# Patient Record
Sex: FEMALE | Race: WHITE | NOT HISPANIC OR LATINO | Employment: OTHER | URBAN - METROPOLITAN AREA
[De-identification: names, ages, dates, MRNs, and addresses within clinical notes are randomized per-mention and may not be internally consistent; named-entity substitution may affect disease eponyms.]

---

## 2017-01-10 ENCOUNTER — PATIENT MESSAGE (OUTPATIENT)
Dept: UROLOGY | Facility: CLINIC | Age: 77
End: 2017-01-10

## 2017-01-10 ENCOUNTER — TELEPHONE (OUTPATIENT)
Dept: UROLOGY | Facility: CLINIC | Age: 77
End: 2017-01-10

## 2017-01-10 DIAGNOSIS — N20.1 URETERAL CALCULUS, LEFT: Primary | ICD-10-CM

## 2017-01-10 NOTE — H&P
Subjective:       Patient ID: Valery Huggins is a 76 y.o. female.    Chief Complaint: Patient had undergone left ESWL on 12/5/2016.  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  Most recent KUB is able to visualize stone fragments alongside the stent.    Past Medical History   Diagnosis Date    Anemia     Cancer      thyroid    Cancer      Kidney    Depression     Diabetes mellitus     Diabetes mellitus type II     Encounter for blood transfusion     Gallstones     Hypertension     Kidney stone     MVP (mitral valve prolapse)     PONV (postoperative nausea and vomiting)     Thyroid disease      Past Surgical History   Procedure Laterality Date    Appendectomy      Hysterectomy      Tonsillectomy      Ectopic pregnancy surgery      Thyroidectomy       two times    Cataracts       both eyes    Cystoscopy      Cystoscopy w/ ureteral stent placement      Nasal septum surgery       Family History   Problem Relation Age of Onset    Hypertension Father     Kidney disease Father     Mental illness Mother     Cancer Brother      Social History     Social History    Marital status:      Spouse name: N/A    Number of children: N/A    Years of education: N/A     Social History Main Topics    Smoking status: Never Smoker    Smokeless tobacco: Not on file    Alcohol use No      Comment: per month    Drug use: No    Sexual activity: Yes     Partners: Male     Other Topics Concern    Not on file     Social History Narrative       Current Outpatient Prescriptions   Medication Sig Dispense Refill    ascorbic acid (VITAMIN C) 100 MG tablet Take 100 mg by mouth once daily.      aspirin (ECOTRIN) 81 MG EC tablet Take 81 mg by mouth once daily.      atenolol (TENORMIN) 25 MG tablet Take 1 tablet (25 mg total) by mouth once daily. 30 tablet 1    b complex vitamins capsule Take 1 capsule by mouth once daily.      BD ULTRA-FINE CONNIE  "PEN NEEDLES 32 gauge x 5/32" Ndle Use EVERY DAY  3    calcitRIOL (ROCALTROL) 0.5 MCG Cap Take 1 capsule (0.5 mcg total) by mouth once daily. 90 capsule 0    cholecalciferol, vitamin D3, (VITAMIN D3) 4,000 unit Cap Take 1 capsule by mouth once daily.      escitalopram oxalate (LEXAPRO) 20 MG tablet Take 1 tablet (20 mg total) by mouth once daily. 30 tablet 0    fenofibrate (TRICOR) 54 MG tablet Take 1 tablet (54 mg total) by mouth once daily. 90 tablet 0    ferrous sulfate 325 (65 FE) MG EC tablet Take 1 tablet (325 mg total) by mouth 3 (three) times daily. 180 tablet 0    JANUMET XR 50-1,000 mg TM24 Take 1 tablet by mouth 2 (two) times daily.  3    levothyroxine (SYNTHROID) 112 MCG tablet Take 1 tablet (112 mcg total) by mouth before breakfast. 90 tablet 0    magnesium oxide (MAG-OX) 400 mg tablet TAKE ONE TABLET BY MOUTH TWICE DAILY 180 tablet 0    meclizine (ANTIVERT) 25 mg tablet Take 1 tablet (25 mg total) by mouth 3 (three) times daily as needed for Dizziness or Nausea. 30 tablet 1    niacin 500 MG CpSR Take 500 mg by mouth every evening.       ondansetron (ZOFRAN, AS HYDROCHLORIDE,) 4 MG tablet Take 1 tablet (4 mg total) by mouth daily as needed for Nausea. 15 tablet 0    ONETOUCH DELICA LANCETS 33 gauge Misc TESR BLOOD SUGAR THREE TIMES DAILY  3    ONETOUCH VERIO Strp TEST BLOOD SUGAR THREE TIMES DAILY  3    pantoprazole (PROTONIX) 40 MG tablet TAKE ONE TABLET BY MOUTH EVERY DAY 90 tablet 0    valsartan-hydrochlorothiazide (DIOVAN-HCT) 160-12.5 mg per tablet Take 1 tablet by mouth once daily. 90 tablet 0    VICTOZA 3-XAVIER 0.6 mg/0.1 mL (18 mg/3 mL) PnIj 1.2 mg sq SUBCUTANEOUS every day  3     No current facility-administered medications for this visit.      Review of patient's allergies indicates:  No Known Allergies    Review of Systems   All other systems reviewed and are negative.      Objective:      There were no vitals filed for this visit.  Physical Exam   Constitutional: She appears " well-developed.   Eyes: Pupils are equal, round, and reactive to light.   Cardiovascular: Normal rate.    Pulmonary/Chest: Effort normal.   Abdominal: Soft.   Mild left flank tenderness        Assessment:       1. Ureteral calculus, left        Plan:       Cystoscopy left ureteroscopy stone extraction

## 2017-01-11 ENCOUNTER — HOSPITAL ENCOUNTER (OUTPATIENT)
Dept: PREADMISSION TESTING | Facility: HOSPITAL | Age: 77
Discharge: HOME | End: 2017-01-11
Attending: UROLOGY
Payer: MEDICARE

## 2017-01-11 VITALS — WEIGHT: 135 LBS | BODY MASS INDEX: 25.49 KG/M2 | HEIGHT: 61 IN

## 2017-01-11 DIAGNOSIS — N20.1 URETERAL CALCULUS, LEFT: ICD-10-CM

## 2017-01-11 LAB
ALBUMIN SERPL BCP-MCNC: 3.5 G/DL
ALP SERPL-CCNC: 52 U/L
ALT SERPL W/O P-5'-P-CCNC: 13 U/L
ANION GAP SERPL CALC-SCNC: 10 MMOL/L
AST SERPL-CCNC: 14 U/L
BASOPHILS # BLD AUTO: 0 K/UL
BASOPHILS NFR BLD: 0.5 %
BILIRUB SERPL-MCNC: 0.2 MG/DL
BUN SERPL-MCNC: 23 MG/DL
CALCIUM SERPL-MCNC: 9 MG/DL
CHLORIDE SERPL-SCNC: 99 MMOL/L
CO2 SERPL-SCNC: 28 MMOL/L
CREAT SERPL-MCNC: 1.2 MG/DL
DIFFERENTIAL METHOD: ABNORMAL
EOSINOPHIL # BLD AUTO: 0.4 K/UL
EOSINOPHIL NFR BLD: 5.2 %
ERYTHROCYTE [DISTWIDTH] IN BLOOD BY AUTOMATED COUNT: 14.5 %
EST. GFR  (AFRICAN AMERICAN): 51 ML/MIN/1.73 M^2
EST. GFR  (NON AFRICAN AMERICAN): 45 ML/MIN/1.73 M^2
GLUCOSE SERPL-MCNC: 183 MG/DL
HCT VFR BLD AUTO: 33.9 %
HGB BLD-MCNC: 11.3 G/DL
LYMPHOCYTES # BLD AUTO: 1.7 K/UL
LYMPHOCYTES NFR BLD: 22.2 %
MCH RBC QN AUTO: 28.2 PG
MCHC RBC AUTO-ENTMCNC: 33.4 %
MCV RBC AUTO: 85 FL
MONOCYTES # BLD AUTO: 0.5 K/UL
MONOCYTES NFR BLD: 6.9 %
NEUTROPHILS # BLD AUTO: 4.9 K/UL
NEUTROPHILS NFR BLD: 65.2 %
PLATELET # BLD AUTO: 320 K/UL
PMV BLD AUTO: 7.8 FL
POTASSIUM SERPL-SCNC: 4 MMOL/L
PROT SERPL-MCNC: 6.5 G/DL
RBC # BLD AUTO: 4.01 M/UL
SODIUM SERPL-SCNC: 137 MMOL/L
WBC # BLD AUTO: 7.5 K/UL

## 2017-01-11 PROCEDURE — 99900103 DSU ONLY-NO CHARGE-INITIAL HR (STAT)

## 2017-01-11 PROCEDURE — 99900104 DSU ONLY-NO CHARGE-EA ADD'L HR (STAT)

## 2017-01-11 PROCEDURE — 85025 COMPLETE CBC W/AUTO DIFF WBC: CPT

## 2017-01-11 PROCEDURE — 36415 COLL VENOUS BLD VENIPUNCTURE: CPT

## 2017-01-11 PROCEDURE — 80053 COMPREHEN METABOLIC PANEL: CPT

## 2017-01-13 ENCOUNTER — ANESTHESIA EVENT (OUTPATIENT)
Dept: SURGERY | Facility: HOSPITAL | Age: 77
End: 2017-01-13
Payer: COMMERCIAL

## 2017-01-16 ENCOUNTER — ANESTHESIA (OUTPATIENT)
Dept: SURGERY | Facility: HOSPITAL | Age: 77
End: 2017-01-16
Payer: COMMERCIAL

## 2017-01-16 ENCOUNTER — HOSPITAL ENCOUNTER (OUTPATIENT)
Facility: HOSPITAL | Age: 77
Discharge: HOME | End: 2017-01-16
Attending: UROLOGY | Admitting: UROLOGY
Payer: COMMERCIAL

## 2017-01-16 VITALS
DIASTOLIC BLOOD PRESSURE: 67 MMHG | BODY MASS INDEX: 26.24 KG/M2 | WEIGHT: 139 LBS | RESPIRATION RATE: 14 BRPM | HEART RATE: 77 BPM | SYSTOLIC BLOOD PRESSURE: 145 MMHG | HEIGHT: 61 IN | TEMPERATURE: 98 F | OXYGEN SATURATION: 100 %

## 2017-01-16 DIAGNOSIS — N20.1 URETERAL CALCULUS, LEFT: ICD-10-CM

## 2017-01-16 PROCEDURE — D9220A PRA ANESTHESIA: Mod: ANES,,, | Performed by: ANESTHESIOLOGY

## 2017-01-16 PROCEDURE — 52352 CYSTOURETERO W/STONE REMOVE: CPT | Mod: 58,LT,, | Performed by: UROLOGY

## 2017-01-16 PROCEDURE — 99900103 DSU ONLY-NO CHARGE-INITIAL HR (STAT): Performed by: UROLOGY

## 2017-01-16 PROCEDURE — 71000033 HC RECOVERY, INTIAL HOUR: Performed by: UROLOGY

## 2017-01-16 PROCEDURE — 37000008 HC ANESTHESIA 1ST 15 MINUTES: Performed by: UROLOGY

## 2017-01-16 PROCEDURE — 76000 FLUOROSCOPY <1 HR PHYS/QHP: CPT | Mod: 26,59,, | Performed by: UROLOGY

## 2017-01-16 PROCEDURE — 82370 X-RAY ASSAY CALCULUS: CPT

## 2017-01-16 PROCEDURE — C1773 RET DEV, INSERTABLE: HCPCS | Performed by: UROLOGY

## 2017-01-16 PROCEDURE — 74420 UROGRAPHY RTRGR +-KUB: CPT | Mod: 26,,, | Performed by: UROLOGY

## 2017-01-16 PROCEDURE — 71000015 HC POSTOP RECOV 1ST HR: Performed by: UROLOGY

## 2017-01-16 PROCEDURE — 63600175 PHARM REV CODE 636 W HCPCS: Performed by: NURSE ANESTHETIST, CERTIFIED REGISTERED

## 2017-01-16 PROCEDURE — 25000003 PHARM REV CODE 250: Performed by: ANESTHESIOLOGY

## 2017-01-16 PROCEDURE — 25000003 PHARM REV CODE 250: Performed by: NURSE ANESTHETIST, CERTIFIED REGISTERED

## 2017-01-16 PROCEDURE — D9220A PRA ANESTHESIA: Mod: CRNA,,, | Performed by: NURSE ANESTHETIST, CERTIFIED REGISTERED

## 2017-01-16 PROCEDURE — 36000706: Performed by: UROLOGY

## 2017-01-16 PROCEDURE — 36000707: Performed by: UROLOGY

## 2017-01-16 PROCEDURE — C1769 GUIDE WIRE: HCPCS | Performed by: UROLOGY

## 2017-01-16 PROCEDURE — 99900104 DSU ONLY-NO CHARGE-EA ADD'L HR (STAT): Performed by: UROLOGY

## 2017-01-16 PROCEDURE — 37000009 HC ANESTHESIA EA ADD 15 MINS: Performed by: UROLOGY

## 2017-01-16 PROCEDURE — 63600175 PHARM REV CODE 636 W HCPCS: Performed by: UROLOGY

## 2017-01-16 PROCEDURE — 25000003 PHARM REV CODE 250: Performed by: UROLOGY

## 2017-01-16 PROCEDURE — C1758 CATHETER, URETERAL: HCPCS | Performed by: UROLOGY

## 2017-01-16 RX ORDER — LIDOCAINE HCL/PF 100 MG/5ML
SYRINGE (ML) INTRAVENOUS
Status: DISCONTINUED | OUTPATIENT
Start: 2017-01-16 | End: 2017-01-16

## 2017-01-16 RX ORDER — PROPOFOL 10 MG/ML
VIAL (ML) INTRAVENOUS
Status: DISCONTINUED | OUTPATIENT
Start: 2017-01-16 | End: 2017-01-16

## 2017-01-16 RX ORDER — HYDROCODONE BITARTRATE AND ACETAMINOPHEN 5; 325 MG/1; MG/1
1 TABLET ORAL
Qty: 20 TABLET | Refills: 0 | Status: SHIPPED | OUTPATIENT
Start: 2017-01-16 | End: 2017-09-26

## 2017-01-16 RX ORDER — LIDOCAINE HYDROCHLORIDE 10 MG/ML
1 INJECTION, SOLUTION EPIDURAL; INFILTRATION; INTRACAUDAL; PERINEURAL
Status: DISCONTINUED | OUTPATIENT
Start: 2017-01-16 | End: 2017-01-16 | Stop reason: HOSPADM

## 2017-01-16 RX ORDER — PHENYLEPHRINE HYDROCHLORIDE 10 MG/ML
INJECTION INTRAVENOUS
Status: DISCONTINUED | OUTPATIENT
Start: 2017-01-16 | End: 2017-01-16

## 2017-01-16 RX ORDER — PHENAZOPYRIDINE HYDROCHLORIDE 200 MG/1
200 TABLET, FILM COATED ORAL ONCE
Status: CANCELLED | OUTPATIENT
Start: 2017-01-16 | End: 2017-01-16

## 2017-01-16 RX ORDER — HYDROCODONE BITARTRATE AND ACETAMINOPHEN 5; 325 MG/1; MG/1
1 TABLET ORAL ONCE
Status: COMPLETED | OUTPATIENT
Start: 2017-01-16 | End: 2017-01-16

## 2017-01-16 RX ORDER — SODIUM CHLORIDE 0.9 % (FLUSH) 0.9 %
3 SYRINGE (ML) INJECTION
Status: DISCONTINUED | OUTPATIENT
Start: 2017-01-16 | End: 2017-01-16 | Stop reason: HOSPADM

## 2017-01-16 RX ORDER — FENTANYL CITRATE 50 UG/ML
INJECTION, SOLUTION INTRAMUSCULAR; INTRAVENOUS
Status: DISCONTINUED | OUTPATIENT
Start: 2017-01-16 | End: 2017-01-16

## 2017-01-16 RX ORDER — FENTANYL CITRATE 50 UG/ML
25 INJECTION, SOLUTION INTRAMUSCULAR; INTRAVENOUS EVERY 5 MIN PRN
Status: DISCONTINUED | OUTPATIENT
Start: 2017-01-16 | End: 2017-01-16 | Stop reason: HOSPADM

## 2017-01-16 RX ORDER — SODIUM CHLORIDE, SODIUM LACTATE, POTASSIUM CHLORIDE, CALCIUM CHLORIDE 600; 310; 30; 20 MG/100ML; MG/100ML; MG/100ML; MG/100ML
10 INJECTION, SOLUTION INTRAVENOUS CONTINUOUS
Status: DISCONTINUED | OUTPATIENT
Start: 2017-01-17 | End: 2017-01-16 | Stop reason: HOSPADM

## 2017-01-16 RX ORDER — ONDANSETRON 2 MG/ML
INJECTION INTRAMUSCULAR; INTRAVENOUS
Status: DISCONTINUED | OUTPATIENT
Start: 2017-01-16 | End: 2017-01-16

## 2017-01-16 RX ORDER — LIDOCAINE HYDROCHLORIDE 20 MG/ML
JELLY TOPICAL
Status: DISCONTINUED | OUTPATIENT
Start: 2017-01-16 | End: 2017-01-16 | Stop reason: HOSPADM

## 2017-01-16 RX ORDER — CEFAZOLIN SODIUM 2 G/50ML
2 SOLUTION INTRAVENOUS
Status: COMPLETED | OUTPATIENT
Start: 2017-01-16 | End: 2017-01-16

## 2017-01-16 RX ORDER — HYDROCODONE BITARTRATE AND ACETAMINOPHEN 5; 325 MG/1; MG/1
1 TABLET ORAL EVERY 4 HOURS PRN
Status: CANCELLED | OUTPATIENT
Start: 2017-01-16

## 2017-01-16 RX ORDER — PHENAZOPYRIDINE HYDROCHLORIDE 100 MG/1
200 TABLET, FILM COATED ORAL
Qty: 20 TABLET | Refills: 0 | Status: SHIPPED | OUTPATIENT
Start: 2017-01-16 | End: 2017-02-23

## 2017-01-16 RX ORDER — CIPROFLOXACIN 500 MG/1
500 TABLET ORAL 2 TIMES DAILY
Qty: 20 TABLET | Refills: 0 | Status: SHIPPED | OUTPATIENT
Start: 2017-01-16 | End: 2017-01-26

## 2017-01-16 RX ORDER — METOCLOPRAMIDE HYDROCHLORIDE 5 MG/ML
10 INJECTION INTRAMUSCULAR; INTRAVENOUS EVERY 10 MIN PRN
Status: DISCONTINUED | OUTPATIENT
Start: 2017-01-16 | End: 2017-01-16 | Stop reason: HOSPADM

## 2017-01-16 RX ADMIN — PHENYLEPHRINE HYDROCHLORIDE 100 MCG: 10 INJECTION INTRAVENOUS at 12:01

## 2017-01-16 RX ADMIN — HYDROCODONE BITARTRATE AND ACETAMINOPHEN 1 TABLET: 5; 325 TABLET ORAL at 01:01

## 2017-01-16 RX ADMIN — ONDANSETRON 4 MG: 2 INJECTION, SOLUTION INTRAMUSCULAR; INTRAVENOUS at 11:01

## 2017-01-16 RX ADMIN — PROPOFOL 120 MG: 10 INJECTION, EMULSION INTRAVENOUS at 11:01

## 2017-01-16 RX ADMIN — CEFAZOLIN SODIUM 2 G: 2 SOLUTION INTRAVENOUS at 11:01

## 2017-01-16 RX ADMIN — LIDOCAINE HYDROCHLORIDE 50 MG: 20 INJECTION, SOLUTION INTRAVENOUS at 12:01

## 2017-01-16 RX ADMIN — FENTANYL CITRATE 25 MCG: 50 INJECTION INTRAMUSCULAR; INTRAVENOUS at 11:01

## 2017-01-16 RX ADMIN — SODIUM CHLORIDE, SODIUM LACTATE, POTASSIUM CHLORIDE, AND CALCIUM CHLORIDE 10 ML/HR: .6; .31; .03; .02 INJECTION, SOLUTION INTRAVENOUS at 09:01

## 2017-01-16 RX ADMIN — LIDOCAINE HYDROCHLORIDE 10 MG: 10 INJECTION, SOLUTION EPIDURAL; INFILTRATION; INTRACAUDAL; PERINEURAL at 09:01

## 2017-01-16 RX ADMIN — LIDOCAINE HYDROCHLORIDE 50 MG: 20 INJECTION, SOLUTION INTRAVENOUS at 11:01

## 2017-01-16 NOTE — ANESTHESIA PREPROCEDURE EVALUATION
01/16/2017  Valery Huggins is a 76 y.o., female.    [facility] Anesthesia Evaluation    I have reviewed the Patient Summary Reports.    I have reviewed the Nursing Notes.      Review of Systems  Anesthesia Hx:  PONV   Social:  Non-Smoker    Hematology/Oncology:         -- Anemia:   Cardiovascular:   Hypertension, well controlled Valvular problems/Murmurs, MVP ECG has been reviewed.    Pulmonary:  Pulmonary Normal    Renal/:   renal calculi    Neurological:  Neurology Normal    Endocrine:   Diabetes, well controlled, type 2 Hypothyroidism    Psych:   Psychiatric History          Physical Exam  General:  Well nourished    Airway/Jaw/Neck:  Airway Findings: Mouth Opening: Normal Tongue: Normal  General Airway Assessment: Adult  Mallampati: III  Improves to II with phonation.  TM Distance: Normal, at least 6 cm  Jaw/Neck Findings:  Neck ROM: Normal ROM  Neck Findings:  Thyroidectomy Scar      Dental:  Dental Findings:   Chest/Lungs:  Chest/Lungs Findings: Clear to auscultation     Heart/Vascular:  Heart Findings: Rate: Normal  Rhythm: Regular Rhythm  Sounds: Normal             Anesthesia Plan  Type of Anesthesia, risks & benefits discussed:  Anesthesia Type:  general  Patient's Preference:   Intra-op Monitoring Plan:   Intra-op Monitoring Plan Comments:   Post Op Pain Control Plan:   Post Op Pain Control Plan Comments:   Induction:   IV  Beta Blocker:  Patient is on a Beta-Blocker and has received one dose within the past 24 hours (No further documentation required).       Informed Consent: Patient understands risks and agrees with Anesthesia plan.  Questions answered. Anesthesia consent signed with patient.  ASA Score: 2     Day of Surgery Review of History & Physical:    H&P update referred to the surgeon.         Ready For Surgery From Anesthesia Perspective.

## 2017-01-16 NOTE — TRANSFER OF CARE
"Anesthesia Transfer of Care Note    Patient: Valery Huggins    Procedure(s) Performed: Procedure(s) (LRB):  EXTRACTION-STONE-URETEROSCOPY (Left)  PYELOGRAM-RETROGRADE (Left)  CYSTOSCOPY WITH STENT REMOVAL (Left)    Patient location: PACU    Anesthesia Type: general    Transport from OR: Transported from OR on 2-3 L/min O2 by NC with adequate spontaneous ventilation    Post pain: adequate analgesia    Post assessment: no apparent anesthetic complications    Post vital signs: stable    Level of consciousness: awake    Nausea/Vomiting: no nausea/vomiting    Complications: none          Last vitals:   Visit Vitals    BP (!) 120/59 (BP Location: Left arm, Patient Position: Lying, BP Method: Automatic)    Pulse 78    Temp 36.7 °C (98 °F) (Oral)    Resp 18    Ht 5' 1" (1.549 m)    Wt 63 kg (139 lb)    LMP 06/01/2012    SpO2 100%    Breastfeeding No    BMI 26.26 kg/m2     "

## 2017-01-16 NOTE — IP AVS SNAPSHOT
[address] [place] [address]  Phone: [phone]           Patient Discharge Instructions     Our goal is to set you up for success. This packet includes information on your condition, medications, and your home care. It will help you to care for yourself so you don't get sicker and need to go back to the hospital.     Please ask your nurse if you have any questions.        There are many details to remember when preparing to leave the hospital. Here is what you will need to do:    1. Take your medicine. If you are prescribed medications, review your Medication List in the following pages. You may have new medications to  at the pharmacy and others that you'll need to stop taking. Review the instructions for how and when to take your medications. Talk with your doctor or nurses if you are unsure of what to do.     2. Go to your follow-up appointments. Specific follow-up information is listed in the following pages. Your may be contacted by a transition nurse or clinical provider about future appointments. Be sure we have all of the phone numbers to reach you, if needed. Please contact your provider's office if you are unable to make an appointment.     3. Watch for warning signs. Your doctor or nurse will give you detailed warning signs to watch for and when to call for assistance. These instructions may also include educational information about your condition. If you experience any of warning signs to your health, call your doctor.               [facility]  Unless otherwise directed by your provider, please contact [facility], our nurse care line that is available for 24/7 assistance.     [phone] (toll-free)    Registered nurses in the [facility] provide clinical advisement, health education, appointment booking, and other advisory services.                    ** Verify the list of medication(s) below is accurate and up to date.  "Carry this with you in case of emergency. If your medications have changed, please notify your healthcare provider.             Medication List      START taking these medications        Additional Info                      ciprofloxacin HCl 500 MG tablet   Commonly known as:  CIPRO   Quantity:  20 tablet   Refills:  0   Dose:  500 mg    Instructions:  Take 1 tablet (500 mg total) by mouth 2 (two) times daily.     Begin Date    AM    Noon    PM    Bedtime       hydrocodone-acetaminophen 5-325mg 5-325 mg per tablet   Commonly known as:  NORCO   Quantity:  20 tablet   Refills:  0   Dose:  1 tablet    Instructions:  Take 1 tablet by mouth every 4 to 6 hours as needed for Pain.     Begin Date    AM    Noon    PM    Bedtime       phenazopyridine 100 MG tablet   Commonly known as:  PYRIDIUM   Quantity:  20 tablet   Refills:  0   Dose:  200 mg    Instructions:  Take 2 tablets (200 mg total) by mouth 3 (three) times daily with meals.     Begin Date    AM    Noon    PM    Bedtime         CONTINUE taking these medications        Additional Info                      aspirin 81 MG EC tablet   Commonly known as:  ECOTRIN   Refills:  0   Dose:  81 mg    Instructions:  Take 81 mg by mouth once daily.     Begin Date    AM    Noon    PM    Bedtime       atenolol 25 MG tablet   Commonly known as:  TENORMIN   Quantity:  30 tablet   Refills:  1   Dose:  25 mg   Comments:  This prescription was filled today. Any refills authorized will be placed on file.    Instructions:  Take 1 tablet (25 mg total) by mouth once daily.     Begin Date    AM    Noon    PM    Bedtime       b complex vitamins capsule   Refills:  0   Dose:  1 capsule    Instructions:  Take 1 capsule by mouth once daily.     Begin Date    AM    Noon    PM    Bedtime       BD ULTRA-FINE CONNIE PEN NEEDLES 32 gauge x 5/32" Ndle   Refills:  3   Generic drug:  pen needle, diabetic    Instructions:  Use EVERY DAY     Begin Date    AM    Noon    PM    Bedtime       calcitRIOL 0.5 " MCG Cap   Commonly known as:  ROCALTROL   Quantity:  90 capsule   Refills:  0   Dose:  0.5 mcg    Instructions:  Take 1 capsule (0.5 mcg total) by mouth once daily.     Begin Date    AM    Noon    PM    Bedtime       escitalopram oxalate 20 MG tablet   Commonly known as:  LEXAPRO   Quantity:  30 tablet   Refills:  0   Dose:  20 mg    Instructions:  Take 1 tablet (20 mg total) by mouth once daily.     Begin Date    AM    Noon    PM    Bedtime       fenofibrate 54 MG tablet   Commonly known as:  TRICOR   Quantity:  90 tablet   Refills:  0   Dose:  54 mg    Instructions:  Take 1 tablet (54 mg total) by mouth once daily.     Begin Date    AM    Noon    PM    Bedtime       ferrous sulfate 325 (65 FE) MG EC tablet   Quantity:  180 tablet   Refills:  0   Dose:  325 mg    Instructions:  Take 1 tablet (325 mg total) by mouth 3 (three) times daily.     Begin Date    AM    Noon    PM    Bedtime       JANUMET XR 50-1,000 mg Tm24   Refills:  3   Dose:  1 tablet   Generic drug:  SITagliptan-metformin    Instructions:  Take 1 tablet by mouth 2 (two) times daily.     Begin Date    AM    Noon    PM    Bedtime       levothyroxine 112 MCG tablet   Commonly known as:  SYNTHROID   Quantity:  90 tablet   Refills:  0   Dose:  112 mcg    Instructions:  Take 1 tablet (112 mcg total) by mouth before breakfast.     Begin Date    AM    Noon    PM    Bedtime       magnesium oxide 400 mg tablet   Commonly known as:  MAG-OX   Quantity:  180 tablet   Refills:  0    Instructions:  TAKE ONE TABLET BY MOUTH TWICE DAILY     Begin Date    AM    Noon    PM    Bedtime       meclizine 25 mg tablet   Commonly known as:  ANTIVERT   Quantity:  30 tablet   Refills:  1   Dose:  25 mg    Instructions:  Take 1 tablet (25 mg total) by mouth 3 (three) times daily as needed for Dizziness or Nausea.     Begin Date    AM    Noon    PM    Bedtime       niacin 500 MG Cpsr   Refills:  0   Dose:  500 mg    Instructions:  Take 500 mg by mouth every evening.     Begin Date     AM    Noon    PM    Bedtime       ondansetron 4 MG tablet   Commonly known as:  ZOFRAN (AS HYDROCHLORIDE)   Quantity:  15 tablet   Refills:  0   Dose:  4 mg    Instructions:  Take 1 tablet (4 mg total) by mouth daily as needed for Nausea.     Begin Date    AM    Noon    PM    Bedtime       ONETOUCH DELICA LANCETS 33 gauge Misc   Refills:  3   Generic drug:  lancets    Instructions:  TESR BLOOD SUGAR THREE TIMES DAILY     Begin Date    AM    Noon    PM    Bedtime       ONETOUCH VERIO Strp   Refills:  3   Generic drug:  blood sugar diagnostic    Instructions:  TEST BLOOD SUGAR THREE TIMES DAILY     Begin Date    AM    Noon    PM    Bedtime       pantoprazole 40 MG tablet   Commonly known as:  PROTONIX   Quantity:  90 tablet   Refills:  0    Instructions:  TAKE ONE TABLET BY MOUTH EVERY DAY     Begin Date    AM    Noon    PM    Bedtime       valsartan-hydrochlorothiazide 160-12.5 mg per tablet   Commonly known as:  DIOVAN-HCT   Quantity:  90 tablet   Refills:  0   Dose:  1 tablet    Instructions:  Take 1 tablet by mouth once daily.     Begin Date    AM    Noon    PM    Bedtime       VICTOZA 3-XAVIER 0.6 mg/0.1 mL (18 mg/3 mL) Pnij   Refills:  3   Generic drug:  liraglutide 0.6 mg/0.1 mL (18 mg/3 mL) subq PNIJ    Instructions:  1.2 mg sq SUBCUTANEOUS every day     Begin Date    AM    Noon    PM    Bedtime       VITAMIN C 100 MG tablet   Refills:  0   Dose:  100 mg   Generic drug:  ascorbic acid (vitamin C)    Instructions:  Take 100 mg by mouth once daily.     Begin Date    AM    Noon    PM    Bedtime       VITAMIN D3 4,000 unit Cap   Refills:  0   Dose:  1 capsule   Generic drug:  cholecalciferol (vitamin D3)    Instructions:  Take 1 capsule by mouth once daily.     Begin Date    AM    Noon    PM    Bedtime            Where to Get Your Medications      These medications were sent to [facility]  FRANNY Calvin [address]  [address] [address]     Phone:  [phone]      ciprofloxacin HCl 500 MG tablet    phenazopyridine 100 MG tablet         You can get these medications from any pharmacy     Bring a paper prescription for each of these medications     hydrocodone-acetaminophen 5-325mg 5-325 mg per tablet                  Please bring to all follow up appointments:    1. A copy of your discharge instructions.  2. All medicines you are currently taking in their original bottles.  3. Identification and insurance card.    Please arrive 15 minutes ahead of scheduled appointment time.    Please call 24 hours in advance if you must reschedule your appointment and/or time.        Your Scheduled Appointments     Jan 31, 2017  9:00 AM CST   Established Patient Visit with MD Carina Marcus - Urology ([facility])    [address]  [facility] [address]   [phone]            May 05, 2017 11:00 AM CDT   Diagnostic Xray with [facility] XROP    [facility] ([facility])    [address] [facility] [address] [phone]            May 05, 2017 11:15 AM CDT   Us Abdomen with [facility] USOPRuddy   [facility] ([facility])    [address] [facility] [address] [phone]            May 09, 2017  1:45 PM CDT   Established Patient Visit with Azar Donnelly MD   [facility] - Urology ([facility])    [address] [place] [address] [phone]              Follow-up Information     Follow up In 2 weeks.        Discharge Instructions     Future Orders    Activity as tolerated     Diet general     Questions:    Total calories:      Fat restriction, if any:      Protein restriction, if any:      Na restriction, if any:      Fluid restriction:      Additional restrictions:          Discharge Instructions       General Information:    1.  Do not drink alcoholic beverages including beer for 24 hours or as long as you are on pain medication..  2.  Do not drive a motor vehicle, operate machinery or power tools,  or signs legal papers for 24 hours or as long as you are on pain medication.   3.  You may experience light-headedness, dizziness, and sleepiness following surgery. Please do not stay alone. A responsible adult should be with you for this 24 hour period.  4.  Go home and rest.    5. Progress slowly to a normal diet unless instructed.  Otherwise, begin with liquids such as soft drinks, then soup and crackers working up to solid foods. Drink plenty of nonalcoholic fluids.  6.  Certain anesthetics and pain medications produce nausea and vomiting in certain       individuals. If nausea becomes a problem at home, call you doctor.    7. A nurse will be calling you sometime after surgery. Do not be alarmed. This is our way of finding out how you are doing.    8. Several times every hour while you are awake, take 2-3 deep breaths and cough. If you had stomach surgery hold a pillow or rolled towel firmly against your stomach before you cough. This will help with any pain the cough might cause.  9. Several times every hour while you are awake, pump and flex your feet 5-6 times and do foot circles. This will help prevent blood clots.    10.Call your doctor for severe pain, bleeding, fever, or signs or symptoms of infection (pain, swelling, redness, foul odor, drainage).Discharge Instructions: After Your Surgery/Procedure  Youve just had surgery. During surgery you were given medicine called anesthesia to keep you relaxed and free of pain. After surgery you may have some pain or nausea. This is common. Here are some tips for feeling better and getting well after surgery.     Stay on schedule with your medication.   Going home  Your doctor or nurse will show you how to take care of yourself when you go home. He or she will also answer your questions. Have an adult family member or friend drive you home.      For your safety we recommend these precaution for the first 24 hours after your procedure:  · Do not drive or use heavy  "equipment.  · Do not make important decisions or sign legal papers.  · Do not drink alcohol.  · Have someone stay with you, if needed. He or she can watch for problems and help keep you safe.  · Your concentration, balance, coordination, and judgement may be impaired for many hours after anesthesia.  Use caution when ambulating or standing up.     · You may feel weak and "washed out" after anesthesia and surgery.      Subtle residual effects of general anesthesia or sedation with regional / local anesthesia can last more than 24 hours.  Rest for the remainder of the day or longer if your Doctor/Surgeon has advised you to do so.  Although you may feel normal within the first 24 hours, your reflexes and mental ability may be impaired without you realizing it.  You may feel dizzy, lightheaded or sleepy for 24 hours or longer.      Be sure to go to all follow-up visits with your doctor. And rest after your surgery for as long as your doctor tells you to.  Coping with pain  If you have pain after surgery, pain medicine will help you feel better. Take it as told, before pain becomes severe. Also, ask your doctor or pharmacist about other ways to control pain. This might be with heat, ice, or relaxation. And follow any other instructions your surgeon or nurse gives you.  Tips for taking pain medicine  To get the best relief possible, remember these points:  · Pain medicines can upset your stomach. Taking them with a little food may help.  · Most pain relievers taken by mouth need at least 20 to 30 minutes to start to work.  · Taking medicine on a schedule can help you remember to take it. Try to time your medicine so that you can take it before starting an activity. This might be before you get dressed, go for a walk, or sit down for dinner.  · Constipation is a common side effect of pain medicines. Call your doctor before taking any medicines such as laxatives or stool softeners to help ease constipation. Also ask if you " should skip any foods. Drinking lots of fluids and eating foods such as fruits and vegetables that are high in fiber can also help. Remember, do not take laxatives unless your surgeon has prescribed them.  · Drinking alcohol and taking pain medicine can cause dizziness and slow your breathing. It can even be deadly. Do not drink alcohol while taking pain medicine.  · Pain medicine can make you react more slowly to things. Do not drive or run machinery while taking pain medicine.  Your health care provider may tell you to take acetaminophen to help ease your pain. Ask him or her how much you are supposed to take each day. Acetaminophen or other pain relievers may interact with your prescription medicines or other over-the-counter (OTC) drugs. Some prescription medicines have acetaminophen and other ingredients. Using both prescription and OTC acetaminophen for pain can cause you to overdose. Read the labels on your OTC medicines with care. This will help you to clearly know the list of ingredients, how much to take, and any warnings. It may also help you not take too much acetaminophen. If you have questions or do not understand the information, ask your pharmacist or health care provider to explain it to you before you take the OTC medicine.  Managing nausea  Some people have an upset stomach after surgery. This is often because of anesthesia, pain, or pain medicine, or the stress of surgery. These tips will help you handle nausea and eat healthy foods as you get better. If you were on a special food plan before surgery, ask your doctor if you should follow it while you get better. These tips may help:  · Do not push yourself to eat. Your body will tell you when to eat and how much.  · Start off with clear liquids and soup. They are easier to digest.  · Next try semi-solid foods, such as mashed potatoes, applesauce, and gelatin, as you feel ready.  · Slowly move to solid foods. Dont eat fatty, rich, or spicy foods  at first.  · Do not force yourself to have 3 large meals a day. Instead eat smaller amounts more often.  · Take pain medicines with a small amount of solid food, such as crackers or toast, to avoid nausea.     Call your surgeon if  · You still have pain an hour after taking medicine. The medicine may not be strong enough.  · You feel too sleepy, dizzy, or groggy. The medicine may be too strong.  · You have side effects like nausea, vomiting, or skin changes, such as rash, itching, or hives.       If you have obstructive sleep apnea  You were given anesthesia medicine during surgery to keep you comfortable and free of pain. After surgery, you may have more apnea spells because of this medicine and other medicines you were given. The spells may last longer than usual.   At home:  · Keep using the continuous positive airway pressure (CPAP) device when you sleep. Unless your health care provider tells you not to, use it when you sleep, day or night. CPAP is a common device used to treat obstructive sleep apnea.  · Talk with your provider before taking any pain medicine, muscle relaxants, or sedatives. Your provider will tell you about the possible dangers of taking these medicines.  © 9860-2278 [employer]. [address] [place] [address], [place] [address]. All rights reserved. This information is not intended as a substitute for professional medical care. Always follow your healthcare professional's instructions.    Cystoscopy  Cystoscopy is a procedure that lets your doctor look directly inside your urethra and bladder. It can be used to:  · Help diagnose a problem with your urethra, bladder, or kidneys.  · Take a sample (biopsy) of bladder or urethral tissue.  · Treat certain problems (such as removing kidney stones).  · Place a stent to bypass an obstruction.  · Take special X-rays of the kidneys.  Based on the findings, your doctor may recommend other tests or treatments.  What is a cystoscope?  A cystoscope  is a telescope-like instrument that contains lenses and fiberoptics (small glass wires that make bright light). The cystoscope may be straight and rigid, or flexible to bend around curves in the urethra. The doctor may look directly into the cystoscope, or project the image onto a monitor.  Getting ready  · Ask your doctor if you should stop taking any medications prior to the procedure.  · Ask whether you should avoid eating or drinking anything after midnight before the procedure.  · Follow any other instructions your doctor gives you.  Tell your doctor before the exam if you:  · Take any medications, such as aspirin or blood thinners  · Have allergies to any medications  · Are pregnant   The procedure  Cystoscopy is done in the doctors office or hospital. The doctor and a nurse are present during the procedure. It takes only a few minutes, longer if a biopsy, X-ray, or treatment needs to be done.  During the procedure:  · You lie on an exam table on your back, knees bent and legs apart. You are covered with a drape.  · Your urethra and the area around it are washed. Anesthetic jelly may be applied to numb the urethra. Other pain medication is usually not needed. In some cases, you may be offered a mild sedative to help you relax. If a more extensive procedure is to be done, such as a biopsy or kidney stone removal, general anesthesia may be needed.  · The cystoscope is inserted. A sterile fluid is put into the bladder to expand it. You may feel pressure from this fluid.  · When the procedure is done, the cystoscope is removed.  After the procedure  If you had a sedative, general anesthesia, or spinal anesthesia, you must have someone drive you home. Once youre home:  · Drink plenty of fluids.  · You may have burning or light bleeding when you urinate--this is normal.  · Medications may be prescribed to ease any discomfort or prevent infection. Take these as directed.  · Call your doctor if you have heavy  "bleeding or blood clots, burning that lasts more than a day, a fever over 100°F  (38° C), or trouble urinating.  © 4147-5024 [employer]. [address] [place] [address], [place] [address]. All rights reserved. This information is not intended as a substitute for professional medical care. Always follow your healthcare professional's instructions.            Primary Diagnosis     Your primary diagnosis was:  Urinary Tract Stone      Admission Information     Date & Time Provider Department CSN    1/16/2017  9:04 AM Roxy Rodríguez MD [facility] [id number]      Care Providers     Provider Role Specialty Primary office phone    Roxy Rodríguez MD Attending Provider Urology [phone]    Roxy Rodríguez MD Surgeon  Urology [phone]      Your Vitals Were     BP Pulse Temp Resp Height Weight    120/59 (BP Location: Left arm, Patient Position: Lying, BP Method: Automatic) 78 98.2 °F (36.8 °C) (Oral) 18 5' 1" (1.549 m) 63 kg (139 lb)    Last Period SpO2 BMI          06/01/2012 100% 26.26 kg/m2        Recent Lab Values        8/3/2007 5/20/2008 7/27/2009 1/20/2010 7/30/2010 7/30/2012 12/5/2012 3/5/2013      9:48 AM 10:28 AM  9:34 AM  9:19 AM  9:19 AM 12:26 PM 10:54 AM 12:25 PM    A1C 7.6 (H) 7.2 (H) 7.1 (H) 7.2 (H) 7.3 (H) 7.0 7.0 6.8      Pending Labs     Order Current Status    Urinary Stone Analysis In process      Allergies as of 1/16/2017     No Known Allergies      Advance Directives     An advance directive is a document which, in the event you are no longer able to make decisions for yourself, tells your healthcare team what kind of treatment you do or do not want to receive, or who you would like to make those decisions for you.  If you do not currently have an advance directive, [facility] encourages you to create one.  For more information call:  [phone]), [phone] ([phone]),  or log on to [email or web address].        Language Assistance Services     " ATTENTION: Language assistance services are available, free of charge. Please call [phone].      ATENCIÓN: Si habla español, tiene a nam disposición servicios gratuitos de asistencia lingüística. Llame al [phone].     CHÚ Ý: N?u b?n nói Ti?ng Vi?t, có các d?ch v? h? tr? ngôn ng? mi?n phí dành cho b?n. G?i s? [phone].        Diabetes Discharge Instructions                                    [facility] complies with applicable Federal civil rights laws and does not discriminate on the basis of race, color, national origin, age, disability, or sex.

## 2017-01-16 NOTE — IP AVS SNAPSHOT
[address] [place] [address]  Phone: [phone]           I have received a copy of my After Visit Summary and discharge instructions from [facility].    INSTRUCTIONS RECEIVED AND UNDERSTOOD BY:                     Patient/Patient Representative: ________________________________________________________________     Date/Time: ________________________________________________________________                     Instructions Given By: ________________________________________________________________     Date/Time: ________________________________________________________________

## 2017-01-16 NOTE — ANESTHESIA POSTPROCEDURE EVALUATION
"Anesthesia Post Evaluation    Patient: Valery Huggins    Procedure(s) Performed: Procedure(s) (LRB):  EXTRACTION-STONE-URETEROSCOPY (Left)  PYELOGRAM-RETROGRADE (Left)  CYSTOSCOPY WITH STENT REMOVAL (Left)    Final Anesthesia Type: general  Patient location during evaluation: PACU  Patient participation: Yes- Able to Participate  Level of consciousness: awake and alert  Post-procedure vital signs: reviewed and stable  Pain management: adequate  Airway patency: patent  PONV status at discharge: No PONV  Anesthetic complications: no      Cardiovascular status: blood pressure returned to baseline  Respiratory status: unassisted  Hydration status: euvolemic  Follow-up not needed.        Visit Vitals    BP (!) 120/59 (BP Location: Left arm, Patient Position: Lying, BP Method: Automatic)    Pulse 78    Temp 36.8 °C (98.2 °F) (Oral)    Resp 18    Ht 5' 1" (1.549 m)    Wt 63 kg (139 lb)    LMP 06/01/2012    SpO2 100%    Breastfeeding No    BMI 26.26 kg/m2       Pain/Jake Score: Pain Assessment Performed: Yes (1/16/2017  1:00 PM)  Presence of Pain: denies (1/16/2017  1:00 PM)  Pain Rating Prior to Med Admin: 2 (1/16/2017  1:00 PM)  Jake Score: 9 (1/16/2017  1:00 PM)      "

## 2017-01-16 NOTE — BRIEF OP NOTE
"Operative Note     SUMMARY     Surgery Date: 1/16/2017     Surgeon(s) and Role:     * Roxy Rodríguez MD - Primary    Pre-op Diagnosis:  Ureteral calculus, left [N20.1]    Post-op Diagnosis:  Ureteral calculus, left [N20.1]    Procedure(s) (LRB):  EXTRACTION-STONE-URETEROSCOPY (Left)  PYELOGRAM-RETROGRADE (Left)  CYSTOSCOPY WITH STENT REMOVAL (Left)    Anesthesia: General    Findings/Key Components:  See Op Note      Estimated Blood Loss: * No values recorded between 1/16/2017 12:03 PM and 1/16/2017 12:28 PM *         Specimens     None            Discharge Note      SUMMARY     Admit Date: 1/16/2017    Attending Physician: Roxy Rodríguez MD     Discharge Physician: Roxy Rodríguez MD    Discharge Date: 1/16/2017     Final Diagnosis: Ureteral calculus, left [N20.1]    Hospital Course: uneventful, going home same day    Disposition: Home or Self Care    Patient Instructions:   Current Discharge Medication List      START taking these medications    Details   ciprofloxacin HCl (CIPRO) 500 MG tablet Take 1 tablet (500 mg total) by mouth 2 (two) times daily.  Qty: 20 tablet, Refills: 0      hydrocodone-acetaminophen 5-325mg (NORCO) 5-325 mg per tablet Take 1 tablet by mouth every 4 to 6 hours as needed for Pain.  Qty: 20 tablet, Refills: 0      phenazopyridine (PYRIDIUM) 100 MG tablet Take 2 tablets (200 mg total) by mouth 3 (three) times daily with meals.  Qty: 20 tablet, Refills: 0         CONTINUE these medications which have NOT CHANGED    Details   ascorbic acid (VITAMIN C) 100 MG tablet Take 100 mg by mouth once daily.      atenolol (TENORMIN) 25 MG tablet Take 1 tablet (25 mg total) by mouth once daily.  Qty: 30 tablet, Refills: 1    Comments: This prescription was filled today. Any refills authorized will be placed on file.  Associated Diagnoses: Essential hypertension      BD ULTRA-FINE CONNIE PEN NEEDLES 32 gauge x 5/32" Ndle Use EVERY DAY  Refills: 3      calcitRIOL (ROCALTROL) 0.5 MCG Cap Take 1 capsule " (0.5 mcg total) by mouth once daily.  Qty: 90 capsule, Refills: 0    Associated Diagnoses: Vitamin deficiency      cholecalciferol, vitamin D3, (VITAMIN D3) 4,000 unit Cap Take 1 capsule by mouth once daily.      escitalopram oxalate (LEXAPRO) 20 MG tablet Take 1 tablet (20 mg total) by mouth once daily.  Qty: 30 tablet, Refills: 0    Associated Diagnoses: Mild single current episode of major depressive disorder      fenofibrate (TRICOR) 54 MG tablet Take 1 tablet (54 mg total) by mouth once daily.  Qty: 90 tablet, Refills: 0    Associated Diagnoses: Hypercholesteremia      ferrous sulfate 325 (65 FE) MG EC tablet Take 1 tablet (325 mg total) by mouth 3 (three) times daily.  Qty: 180 tablet, Refills: 0    Associated Diagnoses: Other iron deficiency anemia      JANUMET XR 50-1,000 mg TM24 Take 1 tablet by mouth 2 (two) times daily.  Refills: 3      levothyroxine (SYNTHROID) 112 MCG tablet Take 1 tablet (112 mcg total) by mouth before breakfast.  Qty: 90 tablet, Refills: 0    Associated Diagnoses: Congenital hypothyroidism without goiter      magnesium oxide (MAG-OX) 400 mg tablet TAKE ONE TABLET BY MOUTH TWICE DAILY  Qty: 180 tablet, Refills: 0    Associated Diagnoses: Hypomagnesemia      niacin 500 MG CpSR Take 500 mg by mouth every evening.       ONETOUCH DELICA LANCETS 33 gauge Misc TESR BLOOD SUGAR THREE TIMES DAILY  Refills: 3      ONETOUCH VERIO Strp TEST BLOOD SUGAR THREE TIMES DAILY  Refills: 3      pantoprazole (PROTONIX) 40 MG tablet TAKE ONE TABLET BY MOUTH EVERY DAY  Qty: 90 tablet, Refills: 0    Associated Diagnoses: Gastroesophageal reflux disease without esophagitis      valsartan-hydrochlorothiazide (DIOVAN-HCT) 160-12.5 mg per tablet Take 1 tablet by mouth once daily.  Qty: 90 tablet, Refills: 0    Associated Diagnoses: Essential hypertension      VICTOZA 3-XAVIER 0.6 mg/0.1 mL (18 mg/3 mL) PnIj 1.2 mg sq SUBCUTANEOUS every day  Refills: 3      aspirin (ECOTRIN) 81 MG EC tablet Take 81 mg by mouth once  daily.      b complex vitamins capsule Take 1 capsule by mouth once daily.      meclizine (ANTIVERT) 25 mg tablet Take 1 tablet (25 mg total) by mouth 3 (three) times daily as needed for Dizziness or Nausea.  Qty: 30 tablet, Refills: 1    Associated Diagnoses: Vertigo      ondansetron (ZOFRAN, AS HYDROCHLORIDE,) 4 MG tablet Take 1 tablet (4 mg total) by mouth daily as needed for Nausea.  Qty: 15 tablet, Refills: 0    Associated Diagnoses: Vertigo; Nausea             Discharge Procedure Orders (must include Diet, Follow-up, Activity)  Resume previous diet.  Follow up with PCP as needed.

## 2017-01-16 NOTE — INTERVAL H&P NOTE
The patient has been examined and the H&P has been reviewed:    I concur with the findings and no changes have occurred since H&P was written.    Anesthesia/Surgery risks, benefits and alternative options discussed and understood by patient/family.          Active Hospital Problems    Diagnosis  POA    Ureteral calculus, left [N20.1]  Yes      Resolved Hospital Problems    Diagnosis Date Resolved POA   No resolved problems to display.

## 2017-01-16 NOTE — PLAN OF CARE
Discharge instructions given to pt and  who voice understanding.  Taking po fluids without difficulty.  Voiding clear pink urine without difficulty.  Denies pain

## 2017-01-16 NOTE — OP NOTE
DATE OF PROCEDURE:  01/16/2017    PREOPERATIVE DIAGNOSES:  Left ureteral and renal calculi and indwelling left   ureteral stent.    POSTOPERATIVE DIAGNOSES:  Left ureteral and renal calculi and indwelling left   ureteral stent.    PROCEDURES PERFORMED:  Cystourethroscopy, left ureteroscopy with stone   extraction, removal of left ureteral stent and left retrograde pyelogram.    SURGEON:  Roxy Rodríguez M.D.    ANESTHESIA:  General.    PROCEDURE IN DETAIL:  The patient was brought to the Cystoscopy Suite and after   adequate induction of general anesthesia, she was placed in lithotomy position.    Genitalia were prepped and draped in usual manner.  Plain fluoroscopic images   were obtained of the abdomen and pelvis, and the left double-J stent was noted   to be in good position and easily visualized alongside the stent and areas of   the proximal mid ureter, some small rounded radiopaque densities were noted to   be consistent with calculi that the patient was known to have in the left   ureter.  There was also a small stone visualized at the level of the lower pole   of the left kidney that the patient is known to have from prior imaging studies,   but the large right stone in the upper pole of the left kidney was no longer   visualized, the patient having undergone extracorporeal shockwave lithotripsy in   the past.  A 22-Luxembourger cystoscope sheath with foroblique lens and video camera   was then inserted under direct vision into urethra.  Examination of urethra was   unremarkable.  The interior of the bladder was then examined and protruding from   the left ureteral orifice was the distal coiled end of the double-J stent.    This was grasped with alligator forceps and removed without any resistance or   difficulty.  Subsequently, a #35 guidewire was introduced into left ureteral   orifice, was advanced up to the kidney and subsequently, a rigid ureteroscope   was introduced into the left ureter alongside the  guidewire and was advanced up   the ureter and in the portion of the distal ureter, the calculus was encountered   and this was successfully engaged within the wires of a Jackson stone basket and   removed without any resistance or difficulty.  Ureteroscope was then   reintroduced and advanced up the ureter in the area of the mid to proximal   ureter.  Another larger calculus was encountered, plus another small one   adjacent to it and these were successfully engaged within the wires of a Jackson   stone basket and also removed without any resistance or difficulty.  The   ureteroscope was then reintroduced one more time and it was passed all the way   up into the kidney and no further calculi or points of obstruction were   encountered.  The ureteroscope was then removed and subsequently, a 5-Syriac   open-ended catheter was threaded over the guidewire.  It was passed up the   ureter until the proximal end was in the renal pelvis.  Guidewire was removed   and contrast was then injected into the open-ended catheter and in this fashion,   retrograde pyelogram was obtained, which revealed essentially normal left   pyelocalyceal system with no evidence of any hydronephrosis, no obstruction.    The instrument was then pulled back, while  injecting contrast; in this   fashion, whole ureter was visualized, and there were no further points of   obstruction and no evidence of any extravasation.  Cystoscope was then   reintroduced into the bladder and interior of the bladder was examined and   multiple small stone fragments scattered throughout the lumen of the bladder,   somewhat larger ones were removed with the aid of alligator forceps, but the   rest were felt that will be able to pass without any difficulty.  At the end of   procedure, the bladder was then drained, instruments removed and the patient   having tolerated the procedure well, was then transferred to the Recovery Room   in stable condition.      MD/  dd:  01/16/2017 12:42:48 (CST)  td: 01/16/2017 14:00:14 (CST)  Doc ID   #[id number]  Job ID #[id number]    CC:

## 2017-01-16 NOTE — DISCHARGE INSTRUCTIONS
General Information:    1.  Do not drink alcoholic beverages including beer for 24 hours or as long as you are on pain medication..  2.  Do not drive a motor vehicle, operate machinery or power tools, or signs legal papers for 24 hours or as long as you are on pain medication.   3.  You may experience light-headedness, dizziness, and sleepiness following surgery. Please do not stay alone. A responsible adult should be with you for this 24 hour period.  4.  Go home and rest.    5. Progress slowly to a normal diet unless instructed.  Otherwise, begin with liquids such as soft drinks, then soup and crackers working up to solid foods. Drink plenty of nonalcoholic fluids.  6.  Certain anesthetics and pain medications produce nausea and vomiting in certain       individuals. If nausea becomes a problem at home, call you doctor.    7. A nurse will be calling you sometime after surgery. Do not be alarmed. This is our way of finding out how you are doing.    8. Several times every hour while you are awake, take 2-3 deep breaths and cough. If you had stomach surgery hold a pillow or rolled towel firmly against your stomach before you cough. This will help with any pain the cough might cause.  9. Several times every hour while you are awake, pump and flex your feet 5-6 times and do foot circles. This will help prevent blood clots.    10.Call your doctor for severe pain, bleeding, fever, or signs or symptoms of infection (pain, swelling, redness, foul odor, drainage).Discharge Instructions: After Your Surgery/Procedure  Youve just had surgery. During surgery you were given medicine called anesthesia to keep you relaxed and free of pain. After surgery you may have some pain or nausea. This is common. Here are some tips for feeling better and getting well after surgery.     Stay on schedule with your medication.   Going home  Your doctor or nurse will show you how to take care of yourself when you go home. He or she will also  "answer your questions. Have an adult family member or friend drive you home.      For your safety we recommend these precaution for the first 24 hours after your procedure:  · Do not drive or use heavy equipment.  · Do not make important decisions or sign legal papers.  · Do not drink alcohol.  · Have someone stay with you, if needed. He or she can watch for problems and help keep you safe.  · Your concentration, balance, coordination, and judgement may be impaired for many hours after anesthesia.  Use caution when ambulating or standing up.     · You may feel weak and "washed out" after anesthesia and surgery.      Subtle residual effects of general anesthesia or sedation with regional / local anesthesia can last more than 24 hours.  Rest for the remainder of the day or longer if your Doctor/Surgeon has advised you to do so.  Although you may feel normal within the first 24 hours, your reflexes and mental ability may be impaired without you realizing it.  You may feel dizzy, lightheaded or sleepy for 24 hours or longer.      Be sure to go to all follow-up visits with your doctor. And rest after your surgery for as long as your doctor tells you to.  Coping with pain  If you have pain after surgery, pain medicine will help you feel better. Take it as told, before pain becomes severe. Also, ask your doctor or pharmacist about other ways to control pain. This might be with heat, ice, or relaxation. And follow any other instructions your surgeon or nurse gives you.  Tips for taking pain medicine  To get the best relief possible, remember these points:  · Pain medicines can upset your stomach. Taking them with a little food may help.  · Most pain relievers taken by mouth need at least 20 to 30 minutes to start to work.  · Taking medicine on a schedule can help you remember to take it. Try to time your medicine so that you can take it before starting an activity. This might be before you get dressed, go for a walk, or sit " down for dinner.  · Constipation is a common side effect of pain medicines. Call your doctor before taking any medicines such as laxatives or stool softeners to help ease constipation. Also ask if you should skip any foods. Drinking lots of fluids and eating foods such as fruits and vegetables that are high in fiber can also help. Remember, do not take laxatives unless your surgeon has prescribed them.  · Drinking alcohol and taking pain medicine can cause dizziness and slow your breathing. It can even be deadly. Do not drink alcohol while taking pain medicine.  · Pain medicine can make you react more slowly to things. Do not drive or run machinery while taking pain medicine.  Your health care provider may tell you to take acetaminophen to help ease your pain. Ask him or her how much you are supposed to take each day. Acetaminophen or other pain relievers may interact with your prescription medicines or other over-the-counter (OTC) drugs. Some prescription medicines have acetaminophen and other ingredients. Using both prescription and OTC acetaminophen for pain can cause you to overdose. Read the labels on your OTC medicines with care. This will help you to clearly know the list of ingredients, how much to take, and any warnings. It may also help you not take too much acetaminophen. If you have questions or do not understand the information, ask your pharmacist or health care provider to explain it to you before you take the OTC medicine.  Managing nausea  Some people have an upset stomach after surgery. This is often because of anesthesia, pain, or pain medicine, or the stress of surgery. These tips will help you handle nausea and eat healthy foods as you get better. If you were on a special food plan before surgery, ask your doctor if you should follow it while you get better. These tips may help:  · Do not push yourself to eat. Your body will tell you when to eat and how much.  · Start off with clear liquids and  soup. They are easier to digest.  · Next try semi-solid foods, such as mashed potatoes, applesauce, and gelatin, as you feel ready.  · Slowly move to solid foods. Dont eat fatty, rich, or spicy foods at first.  · Do not force yourself to have 3 large meals a day. Instead eat smaller amounts more often.  · Take pain medicines with a small amount of solid food, such as crackers or toast, to avoid nausea.     Call your surgeon if  · You still have pain an hour after taking medicine. The medicine may not be strong enough.  · You feel too sleepy, dizzy, or groggy. The medicine may be too strong.  · You have side effects like nausea, vomiting, or skin changes, such as rash, itching, or hives.       If you have obstructive sleep apnea  You were given anesthesia medicine during surgery to keep you comfortable and free of pain. After surgery, you may have more apnea spells because of this medicine and other medicines you were given. The spells may last longer than usual.   At home:  · Keep using the continuous positive airway pressure (CPAP) device when you sleep. Unless your health care provider tells you not to, use it when you sleep, day or night. CPAP is a common device used to treat obstructive sleep apnea.  · Talk with your provider before taking any pain medicine, muscle relaxants, or sedatives. Your provider will tell you about the possible dangers of taking these medicines.  © 5202-7022 [employer]. [address] [place] [address]. All rights reserved. This information is not intended as a substitute for professional medical care. Always follow your healthcare professional's instructions.    Cystoscopy  Cystoscopy is a procedure that lets your doctor look directly inside your urethra and bladder. It can be used to:  · Help diagnose a problem with your urethra, bladder, or kidneys.  · Take a sample (biopsy) of bladder or urethral tissue.  · Treat certain problems (such as removing kidney  stones).  · Place a stent to bypass an obstruction.  · Take special X-rays of the kidneys.  Based on the findings, your doctor may recommend other tests or treatments.  What is a cystoscope?  A cystoscope is a telescope-like instrument that contains lenses and fiberoptics (small glass wires that make bright light). The cystoscope may be straight and rigid, or flexible to bend around curves in the urethra. The doctor may look directly into the cystoscope, or project the image onto a monitor.  Getting ready  · Ask your doctor if you should stop taking any medications prior to the procedure.  · Ask whether you should avoid eating or drinking anything after midnight before the procedure.  · Follow any other instructions your doctor gives you.  Tell your doctor before the exam if you:  · Take any medications, such as aspirin or blood thinners  · Have allergies to any medications  · Are pregnant   The procedure  Cystoscopy is done in the doctors office or hospital. The doctor and a nurse are present during the procedure. It takes only a few minutes, longer if a biopsy, X-ray, or treatment needs to be done.  During the procedure:  · You lie on an exam table on your back, knees bent and legs apart. You are covered with a drape.  · Your urethra and the area around it are washed. Anesthetic jelly may be applied to numb the urethra. Other pain medication is usually not needed. In some cases, you may be offered a mild sedative to help you relax. If a more extensive procedure is to be done, such as a biopsy or kidney stone removal, general anesthesia may be needed.  · The cystoscope is inserted. A sterile fluid is put into the bladder to expand it. You may feel pressure from this fluid.  · When the procedure is done, the cystoscope is removed.  After the procedure  If you had a sedative, general anesthesia, or spinal anesthesia, you must have someone drive you home. Once youre home:  · Drink plenty of fluids.  · You may have  burning or light bleeding when you urinate--this is normal.  · Medications may be prescribed to ease any discomfort or prevent infection. Take these as directed.  · Call your doctor if you have heavy bleeding or blood clots, burning that lasts more than a day, a fever over 100°F  (38° C), or trouble urinating.  © 5256-8132 [employer]. [address] [place] [address], [place] [address]. All rights reserved. This information is not intended as a substitute for professional medical care. Always follow your healthcare professional's instructions.

## 2017-01-16 NOTE — PLAN OF CARE
Problem: Patient Care Overview  Goal: Plan of Care Review  Outcome: Ongoing (interventions implemented as appropriate)  Reviewed plan of care with family

## 2017-01-18 ENCOUNTER — PATIENT MESSAGE (OUTPATIENT)
Dept: UROLOGY | Facility: CLINIC | Age: 77
End: 2017-01-18

## 2017-01-23 ENCOUNTER — PATIENT MESSAGE (OUTPATIENT)
Dept: UROLOGY | Facility: CLINIC | Age: 77
End: 2017-01-23

## 2017-01-25 LAB — URINARY STONE ANALYSIS: NORMAL

## 2017-01-31 ENCOUNTER — OFFICE VISIT (OUTPATIENT)
Dept: UROLOGY | Facility: CLINIC | Age: 77
End: 2017-01-31
Payer: COMMERCIAL

## 2017-01-31 VITALS — TEMPERATURE: 98 F | DIASTOLIC BLOOD PRESSURE: 66 MMHG | HEART RATE: 79 BPM | SYSTOLIC BLOOD PRESSURE: 114 MMHG

## 2017-01-31 DIAGNOSIS — Z09 POSTOP CHECK: ICD-10-CM

## 2017-01-31 DIAGNOSIS — N20.1 URETERAL CALCULUS, LEFT: Primary | ICD-10-CM

## 2017-01-31 DIAGNOSIS — N20.0 CALCULUS OF KIDNEY: ICD-10-CM

## 2017-01-31 PROCEDURE — 99999 PR PBB SHADOW E&M-EST. PATIENT-LVL II: CPT | Mod: PBBFAC,,, | Performed by: UROLOGY

## 2017-01-31 PROCEDURE — 99024 POSTOP FOLLOW-UP VISIT: CPT | Mod: S$GLB,,, | Performed by: UROLOGY

## 2017-01-31 NOTE — PROGRESS NOTES
POST OPERATIVE UROLOGY NOTE    PATIENT NAME: Valery Huggins  : 1940    PROCEDURE/DATE OF PROCEDURE:  ESWL of left renal calculus on 2016                                                                       Cystoscopy left ureteroscopy with stone extraction on 2017 - patient does not have a stent    COMPLAINTS/COMMENTS: Doing very well no complaints no pain    PHYSICAL EXAM: No flank tenderness    RECOMMENDATIONS: 24-hour urine stone workup and awaiting stone analysis report - will contact patient when we have received the findings.

## 2017-05-03 ENCOUNTER — TELEPHONE (OUTPATIENT)
Dept: UROLOGY | Facility: CLINIC | Age: 77
End: 2017-05-03

## 2017-05-03 NOTE — TELEPHONE ENCOUNTER
----- Message from Marium Sifuentes sent at 5/3/2017  2:38 PM CDT -----  Contact: pt   X  1st Request  _  2nd Request  _  3rd Request    Who:REJI MENDOZA [[id number]]    Why: Patient states she would like to speak with the staff to find out if blood work is needed prior to her appointment.... Please contact patient to further discuss and advise      What Number to Call Back: [phone]    When to Expect a call back: (Before the end of the day)   -- if call after 3:00 call back will be tomorrow.

## 2017-05-05 ENCOUNTER — HOSPITAL ENCOUNTER (OUTPATIENT)
Dept: RADIOLOGY | Facility: OTHER | Age: 77
Discharge: HOME | End: 2017-05-05
Attending: UROLOGY
Payer: COMMERCIAL

## 2017-05-05 DIAGNOSIS — C64.9 RENAL CELL CARCINOMA, UNSPECIFIED LATERALITY: ICD-10-CM

## 2017-05-05 PROCEDURE — 71020 XR CHEST PA AND LATERAL: CPT | Mod: TC

## 2017-05-05 PROCEDURE — 71020 XR CHEST PA AND LATERAL: CPT | Mod: 26,,, | Performed by: RADIOLOGY

## 2017-05-05 PROCEDURE — 76700 US EXAM ABDOM COMPLETE: CPT | Mod: TC

## 2017-05-05 PROCEDURE — 76700 US EXAM ABDOM COMPLETE: CPT | Mod: 26,,, | Performed by: RADIOLOGY

## 2017-05-09 ENCOUNTER — OFFICE VISIT (OUTPATIENT)
Dept: UROLOGY | Facility: CLINIC | Age: 77
End: 2017-05-09
Attending: UROLOGY
Payer: COMMERCIAL

## 2017-05-09 VITALS
WEIGHT: 145.5 LBS | HEIGHT: 61 IN | SYSTOLIC BLOOD PRESSURE: 132 MMHG | DIASTOLIC BLOOD PRESSURE: 62 MMHG | HEART RATE: 76 BPM | BODY MASS INDEX: 27.47 KG/M2

## 2017-05-09 DIAGNOSIS — C64.9 RENAL CELL CARCINOMA, UNSPECIFIED LATERALITY: Primary | ICD-10-CM

## 2017-05-09 PROCEDURE — 99999 PR PBB SHADOW E&M-EST. PATIENT-LVL IV: CPT | Mod: PBBFAC,,, | Performed by: UROLOGY

## 2017-05-09 PROCEDURE — 99214 OFFICE O/P EST MOD 30 MIN: CPT | Mod: PBBFAC | Performed by: UROLOGY

## 2017-05-09 PROCEDURE — 99214 OFFICE O/P EST MOD 30 MIN: CPT | Mod: S$GLB,,, | Performed by: UROLOGY

## 2017-05-09 RX ORDER — BLOOD-GLUCOSE METER
KIT MISCELLANEOUS
Refills: 0 | COMMUNITY
Start: 2017-04-25 | End: 2023-06-02

## 2017-05-09 RX ORDER — FERROUS SULFATE 325(65) MG
1 TABLET ORAL 3 TIMES DAILY
Refills: 0 | COMMUNITY
Start: 2017-04-17 | End: 2017-06-20 | Stop reason: SDUPTHER

## 2017-05-09 NOTE — PROGRESS NOTES
"Subjective:      Valery Huggins is a 76 y.o. female who returns today regarding her     Small renal mass on active surveillance.  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  She has no symptoms of metastatic disease and no local symptoms related to the mass.  She is motivated to continue active surveillance.    Dr. De Jesus has been managing her renal stone disease and she sees Dr. Antonio for her chronic kidney disease    The following portions of the patient's history were reviewed and updated as appropriate: allergies, current medications, past family history, past medical history, past social history, past surgical history and problem list.    Review of Systems  Pertinent items are noted in HPI.  A comprehensive multipoint review of systems was negative except as otherwise stated in the HPI.     Objective:   Vitals: /62 (BP Location: Left arm, Patient Position: Sitting, BP Method: Automatic)  Pulse 76  Ht 5' 1" (1.549 m)  Wt 66 kg (145 lb 8.1 oz)  LMP 06/01/2012  BMI 27.49 kg/m2    Physical Exam   General: alert and oriented, no acute distress  Respiratory: Symmetric expansion, non-labored breathing  Cardiovascular: no peripheral edema  Abdomen: soft, non distended; well-healed lower midline incision  Skin: normal coloration and turgor, no rashes, no suspicious skin lesions noted  Neuro: no gross deficits  Psych: normal judgment and insight, normal mood/affect and non-anxious  No adenopathy in the cervical axillary or inguinal areas  Physical Exam    Lab Review   Urinalysis demonstrates no specimen    Lab Results   Component Value Date    WBC 7.50 01/11/2017    HGB 11.3 (L) 01/11/2017    HCT 33.9 (L) 01/11/2017    MCV 85 01/11/2017     01/11/2017     Lab Results   Component Value Date    CREATININE 1.2 01/11/2017    BUN 23 01/11/2017       Imaging   Chest x-ray shows no metastases.  Renal ultrasound shows unchanged 2.3 cm endophytic left-sided renal mass    Assessment and Plan: "   Renal cell carcinoma, unspecified laterality  Clear cell [id number]; active surveillance  -     X-Ray Chest PA And Lateral; Future; Expected date: 5/9/17  -     US Kidney Only; Future; Expected date: 5/10/17      We again discussed active treatment versus surveillance.  We discussed the small but finite risk of a developing metastatic disease while on surveillance.  She is very motivated to continue surveillance.  She will return in 6 months with a chest x-ray and renal ultrasound

## 2017-05-09 NOTE — MR AVS SNAPSHOT
[facility]  [address] [place] [address], [address]  [facility] [address]  Phone: [phone]                  Valery Huggins   2017 1:45 PM   Office Visit    Description:  Female : 1940   Provider:  Azar Donnelly MD   Department:  [facility]           Reason for Visit     Follow-up           Diagnoses this Visit        Comments    Renal cell carcinoma, unspecified laterality    -  Primary            To Do List           Future Appointments        Provider Department Dept Phone    11/10/2017 10:30 AM Azar Donnelly MD [facility] [phone]      Goals (5 Years of Data)     None      Follow-Up and Disposition     Return in about 6 months (around 2017).      [facility]     [facility] On Call Nurse Care Line -  Assistance  Unless otherwise directed by your provider, please contact [facility], our nurse care line that is available for  assistance.     Registered nurses in the [facility] On Call Center provide: appointment scheduling, clinical advisement, health education, and other advisory services.  Call: [phone] (toll free)               Medications           Message regarding Medications     Verify the changes and/or additions to your medication regime listed below are the same as discussed with your clinician today.  If any of these changes or additions are incorrect, please notify your healthcare provider.        STOP taking these medications     meclizine (ANTIVERT) 25 mg tablet Take 1 tablet (25 mg total) by mouth 3 (three) times daily as needed for Dizziness or Nausea.           Verify that the below list of medications is an accurate representation of the medications you are currently taking.  If none reported, the list may be blank. If incorrect, please contact your healthcare provider. Carry this list with you in case of emergency.           Current Medications     ascorbic acid (VITAMIN C) 100 MG tablet Take 100 mg by mouth once daily.     "aspirin (ECOTRIN) 81 MG EC tablet Take 81 mg by mouth once daily.    atenolol (TENORMIN) 25 MG tablet TAKE ONE TABLET BY MOUTH EVERY DAY    b complex vitamins capsule Take 1 capsule by mouth once daily.    BD ULTRA-FINE CONNIE PEN NEEDLES 32 gauge x 5/32" Ndle Use EVERY DAY    calcitRIOL (ROCALTROL) 0.5 MCG Cap Take 1 capsule (0.5 mcg total) by mouth once daily.    cholecalciferol, vitamin D3, (VITAMIN D3) 4,000 unit Cap Take 1 capsule by mouth once daily.    escitalopram oxalate (LEXAPRO) 20 MG tablet Take 1 tablet (20 mg total) by mouth once daily.    fenofibrate (TRICOR) 54 MG tablet Take 1 tablet (54 mg total) by mouth once daily.    ferrous sulfate 325 (65 FE) MG EC tablet Take 1 tablet (325 mg total) by mouth 3 (three) times daily.    ferrous sulfate 325 mg (65 mg iron) Tab tablet Take 1 tablet by mouth 3 (three) times daily.    FREESTYLE LITE METER kit     hydrocodone-acetaminophen 5-325mg (NORCO) 5-325 mg per tablet Take 1 tablet by mouth every 4 to 6 hours as needed for Pain.    JANUMET XR 50-1,000 mg TM24 Take 1 tablet by mouth 2 (two) times daily.    levothyroxine (SYNTHROID) 112 MCG tablet Take 1 tablet (112 mcg total) by mouth before breakfast.    losartan-hydrochlorothiazide 100-25 mg (HYZAAR) 100-25 mg per tablet Take 1 tablet by mouth once daily.    magnesium oxide (MAG-OX) 400 mg tablet TAKE ONE TABLET BY MOUTH TWICE DAILY    niacin 500 MG CpSR Take 500 mg by mouth every evening.     ondansetron (ZOFRAN, AS HYDROCHLORIDE,) 4 MG tablet Take 1 tablet (4 mg total) by mouth daily as needed for Nausea.    ONETOUCH DELICA LANCETS 33 gauge Misc TESR BLOOD SUGAR THREE TIMES DAILY    ONETOUCH VERIO Strp TEST BLOOD SUGAR THREE TIMES DAILY    pantoprazole (PROTONIX) 40 MG tablet Take 1 tablet (40 mg total) by mouth once daily.    VICTOZA 3-XAVIER 0.6 mg/0.1 mL (18 mg/3 mL) PnIj 1.2 mg sq SUBCUTANEOUS every day           Clinical Reference Information           Your Vitals Were     BP Pulse Height Weight Last Period " "BMI    132/62 (BP Location: Left arm, Patient Position: Sitting, BP Method: Automatic) 76 5' 1" (1.549 m) 66 kg (145 lb 8.1 oz) 06/01/2012 27.49 kg/m2      Blood Pressure          Most Recent Value    BP  132/62      Allergies as of 5/9/2017     No Known Allergies      Immunizations Administered on Date of Encounter - 5/9/2017     None      Orders Placed During Today's Visit     Future Labs/Procedures Expected by Expires    X-Ray Chest PA And Lateral  5/9/2017 5/9/2018    US Kidney Only  5/10/2017 5/9/2018      Language Assistance Services     ATTENTION: Language assistance services are available, free of charge. Please call [phone].      ATENCIÓN: Si linwood moreno, tiene a nam disposición servicios gratuitos de asistencia lingüística. Llame al [phone].     CHÚ Ý: N?u b?n nói Ti?ng Vi?t, có các d?ch v? h? tr? ngôn ng? mi?n phí dành cho b?n. G?i s? [phone].         [facility] complies with applicable Federal civil rights laws and does not discriminate on the basis of race, color, national origin, age, disability, or sex.        "

## 2017-06-23 ENCOUNTER — HOSPITAL ENCOUNTER (OUTPATIENT)
Dept: RADIOLOGY | Facility: HOSPITAL | Age: 77
Discharge: HOME | End: 2017-06-23
Attending: NURSE PRACTITIONER
Payer: COMMERCIAL

## 2017-06-23 DIAGNOSIS — M79.642 LEFT HAND PAIN: ICD-10-CM

## 2017-06-23 PROCEDURE — 73120 X-RAY EXAM OF HAND: CPT | Mod: TC,LT

## 2017-06-23 PROCEDURE — 73120 X-RAY EXAM OF HAND: CPT | Mod: 26,LT,, | Performed by: RADIOLOGY

## 2017-09-26 PROBLEM — I10 ESSENTIAL HYPERTENSION: Chronic | Status: ACTIVE | Noted: 2017-09-26

## 2017-11-01 ENCOUNTER — HOSPITAL ENCOUNTER (OUTPATIENT)
Dept: RADIOLOGY | Facility: OTHER | Age: 77
Discharge: HOME | End: 2017-11-01
Attending: UROLOGY
Payer: COMMERCIAL

## 2017-11-01 DIAGNOSIS — C64.9 RENAL CELL CARCINOMA, UNSPECIFIED LATERALITY: ICD-10-CM

## 2017-11-01 PROCEDURE — 71020 XR CHEST PA AND LATERAL: CPT | Mod: 26,,, | Performed by: RADIOLOGY

## 2017-11-01 PROCEDURE — 76770 US EXAM ABDO BACK WALL COMP: CPT | Mod: 26,,, | Performed by: RADIOLOGY

## 2017-11-01 PROCEDURE — 71020 XR CHEST PA AND LATERAL: CPT | Mod: TC

## 2017-11-01 PROCEDURE — 76770 US EXAM ABDO BACK WALL COMP: CPT | Mod: TC

## 2017-11-03 ENCOUNTER — OFFICE VISIT (OUTPATIENT)
Dept: UROLOGY | Facility: CLINIC | Age: 77
End: 2017-11-03
Attending: UROLOGY
Payer: MEDICARE

## 2017-11-03 VITALS
HEIGHT: 61 IN | BODY MASS INDEX: 26.6 KG/M2 | HEART RATE: 81 BPM | SYSTOLIC BLOOD PRESSURE: 112 MMHG | DIASTOLIC BLOOD PRESSURE: 61 MMHG | WEIGHT: 140.88 LBS

## 2017-11-03 DIAGNOSIS — N28.89 RENAL MASS, LEFT: Primary | ICD-10-CM

## 2017-11-03 DIAGNOSIS — N20.0 KIDNEY STONE: ICD-10-CM

## 2017-11-03 PROCEDURE — 99214 OFFICE O/P EST MOD 30 MIN: CPT | Mod: S$GLB,,, | Performed by: UROLOGY

## 2017-11-03 NOTE — PROGRESS NOTES
"Subjective:      Valery Huggins is a 76 y.o. female who returns today regarding her     small renal cell carcinoma on surveillance.  Here to review imaging.  No symptoms of metastatic disease.  No flank pain no hematuria.  No  complaints.    The following portions of the patient's history were reviewed and updated as appropriate: allergies, current medications, past family history, past medical history, past social history, past surgical history and problem list.    Review of Systems  Pertinent items are noted in HPI.  A comprehensive multipoint review of systems was negative except as otherwise stated in the HPI.     Objective:   Vitals: /61 (BP Location: Left arm, Patient Position: Sitting, BP Method: Large (Automatic))   Pulse 81   Ht 5' 1" (1.549 m)   Wt 63.9 kg (140 lb 14 oz)   LMP 06/01/2012   BMI 26.62 kg/m²     Physical Exam   General: alert and oriented, no acute distress  Respiratory: Symmetric expansion, non-labored breathing  Cardiovascular: no peripheral edema  Abdomen: soft, non distended  Skin: normal coloration and turgor, no rashes, no suspicious skin lesions noted  Neuro: no gross deficits  Psych: normal judgment and insight, normal mood/affect and non-anxious    Physical Exam    Lab Review   Urinalysis demonstrates no specimen    Lab Results   Component Value Date    WBC 7.50 01/11/2017    HGB 11.9 10/03/2017    HCT 36.1 10/03/2017    MCV 85 01/11/2017     01/11/2017     Lab Results   Component Value Date    CREATININE 1.12 (H) 10/03/2017    CREATININE 1.12 (H) 10/03/2017    BUN 30 (H) 10/03/2017    BUN 30 (H) 10/03/2017       Imaging  US masses enlarged from 2.2cm to 3.3cm  noobstructing stone    Assessment and Plan:   Renal mass, left  Clear cell renal cell ca [id number]; active surveillance  -     Basic metabolic panel; Future; Expected date: 11/03/2017  -     CT Urogram Abd Pelvis W WO; Future; Expected date: 11/03/2017    Kidney stone  -     Basic metabolic panel; Future; " Expected date: 11/03/2017  -     CT Urogram Abd Pelvis W WO; Future; Expected date: 11/03/2017      We will get CT urogram and return to clinic as soon as possible.  Hold metformin prior to CT.  If the lesion has indeed increased in size, I will recommend intervention

## 2017-11-07 ENCOUNTER — HOSPITAL ENCOUNTER (OUTPATIENT)
Dept: RADIOLOGY | Facility: OTHER | Age: 77
Discharge: HOME | End: 2017-11-07
Attending: UROLOGY
Payer: COMMERCIAL

## 2017-11-07 ENCOUNTER — OFFICE VISIT (OUTPATIENT)
Dept: UROLOGY | Facility: CLINIC | Age: 77
End: 2017-11-07
Attending: UROLOGY
Payer: COMMERCIAL

## 2017-11-07 VITALS
BODY MASS INDEX: 26.6 KG/M2 | DIASTOLIC BLOOD PRESSURE: 71 MMHG | WEIGHT: 140.88 LBS | HEIGHT: 61 IN | HEART RATE: 82 BPM | SYSTOLIC BLOOD PRESSURE: 123 MMHG

## 2017-11-07 DIAGNOSIS — N28.89 RENAL MASS, LEFT: ICD-10-CM

## 2017-11-07 DIAGNOSIS — N20.0 KIDNEY STONE: ICD-10-CM

## 2017-11-07 DIAGNOSIS — N18.30 CKD (CHRONIC KIDNEY DISEASE) STAGE 3, GFR 30-59 ML/MIN: ICD-10-CM

## 2017-11-07 DIAGNOSIS — C64.9 RENAL CELL CARCINOMA, UNSPECIFIED LATERALITY: Primary | ICD-10-CM

## 2017-11-07 PROCEDURE — 74178 CT ABD&PLV WO CNTR FLWD CNTR: CPT | Mod: 26,,, | Performed by: RADIOLOGY

## 2017-11-07 PROCEDURE — 74178 CT ABD&PLV WO CNTR FLWD CNTR: CPT | Mod: TC

## 2017-11-07 PROCEDURE — 99215 OFFICE O/P EST HI 40 MIN: CPT | Mod: S$GLB,,, | Performed by: UROLOGY

## 2017-11-07 PROCEDURE — 25500020 PHARM REV CODE 255: Performed by: UROLOGY

## 2017-11-07 RX ADMIN — IOHEXOL 125 ML: 350 INJECTION, SOLUTION INTRAVENOUS at 10:11

## 2017-11-07 NOTE — PROGRESS NOTES
[facility]  Urology  History & Physical    Patient Name: Valery Huggins  MRN: [id number]  Admission Date: (Not on file)  Code Status: [unfilled]   Attending Provider: Azar Donnelly MD   Primary Care Physician: Luci Nath NP  Principal Problem:[unfilled]    Subjective:     HPI:   76-year-old female with small left renal mass which has been biopsied and is clear cell renal cell carcinoma.  After extensive discussion with her and her  they elected active surveillance.  She does have well controlled chronic kidney disease.  Over the last 6 months the mass is increased rather rapidly in size and is now 3.6 cm.  The mass is now extending into the hilum and contacting the main renal vessels.  This does not appear amenable to partial nephrectomy.  There is no evidence of metastatic disease.  She presents for robotic nephrectomy      Past Medical History:   Diagnosis Date    Anemia     Cancer     thyroid    Cancer     Kidney    Depression     Diabetes mellitus     Diabetes mellitus type II     Encounter for blood transfusion     Gallstones     Hypertension     Kidney stone     MVP (mitral valve prolapse)     PONV (postoperative nausea and vomiting)     Thyroid disease        Past Surgical History:   Procedure Laterality Date    APPENDECTOMY      cataracts      both eyes    CYSTOSCOPY      CYSTOSCOPY W/ URETERAL STENT PLACEMENT      ECTOPIC PREGNANCY SURGERY      EYE SURGERY      phoebe cataract    HYSTERECTOMY      NASAL SEPTUM SURGERY      THYROIDECTOMY      two times    TONSILLECTOMY         Review of patient's allergies indicates:  No Known Allergies    Family History     Problem Relation (Age of Onset)    Cancer Brother    Hypertension Father    Kidney disease Father    Mental illness Mother          Social History Main Topics    Smoking status: Never Smoker    Smokeless tobacco: Never Used    Alcohol use No      Comment: per month    Drug use: No    Sexual  activity: Yes     Partners: Male       Review of Systems    Objective:     [unfilled]     Body mass index is 26.62 kg/m².    [unfilled]       Lines/Drains/Airways     Drain                 Ureteral Drain/Stent 12/11/16 1653 Left ureter 6 Fr. 330 days          Airway                 Airway - Non-Surgical 01/16/17 1153  days                Physical Exam    Significant Labs:    Cr 1.2  GFR 45  LFT normal   Alk phos normal          Significant Imaging:  Impression          Large heterogeneous mass in the interpolar region of the left kidney has increased in size now measuring 3.4 x 2.9 cm, previously 2.6 x 2.2 cm on 05/10/16.      5 mm hypodensity in the upper pole of the left kidney without definite enhancement is most suggestive of a cyst but too small to accurately characterize on CT.      Electronically signed by: SANA WINN  Date: 11/07/17  Time: 10:40      CXR neg        Assessment and Plan:     There are no hospital problems to display for this patient.    Renal cell carcinoma, unspecified laterality  Clear cell [id number]; progressed while on active surveillance; now with hilar involvement    CKD    Schedule robotic nephrectomy next Thursday.  Preop labs.  Type and match.  Unfortunately the lesion has increased rather rapidly in size and is now involving the renal hilum; therefore do not think this is amenable to partial nephrectomy.  Preop appt with anesthesia.  Will notify her nephrologist Dr Antonio and her PCP    40 min face to face; more than 50% time spent counseling and coordinating care            VTE Risk Mitigation     None          Azar Donnelly MD  Urology  [facility]

## 2017-11-13 ENCOUNTER — HOSPITAL ENCOUNTER (OUTPATIENT)
Dept: PREADMISSION TESTING | Facility: OTHER | Age: 77
Discharge: HOME | DRG: 658 | End: 2017-11-13
Attending: UROLOGY
Payer: COMMERCIAL

## 2017-11-13 ENCOUNTER — ANESTHESIA EVENT (OUTPATIENT)
Dept: SURGERY | Facility: OTHER | Age: 77
DRG: 658 | End: 2017-11-13
Payer: COMMERCIAL

## 2017-11-13 VITALS
OXYGEN SATURATION: 98 % | DIASTOLIC BLOOD PRESSURE: 67 MMHG | WEIGHT: 140 LBS | TEMPERATURE: 99 F | HEIGHT: 61 IN | BODY MASS INDEX: 26.43 KG/M2 | HEART RATE: 86 BPM | SYSTOLIC BLOOD PRESSURE: 107 MMHG

## 2017-11-13 DIAGNOSIS — C64.9 RENAL CELL CARCINOMA, UNSPECIFIED LATERALITY: ICD-10-CM

## 2017-11-13 DIAGNOSIS — N18.30 CKD (CHRONIC KIDNEY DISEASE) STAGE 3, GFR 30-59 ML/MIN: ICD-10-CM

## 2017-11-13 DIAGNOSIS — C64.9 RENAL CANCER: ICD-10-CM

## 2017-11-13 LAB
ABO GROUP BLD: NORMAL
ANION GAP SERPL CALC-SCNC: 6 MMOL/L
BASOPHILS # BLD AUTO: 0.03 K/UL
BASOPHILS NFR BLD: 0.5 %
BLD GP AB SCN CELLS X3 SERPL QL: NORMAL
BUN SERPL-MCNC: 25 MG/DL
CALCIUM SERPL-MCNC: 8.8 MG/DL
CHLORIDE SERPL-SCNC: 100 MMOL/L
CO2 SERPL-SCNC: 32 MMOL/L
CREAT SERPL-MCNC: 1 MG/DL
DIFFERENTIAL METHOD: ABNORMAL
EOSINOPHIL # BLD AUTO: 0.1 K/UL
EOSINOPHIL NFR BLD: 1.6 %
ERYTHROCYTE [DISTWIDTH] IN BLOOD BY AUTOMATED COUNT: 13.2 %
EST. GFR  (AFRICAN AMERICAN): >60 ML/MIN/1.73 M^2
EST. GFR  (NON AFRICAN AMERICAN): 56 ML/MIN/1.73 M^2
GLUCOSE SERPL-MCNC: 108 MG/DL
HCT VFR BLD AUTO: 35.2 %
HGB BLD-MCNC: 11.8 G/DL
LYMPHOCYTES # BLD AUTO: 1.4 K/UL
LYMPHOCYTES NFR BLD: 24.3 %
MCH RBC QN AUTO: 29 PG
MCHC RBC AUTO-ENTMCNC: 33.5 G/DL
MCV RBC AUTO: 87 FL
MONOCYTES # BLD AUTO: 0.5 K/UL
MONOCYTES NFR BLD: 7.8 %
NEUTROPHILS # BLD AUTO: 3.8 K/UL
NEUTROPHILS NFR BLD: 65.6 %
PLATELET # BLD AUTO: 326 K/UL
PMV BLD AUTO: 10.2 FL
POTASSIUM SERPL-SCNC: 3.8 MMOL/L
RBC # BLD AUTO: 4.07 M/UL
RH BLD: NORMAL
SODIUM SERPL-SCNC: 138 MMOL/L
WBC # BLD AUTO: 5.76 K/UL

## 2017-11-13 PROCEDURE — 36415 COLL VENOUS BLD VENIPUNCTURE: CPT

## 2017-11-13 PROCEDURE — 93005 ELECTROCARDIOGRAM TRACING: CPT

## 2017-11-13 PROCEDURE — 93010 ELECTROCARDIOGRAM REPORT: CPT | Mod: ,,, | Performed by: INTERNAL MEDICINE

## 2017-11-13 PROCEDURE — 86900 BLOOD TYPING SEROLOGIC ABO: CPT

## 2017-11-13 PROCEDURE — 85025 COMPLETE CBC W/AUTO DIFF WBC: CPT

## 2017-11-13 PROCEDURE — 86901 BLOOD TYPING SEROLOGIC RH(D): CPT

## 2017-11-13 PROCEDURE — 80048 BASIC METABOLIC PNL TOTAL CA: CPT

## 2017-11-13 RX ORDER — SCOLOPAMINE TRANSDERMAL SYSTEM 1 MG/1
1 PATCH, EXTENDED RELEASE TRANSDERMAL
Status: CANCELLED | OUTPATIENT
Start: 2017-11-13

## 2017-11-13 RX ORDER — FAMOTIDINE 20 MG/1
20 TABLET, FILM COATED ORAL
Status: DISPENSED | OUTPATIENT
Start: 2017-11-13 | End: 2017-11-13

## 2017-11-13 RX ORDER — SODIUM CHLORIDE, SODIUM LACTATE, POTASSIUM CHLORIDE, CALCIUM CHLORIDE 600; 310; 30; 20 MG/100ML; MG/100ML; MG/100ML; MG/100ML
INJECTION, SOLUTION INTRAVENOUS CONTINUOUS
Status: CANCELLED | OUTPATIENT
Start: 2017-11-13

## 2017-11-13 NOTE — DISCHARGE INSTRUCTIONS
PRE-ADMIT TESTING -  [phone]    [address]  [facility]          Your surgery has been scheduled at [facility]. We are pleased to have the opportunity to serve you. For Further Information please call [phone].    On the day of surgery please report to the Information Desk on the 1st floor.    · CONTACT YOUR PHYSICIAN'S OFFICE THE DAY PRIOR TO YOUR SURGERY TO OBTAIN YOUR ARRIVAL TIME.     · The evening before surgery do not eat anything after 9 p.m. ( this includes hard candy, chewing gum and mints).  You may only have GATORADE, POWERADE AND WATER  from 9 p.m. until you leave your home.   DO NOT DRINK ANY LIQUIDS ON THE WAY TO THE HOSPITAL.      SPECIAL MEDICATION INSTRUCTIONS: TAKE medications checked off by the Anesthesiologist on your Medication List.    Angiogram Patients: Take medications as instructed by your physician, including aspirin.     Surgery Patients:    If you take ASPIRIN - Your PHYSICIAN/SURGEON will need to inform you IF/OR when you need to stop taking aspirin prior to your surgery.     Do Not take any medications containing IBUPROFEN.  Do Not Wear any make-up or dark nail polish   (especially eye make-up) to surgery. If you come to surgery with makeup on you will be required to remove the makeup or nail polish.    Do not shave your surgical area at least 5 days prior to your surgery. The surgical prep will be performed at the hospital according to Infection Control regulations.    Leave all valuables at home.   Do Not wear any jewelry or watches, including any metal in body piercings.  Contact Lens must be removed before surgery. Either do not wear the contact lens or bring a case and solution for storage.  Please bring a container for eyeglasses or dentures as required.  Bring any paperwork your physician has provided, such as consent forms,  history and physicals, doctor's orders, etc.   Bring comfortable clothes that are loose fitting to wear upon  discharge. Take into consideration the type of surgery being performed.  Maintain your diet as advised per your physician the day prior to surgery.      Adequate rest the night before surgery is advised.   Park in the Parking lot behind the hospital or in the [facility] across the street from the parking lot. Parking is complimentary.  If you will be discharged the same day as your procedure, please arrange for a responsible adult to drive you home or to accompany you if traveling by taxi.   YOU WILL NOT BE PERMITTED TO DRIVE OR TO LEAVE THE HOSPITAL ALONE AFTER SURGERY.   It is strongly recommended that you arrange for someone to remain with you for the first 24 hrs following your surgery.       Thank you for your cooperation.  The Staff of [facility].        Bathing Instructions                                                                 Please shower the evening before and morning of your procedure with    ANTIBACTERIAL SOAP. ( DIAL, etc )  Concentrate on the surgical area   for at least 3 minutes and rinse completely. Dry off as usual.   Do not use any deodorant, powder, body lotions, perfume, after shave or    cologne.

## 2017-11-13 NOTE — ANESTHESIA PREPROCEDURE EVALUATION
11/13/2017  Valery Huggins is a 76 y.o., female.    Anesthesia Evaluation    I have reviewed the Patient Summary Reports.    I have reviewed the Nursing Notes.   I have reviewed the Medications.     Review of Systems  Anesthesia Hx:  No problems with previous Anesthesia  Denies Family Hx of Anesthesia complications.  Personal Hx of Anesthesia complications, Post-Operative Nausea/Vomiting, with every anesthetic, treatment not known   Social:  Non-Smoker    Hematology/Oncology:         -- Anemia: Current/Recent Cancer.   EENT/Dental:EENT/Dental Normal   Cardiovascular:   Exercise tolerance: good Hypertension    Pulmonary:  Pulmonary Normal    Renal/:   Chronic Renal Disease    Musculoskeletal:  Musculoskeletal Normal    Neurological:  Neurology Normal    Endocrine:   Diabetes, well controlled, type 1 Hypothyroidism    Dermatological:  Skin Normal    Psych:   anxiety          Physical Exam  General:  Well nourished    Airway/Jaw/Neck:  Jaw/Neck Findings:                Anesthesia Plan  Type of Anesthesia, risks & benefits discussed:  Anesthesia Type:  general  Patient's Preference:   Intra-op Monitoring Plan:   Intra-op Monitoring Plan Comments:   Post Op Pain Control Plan: multimodal analgesia  Post Op Pain Control Plan Comments:   Induction:   IV  Beta Blocker:         Informed Consent: Patient understands risks and agrees with Anesthesia plan.  Questions answered. Anesthesia consent signed with patient.  ASA Score: 3     Day of Surgery Review of History & Physical:    H&P update referred to the surgeon.     Anesthesia Plan Notes: Labs and EKG ordered        Ready For Surgery From Anesthesia Perspective.

## 2017-11-15 ENCOUNTER — TELEPHONE (OUTPATIENT)
Dept: UROLOGY | Facility: CLINIC | Age: 77
End: 2017-11-15

## 2017-11-15 PROCEDURE — 86920 COMPATIBILITY TEST SPIN: CPT

## 2017-11-15 NOTE — TELEPHONE ENCOUNTER
----- Message from Curry Blanca sent at 11/15/2017 11:41 AM CST -----  Contact: Pt   x_  1st Request  _  2nd Request  _  3rd Request        Who: REJI MENDOZA [[id number]]    Why: Requesting a call back in regards to discussing procedure that she is havinog tomrrow to verify arrival time.Please return the call at earliest convenience to discuss further. Thanks!    What Number to Call Back:[phone]  When to Expect a call back: (Within 24 hours)

## 2017-11-16 ENCOUNTER — HOSPITAL ENCOUNTER (INPATIENT)
Facility: OTHER | Age: 77
LOS: 1 days | Discharge: HOME | DRG: 658 | End: 2017-11-17
Attending: UROLOGY | Admitting: UROLOGY
Payer: COMMERCIAL

## 2017-11-16 ENCOUNTER — ANESTHESIA (OUTPATIENT)
Dept: SURGERY | Facility: OTHER | Age: 77
DRG: 658 | End: 2017-11-16
Payer: COMMERCIAL

## 2017-11-16 DIAGNOSIS — C64.9 RENAL CELL CARCINOMA, UNSPECIFIED LATERALITY: ICD-10-CM

## 2017-11-16 DIAGNOSIS — N18.30 CKD (CHRONIC KIDNEY DISEASE) STAGE 3, GFR 30-59 ML/MIN: ICD-10-CM

## 2017-11-16 LAB — POCT GLUCOSE: 99 MG/DL (ref 70–110)

## 2017-11-16 PROCEDURE — 63600175 PHARM REV CODE 636 W HCPCS: Performed by: NURSE ANESTHETIST, CERTIFIED REGISTERED

## 2017-11-16 PROCEDURE — 37000009 HC ANESTHESIA EA ADD 15 MINS: Performed by: UROLOGY

## 2017-11-16 PROCEDURE — P9045 ALBUMIN (HUMAN), 5%, 250 ML: HCPCS | Performed by: NURSE ANESTHETIST, CERTIFIED REGISTERED

## 2017-11-16 PROCEDURE — 25000003 PHARM REV CODE 250: Performed by: UROLOGY

## 2017-11-16 PROCEDURE — 50545 LAPARO RADICAL NEPHRECTOMY: CPT | Mod: LT,,, | Performed by: UROLOGY

## 2017-11-16 PROCEDURE — 25000003 PHARM REV CODE 250: Performed by: ANESTHESIOLOGY

## 2017-11-16 PROCEDURE — 63600175 PHARM REV CODE 636 W HCPCS: Performed by: UROLOGY

## 2017-11-16 PROCEDURE — 82962 GLUCOSE BLOOD TEST: CPT | Performed by: UROLOGY

## 2017-11-16 PROCEDURE — 25000003 PHARM REV CODE 250: Performed by: NURSE ANESTHETIST, CERTIFIED REGISTERED

## 2017-11-16 PROCEDURE — 71000033 HC RECOVERY, INTIAL HOUR: Performed by: UROLOGY

## 2017-11-16 PROCEDURE — 36000713 HC OR TIME LEV V EA ADD 15 MIN: Performed by: UROLOGY

## 2017-11-16 PROCEDURE — 0TT14ZZ RESECTION OF LEFT KIDNEY, PERCUTANEOUS ENDOSCOPIC APPROACH: ICD-10-PCS | Performed by: UROLOGY

## 2017-11-16 PROCEDURE — 37000008 HC ANESTHESIA 1ST 15 MINUTES: Performed by: UROLOGY

## 2017-11-16 PROCEDURE — 86920 COMPATIBILITY TEST SPIN: CPT

## 2017-11-16 PROCEDURE — 63600175 PHARM REV CODE 636 W HCPCS: Performed by: ANESTHESIOLOGY

## 2017-11-16 PROCEDURE — 36000712 HC OR TIME LEV V 1ST 15 MIN: Performed by: UROLOGY

## 2017-11-16 PROCEDURE — 11000001 HC ACUTE MED/SURG PRIVATE ROOM

## 2017-11-16 PROCEDURE — 27201423 OPTIME MED/SURG SUP & DEVICES STERILE SUPPLY: Performed by: UROLOGY

## 2017-11-16 PROCEDURE — C9290 INJ, BUPIVACAINE LIPOSOME: HCPCS | Performed by: UROLOGY

## 2017-11-16 PROCEDURE — 88309 TISSUE EXAM BY PATHOLOGIST: CPT | Performed by: PATHOLOGY

## 2017-11-16 PROCEDURE — 71000039 HC RECOVERY, EACH ADD'L HOUR: Performed by: UROLOGY

## 2017-11-16 PROCEDURE — 8E0W4CZ ROBOTIC ASSISTED PROCEDURE OF TRUNK REGION, PERCUTANEOUS ENDOSCOPIC APPROACH: ICD-10-PCS | Performed by: UROLOGY

## 2017-11-16 PROCEDURE — 88309 TISSUE EXAM BY PATHOLOGIST: CPT | Mod: 26,,, | Performed by: PATHOLOGY

## 2017-11-16 RX ORDER — GLYCOPYRROLATE 0.2 MG/ML
INJECTION INTRAMUSCULAR; INTRAVENOUS
Status: DISCONTINUED | OUTPATIENT
Start: 2017-11-16 | End: 2017-11-16

## 2017-11-16 RX ORDER — HYDROMORPHONE HYDROCHLORIDE 2 MG/ML
0.4 INJECTION, SOLUTION INTRAMUSCULAR; INTRAVENOUS; SUBCUTANEOUS EVERY 5 MIN PRN
Status: DISCONTINUED | OUTPATIENT
Start: 2017-11-16 | End: 2017-11-16 | Stop reason: HOSPADM

## 2017-11-16 RX ORDER — FENTANYL CITRATE 50 UG/ML
INJECTION, SOLUTION INTRAMUSCULAR; INTRAVENOUS
Status: DISCONTINUED | OUTPATIENT
Start: 2017-11-16 | End: 2017-11-16

## 2017-11-16 RX ORDER — DIPHENHYDRAMINE HYDROCHLORIDE 50 MG/ML
12.5 INJECTION INTRAMUSCULAR; INTRAVENOUS EVERY 4 HOURS PRN
Status: DISCONTINUED | OUTPATIENT
Start: 2017-11-16 | End: 2017-11-17 | Stop reason: HOSPADM

## 2017-11-16 RX ORDER — SODIUM CHLORIDE 0.9 % (FLUSH) 0.9 %
3 SYRINGE (ML) INJECTION
Status: DISCONTINUED | OUTPATIENT
Start: 2017-11-16 | End: 2017-11-16

## 2017-11-16 RX ORDER — ONDANSETRON 2 MG/ML
4 INJECTION INTRAMUSCULAR; INTRAVENOUS DAILY PRN
Status: DISCONTINUED | OUTPATIENT
Start: 2017-11-16 | End: 2017-11-16 | Stop reason: HOSPADM

## 2017-11-16 RX ORDER — ACETAMINOPHEN 10 MG/ML
1000 INJECTION, SOLUTION INTRAVENOUS EVERY 8 HOURS
Status: DISCONTINUED | OUTPATIENT
Start: 2017-11-16 | End: 2017-11-17 | Stop reason: HOSPADM

## 2017-11-16 RX ORDER — ASPIRIN 81 MG/1
81 TABLET ORAL DAILY
Status: DISCONTINUED | OUTPATIENT
Start: 2017-11-17 | End: 2017-11-17 | Stop reason: HOSPADM

## 2017-11-16 RX ORDER — ONDANSETRON 2 MG/ML
4 INJECTION INTRAMUSCULAR; INTRAVENOUS EVERY 8 HOURS PRN
Status: DISCONTINUED | OUTPATIENT
Start: 2017-11-16 | End: 2017-11-17 | Stop reason: HOSPADM

## 2017-11-16 RX ORDER — PANTOPRAZOLE SODIUM 40 MG/1
40 TABLET, DELAYED RELEASE ORAL DAILY
Status: DISCONTINUED | OUTPATIENT
Start: 2017-11-17 | End: 2017-11-17 | Stop reason: HOSPADM

## 2017-11-16 RX ORDER — ESCITALOPRAM OXALATE 10 MG/1
20 TABLET ORAL DAILY
Status: DISCONTINUED | OUTPATIENT
Start: 2017-11-17 | End: 2017-11-17 | Stop reason: HOSPADM

## 2017-11-16 RX ORDER — ROCURONIUM BROMIDE 10 MG/ML
INJECTION, SOLUTION INTRAVENOUS
Status: DISCONTINUED | OUTPATIENT
Start: 2017-11-16 | End: 2017-11-16

## 2017-11-16 RX ORDER — OXYCODONE HYDROCHLORIDE 5 MG/1
5 TABLET ORAL
Status: DISCONTINUED | OUTPATIENT
Start: 2017-11-16 | End: 2017-11-16 | Stop reason: HOSPADM

## 2017-11-16 RX ORDER — LIDOCAINE HCL/PF 100 MG/5ML
SYRINGE (ML) INTRAVENOUS
Status: DISCONTINUED | OUTPATIENT
Start: 2017-11-16 | End: 2017-11-16

## 2017-11-16 RX ORDER — MEPERIDINE HYDROCHLORIDE 50 MG/ML
12.5 INJECTION INTRAMUSCULAR; INTRAVENOUS; SUBCUTANEOUS ONCE AS NEEDED
Status: DISCONTINUED | OUTPATIENT
Start: 2017-11-16 | End: 2017-11-16 | Stop reason: HOSPADM

## 2017-11-16 RX ORDER — ATENOLOL 25 MG/1
25 TABLET ORAL DAILY
Status: DISCONTINUED | OUTPATIENT
Start: 2017-11-17 | End: 2017-11-17 | Stop reason: HOSPADM

## 2017-11-16 RX ORDER — MIDAZOLAM HYDROCHLORIDE 1 MG/ML
INJECTION INTRAMUSCULAR; INTRAVENOUS
Status: DISCONTINUED | OUTPATIENT
Start: 2017-11-16 | End: 2017-11-16

## 2017-11-16 RX ORDER — NEOSTIGMINE METHYLSULFATE 1 MG/ML
INJECTION, SOLUTION INTRAVENOUS
Status: DISCONTINUED | OUTPATIENT
Start: 2017-11-16 | End: 2017-11-16

## 2017-11-16 RX ORDER — LANOLIN ALCOHOL/MO/W.PET/CERES
500 CREAM (GRAM) TOPICAL NIGHTLY
Status: DISCONTINUED | OUTPATIENT
Start: 2017-11-16 | End: 2017-11-17 | Stop reason: HOSPADM

## 2017-11-16 RX ORDER — FENTANYL CITRATE 50 UG/ML
25 INJECTION, SOLUTION INTRAMUSCULAR; INTRAVENOUS EVERY 5 MIN PRN
Status: DISCONTINUED | OUTPATIENT
Start: 2017-11-16 | End: 2017-11-16 | Stop reason: HOSPADM

## 2017-11-16 RX ORDER — ALBUMIN HUMAN 50 G/1000ML
SOLUTION INTRAVENOUS CONTINUOUS PRN
Status: DISCONTINUED | OUTPATIENT
Start: 2017-11-16 | End: 2017-11-16

## 2017-11-16 RX ORDER — PROPOFOL 10 MG/ML
VIAL (ML) INTRAVENOUS
Status: DISCONTINUED | OUTPATIENT
Start: 2017-11-16 | End: 2017-11-16

## 2017-11-16 RX ORDER — FENOFIBRATE 160 MG/1
54 TABLET ORAL DAILY
Status: DISCONTINUED | OUTPATIENT
Start: 2017-11-17 | End: 2017-11-17 | Stop reason: HOSPADM

## 2017-11-16 RX ORDER — SCOLOPAMINE TRANSDERMAL SYSTEM 1 MG/1
1 PATCH, EXTENDED RELEASE TRANSDERMAL
Status: DISCONTINUED | OUTPATIENT
Start: 2017-11-16 | End: 2017-11-16 | Stop reason: HOSPADM

## 2017-11-16 RX ORDER — CEFAZOLIN SODIUM 2 G/50ML
2 SOLUTION INTRAVENOUS
Status: DISCONTINUED | OUTPATIENT
Start: 2017-11-16 | End: 2017-11-16 | Stop reason: HOSPADM

## 2017-11-16 RX ORDER — BISACODYL 10 MG
10 SUPPOSITORY, RECTAL RECTAL 2 TIMES DAILY
Status: COMPLETED | OUTPATIENT
Start: 2017-11-16 | End: 2017-11-17

## 2017-11-16 RX ORDER — ACETAMINOPHEN 10 MG/ML
INJECTION, SOLUTION INTRAVENOUS
Status: DISCONTINUED | OUTPATIENT
Start: 2017-11-16 | End: 2017-11-16

## 2017-11-16 RX ORDER — CEFAZOLIN SODIUM 2 G/50ML
2 SOLUTION INTRAVENOUS
Status: COMPLETED | OUTPATIENT
Start: 2017-11-16 | End: 2017-11-17

## 2017-11-16 RX ORDER — DOCUSATE SODIUM 100 MG/1
100 CAPSULE, LIQUID FILLED ORAL 2 TIMES DAILY
Status: DISCONTINUED | OUTPATIENT
Start: 2017-11-16 | End: 2017-11-17 | Stop reason: HOSPADM

## 2017-11-16 RX ORDER — SODIUM CHLORIDE, SODIUM LACTATE, POTASSIUM CHLORIDE, CALCIUM CHLORIDE 600; 310; 30; 20 MG/100ML; MG/100ML; MG/100ML; MG/100ML
INJECTION, SOLUTION INTRAVENOUS CONTINUOUS
Status: DISCONTINUED | OUTPATIENT
Start: 2017-11-16 | End: 2017-11-16

## 2017-11-16 RX ORDER — ONDANSETRON 2 MG/ML
INJECTION INTRAMUSCULAR; INTRAVENOUS
Status: DISCONTINUED | OUTPATIENT
Start: 2017-11-16 | End: 2017-11-16

## 2017-11-16 RX ORDER — OXYCODONE HYDROCHLORIDE 5 MG/1
10 TABLET ORAL EVERY 4 HOURS PRN
Status: DISCONTINUED | OUTPATIENT
Start: 2017-11-16 | End: 2017-11-17 | Stop reason: HOSPADM

## 2017-11-16 RX ORDER — SODIUM CHLORIDE 450 MG/100ML
INJECTION, SOLUTION INTRAVENOUS CONTINUOUS
Status: DISCONTINUED | OUTPATIENT
Start: 2017-11-16 | End: 2017-11-17

## 2017-11-16 RX ORDER — LANOLIN ALCOHOL/MO/W.PET/CERES
400 CREAM (GRAM) TOPICAL 2 TIMES DAILY
Status: DISCONTINUED | OUTPATIENT
Start: 2017-11-16 | End: 2017-11-17 | Stop reason: HOSPADM

## 2017-11-16 RX ORDER — OXYCODONE HYDROCHLORIDE 5 MG/1
5 TABLET ORAL EVERY 4 HOURS PRN
Status: DISCONTINUED | OUTPATIENT
Start: 2017-11-16 | End: 2017-11-17 | Stop reason: HOSPADM

## 2017-11-16 RX ORDER — CALCITRIOL 0.25 UG/1
0.5 CAPSULE ORAL DAILY
Status: DISCONTINUED | OUTPATIENT
Start: 2017-11-17 | End: 2017-11-17 | Stop reason: HOSPADM

## 2017-11-16 RX ORDER — LEVOTHYROXINE SODIUM 112 UG/1
112 TABLET ORAL
Status: DISCONTINUED | OUTPATIENT
Start: 2017-11-17 | End: 2017-11-17 | Stop reason: HOSPADM

## 2017-11-16 RX ADMIN — PROPOFOL 40 MG: 10 INJECTION, EMULSION INTRAVENOUS at 02:11

## 2017-11-16 RX ADMIN — SODIUM CHLORIDE: 450 INJECTION, SOLUTION INTRAVENOUS at 04:11

## 2017-11-16 RX ADMIN — CEFAZOLIN SODIUM 2 G: 2 SOLUTION INTRAVENOUS at 06:11

## 2017-11-16 RX ADMIN — HYDROMORPHONE HYDROCHLORIDE 0.4 MG: 2 INJECTION INTRAMUSCULAR; INTRAVENOUS; SUBCUTANEOUS at 04:11

## 2017-11-16 RX ADMIN — ACETAMINOPHEN 1000 MG: 10 INJECTION, SOLUTION INTRAVENOUS at 12:11

## 2017-11-16 RX ADMIN — EPHEDRINE SULFATE 5 MG: 50 INJECTION INTRAMUSCULAR; INTRAVENOUS; SUBCUTANEOUS at 12:11

## 2017-11-16 RX ADMIN — BISACODYL 10 MG: 10 SUPPOSITORY RECTAL at 08:11

## 2017-11-16 RX ADMIN — PROPOFOL 50 MG: 10 INJECTION, EMULSION INTRAVENOUS at 02:11

## 2017-11-16 RX ADMIN — FENTANYL CITRATE 25 MCG: 50 INJECTION, SOLUTION INTRAMUSCULAR; INTRAVENOUS at 12:11

## 2017-11-16 RX ADMIN — SODIUM CHLORIDE, SODIUM LACTATE, POTASSIUM CHLORIDE, AND CALCIUM CHLORIDE: 600; 310; 30; 20 INJECTION, SOLUTION INTRAVENOUS at 02:11

## 2017-11-16 RX ADMIN — Medication 400 MG: at 08:11

## 2017-11-16 RX ADMIN — GLYCOPYRROLATE 0.2 MG: 0.2 INJECTION, SOLUTION INTRAMUSCULAR; INTRAVENOUS at 12:11

## 2017-11-16 RX ADMIN — CARBOXYMETHYLCELLULOSE SODIUM 2 DROP: 2.5 SOLUTION/ DROPS OPHTHALMIC at 12:11

## 2017-11-16 RX ADMIN — FENTANYL CITRATE 100 MCG: 50 INJECTION, SOLUTION INTRAMUSCULAR; INTRAVENOUS at 12:11

## 2017-11-16 RX ADMIN — ROCURONIUM BROMIDE 10 MG: 10 INJECTION INTRAVENOUS at 01:11

## 2017-11-16 RX ADMIN — OXYCODONE HYDROCHLORIDE 5 MG: 5 TABLET ORAL at 04:11

## 2017-11-16 RX ADMIN — SCOPALAMINE 1 PATCH: 1 PATCH, EXTENDED RELEASE TRANSDERMAL at 11:11

## 2017-11-16 RX ADMIN — CEFAZOLIN SODIUM 2 G: 2 SOLUTION INTRAVENOUS at 11:11

## 2017-11-16 RX ADMIN — ACETAMINOPHEN 1000 MG: 10 INJECTION, SOLUTION INTRAVENOUS at 09:11

## 2017-11-16 RX ADMIN — MIDAZOLAM HYDROCHLORIDE 2 MG: 1 INJECTION, SOLUTION INTRAMUSCULAR; INTRAVENOUS at 11:11

## 2017-11-16 RX ADMIN — ALBUMIN (HUMAN): 2.5 SOLUTION INTRAVENOUS at 12:11

## 2017-11-16 RX ADMIN — PROPOFOL 160 MG: 10 INJECTION, EMULSION INTRAVENOUS at 12:11

## 2017-11-16 RX ADMIN — GLYCOPYRROLATE 0.8 MG: 0.2 INJECTION, SOLUTION INTRAMUSCULAR; INTRAVENOUS at 03:11

## 2017-11-16 RX ADMIN — FENTANYL CITRATE 25 MCG: 50 INJECTION, SOLUTION INTRAMUSCULAR; INTRAVENOUS at 01:11

## 2017-11-16 RX ADMIN — LIDOCAINE HYDROCHLORIDE 40 MG: 20 INJECTION, SOLUTION INTRAVENOUS at 12:11

## 2017-11-16 RX ADMIN — ROCURONIUM BROMIDE 50 MG: 10 INJECTION INTRAVENOUS at 12:11

## 2017-11-16 RX ADMIN — ONDANSETRON 4 MG: 2 INJECTION INTRAMUSCULAR; INTRAVENOUS at 02:11

## 2017-11-16 RX ADMIN — FENTANYL CITRATE 25 MCG: 50 INJECTION, SOLUTION INTRAMUSCULAR; INTRAVENOUS at 02:11

## 2017-11-16 RX ADMIN — NEOSTIGMINE METHYLSULFATE 5 MG: 1 INJECTION INTRAVENOUS at 03:11

## 2017-11-16 RX ADMIN — SODIUM CHLORIDE, SODIUM LACTATE, POTASSIUM CHLORIDE, AND CALCIUM CHLORIDE: 600; 310; 30; 20 INJECTION, SOLUTION INTRAVENOUS at 11:11

## 2017-11-16 RX ADMIN — DOCUSATE SODIUM 100 MG: 100 CAPSULE, LIQUID FILLED ORAL at 08:11

## 2017-11-16 NOTE — OR NURSING
Abdominal surgical sites with dermabond, ecchymotic around sites.  One small area to proximal end of long incision is slightly lumpy.  Dr Donnelly aware of this and states it is where they injected the exparel.  Denies c/o pain

## 2017-11-16 NOTE — INTERVAL H&P NOTE
The patient has been examined and the H&P has been reviewed:    I concur with the findings and no changes have occurred since H&P was written.    Anesthesia/Surgery risks, benefits and alternative options discussed and understood by patient/family.          Active Hospital Problems    Diagnosis  POA    Renal cell carcinoma [C64.9]  Yes      Resolved Hospital Problems    Diagnosis Date Resolved POA   No resolved problems to display.

## 2017-11-16 NOTE — H&P (VIEW-ONLY)
[facility]  Urology  History & Physical    Patient Name: Valery Huggins  MRN: [id number]  Admission Date: (Not on file)  Code Status: [unfilled]   Attending Provider: Azar Donnelly MD   Primary Care Physician: Luci Nath NP  Principal Problem:[unfilled]    Subjective:     HPI:   76-year-old female with small left renal mass which has been biopsied and is clear cell renal cell carcinoma.  After extensive discussion with her and her  they elected active surveillance.  She does have well controlled chronic kidney disease.  Over the last 6 months the mass is increased rather rapidly in size and is now 3.6 cm.  The mass is now extending into the hilum and contacting the main renal vessels.  This does not appear amenable to partial nephrectomy.  There is no evidence of metastatic disease.  She presents for robotic nephrectomy      Past Medical History:   Diagnosis Date    Anemia     Cancer     thyroid    Cancer     Kidney    Depression     Diabetes mellitus     Diabetes mellitus type II     Encounter for blood transfusion     Gallstones     Hypertension     Kidney stone     MVP (mitral valve prolapse)     PONV (postoperative nausea and vomiting)     Thyroid disease        Past Surgical History:   Procedure Laterality Date    APPENDECTOMY      cataracts      both eyes    CYSTOSCOPY      CYSTOSCOPY W/ URETERAL STENT PLACEMENT      ECTOPIC PREGNANCY SURGERY      EYE SURGERY      phoebe cataract    HYSTERECTOMY      NASAL SEPTUM SURGERY      THYROIDECTOMY      two times    TONSILLECTOMY         Review of patient's allergies indicates:  No Known Allergies    Family History     Problem Relation (Age of Onset)    Cancer Brother    Hypertension Father    Kidney disease Father    Mental illness Mother          Social History Main Topics    Smoking status: Never Smoker    Smokeless tobacco: Never Used    Alcohol use No      Comment: per month    Drug use: No    Sexual  activity: Yes     Partners: Male       Review of Systems    Objective:     [unfilled]     Body mass index is 26.62 kg/m².    [unfilled]       Lines/Drains/Airways     Drain                 Ureteral Drain/Stent 12/11/16 1653 Left ureter 6 Fr. 330 days          Airway                 Airway - Non-Surgical 01/16/17 1153  days                Physical Exam    Significant Labs:    Cr 1.2  GFR 45  LFT normal   Alk phos normal          Significant Imaging:  Impression          Large heterogeneous mass in the interpolar region of the left kidney has increased in size now measuring 3.4 x 2.9 cm, previously 2.6 x 2.2 cm on 05/10/16.      5 mm hypodensity in the upper pole of the left kidney without definite enhancement is most suggestive of a cyst but too small to accurately characterize on CT.      Electronically signed by: SANA WINN  Date: 11/07/17  Time: 10:40      CXR neg        Assessment and Plan:     There are no hospital problems to display for this patient.    Renal cell carcinoma, unspecified laterality  Clear cell [id number]; progressed while on active surveillance; now with hilar involvement    CKD    Schedule robotic nephrectomy next Thursday.  Preop labs.  Type and match.  Unfortunately the lesion has increased rather rapidly in size and is now involving the renal hilum; therefore do not think this is amenable to partial nephrectomy.  Preop appt with anesthesia.  Will notify her nephrologist Dr Antonio and her PCP    40 min face to face; more than 50% time spent counseling and coordinating care            VTE Risk Mitigation     None          Azar Donnelly MD  Urology  [facility]

## 2017-11-16 NOTE — TRANSFER OF CARE
"Anesthesia Transfer of Care Note    Patient: Valery Huggins    Procedure(s) Performed: Procedure(s) (LRB):  XI ROBOTIC ASSISTED LAPAROSCOPIC NEPHRECTOMY (Left)    Patient location: PACU    Anesthesia Type: general    Transport from OR: Transported from OR on 2-3 L/min O2 by NC with adequate spontaneous ventilation    Post pain: adequate analgesia    Post assessment: no apparent anesthetic complications and tolerated procedure well    Post vital signs: stable    Level of consciousness: awake    Nausea/Vomiting: no nausea/vomiting    Complications: none    Transfer of care protocol was followed      Last vitals:   Visit Vitals  BP (!) 125/58 (BP Location: Left arm, Patient Position: Lying)   Pulse 70   Temp 36.9 °C (98.4 °F) (Oral)   Resp 18   Ht 5' 1" (1.549 m)   Wt 63.5 kg (140 lb)   LMP 06/01/2012   SpO2 97%   Breastfeeding? No   BMI 26.45 kg/m²     "

## 2017-11-17 VITALS
OXYGEN SATURATION: 94 % | DIASTOLIC BLOOD PRESSURE: 57 MMHG | RESPIRATION RATE: 16 BRPM | HEIGHT: 61 IN | TEMPERATURE: 99 F | WEIGHT: 140 LBS | SYSTOLIC BLOOD PRESSURE: 108 MMHG | BODY MASS INDEX: 26.43 KG/M2 | HEART RATE: 67 BPM

## 2017-11-17 PROBLEM — Z90.5 STATUS POST NEPHRECTOMY: Status: ACTIVE | Noted: 2017-11-17

## 2017-11-17 LAB
ANION GAP SERPL CALC-SCNC: 7 MMOL/L
BASOPHILS # BLD AUTO: 0.01 K/UL
BASOPHILS NFR BLD: 0.1 %
BUN SERPL-MCNC: 20 MG/DL
CALCIUM SERPL-MCNC: 7.3 MG/DL
CHLORIDE SERPL-SCNC: 95 MMOL/L
CO2 SERPL-SCNC: 28 MMOL/L
CREAT SERPL-MCNC: 1.7 MG/DL
DIFFERENTIAL METHOD: ABNORMAL
EOSINOPHIL # BLD AUTO: 0.1 K/UL
EOSINOPHIL NFR BLD: 0.6 %
ERYTHROCYTE [DISTWIDTH] IN BLOOD BY AUTOMATED COUNT: 13.2 %
EST. GFR  (AFRICAN AMERICAN): 34 ML/MIN/1.73 M^2
EST. GFR  (NON AFRICAN AMERICAN): 29 ML/MIN/1.73 M^2
GLUCOSE SERPL-MCNC: 132 MG/DL
HCT VFR BLD AUTO: 30.6 %
HGB BLD-MCNC: 10.1 G/DL
LYMPHOCYTES # BLD AUTO: 1.5 K/UL
LYMPHOCYTES NFR BLD: 15.1 %
MCH RBC QN AUTO: 28.5 PG
MCHC RBC AUTO-ENTMCNC: 33 G/DL
MCV RBC AUTO: 86 FL
MONOCYTES # BLD AUTO: 0.7 K/UL
MONOCYTES NFR BLD: 6.9 %
NEUTROPHILS # BLD AUTO: 7.4 K/UL
NEUTROPHILS NFR BLD: 77 %
PLATELET # BLD AUTO: 269 K/UL
PMV BLD AUTO: 9.8 FL
POTASSIUM SERPL-SCNC: 4.6 MMOL/L
RBC # BLD AUTO: 3.55 M/UL
SODIUM SERPL-SCNC: 130 MMOL/L
WBC # BLD AUTO: 9.58 K/UL

## 2017-11-17 PROCEDURE — 99253 IP/OBS CNSLTJ NEW/EST LOW 45: CPT | Mod: ,,, | Performed by: HOSPITALIST

## 2017-11-17 PROCEDURE — 90670 PCV13 VACCINE IM: CPT | Performed by: UROLOGY

## 2017-11-17 PROCEDURE — 80048 BASIC METABOLIC PNL TOTAL CA: CPT

## 2017-11-17 PROCEDURE — 85025 COMPLETE CBC W/AUTO DIFF WBC: CPT

## 2017-11-17 PROCEDURE — 90471 IMMUNIZATION ADMIN: CPT | Performed by: UROLOGY

## 2017-11-17 PROCEDURE — 63600175 PHARM REV CODE 636 W HCPCS: Performed by: UROLOGY

## 2017-11-17 PROCEDURE — 25000003 PHARM REV CODE 250: Performed by: UROLOGY

## 2017-11-17 PROCEDURE — 3E0234Z INTRODUCTION OF SERUM, TOXOID AND VACCINE INTO MUSCLE, PERCUTANEOUS APPROACH: ICD-10-PCS | Performed by: UROLOGY

## 2017-11-17 PROCEDURE — 25000003 PHARM REV CODE 250: Performed by: HOSPITALIST

## 2017-11-17 PROCEDURE — 36415 COLL VENOUS BLD VENIPUNCTURE: CPT

## 2017-11-17 RX ORDER — SODIUM CHLORIDE 9 MG/ML
INJECTION, SOLUTION INTRAVENOUS CONTINUOUS
Status: DISCONTINUED | OUTPATIENT
Start: 2017-11-17 | End: 2017-11-17 | Stop reason: HOSPADM

## 2017-11-17 RX ORDER — OXYCODONE AND ACETAMINOPHEN 5; 325 MG/1; MG/1
1 TABLET ORAL EVERY 4 HOURS PRN
Qty: 30 TABLET | Refills: 0 | Status: SHIPPED | OUTPATIENT
Start: 2017-11-17 | End: 2017-12-01

## 2017-11-17 RX ORDER — AMLODIPINE BESYLATE 5 MG/1
5 TABLET ORAL DAILY
Qty: 30 TABLET | Refills: 0 | Status: SHIPPED | OUTPATIENT
Start: 2017-11-17 | End: 2017-12-20 | Stop reason: SDUPTHER

## 2017-11-17 RX ORDER — DOCUSATE SODIUM 100 MG/1
100 CAPSULE, LIQUID FILLED ORAL 2 TIMES DAILY
Refills: 0 | COMMUNITY
Start: 2017-11-17 | End: 2017-12-19

## 2017-11-17 RX ADMIN — OXYCODONE HYDROCHLORIDE 10 MG: 5 TABLET ORAL at 12:11

## 2017-11-17 RX ADMIN — Medication 400 MG: at 10:11

## 2017-11-17 RX ADMIN — LEVOTHYROXINE SODIUM 112 MCG: 112 TABLET ORAL at 06:11

## 2017-11-17 RX ADMIN — ONDANSETRON 4 MG: 2 INJECTION INTRAMUSCULAR; INTRAVENOUS at 06:11

## 2017-11-17 RX ADMIN — BISACODYL 10 MG: 10 SUPPOSITORY RECTAL at 10:11

## 2017-11-17 RX ADMIN — CALCITRIOL 0.5 MCG: 0.25 CAPSULE, LIQUID FILLED ORAL at 10:11

## 2017-11-17 RX ADMIN — ATENOLOL 25 MG: 25 TABLET ORAL at 10:11

## 2017-11-17 RX ADMIN — ACETAMINOPHEN 1000 MG: 10 INJECTION, SOLUTION INTRAVENOUS at 06:11

## 2017-11-17 RX ADMIN — ESCITALOPRAM 20 MG: 10 TABLET, FILM COATED ORAL at 10:11

## 2017-11-17 RX ADMIN — SODIUM CHLORIDE: 0.9 INJECTION, SOLUTION INTRAVENOUS at 10:11

## 2017-11-17 RX ADMIN — PANTOPRAZOLE SODIUM 40 MG: 40 TABLET, DELAYED RELEASE ORAL at 10:11

## 2017-11-17 RX ADMIN — CEFAZOLIN SODIUM 2 G: 2 SOLUTION INTRAVENOUS at 06:11

## 2017-11-17 RX ADMIN — ASPIRIN 81 MG: 81 TABLET, COATED ORAL at 10:11

## 2017-11-17 RX ADMIN — PNEUMOCOCCAL 13-VALENT CONJUGATE VACCINE 0.5 ML: 2.2; 2.2; 2.2; 2.2; 2.2; 4.4; 2.2; 2.2; 2.2; 2.2; 2.2; 2.2; 2.2 INJECTION, SUSPENSION INTRAMUSCULAR at 12:11

## 2017-11-17 RX ADMIN — DOCUSATE SODIUM 100 MG: 100 CAPSULE, LIQUID FILLED ORAL at 10:11

## 2017-11-17 RX ADMIN — OXYCODONE HYDROCHLORIDE 5 MG: 5 TABLET ORAL at 03:11

## 2017-11-17 NOTE — HPI
77 yo white female with history of DM-2, HTN, HLD, and left RCC is not s/p left nephrectomy by Dr. Donnelly.  She has no complaints currently other than mild nausea.  She is getting ready to eat.      She states she was told to stop her BP medication (losartan-HCTZ) per anesthesia, and to stop her DM medications by her endocrinologist prior to surgery.      Medicine is consulted for medical management.

## 2017-11-17 NOTE — CONSULTS
[facility] Hospital Medicine  Consult Note    Patient Name: Valery Huggins  MRN: [id number]  Admission Date: 11/16/2017  Hospital Length of Stay: 1 days  Attending Physician: Azar Donnelly MD   Primary Care Provider: Luci Nath NP           Patient information was obtained from patient.     Consults  Subjective:     Principal Problem: Renal cell carcinoma    Chief Complaint: No chief complaint on file.       HPI: 75 yo white female with history of DM-2, HTN, HLD, and left RCC is not s/p left nephrectomy by Dr. Donnelly.  She has no complaints currently other than mild nausea.  She is getting ready to eat.      She states she was told to stop her BP medication (losartan-HCTZ) per anesthesia, and to stop her DM medications by her endocrinologist prior to surgery.      Medicine is consulted for medical management.      Past Medical History:   Diagnosis Date    Anemia     Cancer     thyroid    Cancer     Kidney    Depression     Diabetes mellitus     Diabetes mellitus type II     Encounter for blood transfusion     Gallstones     Hypertension     Kidney stone     MVP (mitral valve prolapse)     PONV (postoperative nausea and vomiting)     Thyroid disease        Past Surgical History:   Procedure Laterality Date    APPENDECTOMY      cataracts      both eyes    CYSTOSCOPY      CYSTOSCOPY W/ URETERAL STENT PLACEMENT      ECTOPIC PREGNANCY SURGERY      EYE SURGERY      phoebe cataract    HYSTERECTOMY      NASAL SEPTUM SURGERY      THYROIDECTOMY      two times    TONSILLECTOMY         Review of patient's allergies indicates:  No Known Allergies    No current facility-administered medications on file prior to encounter.      Current Outpatient Prescriptions on File Prior to Encounter   Medication Sig    atenolol (TENORMIN) 25 MG tablet TAKE ONE TABLET BY MOUTH ONCE DAILY    b complex vitamins capsule Take 1 capsule by mouth once daily.    calcitRIOL (ROCALTROL) 0.5 MCG Cap Take  "1 capsule (0.5 mcg total) by mouth once daily.    cholecalciferol, vitamin D3, (VITAMIN D3) 4,000 unit Cap Take 1 capsule by mouth once daily.    empagliflozin-metformin (SYNJARDY) 12.5-1,000 mg Tab Take by mouth.    escitalopram oxalate (LEXAPRO) 20 MG tablet TAKE ONE TABLET BY MOUTH DAILY    fenofibrate (TRICOR) 54 MG tablet TAKE 1 TABLET (54 MG TOTAL) BY MOUTH ONCE DAILY.    ferrous sulfate 325 (65 FE) MG EC tablet Take 1 tablet (325 mg total) by mouth 3 (three) times daily.    levothyroxine (SYNTHROID) 112 MCG tablet Take 1 tablet (112 mcg total) by mouth before breakfast.    LIRAGLUTIDE (VICTOZA 3-XAVIER SUBQ) Inject into the skin.    losartan-hydrochlorothiazide 100-25 mg (HYZAAR) 100-25 mg per tablet TAKE 1 TABLET BY MOUTH ONCE DAILY.    magnesium oxide (MAG-OX) 400 mg tablet TAKE ONE TABLET BY MOUTH TWICE DAILY    niacin 500 MG CpSR Take 500 mg by mouth every evening.     pantoprazole (PROTONIX) 40 MG tablet Take 1 tablet (40 mg total) by mouth once daily.    ascorbic acid (VITAMIN C) 100 MG tablet Take 100 mg by mouth once daily.    aspirin (ECOTRIN) 81 MG EC tablet Take 81 mg by mouth once daily.    BD ULTRA-FINE CONNIE PEN NEEDLES 32 gauge x 5/32" Ndle Use EVERY DAY    FREESTYLE LITE METER kit      Family History     Problem Relation (Age of Onset)    Cancer Brother    Hypertension Father    Kidney disease Father    Mental illness Mother        Social History Main Topics    Smoking status: Never Smoker    Smokeless tobacco: Never Used    Alcohol use No    Drug use: No    Sexual activity: Yes     Partners: Male     Review of Systems   Constitutional: Negative for appetite change and fever.   Eyes: Negative for redness and visual disturbance.   Respiratory: Negative for cough and shortness of breath.    Cardiovascular: Negative for chest pain and leg swelling.   Gastrointestinal: Positive for nausea. Negative for abdominal pain and vomiting.        Mild nausea   Endocrine: Negative for cold " intolerance and heat intolerance.   Genitourinary: Negative for dysuria and menstrual problem.   Musculoskeletal: Negative for gait problem and neck pain.   Skin: Negative.    Allergic/Immunologic: Negative for environmental allergies and food allergies.   Neurological: Negative for syncope and weakness.   Hematological: Negative for adenopathy.   Psychiatric/Behavioral: Negative for agitation and confusion.     Objective:     Vital Signs (Most Recent):  Temp: 99.2 °F (37.3 °C) (11/17/17 0808)  Pulse: 67 (11/17/17 0808)  Resp: 16 (11/17/17 0808)  BP: (!) 108/57 (11/17/17 0808)  SpO2: (!) 94 % (11/17/17 0808) Vital Signs (24h Range):  Temp:  [97.9 °F (36.6 °C)-99.2 °F (37.3 °C)] 99.2 °F (37.3 °C)  Pulse:  [67-75] 67  Resp:  [16-20] 16  SpO2:  [94 %-100 %] 94 %  BP: (105-126)/(55-73) 108/57     Weight: 63.5 kg (139 lb 15.9 oz)  Body mass index is 26.45 kg/m².    Physical Exam   Constitutional: She is oriented to person, place, and time. She appears well-developed and well-nourished.   HENT:   Head: Normocephalic and atraumatic.   Eyes: Conjunctivae are normal. Pupils are equal, round, and reactive to light.   Neck: Normal range of motion. Neck supple.   Cardiovascular: Normal rate, regular rhythm and normal heart sounds.    Pulmonary/Chest: Effort normal and breath sounds normal.   Abdominal: Soft. Bowel sounds are normal.   Musculoskeletal: Normal range of motion. She exhibits no edema.   Neurological: She is alert and oriented to person, place, and time.   Skin: Skin is warm and dry.   Psychiatric: She has a normal mood and affect. Her behavior is normal.       Significant Labs:   BMP:   Recent Labs  Lab 11/17/17 0416   *   *   K 4.6   CL 95   CO2 28   BUN 20   CREATININE 1.7*   CALCIUM 7.3*     CBC:   Recent Labs  Lab 11/17/17 0416   WBC 9.58   HGB 10.1*   HCT 30.6*          Significant Imaging: I have reviewed all pertinent imaging results/findings within the past 24 hours.   Imaging Results     None       Assessment/Plan:     * Renal cell carcinoma    - Per Dr. Donnelly urology.             Status post Left nephrectomy    - Per primary urology team.             Essential hypertension    - GFR increased post left nephrectomy.   - Would stop losartan-HCTZ pending follow up with PCP and recheck of BMP.  - May continue atenolol, as dose adequate for GFR.   - If patient needs another blood pressure medication at discharge, would prescribe amlodipine (Norvasc) 5 mg daily.   - BP appears controlled currently on atenolol.   - Discussed with patient and she expressed understanding.                 Non-insulin dependent type 2 diabetes mellitus    - GFR increased post nephrectomy.   - Most recent GFRs reflected CKD-3 level.    - As patient was advised by her endocrinologist, would stop Victoza and Synjardy (empagliflozin-metformin) and follow up promptly with PCP and endocrinologist with recheck of BMP, and consideration of resumption or alternative diabetes medications.   - A1c = 6.5 on 10/3/17.    - Discussed avoid sugary foods until follow up with MDs.    - Patient expressed understanding.              Hypercholesteremia    - Per review of Up to Date, current GFR presents potential problem with Tricor.   - Would stop Fenofibrate (Tricor) at discharge and follow up with PCP for resumption, or alternative medication.   - Discussed with patient and she expressed understanding.                     VTE Risk Mitigation         Ordered     High Risk of VTE  Once      11/16/17 1757     Place LANCE hose  Until discontinued      11/16/17 1757     Place sequential compression device  Until discontinued      11/16/17 1757     Reason for No Pharmacological VTE Prophylaxis  Once      11/16/17 1757              Thank you for your consult. I will follow-up with patient. Please contact us if you have any additional questions.              Lior Dias MD  Department of Hospital Medicine   [facility]

## 2017-11-17 NOTE — HOSPITAL COURSE
DATE OF PROCEDURE:  11/16/2017   SURGEON:  Azar Donnelly M.D.   PROCEDURE:  Robotic left nephrectomy.   PREOPERATIVE DIAGNOSIS:  Renal cell carcinoma, clinical T1a with progression on   active surveillance.   POSTOPERATIVE DIAGNOSIS:  Renal cell carcinoma, clinical T1a with progression on   active surveillance.

## 2017-11-17 NOTE — ASSESSMENT & PLAN NOTE
- GFR increased post left nephrectomy.   - Would stop losartan-HCTZ pending follow up with PCP and recheck of BMP.  - May continue atenolol, as dose adequate for GFR.   - If patient needs another blood pressure medication at discharge, would prescribe amlodipine (Norvasc) 5 mg daily.   - BP appears controlled currently on atenolol.   - Discussed with patient and she expressed understanding.

## 2017-11-17 NOTE — PROGRESS NOTES
Post Op Note:     POD #1 s/p robotic left nephrectomy by Dr. Donnelly        VSS, afebrile overnight  Pt states that she feels great this morning   Denies pain this morning  Tolerating diet well, 1 episode of emesis this morning  Abdomen benign, incisions CDI, +BS,+ flatus, + BM.   H&H stable (10.1/11.6)   Creatinine 1.7 (baseline 1.2)  Hendrickson to gravity with clear, yellow urine    Ambulating well without assistance     D/C hendrickson    Dr. Donnelly discussed operative findings with the patient and her family yesterday   Will reevaluate the patient later today. Will likely discharge at that point.        Addendum:  Pt re-evaluated at 11:30  Doing great  Hendrickson removed at 1100, has not voided yet  Nausea has resolved   Discharge orders placed and pended, okay to discharge after the patient voids   Follow up in 2 weeks with BMP  Follow up with PCP ASAP to reevaluate her current medications

## 2017-11-17 NOTE — ASSESSMENT & PLAN NOTE
- GFR increased post nephrectomy.   - Most recent GFRs reflected CKD-3 level.    - As patient was advised by her endocrinologist, would stop Victoza and Synjardy (empagliflozin-metformin) and follow up promptly with PCP and endocrinologist with recheck of BMP, and consideration of resumption or alternative diabetes medications.   - A1c = 6.5 on 10/3/17.    - Discussed avoid sugary foods until follow up with MDs.    - Patient expressed understanding.

## 2017-11-17 NOTE — PROGRESS NOTES
gu post op  Comfortable in pacu  Af vss  Ab benign  Urine clear  Discussed operative findings with pt and family  Routine post op  Hopefully dc home on 11/18  NP will see pt on 11/18; pt and family aware

## 2017-11-17 NOTE — NURSING
Pt hendrickson cath discontinued this am. Pt voided s difficulty x 2-3 times this am. Vs stable. Left abdomen soft c incision sites healing well. No bleeding or drainage. Incisions open to air. Tolerating reg diet well. No c/o nausea  Etc. Medicated for pain for ride home prior to dc/. Seen by Dr Donnelly NP and discharged home. IV dc'd. Discharge instructions given and prescriptions sent to pt pharmacy. Pt and  verbalize understanding of home meds and follow up care. Pneumonia shot given prior to Dc.  WALTER

## 2017-11-17 NOTE — PLAN OF CARE
DC DISPO:    Patient to D/C home with self care. No needs expressed, CM to remain supportive.     11/17/17 1341   Final Note   Assessment Type Final Discharge Note   Discharge Disposition Home   Hospital Follow Up  Appt(s) scheduled? Yes   Discharge plans and expectations educations in teach back method with documentation complete? Yes

## 2017-11-17 NOTE — ASSESSMENT & PLAN NOTE
- Per review of Up to Date, current GFR presents potential problem with Tricor.   - Would stop Fenofibrate (Tricor) at discharge and follow up with PCP for resumption, or alternative medication.   - Discussed with patient and she expressed understanding.

## 2017-11-17 NOTE — OP NOTE
DATE OF PROCEDURE:  11/16/2017    SURGEON:  Azar Donnelly M.D.    ASSISTANT:  Charline Joseph RN, first assistant.    PROCEDURE:  Robotic left nephrectomy.    PREOPERATIVE DIAGNOSIS:  Renal cell carcinoma, clinical T1a with progression on   active surveillance.    POSTOPERATIVE DIAGNOSIS:  Renal cell carcinoma, clinical T1a with progression on   active surveillance.    ANESTHESIA:  General.    BLOOD LOSS:  50 mL.    HISTORY:  Ms. Huggins is a 76-year-old woman who has had a left renal mass for some   time.  She felt strongly that she preferred surveillance over active treatment.    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  The mass has increased in size and is now 3.6 cm.  The mass is now   quite central and actually abuts the main renal vessels, in the hilum of the   kidney.  The mass does not appear amenable to partial nephrectomy.  There is no   evidence of metastatic disease.  After discussing all the options, she presents   for a robotic nephrectomy.  Informed consent was obtained.    PROCEDURE IN DETAIL:  The patient was brought to the Operating Room and   undergoes general endotracheal anesthesia.  She was placed in the right lateral   decubitus position with the left side up.  An axillary roll was placed and all   pressure points were well padded.  A Holbrook catheter had been placed and an   orogastric tube was placed by Anesthesia.  Sterile prep and drape was performed.    Primary access was obtained with the Veress needle technique.  The needle   irrigates and aspirates without abnormality and a drop test is negative.  The   abdomen is insufflated with low opening pressures.  A dilating 8 mm trocar was   placed.  The abdomen was inspected, no injuries occurred with the primary trocar   placement.  The remaining trocars were placed in the usual fashion utilizing 3   additional 8 mm metallic robotic trocars laterally in line with the primary   access site in addition  to a dilating 12 mm trocar lateral to the rectus and a   dilating 5-mm AirSeal trocar lateral to the rectus.  The robot is docked.  There   were few adhesions in the left upper quadrant, which were taken down sharply.    These required approximately 20 minutes of additional dissection.  The   descending colon is mobilized.  The upper pole attachments to the spleen, colon,   and pancreas are taken down.  The gonadal vein and ureter are identified after   the colon is mobilized.  The gonadal vein was dissected up to the renal vein.    The ureter was dissected free from the gonadal vein.  The lower pole and   posterior surface of the kidney are dissected.  The adrenal gland is identified   and preserved.  There is no evidence of invasion of the adrenal gland.  The   upper pole of renal capsule is identified and dissected.  The main renal artery   was obscured by a large lumbar vein.  Therefore, we elected to take the entire   hilum en bloc with a stapler.  This was performed with a 60 mm [employer]-ALICE   stapler with vascular load.  This produces good hemostasis.  The upper pole   attachments are further dissected.  The remaining upper pole attachments are   taken also with the stapler with care taken to preserve the spleen and the tail   of the pancreas.  The ureter is divided with the Harmonic scalpel.  The   remaining attachments laterally are dissected as are the upper pole attachments.    The specimen was inspected and placed in an EndoCatch bag.  The wound was   irrigated and pressure was lowered.  There was good hemostasis.  The specimen is   extracted through a small incision between 2 trocar sites.  The fascia at this   location now is closed after injecting with Exparel.  Looped #1 PDS was   utilized.  The abdomen was again inspected, the extraction site is inspected and   found to be intact.  There were no unwanted foreign bodies in the extraction   site.  The remaining trocars were removed at low  pressure.  There was good   hemostasis.  The wounds are irrigated.  All counts were correct.  The skin   incisions are closed with absorbable suture and sterile adhesive dressings are   applied.  The patient tolerated the procedure well.      NAHID/JOSE JUAN  dd: 11/16/2017 15:32:22 (CST)  td: 11/17/2017 00:30:22 (CST)  Doc ID   #[id number]  Job ID #[id number]    CC:

## 2017-11-17 NOTE — ANESTHESIA POSTPROCEDURE EVALUATION
"Anesthesia Post Evaluation    Patient: Valery Huggins    Procedure(s) Performed: Procedure(s) (LRB):  XI ROBOTIC ASSISTED LAPAROSCOPIC NEPHRECTOMY (Left)    Final Anesthesia Type: general  Patient location during evaluation: PACU  Patient participation: Yes- Able to Participate  Level of consciousness: awake and alert  Post-procedure vital signs: reviewed and stable  Pain management: adequate  Airway patency: patent  PONV status at discharge: No PONV  Anesthetic complications: no      Cardiovascular status: blood pressure returned to baseline  Respiratory status: unassisted and spontaneous ventilation  Hydration status: euvolemic  Follow-up not needed.        Visit Vitals  BP (!) 118/58 (Patient Position: Lying)   Pulse 74   Temp 36.7 °C (98 °F) (Oral)   Resp 16   Ht 5' 1" (1.549 m)   Wt 63.5 kg (139 lb 15.9 oz)   LMP 06/01/2012   SpO2 100%   Breastfeeding? No   BMI 26.45 kg/m²       Pain/Jake Score: Pain Assessment Performed: Yes (11/16/2017  3:30 PM)  Presence of Pain: denies (11/16/2017  4:55 PM)  Pain Rating Prior to Med Admin: 6 (11/16/2017  4:28 PM)  Pain Rating Post Med Admin: 0 (11/16/2017  4:54 PM)  Jake Score: 7 (11/16/2017  4:55 PM)      "

## 2017-11-17 NOTE — DISCHARGE SUMMARY
[facility]  Urology  Discharge Summary      Patient Name: Valery Huggins  MRN: [id number]  Admission Date: 11/16/2017  Hospital Length of Stay: 1 days  Discharge Date and Time:  11/17/2017 1:52 PM  Attending Physician: No att. providers found   Discharging Provider: Franci Del Cid NP  Primary Care Physician: Luci Nath NP    HPI:    Procedure(s) (LRB):  XI ROBOTIC ASSISTED LAPAROSCOPIC NEPHRECTOMY (Left)     Indwelling Lines/Drains at time of discharge:   Lines/Drains/Airways     Drain                 Ureteral Drain/Stent 12/11/16 1653 Left ureter 6 Fr. 340 days                Hospital Course (synopsis of major diagnoses, care, treatment, and services provided during the course of the hospital stay):     Pt re-evaluated at 11:30  Doing great  [place] removed at 1100, has not voided yet  Nausea has resolved   Discharge orders placed and pended, okay to discharge after the patient voids   Follow up in 2 weeks with BMP  Follow up with PCP ASAP to reevaluate her current medications     Consults:   Consults         Status Ordering Provider     Inpatient consult to Hospitalist  Once     Provider:  Lior Dias MD    Acknowledged AZAR HERNADEZ          Significant Diagnostic Studies:   Pathology pending    Pending Diagnostic Studies:     None          Final Active Diagnoses:    Diagnosis Date Noted POA    PRINCIPAL PROBLEM:  Renal cell carcinoma [C64.9] 11/16/2017 Yes    Status post Left nephrectomy [Z90.5] 11/17/2017 Not Applicable    Essential hypertension [I10] 09/26/2017 Yes     Chronic    Non-insulin dependent type 2 diabetes mellitus [E11.9] 12/11/2016 Yes     Chronic    Hypercholesteremia [E78.00] 09/05/2012 Yes     Chronic      Problems Resolved During this Admission:    Diagnosis Date Noted Date Resolved POA       Discharged Condition: good    Disposition: Home or Self Care    Follow Up:  Follow-up Information     Azar Hernadez MD In 2 weeks.    Specialty:  Urology  Why:  post  op  Contact information:  [address]  [place] [address]  [phone]             Luci Nath NP On 11/20/2017.    Specialty:  Family Medicine  Why:  post op- medication review  Contact information:  [address] [id number] [phone]                 Patient Instructions:     Basic metabolic panel   Standing Status: Standing Number of Occurrences: 1 Standing Exp. Date: 01/16/19     Diet general     Lifting restrictions   Order Comments: Do not lift >5 lbs for 6 weeks     Call MD for:  temperature >100.4     Call MD for:  persistent nausea and vomiting or diarrhea     Call MD for:  severe uncontrolled pain     Call MD for:  redness, tenderness, or signs of infection (pain, swelling, redness, odor or green/yellow discharge around incision site)     Call MD for:  difficulty breathing or increased cough     Call MD for:  severe persistent headache     Call MD for:  worsening rash     Call MD for:  persistent dizziness, light-headedness, or visual disturbances     Call MD for:  increased confusion or weakness     No dressing needed       Medications:  Reconciled Home Medications:   Discharge Medication List as of 11/17/2017  1:04 PM      START taking these medications    Details   amLODIPine (NORVASC) 5 MG tablet Take 1 tablet (5 mg total) by mouth once daily., Starting Fri 11/17/2017, Until Sat 11/17/2018, Normal      docusate sodium (COLACE) 100 MG capsule Take 1 capsule (100 mg total) by mouth 2 (two) times daily., Starting Fri 11/17/2017, OTC      oxyCODONE-acetaminophen (PERCOCET) 5-325 mg per tablet Take 1 tablet by mouth every 4 (four) hours as needed., Starting Fri 11/17/2017, Normal         CONTINUE these medications which have NOT CHANGED    Details   atenolol (TENORMIN) 25 MG tablet TAKE ONE TABLET BY MOUTH ONCE DAILY, Normal      b complex vitamins capsule Take 1 capsule by mouth once daily., Until Discontinued, Historical Med      calcitRIOL (ROCALTROL) 0.5 MCG Cap  "Take 1 capsule (0.5 mcg total) by mouth once daily., Starting 9/9/2016, Until Discontinued, Normal      cholecalciferol, vitamin D3, (VITAMIN D3) 4,000 unit Cap Take 1 capsule by mouth once daily., Until Discontinued, Historical Med      escitalopram oxalate (LEXAPRO) 20 MG tablet TAKE ONE TABLET BY MOUTH DAILY, Normal      ferrous sulfate 325 (65 FE) MG EC tablet Take 1 tablet (325 mg total) by mouth 3 (three) times daily., Starting Wed 6/21/2017, Until Thu 6/21/2018, Normal      levothyroxine (SYNTHROID) 112 MCG tablet Take 1 tablet (112 mcg total) by mouth before breakfast., Starting 11/13/2015, Until Discontinued, Normal      magnesium oxide (MAG-OX) 400 mg tablet TAKE ONE TABLET BY MOUTH TWICE DAILY, Normal      niacin 500 MG CpSR Take 500 mg by mouth every evening. , Until Discontinued, Historical Med      pantoprazole (PROTONIX) 40 MG tablet Take 1 tablet (40 mg total) by mouth once daily., Starting Wed 6/21/2017, Normal      ascorbic acid (VITAMIN C) 100 MG tablet Take 100 mg by mouth once daily., Until Discontinued, Historical Med      aspirin (ECOTRIN) 81 MG EC tablet Take 81 mg by mouth once daily., Until Discontinued, Historical Med      BD ULTRA-FINE CONNIE PEN NEEDLES 32 gauge x 5/32" Ndle Use EVERY DAY, Historical Med      FREESTYLE LITE METER kit Historical Med         STOP taking these medications       empagliflozin-metformin (SYNJARDY) 12.5-1,000 mg Tab Comments:   Reason for Stopping:         fenofibrate (TRICOR) 54 MG tablet Comments:   Reason for Stopping:         LIRAGLUTIDE (VICTOZA 3-XAVIER SUBQ) Comments:   Reason for Stopping:         losartan-hydrochlorothiazide 100-25 mg (HYZAAR) 100-25 mg per tablet Comments:   Reason for Stopping:           Follow up with Dr. Donnelly in 2 weeks with BMP  Follow up with PCP ASAP to address recent medication changes    Time spent on the discharge of patient: 30 minutes    Franci Del Cid NP  Urology  [facility]  "

## 2017-11-17 NOTE — SUBJECTIVE & OBJECTIVE
Past Medical History:   Diagnosis Date    Anemia     Cancer     thyroid    Cancer     Kidney    Depression     Diabetes mellitus     Diabetes mellitus type II     Encounter for blood transfusion     Gallstones     Hypertension     Kidney stone     MVP (mitral valve prolapse)     PONV (postoperative nausea and vomiting)     Thyroid disease        Past Surgical History:   Procedure Laterality Date    APPENDECTOMY      cataracts      both eyes    CYSTOSCOPY      CYSTOSCOPY W/ URETERAL STENT PLACEMENT      ECTOPIC PREGNANCY SURGERY      EYE SURGERY      phoebe cataract    HYSTERECTOMY      NASAL SEPTUM SURGERY      THYROIDECTOMY      two times    TONSILLECTOMY         Review of patient's allergies indicates:  No Known Allergies    No current facility-administered medications on file prior to encounter.      Current Outpatient Prescriptions on File Prior to Encounter   Medication Sig    atenolol (TENORMIN) 25 MG tablet TAKE ONE TABLET BY MOUTH ONCE DAILY    b complex vitamins capsule Take 1 capsule by mouth once daily.    calcitRIOL (ROCALTROL) 0.5 MCG Cap Take 1 capsule (0.5 mcg total) by mouth once daily.    cholecalciferol, vitamin D3, (VITAMIN D3) 4,000 unit Cap Take 1 capsule by mouth once daily.    empagliflozin-metformin (SYNJARDY) 12.5-1,000 mg Tab Take by mouth.    escitalopram oxalate (LEXAPRO) 20 MG tablet TAKE ONE TABLET BY MOUTH DAILY    fenofibrate (TRICOR) 54 MG tablet TAKE 1 TABLET (54 MG TOTAL) BY MOUTH ONCE DAILY.    ferrous sulfate 325 (65 FE) MG EC tablet Take 1 tablet (325 mg total) by mouth 3 (three) times daily.    levothyroxine (SYNTHROID) 112 MCG tablet Take 1 tablet (112 mcg total) by mouth before breakfast.    LIRAGLUTIDE (VICTOZA 3-XAVIER SUBQ) Inject into the skin.    losartan-hydrochlorothiazide 100-25 mg (HYZAAR) 100-25 mg per tablet TAKE 1 TABLET BY MOUTH ONCE DAILY.    magnesium oxide (MAG-OX) 400 mg tablet TAKE ONE TABLET BY MOUTH TWICE DAILY    niacin 500  "MG CpSR Take 500 mg by mouth every evening.     pantoprazole (PROTONIX) 40 MG tablet Take 1 tablet (40 mg total) by mouth once daily.    ascorbic acid (VITAMIN C) 100 MG tablet Take 100 mg by mouth once daily.    aspirin (ECOTRIN) 81 MG EC tablet Take 81 mg by mouth once daily.    BD ULTRA-FINE CONNIE PEN NEEDLES 32 gauge x 5/32" Ndle Use EVERY DAY    FREESTYLE LITE METER kit      Family History     Problem Relation (Age of Onset)    Cancer Brother    Hypertension Father    Kidney disease Father    Mental illness Mother        Social History Main Topics    Smoking status: Never Smoker    Smokeless tobacco: Never Used    Alcohol use No    Drug use: No    Sexual activity: Yes     Partners: Male     Review of Systems   Constitutional: Negative for appetite change and fever.   Eyes: Negative for redness and visual disturbance.   Respiratory: Negative for cough and shortness of breath.    Cardiovascular: Negative for chest pain and leg swelling.   Gastrointestinal: Positive for nausea. Negative for abdominal pain and vomiting.        Mild nausea   Endocrine: Negative for cold intolerance and heat intolerance.   Genitourinary: Negative for dysuria and menstrual problem.   Musculoskeletal: Negative for gait problem and neck pain.   Skin: Negative.    Allergic/Immunologic: Negative for environmental allergies and food allergies.   Neurological: Negative for syncope and weakness.   Hematological: Negative for adenopathy.   Psychiatric/Behavioral: Negative for agitation and confusion.     Objective:     Vital Signs (Most Recent):  Temp: 99.2 °F (37.3 °C) (11/17/17 0808)  Pulse: 67 (11/17/17 0808)  Resp: 16 (11/17/17 0808)  BP: (!) 108/57 (11/17/17 0808)  SpO2: (!) 94 % (11/17/17 0808) Vital Signs (24h Range):  Temp:  [97.9 °F (36.6 °C)-99.2 °F (37.3 °C)] 99.2 °F (37.3 °C)  Pulse:  [67-75] 67  Resp:  [16-20] 16  SpO2:  [94 %-100 %] 94 %  BP: (105-126)/(55-73) 108/57     Weight: 63.5 kg (139 lb 15.9 oz)  Body mass index " is 26.45 kg/m².    Physical Exam   Constitutional: She is oriented to person, place, and time. She appears well-developed and well-nourished.   HENT:   Head: Normocephalic and atraumatic.   Eyes: Conjunctivae are normal. Pupils are equal, round, and reactive to light.   Neck: Normal range of motion. Neck supple.   Cardiovascular: Normal rate, regular rhythm and normal heart sounds.    Pulmonary/Chest: Effort normal and breath sounds normal.   Abdominal: Soft. Bowel sounds are normal.   Musculoskeletal: Normal range of motion. She exhibits no edema.   Neurological: She is alert and oriented to person, place, and time.   Skin: Skin is warm and dry.   Psychiatric: She has a normal mood and affect. Her behavior is normal.       Significant Labs:   BMP:   Recent Labs  Lab 11/17/17 0416   *   *   K 4.6   CL 95   CO2 28   BUN 20   CREATININE 1.7*   CALCIUM 7.3*     CBC:   Recent Labs  Lab 11/17/17 0416   WBC 9.58   HGB 10.1*   HCT 30.6*          Significant Imaging: I have reviewed all pertinent imaging results/findings within the past 24 hours.   Imaging Results    None

## 2017-11-19 LAB
BLD PROD TYP BPU: NORMAL
BLD PROD TYP BPU: NORMAL
BLOOD UNIT EXPIRATION DATE: NORMAL
BLOOD UNIT EXPIRATION DATE: NORMAL
BLOOD UNIT TYPE CODE: 5100
BLOOD UNIT TYPE CODE: 5100
BLOOD UNIT TYPE: NORMAL
BLOOD UNIT TYPE: NORMAL
CODING SYSTEM: NORMAL
CODING SYSTEM: NORMAL
DISPENSE STATUS: NORMAL
DISPENSE STATUS: NORMAL
TRANS ERYTHROCYTES VOL PATIENT: NORMAL ML
TRANS ERYTHROCYTES VOL PATIENT: NORMAL ML

## 2017-11-20 ENCOUNTER — TELEPHONE (OUTPATIENT)
Dept: UROLOGY | Facility: CLINIC | Age: 77
End: 2017-11-20

## 2017-11-20 NOTE — TELEPHONE ENCOUNTER
I spoke to pt and answered all questions.  She will have BMP done at [facility] on the Tuesday before her appt to see Dr. Donnelly.

## 2017-11-20 NOTE — TELEPHONE ENCOUNTER
----- Message from Dara Pennington sent at 11/20/2017 10:38 AM CST -----  Contact: pt  _  1st Request  _  2nd Request  _  3rd Request        Who: pt    Why: Requesting a call back in regards to has questions regarding post surgery. Call pt    What Number to Call Back:[phone]    When to Expect a call back: (Within 24 hours)    Please return the call at earliest convenience. Thanks!

## 2017-12-01 ENCOUNTER — OFFICE VISIT (OUTPATIENT)
Dept: UROLOGY | Facility: CLINIC | Age: 77
End: 2017-12-01
Attending: UROLOGY
Payer: COMMERCIAL

## 2017-12-01 VITALS
BODY MASS INDEX: 25.86 KG/M2 | SYSTOLIC BLOOD PRESSURE: 135 MMHG | HEART RATE: 81 BPM | WEIGHT: 137 LBS | DIASTOLIC BLOOD PRESSURE: 77 MMHG | HEIGHT: 61 IN

## 2017-12-01 DIAGNOSIS — C64.9 RENAL CELL CARCINOMA, UNSPECIFIED LATERALITY: Primary | ICD-10-CM

## 2017-12-01 LAB
BILIRUB SERPL-MCNC: ABNORMAL MG/DL
BLOOD URINE, POC: ABNORMAL
COLOR, POC UA: ABNORMAL
GLUCOSE UR QL STRIP: 50
KETONES UR QL STRIP: ABNORMAL
LEUKOCYTE ESTERASE URINE, POC: ABNORMAL
NITRITE, POC UA: ABNORMAL
PH, POC UA: 7
PROTEIN, POC: ABNORMAL
SPECIFIC GRAVITY, POC UA: 1.01
UROBILINOGEN, POC UA: ABNORMAL

## 2017-12-01 PROCEDURE — 99024 POSTOP FOLLOW-UP VISIT: CPT | Mod: S$GLB,,, | Performed by: UROLOGY

## 2017-12-01 PROCEDURE — 81002 URINALYSIS NONAUTO W/O SCOPE: CPT | Mod: S$GLB,,, | Performed by: UROLOGY

## 2017-12-01 NOTE — PROGRESS NOTES
"Subjective:      Valery Huggins is a 77 y.o. female who returns today regarding her     No complaints    Feels great.    The following portions of the patient's history were reviewed and updated as appropriate: allergies, current medications, past family history, past medical history, past social history, past surgical history and problem list.    Review of Systems  Pertinent items are noted in HPI.  A comprehensive multipoint review of systems was negative except as otherwise stated in the HPI.     Objective:   Vitals: /77 (BP Location: Left arm, Patient Position: Sitting, BP Method: Large (Automatic))   Pulse 81   Ht 5' 1" (1.549 m)   Wt 62.1 kg (137 lb)   LMP 06/01/2012   BMI 25.89 kg/m²     Physical Exam   General: alert and oriented, no acute distress  Respiratory: Symmetric expansion, non-labored breathing  Cardiovascular: no peripheral edema  Abdomen: soft, non distended  Skin: normal coloration and turgor, no rashes, no suspicious skin lesions noted  Neuro: no gross deficits  Psych: normal judgment and insight, normal mood/affect and non-anxious  Wounds clean, dry intact    Physical Exam    Lab Review   Urinalysis demonstrates negative for all components  Lab Results   Component Value Date    WBC 9.58 11/17/2017    HGB 10.1 (L) 11/17/2017    HCT 30.6 (L) 11/17/2017    MCV 86 11/17/2017     11/17/2017     Lab Results   Component Value Date    CREATININE 1.7 (H) 11/17/2017    BUN 20 11/17/2017     FINAL PATHOLOGIC DIAGNOSIS  KIDNEY (LEFT RADICAL NEPHRECTOMY): CLEAR CELL (RENAL CELL) CARCINOMA (4.5 CM) EXTENDING  INTO THE RENAL SINUS; SECTIONS OF RENAL PELVIS, URETER, RENAL VESSELS, AND RESECTION  MARGINS FREE OF MALIGNANCY.  SURGICAL PATHOLOGY CANCER CASE SUMMARY  KIDNEY NEPHRECTOMY  Specimen: kidney, ureter, adipose tissue  Laterality: left  Procedure: radical nephrectomy  Tumor site: superior pole  Tumor size: 4.5 cm  Tumor focality: single focus  Histologic type: clear cell " carcinoma  Sarcomatoid features: not present  Rhabdoid features: not present  Histologic grade: intermediate grade, G2  Tumor necrosis: not present  Anatomic extent of tumor: extends to renal sinus  Margins: uninvolved  Lymph-vascular invasion: not present  Lymph nodes: 0  Stage: pT3a NX  Pathologic findings in nonneoplastic kidney: chronic interstitial nephritis      Imaging          Assessment and Plan:   Renal cell carcinoma, unspecified laterality  Clear cell T3N0M0  -     POCT URINE DIPSTICK WITHOUT MICROSCOPE  -     Basic metabolic panel; Future; Expected date: 12/01/2017    CKD      Will discuss adjuvant trials with Dr Tete FRANCOIS 4 weeks with BMP    See PCP  Re BP management and CKD

## 2017-12-15 ENCOUNTER — TELEPHONE (OUTPATIENT)
Dept: HEMATOLOGY/ONCOLOGY | Facility: CLINIC | Age: 77
End: 2017-12-15

## 2017-12-15 NOTE — TELEPHONE ENCOUNTER
----- Message from Reina Mason NP sent at 12/14/2017 12:09 PM CST -----  Schedule on 12/21 with Dr. Epstein on phase I  ----- Message -----  From: Stanislav Epstein MD  Sent: 12/12/2017   5:00 PM  To: Azar Donnelly MD, Reina Mason NP, #    Josefa Leen. We will get her set up for an appt. Team, let's get her in ASAP.  Thanks!    -Stanislav      ----- Message -----  From: Azar Donnelly MD  Sent: 12/11/2017   3:35 PM  To: Stanislav Epstein MD, ALTAGRACIA Galicia    I'd love for you to see her.  I told her that I'd be discussing her case with you.  Please feel free to call her    Thanks    Azar    ----- Message -----  From: Stanislav Epstein MD  Sent: 12/11/2017   3:09 PM  To: Azar Donnelly MD, ALTAGRACIA Galicia,    Unfortunately, Azar, this one won't be eligible for the adjuvant trial.      Sutent has been approved in this adjuvant setting, and if you'll like I could see her to discuss further.  Thanks!    Stanislav      ----- Message -----  From: Tiff Kumar RN  Sent: 12/7/2017   2:57 PM  To: Stanislav Epstein MD    T3a needs to be a grade 3-4 to be eligible for the adjuvant WO trial. She is a T3a G2.    Thank you!  ----- Message -----  From: Stanislav Epstein MD  Sent: 12/7/2017   2:42 PM  To: Luci Nath NP, Azar Donnelly MD, #    Tiff,     Does this patient qualify for the adjuvant trial?  Thank you!     -MM      ----- Message -----  From: Azar Donnelly MD  Sent: 12/1/2017   3:47 PM  To: Luci Nath NP, Stanislav Epstein MD

## 2017-12-20 NOTE — PROGRESS NOTES
Subjective:       Patient ID: Valery Huggins is a 77 y.o. female.    Chief Complaint: RCC    HPI     77 year old female, referred by Dr. Azar Donnelly, to clinic today for evaluation and management of recently diagnosed Stage III renal cell carcinoma. Patient underwent robotic left nephrectomy on 11/16/17, with clear margins.  Here to discuss adjuvant therapy.       FINAL PATHOLOGIC DIAGNOSIS  KIDNEY (LEFT RADICAL NEPHRECTOMY): CLEAR CELL (RENAL CELL) CARCINOMA (4.5 CM) EXTENDING  INTO THE RENAL SINUS; SECTIONS OF RENAL PELVIS, URETER, RENAL VESSELS, AND RESECTION  MARGINS FREE OF MALIGNANCY.  SURGICAL PATHOLOGY CANCER CASE SUMMARY  KIDNEY NEPHRECTOMY  Specimen: kidney, ureter, adipose tissue  Laterality: left  Procedure: radical nephrectomy  Tumor site: superior pole  Tumor size: 4.5 cm  Tumor focality: single focus  Histologic type: clear cell carcinoma  Sarcomatoid features: not present  Rhabdoid features: not present  Histologic grade: intermediate grade, G2  Tumor necrosis: not present  Anatomic extent of tumor: extends to renal sinus  Margins: uninvolved  Lymph-vascular invasion: not present  Lymph nodes: 0  Stage: pT3a NX  Pathologic findings in nonneoplastic kidney: chronic interstitial nephritis    Review of Systems   Constitutional: Negative for appetite change and unexpected weight change.   Eyes: Negative for visual disturbance.   Respiratory: Negative for cough and shortness of breath.    Cardiovascular: Negative for chest pain.   Gastrointestinal: Negative for abdominal pain and diarrhea.   Genitourinary: Negative for frequency.   Musculoskeletal: Negative for back pain.   Skin: Negative for rash.   Neurological: Negative for headaches.   Hematological: Negative for adenopathy.   Psychiatric/Behavioral: The patient is not nervous/anxious.        Allergies:  Review of patient's allergies indicates:  No Known Allergies    Medications:  Current Outpatient Prescriptions   Medication Sig Dispense Refill     "amLODIPine (NORVASC) 5 MG tablet TAKE ONE TABLET BY MOUTH DAILY 30 tablet 0    ascorbic acid (VITAMIN C) 100 MG tablet Take 100 mg by mouth once daily.      aspirin (ECOTRIN) 81 MG EC tablet Take 81 mg by mouth once daily.      atenolol (TENORMIN) 25 MG tablet Take 1 tablet (25 mg total) by mouth once daily. 90 tablet 0    b complex vitamins capsule Take 1 capsule by mouth once daily.      BD ULTRA-FINE CONNIE PEN NEEDLES 32 gauge x 5/32" Ndle Use EVERY DAY  3    calcitRIOL (ROCALTROL) 0.5 MCG Cap Take 1 capsule (0.5 mcg total) by mouth once daily. 90 capsule 0    cholecalciferol, vitamin D3, (VITAMIN D3) 4,000 unit Cap Take 1 capsule by mouth once daily.      escitalopram oxalate (LEXAPRO) 20 MG tablet TAKE ONE TABLET BY MOUTH DAILY 90 tablet 0    fenofibrate (TRICOR) 54 MG tablet Take 54 mg by mouth once daily.      ferrous sulfate 325 (65 FE) MG EC tablet Take 1 tablet (325 mg total) by mouth 3 (three) times daily. 180 tablet 0    FREESTYLE LITE METER kit   0    levothyroxine (SYNTHROID) 112 MCG tablet Take 1 tablet (112 mcg total) by mouth before breakfast. 90 tablet 0    magnesium oxide (MAG-OX) 400 mg tablet TAKE ONE TABLET BY MOUTH TWICE DAILY 180 tablet 0    niacin 500 MG CpSR Take 500 mg by mouth every evening.       SYNJARDY XR 12.5-1,000 mg TBph 1,000 mg 2 (two) times daily.  2    VICTOZA 3-XAVIER 0.6 mg/0.1 mL (18 mg/3 mL) PnIj   3     No current facility-administered medications for this visit.        PMH:  Past Medical History:   Diagnosis Date    Anemia     Cancer     thyroid    Cancer     Kidney    Depression     Diabetes mellitus     Diabetes mellitus type II     Encounter for blood transfusion     Gallstones     Hypertension     Kidney stone     MVP (mitral valve prolapse)     PONV (postoperative nausea and vomiting)     Thyroid disease        PSH:  Past Surgical History:   Procedure Laterality Date    APPENDECTOMY      cataracts      both eyes    CYSTOSCOPY      " CYSTOSCOPY W/ URETERAL STENT PLACEMENT      ECTOPIC PREGNANCY SURGERY      EYE SURGERY      phoebe cataract    HYSTERECTOMY      NASAL SEPTUM SURGERY      THYROIDECTOMY      two times    TONSILLECTOMY         FamHx:  Family History   Problem Relation Age of Onset    Hypertension Father     Kidney disease Father     Mental illness Mother     Cancer Brother        SocHx:  Social History     Social History    Marital status:      Spouse name: N/A    Number of children: N/A    Years of education: N/A     Occupational History    Not on file.     Social History Main Topics    Smoking status: Never Smoker    Smokeless tobacco: Never Used    Alcohol use No    Drug use: No    Sexual activity: Yes     Partners: Male     Other Topics Concern    Not on file     Social History Narrative    No narrative on file       Distress Score    Distress Score: 6        Practical Problems Physical Problems   : No Appearance: No   Housing: No Bathing / Dressing: No   Insurance / Financial: No Breathing: No    Transportation: No  Changes in Urination: No    Work / School: No  Constipation: No   Treatment Decisions: No  Diarrhea: No     Eating: No    Family Problems Fatigue: No    Dealing with Children: No Feeling Swollen: No    Dealing with Partner: Yes Fevers: No    Ability to Have Children: No  Getting Around: No    Family Health Issues: No  Indigestion: No     Memory / Concentration: No   Emotional Problems Mouth Sores: No    Depression: No  Nausea: No    Fears: Yes  Nose Dry / Congested: No    Nervousness: Yes  Pain: No    Sadness: No Sexual: No    Worry: No Skin Dry / Itchy: No    Loss of Interest in Usual Activities: No Sleep: No     Substance Abuse: No    Spiritual/Religions Concerns Tingling in Hands / Feet: No   Spritual / Restorationism Concerns: No         Other Problems              Objective:      Physical Exam   Constitutional: She is oriented to person, place, and time. She appears well-developed  and well-nourished. No distress.   HENT:   Head: Normocephalic and atraumatic.   Eyes: EOM are normal. Pupils are equal, round, and reactive to light.   Musculoskeletal: Normal range of motion.   Neurological: She is alert and oriented to person, place, and time.   Skin: No erythema. No pallor.   Psychiatric: She has a normal mood and affect. Her behavior is normal. Judgment and thought content normal.       Assessment:       1. Renal cell carcinoma of left kidney    2. Status post Left nephrectomy    3. Encounter for chemotherapy management        Plan:       1. Stage III RCC:     77 year old female, referred by Dr. Azar Donnelly, to clinic today for evaluation and management of recently diagnosed clear cell renal cell carcinoma. Patient underwent robotic left nephrectomy on 11/16/17.    Discussed rationale and risks/benefits of adjuvant therapy in RCC.  She does not qualify for our adjuvant trial, but Sutent has recently been approved for this indication.  Patient would like to try this.      Reviewed side effects and schedule of Sutent (2w on/1w off per [facility] standard), pt given written info sent script to specialty pharmacy.      RTC 4 wks to see me with labs.     The patient agrees with the plan, and all questions have been answered to their satisfaction.     More than 45 mins were spent during this encounter, greater than 50% was spent in direct counseling and/or coordination of care.     Stanislav Epstein M.D., M.S., F.A.C.P.  Hematology and Oncology Attending  [facility]

## 2017-12-21 ENCOUNTER — INITIAL CONSULT (OUTPATIENT)
Dept: HEMATOLOGY/ONCOLOGY | Facility: CLINIC | Age: 77
End: 2017-12-21
Payer: COMMERCIAL

## 2017-12-21 VITALS
SYSTOLIC BLOOD PRESSURE: 129 MMHG | DIASTOLIC BLOOD PRESSURE: 64 MMHG | TEMPERATURE: 98 F | WEIGHT: 137.81 LBS | HEIGHT: 61 IN | RESPIRATION RATE: 16 BRPM | BODY MASS INDEX: 26.02 KG/M2 | HEART RATE: 88 BPM

## 2017-12-21 DIAGNOSIS — Z90.5 STATUS POST NEPHRECTOMY: ICD-10-CM

## 2017-12-21 DIAGNOSIS — C64.2 RENAL CELL CARCINOMA OF LEFT KIDNEY: Primary | ICD-10-CM

## 2017-12-21 DIAGNOSIS — Z51.11 ENCOUNTER FOR CHEMOTHERAPY MANAGEMENT: ICD-10-CM

## 2017-12-21 PROCEDURE — 99204 OFFICE O/P NEW MOD 45 MIN: CPT | Mod: S$GLB,,, | Performed by: INTERNAL MEDICINE

## 2017-12-21 PROCEDURE — 99999 PR PBB SHADOW E&M-EST. PATIENT-LVL III: CPT | Mod: PBBFAC,,, | Performed by: INTERNAL MEDICINE

## 2017-12-21 RX ORDER — AMLODIPINE BESYLATE 5 MG/1
TABLET ORAL
Qty: 30 TABLET | Refills: 0 | Status: SHIPPED | OUTPATIENT
Start: 2017-12-21 | End: 2018-01-05

## 2017-12-21 RX ORDER — SUNITINIB MALATE 50 MG/1
50 CAPSULE ORAL DAILY
Qty: 30 CAPSULE | Refills: 5 | Status: ON HOLD | OUTPATIENT
Start: 2017-12-21 | End: 2018-01-21

## 2017-12-21 NOTE — LETTER
December 21, 2017      Azar Donnelly MD  [address] [facility]  [address]  [facility] [address]           [facility]ematology Oncology  [address]  [place] [address]  Phone: [phone]          Patient: Valery Huggins   MR Number: [id number]   YOB: 1940   Date of Visit: 12/21/2017       Dear Dr. Azar Donnelly:    Thank you for referring Valery Huggins to me for evaluation. Attached you will find relevant portions of my assessment and plan of care.    If you have questions, please do not hesitate to call me. I look forward to following Valery Huggins along with you.    Sincerely,    Stanislav Epstein MD    Enclosure  CC:  No Recipients    If you would like to receive this communication electronically, please contact [email or web address] or [phone] to request more information on [employer] Link access.    For providers and/or their staff who would like to refer a patient to [facility], please contact us through our one-stop-shop provider referral line, [facility] , at [phone].    If you feel you have received this communication in error or would no longer like to receive these types of communications, please e-mail [email or web address]

## 2017-12-22 ENCOUNTER — TELEPHONE (OUTPATIENT)
Dept: PHARMACY | Facility: HOSPITAL | Age: 77
End: 2017-12-22

## 2017-12-22 DIAGNOSIS — C64.2 RENAL CELL CARCINOMA OF LEFT KIDNEY: Primary | ICD-10-CM

## 2017-12-22 NOTE — TELEPHONE ENCOUNTER
Informed patient [facility] received a prescription for Sutent and it will require a prior authorization with their insurance company. We will update patient of status as more information is received.

## 2017-12-23 NOTE — TELEPHONE ENCOUNTER
Documentation Only:    Faxed Prior Authorization form and supporting documentation for Sutent to insurance on 12/22/2017

## 2017-12-26 NOTE — TELEPHONE ENCOUNTER
FOR DOCUMENTATION ONLY:  Financial Assistance for Sutent is approved from 12-26-17 to 12-31-17  Source Sutent [employer] Pay Card  BIN: [id number]  PCN: [id number]  Id: [id number]  GRP: [id number]

## 2017-12-26 NOTE — TELEPHONE ENCOUNTER
Documentation Only:    Prior Authorization for Surent has been approved for one year.    Approval dates:  12/22/2017 through 12/21/2018    Case ID#:  [id number]    Patient co-pay:  $40.00    Researching possible co-pay assistance.

## 2017-12-28 NOTE — TELEPHONE ENCOUNTER
Patient was counseled on the administration directions for Sutent:  Take 1 tablet by mouth once daily for 4 weeks, and then 2 weeks off.  May be taken with or without food, but patient should remain consistent in terms of food and timing of the day.    Do not chew, crush, or break the tablets.   If possible, patient was instructed tip the tablets from the RX bottle to the cap, and take directly from the cap to the mouth.  Patient may handle the medication with their hands if they wear with a latex or nitrile glove and wash their hands before and after handling the tablets.    Patient was counseled on the following possible side effects, which include, but are not limited to:  hypertension. peripheral edema, fatigue, headaches, skin discoloration, skin rash, hand-foot syndrome,  dry skin, hyperglycemia, diarrhea, nausea, mouth sores, vomiting, anemia, increased risk of infection, increased risk of bleeding, weakness, back pain, cough, and shortness of breath.  Patient was given Eucerin cream for dry skin, and hydrocortisone cream for dermatitis.     DDIs:     Patient was given 2 patient education handouts on how to handle oral chemotherapy and specific recommendations- do's and don'ts. Instructed the patient that if they have any remaining oral chemotherapy, not to flush down the toilet or throw away in the trash; the patient or caregiver should return the unused oral chemotherapy to either the clinic or to myself in the Pharmacy where the oral chemotherapy can be disposed of properly.     Patient confirmed understanding. Patient did not have additional questions.      WCB in 1 week to see how patient is doing on therapy.  [facility] in 2 weeks to confirm readiness for refill.

## 2017-12-28 NOTE — TELEPHONE ENCOUNTER
Patient was counseled on the administration directions for Sutent on 12/28/17.  Patient plans to start Sutent on 12/30/17:    Dose Sutent 50 mg - Take 1 tablet by mouth once daily for 2 weeks, and then 1 week off.  May be taken with or without food, but patient should remain consistent in terms of food and timing of the day.      Do not chew, crush, or break the tablets.   If possible, patient was instructed tip the tablets from the RX bottle to the cap, and take directly from the cap to the mouth.  Patient may handle the medication with their hands if they wear with a latex or nitrile glove and wash their hands before and after handling the tablets.    Patient was counseled on the following possible side effects, which include, but are not limited to:  Hypertension (patient will check BP daily and note any trends of increased BP). peripheral edema, fatigue, headaches, skin discoloration, skin rash, hand-foot syndrome (discussed specifics of H-F syndrome and patient was told to use eucerin cream for dry skin and apply hydrocortisone cream for any red, inflamed, irritated skin), dry skin, diarrhea, nausea, vomiting, anemia, increased risk of infection (discussed reporting fever >100.5, persistent productive cough, cuts that won't heal, bleeding that won't stop, etc), increased risk of bleeding, weakness, back pain, cough, and shortness of breath.  Patient was given Eucerin cream for dry skin, and hydrocortisone cream for dermatitis.     DDIs: Sutent may enhance the hypoglycemic effects of Synjardy and Victoza - patient currently tests BG 2 times daily and will increase if needed.  She was advised to record readings and bring log to MD appointments.  Increased risk of QTc prolongation when Sutent is combined with Lexapro - patient's baseline QTc is 474 msec as of 11/13/17 - monitor patient (advised to note any dizziness, lightheadedness, chest pain/pressure, sudden SOB, irregular heartbeat or fluttering in her chest  to MD and seek medical attention).    Patient was given 2 patient education handouts on how to handle oral chemotherapy and specific recommendations- do's and don'ts. Instructed the patient that if they have any remaining oral chemotherapy, not to flush down the toilet or throw away in the trash; the patient or caregiver should return the unused oral chemotherapy to either the clinic or to myself in the Pharmacy where the oral chemotherapy can be disposed of properly.     Patient confirmed understanding. Patient did not have additional questions.      WCB in 1 week to see how patient is doing on therapy.  [facility] in 2 weeks to confirm readiness for refill.

## 2018-01-02 ENCOUNTER — TELEPHONE (OUTPATIENT)
Dept: HEMATOLOGY/ONCOLOGY | Facility: CLINIC | Age: 78
End: 2018-01-02

## 2018-01-02 DIAGNOSIS — T45.1X5A CHEMOTHERAPY INDUCED NAUSEA AND VOMITING: Primary | ICD-10-CM

## 2018-01-02 DIAGNOSIS — R11.2 CHEMOTHERAPY INDUCED NAUSEA AND VOMITING: Primary | ICD-10-CM

## 2018-01-02 RX ORDER — PROMETHAZINE HYDROCHLORIDE 25 MG/1
25 TABLET ORAL EVERY 6 HOURS PRN
Qty: 60 TABLET | Refills: 7 | Status: SHIPPED | OUTPATIENT
Start: 2018-01-02 | End: 2018-01-05

## 2018-01-02 RX ORDER — ONDANSETRON 8 MG/1
8 TABLET, ORALLY DISINTEGRATING ORAL EVERY 8 HOURS PRN
Qty: 60 TABLET | Refills: 4 | Status: SHIPPED | OUTPATIENT
Start: 2018-01-02 | End: 2018-04-09

## 2018-01-02 NOTE — TELEPHONE ENCOUNTER
Spoke to patient she will  her prescriptions. Discussed with patient to check her BP twice a day and to try increasing her Norvasc from 5 mg to 10 mg, also discussed signs and symptoms of low blood pressure. Patient states an understanding she will call for any further needs or concerns. Patient also mentioned reflux like symptoms and congestion. Let her know it is okay to take a decongestant. She states that once her nausea is under control she would know if it was really reflux.

## 2018-01-02 NOTE — TELEPHONE ENCOUNTER
Patient states that she has been having nausea, diarrhea and elevated blood pressure since starting medication. Let her know she should take imodium for her diarrhea and we would send her a prescription to her pharmacy for nausea medication. She let me know that she stopped Diovan due to the diuretic. Her PCP and endocrinologist suggested and ACE or an ARB if she needed another blood pressure medication. She states that she will call her PCP also to let them know about her blood pressure. I let her know that I would inform Reina Mason NP and Dr. Epstein about the elevated blood pressure as well.

## 2018-01-02 NOTE — TELEPHONE ENCOUNTER
Sent Zofran and phenergan. Increase Norvasc to 10mg daily and monitor BP with at least twice daily readings.

## 2018-01-02 NOTE — TELEPHONE ENCOUNTER
----- Message from Curry Pino sent at 1/2/2018 11:18 AM CST -----  Contact: Pt   Pt states she's having side effects for medication SUNItinib (SUTENT) 50 MG capsule    Contact::[phone]

## 2018-01-05 ENCOUNTER — OFFICE VISIT (OUTPATIENT)
Dept: UROLOGY | Facility: CLINIC | Age: 78
End: 2018-01-05
Attending: UROLOGY
Payer: COMMERCIAL

## 2018-01-05 VITALS
HEART RATE: 75 BPM | WEIGHT: 132 LBS | HEIGHT: 61 IN | BODY MASS INDEX: 24.92 KG/M2 | DIASTOLIC BLOOD PRESSURE: 74 MMHG | SYSTOLIC BLOOD PRESSURE: 123 MMHG

## 2018-01-05 DIAGNOSIS — C64.9 RENAL CELL CARCINOMA, UNSPECIFIED LATERALITY: Primary | ICD-10-CM

## 2018-01-05 LAB
BILIRUB SERPL-MCNC: ABNORMAL MG/DL
BLOOD URINE, POC: ABNORMAL
COLOR, POC UA: ABNORMAL
GLUCOSE UR QL STRIP: 1000
KETONES UR QL STRIP: ABNORMAL
LEUKOCYTE ESTERASE URINE, POC: ABNORMAL
NITRITE, POC UA: ABNORMAL
PH, POC UA: 5
PROTEIN, POC: ABNORMAL
SPECIFIC GRAVITY, POC UA: 1.02
UROBILINOGEN, POC UA: ABNORMAL

## 2018-01-05 PROCEDURE — 99024 POSTOP FOLLOW-UP VISIT: CPT | Mod: S$GLB,,, | Performed by: UROLOGY

## 2018-01-05 PROCEDURE — 81002 URINALYSIS NONAUTO W/O SCOPE: CPT | Mod: S$GLB,,, | Performed by: UROLOGY

## 2018-01-05 RX ORDER — AMLODIPINE BESYLATE 10 MG/1
10 TABLET ORAL DAILY
COMMUNITY
End: 2018-01-15 | Stop reason: DRUGHIGH

## 2018-01-05 NOTE — PROGRESS NOTES
"Subjective:      Valery Huggins is a 77 y.o. female who returns today regarding her     Pt has started adjuvant Sutent    BP well controlled with increased dose of Norvasc     Other symptoms have resolved (she believes her other symptoms were due to an episode of sinusitis)    Feels great now    .    The following portions of the patient's history were reviewed and updated as appropriate: allergies, current medications, past family history, past medical history, past social history, past surgical history and problem list.    Review of Systems  Pertinent items are noted in HPI.  A comprehensive multipoint review of systems was negative except as otherwise stated in the HPI.     Objective:   Vitals: /74   Pulse 75   Ht 5' 1" (1.549 m)   Wt 59.9 kg (132 lb)   LMP 06/01/2012   BMI 24.94 kg/m²     Physical Exam   General: alert and oriented, no acute distress  Respiratory: Symmetric expansion, non-labored breathing  Cardiovascular: no peripheral edema  Abdomen: soft, non distended  Skin: normal coloration and turgor, no rashes, no suspicious skin lesions noted  Neuro: no gross deficits  Psych: normal judgment and insight, normal mood/affect and non-anxious    Physical Exam    Lab Review   Urinalysis demonstrates negative for all components except glucosuria (due to DM meds)    Lab Results   Component Value Date    WBC 9.58 11/17/2017    HGB 10.1 (L) 11/17/2017    HCT 30.6 (L) 11/17/2017    MCV 86 11/17/2017     11/17/2017     Lab Results   Component Value Date    CREATININE 1.7 (H) 11/17/2017    BUN 20 11/17/2017       Imaging  -  Assessment and Plan:   Renal cell carcinoma, unspecified laterality clear cell [id number]  Adjuvant Sutent per Dr Epstein    RTC 6months  Will defer management of Sutent and follow up imaging to Dr Epstein            "

## 2018-01-17 ENCOUNTER — TELEPHONE (OUTPATIENT)
Dept: PHARMACY | Facility: CLINIC | Age: 78
End: 2018-01-17

## 2018-01-17 NOTE — TELEPHONE ENCOUNTER
Reached patient to discuss Sutent follow up and refill.  She has an appointment on Friday 1/19/18.  Therapy of Sutent will be determined after this appointment.  Follow up in chart.

## 2018-01-18 NOTE — PROGRESS NOTES
Subjective:       Patient ID: Valery Huggins is a 77 y.o. female.    Chief Complaint: RCC    HPI   77 year old female to clinic for 4 week follow-up and to review labs. Patient is currently on first-line Sutent since 12/30/17 for stage III RCC. Patient reports she has stopped the medication due to nausea, vomiting, and diarrhea. She also has been dealing with sinus infection. She does not wish to continue Sutent since symptoms have resolved.    Today, she denies any mouth sores, nausea, vomiting, diarrhea, constipation, abdominal pain, weight loss or loss of appetite, chest pain, shortness of breath, leg swelling, pain, headache, dizziness, or mood changes.    Her ECOG PS is 1 and she is accompanied by her .    Oncologic History (From Chart):  77 year old female, referred by Dr. Azar Donnelly, to clinic today for evaluation and management of recently diagnosed Stage III renal cell carcinoma. Patient underwent robotic left nephrectomy on 11/16/17, with clear margins.  Here to discuss adjuvant therapy.       FINAL PATHOLOGIC DIAGNOSIS  KIDNEY (LEFT RADICAL NEPHRECTOMY): CLEAR CELL (RENAL CELL) CARCINOMA (4.5 CM) EXTENDING  INTO THE RENAL SINUS; SECTIONS OF RENAL PELVIS, URETER, RENAL VESSELS, AND RESECTION  MARGINS FREE OF MALIGNANCY.  SURGICAL PATHOLOGY CANCER CASE SUMMARY  KIDNEY NEPHRECTOMY  Specimen: kidney, ureter, adipose tissue  Laterality: left  Procedure: radical nephrectomy  Tumor site: superior pole  Tumor size: 4.5 cm  Tumor focality: single focus  Histologic type: clear cell carcinoma  Sarcomatoid features: not present  Rhabdoid features: not present  Histologic grade: intermediate grade, G2  Tumor necrosis: not present  Anatomic extent of tumor: extends to renal sinus  Margins: uninvolved  Lymph-vascular invasion: not present  Lymph nodes: 0  Stage: pT3a NX  Pathologic findings in nonneoplastic kidney: chronic interstitial nephritis    Review of Systems   Constitutional: Negative for appetite  "change and unexpected weight change.   Eyes: Negative for visual disturbance.   Respiratory: Negative for cough and shortness of breath.    Cardiovascular: Negative for chest pain.   Gastrointestinal: Negative for abdominal pain and diarrhea.   Genitourinary: Negative for frequency.   Musculoskeletal: Negative for back pain.   Skin: Negative for rash.   Neurological: Negative for headaches.   Hematological: Negative for adenopathy.   Psychiatric/Behavioral: The patient is not nervous/anxious.        Allergies:  Review of patient's allergies indicates:  No Known Allergies    Medications:  Current Outpatient Prescriptions   Medication Sig Dispense Refill    amLODIPine (NORVASC) 5 MG tablet Take 1 tablet (5 mg total) by mouth once daily. 90 tablet 0    amoxicillin-clavulanate 500-125mg (AUGMENTIN) 500-125 mg Tab Take 1 tablet (500 mg total) by mouth 2 (two) times daily. 10 tablet 0    ascorbic acid (VITAMIN C) 100 MG tablet Take 100 mg by mouth once daily.      aspirin (ECOTRIN) 81 MG EC tablet Take 81 mg by mouth once daily.      atenolol (TENORMIN) 25 MG tablet Take 1 tablet (25 mg total) by mouth once daily. 90 tablet 0    b complex vitamins capsule Take 1 capsule by mouth once daily.      BD ULTRA-FINE CONNIE PEN NEEDLES 32 gauge x 5/32" Ndle Use EVERY DAY  3    calcitRIOL (ROCALTROL) 0.5 MCG Cap Take 1 capsule (0.5 mcg total) by mouth once daily. 90 capsule 0    cholecalciferol, vitamin D3, (VITAMIN D3) 4,000 unit Cap Take 1 capsule by mouth once daily.      escitalopram oxalate (LEXAPRO) 20 MG tablet TAKE ONE TABLET BY MOUTH DAILY 90 tablet 0    fenofibrate (TRICOR) 54 MG tablet Take 54 mg by mouth once daily.      ferrous sulfate 325 (65 FE) MG EC tablet Take 1 tablet (325 mg total) by mouth 3 (three) times daily. 180 tablet 0    FREESTYLE LITE METER kit   0    levothyroxine (SYNTHROID) 112 MCG tablet Take 1 tablet (112 mcg total) by mouth before breakfast. 90 tablet 0    magnesium oxide (MAG-OX) " 400 mg tablet TAKE ONE TABLET BY MOUTH TWICE DAILY 180 tablet 0    niacin 500 MG CpSR Take 500 mg by mouth every evening.       ondansetron (ZOFRAN-ODT) 8 MG TbDL Take 1 tablet (8 mg total) by mouth every 8 (eight) hours as needed. 60 tablet 4    pantoprazole (PROTONIX) 40 MG tablet   0    SUNItinib (SUTENT) 50 MG capsule Take 1 capsule (50 mg total) by mouth once daily. Take for two weeks on, one week off. 30 capsule 5    SYNJARDY XR 12.5-1,000 mg TBph 1,000 mg 2 (two) times daily.  2    VICTOZA 3-XAVIER 0.6 mg/0.1 mL (18 mg/3 mL) PnIj   3     No current facility-administered medications for this visit.        PMH:  Past Medical History:   Diagnosis Date    Anemia     Cancer     thyroid    Cancer     Kidney    Depression     Diabetes mellitus     Diabetes mellitus type II     Encounter for blood transfusion     Gallstones     Hypertension     Kidney stone     MVP (mitral valve prolapse)     PONV (postoperative nausea and vomiting)     Thyroid disease        PSH:  Past Surgical History:   Procedure Laterality Date    APPENDECTOMY      cataracts      both eyes    CYSTOSCOPY      CYSTOSCOPY W/ URETERAL STENT PLACEMENT      ECTOPIC PREGNANCY SURGERY      EYE SURGERY      phoebe cataract    HYSTERECTOMY      NASAL SEPTUM SURGERY      THYROIDECTOMY      two times    TONSILLECTOMY         FamHx:  Family History   Problem Relation Age of Onset    Hypertension Father     Kidney disease Father     Mental illness Mother     Cancer Brother        SocHx:  Social History     Social History    Marital status:      Spouse name: N/A    Number of children: N/A    Years of education: N/A     Occupational History    Not on file.     Social History Main Topics    Smoking status: Never Smoker    Smokeless tobacco: Never Used    Alcohol use No    Drug use: No    Sexual activity: Yes     Partners: Male     Other Topics Concern    Not on file     Social History Narrative    No narrative on file        Objective:      Physical Exam   Constitutional: She is oriented to person, place, and time. She appears well-developed and well-nourished. No distress.   HENT:   Head: Normocephalic and atraumatic.   Eyes: EOM are normal. Pupils are equal, round, and reactive to light.   Musculoskeletal: Normal range of motion.   Neurological: She is alert and oriented to person, place, and time.   Skin: No erythema. No pallor.   Psychiatric: She has a normal mood and affect. Her behavior is normal. Judgment and thought content normal.       LABS:  WBC   Date Value Ref Range Status   01/19/2018 6.01 3.90 - 12.70 K/uL Final     Hemoglobin   Date Value Ref Range Status   01/19/2018 10.7 (L) 12.0 - 16.0 g/dL Final     Hematocrit   Date Value Ref Range Status   01/19/2018 31.7 (L) 37.0 - 48.5 % Final     Platelets   Date Value Ref Range Status   01/19/2018 141 (L) 150 - 350 K/uL Final       Chemistry        Component Value Date/Time     01/19/2018 0750    K 4.3 01/19/2018 0750     01/19/2018 0750    CO2 27 01/19/2018 0750    BUN 30 (H) 01/19/2018 0750    CREATININE 2.0 (H) 01/19/2018 0750     (H) 01/19/2018 0750        Component Value Date/Time    CALCIUM 7.4 (L) 01/19/2018 0750    ALKPHOS 107 01/19/2018 0750    AST 25 01/19/2018 0750    ALT 49 (H) 01/19/2018 0750    BILITOT 0.4 01/19/2018 0750    ESTGFRAFRICA 27.5 (A) 01/19/2018 0750    EGFRNONAA 23.9 (A) 01/19/2018 0750          Assessment:       1. Renal cell carcinoma of left kidney    2. Status post Left nephrectomy    3. Encounter for chemotherapy management    4. Stage 4 chronic kidney disease        Plan:       1,2,3,4- Stage III RCC:     77 year old female, referred by Dr. Azar Donnelly, to clinic today for evaluation and management of recently diagnosed clear cell renal cell carcinoma. Patient underwent robotic left nephrectomy on 11/16/17.    Discussed rationale and risks/benefits of adjuvant therapy in RCC.  She does not qualify for our adjuvant  trial, but Sutent has recently been approved for this indication.  Patient would like to try this. Unable to tolerate Sutent, will start surveillance.     RTC 4 wks with labs (CBC,CMP), CT CAP and to see Dr. Epstein.      Patient is in agreement with the proposed treatment plan. All questions were answered to the patient's satisfaction. Pt knows to call clinic if anything is needed before the next clinic visit.    CRISTI Del Rosario-C  Hematology and Medical Oncology      I have reviewed the notes, assessments, and/or procedures performed by the nurse practitioner, as above.  I have personally interviewed the patient at the beside, and rounded with the nurse practitioner. I formulated the plan of care.  I concur with her documentation of Valery Huggins.  I, Dr. Stanislav Epstein, personally spent more than 25 mins during this encounter, greater than 50% was spent in direct counseling and/or coordination of care.     Stanislav Epstein M.D., M.S., F.A.C.P.  Hematology/Oncology Attending  [facility]

## 2018-01-19 ENCOUNTER — LAB VISIT (OUTPATIENT)
Dept: LAB | Facility: HOSPITAL | Age: 78
End: 2018-01-19
Payer: COMMERCIAL

## 2018-01-19 ENCOUNTER — OFFICE VISIT (OUTPATIENT)
Dept: HEMATOLOGY/ONCOLOGY | Facility: CLINIC | Age: 78
End: 2018-01-19
Payer: MEDICARE

## 2018-01-19 VITALS
TEMPERATURE: 98 F | DIASTOLIC BLOOD PRESSURE: 64 MMHG | WEIGHT: 135.56 LBS | HEART RATE: 86 BPM | BODY MASS INDEX: 25.59 KG/M2 | SYSTOLIC BLOOD PRESSURE: 129 MMHG | HEIGHT: 61 IN | RESPIRATION RATE: 16 BRPM

## 2018-01-19 DIAGNOSIS — Z51.11 ENCOUNTER FOR CHEMOTHERAPY MANAGEMENT: ICD-10-CM

## 2018-01-19 DIAGNOSIS — N18.4 STAGE 4 CHRONIC KIDNEY DISEASE: ICD-10-CM

## 2018-01-19 DIAGNOSIS — C64.2 RENAL CELL CARCINOMA OF LEFT KIDNEY: Primary | ICD-10-CM

## 2018-01-19 DIAGNOSIS — C64.2 RENAL CELL CARCINOMA OF LEFT KIDNEY: ICD-10-CM

## 2018-01-19 DIAGNOSIS — Z90.5 STATUS POST NEPHRECTOMY: ICD-10-CM

## 2018-01-19 LAB
ALBUMIN SERPL BCP-MCNC: 2.6 G/DL
ALP SERPL-CCNC: 107 U/L
ALT SERPL W/O P-5'-P-CCNC: 49 U/L
ANION GAP SERPL CALC-SCNC: 8 MMOL/L
AST SERPL-CCNC: 25 U/L
BASOPHILS # BLD AUTO: 0.01 K/UL
BASOPHILS NFR BLD: 0.2 %
BILIRUB SERPL-MCNC: 0.4 MG/DL
BUN SERPL-MCNC: 30 MG/DL
CALCIUM SERPL-MCNC: 7.4 MG/DL
CHLORIDE SERPL-SCNC: 103 MMOL/L
CO2 SERPL-SCNC: 27 MMOL/L
CREAT SERPL-MCNC: 2 MG/DL
DIFFERENTIAL METHOD: ABNORMAL
EOSINOPHIL # BLD AUTO: 0.3 K/UL
EOSINOPHIL NFR BLD: 4.8 %
ERYTHROCYTE [DISTWIDTH] IN BLOOD BY AUTOMATED COUNT: 14.6 %
EST. GFR  (AFRICAN AMERICAN): 27.5 ML/MIN/1.73 M^2
EST. GFR  (NON AFRICAN AMERICAN): 23.9 ML/MIN/1.73 M^2
GLUCOSE SERPL-MCNC: 135 MG/DL
HCT VFR BLD AUTO: 31.7 %
HGB BLD-MCNC: 10.7 G/DL
IMM GRANULOCYTES # BLD AUTO: 0.04 K/UL
IMM GRANULOCYTES NFR BLD AUTO: 0.7 %
LYMPHOCYTES # BLD AUTO: 1.3 K/UL
LYMPHOCYTES NFR BLD: 22 %
MCH RBC QN AUTO: 28.1 PG
MCHC RBC AUTO-ENTMCNC: 33.8 G/DL
MCV RBC AUTO: 83 FL
MONOCYTES # BLD AUTO: 0.2 K/UL
MONOCYTES NFR BLD: 3.5 %
NEUTROPHILS # BLD AUTO: 4.1 K/UL
NEUTROPHILS NFR BLD: 68.8 %
NRBC BLD-RTO: 0 /100 WBC
PLATELET # BLD AUTO: 141 K/UL
PMV BLD AUTO: 10.2 FL
POTASSIUM SERPL-SCNC: 4.3 MMOL/L
PROT SERPL-MCNC: 5.9 G/DL
RBC # BLD AUTO: 3.81 M/UL
SODIUM SERPL-SCNC: 138 MMOL/L
WBC # BLD AUTO: 6.01 K/UL

## 2018-01-19 PROCEDURE — 99214 OFFICE O/P EST MOD 30 MIN: CPT | Mod: S$GLB,,, | Performed by: INTERNAL MEDICINE

## 2018-01-19 PROCEDURE — 80053 COMPREHEN METABOLIC PANEL: CPT

## 2018-01-19 PROCEDURE — 85025 COMPLETE CBC W/AUTO DIFF WBC: CPT

## 2018-01-19 PROCEDURE — 99999 PR PBB SHADOW E&M-EST. PATIENT-LVL III: CPT | Mod: PBBFAC,,, | Performed by: INTERNAL MEDICINE

## 2018-01-19 PROCEDURE — 36415 COLL VENOUS BLD VENIPUNCTURE: CPT

## 2018-01-19 NOTE — PROGRESS NOTES
Patient, Valery Huggins (MRN #[id number]), presented with a recent Platelet count less than 150 K/uL consistent with the definition of thrombocytopenia (ICD10 - D69.6).    Platelets   Date Value Ref Range Status   01/19/2018 141 (L) 150 - 350 K/uL Final     The patient's thrombocytopenia was monitored, evaluated, addressed and/or treated. This addendum to the medical record is made on 01/19/2018.

## 2018-01-21 ENCOUNTER — HOSPITAL ENCOUNTER (INPATIENT)
Facility: HOSPITAL | Age: 78
LOS: 2 days | Discharge: HOME | DRG: 418 | End: 2018-01-23
Attending: EMERGENCY MEDICINE | Admitting: FAMILY MEDICINE
Payer: COMMERCIAL

## 2018-01-21 DIAGNOSIS — K85.10 ACUTE BILIARY PANCREATITIS WITHOUT INFECTION OR NECROSIS: Primary | ICD-10-CM

## 2018-01-21 DIAGNOSIS — R07.9 CHEST PAIN: ICD-10-CM

## 2018-01-21 DIAGNOSIS — N17.9 AKI (ACUTE KIDNEY INJURY): ICD-10-CM

## 2018-01-21 DIAGNOSIS — K80.20 CALCULUS OF GALLBLADDER WITHOUT CHOLECYSTITIS WITHOUT OBSTRUCTION: ICD-10-CM

## 2018-01-21 DIAGNOSIS — E83.51 HYPOCALCEMIA: ICD-10-CM

## 2018-01-21 LAB
ALBUMIN SERPL BCP-MCNC: 3 G/DL
ALP SERPL-CCNC: 93 U/L
ALT SERPL W/O P-5'-P-CCNC: 33 U/L
ANION GAP SERPL CALC-SCNC: 11 MMOL/L
AST SERPL-CCNC: 16 U/L
BACTERIA #/AREA URNS HPF: NORMAL /HPF
BASOPHILS # BLD AUTO: 0.1 K/UL
BASOPHILS NFR BLD: 1.5 %
BILIRUB SERPL-MCNC: 0.7 MG/DL
BILIRUB UR QL STRIP: NEGATIVE
BUN SERPL-MCNC: 23 MG/DL
CALCIUM SERPL-MCNC: 9.1 MG/DL
CHLORIDE SERPL-SCNC: 100 MMOL/L
CLARITY UR: CLEAR
CO2 SERPL-SCNC: 27 MMOL/L
COLOR UR: YELLOW
CREAT SERPL-MCNC: 1.7 MG/DL
DIFFERENTIAL METHOD: ABNORMAL
EOSINOPHIL # BLD AUTO: 0 K/UL
EOSINOPHIL NFR BLD: 0.6 %
ERYTHROCYTE [DISTWIDTH] IN BLOOD BY AUTOMATED COUNT: 14.9 %
EST. GFR  (AFRICAN AMERICAN): 34 ML/MIN/1.73 M^2
EST. GFR  (NON AFRICAN AMERICAN): 29 ML/MIN/1.73 M^2
GLUCOSE SERPL-MCNC: 165 MG/DL
GLUCOSE UR QL STRIP: ABNORMAL
HCT VFR BLD AUTO: 33.7 %
HGB BLD-MCNC: 11.4 G/DL
HGB UR QL STRIP: NEGATIVE
HYALINE CASTS #/AREA URNS LPF: 0 /LPF
KETONES UR QL STRIP: NEGATIVE
LACTATE SERPL-SCNC: 3.1 MMOL/L
LEUKOCYTE ESTERASE UR QL STRIP: NEGATIVE
LIPASE SERPL-CCNC: 541 U/L
LYMPHOCYTES # BLD AUTO: 0.9 K/UL
LYMPHOCYTES NFR BLD: 11.8 %
MCH RBC QN AUTO: 28.6 PG
MCHC RBC AUTO-ENTMCNC: 33.7 G/DL
MCV RBC AUTO: 85 FL
MICROSCOPIC COMMENT: NORMAL
MONOCYTES # BLD AUTO: 0.2 K/UL
MONOCYTES NFR BLD: 2.3 %
NEUTROPHILS # BLD AUTO: 6.6 K/UL
NEUTROPHILS NFR BLD: 83.8 %
NITRITE UR QL STRIP: NEGATIVE
PH UR STRIP: 6 [PH] (ref 5–8)
PLATELET # BLD AUTO: 217 K/UL
PLATELET BLD QL SMEAR: ABNORMAL
PMV BLD AUTO: 7.9 FL
POCT GLUCOSE: 160 MG/DL (ref 70–110)
POTASSIUM SERPL-SCNC: 4.2 MMOL/L
PROT SERPL-MCNC: 6.8 G/DL
PROT UR QL STRIP: ABNORMAL
RBC # BLD AUTO: 3.97 M/UL
RBC #/AREA URNS HPF: 0 /HPF (ref 0–4)
SODIUM SERPL-SCNC: 138 MMOL/L
SP GR UR STRIP: 1.02 (ref 1–1.03)
SQUAMOUS #/AREA URNS HPF: 0 /HPF
TROPONIN I SERPL DL<=0.01 NG/ML-MCNC: <0.006 NG/ML
URN SPEC COLLECT METH UR: ABNORMAL
UROBILINOGEN UR STRIP-ACNC: NEGATIVE EU/DL
WBC # BLD AUTO: 7.9 K/UL
WBC #/AREA URNS HPF: 1 /HPF (ref 0–5)
YEAST URNS QL MICRO: NORMAL

## 2018-01-21 PROCEDURE — 93010 ELECTROCARDIOGRAM REPORT: CPT | Mod: ,,, | Performed by: INTERNAL MEDICINE

## 2018-01-21 PROCEDURE — 84484 ASSAY OF TROPONIN QUANT: CPT

## 2018-01-21 PROCEDURE — 81000 URINALYSIS NONAUTO W/SCOPE: CPT

## 2018-01-21 PROCEDURE — 25000003 PHARM REV CODE 250: Performed by: NURSE PRACTITIONER

## 2018-01-21 PROCEDURE — 63600175 PHARM REV CODE 636 W HCPCS: Performed by: FAMILY MEDICINE

## 2018-01-21 PROCEDURE — 83036 HEMOGLOBIN GLYCOSYLATED A1C: CPT

## 2018-01-21 PROCEDURE — 96374 THER/PROPH/DIAG INJ IV PUSH: CPT

## 2018-01-21 PROCEDURE — 99285 EMERGENCY DEPT VISIT HI MDM: CPT | Mod: 25

## 2018-01-21 PROCEDURE — 83690 ASSAY OF LIPASE: CPT

## 2018-01-21 PROCEDURE — 83605 ASSAY OF LACTIC ACID: CPT

## 2018-01-21 PROCEDURE — 87040 BLOOD CULTURE FOR BACTERIA: CPT

## 2018-01-21 PROCEDURE — 93005 ELECTROCARDIOGRAM TRACING: CPT

## 2018-01-21 PROCEDURE — 63600175 PHARM REV CODE 636 W HCPCS: Performed by: EMERGENCY MEDICINE

## 2018-01-21 PROCEDURE — 85025 COMPLETE CBC W/AUTO DIFF WBC: CPT

## 2018-01-21 PROCEDURE — 80053 COMPREHEN METABOLIC PANEL: CPT

## 2018-01-21 PROCEDURE — 12000002 HC ACUTE/MED SURGE SEMI-PRIVATE ROOM

## 2018-01-21 PROCEDURE — 36415 COLL VENOUS BLD VENIPUNCTURE: CPT

## 2018-01-21 RX ORDER — POTASSIUM CHLORIDE 20 MEQ/15ML
40 SOLUTION ORAL
Status: DISCONTINUED | OUTPATIENT
Start: 2018-01-21 | End: 2018-01-23 | Stop reason: HOSPADM

## 2018-01-21 RX ORDER — AMLODIPINE BESYLATE 5 MG/1
5 TABLET ORAL DAILY
Status: DISCONTINUED | OUTPATIENT
Start: 2018-01-21 | End: 2018-01-23 | Stop reason: HOSPADM

## 2018-01-21 RX ORDER — MORPHINE SULFATE 8 MG/ML
2 INJECTION INTRAMUSCULAR; INTRAVENOUS; SUBCUTANEOUS EVERY 6 HOURS PRN
Status: DISCONTINUED | OUTPATIENT
Start: 2018-01-21 | End: 2018-01-21

## 2018-01-21 RX ORDER — RAMELTEON 8 MG/1
8 TABLET ORAL NIGHTLY PRN
Status: DISCONTINUED | OUTPATIENT
Start: 2018-01-21 | End: 2018-01-23 | Stop reason: HOSPADM

## 2018-01-21 RX ORDER — SODIUM CHLORIDE 0.9 % (FLUSH) 0.9 %
5 SYRINGE (ML) INJECTION
Status: DISCONTINUED | OUTPATIENT
Start: 2018-01-21 | End: 2018-01-23 | Stop reason: HOSPADM

## 2018-01-21 RX ORDER — LANOLIN ALCOHOL/MO/W.PET/CERES
800 CREAM (GRAM) TOPICAL
Status: DISCONTINUED | OUTPATIENT
Start: 2018-01-21 | End: 2018-01-23 | Stop reason: HOSPADM

## 2018-01-21 RX ORDER — ATENOLOL 25 MG/1
25 TABLET ORAL DAILY
Status: DISCONTINUED | OUTPATIENT
Start: 2018-01-21 | End: 2018-01-23 | Stop reason: HOSPADM

## 2018-01-21 RX ORDER — GLUCAGON 1 MG
1 KIT INJECTION
Status: DISCONTINUED | OUTPATIENT
Start: 2018-01-21 | End: 2018-01-23 | Stop reason: HOSPADM

## 2018-01-21 RX ORDER — SODIUM,POTASSIUM PHOSPHATES 280-250MG
2 POWDER IN PACKET (EA) ORAL
Status: DISCONTINUED | OUTPATIENT
Start: 2018-01-21 | End: 2018-01-23 | Stop reason: HOSPADM

## 2018-01-21 RX ORDER — PANTOPRAZOLE SODIUM 40 MG/1
40 TABLET, DELAYED RELEASE ORAL DAILY
Status: DISCONTINUED | OUTPATIENT
Start: 2018-01-21 | End: 2018-01-23 | Stop reason: HOSPADM

## 2018-01-21 RX ORDER — MORPHINE SULFATE 2 MG/ML
4 INJECTION, SOLUTION INTRAMUSCULAR; INTRAVENOUS
Status: DISCONTINUED | OUTPATIENT
Start: 2018-01-21 | End: 2018-01-21

## 2018-01-21 RX ORDER — SODIUM CHLORIDE 9 MG/ML
INJECTION, SOLUTION INTRAVENOUS CONTINUOUS
Status: DISCONTINUED | OUTPATIENT
Start: 2018-01-21 | End: 2018-01-23

## 2018-01-21 RX ORDER — ONDANSETRON 2 MG/ML
8 INJECTION INTRAMUSCULAR; INTRAVENOUS EVERY 8 HOURS PRN
Status: DISCONTINUED | OUTPATIENT
Start: 2018-01-21 | End: 2018-01-23 | Stop reason: HOSPADM

## 2018-01-21 RX ORDER — MORPHINE SULFATE 8 MG/ML
2 INJECTION INTRAMUSCULAR; INTRAVENOUS; SUBCUTANEOUS EVERY 4 HOURS PRN
Status: DISCONTINUED | OUTPATIENT
Start: 2018-01-21 | End: 2018-01-22

## 2018-01-21 RX ORDER — MORPHINE SULFATE 8 MG/ML
4 INJECTION INTRAMUSCULAR; INTRAVENOUS; SUBCUTANEOUS
Status: COMPLETED | OUTPATIENT
Start: 2018-01-21 | End: 2018-01-21

## 2018-01-21 RX ORDER — ESCITALOPRAM OXALATE 10 MG/1
20 TABLET ORAL DAILY
Status: DISCONTINUED | OUTPATIENT
Start: 2018-01-21 | End: 2018-01-23 | Stop reason: HOSPADM

## 2018-01-21 RX ORDER — INSULIN ASPART 100 [IU]/ML
0-5 INJECTION, SOLUTION INTRAVENOUS; SUBCUTANEOUS
Status: DISCONTINUED | OUTPATIENT
Start: 2018-01-21 | End: 2018-01-23 | Stop reason: HOSPADM

## 2018-01-21 RX ORDER — IBUPROFEN 200 MG
24 TABLET ORAL
Status: DISCONTINUED | OUTPATIENT
Start: 2018-01-21 | End: 2018-01-23 | Stop reason: HOSPADM

## 2018-01-21 RX ORDER — IBUPROFEN 200 MG
16 TABLET ORAL
Status: DISCONTINUED | OUTPATIENT
Start: 2018-01-21 | End: 2018-01-23 | Stop reason: HOSPADM

## 2018-01-21 RX ORDER — ACETAMINOPHEN 325 MG/1
650 TABLET ORAL EVERY 8 HOURS PRN
Status: DISCONTINUED | OUTPATIENT
Start: 2018-01-21 | End: 2018-01-23 | Stop reason: HOSPADM

## 2018-01-21 RX ADMIN — MORPHINE SULFATE 2 MG: 8 INJECTION, SOLUTION INTRAMUSCULAR; INTRAVENOUS at 07:01

## 2018-01-21 RX ADMIN — ATENOLOL 25 MG: 25 TABLET ORAL at 03:01

## 2018-01-21 RX ADMIN — PIPERACILLIN AND TAZOBACTAM 4.5 G: 4; .5 INJECTION, POWDER, LYOPHILIZED, FOR SOLUTION INTRAVENOUS; PARENTERAL at 09:01

## 2018-01-21 RX ADMIN — ACETAMINOPHEN 650 MG: 325 TABLET, FILM COATED ORAL at 06:01

## 2018-01-21 RX ADMIN — MORPHINE SULFATE 4 MG: 8 INJECTION, SOLUTION INTRAMUSCULAR; INTRAVENOUS at 12:01

## 2018-01-21 RX ADMIN — ESCITALOPRAM 20 MG: 10 TABLET, FILM COATED ORAL at 03:01

## 2018-01-21 RX ADMIN — SODIUM CHLORIDE: 0.9 INJECTION, SOLUTION INTRAVENOUS at 03:01

## 2018-01-21 RX ADMIN — AMLODIPINE BESYLATE 5 MG: 5 TABLET ORAL at 03:01

## 2018-01-21 NOTE — ED PROVIDER NOTES
"Encounter Date: 1/21/2018    SCRIBE #1 NOTE: ILori, hoa scribing for, and in the presence of,  .       History     Chief Complaint   Patient presents with    Flank Pain     right that radiates to back. also reports gas pain x 2 days       01/21/2018 10:46 AM     Chief complaint: Abdominal pain       Valery Huggins is a 77 y.o. female with DM II, Anemia, Thyroid disease, MVP, who presents to the ED with abdominal pain onset 3 days. She states experiencing generalized upper abdominal/"chest gas pain" 3 days ago that resolved. Howsoever she states feeling as if the pain has settled along her right abdomen and back. She complains of nausea relieved with Zofran. Patient states 2 months ago her left kidney was removed secondary to cancer and since has been prescribed Sutent (chemotherapy). Patient has had an appendectomy,  Hysterectomy, and surgical removal of kidney stones (2 years ago). She denies SOB however complains of a subjective fever yesterday stating "she woke up covered in sweat". There are no identifying, exacerbating, or alleviating factors for symptoms.         The history is provided by the patient. No  was used.     Review of patient's allergies indicates:  No Known Allergies  Past Medical History:   Diagnosis Date    Anemia     Cancer     thyroid    Cancer     Kidney    Depression     Diabetes mellitus     Diabetes mellitus type II     Encounter for blood transfusion     Gallstones     Hypertension     Kidney stone     MVP (mitral valve prolapse)     PONV (postoperative nausea and vomiting)     Thyroid disease      Past Surgical History:   Procedure Laterality Date    APPENDECTOMY      cataracts      both eyes    CYSTOSCOPY      CYSTOSCOPY W/ URETERAL STENT PLACEMENT      ECTOPIC PREGNANCY SURGERY      EYE SURGERY      phoebe cataract    HYSTERECTOMY      NASAL SEPTUM SURGERY      THYROIDECTOMY      two times    TONSILLECTOMY       Family " History   Problem Relation Age of Onset    Hypertension Father     Kidney disease Father     Mental illness Mother     Cancer Brother      Social History   Substance Use Topics    Smoking status: Never Smoker    Smokeless tobacco: Never Used    Alcohol use No     Review of Systems   Constitutional: Positive for diaphoresis (1 episode: resolved) and fever (subjective, unmeasured, and resolved).   HENT: Negative for sore throat.    Respiratory: Negative for shortness of breath.    Cardiovascular: Negative for chest pain.   Gastrointestinal: Positive for abdominal pain and nausea (resolved).   Genitourinary: Positive for flank pain. Negative for dysuria.   Musculoskeletal: Positive for back pain.   Skin: Negative for rash.   Neurological: Negative for weakness.   Hematological: Does not bruise/bleed easily.   All other systems reviewed and are negative.      Physical Exam     Initial Vitals [01/21/18 1007]   BP Pulse Resp Temp SpO2   (!) 146/67 89 17 98.2 °F (36.8 °C) 99 %      MAP       93.33         Physical Exam    Nursing note and vitals reviewed.  Constitutional: She appears well-developed and well-nourished. She is not diaphoretic. No distress.   HENT:   Head: Normocephalic and atraumatic.   Eyes: EOM are normal.   Neck: Normal range of motion. Neck supple.   Cardiovascular: Normal rate, regular rhythm and normal heart sounds. Exam reveals no gallop and no friction rub.    No murmur heard.  Pulmonary/Chest: Breath sounds normal. No respiratory distress. She has no wheezes. She has no rhonchi. She has no rales.   Abdominal: There is tenderness (diffuse along right abdomen ).       Musculoskeletal: Normal range of motion.   Neurological: She is alert and oriented to person, place, and time.   Skin: Skin is warm and dry.   Psychiatric: She has a normal mood and affect. Her behavior is normal. Judgment and thought content normal.         ED Course   Procedures  Labs Reviewed - No data to display           Medical Decision Making:   History:   Old Medical Records: I decided to obtain old medical records.  Clinical Tests:   Lab Tests: Ordered and Reviewed  Radiological Study: Ordered and Reviewed  Medical Tests: Ordered and Reviewed            Scribe Attestation:   Scribe #1: I performed the above scribed service and the documentation accurately describes the services I performed. I attest to the accuracy of the note.    I, Dr. Cabrera, personally performed the services described in this documentation. All medical record entries made by the scribe were at my direction and in my presence.  I have reviewed the chart and agree that the record reflects my personal performance and is accurate and complete.1:48 PM 01/21/2018            ED Course as of Jan 21 1347   Sun Jan 21, 2018   1237 Doctor Ulysses will come down to the ER to see the patient  [EF]   1255 Case discussed with Dr. Denise general surgery no indication for emergent cholecystectomy  [EF]      ED Course User Index  [EF] Doroteo Cabrera MD     Clinical Impression:   Diagnoses of Chest pain and Chest pain were pertinent to this visit.          77-year-old presents to the ER with diffuse right-sided abdominal pain.  Lipase elevated, patient probably has gallstone pancreatitis.  Case discussed with general surgery, no indication for acute cholecystectomy.  Hospital medicine will admit the patient.                 Doroteo Cabrera MD  01/21/18 5634

## 2018-01-21 NOTE — PLAN OF CARE
Problem: Patient Care Overview  Goal: Plan of Care Review  Outcome: Ongoing (interventions implemented as appropriate)  Pt arrived to unit at approximately 1410, pt ambulating at raphael, denies pain at this time, initiated NS at 125ml/hr to right AC, applied teds/scd's, tolerating clear liquids, no nausea voiced, will continue to monitor, observe and note any changes, safety maintain

## 2018-01-22 ENCOUNTER — ANESTHESIA EVENT (OUTPATIENT)
Dept: SURGERY | Facility: HOSPITAL | Age: 78
DRG: 418 | End: 2018-01-22
Payer: COMMERCIAL

## 2018-01-22 ENCOUNTER — ANESTHESIA (OUTPATIENT)
Dept: SURGERY | Facility: HOSPITAL | Age: 78
DRG: 418 | End: 2018-01-22
Payer: COMMERCIAL

## 2018-01-22 PROBLEM — D64.9 NORMOCYTIC ANEMIA: Status: ACTIVE | Noted: 2018-01-22

## 2018-01-22 PROBLEM — E86.0 DEHYDRATION: Status: ACTIVE | Noted: 2018-01-22

## 2018-01-22 PROBLEM — E87.1 HYPONATREMIA: Status: ACTIVE | Noted: 2018-01-22

## 2018-01-22 PROBLEM — R07.9 CHEST PAIN: Status: ACTIVE | Noted: 2018-01-22

## 2018-01-22 PROBLEM — A41.9 SEPSIS: Status: ACTIVE | Noted: 2018-01-22

## 2018-01-22 LAB
ANION GAP SERPL CALC-SCNC: 8 MMOL/L
BASOPHILS # BLD AUTO: 0 K/UL
BASOPHILS NFR BLD: 0.6 %
BUN SERPL-MCNC: 18 MG/DL
CALCIUM SERPL-MCNC: 7.6 MG/DL
CHLORIDE SERPL-SCNC: 100 MMOL/L
CO2 SERPL-SCNC: 26 MMOL/L
CREAT SERPL-MCNC: 1.7 MG/DL
DIFFERENTIAL METHOD: ABNORMAL
EOSINOPHIL # BLD AUTO: 0 K/UL
EOSINOPHIL NFR BLD: 0.6 %
ERYTHROCYTE [DISTWIDTH] IN BLOOD BY AUTOMATED COUNT: 14.9 %
EST. GFR  (AFRICAN AMERICAN): 34 ML/MIN/1.73 M^2
EST. GFR  (NON AFRICAN AMERICAN): 29 ML/MIN/1.73 M^2
ESTIMATED AVG GLUCOSE: 140 MG/DL
GLUCOSE SERPL-MCNC: 147 MG/DL
HBA1C MFR BLD HPLC: 6.5 %
HCT VFR BLD AUTO: 28.3 %
HGB BLD-MCNC: 9.8 G/DL
LACTATE SERPL-SCNC: 0.7 MMOL/L
LACTATE SERPL-SCNC: 1.2 MMOL/L
LIPASE SERPL-CCNC: 180 U/L
LYMPHOCYTES # BLD AUTO: 0.7 K/UL
LYMPHOCYTES NFR BLD: 9.1 %
MCH RBC QN AUTO: 29.3 PG
MCHC RBC AUTO-ENTMCNC: 34.6 G/DL
MCV RBC AUTO: 85 FL
MONOCYTES # BLD AUTO: 0.4 K/UL
MONOCYTES NFR BLD: 5.3 %
NEUTROPHILS # BLD AUTO: 6.4 K/UL
NEUTROPHILS NFR BLD: 84.4 %
PLATELET # BLD AUTO: 203 K/UL
PMV BLD AUTO: 7 FL
POCT GLUCOSE: 266 MG/DL (ref 70–110)
POTASSIUM SERPL-SCNC: 4.1 MMOL/L
RBC # BLD AUTO: 3.34 M/UL
SODIUM SERPL-SCNC: 134 MMOL/L
WBC # BLD AUTO: 7.6 K/UL

## 2018-01-22 PROCEDURE — 47562 LAPAROSCOPIC CHOLECYSTECTOMY: CPT | Mod: ,,, | Performed by: SURGERY

## 2018-01-22 PROCEDURE — 71000039 HC RECOVERY, EACH ADD'L HOUR: Performed by: SURGERY

## 2018-01-22 PROCEDURE — 63600175 PHARM REV CODE 636 W HCPCS: Performed by: NURSE ANESTHETIST, CERTIFIED REGISTERED

## 2018-01-22 PROCEDURE — 83605 ASSAY OF LACTIC ACID: CPT | Mod: 91

## 2018-01-22 PROCEDURE — 36000709 HC OR TIME LEV III EA ADD 15 MIN: Performed by: SURGERY

## 2018-01-22 PROCEDURE — 80048 BASIC METABOLIC PNL TOTAL CA: CPT

## 2018-01-22 PROCEDURE — 25000003 PHARM REV CODE 250: Performed by: NURSE PRACTITIONER

## 2018-01-22 PROCEDURE — 12000002 HC ACUTE/MED SURGE SEMI-PRIVATE ROOM

## 2018-01-22 PROCEDURE — 63600175 PHARM REV CODE 636 W HCPCS: Performed by: SURGERY

## 2018-01-22 PROCEDURE — 71000033 HC RECOVERY, INTIAL HOUR: Performed by: SURGERY

## 2018-01-22 PROCEDURE — 25000003 PHARM REV CODE 250: Performed by: NURSE ANESTHETIST, CERTIFIED REGISTERED

## 2018-01-22 PROCEDURE — 0FT44ZZ RESECTION OF GALLBLADDER, PERCUTANEOUS ENDOSCOPIC APPROACH: ICD-10-PCS | Performed by: SURGERY

## 2018-01-22 PROCEDURE — 36000708 HC OR TIME LEV III 1ST 15 MIN: Performed by: SURGERY

## 2018-01-22 PROCEDURE — 85025 COMPLETE CBC W/AUTO DIFF WBC: CPT

## 2018-01-22 PROCEDURE — 25000003 PHARM REV CODE 250: Performed by: SURGERY

## 2018-01-22 PROCEDURE — 99900103 DSU ONLY-NO CHARGE-INITIAL HR (STAT): Performed by: SURGERY

## 2018-01-22 PROCEDURE — 25000003 PHARM REV CODE 250: Performed by: ANESTHESIOLOGY

## 2018-01-22 PROCEDURE — D9220A PRA ANESTHESIA: Mod: ANES,,, | Performed by: ANESTHESIOLOGY

## 2018-01-22 PROCEDURE — 36415 COLL VENOUS BLD VENIPUNCTURE: CPT

## 2018-01-22 PROCEDURE — 99900104 DSU ONLY-NO CHARGE-EA ADD'L HR (STAT): Performed by: SURGERY

## 2018-01-22 PROCEDURE — 37000009 HC ANESTHESIA EA ADD 15 MINS: Performed by: SURGERY

## 2018-01-22 PROCEDURE — D9220A PRA ANESTHESIA: Mod: CRNA,,, | Performed by: NURSE ANESTHETIST, CERTIFIED REGISTERED

## 2018-01-22 PROCEDURE — 63600175 PHARM REV CODE 636 W HCPCS: Performed by: NURSE PRACTITIONER

## 2018-01-22 PROCEDURE — 83605 ASSAY OF LACTIC ACID: CPT

## 2018-01-22 PROCEDURE — 88304 TISSUE EXAM BY PATHOLOGIST: CPT | Performed by: PATHOLOGY

## 2018-01-22 PROCEDURE — 37000008 HC ANESTHESIA 1ST 15 MINUTES: Performed by: SURGERY

## 2018-01-22 PROCEDURE — 99254 IP/OBS CNSLTJ NEW/EST MOD 60: CPT | Mod: 57,,, | Performed by: SURGERY

## 2018-01-22 PROCEDURE — 63600175 PHARM REV CODE 636 W HCPCS: Performed by: FAMILY MEDICINE

## 2018-01-22 PROCEDURE — 94760 N-INVAS EAR/PLS OXIMETRY 1: CPT

## 2018-01-22 PROCEDURE — 27201423 OPTIME MED/SURG SUP & DEVICES STERILE SUPPLY: Performed by: SURGERY

## 2018-01-22 PROCEDURE — 83690 ASSAY OF LIPASE: CPT

## 2018-01-22 RX ORDER — BUPIVACAINE HYDROCHLORIDE AND EPINEPHRINE 2.5; 5 MG/ML; UG/ML
INJECTION, SOLUTION EPIDURAL; INFILTRATION; INTRACAUDAL; PERINEURAL
Status: DISCONTINUED | OUTPATIENT
Start: 2018-01-22 | End: 2018-01-22 | Stop reason: HOSPADM

## 2018-01-22 RX ORDER — KETOROLAC TROMETHAMINE 30 MG/ML
INJECTION, SOLUTION INTRAMUSCULAR; INTRAVENOUS
Status: DISCONTINUED | OUTPATIENT
Start: 2018-01-22 | End: 2018-01-22

## 2018-01-22 RX ORDER — LIDOCAINE HCL/PF 100 MG/5ML
SYRINGE (ML) INTRAVENOUS
Status: DISCONTINUED | OUTPATIENT
Start: 2018-01-22 | End: 2018-01-22

## 2018-01-22 RX ORDER — IBUPROFEN 200 MG
16 TABLET ORAL
Status: DISCONTINUED | OUTPATIENT
Start: 2018-01-22 | End: 2018-01-23 | Stop reason: HOSPADM

## 2018-01-22 RX ORDER — ONDANSETRON 2 MG/ML
4 INJECTION INTRAMUSCULAR; INTRAVENOUS EVERY 12 HOURS PRN
Status: DISCONTINUED | OUTPATIENT
Start: 2018-01-22 | End: 2018-01-23 | Stop reason: HOSPADM

## 2018-01-22 RX ORDER — FENTANYL CITRATE 50 UG/ML
25 INJECTION, SOLUTION INTRAMUSCULAR; INTRAVENOUS EVERY 5 MIN PRN
Status: DISCONTINUED | OUTPATIENT
Start: 2018-01-22 | End: 2018-01-22 | Stop reason: HOSPADM

## 2018-01-22 RX ORDER — PROPOFOL 10 MG/ML
VIAL (ML) INTRAVENOUS
Status: DISCONTINUED | OUTPATIENT
Start: 2018-01-22 | End: 2018-01-22

## 2018-01-22 RX ORDER — DEXAMETHASONE SODIUM PHOSPHATE 4 MG/ML
INJECTION, SOLUTION INTRA-ARTICULAR; INTRALESIONAL; INTRAMUSCULAR; INTRAVENOUS; SOFT TISSUE
Status: DISCONTINUED | OUTPATIENT
Start: 2018-01-22 | End: 2018-01-22

## 2018-01-22 RX ORDER — IBUPROFEN 200 MG
24 TABLET ORAL
Status: DISCONTINUED | OUTPATIENT
Start: 2018-01-22 | End: 2018-01-23 | Stop reason: HOSPADM

## 2018-01-22 RX ORDER — SODIUM CHLORIDE, SODIUM LACTATE, POTASSIUM CHLORIDE, CALCIUM CHLORIDE 600; 310; 30; 20 MG/100ML; MG/100ML; MG/100ML; MG/100ML
INJECTION, SOLUTION INTRAVENOUS CONTINUOUS
Status: DISCONTINUED | OUTPATIENT
Start: 2018-01-22 | End: 2018-01-22

## 2018-01-22 RX ORDER — HYDROMORPHONE HYDROCHLORIDE 2 MG/ML
0.2 INJECTION, SOLUTION INTRAMUSCULAR; INTRAVENOUS; SUBCUTANEOUS EVERY 5 MIN PRN
Status: DISCONTINUED | OUTPATIENT
Start: 2018-01-22 | End: 2018-01-22 | Stop reason: HOSPADM

## 2018-01-22 RX ORDER — NEOSTIGMINE METHYLSULFATE 1 MG/ML
INJECTION, SOLUTION INTRAVENOUS
Status: DISCONTINUED | OUTPATIENT
Start: 2018-01-22 | End: 2018-01-22

## 2018-01-22 RX ORDER — SODIUM CHLORIDE 9 MG/ML
INJECTION, SOLUTION INTRAVENOUS CONTINUOUS
Status: DISCONTINUED | OUTPATIENT
Start: 2018-01-22 | End: 2018-01-23

## 2018-01-22 RX ORDER — SCOLOPAMINE TRANSDERMAL SYSTEM 1 MG/1
1 PATCH, EXTENDED RELEASE TRANSDERMAL
Status: DISCONTINUED | OUTPATIENT
Start: 2018-01-22 | End: 2018-01-23 | Stop reason: HOSPADM

## 2018-01-22 RX ORDER — ONDANSETRON 2 MG/ML
4 INJECTION INTRAMUSCULAR; INTRAVENOUS ONCE
Status: DISCONTINUED | OUTPATIENT
Start: 2018-01-22 | End: 2018-01-22 | Stop reason: HOSPADM

## 2018-01-22 RX ORDER — GLUCAGON 1 MG
1 KIT INJECTION
Status: DISCONTINUED | OUTPATIENT
Start: 2018-01-22 | End: 2018-01-23 | Stop reason: HOSPADM

## 2018-01-22 RX ORDER — DIPHENHYDRAMINE HYDROCHLORIDE 50 MG/ML
25 INJECTION INTRAMUSCULAR; INTRAVENOUS EVERY 6 HOURS PRN
Status: DISCONTINUED | OUTPATIENT
Start: 2018-01-22 | End: 2018-01-22 | Stop reason: HOSPADM

## 2018-01-22 RX ORDER — HYDROCODONE BITARTRATE AND ACETAMINOPHEN 5; 325 MG/1; MG/1
1 TABLET ORAL EVERY 4 HOURS PRN
Status: DISCONTINUED | OUTPATIENT
Start: 2018-01-22 | End: 2018-01-23 | Stop reason: HOSPADM

## 2018-01-22 RX ORDER — SUCCINYLCHOLINE CHLORIDE 20 MG/ML
INJECTION INTRAMUSCULAR; INTRAVENOUS
Status: DISCONTINUED | OUTPATIENT
Start: 2018-01-22 | End: 2018-01-22

## 2018-01-22 RX ORDER — HYDROMORPHONE HYDROCHLORIDE 2 MG/ML
1 INJECTION, SOLUTION INTRAMUSCULAR; INTRAVENOUS; SUBCUTANEOUS EVERY 4 HOURS PRN
Status: DISCONTINUED | OUTPATIENT
Start: 2018-01-22 | End: 2018-01-23 | Stop reason: HOSPADM

## 2018-01-22 RX ORDER — ACETAMINOPHEN 10 MG/ML
1000 INJECTION, SOLUTION INTRAVENOUS EVERY 6 HOURS
Status: DISCONTINUED | OUTPATIENT
Start: 2018-01-22 | End: 2018-01-23 | Stop reason: HOSPADM

## 2018-01-22 RX ORDER — GLYCOPYRROLATE 0.2 MG/ML
INJECTION INTRAMUSCULAR; INTRAVENOUS
Status: DISCONTINUED | OUTPATIENT
Start: 2018-01-22 | End: 2018-01-22

## 2018-01-22 RX ORDER — FENTANYL CITRATE 50 UG/ML
INJECTION, SOLUTION INTRAMUSCULAR; INTRAVENOUS
Status: DISCONTINUED | OUTPATIENT
Start: 2018-01-22 | End: 2018-01-22

## 2018-01-22 RX ORDER — INSULIN ASPART 100 [IU]/ML
0-5 INJECTION, SOLUTION INTRAVENOUS; SUBCUTANEOUS
Status: DISCONTINUED | OUTPATIENT
Start: 2018-01-22 | End: 2018-01-23 | Stop reason: HOSPADM

## 2018-01-22 RX ORDER — OXYCODONE HYDROCHLORIDE 5 MG/1
5 TABLET ORAL ONCE AS NEEDED
Status: DISCONTINUED | OUTPATIENT
Start: 2018-01-23 | End: 2018-01-22 | Stop reason: HOSPADM

## 2018-01-22 RX ORDER — ENOXAPARIN SODIUM 100 MG/ML
30 INJECTION SUBCUTANEOUS EVERY 24 HOURS
Status: DISCONTINUED | OUTPATIENT
Start: 2018-01-23 | End: 2018-01-23 | Stop reason: HOSPADM

## 2018-01-22 RX ORDER — ROCURONIUM BROMIDE 10 MG/ML
INJECTION, SOLUTION INTRAVENOUS
Status: DISCONTINUED | OUTPATIENT
Start: 2018-01-22 | End: 2018-01-22

## 2018-01-22 RX ORDER — SODIUM CHLORIDE 0.9 % (FLUSH) 0.9 %
3 SYRINGE (ML) INJECTION
Status: DISCONTINUED | OUTPATIENT
Start: 2018-01-22 | End: 2018-01-22 | Stop reason: HOSPADM

## 2018-01-22 RX ORDER — MEPERIDINE HYDROCHLORIDE 25 MG/ML
12.5 INJECTION INTRAMUSCULAR; INTRAVENOUS; SUBCUTANEOUS ONCE AS NEEDED
Status: DISCONTINUED | OUTPATIENT
Start: 2018-01-22 | End: 2018-01-22 | Stop reason: HOSPADM

## 2018-01-22 RX ADMIN — EPHEDRINE SULFATE 25 MG: 50 INJECTION, SOLUTION INTRAMUSCULAR; INTRAVENOUS; SUBCUTANEOUS at 01:01

## 2018-01-22 RX ADMIN — SCOLOPAMINE TRANSDERMAL SYSTEM 1 PATCH: 1 PATCH, EXTENDED RELEASE TRANSDERMAL at 01:01

## 2018-01-22 RX ADMIN — FENTANYL CITRATE 25 MCG: 50 INJECTION, SOLUTION INTRAMUSCULAR; INTRAVENOUS at 01:01

## 2018-01-22 RX ADMIN — PIPERACILLIN AND TAZOBACTAM 4.5 G: 4; .5 INJECTION, POWDER, LYOPHILIZED, FOR SOLUTION INTRAVENOUS; PARENTERAL at 05:01

## 2018-01-22 RX ADMIN — LIDOCAINE HYDROCHLORIDE 50 MG: 20 INJECTION, SOLUTION INTRAVENOUS at 01:01

## 2018-01-22 RX ADMIN — ROCURONIUM BROMIDE 10 MG: 10 INJECTION, SOLUTION INTRAVENOUS at 01:01

## 2018-01-22 RX ADMIN — SODIUM CHLORIDE, SODIUM LACTATE, POTASSIUM CHLORIDE, AND CALCIUM CHLORIDE: .6; .31; .03; .02 INJECTION, SOLUTION INTRAVENOUS at 12:01

## 2018-01-22 RX ADMIN — NEOSTIGMINE METHYLSULFATE 3.5 MG: 1 INJECTION INTRAVENOUS at 02:01

## 2018-01-22 RX ADMIN — PIPERACILLIN AND TAZOBACTAM 4.5 G: 4; .5 INJECTION, POWDER, LYOPHILIZED, FOR SOLUTION INTRAVENOUS; PARENTERAL at 12:01

## 2018-01-22 RX ADMIN — SODIUM CHLORIDE: 0.9 INJECTION, SOLUTION INTRAVENOUS at 02:01

## 2018-01-22 RX ADMIN — MORPHINE SULFATE 2 MG: 8 INJECTION, SOLUTION INTRAMUSCULAR; INTRAVENOUS at 02:01

## 2018-01-22 RX ADMIN — KETOROLAC TROMETHAMINE 15 MG: 30 INJECTION, SOLUTION INTRAMUSCULAR; INTRAVENOUS at 02:01

## 2018-01-22 RX ADMIN — DEXAMETHASONE SODIUM PHOSPHATE 4 MG: 4 INJECTION, SOLUTION INTRAMUSCULAR; INTRAVENOUS at 01:01

## 2018-01-22 RX ADMIN — AMLODIPINE BESYLATE 5 MG: 5 TABLET ORAL at 08:01

## 2018-01-22 RX ADMIN — ATENOLOL 25 MG: 25 TABLET ORAL at 08:01

## 2018-01-22 RX ADMIN — ONDANSETRON 4 MG: 2 INJECTION, SOLUTION INTRAMUSCULAR; INTRAVENOUS at 01:01

## 2018-01-22 RX ADMIN — ESCITALOPRAM 20 MG: 10 TABLET, FILM COATED ORAL at 08:01

## 2018-01-22 RX ADMIN — PROPOFOL 150 MG: 10 INJECTION, EMULSION INTRAVENOUS at 01:01

## 2018-01-22 RX ADMIN — EPHEDRINE SULFATE 20 MG: 50 INJECTION, SOLUTION INTRAMUSCULAR; INTRAVENOUS; SUBCUTANEOUS at 01:01

## 2018-01-22 RX ADMIN — EPHEDRINE SULFATE 5 MG: 50 INJECTION, SOLUTION INTRAMUSCULAR; INTRAVENOUS; SUBCUTANEOUS at 02:01

## 2018-01-22 RX ADMIN — MORPHINE SULFATE 2 MG: 8 INJECTION, SOLUTION INTRAMUSCULAR; INTRAVENOUS at 07:01

## 2018-01-22 RX ADMIN — SUCCINYLCHOLINE CHLORIDE 100 MG: 20 INJECTION, SOLUTION INTRAMUSCULAR; INTRAVENOUS at 01:01

## 2018-01-22 RX ADMIN — SODIUM CHLORIDE: 0.9 INJECTION, SOLUTION INTRAVENOUS at 03:01

## 2018-01-22 RX ADMIN — GLYCOPYRROLATE 0.4 MG: 0.2 INJECTION, SOLUTION INTRAMUSCULAR; INTRAVENOUS at 02:01

## 2018-01-22 RX ADMIN — PIPERACILLIN AND TAZOBACTAM 4.5 G: 4; .5 INJECTION, POWDER, LYOPHILIZED, FOR SOLUTION INTRAVENOUS; PARENTERAL at 08:01

## 2018-01-22 RX ADMIN — ACETAMINOPHEN 1000 MG: 10 INJECTION, SOLUTION INTRAVENOUS at 03:01

## 2018-01-22 RX ADMIN — FENTANYL CITRATE 50 MCG: 50 INJECTION, SOLUTION INTRAMUSCULAR; INTRAVENOUS at 01:01

## 2018-01-22 RX ADMIN — PANTOPRAZOLE SODIUM 40 MG: 40 TABLET, DELAYED RELEASE ORAL at 08:01

## 2018-01-22 RX ADMIN — INSULIN ASPART 1 UNITS: 100 INJECTION, SOLUTION INTRAVENOUS; SUBCUTANEOUS at 09:01

## 2018-01-22 RX ADMIN — ROCURONIUM BROMIDE 20 MG: 10 INJECTION, SOLUTION INTRAVENOUS at 01:01

## 2018-01-22 NOTE — SUBJECTIVE & OBJECTIVE
Interval History: patient seen by Dr. Mendieta and scheduled for Lap cholecystectomy for today.       Review of Systems   Constitutional: Positive for activity change, appetite change, chills and fatigue. Negative for diaphoresis and fever.   HENT: Negative for ear discharge, ear pain and facial swelling.    Eyes: Negative for photophobia, pain, redness and visual disturbance.   Respiratory: Negative for apnea, cough, choking, chest tightness, shortness of breath, wheezing and stridor.    Cardiovascular: Negative for chest pain, palpitations and leg swelling.   Gastrointestinal: Positive for abdominal distention, abdominal pain and nausea. Negative for constipation, diarrhea and vomiting.   Endocrine: Negative for polydipsia, polyphagia and polyuria.   Genitourinary: Negative for difficulty urinating, dysuria, flank pain, frequency and hematuria.   Musculoskeletal: Positive for back pain. Negative for joint swelling, neck pain and neck stiffness.   Skin: Negative for color change.   Allergic/Immunologic: Negative for food allergies.   Neurological: Negative for seizures, syncope, facial asymmetry, speech difficulty and weakness.   Hematological: Does not bruise/bleed easily.   Psychiatric/Behavioral: Negative for agitation, behavioral problems, confusion, hallucinations and suicidal ideas. The patient is nervous/anxious.      Objective:     Vital Signs (Most Recent):  Temp: 98.2 °F (36.8 °C) (01/22/18 1440)  Pulse: 76 (01/22/18 1500)  Resp: 15 (01/22/18 1500)  BP: (!) 117/55 (01/22/18 1500)  SpO2: (!) 92 % (01/22/18 1500) Vital Signs (24h Range):  Temp:  [97.9 °F (36.6 °C)-101.7 °F (38.7 °C)] 98.2 °F (36.8 °C)  Pulse:  [70-91] 76  Resp:  [11-18] 15  SpO2:  [92 %-97 %] 92 %  BP: (117-155)/(55-81) 117/55     Weight: 60 kg (132 lb 4.4 oz)  Body mass index is 24.99 kg/m².    Physical Exam   Constitutional: She is oriented to person, place, and time. She appears well-developed and well-nourished. No distress.   HENT:    Head: Normocephalic and atraumatic.   Eyes: Conjunctivae and EOM are normal. Pupils are equal, round, and reactive to light. Right eye exhibits no discharge. Left eye exhibits no discharge.   Neck: Normal range of motion. Neck supple. No JVD present.   Cardiovascular: Normal rate, regular rhythm and intact distal pulses.    Murmur (ASM) heard.  Pulmonary/Chest: Effort normal and breath sounds normal. No stridor. No respiratory distress. She has no wheezes.   Abdominal: Soft. Bowel sounds are normal. She exhibits distension (mild). There is tenderness (RUQ and epigastic tenderness with palpation ). There is no guarding.   Genitourinary:   Genitourinary Comments: Not examined   Musculoskeletal: Normal range of motion. She exhibits no edema.   Neurological: She is alert and oriented to person, place, and time. No cranial nerve deficit.   Skin: Skin is warm and dry. Capillary refill takes less than 2 seconds. She is not diaphoretic.   Psychiatric: She has a normal mood and affect. Her behavior is normal. Judgment and thought content normal.   anxious   Nursing note and vitals reviewed.        CRANIAL NERVES     CN III, IV, VI   Pupils are equal, round, and reactive to light.  Extraocular motions are normal.     Labs: Reviewed

## 2018-01-22 NOTE — HOSPITAL COURSE
The patient was monitored closely during her stay. The CT scan of the abdomen demonstrated Cholelithiasis without sonographic evidence for acute cholecystitis. Laboratory showed evidence of acute pancreatitis. Patient received pain control and IV pain medications. General surgery was consulted and on 1/22/2018, patient underwent Laparoscopic cholecystectomy. She tolerated procedure well and is tolerating oral diet. She is cleared for DC from surgical standpoint     PE; chest CTA. Abd: soft, NT. Surgical site looks good.  Magnesium and Calcium decreased this am. Replaced prior to DC.

## 2018-01-22 NOTE — SUBJECTIVE & OBJECTIVE
"No current facility-administered medications on file prior to encounter.      Current Outpatient Prescriptions on File Prior to Encounter   Medication Sig    amLODIPine (NORVASC) 5 MG tablet Take 1 tablet (5 mg total) by mouth once daily.    [] amoxicillin-clavulanate 500-125mg (AUGMENTIN) 500-125 mg Tab Take 1 tablet (500 mg total) by mouth 2 (two) times daily.    ascorbic acid (VITAMIN C) 100 MG tablet Take 100 mg by mouth once daily.    aspirin (ECOTRIN) 81 MG EC tablet Take 81 mg by mouth once daily.    atenolol (TENORMIN) 25 MG tablet Take 1 tablet (25 mg total) by mouth once daily.    b complex vitamins capsule Take 1 capsule by mouth once daily.    calcitRIOL (ROCALTROL) 0.5 MCG Cap Take 1 capsule (0.5 mcg total) by mouth once daily.    cholecalciferol, vitamin D3, (VITAMIN D3) 4,000 unit Cap Take 1 capsule by mouth once daily.    escitalopram oxalate (LEXAPRO) 20 MG tablet TAKE ONE TABLET BY MOUTH DAILY    fenofibrate (TRICOR) 54 MG tablet Take 54 mg by mouth once daily.    ferrous sulfate 325 (65 FE) MG EC tablet Take 1 tablet (325 mg total) by mouth 3 (three) times daily.    levothyroxine (SYNTHROID) 112 MCG tablet Take 1 tablet (112 mcg total) by mouth before breakfast.    magnesium oxide (MAG-OX) 400 mg tablet TAKE ONE TABLET BY MOUTH TWICE DAILY    niacin 500 MG CpSR Take 500 mg by mouth every evening.     ondansetron (ZOFRAN-ODT) 8 MG TbDL Take 1 tablet (8 mg total) by mouth every 8 (eight) hours as needed.    pantoprazole (PROTONIX) 40 MG tablet Take 40 mg by mouth once daily.     SYNJARDY XR 12.5-1,000 mg TBph 1,000 mg 2 (two) times daily.    VICTOZA 3-XAVIER 0.6 mg/0.1 mL (18 mg/3 mL) PnIj Inject into the skin once daily.     BD ULTRA-FINE CONNIE PEN NEEDLES 32 gauge x /32" Ndle Use EVERY DAY    FREESTYLE LITE METER kit        Review of patient's allergies indicates:  No Known Allergies    Past Medical History:   Diagnosis Date    Anemia     Cancer     thyroid    Cancer     " Kidney    Depression     Diabetes mellitus     Diabetes mellitus type II     Encounter for blood transfusion     Gallstones     Hypertension     Kidney stone     MVP (mitral valve prolapse)     PONV (postoperative nausea and vomiting)     Thyroid disease      Past Surgical History:   Procedure Laterality Date    APPENDECTOMY      cataracts      both eyes    CYSTOSCOPY      CYSTOSCOPY W/ URETERAL STENT PLACEMENT      ECTOPIC PREGNANCY SURGERY      EYE SURGERY      phoebe cataract    HYSTERECTOMY      NASAL SEPTUM SURGERY      NEPHRECTOMY Left     THYROIDECTOMY      two times    TONSILLECTOMY       Family History     Problem Relation (Age of Onset)    Cancer Brother    Hypertension Father    Kidney disease Father    Mental illness Mother        Social History Main Topics    Smoking status: Never Smoker    Smokeless tobacco: Never Used    Alcohol use No    Drug use: No    Sexual activity: Yes     Partners: Male     Review of Systems   Constitutional: Negative for activity change, appetite change, fever and unexpected weight change.   HENT: Negative for congestion and facial swelling.    Respiratory: Negative for chest tightness, shortness of breath and wheezing.    Cardiovascular: Negative for chest pain.   Gastrointestinal: Positive for abdominal pain. Negative for abdominal distention, anal bleeding, blood in stool, constipation, diarrhea, nausea, rectal pain and vomiting.   Genitourinary: Negative for difficulty urinating, dysuria and frequency.   Musculoskeletal: Negative for arthralgias and joint swelling.   Skin: Negative for wound.   Neurological: Negative for dizziness.   Hematological: Negative for adenopathy.   Psychiatric/Behavioral: Negative for agitation and decreased concentration.     Objective:     Vital Signs (Most Recent):  Temp: 97.9 °F (36.6 °C) (01/22/18 1237)  Pulse: 73 (01/22/18 1237)  Resp: 14 (01/22/18 1237)  BP: (!) 123/58 (01/22/18 1237)  SpO2: 97 % (01/22/18 1237)  Vital Signs (24h Range):  Temp:  [97.9 °F (36.6 °C)-101.7 °F (38.7 °C)] 97.9 °F (36.6 °C)  Pulse:  [] 73  Resp:  [14-18] 14  SpO2:  [93 %-98 %] 97 %  BP: (117-156)/(58-81) 123/58     Weight: 60 kg (132 lb 4.4 oz)  Body mass index is 24.99 kg/m².    Physical Exam   Constitutional: She is oriented to person, place, and time. She appears well-developed and well-nourished.   HENT:   Head: Normocephalic and atraumatic.   Eyes: Pupils are equal, round, and reactive to light. Right eye exhibits no discharge. Left eye exhibits no discharge. No scleral icterus.   Neck: Normal range of motion. Neck supple. No tracheal deviation present. No thyromegaly present.   Cardiovascular: Normal rate, regular rhythm and normal heart sounds.    No murmur heard.  Pulmonary/Chest: Effort normal and breath sounds normal. She exhibits no tenderness.   Abdominal: Soft. Bowel sounds are normal. She exhibits no distension, no abdominal bruit, no pulsatile midline mass and no mass. There is no hepatosplenomegaly. There is tenderness. There is no rigidity, no rebound, no guarding, no tenderness at McBurney's point and negative Rosenberg's sign. No hernia. Hernia confirmed negative in the ventral area.   Genitourinary: Rectum normal.   Musculoskeletal: Normal range of motion. She exhibits no edema, tenderness or deformity.   Lymphadenopathy:     She has no cervical adenopathy.   Neurological: She is alert and oriented to person, place, and time.   Skin: Skin is warm. No rash noted. No erythema.   Psychiatric: She has a normal mood and affect. Her behavior is normal.   Vitals reviewed.      Significant Labs:  CBC:   Recent Labs  Lab 01/22/18  0426   WBC 7.60   RBC 3.34*   HGB 9.8*   HCT 28.3*      MCV 85   MCH 29.3   MCHC 34.6     BMP:   Recent Labs  Lab 01/22/18  0426   *   *   K 4.1      CO2 26   BUN 18   CREATININE 1.7*   CALCIUM 7.6*     CMP:   Recent Labs  Lab 01/21/18  1120 01/22/18  0426   * 147*   CALCIUM  9.1 7.6*   ALBUMIN 3.0*  --    PROT 6.8  --     134*   K 4.2 4.1   CO2 27 26    100   BUN 23 18   CREATININE 1.7* 1.7*   ALKPHOS 93  --    ALT 33  --    AST 16  --    BILITOT 0.7  --        Significant Diagnostics:  US reviewed.  Cholelithiasis noted.

## 2018-01-22 NOTE — PLAN OF CARE
Problem: Patient Care Overview  Goal: Plan of Care Review  Outcome: Ongoing (interventions implemented as appropriate)  Patient AAO, VSS. Patient febrile at beginning of shift.  Previous nurse gave tylenol.  At midnight patient was afebrile.  IVF infusing through shift as ordered.  IV abx given as ordered. Pain moderately relieved with PRN analgesics.  Patient free from falls and injury during shift.  Non skid socks on when OOB.  Bed in lowest position, brakes locked, and call light within reach.  Will continue to monitor.

## 2018-01-22 NOTE — H&P
"[facility] Medicine  History & Physical    Patient Name: Valery Huggins  MRN: [id number]  Admission Date: 1/21/2018  Attending Physician: Gabrielle Arora MD   Primary Care Provider: Luci Nath NP         Patient information was obtained from patient and ER records.     Subjective:     Principal Problem:VICTOR M (acute kidney injury)    Chief Complaint:   Chief Complaint   Patient presents with    Flank Pain     right that radiates to back. also reports gas pain x 2 days        HPI:  Valery Huggins is a 77 y.o. female with a PMH Of diabetes mellitus type 2, Anemia, Thyroid disease, MVP, and renal cell carcinoma s/p left nephrectomy who presents to the ED for evaluation of right upper quadrant abdominal pain associated with nausea and anorexia since yesterday afternoon.  She reports RUQ pain radiated to general upper abdomen with is sharp with intermittent "gas pains' and belching.  She states the pain is unrelieved with home zofran and Protonix.  She reports feeling bad for several weeks which she attributes to a new prescribed medications,  Sutent (chemotherapy). Her last dose was ~ 1 week ago. She reports subjective fever with chills.     Past Medical History:   Diagnosis Date    Anemia     Cancer     thyroid    Cancer     Kidney    Depression     Diabetes mellitus     Diabetes mellitus type II     Encounter for blood transfusion     Gallstones     Hypertension     Kidney stone     MVP (mitral valve prolapse)     PONV (postoperative nausea and vomiting)     Thyroid disease        Past Surgical History:   Procedure Laterality Date    APPENDECTOMY      cataracts      both eyes    CYSTOSCOPY      CYSTOSCOPY W/ URETERAL STENT PLACEMENT      ECTOPIC PREGNANCY SURGERY      EYE SURGERY      phoebe cataract    HYSTERECTOMY      NASAL SEPTUM SURGERY      THYROIDECTOMY      two times    TONSILLECTOMY         Review of patient's allergies indicates:  No Known Allergies    No " "current facility-administered medications on file prior to encounter.      Current Outpatient Prescriptions on File Prior to Encounter   Medication Sig    amLODIPine (NORVASC) 5 MG tablet Take 1 tablet (5 mg total) by mouth once daily.    amoxicillin-clavulanate 500-125mg (AUGMENTIN) 500-125 mg Tab Take 1 tablet (500 mg total) by mouth 2 (two) times daily.    ascorbic acid (VITAMIN C) 100 MG tablet Take 100 mg by mouth once daily.    aspirin (ECOTRIN) 81 MG EC tablet Take 81 mg by mouth once daily.    atenolol (TENORMIN) 25 MG tablet Take 1 tablet (25 mg total) by mouth once daily.    b complex vitamins capsule Take 1 capsule by mouth once daily.    calcitRIOL (ROCALTROL) 0.5 MCG Cap Take 1 capsule (0.5 mcg total) by mouth once daily.    cholecalciferol, vitamin D3, (VITAMIN D3) 4,000 unit Cap Take 1 capsule by mouth once daily.    escitalopram oxalate (LEXAPRO) 20 MG tablet TAKE ONE TABLET BY MOUTH DAILY    fenofibrate (TRICOR) 54 MG tablet Take 54 mg by mouth once daily.    ferrous sulfate 325 (65 FE) MG EC tablet Take 1 tablet (325 mg total) by mouth 3 (three) times daily.    levothyroxine (SYNTHROID) 112 MCG tablet Take 1 tablet (112 mcg total) by mouth before breakfast.    magnesium oxide (MAG-OX) 400 mg tablet TAKE ONE TABLET BY MOUTH TWICE DAILY    niacin 500 MG CpSR Take 500 mg by mouth every evening.     ondansetron (ZOFRAN-ODT) 8 MG TbDL Take 1 tablet (8 mg total) by mouth every 8 (eight) hours as needed.    pantoprazole (PROTONIX) 40 MG tablet Take 40 mg by mouth once daily.     SYNJARDY XR 12.5-1,000 mg TBph 1,000 mg 2 (two) times daily.    VICTOZA 3-XAVIER 0.6 mg/0.1 mL (18 mg/3 mL) PnIj Inject into the skin once daily.     BD ULTRA-FINE CONNIE PEN NEEDLES 32 gauge x 5/32" Ndle Use EVERY DAY    FREESTYLE LITE METER kit     [DISCONTINUED] SUNItinib (SUTENT) 50 MG capsule Take 1 capsule (50 mg total) by mouth once daily. Take for two weeks on, one week off.     Family History     Problem " Relation (Age of Onset)    Cancer Brother    Hypertension Father    Kidney disease Father    Mental illness Mother        Social History Main Topics    Smoking status: Never Smoker    Smokeless tobacco: Never Used    Alcohol use No    Drug use: No    Sexual activity: Yes     Partners: Male     Review of Systems   Constitutional: Positive for appetite change, chills and fatigue. Negative for diaphoresis and fever.   HENT: Negative for dental problem and facial swelling.    Eyes: Negative for photophobia and visual disturbance.   Respiratory: Negative for cough and shortness of breath.    Cardiovascular: Negative for chest pain and leg swelling.   Gastrointestinal: Positive for abdominal distention, abdominal pain and nausea.   Endocrine: Negative for polyphagia and polyuria.   Genitourinary: Negative for flank pain and frequency.   Musculoskeletal: Positive for back pain. Negative for joint swelling.   Skin: Negative for rash.   Neurological: Negative for speech difficulty and numbness.   Psychiatric/Behavioral: Negative for behavioral problems and confusion. The patient is nervous/anxious.      Objective:     Vital Signs (Most Recent):  Temp: (!) 101.2 °F (38.4 °C) (01/21/18 1953)  Pulse: 88 (01/21/18 1953)  Resp: 18 (01/21/18 1953)  BP: 131/81 (01/21/18 1953)  SpO2: 95 % (01/21/18 1953) Vital Signs (24h Range):  Temp:  [98.2 °F (36.8 °C)-101.7 °F (38.7 °C)] 101.2 °F (38.4 °C)  Pulse:  [] 88  Resp:  [17-18] 18  SpO2:  [95 %-99 %] 95 %  BP: (131-156)/(66-81) 131/81     Weight: 60 kg (132 lb 4.4 oz)  Body mass index is 24.99 kg/m².    Physical Exam   Constitutional: She is oriented to person, place, and time. She appears well-developed and well-nourished.   HENT:   Head: Normocephalic and atraumatic.   Right Ear: External ear normal.   Left Ear: External ear normal.   Mouth/Throat: Oropharynx is clear and moist.   Eyes: Conjunctivae and EOM are normal. Pupils are equal, round, and reactive to light.   Neck:  Normal range of motion. Neck supple.   Cardiovascular: Normal rate, regular rhythm and intact distal pulses.    Murmur (ASM) heard.  Pulmonary/Chest: Effort normal and breath sounds normal.   Abdominal: Soft. Bowel sounds are normal. She exhibits distension (mild). There is tenderness (RUQ and epigastic tenderness with palpation ). There is no guarding.   Musculoskeletal: Normal range of motion.   Neurological: She is alert and oriented to person, place, and time. Coordination normal.   Skin: Skin is warm and dry.   Psychiatric: She has a normal mood and affect. Her behavior is normal. Judgment normal.   anxious   Nursing note and vitals reviewed.        CRANIAL NERVES     CN III, IV, VI   Pupils are equal, round, and reactive to light.  Extraocular motions are normal.        Significant Labs:   BMP:   Recent Labs  Lab 01/21/18  1120   *      K 4.2      CO2 27   BUN 23   CREATININE 1.7*   CALCIUM 9.1     CBC:   Recent Labs  Lab 01/21/18  1120   WBC 7.90   HGB 11.4*   HCT 33.7*          Significant Imaging: I have reviewed and interpreted all pertinent imaging results/findings within the past 24 hours.     US abdomen 1.  Cholelithiasis without sonographic evidence for acute cholecystitis.  2. Diffusely increased hepatic echogenicity, consistent with steatosis.    CT renal 1.  No acute CT findings in the abdomen or pelvis to explain this patient's symptoms.  2. Abnormal findings:  -Left nephrectomy  -Cholelithiasis  -Right nephrolithiasis without ureteral stone or obstructive uropathy  -Appendectomy  -Hysterectomy.    Assessment/Plan:     * VICTOR M (acute kidney injury)    Baseline Cr 1.0. Currently 1.7.   Prerenal due to dehydration.  NS at 125 cc/hr  Check UA.          Acute biliary pancreatitis    Elevated Lipase 541.   Normal saline at 125 cc/hr  Clear liquid diet.  Trend lipase          Calculus of gallbladder without cholecystitis    Noted on abdominal US.   Consult general surgery. NPO  after MN  Pain control          Type 2 diabetes mellitus with complication, without long-term current use of insulin    Check  hgb A1C          BMI 28.0-28.9,adult              Hypertension associated with diabetes      Chronic. Resume home antihypertensives.  accuchecks AC and HS. Insulin correction scale          VTE Risk Mitigation         Ordered     Place sequential compression device  Until discontinued      01/21/18 1423     Place LANCE hose  Until discontinued      01/21/18 1423     Medium Risk of VTE  Once      01/21/18 1423         Time spent seeing patient( greater than 1/2 spent in direct contact) : 60 min.       Marium Storey NP  Department of Hospital Medicine   [facility]

## 2018-01-22 NOTE — CONSULTS
[facility]  General Surgery  Consult Note    Patient Name: Valery Huggins  MRN: [id number]  Code Status: Full Code  Admission Date: 2018  Hospital Length of Stay: 1 days  Attending Physician: Gabrielle Arora MD  Primary Care Provider: Luci Nath NP    Patient information was obtained from patient and ER records.     Inpatient consult to General Surgery  Consult performed by: VIDA LUNA  Consult ordered by: ANEUDY TINAJERO  Reason for consult: cholecystitis  Assessment/Recommendations: TO OR for lap kaylee        Subjective:     Principal Problem: VICTOR M (acute kidney injury)    History of Present Illness: Ankita 78 yo F presented to the hospital yesterday with abdominal pain that had been present since Friday evening. Pain intensified throughout the morning prompting her to come to the ER.  Notes that pain was in the RUQ and radiated around to her flank.  Had some nausea Friday and Sat Am.  No emesis.  Pain slightly improved today but has been npo.   Pt with PMHx significant for DM and HTn.  She had a robotic L nephrectomy one month ago for L sided renal cancer.       No current facility-administered medications on file prior to encounter.      Current Outpatient Prescriptions on File Prior to Encounter   Medication Sig    amLODIPine (NORVASC) 5 MG tablet Take 1 tablet (5 mg total) by mouth once daily.    [] amoxicillin-clavulanate 500-125mg (AUGMENTIN) 500-125 mg Tab Take 1 tablet (500 mg total) by mouth 2 (two) times daily.    ascorbic acid (VITAMIN C) 100 MG tablet Take 100 mg by mouth once daily.    aspirin (ECOTRIN) 81 MG EC tablet Take 81 mg by mouth once daily.    atenolol (TENORMIN) 25 MG tablet Take 1 tablet (25 mg total) by mouth once daily.    b complex vitamins capsule Take 1 capsule by mouth once daily.    calcitRIOL (ROCALTROL) 0.5 MCG Cap Take 1 capsule (0.5 mcg total) by mouth once daily.    cholecalciferol, vitamin D3, (VITAMIN D3) 4,000 unit Cap  "Take 1 capsule by mouth once daily.    escitalopram oxalate (LEXAPRO) 20 MG tablet TAKE ONE TABLET BY MOUTH DAILY    fenofibrate (TRICOR) 54 MG tablet Take 54 mg by mouth once daily.    ferrous sulfate 325 (65 FE) MG EC tablet Take 1 tablet (325 mg total) by mouth 3 (three) times daily.    levothyroxine (SYNTHROID) 112 MCG tablet Take 1 tablet (112 mcg total) by mouth before breakfast.    magnesium oxide (MAG-OX) 400 mg tablet TAKE ONE TABLET BY MOUTH TWICE DAILY    niacin 500 MG CpSR Take 500 mg by mouth every evening.     ondansetron (ZOFRAN-ODT) 8 MG TbDL Take 1 tablet (8 mg total) by mouth every 8 (eight) hours as needed.    pantoprazole (PROTONIX) 40 MG tablet Take 40 mg by mouth once daily.     SYNJARDY XR 12.5-1,000 mg TBph 1,000 mg 2 (two) times daily.    VICTOZA 3-XAVIER 0.6 mg/0.1 mL (18 mg/3 mL) PnIj Inject into the skin once daily.     BD ULTRA-FINE CONNIE PEN NEEDLES 32 gauge x 5/32" Ndle Use EVERY DAY    FREESTYLE LITE METER kit        Review of patient's allergies indicates:  No Known Allergies    Past Medical History:   Diagnosis Date    Anemia     Cancer     thyroid    Cancer     Kidney    Depression     Diabetes mellitus     Diabetes mellitus type II     Encounter for blood transfusion     Gallstones     Hypertension     Kidney stone     MVP (mitral valve prolapse)     PONV (postoperative nausea and vomiting)     Thyroid disease      Past Surgical History:   Procedure Laterality Date    APPENDECTOMY      cataracts      both eyes    CYSTOSCOPY      CYSTOSCOPY W/ URETERAL STENT PLACEMENT      ECTOPIC PREGNANCY SURGERY      EYE SURGERY      phoebe cataract    HYSTERECTOMY      NASAL SEPTUM SURGERY      NEPHRECTOMY Left     THYROIDECTOMY      two times    TONSILLECTOMY       Family History     Problem Relation (Age of Onset)    Cancer Brother    Hypertension Father    Kidney disease Father    Mental illness Mother        Social History Main Topics    Smoking status: Never " Smoker    Smokeless tobacco: Never Used    Alcohol use No    Drug use: No    Sexual activity: Yes     Partners: Male     Review of Systems   Constitutional: Negative for activity change, appetite change, fever and unexpected weight change.   HENT: Negative for congestion and facial swelling.    Respiratory: Negative for chest tightness, shortness of breath and wheezing.    Cardiovascular: Negative for chest pain.   Gastrointestinal: Positive for abdominal pain. Negative for abdominal distention, anal bleeding, blood in stool, constipation, diarrhea, nausea, rectal pain and vomiting.   Genitourinary: Negative for difficulty urinating, dysuria and frequency.   Musculoskeletal: Negative for arthralgias and joint swelling.   Skin: Negative for wound.   Neurological: Negative for dizziness.   Hematological: Negative for adenopathy.   Psychiatric/Behavioral: Negative for agitation and decreased concentration.     Objective:     Vital Signs (Most Recent):  Temp: 97.9 °F (36.6 °C) (01/22/18 1237)  Pulse: 73 (01/22/18 1237)  Resp: 14 (01/22/18 1237)  BP: (!) 123/58 (01/22/18 1237)  SpO2: 97 % (01/22/18 1237) Vital Signs (24h Range):  Temp:  [97.9 °F (36.6 °C)-101.7 °F (38.7 °C)] 97.9 °F (36.6 °C)  Pulse:  [] 73  Resp:  [14-18] 14  SpO2:  [93 %-98 %] 97 %  BP: (117-156)/(58-81) 123/58     Weight: 60 kg (132 lb 4.4 oz)  Body mass index is 24.99 kg/m².    Physical Exam   Constitutional: She is oriented to person, place, and time. She appears well-developed and well-nourished.   HENT:   Head: Normocephalic and atraumatic.   Eyes: Pupils are equal, round, and reactive to light. Right eye exhibits no discharge. Left eye exhibits no discharge. No scleral icterus.   Neck: Normal range of motion. Neck supple. No tracheal deviation present. No thyromegaly present.   Cardiovascular: Normal rate, regular rhythm and normal heart sounds.    No murmur heard.  Pulmonary/Chest: Effort normal and breath sounds normal. She exhibits  no tenderness.   Abdominal: Soft. Bowel sounds are normal. She exhibits no distension, no abdominal bruit, no pulsatile midline mass and no mass. There is no hepatosplenomegaly. There is tenderness. There is no rigidity, no rebound, no guarding, no tenderness at McBurney's point and negative Rosenberg's sign. No hernia. Hernia confirmed negative in the ventral area.   Genitourinary: Rectum normal.   Musculoskeletal: Normal range of motion. She exhibits no edema, tenderness or deformity.   Lymphadenopathy:     She has no cervical adenopathy.   Neurological: She is alert and oriented to person, place, and time.   Skin: Skin is warm. No rash noted. No erythema.   Psychiatric: She has a normal mood and affect. Her behavior is normal.   Vitals reviewed.      Significant Labs:  CBC:   Recent Labs  Lab 01/22/18 0426   WBC 7.60   RBC 3.34*   HGB 9.8*   HCT 28.3*      MCV 85   MCH 29.3   MCHC 34.6     BMP:   Recent Labs  Lab 01/22/18  0426   *   *   K 4.1      CO2 26   BUN 18   CREATININE 1.7*   CALCIUM 7.6*     CMP:   Recent Labs  Lab 01/21/18  1120 01/22/18  0426   * 147*   CALCIUM 9.1 7.6*   ALBUMIN 3.0*  --    PROT 6.8  --     134*   K 4.2 4.1   CO2 27 26    100   BUN 23 18   CREATININE 1.7* 1.7*   ALKPHOS 93  --    ALT 33  --    AST 16  --    BILITOT 0.7  --        Significant Diagnostics:  US reviewed.  Cholelithiasis noted.       Assessment/Plan:     Calculus of gallbladder without cholecystitis    Biliary colic.  D/w pt. I have recommended and offered to remove the gallbladder today.  Risks and benefits were d/w pt and informed consent obtained.             VTE Risk Mitigation         Ordered     Place sequential compression device  Until discontinued      01/21/18 1423     Place LANCE hose  Until discontinued      01/21/18 1423     Medium Risk of VTE  Once      01/21/18 1423          Thank you for your consult. I will follow-up with patient. Please contact us if you have any  additional questions.    David Mendieta MD  General Surgery  [facility]

## 2018-01-22 NOTE — ASSESSMENT & PLAN NOTE
Chronic. Continue  home antihypertensives  Dm diet  Accuchecks AC and HS. Insulin correction scale  Blood sugars running 160  HGA1C is 6.5

## 2018-01-22 NOTE — TRANSFER OF CARE
"Anesthesia Transfer of Care Note    Patient: Valery Huggins    Procedure(s) Performed: Procedure(s) (LRB):  CHOLECYSTECTOMY-LAPAROSCOPIC (N/A)    Patient location: PACU    Anesthesia Type: general    Transport from OR: Transported from OR on 2-3 L/min O2 by NC with adequate spontaneous ventilation    Post pain: adequate analgesia    Post assessment: no apparent anesthetic complications and tolerated procedure well    Post vital signs: stable    Level of consciousness: awake, alert and oriented    Nausea/Vomiting: no nausea/vomiting    Complications: none    Transfer of care protocol was followed      Last vitals:   Visit Vitals  BP (!) 123/58 (BP Location: Left arm, Patient Position: Lying)   Pulse 73   Temp 36.6 °C (97.9 °F) (Temporal)   Resp 14   Ht 5' 1" (1.549 m)   Wt 60 kg (132 lb 4.4 oz)   LMP 06/01/2012   SpO2 97%   Breastfeeding? No   BMI 24.99 kg/m²     "

## 2018-01-22 NOTE — SUBJECTIVE & OBJECTIVE
Past Medical History:   Diagnosis Date    Anemia     Cancer     thyroid    Cancer     Kidney    Depression     Diabetes mellitus     Diabetes mellitus type II     Encounter for blood transfusion     Gallstones     Hypertension     Kidney stone     MVP (mitral valve prolapse)     PONV (postoperative nausea and vomiting)     Thyroid disease        Past Surgical History:   Procedure Laterality Date    APPENDECTOMY      cataracts      both eyes    CYSTOSCOPY      CYSTOSCOPY W/ URETERAL STENT PLACEMENT      ECTOPIC PREGNANCY SURGERY      EYE SURGERY      phoebe cataract    HYSTERECTOMY      NASAL SEPTUM SURGERY      THYROIDECTOMY      two times    TONSILLECTOMY         Review of patient's allergies indicates:  No Known Allergies    No current facility-administered medications on file prior to encounter.      Current Outpatient Prescriptions on File Prior to Encounter   Medication Sig    amLODIPine (NORVASC) 5 MG tablet Take 1 tablet (5 mg total) by mouth once daily.    amoxicillin-clavulanate 500-125mg (AUGMENTIN) 500-125 mg Tab Take 1 tablet (500 mg total) by mouth 2 (two) times daily.    ascorbic acid (VITAMIN C) 100 MG tablet Take 100 mg by mouth once daily.    aspirin (ECOTRIN) 81 MG EC tablet Take 81 mg by mouth once daily.    atenolol (TENORMIN) 25 MG tablet Take 1 tablet (25 mg total) by mouth once daily.    b complex vitamins capsule Take 1 capsule by mouth once daily.    calcitRIOL (ROCALTROL) 0.5 MCG Cap Take 1 capsule (0.5 mcg total) by mouth once daily.    cholecalciferol, vitamin D3, (VITAMIN D3) 4,000 unit Cap Take 1 capsule by mouth once daily.    escitalopram oxalate (LEXAPRO) 20 MG tablet TAKE ONE TABLET BY MOUTH DAILY    fenofibrate (TRICOR) 54 MG tablet Take 54 mg by mouth once daily.    ferrous sulfate 325 (65 FE) MG EC tablet Take 1 tablet (325 mg total) by mouth 3 (three) times daily.    levothyroxine (SYNTHROID) 112 MCG tablet Take 1 tablet (112 mcg total) by mouth  "before breakfast.    magnesium oxide (MAG-OX) 400 mg tablet TAKE ONE TABLET BY MOUTH TWICE DAILY    niacin 500 MG CpSR Take 500 mg by mouth every evening.     ondansetron (ZOFRAN-ODT) 8 MG TbDL Take 1 tablet (8 mg total) by mouth every 8 (eight) hours as needed.    pantoprazole (PROTONIX) 40 MG tablet Take 40 mg by mouth once daily.     SYNJARDY XR 12.5-1,000 mg TBph 1,000 mg 2 (two) times daily.    VICTOZA 3-XAVIER 0.6 mg/0.1 mL (18 mg/3 mL) PnIj Inject into the skin once daily.     BD ULTRA-FINE CONNIE PEN NEEDLES 32 gauge x 5/32" Ndle Use EVERY DAY    FREESTYLE LITE METER kit     [DISCONTINUED] SUNItinib (SUTENT) 50 MG capsule Take 1 capsule (50 mg total) by mouth once daily. Take for two weeks on, one week off.     Family History     Problem Relation (Age of Onset)    Cancer Brother    Hypertension Father    Kidney disease Father    Mental illness Mother        Social History Main Topics    Smoking status: Never Smoker    Smokeless tobacco: Never Used    Alcohol use No    Drug use: No    Sexual activity: Yes     Partners: Male     Review of Systems   Constitutional: Positive for appetite change, chills and fatigue. Negative for diaphoresis and fever.   HENT: Negative for dental problem and facial swelling.    Eyes: Negative for photophobia and visual disturbance.   Respiratory: Negative for cough and shortness of breath.    Cardiovascular: Negative for chest pain and leg swelling.   Gastrointestinal: Positive for abdominal distention, abdominal pain and nausea.   Endocrine: Negative for polyphagia and polyuria.   Genitourinary: Negative for flank pain and frequency.   Musculoskeletal: Positive for back pain. Negative for joint swelling.   Skin: Negative for rash.   Neurological: Negative for speech difficulty and numbness.   Psychiatric/Behavioral: Negative for behavioral problems and confusion. The patient is nervous/anxious.      Objective:     Vital Signs (Most Recent):  Temp: (!) 101.2 °F (38.4 °C) " (01/21/18 1953)  Pulse: 88 (01/21/18 1953)  Resp: 18 (01/21/18 1953)  BP: 131/81 (01/21/18 1953)  SpO2: 95 % (01/21/18 1953) Vital Signs (24h Range):  Temp:  [98.2 °F (36.8 °C)-101.7 °F (38.7 °C)] 101.2 °F (38.4 °C)  Pulse:  [] 88  Resp:  [17-18] 18  SpO2:  [95 %-99 %] 95 %  BP: (131-156)/(66-81) 131/81     Weight: 60 kg (132 lb 4.4 oz)  Body mass index is 24.99 kg/m².    Physical Exam   Constitutional: She is oriented to person, place, and time. She appears well-developed and well-nourished.   HENT:   Head: Normocephalic and atraumatic.   Right Ear: External ear normal.   Left Ear: External ear normal.   Mouth/Throat: Oropharynx is clear and moist.   Eyes: Conjunctivae and EOM are normal. Pupils are equal, round, and reactive to light.   Neck: Normal range of motion. Neck supple.   Cardiovascular: Normal rate, regular rhythm and intact distal pulses.    Murmur (ASM) heard.  Pulmonary/Chest: Effort normal and breath sounds normal.   Abdominal: Soft. Bowel sounds are normal. She exhibits distension (mild). There is tenderness (RUQ and epigastic tenderness with palpation ). There is no guarding.   Musculoskeletal: Normal range of motion.   Neurological: She is alert and oriented to person, place, and time. Coordination normal.   Skin: Skin is warm and dry.   Psychiatric: She has a normal mood and affect. Her behavior is normal. Judgment normal.   anxious   Nursing note and vitals reviewed.        CRANIAL NERVES     CN III, IV, VI   Pupils are equal, round, and reactive to light.  Extraocular motions are normal.        Significant Labs:   BMP:   Recent Labs  Lab 01/21/18  1120   *      K 4.2      CO2 27   BUN 23   CREATININE 1.7*   CALCIUM 9.1     CBC:   Recent Labs  Lab 01/21/18  1120   WBC 7.90   HGB 11.4*   HCT 33.7*          Significant Imaging: I have reviewed and interpreted all pertinent imaging results/findings within the past 24 hours.     US abdomen 1.  Cholelithiasis without  sonographic evidence for acute cholecystitis.  2. Diffusely increased hepatic echogenicity, consistent with steatosis.    CT renal 1.  No acute CT findings in the abdomen or pelvis to explain this patient's symptoms.  2. Abnormal findings:  -Left nephrectomy  -Cholelithiasis  -Right nephrolithiasis without ureteral stone or obstructive uropathy  -Appendectomy  -Hysterectomy.

## 2018-01-22 NOTE — PLAN OF CARE
Dr villavicencio released pt from pacu bandaids x 4 abd clean and dry ivf running pain controlled a 3/10 vs wnl of bl to 3rd floor via stretcher

## 2018-01-22 NOTE — PROGRESS NOTES
"[facility] Medicine  Progress Note    Patient Name: Valery Huggins  MRN: [id number]  Patient Class: IP- Inpatient   Admission Date: 1/21/2018  Length of Stay: 1 days  Attending Physician: Gabrielle Arora MD  Primary Care Provider: Luci Nath NP        Subjective:     Principal Problem:VICTOR M (acute kidney injury)    HPI:   Valery Huggins is a 77 y.o. female with a PMH Of diabetes mellitus type 2, Anemia, Thyroid disease, MVP, and renal cell carcinoma s/p left nephrectomy who presents to the ED for evaluation of right upper quadrant abdominal pain associated with nausea and anorexia since yesterday afternoon.  She reports RUQ pain radiated to general upper abdomen with is sharp with intermittent "gas pains' and belching.  She states the pain is unrelieved with home zofran and Protonix.  She reports feeling bad for several weeks which she attributes to a new prescribed medications,  Sutent (chemotherapy). Her last dose was ~ 1 week ago. She reports subjective fever with chills. Patient evaluated in ER and found to have acute biliary pancreatitis and admitted for further evaluation and treatment.    Hospital Course:  The patient was monitored closely during her stay. The CT scan of the abdomen demonstrated Cholelithiasis without sonographic evidence for acute cholecystitis. Laboratory showed evidence of acute pancreatitis. Patient received pain control and IV pain medications. General surgery was consulted and on 1/22/2018, patient was scheduled for a Laparoscopic cholecystectomy.     Interval History: patient seen by Dr. Mendieta and scheduled for Lap cholecystectomy for today.       Review of Systems   Constitutional: Positive for activity change, appetite change, chills and fatigue. Negative for diaphoresis and fever.   HENT: Negative for ear discharge, ear pain and facial swelling.    Eyes: Negative for photophobia, pain, redness and visual disturbance.   Respiratory: Negative for " apnea, cough, choking, chest tightness, shortness of breath, wheezing and stridor.    Cardiovascular: Negative for chest pain, palpitations and leg swelling.   Gastrointestinal: Positive for abdominal distention, abdominal pain and nausea. Negative for constipation, diarrhea and vomiting.   Endocrine: Negative for polydipsia, polyphagia and polyuria.   Genitourinary: Negative for difficulty urinating, dysuria, flank pain, frequency and hematuria.   Musculoskeletal: Positive for back pain. Negative for joint swelling, neck pain and neck stiffness.   Skin: Negative for color change.   Allergic/Immunologic: Negative for food allergies.   Neurological: Negative for seizures, syncope, facial asymmetry, speech difficulty and weakness.   Hematological: Does not bruise/bleed easily.   Psychiatric/Behavioral: Negative for agitation, behavioral problems, confusion, hallucinations and suicidal ideas. The patient is nervous/anxious.      Objective:     Vital Signs (Most Recent):  Temp: 98.2 °F (36.8 °C) (01/22/18 1440)  Pulse: 76 (01/22/18 1500)  Resp: 15 (01/22/18 1500)  BP: (!) 117/55 (01/22/18 1500)  SpO2: (!) 92 % (01/22/18 1500) Vital Signs (24h Range):  Temp:  [97.9 °F (36.6 °C)-101.7 °F (38.7 °C)] 98.2 °F (36.8 °C)  Pulse:  [70-91] 76  Resp:  [11-18] 15  SpO2:  [92 %-97 %] 92 %  BP: (117-155)/(55-81) 117/55     Weight: 60 kg (132 lb 4.4 oz)  Body mass index is 24.99 kg/m².    Physical Exam   Constitutional: She is oriented to person, place, and time. She appears well-developed and well-nourished. No distress.   HENT:   Head: Normocephalic and atraumatic.   Eyes: Conjunctivae and EOM are normal. Pupils are equal, round, and reactive to light. Right eye exhibits no discharge. Left eye exhibits no discharge.   Neck: Normal range of motion. Neck supple. No JVD present.   Cardiovascular: Normal rate, regular rhythm and intact distal pulses.    Murmur (ASM) heard.  Pulmonary/Chest: Effort normal and breath sounds normal. No  stridor. No respiratory distress. She has no wheezes.   Abdominal: Soft. Bowel sounds are normal. She exhibits distension (mild). There is tenderness (RUQ and epigastic tenderness with palpation ). There is no guarding.   Genitourinary:   Genitourinary Comments: Not examined   Musculoskeletal: Normal range of motion. She exhibits no edema.   Neurological: She is alert and oriented to person, place, and time. No cranial nerve deficit.   Skin: Skin is warm and dry. Capillary refill takes less than 2 seconds. She is not diaphoretic.   Psychiatric: She has a normal mood and affect. Her behavior is normal. Judgment and thought content normal.   anxious   Nursing note and vitals reviewed.        CRANIAL NERVES     CN III, IV, VI   Pupils are equal, round, and reactive to light.  Extraocular motions are normal.     Labs: Reviewed    Assessment/Plan:      * VICTOR M (acute kidney injury)    Hx recent left nephrectomy due to cancer-  Baseline Cr 1.0. Currently 1.7.   Prerenal due to dehydration  Trend BMP  Continue NS at 125 cc/hr  UA negative for UTI          Normocytic anemia    Monitor  Add MVI            Hyponatremia    Monitor  Continue IVF          Dehydration    Continue IVF          Calculus of gallbladder without cholecystitis              Acute biliary pancreatitis    Cholelithiasis-    Continue Normal saline at 125 cc/hr  NPO for Surgery today  Trend lipase          Postoperative hypothyroidism    Hx thyroid cancer with Thyroidectomy-  Continue home levothyroxine          Type 2 diabetes mellitus with complication, without long-term current use of insulin               BMI 28.0-28.9,adult              Hypercholesteremia    Home fenofibrate on hold  Plan to resume once no longer NPO            Hypertension associated with diabetes      Chronic. Continue  home antihypertensives  Dm diet  Accuchecks AC and HS. Insulin correction scale  Blood sugars running 160  HGA1C is 6.5          VTE Risk Mitigation         Ordered      enoxaparin injection 30 mg  Daily     Route:  Subcutaneous        01/22/18 1444     Medium Risk of VTE  Once      01/22/18 1606     Place sequential compression device  Until discontinued      01/22/18 1444     Place sequential compression device  Until discontinued      01/21/18 1423     Place LANCE hose  Until discontinued      01/21/18 1423        CRISTI Blanchard  Department of Hospital Medicine   [facility]    Time spent seeing patient( greater than 1/2 spent in direct contact) : 36 minutes

## 2018-01-22 NOTE — ANESTHESIA POSTPROCEDURE EVALUATION
"Anesthesia Post Evaluation    Patient: Valery Huggins    Procedure(s) Performed: Procedure(s) (LRB):  CHOLECYSTECTOMY-LAPAROSCOPIC (N/A)    Final Anesthesia Type: general  Patient location during evaluation: PACU  Patient participation: Yes- Able to Participate  Level of consciousness: awake and alert and oriented  Post-procedure vital signs: reviewed and stable  Pain management: adequate  Airway patency: patent  PONV status at discharge: No PONV  Anesthetic complications: no      Cardiovascular status: blood pressure returned to baseline  Respiratory status: unassisted, spontaneous ventilation and room air  Hydration status: euvolemic  Follow-up not needed.        Visit Vitals  BP (!) 117/55   Pulse 76   Temp 36.8 °C (98.2 °F) (Temporal)   Resp 15   Ht 5' 1" (1.549 m)   Wt 60 kg (132 lb 4.4 oz)   LMP 06/01/2012   SpO2 (!) 92%   Breastfeeding? No   BMI 24.99 kg/m²       Pain/Jake Score: Pain Assessment Performed: Yes (1/22/2018  2:40 PM)  Presence of Pain: denies (1/22/2018  3:00 PM)  Pain Rating Prior to Med Admin: 0 (1/22/2018  3:00 PM)  Pain Rating Post Med Admin: 2 (1/22/2018  7:43 AM)  Jake Score: 8 (1/22/2018  3:00 PM)      "

## 2018-01-22 NOTE — ASSESSMENT & PLAN NOTE
Biliary colic.  D/w pt. I have recommended and offered to remove the gallbladder today.  Risks and benefits were d/w pt and informed consent obtained.

## 2018-01-22 NOTE — PLAN OF CARE
Problem: Diabetes, Type 2 (Adult)  Goal: Signs and Symptoms of Listed Potential Problems Will be Absent, Minimized or Managed (Diabetes, Type 2)  Signs and symptoms of listed potential problems will be absent, minimized or managed by discharge/transition of care (reference Diabetes, Type 2 (Adult) CPG).   Outcome: Ongoing (interventions implemented as appropriate)  Pt s/p lap kaylee, denies pain at this time. Remains free from fall or injury, pt calls for assistance with ambulating. Tolerating clear liquids with no nausea or vomiting. IV fluids infusing per order. Afebrile and VS stable. Call light in reach. Will continue to monitor.

## 2018-01-22 NOTE — BRIEF OP NOTE
[facility]  Brief Operative Note    SUMMARY     Surgery Date: 1/22/2018     Surgeon(s) and Role:     * David Mendieta MD - Primary    Assisting Surgeon: None    Pre-op Diagnosis:  Acute cholecystitis [K81.0]    Post-op Diagnosis:  Post-Op Diagnosis Codes:     * Acute cholecystitis [K81.0]    Procedure(s) (LRB):  CHOLECYSTECTOMY-LAPAROSCOPIC (N/A)    Anesthesia: General    Description of Procedure: Laparoscopic cholecystectomy performed wihtout event.    Description of the findings of the procedure: see above.       Estimated Blood Loss: 14 mL         Specimens:   Specimen (12h ago through future)    Start     Ordered    01/22/18 1409  Specimen to Pathology - Surgery  Once     Comments:  Pre-op Diagnosis: Acute cholecystitis [K81.0]Post-op Diagnosis: sameProcedure(s):CHOLECYSTECTOMY-LAPAROSCOPIC Number of specimens: 1Name of specimens: Gallbladder      01/22/18 1420

## 2018-01-22 NOTE — NURSING
Voice msg left for jayla santizo, temp 101.3, 650mg tylenol administered  1800- new orders per JAY dickerson-suha 4.5 q8hrs, blood culture x2, lactic acid

## 2018-01-22 NOTE — ASSESSMENT & PLAN NOTE
Hx recent left nephrectomy due to cancer-  Baseline Cr 1.0. Currently 1.7.   Prerenal due to dehydration  Trend BMP  Continue NS at 125 cc/hr  UA negative for UTI

## 2018-01-22 NOTE — ANESTHESIA PREPROCEDURE EVALUATION
01/22/2018  Valery Huggins is a 77 y.o., female.    Pre-op Assessment    I have reviewed the Patient Summary Reports.     I have reviewed the Nursing Notes.   I have reviewed the Medications.     Review of Systems  Anesthesia Hx:  No problems with previous Anesthesia Hx of Anesthetic complications  Denies Family Hx of Anesthesia complications.  Personal Hx of Anesthesia complications, Post-Operative Nausea/Vomiting, with every anesthetic, treatment not known   Social:  Non-Smoker    Hematology/Oncology:         -- Denies Anemia: Current/Recent Cancer.   EENT/Dental:EENT/Dental Normal   Cardiovascular:   Exercise tolerance: good Hypertension    Pulmonary:  Pulmonary Normal    Renal/:   Chronic Renal Disease    Musculoskeletal:  Musculoskeletal Normal    Neurological:  Neurology Normal    Endocrine:   Diabetes, well controlled, type 2 Hypothyroidism    Dermatological:  Skin Normal    Psych:   Psychiatric History anxiety          Physical Exam  General:  Well nourished    Airway/Jaw/Neck:  Airway Findings: Mouth Opening: Normal Tongue: Normal  General Airway Assessment: Adult, Good  Mallampati: II  TM Distance: Normal, at least 6 cm  Jaw/Neck Findings:      Dental:  Dental Findings: In tact   Chest/Lungs:  Chest/Lungs Findings: Clear to auscultation     Heart/Vascular:  Heart Findings: Rate: Normal  Rhythm: Regular Rhythm        Mental Status:  Mental Status Findings:  Cooperative, Alert and Oriented         Anesthesia Plan  Type of Anesthesia, risks & benefits discussed:  Anesthesia Type:  general  Patient's Preference:   Intra-op Monitoring Plan: standard ASA monitors  Intra-op Monitoring Plan Comments:   Post Op Pain Control Plan: multimodal analgesia  Post Op Pain Control Plan Comments:   Induction:   IV  Beta Blocker:  Patient is on a Beta-Blocker and has received one dose within the past 24 hours (No  further documentation required).       Informed Consent: Patient understands risks and agrees with Anesthesia plan.  Questions answered. Anesthesia consent signed with patient.  ASA Score: 3  emergent   Day of Surgery Review of History & Physical: I have interviewed and examined the patient. I have reviewed the patient's H&P dated:  There are no significant changes.  H&P update referred to the surgeon.     Anesthesia Plan Notes: Labs and EKG ordered        Ready For Surgery From Anesthesia Perspective.

## 2018-01-22 NOTE — HPI
" Valery Huggins is a 77 y.o. female with a PMH Of diabetes mellitus type 2, Anemia, Thyroid disease, MVP, and renal cell carcinoma s/p left nephrectomy who presents to the ED for evaluation of right upper quadrant abdominal pain associated with nausea and anorexia since yesterday afternoon.  She reports RUQ pain radiated to general upper abdomen with is sharp with intermittent "gas pains' and belching.  She states the pain is unrelieved with home zofran and Protonix.  She reports feeling bad for several weeks which she attributes to a new prescribed medications,  Sutent (chemotherapy). Her last dose was ~ 1 week ago. She reports subjective fever with chills. Patient evaluated in ER and found to have acute biliary pancreatitis and admitted for further evaluation and treatment.  "

## 2018-01-22 NOTE — HPI
Ankita 76 yo F presented to the hospital yesterday with abdominal pain that had been present since Friday evening. Pain intensified throughout the morning prompting her to come to the ER.  Notes that pain was in the RUQ and radiated around to her flank.  Had some nausea Friday and Sat Am.  No emesis.  Pain slightly improved today but has been npo.   Pt with PMHx significant for DM and HTn.  She had a robotic L nephrectomy one month ago for L sided renal cancer.

## 2018-01-23 VITALS
SYSTOLIC BLOOD PRESSURE: 125 MMHG | BODY MASS INDEX: 24.97 KG/M2 | HEART RATE: 71 BPM | HEIGHT: 61 IN | WEIGHT: 132.25 LBS | TEMPERATURE: 99 F | RESPIRATION RATE: 16 BRPM | DIASTOLIC BLOOD PRESSURE: 60 MMHG | OXYGEN SATURATION: 100 %

## 2018-01-23 LAB
ALBUMIN SERPL BCP-MCNC: 2.2 G/DL
ALP SERPL-CCNC: 66 U/L
ALT SERPL W/O P-5'-P-CCNC: 35 U/L
ANION GAP SERPL CALC-SCNC: 10 MMOL/L
ANION GAP SERPL CALC-SCNC: 9 MMOL/L
AST SERPL-CCNC: 31 U/L
BASOPHILS # BLD AUTO: 0 K/UL
BASOPHILS NFR BLD: 0.1 %
BILIRUB SERPL-MCNC: 0.3 MG/DL
BUN SERPL-MCNC: 21 MG/DL
BUN SERPL-MCNC: 21 MG/DL
CA-I BLDV-SCNC: 0.88 MMOL/L
CALCIUM SERPL-MCNC: 6.8 MG/DL
CALCIUM SERPL-MCNC: 7.2 MG/DL
CHLORIDE SERPL-SCNC: 101 MMOL/L
CHLORIDE SERPL-SCNC: 102 MMOL/L
CO2 SERPL-SCNC: 23 MMOL/L
CO2 SERPL-SCNC: 24 MMOL/L
CREAT SERPL-MCNC: 2.1 MG/DL
CREAT SERPL-MCNC: 2.1 MG/DL
DIFFERENTIAL METHOD: ABNORMAL
EOSINOPHIL # BLD AUTO: 0 K/UL
EOSINOPHIL NFR BLD: 0.2 %
ERYTHROCYTE [DISTWIDTH] IN BLOOD BY AUTOMATED COUNT: 14.4 %
EST. GFR  (AFRICAN AMERICAN): 26 ML/MIN/1.73 M^2
EST. GFR  (AFRICAN AMERICAN): 26 ML/MIN/1.73 M^2
EST. GFR  (NON AFRICAN AMERICAN): 23 ML/MIN/1.73 M^2
EST. GFR  (NON AFRICAN AMERICAN): 23 ML/MIN/1.73 M^2
GLUCOSE SERPL-MCNC: 178 MG/DL
GLUCOSE SERPL-MCNC: 314 MG/DL
HCT VFR BLD AUTO: 25.8 %
HGB BLD-MCNC: 8.7 G/DL
LIPASE SERPL-CCNC: 41 U/L
LYMPHOCYTES # BLD AUTO: 0.5 K/UL
LYMPHOCYTES NFR BLD: 10.7 %
MAGNESIUM SERPL-MCNC: 1.4 MG/DL
MAGNESIUM SERPL-MCNC: 1.5 MG/DL
MCH RBC QN AUTO: 28.6 PG
MCHC RBC AUTO-ENTMCNC: 33.7 G/DL
MCV RBC AUTO: 85 FL
MONOCYTES # BLD AUTO: 0.2 K/UL
MONOCYTES NFR BLD: 4.4 %
NEUTROPHILS # BLD AUTO: 4.2 K/UL
NEUTROPHILS NFR BLD: 84.6 %
PHOSPHATE SERPL-MCNC: 3.4 MG/DL
PLATELET # BLD AUTO: 199 K/UL
PMV BLD AUTO: 7.5 FL
POCT GLUCOSE: 217 MG/DL (ref 70–110)
POCT GLUCOSE: 308 MG/DL (ref 70–110)
POTASSIUM SERPL-SCNC: 3.9 MMOL/L
POTASSIUM SERPL-SCNC: 4.3 MMOL/L
PROT SERPL-MCNC: 5.2 G/DL
RBC # BLD AUTO: 3.04 M/UL
SODIUM SERPL-SCNC: 134 MMOL/L
SODIUM SERPL-SCNC: 135 MMOL/L
WBC # BLD AUTO: 4.9 K/UL

## 2018-01-23 PROCEDURE — 83735 ASSAY OF MAGNESIUM: CPT | Mod: 91

## 2018-01-23 PROCEDURE — 84100 ASSAY OF PHOSPHORUS: CPT

## 2018-01-23 PROCEDURE — 83735 ASSAY OF MAGNESIUM: CPT

## 2018-01-23 PROCEDURE — 63600175 PHARM REV CODE 636 W HCPCS: Performed by: SURGERY

## 2018-01-23 PROCEDURE — 25000003 PHARM REV CODE 250: Performed by: NURSE PRACTITIONER

## 2018-01-23 PROCEDURE — 83690 ASSAY OF LIPASE: CPT

## 2018-01-23 PROCEDURE — 85025 COMPLETE CBC W/AUTO DIFF WBC: CPT

## 2018-01-23 PROCEDURE — 80048 BASIC METABOLIC PNL TOTAL CA: CPT

## 2018-01-23 PROCEDURE — 94761 N-INVAS EAR/PLS OXIMETRY MLT: CPT

## 2018-01-23 PROCEDURE — 63600175 PHARM REV CODE 636 W HCPCS: Performed by: FAMILY MEDICINE

## 2018-01-23 PROCEDURE — 80053 COMPREHEN METABOLIC PANEL: CPT

## 2018-01-23 PROCEDURE — 36415 COLL VENOUS BLD VENIPUNCTURE: CPT

## 2018-01-23 PROCEDURE — 82330 ASSAY OF CALCIUM: CPT

## 2018-01-23 PROCEDURE — 25000003 PHARM REV CODE 250: Performed by: FAMILY MEDICINE

## 2018-01-23 RX ORDER — CALCITRIOL 0.25 UG/1
0.5 CAPSULE ORAL DAILY
Status: DISCONTINUED | OUTPATIENT
Start: 2018-01-23 | End: 2018-01-23 | Stop reason: HOSPADM

## 2018-01-23 RX ORDER — CHOLECALCIFEROL (VITAMIN D3) 25 MCG
1000 TABLET ORAL DAILY
Status: DISCONTINUED | OUTPATIENT
Start: 2018-01-23 | End: 2018-01-23 | Stop reason: HOSPADM

## 2018-01-23 RX ORDER — LEVOTHYROXINE SODIUM 112 UG/1
112 TABLET ORAL
Status: DISCONTINUED | OUTPATIENT
Start: 2018-01-24 | End: 2018-01-23 | Stop reason: HOSPADM

## 2018-01-23 RX ORDER — AMOXICILLIN AND CLAVULANATE POTASSIUM 500; 125 MG/1; MG/1
1 TABLET, FILM COATED ORAL 3 TIMES DAILY
Qty: 14 TABLET | Refills: 0 | Status: SHIPPED | OUTPATIENT
Start: 2018-01-23 | End: 2018-01-30

## 2018-01-23 RX ORDER — CALCIUM CARBONATE 200(500)MG
500 TABLET,CHEWABLE ORAL 2 TIMES DAILY
Status: DISCONTINUED | OUTPATIENT
Start: 2018-01-23 | End: 2018-01-23 | Stop reason: HOSPADM

## 2018-01-23 RX ADMIN — ENOXAPARIN SODIUM 30 MG: 100 INJECTION SUBCUTANEOUS at 09:01

## 2018-01-23 RX ADMIN — SODIUM CHLORIDE: 0.9 INJECTION, SOLUTION INTRAVENOUS at 02:01

## 2018-01-23 RX ADMIN — ACETAMINOPHEN 1000 MG: 10 INJECTION, SOLUTION INTRAVENOUS at 02:01

## 2018-01-23 RX ADMIN — CALCITRIOL 0.5 MCG: 0.25 CAPSULE, LIQUID FILLED ORAL at 01:01

## 2018-01-23 RX ADMIN — ACETAMINOPHEN 1000 MG: 10 INJECTION, SOLUTION INTRAVENOUS at 09:01

## 2018-01-23 RX ADMIN — INSULIN ASPART 4 UNITS: 100 INJECTION, SOLUTION INTRAVENOUS; SUBCUTANEOUS at 06:01

## 2018-01-23 RX ADMIN — ACETAMINOPHEN 650 MG: 325 TABLET, FILM COATED ORAL at 03:01

## 2018-01-23 RX ADMIN — ATENOLOL 25 MG: 25 TABLET ORAL at 09:01

## 2018-01-23 RX ADMIN — ESCITALOPRAM 20 MG: 10 TABLET, FILM COATED ORAL at 09:01

## 2018-01-23 RX ADMIN — PIPERACILLIN AND TAZOBACTAM 4.5 G: 4; .5 INJECTION, POWDER, LYOPHILIZED, FOR SOLUTION INTRAVENOUS; PARENTERAL at 01:01

## 2018-01-23 RX ADMIN — INSULIN ASPART 2 UNITS: 100 INJECTION, SOLUTION INTRAVENOUS; SUBCUTANEOUS at 12:01

## 2018-01-23 RX ADMIN — AMLODIPINE BESYLATE 5 MG: 5 TABLET ORAL at 09:01

## 2018-01-23 RX ADMIN — CHOLECALCIFEROL TAB 25 MCG (1000 UNIT) 1000 UNITS: 25 TAB at 01:01

## 2018-01-23 RX ADMIN — Medication 800 MG: at 03:01

## 2018-01-23 RX ADMIN — CALCIUM GLUCONATE 1 G: 94 INJECTION, SOLUTION INTRAVENOUS at 11:01

## 2018-01-23 RX ADMIN — PANTOPRAZOLE SODIUM 40 MG: 40 TABLET, DELAYED RELEASE ORAL at 09:01

## 2018-01-23 RX ADMIN — Medication 800 MG: at 11:01

## 2018-01-23 RX ADMIN — CALCIUM CARBONATE (ANTACID) CHEW TAB 500 MG 500 MG: 500 CHEW TAB at 11:01

## 2018-01-23 RX ADMIN — PIPERACILLIN AND TAZOBACTAM 4.5 G: 4; .5 INJECTION, POWDER, LYOPHILIZED, FOR SOLUTION INTRAVENOUS; PARENTERAL at 04:01

## 2018-01-23 NOTE — PROGRESS NOTES
Discharge instructions given. Pt verbalized understanding. Peripheral IV removed. Denies pain at this time. Tolerating regular diet with no nausea or vomiting. Afebrile and VS stable. Pt transferred to vehicle via wheelchair by staff. All belongings sent.

## 2018-01-23 NOTE — PROGRESS NOTES
POD 1 s/p lap appy.  Pt resting comfortably.  No n/v.  Pain well controlled.      Wt Readings from Last 3 Encounters:   01/21/18 60 kg (132 lb 4.4 oz)   01/19/18 61.5 kg (135 lb 9.3 oz)   01/15/18 61.2 kg (135 lb)     Temp Readings from Last 3 Encounters:   01/23/18 97.9 °F (36.6 °C)   01/19/18 97.8 °F (36.6 °C) (Oral)   01/15/18 97.4 °F (36.3 °C) (Temporal)     BP Readings from Last 3 Encounters:   01/23/18 129/60   01/19/18 129/64   01/15/18 106/71     Pulse Readings from Last 3 Encounters:   01/23/18 65   01/19/18 86   01/15/18 80     AAOx3  CTA B  Sinus  Soft/nd/nt    Lab Results   Component Value Date    WBC 4.90 01/23/2018    HGB 8.7 (L) 01/23/2018    HCT 25.8 (L) 01/23/2018    MCV 85 01/23/2018     01/23/2018     CMP  Sodium   Date Value Ref Range Status   01/23/2018 134 (L) 136 - 145 mmol/L Final     Potassium   Date Value Ref Range Status   01/23/2018 4.3 3.5 - 5.1 mmol/L Final     Chloride   Date Value Ref Range Status   01/23/2018 101 95 - 110 mmol/L Final     CO2   Date Value Ref Range Status   01/23/2018 24 23 - 29 mmol/L Final     Glucose   Date Value Ref Range Status   01/23/2018 314 (H) 70 - 110 mg/dL Final     BUN, Bld   Date Value Ref Range Status   01/23/2018 21 8 - 23 mg/dL Final     Creatinine   Date Value Ref Range Status   01/23/2018 2.1 (H) 0.5 - 1.4 mg/dL Final     Calcium   Date Value Ref Range Status   01/23/2018 6.8 (LL) 8.7 - 10.5 mg/dL Final     Comment:     CA critical result(s) called and verbal readback obtained from   Silvana Gardner RN, 01/23/2018 06:58       Total Protein   Date Value Ref Range Status   01/23/2018 5.2 (L) 6.0 - 8.4 g/dL Final     Albumin   Date Value Ref Range Status   01/23/2018 2.2 (L) 3.5 - 5.2 g/dL Final     Total Bilirubin   Date Value Ref Range Status   01/23/2018 0.3 0.1 - 1.0 mg/dL Final     Comment:     For infants and newborns, interpretation of results should be based  on gestational age, weight and in agreement with  clinical  observations.  Premature Infant recommended reference ranges:  Up to 24 hours.............<8.0 mg/dL  Up to 48 hours............<12.0 mg/dL  3-5 days..................<15.0 mg/dL  6-29 days.................<15.0 mg/dL       Alkaline Phosphatase   Date Value Ref Range Status   01/23/2018 66 55 - 135 U/L Final     AST   Date Value Ref Range Status   01/23/2018 31 10 - 40 U/L Final     ALT   Date Value Ref Range Status   01/23/2018 35 10 - 44 U/L Final     Anion Gap   Date Value Ref Range Status   01/23/2018 9 8 - 16 mmol/L Final     eGFR if    Date Value Ref Range Status   01/23/2018 26 (A) >60 mL/min/1.73 m^2 Final     eGFR if non    Date Value Ref Range Status   01/23/2018 23 (A) >60 mL/min/1.73 m^2 Final     Comment:     Calculation used to obtain the estimated glomerular filtration  rate (eGFR) is the CKD-EPI equation.        A/P: POD 1 s/p lap kaylee  Regular diet  Okay to d/c from surgical perspective.  WIll need f/u with me in 2 weeks

## 2018-01-23 NOTE — PLAN OF CARE
01/23/18 1308   Final Note   Assessment Type Final Discharge Note   Discharge Disposition Home   Hospital Follow Up  Appt(s) scheduled? Yes

## 2018-01-23 NOTE — OP NOTE
DATE OF PROCEDURE:  01/22/2018    STAFF SURGEON:  David Mendieta M.D.    PREOPERATIVE DIAGNOSIS:  Biliary colic.    POSTOPERATIVE DIAGNOSIS:  Biliary colic.    PROCEDURE:  Laparoscopic cholecystectomy.    ANESTHESIA:  General endotracheal anesthesia.    INDICATION FOR PROCEDURE:  A pleasant 77-year-old female who presented to the ER   with abdominal pain consistent with biliary colic and scheduled for   laparoscopic cholecystectomy on the above-mentioned date.    DESCRIPTION OF PROCEDURE:  Following signing of informed consent, she received   preoperative IV antibiotics, taken to the OR and placed in supine position.    SCDs were placed.  General anesthesia was administered without event.  Abdomen   was prepped and draped in standard fashion and appropriate timeout procedure   performed, made a small transverse incision above the umbilicus, carried down   through deep dermal tissue, entered the abdominal cavity with Veress needle.    Pneumoperitoneum to 15 mmHg was established.  I entered the abdominal cavity   Optiview trocar under direct visualization and evaluated for injury from the   Veress needle.  No such injury was appreciated.  The patient was placed in   reverse Trendelenburg and tilted towards her left side.  I then evaluated the   upper abdomen, there is profoundly dilated gallbladder with some mild   gallbladder wall thickening and edema placed an epigastric trocar under direct   visualization, 5 mm trocar under direct visualization, and placed, 2 in the   right subcostal margin, all under direct visualization and all three of these   were placed after injection of 0.25% Marcaine with epinephrine.  I immediately   drained the gallbladder, using laparoscopic suction through the trocar.  Having   decompressed the gallbladder I grasped the fundus of the gallbladder, held it up   over the liver.  This exposed the infundibulum, which I grasped and held   towards the patient's feet.  I then dissected the  triangle of Calot, identifying   the cystic duct and cystic artery in the usual anatomic locations.  I placed 2   clips distally on the cystic duct, 1 clip proximally and 2 clips were placed on   the cystic artery and cut between clips.  I then used hook cautery to remove the   gallbladder from the hepatic fossa without event.  I then removed the   gallbladder from the abdominal cavity with the assistance of an EndoCatch bag.    I evaluated the gallbladder bed and ensured adequate hemostasis.  Having ensured   adequate hemostasis I removed all trocars under direct visualization and closed   the umbilical fascia with 0 Vicryl suture.  Skin incision closed with 4-0   Monocryl.  The patient was then awakened and taken to Recovery Room in stable   condition.  There were no immediate complications.    BLOOD LOSS:  Approximately 15 mL      JDP/IN  dd: 01/22/2018 14:35:56 (CST)  td: 01/22/2018 22:40:39 (CST)  Doc ID   #[id number]  Job ID #[id number]    CC:

## 2018-01-23 NOTE — PLAN OF CARE
Cm entered pt's room, pt not back from surgery, spouse sitting in room.  Spouse informed me that pt was in recovery and will be returning soon. Spouse completed the assessment without difficulties.  Pt is independent in care.  PCP is Dr. Nath. Insurance verified as BCBS/Medicare.  Pt is a diabetic.  Denies dialysis and coumadin.  Disposition:  Pt will discharge to home with family.  No needs identified at this time.       01/22/18 5100   Discharge Assessment   Assessment Type Discharge Planning Assessment   Assessment information obtained from? Caregiver  (spouse - Santos Huggins)   Prior to hospitilization cognitive status: (Pt is in Surgery,Spouse completed the assessment)   Prior to hospitalization functional status: Independent   Current cognitive status: (Spouse completed the assessment, while pt was in surgery)   Current Functional Status: Independent   Facility Arrived From: Home   Lives With spouse   Is patient able to care for self after discharge? Yes   Who are your caregiver(s) and their phone number(s)? spouse - Santos Huggins   Patient's perception of discharge disposition home or selfcare   Readmission Within The Last 30 Days no previous admission in last 30 days   Patient currently being followed by outpatient case management? No   Patient currently receives any other outside agency services? No   Equipment Currently Used at Home glucometer   Do you have any problems affording any of your prescribed medications? No   Is the patient taking medications as prescribed? yes  (C&C)   Does the patient have transportation home? Yes   Transportation Available car;family or friend will provide   Dialysis Name and Scheduled days NA   Does the patient receive services at the Coumadin Clinic? No   Discharge Plan A Home with family   Discharge Plan B Home with family   Patient/Family In Agreement With Plan yes

## 2018-01-23 NOTE — PLAN OF CARE
Problem: Patient Care Overview  Goal: Plan of Care Review  Outcome: Ongoing (interventions implemented as appropriate)  Pt on room air with 100% sats

## 2018-01-23 NOTE — PLAN OF CARE
Problem: Patient Care Overview  Goal: Plan of Care Review  Outcome: Ongoing (interventions implemented as appropriate)  AAOx4. Ambulatory. No complaints of pain. Lap sites CDI. IVF and atx infusing per order. CBG monitored and covered as needed. Teds and SCDs maintained. Pt slept well through the night. Q2 hour rounding utilized. Pain and comfort assessed. Bed low, brakes locked, SR up, call light within reach. POC reviewed. Pt verbalized understanding. Will continue to monitor.

## 2018-01-24 ENCOUNTER — PATIENT OUTREACH (OUTPATIENT)
Dept: ADMINISTRATIVE | Facility: CLINIC | Age: 78
End: 2018-01-24

## 2018-01-24 ENCOUNTER — TELEPHONE (OUTPATIENT)
Dept: MEDSURG UNIT | Facility: HOSPITAL | Age: 78
End: 2018-01-24

## 2018-01-24 DIAGNOSIS — N17.9 AKI (ACUTE KIDNEY INJURY): Primary | ICD-10-CM

## 2018-01-24 NOTE — PROGRESS NOTES
C3 nurse attempted to contact patient. No answer. The following message was left for the patient to return the call:  Good morning  I am a nurse calling on behalf of [facility] from the [facility]ination [facility].  This is a Transitional Care Call for Valery Huggins . When you have a moment please contact us at [phone] or [phone] Monday through Friday, between the hours of 8 am to 4 pm. We look forward to speaking with you. On behalf of [facility] have a nice day.    The patient has a scheduled  appointment with Dr. David Mendieta on 1/30/18 @ 9:30am.

## 2018-01-24 NOTE — DISCHARGE SUMMARY
"[facility] Medicine  Discharge Summary      Patient Name: Valery Huggins  MRN: [id number]  Admission Date: 1/21/2018  Hospital Length of Stay: 2 days  Discharge Date and Time: 1/23/2018  5:50 PM  Attending Physician: No att. providers found   Discharging Provider: Marium Storey NP  Primary Care Provider: Luci Nath NP      HPI:    Valery Huggins is a 77 y.o. female with a PMH Of diabetes mellitus type 2, Anemia, Thyroid disease, MVP, and renal cell carcinoma s/p left nephrectomy who presents to the ED for evaluation of right upper quadrant abdominal pain associated with nausea and anorexia since yesterday afternoon.  She reports RUQ pain radiated to general upper abdomen with is sharp with intermittent "gas pains' and belching.  She states the pain is unrelieved with home zofran and Protonix.  She reports feeling bad for several weeks which she attributes to a new prescribed medications,  Sutent (chemotherapy). Her last dose was ~ 1 week ago. She reports subjective fever with chills. Patient evaluated in ER and found to have acute biliary pancreatitis and admitted for further evaluation and treatment.    Procedure(s) (LRB):  CHOLECYSTECTOMY-LAPAROSCOPIC (N/A)      Hospital Course:   The patient was monitored closely during her stay. The CT scan of the abdomen demonstrated Cholelithiasis without sonographic evidence for acute cholecystitis. Laboratory showed evidence of acute pancreatitis. Patient received pain control and IV pain medications. General surgery was consulted and on 1/22/2018, patient underwent Laparoscopic cholecystectomy. She tolerated procedure well and is tolerating oral diet. She is cleared for DC from surgical standpoint     PE; chest CTA. Abd: soft, NT. Surgical site looks good.  Magnesium and Calcium decreased this am. Replaced prior to DC.      Consults:   Consults         Status Ordering Provider     Inpatient consult to General Surgery  Once     Provider:  " David Mendieta MD    Completed ANEUDY TINAJERO          No new Assessment & Plan notes have been filed under this hospital service since the last note was generated.  Service: Hospital Medicine    Final Active Diagnoses:    Diagnosis Date Noted POA    PRINCIPAL PROBLEM:  VICTOR M (acute kidney injury) [N17.9] 01/21/2018 Yes    Acute biliary pancreatitis [K85.10] 01/21/2018 Yes    Dehydration [E86.0] 01/22/2018 Yes    Hyponatremia [E87.1] 01/22/2018 Yes    Normocytic anemia [D64.9] 01/22/2018 Yes    Chest pain [R07.9] 01/22/2018 Yes    Calculus of gallbladder without cholecystitis [K80.20] 01/21/2018 Yes    Status post Left nephrectomy [Z90.5] 11/17/2017 Not Applicable    Type 2 diabetes mellitus with complication, without long-term current use of insulin [E11.8] 12/11/2016 Yes     Chronic    Postoperative hypothyroidism [E89.0] 12/11/2016 Yes    BMI 28.0-28.9,adult [Z68.28] 05/19/2016 Not Applicable    Hypercholesteremia [E78.00] 09/05/2012 Yes     Chronic    Hypertension associated with diabetes [E11.59, I10] 06/05/2012 Yes      Problems Resolved During this Admission:    Diagnosis Date Noted Date Resolved POA       Discharged Condition: stable    Disposition: Home or Self Care    Follow Up:  Follow-up Information     Luci Nath NP In 1 week.    Specialty:  Family Medicine  Why:  hospital follow up.  Cm left a message on answering machine to call pt with appointment.  Contact information:  [address] [place], [place] [id number] [phone]                 Patient Instructions:     Basic metabolic panel   Standing Status: Future  Standing Exp. Date: 03/24/19     CBC auto differential   Standing Status: Future  Standing Exp. Date: 03/24/19     Diet diabetic     Diet diabetic     Activity as tolerated     Notify your health care provider if you experience any of the following:  severe uncontrolled pain     Notify your health care provider if you experience any of the following:  temperature  >100.4     Notify your health care provider if you experience any of the following:  redness, tenderness, or signs of infection (pain, swelling, redness, odor or green/yellow discharge around incision site)     Notify your health care provider if you experience any of the following:  persistent nausea and vomiting or diarrhea         Significant Diagnostic Studies: Labs:   BMP:   Recent Labs  Lab 01/23/18  0621 01/23/18  1359   * 178*   * 135*   K 4.3 3.9    102   CO2 24 23   BUN 21 21   CREATININE 2.1* 2.1*   CALCIUM 6.8* 7.2*   MG 1.4* 1.5*    and CMP   Recent Labs  Lab 01/23/18  0621 01/23/18  1359   * 135*   K 4.3 3.9    102   CO2 24 23   * 178*   BUN 21 21   CREATININE 2.1* 2.1*   CALCIUM 6.8* 7.2*   PROT 5.2*  --    ALBUMIN 2.2*  --    BILITOT 0.3  --    ALKPHOS 66  --    AST 31  --    ALT 35  --    ANIONGAP 9 10   ESTGFRAFRICA 26* 26*   EGFRNONAA 23* 23*     Radiology: CT scan: CT ABDOMEN PELVIS WITH CONTRAST: No results found for this visit on 01/21/18. and CT ABDOMEN PELVIS WITHOUT CONTRAST: No results found for this visit on 01/21/18.  US Abdomen Limited [[id number]] Resulted: 01/21/18 1229   Order Status: Completed Updated: 01/21/18 1229   Narrative:     Comparison: CT dated 1/21/18    Technique: Transverse and longitudinal grayscale ultrasound images of the right upper quadrant with limited color Doppler interrogation.    Findings:The pancreas is largely obscured by overlying bowel gas.    The hepatic echogenicity is increased with loss of portal venous interface is noted. No focal hepatic mass is demonstrated sonographically. No intrahepatic bile duct dilation is seen.    The gallbladder is moderately well distended. Multiple gallstones are present. Normal gallbladder wall thickness is seen. Sonographic Rosenberg's sign is negative. The common duct measures 4 mm in diameter near the frandy hepatis. The distal duct is obscured by bowel gas artifact.    The visualized  upper abdominal IVC is unremarkable.    The right kidney measures approximately 10.7 cm in length. Normal corticomedullary differentiation is seen. No hydronephrosis, stone or focal mass is identified.   Impression:       1.  Cholelithiasis without sonographic evidence for acute cholecystitis.  2. Diffusely increased hepatic echogenicity, consistent with steatosis.      Electronically signed by: Zach Madison MD  Date: 01/21/18  Time: 12:29    CT Renal Stone Study ABD Pelvis WO [[id number]] Resulted: 01/21/18 1121   Order Status: Completed Updated: 01/21/18 1121   Narrative:     Comparison:11/7/17    Technique: Axial 5-mm images are reviewed from above the diaphragm to the proximal femora without contrast.  Coronal and sagittal reformatted images are reviewed.    Findings:Trace pericardial fluid is seen. The visualized lung bases are clear.    Evaluating the solid organs is limited without intravenous contrast. No acute hepatic abnormality is seen. There is cholelithiasis. The spleen is unremarkable. The bilateral adrenal glands and pancreas are unremarkable. Fatty involutional changes are seen in the pancreas.    Evidence of prior left nephrectomy is noted. The right kidney is normal in size. There is right nephrolithiasis with 2 stones in the mid and upper right kidney measuring up to 9 mm. No right hydroureteronephrosis or ureteral stone is identified.    The stomach is moderately well distended. No dilated loops of small bowel are seen. The appendix is not seen and may be absent. No secondary signs of acute appendicitis are noted. There is scattered, moderate volume of colonic fecal material throughout the colon.    The uterus is absent. No abnormal adnexal mass is seen. Small, calcified phleboliths are seen in the pelvis. There is trace free pelvic fluid. The urinary bladder is minimally well distended.    No abdominal or pelvic lymphadenopathy is seen. The aorta is non-aneurysmal. There is moderate  atherosclerosis within the aorta.    There is linear scarring in the left midabdomen ventrally.    Degenerative changes are seen in the hips and SI joints bilaterally. Mild, multilevel degenerative disc disease is seen. No acute osseous abnormality is seen.   Impression:       1.  No acute CT findings in the abdomen or pelvis to explain this patient's symptoms.  2. Abnormal findings:  -Left nephrectomy  -Cholelithiasis  -Right nephrolithiasis without ureteral stone or obstructive uropathy  -Appendectomy  -Hysterectomy.      Electronically signed by: Zach Madison MD  Date: 01/21/18  Time: 11:21    X-Ray Chest AP Portable [[id number]] Resulted: 01/21/18 1110   Order Status: Completed Updated: 01/21/18 1110   Narrative:     Comparison: 11/1/17    Technique: Single AP portable chest radiograph.    Findings:Cardiac size is normal. No alveolar opacity, pleural effusion or pneumothorax is seen. No radiographically apparent pulmonary nodule is seen. There is aortic tortuosity.   Impression:       1.  No acute radiographic findings in the chest.        Electronically signed by: Zach Madison MD  Date: 01/21/18  Time: 11:10             Pending Diagnostic Studies:     None         Medications:  Reconciled Home Medications:   Discharge Medication List as of 1/23/2018  2:45 PM      CONTINUE these medications which have CHANGED    Details   amoxicillin-clavulanate 500-125mg (AUGMENTIN) 500-125 mg Tab Take 1 tablet (500 mg total) by mouth 3 (three) times daily., Starting Tue 1/23/2018, Until Tue 1/30/2018, Normal         CONTINUE these medications which have NOT CHANGED    Details   amLODIPine (NORVASC) 5 MG tablet Take 1 tablet (5 mg total) by mouth once daily., Starting Mon 1/15/2018, Until Tue 1/15/2019, Normal      ascorbic acid (VITAMIN C) 100 MG tablet Take 100 mg by mouth once daily., Until Discontinued, Historical Med      aspirin (ECOTRIN) 81 MG EC tablet Take 81 mg by mouth once daily., Until Discontinued, Historical  "Med      atenolol (TENORMIN) 25 MG tablet Take 1 tablet (25 mg total) by mouth once daily., Starting Tue 12/19/2017, Normal      b complex vitamins capsule Take 1 capsule by mouth once daily., Until Discontinued, Historical Med      calcitRIOL (ROCALTROL) 0.5 MCG Cap Take 1 capsule (0.5 mcg total) by mouth once daily., Starting 9/9/2016, Until Discontinued, Normal      cholecalciferol, vitamin D3, (VITAMIN D3) 4,000 unit Cap Take 1 capsule by mouth once daily., Until Discontinued, Historical Med      escitalopram oxalate (LEXAPRO) 20 MG tablet TAKE ONE TABLET BY MOUTH DAILY, Normal      fenofibrate (TRICOR) 54 MG tablet Take 54 mg by mouth once daily., Historical Med      ferrous sulfate 325 (65 FE) MG EC tablet Take 1 tablet (325 mg total) by mouth 3 (three) times daily., Starting Wed 6/21/2017, Until Thu 6/21/2018, Normal      levothyroxine (SYNTHROID) 112 MCG tablet Take 1 tablet (112 mcg total) by mouth before breakfast., Starting 11/13/2015, Until Discontinued, Normal      magnesium oxide (MAG-OX) 400 mg tablet TAKE ONE TABLET BY MOUTH TWICE DAILY, Normal      niacin 500 MG CpSR Take 500 mg by mouth every evening. , Until Discontinued, Historical Med      ondansetron (ZOFRAN-ODT) 8 MG TbDL Take 1 tablet (8 mg total) by mouth every 8 (eight) hours as needed., Starting Tue 1/2/2018, Until Wed 1/2/2019, Normal      pantoprazole (PROTONIX) 40 MG tablet Take 40 mg by mouth once daily. , Starting Thu 1/4/2018, Historical Med      SYNJARDY XR 12.5-1,000 mg TBph 1,000 mg 2 (two) times daily., Starting Tue 10/31/2017, Historical Med      VICTOZA 3-XAVIER 0.6 mg/0.1 mL (18 mg/3 mL) PnIj Inject into the skin once daily. , Starting Wed 11/22/2017, Historical Med      BD ULTRA-FINE CONNIE PEN NEEDLES 32 gauge x 5/32" Ndle Use EVERY DAY, Historical Med      FREESTYLE LITE METER kit Historical Med         STOP taking these medications       SUNItinib (SUTENT) 50 MG capsule Comments:   Reason for Stopping:               Indwelling " Lines/Drains at time of discharge:   Lines/Drains/Airways     Drain 30                Ureteral Drain/Stent 12/11/16 1653 Left ureter 6 Fr. 408 days                Time spent on the discharge of patient: 40 minutes  Patient was seen and examined on the date of discharge and determined to be suitable for discharge.         Marium Storey NP  Department of Hospital Medicine  [facility]

## 2018-01-25 ENCOUNTER — OUTPATIENT CASE MANAGEMENT (OUTPATIENT)
Dept: ADMINISTRATIVE | Facility: OTHER | Age: 78
End: 2018-01-25

## 2018-01-25 NOTE — PROGRESS NOTES
Please note the following patients information has been forwarded to [facility] for Case Management and/or .    Please see the media section in patient's chart for additional details.    Please contact Outpatient Complex care Management at ext [phone] with any questions.    Thank you,    Arlyn Jenkins, [facility]  Outpatient Complex Care Mgmt  Ext [phone]

## 2018-01-27 LAB — BACTERIA BLD CULT: NORMAL

## 2018-01-30 ENCOUNTER — OFFICE VISIT (OUTPATIENT)
Dept: SURGERY | Facility: CLINIC | Age: 78
End: 2018-01-30
Payer: COMMERCIAL

## 2018-01-30 VITALS — TEMPERATURE: 98 F | WEIGHT: 131.19 LBS | BODY MASS INDEX: 24.77 KG/M2 | HEIGHT: 61 IN

## 2018-01-30 DIAGNOSIS — Z09 POSTOP CHECK: Primary | ICD-10-CM

## 2018-01-30 PROCEDURE — 99999 PR PBB SHADOW E&M-EST. PATIENT-LVL III: CPT | Mod: PBBFAC,,, | Performed by: SURGERY

## 2018-01-30 PROCEDURE — 99024 POSTOP FOLLOW-UP VISIT: CPT | Mod: S$GLB,,, | Performed by: SURGERY

## 2018-01-30 NOTE — PROGRESS NOTES
Cc: post op    HPI: 77 y.o.  female  1 weeks s/p lap kaylee.   Pt notes that she is feeling well.  No pain.  No n/v.      PE: AFVSS    AAOx3  CTA  Soft/NT/nd  Inc: c/d/i        Path: acute cholecystitis.      A/P:   Pt doing well post surgery.   F/U with me prn

## 2018-02-02 DIAGNOSIS — D50.8 OTHER IRON DEFICIENCY ANEMIA: Primary | ICD-10-CM

## 2018-02-06 ENCOUNTER — TELEPHONE (OUTPATIENT)
Dept: HEMATOLOGY/ONCOLOGY | Facility: CLINIC | Age: 78
End: 2018-02-06

## 2018-02-06 DIAGNOSIS — D50.9 IRON DEFICIENCY ANEMIA, UNSPECIFIED IRON DEFICIENCY ANEMIA TYPE: Primary | ICD-10-CM

## 2018-02-16 ENCOUNTER — HOSPITAL ENCOUNTER (OUTPATIENT)
Facility: OTHER | Age: 78
Discharge: HOME | End: 2018-02-17
Attending: EMERGENCY MEDICINE | Admitting: UROLOGY
Payer: COMMERCIAL

## 2018-02-16 ENCOUNTER — ANESTHESIA EVENT (OUTPATIENT)
Dept: SURGERY | Facility: OTHER | Age: 78
End: 2018-02-16
Payer: COMMERCIAL

## 2018-02-16 ENCOUNTER — TELEPHONE (OUTPATIENT)
Dept: HEMATOLOGY/ONCOLOGY | Facility: CLINIC | Age: 78
End: 2018-02-16

## 2018-02-16 ENCOUNTER — HOSPITAL ENCOUNTER (OUTPATIENT)
Dept: RADIOLOGY | Facility: HOSPITAL | Age: 78
Discharge: HOME | End: 2018-02-16
Attending: NURSE PRACTITIONER
Payer: COMMERCIAL

## 2018-02-16 ENCOUNTER — TELEPHONE (OUTPATIENT)
Dept: UROLOGY | Facility: CLINIC | Age: 78
End: 2018-02-16

## 2018-02-16 ENCOUNTER — ANESTHESIA (OUTPATIENT)
Dept: SURGERY | Facility: OTHER | Age: 78
End: 2018-02-16
Payer: COMMERCIAL

## 2018-02-16 DIAGNOSIS — N20.0 URINARY TRACT OBSTRUCTION BY KIDNEY STONE: Primary | ICD-10-CM

## 2018-02-16 DIAGNOSIS — C64.2 RENAL CELL CARCINOMA OF LEFT KIDNEY: ICD-10-CM

## 2018-02-16 DIAGNOSIS — N20.1 CALCULUS OF URETER: ICD-10-CM

## 2018-02-16 DIAGNOSIS — N13.8 URINARY TRACT OBSTRUCTION BY KIDNEY STONE: Primary | ICD-10-CM

## 2018-02-16 DIAGNOSIS — E11.59 HYPERTENSION ASSOCIATED WITH DIABETES: ICD-10-CM

## 2018-02-16 DIAGNOSIS — Z90.5 STATUS POST NEPHRECTOMY: ICD-10-CM

## 2018-02-16 DIAGNOSIS — I15.2 HYPERTENSION ASSOCIATED WITH DIABETES: ICD-10-CM

## 2018-02-16 DIAGNOSIS — E11.8 TYPE 2 DIABETES MELLITUS WITH COMPLICATION, WITHOUT LONG-TERM CURRENT USE OF INSULIN: Chronic | ICD-10-CM

## 2018-02-16 LAB
BACTERIA #/AREA URNS HPF: NORMAL /HPF
BASOPHILS # BLD AUTO: 0.04 K/UL
BASOPHILS NFR BLD: 0.5 %
BILIRUB UR QL STRIP: NEGATIVE
CLARITY UR: ABNORMAL
COLOR UR: ABNORMAL
DIFFERENTIAL METHOD: ABNORMAL
EOSINOPHIL # BLD AUTO: 0.2 K/UL
EOSINOPHIL NFR BLD: 2.2 %
ERYTHROCYTE [DISTWIDTH] IN BLOOD BY AUTOMATED COUNT: 17.6 %
GLUCOSE UR QL STRIP: NEGATIVE
HCT VFR BLD AUTO: 30.5 %
HGB BLD-MCNC: 10 G/DL
HGB UR QL STRIP: ABNORMAL
HYALINE CASTS #/AREA URNS LPF: 0 /LPF
KETONES UR QL STRIP: NEGATIVE
LEUKOCYTE ESTERASE UR QL STRIP: NEGATIVE
LYMPHOCYTES # BLD AUTO: 1.3 K/UL
LYMPHOCYTES NFR BLD: 15.8 %
MCH RBC QN AUTO: 29.4 PG
MCHC RBC AUTO-ENTMCNC: 32.8 G/DL
MCV RBC AUTO: 90 FL
MICROSCOPIC COMMENT: NORMAL
MONOCYTES # BLD AUTO: 0.3 K/UL
MONOCYTES NFR BLD: 3.5 %
NEUTROPHILS # BLD AUTO: 6.2 K/UL
NEUTROPHILS NFR BLD: 75.9 %
NITRITE UR QL STRIP: NEGATIVE
PH UR STRIP: 8 [PH] (ref 5–8)
PLATELET # BLD AUTO: 297 K/UL
PMV BLD AUTO: 9.5 FL
POCT GLUCOSE: 220 MG/DL (ref 70–110)
PROT UR QL STRIP: ABNORMAL
RBC # BLD AUTO: 3.4 M/UL
RBC #/AREA URNS HPF: NORMAL /HPF (ref 0–4)
SP GR UR STRIP: 1.01 (ref 1–1.03)
URN SPEC COLLECT METH UR: ABNORMAL
UROBILINOGEN UR STRIP-ACNC: NEGATIVE EU/DL
WBC # BLD AUTO: 8.11 K/UL
WBC #/AREA URNS HPF: 0 /HPF (ref 0–5)

## 2018-02-16 PROCEDURE — 74176 CT ABD & PELVIS W/O CONTRAST: CPT | Mod: 26,,, | Performed by: RADIOLOGY

## 2018-02-16 PROCEDURE — G0379 DIRECT REFER HOSPITAL OBSERV: HCPCS

## 2018-02-16 PROCEDURE — 25500020 PHARM REV CODE 255

## 2018-02-16 PROCEDURE — 63600175 PHARM REV CODE 636 W HCPCS: Performed by: NURSE ANESTHETIST, CERTIFIED REGISTERED

## 2018-02-16 PROCEDURE — 63600175 PHARM REV CODE 636 W HCPCS: Performed by: UROLOGY

## 2018-02-16 PROCEDURE — 25000003 PHARM REV CODE 250: Performed by: UROLOGY

## 2018-02-16 PROCEDURE — 74420 UROGRAPHY RTRGR +-KUB: CPT | Mod: 26,,, | Performed by: UROLOGY

## 2018-02-16 PROCEDURE — 99214 OFFICE O/P EST MOD 30 MIN: CPT | Mod: 25,,, | Performed by: UROLOGY

## 2018-02-16 PROCEDURE — 71250 CT THORAX DX C-: CPT | Mod: TC

## 2018-02-16 PROCEDURE — G0378 HOSPITAL OBSERVATION PER HR: HCPCS

## 2018-02-16 PROCEDURE — 25000003 PHARM REV CODE 250: Performed by: NURSE ANESTHETIST, CERTIFIED REGISTERED

## 2018-02-16 PROCEDURE — 71000033 HC RECOVERY, INTIAL HOUR: Performed by: UROLOGY

## 2018-02-16 PROCEDURE — 74176 CT ABD & PELVIS W/O CONTRAST: CPT | Mod: TC

## 2018-02-16 PROCEDURE — 36000707: Performed by: UROLOGY

## 2018-02-16 PROCEDURE — 52332 CYSTOSCOPY AND TREATMENT: CPT | Mod: RT,,, | Performed by: UROLOGY

## 2018-02-16 PROCEDURE — 71250 CT THORAX DX C-: CPT | Mod: 26,,, | Performed by: RADIOLOGY

## 2018-02-16 PROCEDURE — 99284 EMERGENCY DEPT VISIT MOD MDM: CPT

## 2018-02-16 PROCEDURE — 76000 FLUOROSCOPY <1 HR PHYS/QHP: CPT | Mod: 26,59,, | Performed by: UROLOGY

## 2018-02-16 PROCEDURE — 25000003 PHARM REV CODE 250: Performed by: ANESTHESIOLOGY

## 2018-02-16 PROCEDURE — C2617 STENT, NON-COR, TEM W/O DEL: HCPCS | Performed by: UROLOGY

## 2018-02-16 PROCEDURE — C1758 CATHETER, URETERAL: HCPCS | Performed by: UROLOGY

## 2018-02-16 PROCEDURE — 63600175 PHARM REV CODE 636 W HCPCS: Performed by: NURSE PRACTITIONER

## 2018-02-16 PROCEDURE — 36000706: Performed by: UROLOGY

## 2018-02-16 PROCEDURE — 85025 COMPLETE CBC W/AUTO DIFF WBC: CPT

## 2018-02-16 PROCEDURE — 81000 URINALYSIS NONAUTO W/SCOPE: CPT

## 2018-02-16 PROCEDURE — 37000008 HC ANESTHESIA 1ST 15 MINUTES: Performed by: UROLOGY

## 2018-02-16 PROCEDURE — 37000009 HC ANESTHESIA EA ADD 15 MINS: Performed by: UROLOGY

## 2018-02-16 PROCEDURE — 25500020 PHARM REV CODE 255: Performed by: UROLOGY

## 2018-02-16 PROCEDURE — C1769 GUIDE WIRE: HCPCS | Performed by: UROLOGY

## 2018-02-16 PROCEDURE — 36415 COLL VENOUS BLD VENIPUNCTURE: CPT

## 2018-02-16 DEVICE — STENT URETERAL UNIV 6FR 24CM: Type: IMPLANTABLE DEVICE | Site: URETER | Status: FUNCTIONAL

## 2018-02-16 RX ORDER — FENTANYL CITRATE 50 UG/ML
INJECTION, SOLUTION INTRAMUSCULAR; INTRAVENOUS
Status: DISCONTINUED | OUTPATIENT
Start: 2018-02-16 | End: 2018-02-16

## 2018-02-16 RX ORDER — CIPROFLOXACIN 2 MG/ML
400 INJECTION, SOLUTION INTRAVENOUS
Status: COMPLETED | OUTPATIENT
Start: 2018-02-16 | End: 2018-02-16

## 2018-02-16 RX ORDER — LIDOCAINE HCL/PF 100 MG/5ML
SYRINGE (ML) INTRAVENOUS
Status: DISCONTINUED | OUTPATIENT
Start: 2018-02-16 | End: 2018-02-16

## 2018-02-16 RX ORDER — PROPOFOL 10 MG/ML
VIAL (ML) INTRAVENOUS
Status: DISCONTINUED | OUTPATIENT
Start: 2018-02-16 | End: 2018-02-16

## 2018-02-16 RX ORDER — GLUCAGON 1 MG
1 KIT INJECTION
Status: DISCONTINUED | OUTPATIENT
Start: 2018-02-16 | End: 2018-02-17 | Stop reason: HOSPADM

## 2018-02-16 RX ORDER — SODIUM CHLORIDE, SODIUM LACTATE, POTASSIUM CHLORIDE, CALCIUM CHLORIDE 600; 310; 30; 20 MG/100ML; MG/100ML; MG/100ML; MG/100ML
INJECTION, SOLUTION INTRAVENOUS CONTINUOUS
Status: DISCONTINUED | OUTPATIENT
Start: 2018-02-16 | End: 2018-02-16

## 2018-02-16 RX ORDER — LANOLIN ALCOHOL/MO/W.PET/CERES
400 CREAM (GRAM) TOPICAL 2 TIMES DAILY
Status: DISCONTINUED | OUTPATIENT
Start: 2018-02-16 | End: 2018-02-17 | Stop reason: HOSPADM

## 2018-02-16 RX ORDER — ROCURONIUM BROMIDE 10 MG/ML
INJECTION, SOLUTION INTRAVENOUS
Status: DISCONTINUED | OUTPATIENT
Start: 2018-02-16 | End: 2018-02-16

## 2018-02-16 RX ORDER — OXYBUTYNIN CHLORIDE 5 MG/1
5 TABLET ORAL 3 TIMES DAILY
Status: DISCONTINUED | OUTPATIENT
Start: 2018-02-16 | End: 2018-02-17 | Stop reason: HOSPADM

## 2018-02-16 RX ORDER — LEVOTHYROXINE SODIUM 112 UG/1
112 TABLET ORAL
Status: DISCONTINUED | OUTPATIENT
Start: 2018-02-17 | End: 2018-02-17 | Stop reason: HOSPADM

## 2018-02-16 RX ORDER — HYDROCODONE BITARTRATE AND ACETAMINOPHEN 5; 325 MG/1; MG/1
1 TABLET ORAL EVERY 4 HOURS PRN
Status: DISCONTINUED | OUTPATIENT
Start: 2018-02-16 | End: 2018-02-17 | Stop reason: HOSPADM

## 2018-02-16 RX ORDER — SODIUM CHLORIDE, SODIUM LACTATE, POTASSIUM CHLORIDE, CALCIUM CHLORIDE 600; 310; 30; 20 MG/100ML; MG/100ML; MG/100ML; MG/100ML
INJECTION, SOLUTION INTRAVENOUS CONTINUOUS
Status: DISCONTINUED | OUTPATIENT
Start: 2018-02-16 | End: 2018-02-17 | Stop reason: HOSPADM

## 2018-02-16 RX ORDER — OXYCODONE HYDROCHLORIDE 5 MG/1
5 TABLET ORAL
Status: DISCONTINUED | OUTPATIENT
Start: 2018-02-16 | End: 2018-02-16 | Stop reason: HOSPADM

## 2018-02-16 RX ORDER — NIACIN 500 MG
500 CAPSULE, EXTENDED RELEASE ORAL NIGHTLY
Status: DISCONTINUED | OUTPATIENT
Start: 2018-02-16 | End: 2018-02-17 | Stop reason: HOSPADM

## 2018-02-16 RX ORDER — CALCITRIOL 0.25 UG/1
0.5 CAPSULE ORAL DAILY
Status: DISCONTINUED | OUTPATIENT
Start: 2018-02-17 | End: 2018-02-17 | Stop reason: HOSPADM

## 2018-02-16 RX ORDER — PHENYLEPHRINE HYDROCHLORIDE 10 MG/ML
INJECTION INTRAVENOUS
Status: DISCONTINUED | OUTPATIENT
Start: 2018-02-16 | End: 2018-02-16

## 2018-02-16 RX ORDER — PANTOPRAZOLE SODIUM 40 MG/1
40 TABLET, DELAYED RELEASE ORAL DAILY
Status: DISCONTINUED | OUTPATIENT
Start: 2018-02-17 | End: 2018-02-17 | Stop reason: HOSPADM

## 2018-02-16 RX ORDER — PROPOFOL 10 MG/ML
VIAL (ML) INTRAVENOUS CONTINUOUS PRN
Status: DISCONTINUED | OUTPATIENT
Start: 2018-02-16 | End: 2018-02-16

## 2018-02-16 RX ORDER — HYDROCODONE BITARTRATE AND ACETAMINOPHEN 10; 325 MG/1; MG/1
1 TABLET ORAL EVERY 4 HOURS PRN
Status: DISCONTINUED | OUTPATIENT
Start: 2018-02-16 | End: 2018-02-17 | Stop reason: HOSPADM

## 2018-02-16 RX ORDER — SUCCINYLCHOLINE CHLORIDE 20 MG/ML
INJECTION INTRAMUSCULAR; INTRAVENOUS
Status: DISCONTINUED | OUTPATIENT
Start: 2018-02-16 | End: 2018-02-16

## 2018-02-16 RX ORDER — LIDOCAINE HYDROCHLORIDE 10 MG/ML
0.5 INJECTION INFILTRATION; PERINEURAL ONCE
Status: DISCONTINUED | OUTPATIENT
Start: 2018-02-16 | End: 2018-02-17

## 2018-02-16 RX ORDER — SODIUM CHLORIDE, SODIUM LACTATE, POTASSIUM CHLORIDE, CALCIUM CHLORIDE 600; 310; 30; 20 MG/100ML; MG/100ML; MG/100ML; MG/100ML
INJECTION, SOLUTION INTRAVENOUS CONTINUOUS PRN
Status: DISCONTINUED | OUTPATIENT
Start: 2018-02-16 | End: 2018-02-16

## 2018-02-16 RX ORDER — ASCORBIC ACID 250 MG
250 TABLET ORAL DAILY
Status: DISCONTINUED | OUTPATIENT
Start: 2018-02-17 | End: 2018-02-17 | Stop reason: HOSPADM

## 2018-02-16 RX ORDER — SODIUM CHLORIDE 0.9 % (FLUSH) 0.9 %
3 SYRINGE (ML) INJECTION
Status: DISCONTINUED | OUTPATIENT
Start: 2018-02-16 | End: 2018-02-17 | Stop reason: HOSPADM

## 2018-02-16 RX ORDER — ONDANSETRON 8 MG/1
8 TABLET, ORALLY DISINTEGRATING ORAL EVERY 8 HOURS PRN
Status: DISCONTINUED | OUTPATIENT
Start: 2018-02-16 | End: 2018-02-17 | Stop reason: HOSPADM

## 2018-02-16 RX ORDER — FENTANYL CITRATE 50 UG/ML
25 INJECTION, SOLUTION INTRAMUSCULAR; INTRAVENOUS EVERY 5 MIN PRN
Status: DISCONTINUED | OUTPATIENT
Start: 2018-02-16 | End: 2018-02-16 | Stop reason: HOSPADM

## 2018-02-16 RX ORDER — FERROUS SULFATE 325(65) MG
325 TABLET, DELAYED RELEASE (ENTERIC COATED) ORAL 3 TIMES DAILY
Status: DISCONTINUED | OUTPATIENT
Start: 2018-02-16 | End: 2018-02-17 | Stop reason: HOSPADM

## 2018-02-16 RX ORDER — AMLODIPINE BESYLATE 5 MG/1
5 TABLET ORAL DAILY
Status: DISCONTINUED | OUTPATIENT
Start: 2018-02-17 | End: 2018-02-17 | Stop reason: HOSPADM

## 2018-02-16 RX ORDER — HYDROMORPHONE HYDROCHLORIDE 2 MG/ML
0.4 INJECTION, SOLUTION INTRAMUSCULAR; INTRAVENOUS; SUBCUTANEOUS EVERY 5 MIN PRN
Status: DISCONTINUED | OUTPATIENT
Start: 2018-02-16 | End: 2018-02-16 | Stop reason: HOSPADM

## 2018-02-16 RX ORDER — IBUPROFEN 200 MG
24 TABLET ORAL
Status: DISCONTINUED | OUTPATIENT
Start: 2018-02-16 | End: 2018-02-17 | Stop reason: HOSPADM

## 2018-02-16 RX ORDER — MEPERIDINE HYDROCHLORIDE 50 MG/ML
12.5 INJECTION INTRAMUSCULAR; INTRAVENOUS; SUBCUTANEOUS ONCE AS NEEDED
Status: DISCONTINUED | OUTPATIENT
Start: 2018-02-16 | End: 2018-02-16 | Stop reason: HOSPADM

## 2018-02-16 RX ORDER — ASPIRIN 81 MG/1
81 TABLET ORAL DAILY
Status: DISCONTINUED | OUTPATIENT
Start: 2018-02-17 | End: 2018-02-17 | Stop reason: HOSPADM

## 2018-02-16 RX ORDER — ESCITALOPRAM OXALATE 10 MG/1
20 TABLET ORAL DAILY
Status: DISCONTINUED | OUTPATIENT
Start: 2018-02-17 | End: 2018-02-17 | Stop reason: HOSPADM

## 2018-02-16 RX ORDER — ATENOLOL 25 MG/1
25 TABLET ORAL DAILY
Status: DISCONTINUED | OUTPATIENT
Start: 2018-02-17 | End: 2018-02-17 | Stop reason: HOSPADM

## 2018-02-16 RX ORDER — INSULIN ASPART 100 [IU]/ML
1-10 INJECTION, SOLUTION INTRAVENOUS; SUBCUTANEOUS
Status: DISCONTINUED | OUTPATIENT
Start: 2018-02-16 | End: 2018-02-17 | Stop reason: HOSPADM

## 2018-02-16 RX ORDER — DEXAMETHASONE SODIUM PHOSPHATE 4 MG/ML
INJECTION, SOLUTION INTRA-ARTICULAR; INTRALESIONAL; INTRAMUSCULAR; INTRAVENOUS; SOFT TISSUE
Status: DISCONTINUED | OUTPATIENT
Start: 2018-02-16 | End: 2018-02-16

## 2018-02-16 RX ORDER — ONDANSETRON 2 MG/ML
INJECTION INTRAMUSCULAR; INTRAVENOUS
Status: DISCONTINUED | OUTPATIENT
Start: 2018-02-16 | End: 2018-02-16

## 2018-02-16 RX ORDER — ONDANSETRON 2 MG/ML
4 INJECTION INTRAMUSCULAR; INTRAVENOUS DAILY PRN
Status: DISCONTINUED | OUTPATIENT
Start: 2018-02-16 | End: 2018-02-16 | Stop reason: HOSPADM

## 2018-02-16 RX ORDER — IBUPROFEN 200 MG
16 TABLET ORAL
Status: DISCONTINUED | OUTPATIENT
Start: 2018-02-16 | End: 2018-02-17 | Stop reason: HOSPADM

## 2018-02-16 RX ORDER — RAMELTEON 8 MG/1
8 TABLET ORAL NIGHTLY PRN
Status: DISCONTINUED | OUTPATIENT
Start: 2018-02-16 | End: 2018-02-17 | Stop reason: HOSPADM

## 2018-02-16 RX ORDER — PANTOPRAZOLE SODIUM 40 MG/1
40 TABLET, DELAYED RELEASE ORAL DAILY
Status: DISCONTINUED | OUTPATIENT
Start: 2018-02-17 | End: 2018-02-16

## 2018-02-16 RX ADMIN — SODIUM CHLORIDE, SODIUM LACTATE, POTASSIUM CHLORIDE, AND CALCIUM CHLORIDE: 600; 310; 30; 20 INJECTION, SOLUTION INTRAVENOUS at 06:02

## 2018-02-16 RX ADMIN — FENTANYL CITRATE 50 MCG: 50 INJECTION, SOLUTION INTRAMUSCULAR; INTRAVENOUS at 06:02

## 2018-02-16 RX ADMIN — INSULIN ASPART 2 UNITS: 100 INJECTION, SOLUTION INTRAVENOUS; SUBCUTANEOUS at 10:02

## 2018-02-16 RX ADMIN — CIPROFLOXACIN 400 MG: 2 INJECTION, SOLUTION INTRAVENOUS at 06:02

## 2018-02-16 RX ADMIN — LIDOCAINE HYDROCHLORIDE 100 MG: 20 INJECTION, SOLUTION INTRAVENOUS at 06:02

## 2018-02-16 RX ADMIN — SODIUM CHLORIDE, SODIUM LACTATE, POTASSIUM CHLORIDE, AND CALCIUM CHLORIDE: .6; .31; .03; .02 INJECTION, SOLUTION INTRAVENOUS at 08:02

## 2018-02-16 RX ADMIN — Medication 500 MG: at 08:02

## 2018-02-16 RX ADMIN — SUCCINYLCHOLINE CHLORIDE 120 MG: 20 INJECTION, SOLUTION INTRAMUSCULAR; INTRAVENOUS at 06:02

## 2018-02-16 RX ADMIN — PROPOFOL 150 MG: 10 INJECTION, EMULSION INTRAVENOUS at 06:02

## 2018-02-16 RX ADMIN — OXYCODONE HYDROCHLORIDE 5 MG: 5 TABLET ORAL at 07:02

## 2018-02-16 RX ADMIN — PROPOFOL 75 MCG/KG/MIN: 10 INJECTION, EMULSION INTRAVENOUS at 06:02

## 2018-02-16 RX ADMIN — ONDANSETRON 4 MG: 2 INJECTION INTRAMUSCULAR; INTRAVENOUS at 06:02

## 2018-02-16 RX ADMIN — FERROUS SULFATE TAB EC 325 MG (65 MG FE EQUIVALENT) 325 MG: 325 (65 FE) TABLET DELAYED RESPONSE at 08:02

## 2018-02-16 RX ADMIN — OXYBUTYNIN CHLORIDE 5 MG: 5 TABLET ORAL at 08:02

## 2018-02-16 RX ADMIN — ROCURONIUM BROMIDE 5 MG: 10 INJECTION, SOLUTION INTRAVENOUS at 06:02

## 2018-02-16 RX ADMIN — Medication 400 MG: at 08:02

## 2018-02-16 RX ADMIN — DEXAMETHASONE SODIUM PHOSPHATE 4 MG: 4 INJECTION, SOLUTION INTRAMUSCULAR; INTRAVENOUS at 06:02

## 2018-02-16 RX ADMIN — PHENYLEPHRINE HYDROCHLORIDE 100 MCG: 10 INJECTION INTRAVENOUS at 06:02

## 2018-02-16 RX ADMIN — IOHEXOL 30 ML: 350 INJECTION, SOLUTION INTRAVENOUS at 08:02

## 2018-02-16 NOTE — ANESTHESIA PREPROCEDURE EVALUATION
02/16/2018  Valery Huggins is a 77 y.o., female.    Anesthesia Evaluation    I have reviewed the Patient Summary Reports.    I have reviewed the Nursing Notes.   I have reviewed the Medications.     Review of Systems  Anesthesia Hx:  No problems with previous Anesthesia PONV History of prior surgery of interest to airway management or planning: nephrectomy. Previous anesthesia: General Lap kaylee recently on [facility] with general anesthesia.  Procedure performed at an [facility]. Denies Family Hx of Anesthesia complications.  Personal Hx of Anesthesia complications, Post-Operative Nausea/Vomiting, with every anesthetic, treatment not known   Social:  Non-Smoker    Hematology/Oncology:         -- Anemia: Current/Recent Cancer. --  Cancer in past history:  surgery  Oncology Comments: Nov 2017 Robotic nephrectomy [facility]     EENT/Dental:EENT/Dental Normal   Cardiovascular:   Exercise tolerance: good Hypertension    Pulmonary:  Pulmonary Normal    Renal/:   Chronic Renal Disease    Musculoskeletal:  Musculoskeletal Normal    Neurological:  Neurology Normal    Endocrine:   Diabetes, well controlled, type 1 Hypothyroidism    Dermatological:  Skin Normal    Psych:   anxiety          Physical Exam  General:  Obesity    Airway/Jaw/Neck:  Airway Findings: Mouth Opening: Normal Tongue: Normal      Dental:  Dental Findings: In tact        Mental Status:  Mental Status Findings:  Cooperative, Alert and Oriented         Anesthesia Plan  Type of Anesthesia, risks & benefits discussed:  Anesthesia Type:  general  Patient's Preference:   Intra-op Monitoring Plan:   Intra-op Monitoring Plan Comments:   Post Op Pain Control Plan:   Post Op Pain Control Plan Comments:   Induction:   IV  Beta Blocker:         Informed Consent: Patient understands risks and agrees with Anesthesia plan.  Questions answered. Anesthesia  consent signed with patient.  ASA Score: 2  emergent   Day of Surgery Review of History & Physical:    H&P update referred to the surgeon.     Anesthesia Plan Notes: [place] at Noon today.Plan GA w HECTOR for emergency cysto w stent        Ready For Surgery From Anesthesia Perspective.

## 2018-02-16 NOTE — SUBJECTIVE & OBJECTIVE
Past Medical History:   Diagnosis Date    Anemia     Cancer     thyroid    Cancer     Kidney    Depression     Diabetes mellitus     Diabetes mellitus type II     Encounter for blood transfusion     Gallstones     Hypertension     Kidney stone     MVP (mitral valve prolapse)     PONV (postoperative nausea and vomiting)     Thyroid disease        Past Surgical History:   Procedure Laterality Date    APPENDECTOMY      cataracts      both eyes    CYSTOSCOPY      CYSTOSCOPY W/ URETERAL STENT PLACEMENT      ECTOPIC PREGNANCY SURGERY      EYE SURGERY      phoebe cataract    HYSTERECTOMY      NASAL SEPTUM SURGERY      NEPHRECTOMY Left     THYROIDECTOMY      two times    TONSILLECTOMY         Review of patient's allergies indicates:  No Known Allergies    Family History     Problem Relation (Age of Onset)    Cancer Brother    Hypertension Father    Kidney disease Father    Mental illness Mother          Social History Main Topics    Smoking status: Never Smoker    Smokeless tobacco: Never Used    Alcohol use No    Drug use: No    Sexual activity: Yes     Partners: Male       Review of Systems   Constitutional: Negative.    HENT: Negative.    Eyes: Negative.    Respiratory: Negative for cough, chest tightness and shortness of breath.    Cardiovascular: Negative for chest pain.   Gastrointestinal: Negative.  Negative for constipation, diarrhea and nausea.   Musculoskeletal: Negative.    Neurological: Negative.    Psychiatric/Behavioral: Negative.        Objective:     Pulse:  [76-82] 76  SpO2:  [98 %-99 %] 99 %  BP: (148-161)/(70-74) 148/70     There is no height or weight on file to calculate BMI.    No intake/output data recorded.       Drains     Drain                 Ureteral Drain/Stent 12/11/16 1653 Left ureter 6 Fr. 432 days                Physical Exam   Nursing note and vitals reviewed.  Constitutional: She is oriented to person, place, and time. She appears well-developed.   HENT:   Head:  Normocephalic.   Eyes: Conjunctivae are normal.   Neck: Normal range of motion. No tracheal deviation present. No thyromegaly present.   Cardiovascular: Normal rate, normal heart sounds and normal pulses.    Pulmonary/Chest: Effort normal and breath sounds normal. No respiratory distress. She has no wheezes.   Abdominal: Soft. She exhibits no distension and no mass. There is no hepatosplenomegaly. There is no tenderness. There is no rebound, no guarding and no CVA tenderness. No hernia.   Musculoskeletal: Normal range of motion. She exhibits no edema or tenderness.   Lymphadenopathy:     She has no cervical adenopathy.   Neurological: She is alert and oriented to person, place, and time.   Skin: Skin is warm and dry. No rash noted. No erythema.     Psychiatric: She has a normal mood and affect. Her behavior is normal. Judgment and thought content normal.       Significant Labs:    BMP:    Recent Labs  Lab 02/16/18  0817      K 4.6      CO2 29   BUN 31*   CREATININE 1.6*   CALCIUM 8.6*       CBC:    Recent Labs  Lab 02/16/18  0817   WBC 7.40   HGB 10.0*   HCT 30.3*          Blood Culture: No results for input(s): LABBLOO in the last 168 hours.  Urine Culture: No results for input(s): LABURIN in the last 168 hours.    Significant Imaging:  CT: I have reviewed all results within the past 24 hours and my personal findings are:  Right solitary kidney with mild hydronephrosis.  A cluster of ureteral stones in the proximal ureter.  A non-obstructing stone in the kidney.

## 2018-02-16 NOTE — ED NOTES
Patient denies all pain and nausea. Reports normal activity.   Abdomen is soft and non-tender. GCS 15  Skin is warm, dry and pink

## 2018-02-16 NOTE — ED PROVIDER NOTES
Encounter Date: 2/16/2018    SCRIBE #1 NOTE: Matheus LAWRENCE, am scribing for, and in the presence of, Dr. Raygoza.       History     Chief Complaint   Patient presents with    Flank Pain     sent from [facility] for obstructing stone on CT scan     4:11 PM    Patient is a 77 y.o. female referred to the ED from [facility] for further work up after CT scan performed today revealed an obstructing right kidney stone. Patient states CT scan was a  routine follow up s/p left nephrectomy preformed three months ago. Patient denies any current pain, fever, chills, nausea, or vomiting. She states she is not currently receiving treatment related to her kidney removal. She also reports having a cholecystectomy 3.5 weeks ago. Her urologist is Dr. Donnelly. She has no additional complaints.      The history is provided by the patient.     Review of patient's allergies indicates:  No Known Allergies  Past Medical History:   Diagnosis Date    Anemia     Cancer     thyroid    Cancer     Kidney    Depression     Diabetes mellitus     Diabetes mellitus type II     Encounter for blood transfusion     Gallstones     Hypertension     Kidney stone     MVP (mitral valve prolapse)     PONV (postoperative nausea and vomiting)     Thyroid disease      Past Surgical History:   Procedure Laterality Date    APPENDECTOMY      cataracts      both eyes    CYSTOSCOPY      CYSTOSCOPY W/ URETERAL STENT PLACEMENT      ECTOPIC PREGNANCY SURGERY      EYE SURGERY      phoebe cataract    HYSTERECTOMY      NASAL SEPTUM SURGERY      NEPHRECTOMY Left     THYROIDECTOMY      two times    TONSILLECTOMY       Family History   Problem Relation Age of Onset    Hypertension Father     Kidney disease Father     Mental illness Mother     Cancer Brother      Social History   Substance Use Topics    Smoking status: Never Smoker    Smokeless tobacco: Never Used    Alcohol use No     Review of Systems   Constitutional: Negative for chills  and fever.   HENT: Negative for sore throat.    Eyes: Negative for visual disturbance.   Respiratory: Negative for shortness of breath.    Cardiovascular: Negative for chest pain.   Gastrointestinal: Negative for abdominal pain, nausea and vomiting.   Genitourinary: Negative for dysuria, flank pain and hematuria.   Musculoskeletal: Negative for back pain.   Skin: Negative for rash.   Neurological: Negative for weakness and headaches.   Hematological: Does not bruise/bleed easily.   Psychiatric/Behavioral: Negative for confusion.       Physical Exam     Vitals:    02/16/18 1611   BP: (!) 161/74   Pulse: 82   SpO2: 99%       Physical Exam    Nursing note and vitals reviewed.  Constitutional: She appears well-developed and well-nourished. No distress.   HENT:   Head: Normocephalic and atraumatic.   Eyes: Conjunctivae and EOM are normal.   Neck: Normal range of motion. Neck supple.   Cardiovascular: Normal rate, regular rhythm and normal heart sounds. Exam reveals no gallop and no friction rub.    No murmur heard.  Pulmonary/Chest: Breath sounds normal. No respiratory distress. She has no wheezes. She has no rhonchi. She has no rales.   Abdominal: Soft. She exhibits no distension. There is no tenderness. There is no rebound and no guarding.   Musculoskeletal: Normal range of motion.   Neurological: She is alert and oriented to person, place, and time.   Skin: Skin is warm and dry.   Psychiatric: She has a normal mood and affect. Her behavior is normal. Judgment and thought content normal.         ED Course   Procedures  Labs Reviewed   URINALYSIS             Medical Decision Making:   Clinical Tests:   Lab Tests: Ordered and Reviewed  ED Management:  4:20 PM - Spoke with Dr. Page and he is on his way here to place a stent. House supervisor and patient have also been made aware of the plan.  Other:   I have discussed this case with another health care provider.    Additional MDM:   Comments: 77-year-old female  presents for evaluation by urology after being diagnosed with an obstructing right renal stone.  Patient is asymptomatic.  This was found on routine imaging to evaluate status post left nephrectomy 3 months ago.  I did review her labs from earlier today and her creatinine is stable compared to previous.  Urinalysis has been ordered at the request of Dr. Mercado.  Dr. Page has been informed of the patient's presence in the emergency department and he is on his way to place a stent..          Scribe Attestation:   Scribe #1: I performed the above scribed service and the documentation accurately describes the services I performed. I attest to the accuracy of the note.    Attending Attestation:           Physician Attestation for Scribe:  Physician Attestation Statement for Scribe #1: I, Dr. Raygoza, reviewed documentation, as scribed by Matheus Pereyra in my presence, and it is both accurate and complete.                    Clinical Impression:     1. Urinary tract obstruction by kidney stone                                 Pooja Raygoza MD  02/16/18 [id number]

## 2018-02-16 NOTE — TELEPHONE ENCOUNTER
Reviewed CTscans with patient. She understands she has a stone completely blocking her right ureter. She has left nephrectomy. Patient knows the ED and surgeon has been notified at the [facility] ED. She will be on her way shortly.

## 2018-02-16 NOTE — H&P
[facility]  Urology  History & Physical    Patient Name: Valery Huggins  MRN: [id number]  Admission Date: 2/16/2018  Code Status: Prior   Attending Provider: XAVIER Page MD   Primary Care Physician: Luci Nath NP  Principal Problem:<principal problem not specified>    Subjective:     HPI:  Urolithiasis  Patient complains of right ureteral stone in a solitary kidney.  Onset of symptoms was abrupt starting 5 hours ago with unchanged course since that time. Patient describes the pain as none,  and rated as no severity. The patient has had no nausea/no vomiting and no diaphoresis. There has been no fever or chills. The patient is not complaining of dysuria or frequency. Risk factors for urolithiasis: history of stone disease.  She had a staging CT scan today at [facility] [place].  She has recently diagnosed stage III renal cell carcinoma.  She is s/p left nephrectomy from 11/2017 by Dr. Donnelly.      She is relatively asymptomatic and is voiding but was urged to go to the ED today after the CT findings from today.  She has a history of stones and has had ureteroscopy as well was ESWL by Dr. Rodríguez at [facility].        Past Medical History:   Diagnosis Date    Anemia     Cancer     thyroid    Cancer     Kidney    Depression     Diabetes mellitus     Diabetes mellitus type II     Encounter for blood transfusion     Gallstones     Hypertension     Kidney stone     MVP (mitral valve prolapse)     PONV (postoperative nausea and vomiting)     Thyroid disease        Past Surgical History:   Procedure Laterality Date    APPENDECTOMY      cataracts      both eyes    CYSTOSCOPY      CYSTOSCOPY W/ URETERAL STENT PLACEMENT      ECTOPIC PREGNANCY SURGERY      EYE SURGERY      phoebe cataract    HYSTERECTOMY      NASAL SEPTUM SURGERY      NEPHRECTOMY Left     THYROIDECTOMY      two times    TONSILLECTOMY         Review of patient's allergies indicates:  No Known Allergies    Family History     Problem  Relation (Age of Onset)    Cancer Brother    Hypertension Father    Kidney disease Father    Mental illness Mother          Social History Main Topics    Smoking status: Never Smoker    Smokeless tobacco: Never Used    Alcohol use No    Drug use: No    Sexual activity: Yes     Partners: Male       Review of Systems   Constitutional: Negative.    HENT: Negative.    Eyes: Negative.    Respiratory: Negative for cough, chest tightness and shortness of breath.    Cardiovascular: Negative for chest pain.   Gastrointestinal: Negative.  Negative for constipation, diarrhea and nausea.   Musculoskeletal: Negative.    Neurological: Negative.    Psychiatric/Behavioral: Negative.        Objective:     Pulse:  [76-82] 76  SpO2:  [98 %-99 %] 99 %  BP: (148-161)/(70-74) 148/70     There is no height or weight on file to calculate BMI.    No intake/output data recorded.       Drains     Drain                 Ureteral Drain/Stent 12/11/16 1653 Left ureter 6 Fr. 432 days                Physical Exam   Nursing note and vitals reviewed.  Constitutional: She is oriented to person, place, and time. She appears well-developed.   HENT:   Head: Normocephalic.   Eyes: Conjunctivae are normal.   Neck: Normal range of motion. No tracheal deviation present. No thyromegaly present.   Cardiovascular: Normal rate, normal heart sounds and normal pulses.    Pulmonary/Chest: Effort normal and breath sounds normal. No respiratory distress. She has no wheezes.   Abdominal: Soft. She exhibits no distension and no mass. There is no hepatosplenomegaly. There is no tenderness. There is no rebound, no guarding and no CVA tenderness. No hernia.   Musculoskeletal: Normal range of motion. She exhibits no edema or tenderness.   Lymphadenopathy:     She has no cervical adenopathy.   Neurological: She is alert and oriented to person, place, and time.   Skin: Skin is warm and dry. No rash noted. No erythema.     Psychiatric: She has a normal mood and affect.  Her behavior is normal. Judgment and thought content normal.       Significant Labs:    BMP:    Recent Labs  Lab 02/16/18  0817      K 4.6      CO2 29   BUN 31*   CREATININE 1.6*   CALCIUM 8.6*       CBC:    Recent Labs  Lab 02/16/18  0817   WBC 7.40   HGB 10.0*   HCT 30.3*          Blood Culture: No results for input(s): LABBLOO in the last 168 hours.  Urine Culture: No results for input(s): LABURIN in the last 168 hours.    Significant Imaging:  CT: I have reviewed all results within the past 24 hours and my personal findings are:  Right solitary kidney with mild hydronephrosis.  A cluster of ureteral stones in the proximal ureter.  A non-obstructing stone in the kidney.                Assessment and Plan:     Urinary tract obstruction by kidney stone    Plan to go to the OR today for stent placement  Plan for delayed ureteroscopy in a couple of weeks  Cipro OCTOR  Will monitor overnight and plan to d/c home in AM  NPO    Patient consented and marked on the right side.            VTE Risk Mitigation         Ordered     Medium Risk of VTE  Once      02/16/18 1724     Place sequential compression device  Until discontinued      02/16/18 1724          W Tano Page MD  Urology  [facility]

## 2018-02-16 NOTE — TELEPHONE ENCOUNTER
----- Message from Marium Bear sent at 2/16/2018  2:36 PM CST -----  Contact: Charline   X  1st Request  _  2nd Request  _  3rd Request    ---FST Request---    Who: Charline with Dr. Huggins office     Why:  Charline with Dr. Huggins office states the patients ureter is blocked and the patient does not have a way to urinate because she does not have a left kidney she would like her to be seen today.. Please contact to further discuss and advise    What Number to Call Back: Ext [phone]    When to Expect a call back: (Before the end of the day)   -- if call after 3:00 call back will be tomorrow.

## 2018-02-16 NOTE — HPI
Urolithiasis  Patient complains of right ureteral stone in a solitary kidney.  Onset of symptoms was abrupt starting 5 hours ago with unchanged course since that time. Patient describes the pain as none,  and rated as no severity. The patient has had no nausea/no vomiting and no diaphoresis. There has been no fever or chills. The patient is not complaining of dysuria or frequency. Risk factors for urolithiasis: history of stone disease.  She had a staging CT scan today [place] [facility] [place]  She has recently diagnosed stage III renal cell carcinoma.  She is s/p left nephrectomy from 11/2017 by Dr. Donnelly.      She is relatively asymptomatic and is voiding but was urged to go to the ED today after the CT findings from today.  She has a history of stones and has had ureteroscopy as well was ESWL by Dr. Rodríguez at [facility].

## 2018-02-16 NOTE — ASSESSMENT & PLAN NOTE
Plan to go to the OR today for stent placement  Plan for delayed ureteroscopy in a couple of weeks  Cipro OCTOR  Will monitor overnight and plan to d/c home in AM  NPO    Patient consented and marked on the right side.

## 2018-02-16 NOTE — TELEPHONE ENCOUNTER
I spoke to Charline at Dr. Epstein's office.  They will call pt and ask her to go to [facility] ER. Franci is notifying Dr. Mercado, who will notify Dr. Page.  Franci is also notifying ER here at [facility] to make aware of her arrival.

## 2018-02-17 VITALS
RESPIRATION RATE: 18 BRPM | TEMPERATURE: 97 F | OXYGEN SATURATION: 98 % | SYSTOLIC BLOOD PRESSURE: 154 MMHG | HEART RATE: 77 BPM | DIASTOLIC BLOOD PRESSURE: 65 MMHG

## 2018-02-17 LAB
ANION GAP SERPL CALC-SCNC: 11 MMOL/L
BUN SERPL-MCNC: 30 MG/DL
CALCIUM SERPL-MCNC: 7.9 MG/DL
CHLORIDE SERPL-SCNC: 99 MMOL/L
CO2 SERPL-SCNC: 25 MMOL/L
CREAT SERPL-MCNC: 1.5 MG/DL
EST. GFR  (AFRICAN AMERICAN): 39 ML/MIN/1.73 M^2
EST. GFR  (NON AFRICAN AMERICAN): 34 ML/MIN/1.73 M^2
GLUCOSE SERPL-MCNC: 207 MG/DL
POCT GLUCOSE: 181 MG/DL (ref 70–110)
POTASSIUM SERPL-SCNC: 4.9 MMOL/L
SODIUM SERPL-SCNC: 135 MMOL/L

## 2018-02-17 PROCEDURE — 36415 COLL VENOUS BLD VENIPUNCTURE: CPT

## 2018-02-17 PROCEDURE — 99213 OFFICE O/P EST LOW 20 MIN: CPT | Mod: ,,, | Performed by: UROLOGY

## 2018-02-17 PROCEDURE — G0378 HOSPITAL OBSERVATION PER HR: HCPCS

## 2018-02-17 PROCEDURE — 80048 BASIC METABOLIC PNL TOTAL CA: CPT

## 2018-02-17 PROCEDURE — 94761 N-INVAS EAR/PLS OXIMETRY MLT: CPT

## 2018-02-17 PROCEDURE — 25000003 PHARM REV CODE 250: Performed by: UROLOGY

## 2018-02-17 RX ORDER — CIPROFLOXACIN 500 MG/1
500 TABLET ORAL 2 TIMES DAILY
Qty: 6 TABLET | Refills: 0 | Status: SHIPPED | OUTPATIENT
Start: 2018-02-17 | End: 2018-02-20

## 2018-02-17 RX ORDER — OXYBUTYNIN CHLORIDE 5 MG/1
5 TABLET ORAL 3 TIMES DAILY
Qty: 90 TABLET | Refills: 1 | Status: SHIPPED | OUTPATIENT
Start: 2018-02-17 | End: 2018-04-09

## 2018-02-17 RX ORDER — HYDROCODONE BITARTRATE AND ACETAMINOPHEN 5; 325 MG/1; MG/1
1 TABLET ORAL EVERY 4 HOURS PRN
Qty: 30 TABLET | Refills: 0 | Status: SHIPPED | OUTPATIENT
Start: 2018-02-17 | End: 2018-02-23

## 2018-02-17 RX ADMIN — ATENOLOL 25 MG: 25 TABLET ORAL at 10:02

## 2018-02-17 RX ADMIN — PANTOPRAZOLE SODIUM 40 MG: 40 TABLET, DELAYED RELEASE ORAL at 10:02

## 2018-02-17 RX ADMIN — ESCITALOPRAM 20 MG: 10 TABLET, FILM COATED ORAL at 10:02

## 2018-02-17 RX ADMIN — CALCITRIOL 0.5 MCG: 0.25 CAPSULE, LIQUID FILLED ORAL at 10:02

## 2018-02-17 RX ADMIN — FERROUS SULFATE TAB EC 325 MG (65 MG FE EQUIVALENT) 325 MG: 325 (65 FE) TABLET DELAYED RESPONSE at 06:02

## 2018-02-17 RX ADMIN — ASPIRIN 81 MG: 81 TABLET, COATED ORAL at 10:02

## 2018-02-17 RX ADMIN — LEVOTHYROXINE SODIUM 112 MCG: 112 TABLET ORAL at 06:02

## 2018-02-17 RX ADMIN — OXYBUTYNIN CHLORIDE 5 MG: 5 TABLET ORAL at 06:02

## 2018-02-17 RX ADMIN — AMLODIPINE BESYLATE 5 MG: 5 TABLET ORAL at 10:02

## 2018-02-17 RX ADMIN — Medication 400 MG: at 10:02

## 2018-02-17 RX ADMIN — Medication 250 MG: at 10:02

## 2018-02-17 NOTE — NURSING
Discharge instructions given at this time. Saline lck dc'd at this time. Pressure dressing applied. Prescriptions given at this time. Patient voiced understanding.  at bedside. Transport notified at this time.

## 2018-02-17 NOTE — DISCHARGE INSTRUCTIONS
Ureteral Stents  A ureteral stent is a soft plastic tube with holes in it. Its temporarily inserted into a ureter to help drain urine into the bladder. One end goes in the kidney. The other end goes in the bladder. A coil on each end holds the stent in place. The stent cant be seen from outside the body. It shouldnt interfere with your normal routine. Your stent will be put in by a doctor trained in treating the urinary tract (a urologist) or another specialist. The procedure is done in a hospital or surgery center. Youll likely go home the same day.  When is a ureteral stent used?  A ureteral stent may be used:  · To bypass a blockage in a kidney or ureter.  · During kidney stone removal.  · To let a ureter heal after surgery.    Before the Procedure  Your healthcare provider will give you instructions to prepare for the procedure. X-rays or other imaging tests of your kidneys and ureters may be done beforehand.  During the procedure  · You receive medicine to prevent pain and help you relax or sleep during the procedure. Once this takes effect, the procedure starts.  · The doctor inserts a cystoscope (lighted instrument) through the urethra and into the bladder. This shows the opening to the ureter.  · A thin wire is carefully threaded through the cystoscope, up the ureter, and into the kidney. The stent is inserted over the wire.  · A fluoroscope (special X-ray machine) is used to help position the stent. When the stent is in place, the wire and cystoscope are removed.  While you have a stent  · Some discomfort is normal. Certain movements may trigger pain or a feeling that you need to urinate. You may also feel mild soreness or pressure before or during urination. These symptoms will go away a few days after the stent is removed.  · Medicine to control pain or bladder spasms or to prevent infection may be prescribed. Take this as directed.  · Drink plenty of fluids to help flush out your urinary  tract.  · Your urine may be slightly pink or red. This is due to bleeding caused by minor irritation from the stent. This may happen on and off while you have the stent.  · As with any synthetic device placed in the body, there is a risk of infection. The stent may have to be removed if this happens.   How long will you need a stent?  The stent is often taken out after the blockage in the ureter is treated or the ureter has healed. This may take 1 week to 2 weeks, or longer. If a stent is needed for a long time, it may need to be changed every few months.  When to call your healthcare provider  Contact your healthcare provider right away if:  · Your urine contains blood clots or you see a large amount of blood-tinged urine  · You have symptoms similar to those you had before the stent was placed  · You constantly leak urine  · You have a fever over 100.4°F (38°C), chills, nausea, or vomiting  · Your pain is not relieved with medicine  · The end of the stent comes out of the urethra   Date Last Reviewed: 1/1/2017  © 3676-7024 [employer]. [address] [place] [address]. All rights reserved. This information is not intended as a substitute for professional medical care. Always follow your healthcare professional's instructions.

## 2018-02-17 NOTE — PROGRESS NOTES
[facility]  Urology  Progress Note    Patient Name: Valery Huggins  MRN: [id number]  Admission Date: 2/16/2018  Hospital Length of Stay: 0 days  Code Status: Prior   Attending Provider: XAVIER Page MD   Primary Care Physician: Luci Nath NP    Subjective:     HPI:  Urolithiasis  Patient complains of right ureteral stone in a solitary kidney.  Onset of symptoms was abrupt starting 5 hours ago with unchanged course since that time. Patient describes the pain as none,  and rated as no severity. The patient has had no nausea/no vomiting and no diaphoresis. There has been no fever or chills. The patient is not complaining of dysuria or frequency. Risk factors for urolithiasis: history of stone disease.  She had a staging CT scan today at [facility].  She has recently diagnosed stage III renal cell carcinoma.  She is s/p left nephrectomy from 11/2017 by Dr. Donnelly.      She is relatively asymptomatic and is voiding but was urged to go to the ED today after the CT findings from today.  She has a history of stones and has had ureteroscopy as well was ESWL by Dr. Rodríguez at [facility].        Interval History: She feels much better this morning.  She has voided and would like to go home.    Review of Systems   Constitutional: Negative.    HENT: Negative.    Eyes: Negative.    Respiratory: Negative for cough, chest tightness and shortness of breath.    Cardiovascular: Negative for chest pain.   Gastrointestinal: Negative.  Negative for constipation, diarrhea and nausea.   Musculoskeletal: Negative.    Neurological: Negative.    Psychiatric/Behavioral: Negative.      Objective:     Temp:  [97.8 °F (36.6 °C)-98.7 °F (37.1 °C)] 98 °F (36.7 °C)  Pulse:  [65-82] 69  Resp:  [16-20] 18  SpO2:  [95 %-100 %] 98 %  BP: (124-171)/(67-74) 124/68     There is no height or weight on file to calculate BMI.            Drains     Drain                 Ureteral Drain/Stent 12/11/16 1653 Left ureter 6 Fr. 432 days                 Physical Exam   Nursing note and vitals reviewed.  Constitutional: She is oriented to person, place, and time. She appears well-developed.   HENT:   Head: Normocephalic.   Eyes: Conjunctivae are normal.   Neck: Normal range of motion. No tracheal deviation present. No thyromegaly present.   Cardiovascular: Normal rate, normal heart sounds and normal pulses.    Pulmonary/Chest: Effort normal and breath sounds normal. No respiratory distress. She has no wheezes.   Abdominal: Soft. She exhibits no distension and no mass. There is no hepatosplenomegaly. There is no tenderness. There is no rebound, no guarding and no CVA tenderness. No hernia.   Musculoskeletal: Normal range of motion. She exhibits no edema or tenderness.   Lymphadenopathy:     She has no cervical adenopathy.   Neurological: She is alert and oriented to person, place, and time.   Skin: Skin is warm and dry. No rash noted. No erythema.     Psychiatric: She has a normal mood and affect. Her behavior is normal. Judgment and thought content normal.       Significant Labs:    BMP:    Recent Labs  Lab 02/16/18  0817 02/17/18  0511    135*   K 4.6 4.9    99   CO2 29 25   BUN 31* 30*   CREATININE 1.6* 1.5*   CALCIUM 8.6* 7.9*       CBC:     Recent Labs  Lab 02/16/18  0817 02/16/18  2039   WBC 7.40 8.11   HGB 10.0* 10.0*   HCT 30.3* 30.5*    297       Blood Culture: No results for input(s): LABBLOO in the last 168 hours.  Urine Culture: No results for input(s): LABURIN in the last 168 hours.    Significant Imaging:                    Assessment/Plan:     * Urinary tract obstruction by kidney stone    S/p Cystoscopy with right ureteral stent placement  Plan to d/c home this morning  Follow up with Dr. Donnelly or NP later this next week to set up ureteroscopy.        Type 2 diabetes mellitus with complication, without long-term current use of insulin    Hospital Medicine consult appreciated  Home on regular home medications            VTE  Risk Mitigation     None          MIK Page MD  Urology  [facility]

## 2018-02-17 NOTE — NURSING
Hendrickson cath has moderate drainage around insert site    Dr. Page notified  Irrigate hendrickson with 60cc of saline and aspirate back   No further orders at this time  Will cont to monitor

## 2018-02-17 NOTE — SUBJECTIVE & OBJECTIVE
Interval History: She feels much better this morning.  She has voided and would like to go home.    Review of Systems   Constitutional: Negative.    HENT: Negative.    Eyes: Negative.    Respiratory: Negative for cough, chest tightness and shortness of breath.    Cardiovascular: Negative for chest pain.   Gastrointestinal: Negative.  Negative for constipation, diarrhea and nausea.   Musculoskeletal: Negative.    Neurological: Negative.    Psychiatric/Behavioral: Negative.      Objective:     Temp:  [97.8 °F (36.6 °C)-98.7 °F (37.1 °C)] 98 °F (36.7 °C)  Pulse:  [65-82] 69  Resp:  [16-20] 18  SpO2:  [95 %-100 %] 98 %  BP: (124-171)/(67-74) 124/68     There is no height or weight on file to calculate BMI.            Drains     Drain                 Ureteral Drain/Stent 12/11/16 1653 Left ureter 6 Fr. 432 days                Physical Exam   Nursing note and vitals reviewed.  Constitutional: She is oriented to person, place, and time. She appears well-developed.   HENT:   Head: Normocephalic.   Eyes: Conjunctivae are normal.   Neck: Normal range of motion. No tracheal deviation present. No thyromegaly present.   Cardiovascular: Normal rate, normal heart sounds and normal pulses.    Pulmonary/Chest: Effort normal and breath sounds normal. No respiratory distress. She has no wheezes.   Abdominal: Soft. She exhibits no distension and no mass. There is no hepatosplenomegaly. There is no tenderness. There is no rebound, no guarding and no CVA tenderness. No hernia.   Musculoskeletal: Normal range of motion. She exhibits no edema or tenderness.   Lymphadenopathy:     She has no cervical adenopathy.   Neurological: She is alert and oriented to person, place, and time.   Skin: Skin is warm and dry. No rash noted. No erythema.     Psychiatric: She has a normal mood and affect. Her behavior is normal. Judgment and thought content normal.       Significant Labs:    BMP:    Recent Labs  Lab 02/16/18  0817 02/17/18  0511     135*   K 4.6 4.9    99   CO2 29 25   BUN 31* 30*   CREATININE 1.6* 1.5*   CALCIUM 8.6* 7.9*       CBC:     Recent Labs  Lab 02/16/18  0817 02/16/18 2039   WBC 7.40 8.11   HGB 10.0* 10.0*   HCT 30.3* 30.5*    297       Blood Culture: No results for input(s): LABBLOO in the last 168 hours.  Urine Culture: No results for input(s): LABURIN in the last 168 hours.    Significant Imaging:

## 2018-02-17 NOTE — BRIEF OP NOTE
[facility]  Brief Operative Note    SUMMARY     Surgery Date: 2/16/2018     Surgeon(s) and Role:     * XAVIER Page MD - Primary    Assisting Surgeon: None    Pre-op Diagnosis:  Obstruction of kidney [N28.89]    Post-op Diagnosis:  Post-Op Diagnosis Codes:     * Obstruction of kidney [N28.89]    Procedure(s) (LRB):  CYSTOSCOPY/ RETROGRADE PYELOGRAM/ STENT PLACEMENT/STENT EXCHANGE (Right)    Anesthesia: General    Description of Procedure: Proximal ureteral stones, stent placed without difficulty on right side.  Holbrook placed.    Description of the findings of the procedure: as above    Estimated Blood Loss: * No values recorded between 2/16/2018  6:20 PM and 2/16/2018  6:33 PM *         Specimens:   Specimen (12h ago through future)    None

## 2018-02-17 NOTE — ANESTHESIA POSTPROCEDURE EVALUATION
Anesthesia Post Evaluation    Patient: Valery Huggins    Procedure(s) Performed: Procedure(s) (LRB):  CYSTOSCOPY/ RETROGRADE PYELOGRAM/ STENT PLACEMENT/STENT EXCHANGE (Right)    Final Anesthesia Type: general  Patient location during evaluation: PACU  Patient participation: Yes- Able to Participate  Level of consciousness: awake and alert  Post-procedure vital signs: reviewed and stable  Pain management: adequate  Airway patency: patent  PONV status at discharge: No PONV  Anesthetic complications: no      Cardiovascular status: blood pressure returned to baseline  Respiratory status: unassisted  Hydration status: euvolemic  Follow-up not needed.        Visit Vitals  BP (!) 154/70   Pulse 75   Temp 36.6 °C (97.8 °F)   LMP 06/01/2012   SpO2 100%       Pain/Jake Score: Pain Assessment Performed: Yes (2/16/2018  6:44 PM)  Presence of Pain: denies (2/16/2018  6:44 PM)  Jake Score: 9 (2/16/2018  6:44 PM)

## 2018-02-17 NOTE — DISCHARGE SUMMARY
[facility]  Urology  Discharge Summary      Patient Name: Valery Huggins  MRN: [id number]  Admission Date: 2/16/2018  Hospital Length of Stay: 0 days  Discharge Date and Time:  02/17/2018 8:09 AM  Attending Physician: XAVIER Page MD   Discharging Provider: MIK Page MD  Primary Care Physician: Luci Nath NP    HPI:   Urolithiasis  Patient complains of right ureteral stone in a solitary kidney.  Onset of symptoms was abrupt starting 5 hours ago with unchanged course since that time. Patient describes the pain as none,  and rated as no severity. The patient has had no nausea/no vomiting and no diaphoresis. There has been no fever or chills. The patient is not complaining of dysuria or frequency. Risk factors for urolithiasis: history of stone disease.  [place] [facility]  She has recently diagnosed stage III renal cell carcinoma.  She is s/p left nephrectomy from 11/2017 by Dr. Donnelly.      She is relatively asymptomatic and is voiding but was urged to go to the ED today after the CT findings from today.  She has a history of stones and has had ureteroscopy as well was ESWL by Dr. Rodríguez at [facility].        Procedure(s) (LRB):  CYSTOSCOPY/ RETROGRADE PYELOGRAM/ STENT PLACEMENT/STENT EXCHANGE (Right)     Indwelling Lines/Drains at time of discharge:   Lines/Drains/Airways     Drain                 Ureteral Drain/Stent 12/11/16 1653 Left ureter 6 Fr. 432 days                Hospital Course (synopsis of major diagnoses, care, treatment, and services provided during the course of the hospital stay): She was admitted from the ED due to an obstructing right ureteral stone in a solitary kidney.  She underwent urgent ureteral stent placement.  She had no intraoperative or postoperative complications.  She was monitored overnight and then discharged home the next morning.    Consults:   Consults         Status Ordering Provider     Inpatient consult to Hospital Medicine   Once     Provider:  Keith Cote MD    Acknowledged XAVIER SRINIVASAN          Significant Diagnostic Studies: Fluoroscopy    Pending Diagnostic Studies:     None          Final Active Diagnoses:    Diagnosis Date Noted POA    PRINCIPAL PROBLEM:  Urinary tract obstruction by kidney stone [N20.0, N13.8] 02/16/2018 Yes    Essential hypertension [I10] 09/26/2017 Yes     Chronic    Type 2 diabetes mellitus with complication, without long-term current use of insulin [E11.8] 12/11/2016 Yes     Chronic    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 Schedule an appointment as soon as possible for a visit in 1 week.    Specialty:  Urology  Why:  to set up ureteroscopy  Contact information:  [address] [place] [address] [id number] [phone]                 Patient Instructions:     Diet diabetic     Activity as tolerated       Medications:  Reconciled Home Medications:   Current Discharge Medication List      START taking these medications    Details   ciprofloxacin HCl (CIPRO) 500 MG tablet Take 1 tablet (500 mg total) by mouth 2 (two) times daily.  Qty: 6 tablet, Refills: 0    Associated Diagnoses: Urinary tract obstruction by kidney stone; Calculus of ureter; Hypertension associated with diabetes; Type 2 diabetes mellitus with complication, without long-term current use of insulin; Renal cell carcinoma of left kidney      hydrocodone-acetaminophen 5-325mg (NORCO) 5-325 mg per tablet Take 1 tablet by mouth every 4 (four) hours as needed.  Qty: 30 tablet, Refills: 0    Associated Diagnoses: Urinary tract obstruction by kidney stone; Calculus of ureter; Hypertension associated with diabetes; Type 2 diabetes mellitus with  "complication, without long-term current use of insulin; Renal cell carcinoma of left kidney      oxybutynin (DITROPAN) 5 MG Tab Take 1 tablet (5 mg total) by mouth 3 (three) times daily.  Qty: 90 tablet, Refills: 1    Associated Diagnoses: Urinary tract obstruction by kidney stone; Calculus of ureter; Hypertension associated with diabetes; Type 2 diabetes mellitus with complication, without long-term current use of insulin; Renal cell carcinoma of left kidney         CONTINUE these medications which have NOT CHANGED    Details   amLODIPine (NORVASC) 5 MG tablet Take 1 tablet (5 mg total) by mouth once daily.  Qty: 90 tablet, Refills: 0    Associated Diagnoses: Essential hypertension      ascorbic acid (VITAMIN C) 100 MG tablet Take 100 mg by mouth once daily.      aspirin (ECOTRIN) 81 MG EC tablet Take 81 mg by mouth once daily.      atenolol (TENORMIN) 25 MG tablet Take 1 tablet (25 mg total) by mouth once daily.  Qty: 90 tablet, Refills: 0    Associated Diagnoses: Essential hypertension      b complex vitamins capsule Take 1 capsule by mouth once daily.      BD ULTRA-FINE CONNIE PEN NEEDLES 32 gauge x 5/32" Ndle Use EVERY DAY  Refills: 3      calcitRIOL (ROCALTROL) 0.5 MCG Cap Take 1 capsule (0.5 mcg total) by mouth once daily.  Qty: 90 capsule, Refills: 0    Associated Diagnoses: Vitamin deficiency      cholecalciferol, vitamin D3, (VITAMIN D3) 4,000 unit Cap Take 1 capsule by mouth once daily.      escitalopram oxalate (LEXAPRO) 20 MG tablet TAKE ONE TABLET BY MOUTH DAILY  Qty: 90 tablet, Refills: 0    Associated Diagnoses: Mild single current episode of major depressive disorder      ferrous sulfate 325 (65 FE) MG EC tablet Take 1 tablet (325 mg total) by mouth 3 (three) times daily.  Qty: 180 tablet, Refills: 0    Associated Diagnoses: Iron deficiency anemia, unspecified iron deficiency anemia type      FREESTYLE LITE METER kit Refills: 0      levothyroxine (SYNTHROID) 112 MCG tablet Take 1 tablet (112 mcg " total) by mouth before breakfast.  Qty: 90 tablet, Refills: 0    Associated Diagnoses: Congenital hypothyroidism without goiter      magnesium oxide (MAG-OX) 400 mg tablet TAKE ONE TABLET BY MOUTH TWICE DAILY  Qty: 180 tablet, Refills: 0    Associated Diagnoses: Hypomagnesemia      niacin 500 MG CpSR Take 500 mg by mouth every evening.       ondansetron (ZOFRAN-ODT) 8 MG TbDL Take 1 tablet (8 mg total) by mouth every 8 (eight) hours as needed.  Qty: 60 tablet, Refills: 4    Associated Diagnoses: Chemotherapy induced nausea and vomiting      !! pantoprazole (PROTONIX) 40 MG tablet Take 40 mg by mouth once daily.   Refills: 0      !! pantoprazole (PROTONIX) 40 MG tablet TAKE 1 TABLET (40 MG TOTAL) BY MOUTH ONCE DAILY.  Qty: 90 tablet, Refills: 0    Associated Diagnoses: Gastroesophageal reflux disease without esophagitis      SYNJARDY XR 12.5-1,000 mg TBph 1,000 mg 2 (two) times daily.  Refills: 2      VICTOZA 3-XAVIER 0.6 mg/0.1 mL (18 mg/3 mL) PnIj Inject into the skin once daily.   Refills: 3       !! - Potential duplicate medications found. Please discuss with provider.          Time spent on the discharge of patient: 20 minutes    MIK Page MD  Urology  [facility]

## 2018-02-17 NOTE — SUBJECTIVE & OBJECTIVE
"Past Medical History:   Diagnosis Date    Anemia     Cancer     thyroid    Cancer     Kidney    Depression     Diabetes mellitus     Diabetes mellitus type II     Encounter for blood transfusion     Gallstones     Hypertension     Kidney stone     MVP (mitral valve prolapse)     PONV (postoperative nausea and vomiting)     Thyroid disease        Past Surgical History:   Procedure Laterality Date    APPENDECTOMY      cataracts      both eyes    CYSTOSCOPY      CYSTOSCOPY W/ URETERAL STENT PLACEMENT      ECTOPIC PREGNANCY SURGERY      EYE SURGERY      phoebe cataract    HYSTERECTOMY      NASAL SEPTUM SURGERY      NEPHRECTOMY Left     THYROIDECTOMY      two times    TONSILLECTOMY         Review of patient's allergies indicates:  No Known Allergies    Current Facility-Administered Medications on File Prior to Encounter   Medication    [COMPLETED] omnipaque 350 iohexol 350 mg iodine/mL     Current Outpatient Prescriptions on File Prior to Encounter   Medication Sig    amLODIPine (NORVASC) 5 MG tablet Take 1 tablet (5 mg total) by mouth once daily.    ascorbic acid (VITAMIN C) 100 MG tablet Take 100 mg by mouth once daily.    aspirin (ECOTRIN) 81 MG EC tablet Take 81 mg by mouth once daily.    atenolol (TENORMIN) 25 MG tablet Take 1 tablet (25 mg total) by mouth once daily.    b complex vitamins capsule Take 1 capsule by mouth once daily.    BD ULTRA-FINE CONNIE PEN NEEDLES 32 gauge x 5/32" Ndle Use EVERY DAY    calcitRIOL (ROCALTROL) 0.5 MCG Cap Take 1 capsule (0.5 mcg total) by mouth once daily.    cholecalciferol, vitamin D3, (VITAMIN D3) 4,000 unit Cap Take 1 capsule by mouth once daily.    escitalopram oxalate (LEXAPRO) 20 MG tablet TAKE ONE TABLET BY MOUTH DAILY    ferrous sulfate 325 (65 FE) MG EC tablet Take 1 tablet (325 mg total) by mouth 3 (three) times daily.    FREESTYLE LITE METER kit     levothyroxine (SYNTHROID) 112 MCG tablet Take 1 tablet (112 mcg total) by mouth before " breakfast.    magnesium oxide (MAG-OX) 400 mg tablet TAKE ONE TABLET BY MOUTH TWICE DAILY    niacin 500 MG CpSR Take 500 mg by mouth every evening.     ondansetron (ZOFRAN-ODT) 8 MG TbDL Take 1 tablet (8 mg total) by mouth every 8 (eight) hours as needed.    pantoprazole (PROTONIX) 40 MG tablet Take 40 mg by mouth once daily.     pantoprazole (PROTONIX) 40 MG tablet TAKE 1 TABLET (40 MG TOTAL) BY MOUTH ONCE DAILY.    SYNJARDY XR 12.5-1,000 mg TBph 1,000 mg 2 (two) times daily.    VICTOZA 3-XAVIER 0.6 mg/0.1 mL (18 mg/3 mL) PnIj Inject into the skin once daily.      Family History     Problem Relation (Age of Onset)    Cancer Brother    Hypertension Father    Kidney disease Father    Mental illness Mother        Social History Main Topics    Smoking status: Never Smoker    Smokeless tobacco: Never Used    Alcohol use No    Drug use: No    Sexual activity: Yes     Partners: Male     Review of Systems   Constitutional: Positive for activity change and appetite change. Negative for fatigue and fever.   HENT: Negative for congestion, ear pain, rhinorrhea and sinus pressure.    Eyes: Negative for pain and discharge.   Respiratory: Negative for cough, chest tightness, shortness of breath and wheezing.    Cardiovascular: Negative for chest pain and leg swelling.   Gastrointestinal: Negative for abdominal distention, abdominal pain, diarrhea, nausea and vomiting.   Endocrine: Negative for cold intolerance and heat intolerance.   Genitourinary: Positive for flank pain and hematuria. Negative for difficulty urinating, frequency and urgency.   Musculoskeletal: Negative for arthralgias, joint swelling and myalgias.   Allergic/Immunologic: Negative for environmental allergies and food allergies.   Neurological: Negative for dizziness, weakness, light-headedness and headaches.   Hematological: Does not bruise/bleed easily.   Psychiatric/Behavioral: Negative for agitation, behavioral problems and decreased concentration.      Objective:     Vital Signs (Most Recent):  Temp: 98.2 °F (36.8 °C) (02/16/18 1946)  Pulse: 76 (02/16/18 1946)  Resp: 18 (02/16/18 1946)  BP: 130/69 (02/16/18 1946)  SpO2: 97 % (02/16/18 1946) Vital Signs (24h Range):  Temp:  [97.8 °F (36.6 °C)-98.2 °F (36.8 °C)] 98.2 °F (36.8 °C)  Pulse:  [73-82] 76  Resp:  [16-18] 18  SpO2:  [97 %-100 %] 97 %  BP: (130-171)/(69-74) 130/69        There is no height or weight on file to calculate BMI.    Physical Exam   Constitutional: She is oriented to person, place, and time. She appears well-developed and well-nourished.   HENT:   Head: Normocephalic.   Eyes: Conjunctivae are normal. Right eye exhibits no discharge. Left eye exhibits no discharge.   Neck: Normal range of motion. Neck supple.   Cardiovascular: Normal rate, regular rhythm, normal heart sounds and intact distal pulses.    Pulmonary/Chest: Effort normal and breath sounds normal. No respiratory distress.   Abdominal: Soft. Bowel sounds are normal. She exhibits no distension. There is no tenderness.   Musculoskeletal: Normal range of motion.   Neurological: She is alert and oriented to person, place, and time. She has normal strength. GCS eye subscore is 4. GCS verbal subscore is 5. GCS motor subscore is 6.   Skin: Skin is warm and dry.   Psychiatric: She has a normal mood and affect. Her speech is normal and behavior is normal. Thought content normal.           Significant Labs:   CBC:   Recent Labs  Lab 02/16/18  0817   WBC 7.40   HGB 10.0*   HCT 30.3*        CMP:   Recent Labs  Lab 02/16/18  0817      K 4.6      CO2 29   *   BUN 31*   CREATININE 1.6*   CALCIUM 8.6*   PROT 6.5   ALBUMIN 3.0*   BILITOT 0.3   ALKPHOS 109   AST 19   ALT 45*   ANIONGAP 9   EGFRNONAA 31*       Significant Imaging: I have reviewed all pertinent imaging results/findings within the past 24 hours.

## 2018-02-17 NOTE — ASSESSMENT & PLAN NOTE
S/p Cystoscopy with right ureteral stent placement  Plan to d/c home this morning  Follow up with Dr. Donnelly or NP later this next week to set up ureteroscopy.

## 2018-02-17 NOTE — HPI
Patient is a 77 y.o. female referred to the ED from [facility] for further work up after CT scan performed today revealed an obstructing right kidney stone. Patient states CT scan was a  routine follow up s/p left nephrectomy preformed three months ago. Patient denies any current pain, fever, chills, nausea, or vomiting. She states she is not currently receiving treatment related to her kidney removal. She also reports having a cholecystectomy 3.5 weeks ago. Her urologist is Dr. Donnelly. She has no additional complaints    We were consulted for management of diabetes.

## 2018-02-17 NOTE — PLAN OF CARE
Problem: Patient Care Overview  Goal: Plan of Care Review  Outcome: Ongoing (interventions implemented as appropriate)  Patient resting in bed at this time, night light on, all important items within reach, instructed to call if needed, no injuries reported, bed in lowest and locked position, bed alarm used as needed     LR at 77, tolerating   Room air  [place] cath output good, pink clear urine with minimal blood clots  No reports of pain   VSS    No other complaints, questions, or concerns at this time, will cont to monitor

## 2018-02-17 NOTE — CONSULTS
[facility] Hospital Medicine  Consult Note    Patient Name: Valery Huggins  MRN: [id number]  Admission Date: 2/16/2018  Hospital Length of Stay: 0 days  Attending Physician: XAVIER Page MD   Primary Care Provider: Luci Nath NP           Patient information was obtained from patient, past medical records and ER records.     Consults  Subjective:     Principal Problem: Urinary tract obstruction by kidney stone    Chief Complaint:   Chief Complaint   Patient presents with    Flank Pain     sent from [facility] for obstructing stone on CT scan        HPI: Patient is a 77 y.o. female referred to the ED from [facility] for further work up after CT scan performed today revealed an obstructing right kidney stone. Patient states CT scan was a  routine follow up s/p left nephrectomy preformed three months ago. Patient denies any current pain, fever, chills, nausea, or vomiting. She states she is not currently receiving treatment related to her kidney removal. She also reports having a cholecystectomy 3.5 weeks ago. Her urologist is Dr. Donnelly. She has no additional complaints    We were consulted for management of diabetes.    Past Medical History:   Diagnosis Date    Anemia     Cancer     thyroid    Cancer     Kidney    Depression     Diabetes mellitus     Diabetes mellitus type II     Encounter for blood transfusion     Gallstones     Hypertension     Kidney stone     MVP (mitral valve prolapse)     PONV (postoperative nausea and vomiting)     Thyroid disease        Past Surgical History:   Procedure Laterality Date    APPENDECTOMY      cataracts      both eyes    CYSTOSCOPY      CYSTOSCOPY W/ URETERAL STENT PLACEMENT      ECTOPIC PREGNANCY SURGERY      EYE SURGERY      phoebe cataract    HYSTERECTOMY      NASAL SEPTUM SURGERY      NEPHRECTOMY Left     THYROIDECTOMY      two times    TONSILLECTOMY         Review of patient's allergies indicates:  No Known  "Allergies    Current Facility-Administered Medications on File Prior to Encounter   Medication    [COMPLETED] omnipaque 350 iohexol 350 mg iodine/mL     Current Outpatient Prescriptions on File Prior to Encounter   Medication Sig    amLODIPine (NORVASC) 5 MG tablet Take 1 tablet (5 mg total) by mouth once daily.    ascorbic acid (VITAMIN C) 100 MG tablet Take 100 mg by mouth once daily.    aspirin (ECOTRIN) 81 MG EC tablet Take 81 mg by mouth once daily.    atenolol (TENORMIN) 25 MG tablet Take 1 tablet (25 mg total) by mouth once daily.    b complex vitamins capsule Take 1 capsule by mouth once daily.    BD ULTRA-FINE CONNIE PEN NEEDLES 32 gauge x 5/32" Ndle Use EVERY DAY    calcitRIOL (ROCALTROL) 0.5 MCG Cap Take 1 capsule (0.5 mcg total) by mouth once daily.    cholecalciferol, vitamin D3, (VITAMIN D3) 4,000 unit Cap Take 1 capsule by mouth once daily.    escitalopram oxalate (LEXAPRO) 20 MG tablet TAKE ONE TABLET BY MOUTH DAILY    ferrous sulfate 325 (65 FE) MG EC tablet Take 1 tablet (325 mg total) by mouth 3 (three) times daily.    FREESTYLE LITE METER kit     levothyroxine (SYNTHROID) 112 MCG tablet Take 1 tablet (112 mcg total) by mouth before breakfast.    magnesium oxide (MAG-OX) 400 mg tablet TAKE ONE TABLET BY MOUTH TWICE DAILY    niacin 500 MG CpSR Take 500 mg by mouth every evening.     ondansetron (ZOFRAN-ODT) 8 MG TbDL Take 1 tablet (8 mg total) by mouth every 8 (eight) hours as needed.    pantoprazole (PROTONIX) 40 MG tablet Take 40 mg by mouth once daily.     pantoprazole (PROTONIX) 40 MG tablet TAKE 1 TABLET (40 MG TOTAL) BY MOUTH ONCE DAILY.    SYNJARDY XR 12.5-1,000 mg TBph 1,000 mg 2 (two) times daily.    VICTOZA 3-XAVIER 0.6 mg/0.1 mL (18 mg/3 mL) PnIj Inject into the skin once daily.      Family History     Problem Relation (Age of Onset)    Cancer Brother    Hypertension Father    Kidney disease Father    Mental illness Mother        Social History Main Topics    Smoking " status: Never Smoker    Smokeless tobacco: Never Used    Alcohol use No    Drug use: No    Sexual activity: Yes     Partners: Male     Review of Systems   Constitutional: Positive for activity change and appetite change. Negative for fatigue and fever.   HENT: Negative for congestion, ear pain, rhinorrhea and sinus pressure.    Eyes: Negative for pain and discharge.   Respiratory: Negative for cough, chest tightness, shortness of breath and wheezing.    Cardiovascular: Negative for chest pain and leg swelling.   Gastrointestinal: Negative for abdominal distention, abdominal pain, diarrhea, nausea and vomiting.   Endocrine: Negative for cold intolerance and heat intolerance.   Genitourinary: Positive for flank pain and hematuria. Negative for difficulty urinating, frequency and urgency.   Musculoskeletal: Negative for arthralgias, joint swelling and myalgias.   Allergic/Immunologic: Negative for environmental allergies and food allergies.   Neurological: Negative for dizziness, weakness, light-headedness and headaches.   Hematological: Does not bruise/bleed easily.   Psychiatric/Behavioral: Negative for agitation, behavioral problems and decreased concentration.     Objective:     Vital Signs (Most Recent):  Temp: 98.2 °F (36.8 °C) (02/16/18 1946)  Pulse: 76 (02/16/18 1946)  Resp: 18 (02/16/18 1946)  BP: 130/69 (02/16/18 1946)  SpO2: 97 % (02/16/18 1946) Vital Signs (24h Range):  Temp:  [97.8 °F (36.6 °C)-98.2 °F (36.8 °C)] 98.2 °F (36.8 °C)  Pulse:  [73-82] 76  Resp:  [16-18] 18  SpO2:  [97 %-100 %] 97 %  BP: (130-171)/(69-74) 130/69        There is no height or weight on file to calculate BMI.    Physical Exam   Constitutional: She is oriented to person, place, and time. She appears well-developed and well-nourished.   HENT:   Head: Normocephalic.   Eyes: Conjunctivae are normal. Right eye exhibits no discharge. Left eye exhibits no discharge.   Neck: Normal range of motion. Neck supple.   Cardiovascular:  Normal rate, regular rhythm, normal heart sounds and intact distal pulses.    Pulmonary/Chest: Effort normal and breath sounds normal. No respiratory distress.   Abdominal: Soft. Bowel sounds are normal. She exhibits no distension. There is no tenderness.   Musculoskeletal: Normal range of motion.   Neurological: She is alert and oriented to person, place, and time. She has normal strength. GCS eye subscore is 4. GCS verbal subscore is 5. GCS motor subscore is 6.   Skin: Skin is warm and dry.   Psychiatric: She has a normal mood and affect. Her speech is normal and behavior is normal. Thought content normal.           Significant Labs:   CBC:   Recent Labs  Lab 02/16/18  0817   WBC 7.40   HGB 10.0*   HCT 30.3*        CMP:   Recent Labs  Lab 02/16/18  0817      K 4.6      CO2 29   *   BUN 31*   CREATININE 1.6*   CALCIUM 8.6*   PROT 6.5   ALBUMIN 3.0*   BILITOT 0.3   ALKPHOS 109   AST 19   ALT 45*   ANIONGAP 9   EGFRNONAA 31*       Significant Imaging: I have reviewed all pertinent imaging results/findings within the past 24 hours.    Assessment/Plan:     * Urinary tract obstruction by kidney stone    Per primary team          Essential hypertension    Currently normotensive    Continue amlodipine, atenolol          Postoperative hypothyroidism    Continue levothyroxine          Type 2 diabetes mellitus with complication, without long-term current use of insulin    A1c in AM  Hold oral anti diabetics  Moderate dose SSI  BG AC and HS          Hypercholesteremia    Continue Niacin CR            VTE Risk Mitigation     None              Thank you for your consult. I will follow-up with patient. Please contact us if you have any additional questions.    Anthony Avila NP  Department of Hospital Medicine   [facility]

## 2018-02-17 NOTE — TRANSFER OF CARE
Anesthesia Transfer of Care Note    Patient: Valery Huggins    Procedure(s) Performed: Procedure(s) (LRB):  CYSTOSCOPY/ RETROGRADE PYELOGRAM/ STENT PLACEMENT/STENT EXCHANGE (Right)    Patient location: PACU    Anesthesia Type: general    Transport from OR: Transported from OR on 2-3 L/min O2 by NC with adequate spontaneous ventilation    Post pain: adequate analgesia    Post assessment: no apparent anesthetic complications and tolerated procedure well    Post vital signs: stable    Level of consciousness: awake, alert and oriented    Nausea/Vomiting: no nausea/vomiting    Complications: none    Transfer of care protocol was followed      Last vitals:   Visit Vitals  BP (!) 148/70   Pulse 76   LMP 06/01/2012   SpO2 99%

## 2018-02-19 ENCOUNTER — TELEPHONE (OUTPATIENT)
Dept: HEMATOLOGY/ONCOLOGY | Facility: CLINIC | Age: 78
End: 2018-02-19

## 2018-02-19 ENCOUNTER — TELEPHONE (OUTPATIENT)
Dept: UROLOGY | Facility: CLINIC | Age: 78
End: 2018-02-19

## 2018-02-19 NOTE — OP NOTE
DATE OF PROCEDURE:  02/16/2018.    PREOPERATIVE DIAGNOSES:  Right ureteral calculus and solitary kidney.    POSTOPERATIVE DIAGNOSES:  Right ureteral calculus and solitary kidney.    PROCEDURE PERFORMED:  Cystoscopy with right retrograde pyelogram, right ureteral   stent placement, fluoroscopy and Holbrook catheter placement.    PRIMARY SURGEON:  Santos Page M.D.    ANESTHESIA:  General.    ESTIMATED BLOOD LOSS:  Minimal.    DRAINS:  A 6-Israeli 24 cm double-J stent, no string and 16-Israeli Holbrook   catheter.    SPECIMENS REMOVED:  None.    COMPLICATIONS:  None.    INDICATIONS:  Valery Huggins is a 77-year-old woman who was having a CAT scan   today for her recently diagnosed stage III kidney cancer.  She is status post   left nephrectomy from November 2017.  The CAT scan from today showed that she   had right hydronephrosis with proximal cluster of ureteral stones.  It was   recommended that she go to the ER for urgent evaluation and subsequent   cystoscopy with ureteral stent placement on the right side.  The patient was   consented and marked on the right side first ureteral stent placement with plans   for delayed ureteroscopy.    PROCEDURE IN DETAIL:  Valery Huggins was taken to the Operating Room where she was   positively identified by tamir.  She was placed supine on the operating room   table.  Following induction of adequate general anesthesia, she was placed in   the dorsal lithotomy position and her external genitalia were prepped and draped   in the usual sterile fashion.    A preoperative timeout was performed as well as confirmation of preoperative   antibiotics and preoperative marking on the right side.    A  film was taken using fluoroscopy.  The right ureteral stones were seen   as radiopaque densities.    I then passed a 22-Israeli rigid cystoscope per urethra into the bladder under   direct vision.  There were no bladder lesions seen.  No urethral lesions seen.    I then passed a 5-Israeli  open-ended catheter through the scope intubated in the   right ureteral orifice.  I performed a retrograde pyelogram.  Contrast was seen   going up the ureter traveling up to the level of the stone.  There was mild   resistance of the contrast noted at that the stone and then contrast was seen   traveling beyond the stone up into the level of the renal collecting system.    I then passed a hydrophilic tip motion wire through the open-ended catheter up   the ureter beyond the ureteral stone into the renal collecting system.  The   open-ended catheter was then withdrawn.    I then passed a 6-Arabic 24 cm double-J stent over the wire, deploying a coil   within the right kidney and a coil within the bladder confirmed on fluoroscopy   and visually respectively.  The scope was then withdrawn.    A 16-Arabic Holbrook catheter was placed for postoperative drainage.    Her anesthesia was then reversed.  She was taken to the Recovery Room in stable   condition.          /brian [id number] arnoldo(s)        YAEL  dd: 02/16/2018 18:37:43 (CST)  td: 02/16/2018 21:34:13 (CST)  Doc ID   #[id number]  Job ID #[id number]    CC:

## 2018-02-19 NOTE — TELEPHONE ENCOUNTER
----- Message from Melissa Briceño sent at 2/19/2018 10:26 AM CST -----  _  1st Request  _  2nd Request  _  3rd Request        Who: patient     Why: Requesting a call back in regards to scheduling a post op visit on Friday 2/23/18, please call pt     What Number to Call Back:[phone]    When to Expect a call back: (Within 24 hours)    Please return the call at earliest convenience. Thanks!

## 2018-02-19 NOTE — TELEPHONE ENCOUNTER
spoke with pt this morning in regards to appointment on 02/23/18, pt will keep appointments as is.

## 2018-02-21 NOTE — PROGRESS NOTES
Subjective:       Patient ID: Valery Huggins is a 77 y.o. female.    Chief Complaint: RCC    HPI   77 year old female to clinic for follow-up and to review restaging scans. Since last visit, patient had urinary obstruction secondary to kidney stone. Patient feels well today.    Today, she denies any mouth sores, nausea, vomiting, diarrhea, constipation, abdominal pain, weight loss or loss of appetite, chest pain, shortness of breath, leg swelling, pain, headache, dizziness, or mood changes.    Her ECOG PS is 1 and she is accompanied by her .    Oncologic History (From Chart):  77 year old female, referred by Dr. Azar Donnelly, to clinic today for evaluation and management of recently diagnosed Stage III renal cell carcinoma. Patient underwent robotic left nephrectomy on 11/16/17, with clear margins.  Here to discuss adjuvant therapy.       FINAL PATHOLOGIC DIAGNOSIS  KIDNEY (LEFT RADICAL NEPHRECTOMY): CLEAR CELL (RENAL CELL) CARCINOMA (4.5 CM) EXTENDING  INTO THE RENAL SINUS; SECTIONS OF RENAL PELVIS, URETER, RENAL VESSELS, AND RESECTION  MARGINS FREE OF MALIGNANCY.  SURGICAL PATHOLOGY CANCER CASE SUMMARY  KIDNEY NEPHRECTOMY  Specimen: kidney, ureter, adipose tissue  Laterality: left  Procedure: radical nephrectomy  Tumor site: superior pole  Tumor size: 4.5 cm  Tumor focality: single focus  Histologic type: clear cell carcinoma  Sarcomatoid features: not present  Rhabdoid features: not present  Histologic grade: intermediate grade, G2  Tumor necrosis: not present  Anatomic extent of tumor: extends to renal sinus  Margins: uninvolved  Lymph-vascular invasion: not present  Lymph nodes: 0  Stage: pT3a NX  Pathologic findings in nonneoplastic kidney: chronic interstitial nephritis    Review of Systems   Constitutional: Negative for appetite change and unexpected weight change.   Eyes: Negative for visual disturbance.   Respiratory: Negative for cough and shortness of breath.    Cardiovascular: Negative for chest  "pain.   Gastrointestinal: Negative for abdominal pain and diarrhea.   Genitourinary: Negative for frequency.   Musculoskeletal: Negative for back pain.   Skin: Negative for rash.   Neurological: Negative for headaches.   Hematological: Negative for adenopathy.   Psychiatric/Behavioral: The patient is not nervous/anxious.        Allergies:  Review of patient's allergies indicates:  No Known Allergies    Medications:  Current Outpatient Prescriptions   Medication Sig Dispense Refill    amLODIPine (NORVASC) 5 MG tablet Take 1 tablet (5 mg total) by mouth once daily. 90 tablet 0    ascorbic acid (VITAMIN C) 100 MG tablet Take 100 mg by mouth once daily.      aspirin (ECOTRIN) 81 MG EC tablet Take 81 mg by mouth once daily.      atenolol (TENORMIN) 25 MG tablet Take 1 tablet (25 mg total) by mouth once daily. 90 tablet 0    b complex vitamins capsule Take 1 capsule by mouth once daily.      BD ULTRA-FINE CONNIE PEN NEEDLES 32 gauge x 5/32" Ndle Use EVERY DAY  3    calcitRIOL (ROCALTROL) 0.5 MCG Cap Take 1 capsule (0.5 mcg total) by mouth once daily. 90 capsule 0    cholecalciferol, vitamin D3, (VITAMIN D3) 4,000 unit Cap Take 1 capsule by mouth once daily.      escitalopram oxalate (LEXAPRO) 20 MG tablet TAKE ONE TABLET BY MOUTH DAILY 90 tablet 0    ferrous sulfate 325 (65 FE) MG EC tablet Take 1 tablet (325 mg total) by mouth 3 (three) times daily. 180 tablet 0    FREESTYLE LITE METER kit   0    hydrocodone-acetaminophen 5-325mg (NORCO) 5-325 mg per tablet Take 1 tablet by mouth every 4 (four) hours as needed. 30 tablet 0    levothyroxine (SYNTHROID) 112 MCG tablet Take 1 tablet (112 mcg total) by mouth before breakfast. 90 tablet 0    magnesium oxide (MAG-OX) 400 mg tablet TAKE ONE TABLET BY MOUTH TWICE DAILY 180 tablet 0    niacin 500 MG CpSR Take 500 mg by mouth every evening.       ondansetron (ZOFRAN-ODT) 8 MG TbDL Take 1 tablet (8 mg total) by mouth every 8 (eight) hours as needed. 60 tablet 4    " oxybutynin (DITROPAN) 5 MG Tab Take 1 tablet (5 mg total) by mouth 3 (three) times daily. 90 tablet 1    pantoprazole (PROTONIX) 40 MG tablet Take 40 mg by mouth once daily.   0    pantoprazole (PROTONIX) 40 MG tablet TAKE 1 TABLET (40 MG TOTAL) BY MOUTH ONCE DAILY. 90 tablet 0    SYNJARDY XR 12.5-1,000 mg TBph 1,000 mg 2 (two) times daily.  2    VICTOZA 3-XAVIER 0.6 mg/0.1 mL (18 mg/3 mL) PnIj Inject into the skin once daily.   3     No current facility-administered medications for this visit.        PMH:  Past Medical History:   Diagnosis Date    Anemia     Cancer     thyroid    Cancer     Kidney    Depression     Diabetes mellitus     Diabetes mellitus type II     Encounter for blood transfusion     Gallstones     Hypertension     Kidney stone     MVP (mitral valve prolapse)     PONV (postoperative nausea and vomiting)     Thyroid disease        PSH:  Past Surgical History:   Procedure Laterality Date    APPENDECTOMY      cataracts      both eyes    CYSTOSCOPY      CYSTOSCOPY W/ URETERAL STENT PLACEMENT      ECTOPIC PREGNANCY SURGERY      EYE SURGERY      phoebe cataract    HYSTERECTOMY      NASAL SEPTUM SURGERY      NEPHRECTOMY Left     THYROIDECTOMY      two times    TONSILLECTOMY         FamHx:  Family History   Problem Relation Age of Onset    Hypertension Father     Kidney disease Father     Mental illness Mother     Cancer Brother        SocHx:  Social History     Social History    Marital status:      Spouse name: N/A    Number of children: N/A    Years of education: N/A     Occupational History    Not on file.     Social History Main Topics    Smoking status: Never Smoker    Smokeless tobacco: Never Used    Alcohol use No    Drug use: No    Sexual activity: Yes     Partners: Male     Other Topics Concern    Not on file     Social History Narrative    No narrative on file       Objective:      Physical Exam   Constitutional: She is oriented to person, place, and  time. She appears well-developed and well-nourished. No distress.   HENT:   Head: Normocephalic and atraumatic.   Eyes: EOM are normal. Pupils are equal, round, and reactive to light.   Musculoskeletal: Normal range of motion.   Neurological: She is alert and oriented to person, place, and time.   Skin: No erythema. No pallor.   Psychiatric: She has a normal mood and affect. Her behavior is normal. Judgment and thought content normal.       LABS:  WBC   Date Value Ref Range Status   02/16/2018 8.11 3.90 - 12.70 K/uL Final     Hemoglobin   Date Value Ref Range Status   02/16/2018 10.0 (L) 12.0 - 16.0 g/dL Final     Hematocrit   Date Value Ref Range Status   02/16/2018 30.5 (L) 37.0 - 48.5 % Final     Platelets   Date Value Ref Range Status   02/16/2018 297 150 - 350 K/uL Final       Chemistry        Component Value Date/Time     (L) 02/17/2018 0511    K 4.9 02/17/2018 0511    CL 99 02/17/2018 0511    CO2 25 02/17/2018 0511    BUN 30 (H) 02/17/2018 0511    CREATININE 1.5 (H) 02/17/2018 0511     (H) 02/17/2018 0511        Component Value Date/Time    CALCIUM 7.9 (L) 02/17/2018 0511    ALKPHOS 109 02/16/2018 0817    AST 19 02/16/2018 0817    ALT 45 (H) 02/16/2018 0817    BILITOT 0.3 02/16/2018 0817    ESTGFRAFRICA 39 (A) 02/17/2018 0511    EGFRNONAA 34 (A) 02/17/2018 0511          Assessment:       1. Renal cell carcinoma of left kidney    2. Status post Left nephrectomy    3. Encounter for chemotherapy management    4. Stage 4 chronic kidney disease    5. Anemia in neoplastic disease        Plan:       1,2,3,4- Stage III RCC:     77 year old female, referred by Dr. Azar Donnelly, to clinic today for evaluation and management of recently diagnosed clear cell renal cell carcinoma. Patient underwent robotic left nephrectomy on 11/16/17.    Discussed rationale and risks/benefits of adjuvant therapy in RCC.  She does not qualify for our adjuvant trial, but Sutent has recently been approved for this indication.   Patient initially tried Sutent but was unable to tolerate Sutent. Restaging scans show SALMA.      RTC 3 months with labs (CBC,CMP), CT CAP and to see Dr. Epstein.    5- Mild. Iron studies WNL. Will monitor.    Patient was also seen and examined by Dr. Epstein. Patient is in agreement with the proposed treatment plan. All questions were answered to the patient's satisfaction. Pt knows to call clinic if anything is needed before the next clinic visit.    HAROON Del Rosario  Hematology and Medical Oncology      I have reviewed the notes, assessments, and/or procedures performed by the nurse practitioner, as above.  I have personally interviewed the patient at the beside, and rounded with the nurse practitioner. I formulated the plan of care.  I concur with her documentation of Valery Huggins.  I, Dr. Stanislav Epstein, personally spent more than 25 mins during this encounter, greater than 50% was spent in direct counseling and/or coordination of care.     Stanislav Epstein M.D., M.S., F.A.C.P.  Hematology/Oncology Attending  [facility]

## 2018-02-23 ENCOUNTER — OFFICE VISIT (OUTPATIENT)
Dept: UROLOGY | Facility: CLINIC | Age: 78
End: 2018-02-23
Attending: UROLOGY
Payer: COMMERCIAL

## 2018-02-23 ENCOUNTER — OFFICE VISIT (OUTPATIENT)
Dept: HEMATOLOGY/ONCOLOGY | Facility: CLINIC | Age: 78
End: 2018-02-23
Payer: COMMERCIAL

## 2018-02-23 VITALS
DIASTOLIC BLOOD PRESSURE: 74 MMHG | HEIGHT: 61 IN | OXYGEN SATURATION: 100 % | WEIGHT: 137.38 LBS | RESPIRATION RATE: 20 BRPM | BODY MASS INDEX: 25.94 KG/M2 | SYSTOLIC BLOOD PRESSURE: 158 MMHG | TEMPERATURE: 99 F | HEART RATE: 77 BPM

## 2018-02-23 VITALS
WEIGHT: 131 LBS | HEIGHT: 61 IN | BODY MASS INDEX: 24.73 KG/M2 | DIASTOLIC BLOOD PRESSURE: 75 MMHG | HEART RATE: 80 BPM | SYSTOLIC BLOOD PRESSURE: 137 MMHG

## 2018-02-23 DIAGNOSIS — Z51.11 ENCOUNTER FOR CHEMOTHERAPY MANAGEMENT: ICD-10-CM

## 2018-02-23 DIAGNOSIS — Z90.5 STATUS POST NEPHRECTOMY: ICD-10-CM

## 2018-02-23 DIAGNOSIS — D63.0 ANEMIA IN NEOPLASTIC DISEASE: ICD-10-CM

## 2018-02-23 DIAGNOSIS — N20.2 CALCULUS OF KIDNEY AND URETER: Primary | ICD-10-CM

## 2018-02-23 DIAGNOSIS — N18.4 STAGE 4 CHRONIC KIDNEY DISEASE: ICD-10-CM

## 2018-02-23 DIAGNOSIS — C64.2 RENAL CELL CARCINOMA OF LEFT KIDNEY: Primary | ICD-10-CM

## 2018-02-23 PROCEDURE — 1126F AMNT PAIN NOTED NONE PRSNT: CPT | Mod: S$GLB,,, | Performed by: UROLOGY

## 2018-02-23 PROCEDURE — 3008F BODY MASS INDEX DOCD: CPT | Mod: S$GLB,,, | Performed by: INTERNAL MEDICINE

## 2018-02-23 PROCEDURE — 3008F BODY MASS INDEX DOCD: CPT | Mod: S$GLB,,, | Performed by: UROLOGY

## 2018-02-23 PROCEDURE — 99999 PR PBB SHADOW E&M-EST. PATIENT-LVL III: CPT | Mod: PBBFAC,,, | Performed by: INTERNAL MEDICINE

## 2018-02-23 PROCEDURE — 1159F MED LIST DOCD IN RCRD: CPT | Mod: S$GLB,,, | Performed by: INTERNAL MEDICINE

## 2018-02-23 PROCEDURE — 1126F AMNT PAIN NOTED NONE PRSNT: CPT | Mod: S$GLB,,, | Performed by: INTERNAL MEDICINE

## 2018-02-23 PROCEDURE — 99214 OFFICE O/P EST MOD 30 MIN: CPT | Mod: S$GLB,,, | Performed by: UROLOGY

## 2018-02-23 PROCEDURE — 87086 URINE CULTURE/COLONY COUNT: CPT

## 2018-02-23 PROCEDURE — 1159F MED LIST DOCD IN RCRD: CPT | Mod: S$GLB,,, | Performed by: UROLOGY

## 2018-02-23 PROCEDURE — 99214 OFFICE O/P EST MOD 30 MIN: CPT | Mod: S$GLB,,, | Performed by: INTERNAL MEDICINE

## 2018-02-23 RX ORDER — LIDOCAINE HYDROCHLORIDE 20 MG/ML
JELLY TOPICAL ONCE
Status: CANCELLED | OUTPATIENT
Start: 2018-02-23 | End: 2018-02-23

## 2018-02-23 NOTE — PROGRESS NOTES
[facility]  Urology  History & Physical    Patient Name: Valery Huggins  MRN: [id number]  Admission Date: (Not on file)  Code Status: [unfilled]   Attending Provider: Azar Donnelly MD   Primary Care Physician: Luci Nath NP  Principal Problem:[unfilled]    Subjective:     HPI:     preop for right ureteroscopy    Solitary right kidney  Stent in place    Past Medical History:   Diagnosis Date    Anemia     Cancer     thyroid    Cancer     Kidney    Depression     Diabetes mellitus     Diabetes mellitus type II     Encounter for blood transfusion     Gallstones     Hypertension     Kidney stone     MVP (mitral valve prolapse)     PONV (postoperative nausea and vomiting)     Thyroid disease        Past Surgical History:   Procedure Laterality Date    APPENDECTOMY      cataracts      both eyes    CYSTOSCOPY      CYSTOSCOPY W/ URETERAL STENT PLACEMENT      ECTOPIC PREGNANCY SURGERY      EYE SURGERY      phoebe cataract    HYSTERECTOMY      NASAL SEPTUM SURGERY      NEPHRECTOMY Left     THYROIDECTOMY      two times    TONSILLECTOMY         Review of patient's allergies indicates:  No Known Allergies    Family History     Problem Relation (Age of Onset)    Cancer Brother    Hypertension Father    Kidney disease Father    Mental illness Mother          Social History Main Topics    Smoking status: Never Smoker    Smokeless tobacco: Never Used    Alcohol use No    Drug use: No    Sexual activity: Yes     Partners: Male       Review of Systems    Objective:     [unfilled]     Body mass index is 24.75 kg/m².    [unfilled]       Lines/Drains/Airways     Drain                 Ureteral Drain/Stent 12/11/16 1653 Left ureter 6 Fr. 438 days                Physical Exam    Ao*4  resp normal rate' breathing not labored; cta b  cv normal rate; rrr no mur  Ab nondistended  Ext no edema      Significant Labs:    Urine cs sent      Urine Culture:     Significant Imaging:  RPG stent in  place cluster in prox right ureter and nonobstructing renal stone approx 5mm    Assessment and Plan:     Urolithiasis  Solitary right kidney  Renal cell ca sp left nephrectomy    Ureteroscopy Tuesday 2/27 with Dr Mercado  Follow up with Dr Epstein re RCC; pt has chosen to stop sutent    Follow up with me in July 2018    Discussed risks and benefits of URS and alternatives  Answered all questions  Consents done    Repeat urine cs sent      Azar Donnelly MD  Urology  [facility]

## 2018-02-24 LAB
BACTERIA UR CULT: NORMAL
BACTERIA UR CULT: NORMAL

## 2018-02-26 RX ORDER — NITROFURANTOIN 25; 75 MG/1; MG/1
100 CAPSULE ORAL 2 TIMES DAILY
Qty: 8 CAPSULE | Refills: 0 | Status: SHIPPED | OUTPATIENT
Start: 2018-02-26 | End: 2018-03-02

## 2018-03-06 ENCOUNTER — ANESTHESIA EVENT (OUTPATIENT)
Dept: SURGERY | Facility: OTHER | Age: 78
End: 2018-03-06
Payer: COMMERCIAL

## 2018-03-06 RX ORDER — NITROFURANTOIN (MACROCRYSTALS) 100 MG/1
100 CAPSULE ORAL 3 TIMES DAILY
COMMUNITY
End: 2018-04-09

## 2018-03-07 ENCOUNTER — SURGERY (OUTPATIENT)
Age: 78
End: 2018-03-07

## 2018-03-07 ENCOUNTER — TELEPHONE (OUTPATIENT)
Dept: UROLOGY | Facility: CLINIC | Age: 78
End: 2018-03-07

## 2018-03-07 ENCOUNTER — ANESTHESIA (OUTPATIENT)
Dept: SURGERY | Facility: OTHER | Age: 78
End: 2018-03-07
Payer: COMMERCIAL

## 2018-03-07 ENCOUNTER — HOSPITAL ENCOUNTER (OUTPATIENT)
Facility: OTHER | Age: 78
Discharge: HOME | End: 2018-03-07
Attending: UROLOGY | Admitting: UROLOGY
Payer: COMMERCIAL

## 2018-03-07 VITALS
TEMPERATURE: 98 F | RESPIRATION RATE: 16 BRPM | HEART RATE: 90 BPM | SYSTOLIC BLOOD PRESSURE: 141 MMHG | OXYGEN SATURATION: 98 % | DIASTOLIC BLOOD PRESSURE: 88 MMHG | WEIGHT: 136 LBS | BODY MASS INDEX: 25.68 KG/M2 | HEIGHT: 61 IN

## 2018-03-07 DIAGNOSIS — N20.2 CALCULUS OF KIDNEY AND URETER: ICD-10-CM

## 2018-03-07 DIAGNOSIS — N20.1 CALCULUS OF URETER: Primary | ICD-10-CM

## 2018-03-07 LAB — POCT GLUCOSE: 135 MG/DL (ref 70–110)

## 2018-03-07 PROCEDURE — 37000009 HC ANESTHESIA EA ADD 15 MINS: Performed by: UROLOGY

## 2018-03-07 PROCEDURE — C1769 GUIDE WIRE: HCPCS | Performed by: UROLOGY

## 2018-03-07 PROCEDURE — 52356 CYSTO/URETERO W/LITHOTRIPSY: CPT | Mod: RT,,, | Performed by: UROLOGY

## 2018-03-07 PROCEDURE — 25000003 PHARM REV CODE 250: Performed by: UROLOGY

## 2018-03-07 PROCEDURE — 71000015 HC POSTOP RECOV 1ST HR: Performed by: UROLOGY

## 2018-03-07 PROCEDURE — 25000003 PHARM REV CODE 250: Performed by: NURSE ANESTHETIST, CERTIFIED REGISTERED

## 2018-03-07 PROCEDURE — C2617 STENT, NON-COR, TEM W/O DEL: HCPCS | Performed by: UROLOGY

## 2018-03-07 PROCEDURE — 82962 GLUCOSE BLOOD TEST: CPT | Performed by: UROLOGY

## 2018-03-07 PROCEDURE — 25000003 PHARM REV CODE 250: Performed by: ANESTHESIOLOGY

## 2018-03-07 PROCEDURE — 36000707: Performed by: UROLOGY

## 2018-03-07 PROCEDURE — 27201423 OPTIME MED/SURG SUP & DEVICES STERILE SUPPLY: Performed by: UROLOGY

## 2018-03-07 PROCEDURE — 71000033 HC RECOVERY, INTIAL HOUR: Performed by: UROLOGY

## 2018-03-07 PROCEDURE — 63600175 PHARM REV CODE 636 W HCPCS: Performed by: NURSE ANESTHETIST, CERTIFIED REGISTERED

## 2018-03-07 PROCEDURE — 76000 FLUOROSCOPY <1 HR PHYS/QHP: CPT | Mod: 26,59,, | Performed by: UROLOGY

## 2018-03-07 PROCEDURE — C1894 INTRO/SHEATH, NON-LASER: HCPCS | Performed by: UROLOGY

## 2018-03-07 PROCEDURE — 37000008 HC ANESTHESIA 1ST 15 MINUTES: Performed by: UROLOGY

## 2018-03-07 PROCEDURE — 82365 CALCULUS SPECTROSCOPY: CPT

## 2018-03-07 PROCEDURE — 36000706: Performed by: UROLOGY

## 2018-03-07 DEVICE — STENT URETERAL UNIV 6FR 24CM: Type: IMPLANTABLE DEVICE | Site: URETER | Status: FUNCTIONAL

## 2018-03-07 RX ORDER — PROPOFOL 10 MG/ML
VIAL (ML) INTRAVENOUS
Status: DISCONTINUED | OUTPATIENT
Start: 2018-03-07 | End: 2018-03-07

## 2018-03-07 RX ORDER — CEFAZOLIN SODIUM 1 G/3ML
2 INJECTION, POWDER, FOR SOLUTION INTRAMUSCULAR; INTRAVENOUS
Status: DISCONTINUED | OUTPATIENT
Start: 2018-03-07 | End: 2018-03-07 | Stop reason: HOSPADM

## 2018-03-07 RX ORDER — DIPHENHYDRAMINE HYDROCHLORIDE 50 MG/ML
25 INJECTION INTRAMUSCULAR; INTRAVENOUS EVERY 6 HOURS PRN
Status: DISCONTINUED | OUTPATIENT
Start: 2018-03-07 | End: 2018-03-07 | Stop reason: HOSPADM

## 2018-03-07 RX ORDER — FENTANYL CITRATE 50 UG/ML
25 INJECTION, SOLUTION INTRAMUSCULAR; INTRAVENOUS EVERY 5 MIN PRN
Status: DISCONTINUED | OUTPATIENT
Start: 2018-03-07 | End: 2018-03-07 | Stop reason: HOSPADM

## 2018-03-07 RX ORDER — ACETAMINOPHEN 10 MG/ML
INJECTION, SOLUTION INTRAVENOUS
Status: DISCONTINUED | OUTPATIENT
Start: 2018-03-07 | End: 2018-03-07

## 2018-03-07 RX ORDER — HYDROMORPHONE HYDROCHLORIDE 2 MG/ML
0.4 INJECTION, SOLUTION INTRAMUSCULAR; INTRAVENOUS; SUBCUTANEOUS EVERY 5 MIN PRN
Status: DISCONTINUED | OUTPATIENT
Start: 2018-03-07 | End: 2018-03-07 | Stop reason: HOSPADM

## 2018-03-07 RX ORDER — FAMOTIDINE 20 MG/1
20 TABLET, FILM COATED ORAL
Status: COMPLETED | OUTPATIENT
Start: 2018-03-07 | End: 2018-03-07

## 2018-03-07 RX ORDER — HYDROMORPHONE HYDROCHLORIDE 2 MG/ML
1 INJECTION, SOLUTION INTRAMUSCULAR; INTRAVENOUS; SUBCUTANEOUS EVERY 4 HOURS PRN
Status: DISCONTINUED | OUTPATIENT
Start: 2018-03-07 | End: 2018-03-07 | Stop reason: HOSPADM

## 2018-03-07 RX ORDER — HYDROCODONE BITARTRATE AND ACETAMINOPHEN 5; 325 MG/1; MG/1
1 TABLET ORAL EVERY 4 HOURS PRN
Status: DISCONTINUED | OUTPATIENT
Start: 2018-03-07 | End: 2018-03-07 | Stop reason: HOSPADM

## 2018-03-07 RX ORDER — PHENYLEPHRINE HYDROCHLORIDE 10 MG/ML
INJECTION INTRAVENOUS
Status: DISCONTINUED | OUTPATIENT
Start: 2018-03-07 | End: 2018-03-07

## 2018-03-07 RX ORDER — GLYCOPYRROLATE 0.2 MG/ML
INJECTION INTRAMUSCULAR; INTRAVENOUS
Status: DISCONTINUED | OUTPATIENT
Start: 2018-03-07 | End: 2018-03-07

## 2018-03-07 RX ORDER — ATROPA BELLADONNA AND OPIUM 16.2; 6 MG/1; MG/1
SUPPOSITORY RECTAL
Status: DISCONTINUED | OUTPATIENT
Start: 2018-03-07 | End: 2018-03-07 | Stop reason: HOSPADM

## 2018-03-07 RX ORDER — ONDANSETRON 2 MG/ML
4 INJECTION INTRAMUSCULAR; INTRAVENOUS DAILY PRN
Status: DISCONTINUED | OUTPATIENT
Start: 2018-03-07 | End: 2018-03-07 | Stop reason: HOSPADM

## 2018-03-07 RX ORDER — SODIUM CHLORIDE, SODIUM LACTATE, POTASSIUM CHLORIDE, CALCIUM CHLORIDE 600; 310; 30; 20 MG/100ML; MG/100ML; MG/100ML; MG/100ML
INJECTION, SOLUTION INTRAVENOUS CONTINUOUS
Status: DISCONTINUED | OUTPATIENT
Start: 2018-03-07 | End: 2018-03-07 | Stop reason: HOSPADM

## 2018-03-07 RX ORDER — DEXAMETHASONE SODIUM PHOSPHATE 4 MG/ML
INJECTION, SOLUTION INTRA-ARTICULAR; INTRALESIONAL; INTRAMUSCULAR; INTRAVENOUS; SOFT TISSUE
Status: DISCONTINUED | OUTPATIENT
Start: 2018-03-07 | End: 2018-03-07

## 2018-03-07 RX ORDER — ONDANSETRON 2 MG/ML
4 INJECTION INTRAMUSCULAR; INTRAVENOUS EVERY 12 HOURS PRN
Status: DISCONTINUED | OUTPATIENT
Start: 2018-03-07 | End: 2018-03-07 | Stop reason: HOSPADM

## 2018-03-07 RX ORDER — CIPROFLOXACIN 2 MG/ML
INJECTION, SOLUTION INTRAVENOUS
Status: DISCONTINUED | OUTPATIENT
Start: 2018-03-07 | End: 2018-03-07

## 2018-03-07 RX ORDER — SCOLOPAMINE TRANSDERMAL SYSTEM 1 MG/1
PATCH, EXTENDED RELEASE TRANSDERMAL
Status: DISCONTINUED | OUTPATIENT
Start: 2018-03-07 | End: 2018-03-07

## 2018-03-07 RX ORDER — MEPERIDINE HYDROCHLORIDE 50 MG/ML
12.5 INJECTION INTRAMUSCULAR; INTRAVENOUS; SUBCUTANEOUS ONCE AS NEEDED
Status: DISCONTINUED | OUTPATIENT
Start: 2018-03-07 | End: 2018-03-07 | Stop reason: HOSPADM

## 2018-03-07 RX ORDER — ONDANSETRON HYDROCHLORIDE 2 MG/ML
INJECTION, SOLUTION INTRAMUSCULAR; INTRAVENOUS
Status: DISCONTINUED | OUTPATIENT
Start: 2018-03-07 | End: 2018-03-07

## 2018-03-07 RX ORDER — FENTANYL CITRATE 50 UG/ML
INJECTION, SOLUTION INTRAMUSCULAR; INTRAVENOUS
Status: DISCONTINUED | OUTPATIENT
Start: 2018-03-07 | End: 2018-03-07

## 2018-03-07 RX ORDER — LIDOCAINE HCL/PF 100 MG/5ML
SYRINGE (ML) INTRAVENOUS
Status: DISCONTINUED | OUTPATIENT
Start: 2018-03-07 | End: 2018-03-07

## 2018-03-07 RX ORDER — SODIUM CHLORIDE 0.9 % (FLUSH) 0.9 %
3 SYRINGE (ML) INJECTION
Status: DISCONTINUED | OUTPATIENT
Start: 2018-03-07 | End: 2018-03-07 | Stop reason: HOSPADM

## 2018-03-07 RX ORDER — OXYCODONE HYDROCHLORIDE 5 MG/1
5 TABLET ORAL
Status: DISCONTINUED | OUTPATIENT
Start: 2018-03-07 | End: 2018-03-07 | Stop reason: HOSPADM

## 2018-03-07 RX ADMIN — ATROPA BELLADONNA AND OPIUM 60 MG: 16.2; 6 SUPPOSITORY RECTAL at 02:03

## 2018-03-07 RX ADMIN — SCOPALAMINE 1 PATCH: 1 PATCH, EXTENDED RELEASE TRANSDERMAL at 01:03

## 2018-03-07 RX ADMIN — DEXAMETHASONE SODIUM PHOSPHATE 4 MG: 4 INJECTION, SOLUTION INTRAMUSCULAR; INTRAVENOUS at 02:03

## 2018-03-07 RX ADMIN — ACETAMINOPHEN 1000 MG: 10 INJECTION, SOLUTION INTRAVENOUS at 02:03

## 2018-03-07 RX ADMIN — ONDANSETRON 4 MG: 2 INJECTION, SOLUTION INTRAMUSCULAR; INTRAVENOUS at 01:03

## 2018-03-07 RX ADMIN — PHENYLEPHRINE HYDROCHLORIDE 100 MCG: 10 INJECTION INTRAVENOUS at 02:03

## 2018-03-07 RX ADMIN — CARBOXYMETHYLCELLULOSE SODIUM 2 DROP: 2.5 SOLUTION/ DROPS OPHTHALMIC at 01:03

## 2018-03-07 RX ADMIN — PROPOFOL 50 MG: 10 INJECTION, EMULSION INTRAVENOUS at 02:03

## 2018-03-07 RX ADMIN — FENTANYL CITRATE 100 MCG: 50 INJECTION, SOLUTION INTRAMUSCULAR; INTRAVENOUS at 01:03

## 2018-03-07 RX ADMIN — ONDANSETRON 4 MG: 2 INJECTION, SOLUTION INTRAMUSCULAR; INTRAVENOUS at 02:03

## 2018-03-07 RX ADMIN — SODIUM CHLORIDE, SODIUM LACTATE, POTASSIUM CHLORIDE, AND CALCIUM CHLORIDE: 600; 310; 30; 20 INJECTION, SOLUTION INTRAVENOUS at 01:03

## 2018-03-07 RX ADMIN — LIDOCAINE HYDROCHLORIDE 20 MG: 20 INJECTION, SOLUTION INTRAVENOUS at 01:03

## 2018-03-07 RX ADMIN — PROPOFOL 150 MG: 10 INJECTION, EMULSION INTRAVENOUS at 01:03

## 2018-03-07 RX ADMIN — FAMOTIDINE 20 MG: 20 TABLET, FILM COATED ORAL at 12:03

## 2018-03-07 RX ADMIN — GLYCOPYRROLATE 0.2 MG: 0.2 INJECTION, SOLUTION INTRAMUSCULAR; INTRAVENOUS at 01:03

## 2018-03-07 RX ADMIN — CIPROFLOXACIN 400 MG: 2 INJECTION, SOLUTION INTRAVENOUS at 01:03

## 2018-03-07 NOTE — H&P (VIEW-ONLY)
[facility]  Urology  History & Physical    Patient Name: Valery Huggins  MRN: [id number]  Admission Date: 2/16/2018  Code Status: Prior   Attending Provider: XAVIER Page MD   Primary Care Physician: Luci Nath NP  Principal Problem:<principal problem not specified>    Subjective:     HPI:  Urolithiasis  Patient complains of right ureteral stone in a solitary kidney.  Onset of symptoms was abrupt starting 5 hours ago with unchanged course since that time. Patient describes the pain as none,  and rated as no severity. The patient has had no nausea/no vomiting and no diaphoresis. There has been no fever or chills. The patient is not complaining of dysuria or frequency. Risk factors for urolithiasis: history of stone disease.  She had a staging CT scan today at [facility].  She has recently diagnosed stage III renal cell carcinoma.  She is s/p left nephrectomy from 11/2017 by Dr. Donnelly.      She is relatively asymptomatic and is voiding but was urged to go to the ED today after the CT findings from today.  She has a history of stones and has had ureteroscopy as well was ESWL by Dr. Rodríguez at [facility].        Past Medical History:   Diagnosis Date    Anemia     Cancer     thyroid    Cancer     Kidney    Depression     Diabetes mellitus     Diabetes mellitus type II     Encounter for blood transfusion     Gallstones     Hypertension     Kidney stone     MVP (mitral valve prolapse)     PONV (postoperative nausea and vomiting)     Thyroid disease        Past Surgical History:   Procedure Laterality Date    APPENDECTOMY      cataracts      both eyes    CYSTOSCOPY      CYSTOSCOPY W/ URETERAL STENT PLACEMENT      ECTOPIC PREGNANCY SURGERY      EYE SURGERY      phoebe cataract    HYSTERECTOMY      NASAL SEPTUM SURGERY      NEPHRECTOMY Left     THYROIDECTOMY      two times    TONSILLECTOMY         Review of patient's allergies indicates:  No Known Allergies    Family History     Problem  Relation (Age of Onset)    Cancer Brother    Hypertension Father    Kidney disease Father    Mental illness Mother          Social History Main Topics    Smoking status: Never Smoker    Smokeless tobacco: Never Used    Alcohol use No    Drug use: No    Sexual activity: Yes     Partners: Male       Review of Systems   Constitutional: Negative.    HENT: Negative.    Eyes: Negative.    Respiratory: Negative for cough, chest tightness and shortness of breath.    Cardiovascular: Negative for chest pain.   Gastrointestinal: Negative.  Negative for constipation, diarrhea and nausea.   Musculoskeletal: Negative.    Neurological: Negative.    Psychiatric/Behavioral: Negative.        Objective:     Pulse:  [76-82] 76  SpO2:  [98 %-99 %] 99 %  BP: (148-161)/(70-74) 148/70     There is no height or weight on file to calculate BMI.    No intake/output data recorded.       Drains     Drain                 Ureteral Drain/Stent 12/11/16 1653 Left ureter 6 Fr. 432 days                Physical Exam   Nursing note and vitals reviewed.  Constitutional: She is oriented to person, place, and time. She appears well-developed.   HENT:   Head: Normocephalic.   Eyes: Conjunctivae are normal.   Neck: Normal range of motion. No tracheal deviation present. No thyromegaly present.   Cardiovascular: Normal rate, normal heart sounds and normal pulses.    Pulmonary/Chest: Effort normal and breath sounds normal. No respiratory distress. She has no wheezes.   Abdominal: Soft. She exhibits no distension and no mass. There is no hepatosplenomegaly. There is no tenderness. There is no rebound, no guarding and no CVA tenderness. No hernia.   Musculoskeletal: Normal range of motion. She exhibits no edema or tenderness.   Lymphadenopathy:     She has no cervical adenopathy.   Neurological: She is alert and oriented to person, place, and time.   Skin: Skin is warm and dry. No rash noted. No erythema.     Psychiatric: She has a normal mood and affect.  Her behavior is normal. Judgment and thought content normal.       Significant Labs:    BMP:    Recent Labs  Lab 02/16/18  0817      K 4.6      CO2 29   BUN 31*   CREATININE 1.6*   CALCIUM 8.6*       CBC:    Recent Labs  Lab 02/16/18  0817   WBC 7.40   HGB 10.0*   HCT 30.3*          Blood Culture: No results for input(s): LABBLOO in the last 168 hours.  Urine Culture: No results for input(s): LABURIN in the last 168 hours.    Significant Imaging:  CT: I have reviewed all results within the past 24 hours and my personal findings are:  Right solitary kidney with mild hydronephrosis.  A cluster of ureteral stones in the proximal ureter.  A non-obstructing stone in the kidney.                Assessment and Plan:     Urinary tract obstruction by kidney stone    Plan to go to the OR today for stent placement  Plan for delayed ureteroscopy in a couple of weeks  Cipro OCTOR  Will monitor overnight and plan to d/c home in AM  NPO    Patient consented and marked on the right side.            VTE Risk Mitigation         Ordered     Medium Risk of VTE  Once      02/16/18 1724     Place sequential compression device  Until discontinued      02/16/18 1724          W Tano Page MD  Urology  [facility]

## 2018-03-07 NOTE — ANESTHESIA POSTPROCEDURE EVALUATION
"Anesthesia Post Evaluation    Patient: Valery Huggins    Procedure(s) Performed: Procedure(s) (LRB):  URETEROSCOPY (Right)  EXTRACTION-STONE-URETEROSCOPY (N/A)  CYSTOSCOPY WITH STENT PLACEMENT (Right)    Final Anesthesia Type: general  Patient location during evaluation: PACU  Patient participation: Yes- Able to Participate  Level of consciousness: awake and alert and oriented  Post-procedure vital signs: reviewed and stable  Pain management: adequate  Airway patency: patent  PONV status at discharge: No PONV  Anesthetic complications: no      Cardiovascular status: blood pressure returned to baseline and hemodynamically stable  Respiratory status: unassisted, spontaneous ventilation and room air  Hydration status: euvolemic  Follow-up not needed.        Visit Vitals  BP (!) 140/92 (BP Location: Right arm, Patient Position: Sitting)   Pulse 94   Temp 36.4 °C (97.5 °F) (Oral)   Resp 16   Ht 5' 1" (1.549 m)   Wt 61.7 kg (136 lb)   LMP 06/01/2012   SpO2 95%   Breastfeeding? No   BMI 25.70 kg/m²       Pain/Jake Score: Pain Assessment Performed: Yes (3/7/2018  3:50 PM)  Presence of Pain: denies (3/7/2018  3:50 PM)  Jake Score: 10 (3/7/2018  3:50 PM)      "

## 2018-03-07 NOTE — INTERVAL H&P NOTE
The patient has been examined and the H&P has been reviewed:    I concur with the findings and changes have been noted since the H&P was written: stent placed on 2/16/18. Returns today for ureterosocpy to treat stones. No other changes in interim.    Anesthesia/Surgery risks, benefits and alternative options discussed and understood by patient/family.          Active Hospital Problems    Diagnosis  POA    Calculus of kidney and ureter [N20.2]  Yes      Resolved Hospital Problems    Diagnosis Date Resolved POA   No resolved problems to display.

## 2018-03-07 NOTE — PLAN OF CARE
Valery Huggins has met all discharge criteria from Phase II. Vital Signs are stable, ambulating  without difficulty. Discharge instructions given, patient verbalized understanding. Discharged from facility via wheelchair in stable condition.

## 2018-03-07 NOTE — TELEPHONE ENCOUNTER
Attempt to contact pt no answer. A voice message was left informing pt that the call was being made to get her scheduled for a 1 month post op visit with an ultra sound. A call back number was left, reminder letters will be sent in case pt wants to reschedule.

## 2018-03-07 NOTE — OP NOTE
Operative Note       Surgery Date: 3/7/2018     Surgeon: Hunter Mercado MD    Assistant Surgeon: None    Pre-op Diagnosis:  Calculus of kidney and ureter [N20.2]    Post-op Diagnosis: Post-Op Diagnosis Codes:     * Calculus of kidney and ureter [N20.2]    Procedures:  Right ureteroscopy with laser lithotripsy and basket stone extraction  Right ureteral stent exchange    Anesthesia: General    Indications for Procedure:  The patient is a 77 y.o. year old female with solitary right kidney recently presented with obstructing ureteral stone. Stent placed at that time.  She presents today for surgical treatment with ureterosocpy.  The full risks and benefits of the procedure have been explained and detail and she wishes to proceed.    Procedure Description:  The patient was brought to the operative suite and placed under General anesthesia. She was placed in lithotomy position and prepped and draped in usual sterile fashion.  Time out was performed prior to starting the procedure.     film was obtained, which confirmed the location of the stone in the right kidney.  Rigid cystoscopy was performed without abnormality noted in the urethra or bladder.  The right ureteral orifice was identified and Motion wire was advanced into the renal pelvis, alongside the stent, under fluoroscopic guidance without difficulty.  The stent was then removed leaving the wire in place.    Rigid ureteroscopy was then performed. No stones or other abnormalities noted throughout the ureter.    A 28cm ureteral access sheath was then placed over the wire under fluoroscopic guidance. Flexible ureteroscopy was then performed and the stones were identified within the upper and lower pole. Both were too large to extract. The lower pole stone was relocated to the upper poly calyx. Laser lithotripsy was then performed using the Holmium laser and the stone was fragmented into multiple pieces. Any large, potentially obstructing fragments were extracted  using the [place] basket. These fragments were collected and submitted as a specimen.  The entire renal pelvis was then inspected and all calices were noted to be void of any large stone fragments.     The ureteral access sheath was removed under direct vision with no injuries or additional stones identified. Under fluoroscopic guidance, the guidewire was then exchanged for a 6x24 double-J ureteral stent. Good coils were confirmed within the renal pelvis and the bladder using fluoroscopy and cytoscopy. The string was not removed from the stent prior to placement. The bladder was then emptied and the cystoscope removed. The patient was then awakened and transferred to PACU in stable condition. She will remove the stent at home in 1 weeks and follow-up with me in 1 month.       Complications: No    Estimated Blood Loss: 0cc         Specimens Removed:   Specimen (12h ago through future)    None          Implants:   Implant Name Type Inv. Item Serial No.  Lot No. LRB No. Used   STENT URETERAL UNIV 6FR 24CM - [id number]   STENT URETERAL UNIV 6FR 24CM   [employer] [id number] Right 1              Disposition: PACU - hemodynamically stable.           Condition: Good    Attestation:  I performed the procedure.

## 2018-03-07 NOTE — TRANSFER OF CARE
"Anesthesia Transfer of Care Note    Patient: Valery Huggins    Procedure(s) Performed: Procedure(s) (LRB):  URETEROSCOPY (Right)  EXTRACTION-STONE-URETEROSCOPY (N/A)  CYSTOSCOPY WITH STENT PLACEMENT (Right)    Patient location: PACU    Anesthesia Type: general    Transport from OR: Transported from OR on 2-3 L/min O2 by NC with adequate spontaneous ventilation    Post pain: adequate analgesia    Post assessment: no apparent anesthetic complications    Post vital signs: stable    Level of consciousness: awake and alert    Nausea/Vomiting: no nausea/vomiting    Complications: none    Transfer of care protocol was followed      Last vitals:   Visit Vitals  BP (!) 143/80 (BP Location: Right arm, Patient Position: Sitting)   Pulse 75   Temp 36.9 °C (98.5 °F) (Oral)   Resp 16   Ht 5' 1" (1.549 m)   Wt 61.7 kg (136 lb)   LMP 06/01/2012   SpO2 100%   Breastfeeding? No   BMI 25.70 kg/m²     "

## 2018-03-07 NOTE — BRIEF OP NOTE
[facility]  Brief Operative Note     SUMMARY     Surgery Date: 3/7/2018     Surgeon(s) and Role:     * Sp Mercado MD - Primary    Assisting Surgeon: None    Pre-op Diagnosis:  Calculus of kidney and ureter [N20.2]    Post-op Diagnosis:  Post-Op Diagnosis Codes:     * Calculus of kidney and ureter [N20.2]    Procedure(s) (LRB):  URETEROSCOPY (Right)  EXTRACTION-STONE-URETEROSCOPY (N/A)  CYSTOSCOPY WITH STENT PLACEMENT (Right)    Anesthesia: General    Description of the findings of the procedure: Stones in upper and lower pole of right kidney - none in ureter. Fragmented w/ laser and extracted. 6x24 JJ stent replaced w/ string.    Estimated Blood Loss: 0cc         Specimens:   Specimen (12h ago through future)    None          Discharge Note    SUMMARY     Admit Date: 3/7/2018    Discharge Date and Time: 3/7/2018    Hospital Course: Patient was admitted for elective outpatient procedure, which was uncomplicated and well tolerated.      Final Diagnosis: Post-Op Diagnosis Codes:     * Calculus of kidney and ureter [N20.2]    Disposition: Home or Self Cares    Follow Up/Patient Instructions:     Medications:  Reconciled Home Medications: Current Discharge Medication List      CONTINUE these medications which have NOT CHANGED    Details   amLODIPine (NORVASC) 5 MG tablet Take 1 tablet (5 mg total) by mouth once daily.  Qty: 90 tablet, Refills: 0    Associated Diagnoses: Essential hypertension      ascorbic acid (VITAMIN C) 100 MG tablet Take 100 mg by mouth once daily.      aspirin (ECOTRIN) 81 MG EC tablet Take 81 mg by mouth once daily.      atenolol (TENORMIN) 25 MG tablet Take 1 tablet (25 mg total) by mouth once daily.  Qty: 90 tablet, Refills: 0    Associated Diagnoses: Essential hypertension      b complex vitamins capsule Take 1 capsule by mouth once daily.      calcitRIOL (ROCALTROL) 0.5 MCG Cap Take 1 capsule (0.5 mcg total) by mouth once daily.  Qty: 90 capsule, Refills: 0    Associated  "Diagnoses: Vitamin deficiency      cholecalciferol, vitamin D3, (VITAMIN D3) 4,000 unit Cap Take 1 capsule by mouth once daily.      escitalopram oxalate (LEXAPRO) 20 MG tablet TAKE ONE TABLET BY MOUTH DAILY  Qty: 90 tablet, Refills: 0    Associated Diagnoses: Mild single current episode of major depressive disorder      ferrous sulfate 325 (65 FE) MG EC tablet Take 1 tablet (325 mg total) by mouth 3 (three) times daily.  Qty: 180 tablet, Refills: 0    Associated Diagnoses: Iron deficiency anemia, unspecified iron deficiency anemia type      levothyroxine (SYNTHROID) 112 MCG tablet Take 1 tablet (112 mcg total) by mouth before breakfast.  Qty: 90 tablet, Refills: 0    Associated Diagnoses: Congenital hypothyroidism without goiter      magnesium oxide (MAG-OX) 400 mg tablet TAKE ONE TABLET BY MOUTH TWICE DAILY  Qty: 180 tablet, Refills: 0    Associated Diagnoses: Hypomagnesemia      niacin 500 MG CpSR Take 500 mg by mouth every evening.       nitrofurantoin (MACRODANTIN) 100 MG capsule Take 100 mg by mouth 3 (three) times daily.      ondansetron (ZOFRAN-ODT) 8 MG TbDL Take 1 tablet (8 mg total) by mouth every 8 (eight) hours as needed.  Qty: 60 tablet, Refills: 4    Associated Diagnoses: Chemotherapy induced nausea and vomiting      oxybutynin (DITROPAN) 5 MG Tab Take 1 tablet (5 mg total) by mouth 3 (three) times daily.  Qty: 90 tablet, Refills: 1    Associated Diagnoses: Urinary tract obstruction by kidney stone; Calculus of ureter; Hypertension associated with diabetes; Type 2 diabetes mellitus with complication, without long-term current use of insulin; Renal cell carcinoma of left kidney      pantoprazole (PROTONIX) 40 MG tablet Take 40 mg by mouth once daily.   Refills: 0      VICTOZA 3-XAVIER 0.6 mg/0.1 mL (18 mg/3 mL) PnIj Inject into the skin once daily.   Refills: 3      BD ULTRA-FINE CONNIE PEN NEEDLES 32 gauge x 5/32" Ndle Use EVERY DAY  Refills: 3      FREESTYLE LITE METER kit Refills: 0             Discharge " Procedure Orders  Diet general     Activity as tolerated       Follow-up Information     Sp Mercado MD. Go in 4 weeks.    Specialty:  Urology  Why:  For post-operative follow-up (Office will call to schedule appointment and ultrasound)  Contact information:  [address] [address] [phone]

## 2018-03-07 NOTE — TELEPHONE ENCOUNTER
----- Message from Sp Mercado MD sent at 3/7/2018  2:50 PM CST -----  Please schedule for renal US (ordered) and FU with me in 1 month.  Pt had ureteroscopy today and will remove stent at home.  Thanks.

## 2018-03-07 NOTE — DISCHARGE INSTRUCTIONS
Patient may remove stent at home by pulling string next Wednesday.    Call office with any questions or concerns.       After the procedure  If you had a sedative, general anesthesia, or spinal anesthesia, you must have someone drive you home. Once youre home:  · Drink plenty of fluids.  · You may have burning or light bleeding when you urinate--this is normal.  · Medicines may be prescribed to ease any discomfort or prevent infection. Take these as directed.  · Call your doctor if you have heavy bleeding or blood clots, burning that lasts more than a day, a fever over 100°F  (38° C), or trouble urinating.    Date Last Reviewed: 1/1/2017  © 1146-3063 [employer]. [address] [place] [address], [place] [address]. All rights reserved. This information is not intended as a substitute for professional medical care. Always follow your healthcare professional's instructions.      Discharge Instructions: After Your Surgery  Youve just had surgery. During surgery, you were given medicine called anesthesia to keep you relaxed and free of pain. After surgery, you may have some pain or nausea. This is common. Here are some tips for feeling better and getting well after surgery.     Stay on schedule with your medicine.   Going home  Your healthcare provider will show you how to take care of yourself when you go home. He or she will also answer your questions. Have an adult family member or friend drive you home. For the first 24 hours after your surgery:  · Do not drive or use heavy equipment.  · Do not make important decisions or sign legal papers.  · Do not drink alcohol.  · Have someone stay with you, if needed. He or she can watch for problems and help keep you safe.  Be sure to go to all follow-up visits with your healthcare provider. And rest after your surgery for as long as your healthcare provider tells you to.    Coping with pain  If you have pain after surgery, pain medicine will help you feel better. Take  it as told, before pain becomes severe. Also, ask your healthcare provider or pharmacist about other ways to control pain. This might be with heat, ice, or relaxation. And follow any other instructions your surgeon or nurse gives you.    Tips for taking pain medicine  To get the best relief possible, remember these points:  · Pain medicines can upset your stomach. Taking them with a little food may help.  · Most pain relievers taken by mouth need at least 20 to 30 minutes to start to work.  · Taking medicine on a schedule can help you remember to take it. Try to time your medicine so that you can take it before starting an activity. This might be before you get dressed, go for a walk, or sit down for dinner.  · Constipation is a common side effect of pain medicines. Call your healthcare provider before taking any medicines such as laxatives or stool softeners to help ease constipation. Also ask if you should skip any foods. Drinking lots of fluids and eating foods such as fruits and vegetables that are high in fiber can also help. Remember, do not take laxatives unless your surgeon has prescribed them.  · Drinking alcohol and taking pain medicine can cause dizziness and slow your breathing. It can even be deadly. Do not drink alcohol while taking pain medicine.  · Pain medicine can make you react more slowly to things. Do not drive or run machinery while taking pain medicine.  Your healthcare provider may tell you to take acetaminophen to help ease your pain. Ask him or her how much you are supposed to take each day. Acetaminophen or other pain relievers may interact with your prescription medicines or other over-the-counter (OTC) medicines. Some prescription medicines have acetaminophen and other ingredients. Using both prescription and OTC acetaminophen for pain can cause you to overdose. Read the labels on your OTC medicines with care. This will help you to clearly know the list of ingredients, how much to take,  and any warnings. It may also help you not take too much acetaminophen. If you have questions or do not understand the information, ask your pharmacist or healthcare provider to explain it to you before you take the OTC medicine.    Managing nausea  Some people have an upset stomach after surgery. This is often because of anesthesia, pain, or pain medicine, or the stress of surgery. These tips will help you handle nausea and eat healthy foods as you get better. If you were on a special food plan before surgery, ask your healthcare provider if you should follow it while you get better. These tips may help:  · Do not push yourself to eat. Your body will tell you when to eat and how much.  · Start off with clear liquids and soup. They are easier to digest.  · Next try semi-solid foods, such as mashed potatoes, applesauce, and gelatin, as you feel ready.  · Slowly move to solid foods. Dont eat fatty, rich, or spicy foods at first.  · Do not force yourself to have 3 large meals a day. Instead eat smaller amounts more often.  · Take pain medicines with a small amount of solid food, such as crackers or toast, to avoid nausea.     Call your surgeon if  · You still have pain an hour after taking medicine. The medicine may not be strong enough.  · You feel too sleepy, dizzy, or groggy. The medicine may be too strong.  · You have side effects like nausea, vomiting, or skin changes, such as rash, itching, or hives.       If you have obstructive sleep apnea  You were given anesthesia medicine during surgery to keep you comfortable and free of pain. After surgery, you may have more apnea spells because of this medicine and other medicines you were given. The spells may last longer than usual.   At home:  · Keep using the continuous positive airway pressure (CPAP) device when you sleep. Unless your healthcare provider tells you not to, use it when you sleep, day or night. CPAP is a common device used to treat obstructive sleep  apnea.  · Talk with your provider before taking any pain medicine, muscle relaxants, or sedatives. Your provider will tell you about the possible dangers of taking these medicines.    Date Last Reviewed: 12/1/2016  © 9666-3967 [employer]. [address] [place] [address], [place] [address]. All rights reserved. This information is not intended as a substitute for professional medical care. Always follow your healthcare professional's instructions.

## 2018-03-07 NOTE — ANESTHESIA PREPROCEDURE EVALUATION
03/07/2018  Valery Huggins is a 77 y.o., female.    Pre-op Assessment    I have reviewed the Patient Summary Reports.     I have reviewed the Nursing Notes.   I have reviewed the Medications.     Review of Systems  Anesthesia Hx:  No problems with previous Anesthesia PONV History of prior surgery of interest to airway management or planning: nephrectomy. Previous anesthesia: General Lap kaylee recently on [facility] with general anesthesia.  Procedure performed at an [facility]. Denies Family Hx of Anesthesia complications.  Personal Hx of Anesthesia complications, Post-Operative Nausea/Vomiting, with every anesthetic, treatment not known   Social:  Non-Smoker    Hematology/Oncology:         -- Anemia: Current/Recent Cancer. --  Cancer in past history:  surgery  Oncology Comments: Nov 2017 Robotic nephrectomy [facility]     EENT/Dental:EENT/Dental Normal   Cardiovascular:   Exercise tolerance: good Hypertension    Pulmonary:  Pulmonary Normal    Renal/:   Chronic Renal Disease    Musculoskeletal:  Musculoskeletal Normal    Neurological:  Neurology Normal    Endocrine:   Diabetes, well controlled, type 1 Hypothyroidism    Dermatological:  Skin Normal    Psych:   anxiety          Physical Exam  General:  Obesity    Airway/Jaw/Neck:  Airway Findings: Mouth Opening: Normal Tongue: Normal      Dental:  Dental Findings: In tact        Mental Status:  Mental Status Findings:  Cooperative, Alert and Oriented         Anesthesia Plan  Type of Anesthesia, risks & benefits discussed:  Anesthesia Type:  general  Patient's Preference:   Intra-op Monitoring Plan: standard ASA monitors  Intra-op Monitoring Plan Comments:   Post Op Pain Control Plan:   Post Op Pain Control Plan Comments:   Induction:   IV  Beta Blocker:         Informed Consent: Patient understands risks and agrees with Anesthesia plan.  Questions  answered. Anesthesia consent signed with patient.  ASA Score: 2  emergent   Day of Surgery Review of History & Physical:    H&P update referred to the surgeon.         Ready For Surgery From Anesthesia Perspective.

## 2018-03-08 PROCEDURE — 88300 SURGICAL PATH GROSS: CPT | Mod: 26,,, | Performed by: PATHOLOGY

## 2018-03-08 PROCEDURE — 88300 SURGICAL PATH GROSS: CPT | Performed by: PATHOLOGY

## 2018-03-14 ENCOUNTER — PATIENT MESSAGE (OUTPATIENT)
Dept: UROLOGY | Facility: CLINIC | Age: 78
End: 2018-03-14

## 2018-03-17 LAB
ANNOTATION COMMENT IMP: NORMAL
COMPN STONE: NORMAL
SPECIMEN SOURCE: NORMAL
STONE ANALYSIS IR-IMP: NORMAL

## 2018-04-09 ENCOUNTER — HOSPITAL ENCOUNTER (OUTPATIENT)
Dept: RADIOLOGY | Facility: OTHER | Age: 78
Discharge: HOME | End: 2018-04-09
Attending: UROLOGY
Payer: COMMERCIAL

## 2018-04-09 ENCOUNTER — OFFICE VISIT (OUTPATIENT)
Dept: UROLOGY | Facility: CLINIC | Age: 78
End: 2018-04-09
Attending: UROLOGY
Payer: COMMERCIAL

## 2018-04-09 ENCOUNTER — TELEPHONE (OUTPATIENT)
Dept: UROLOGY | Facility: CLINIC | Age: 78
End: 2018-04-09

## 2018-04-09 VITALS
HEIGHT: 61 IN | SYSTOLIC BLOOD PRESSURE: 133 MMHG | HEART RATE: 74 BPM | DIASTOLIC BLOOD PRESSURE: 71 MMHG | WEIGHT: 140 LBS | BODY MASS INDEX: 26.43 KG/M2

## 2018-04-09 DIAGNOSIS — N20.0 NEPHROLITHIASIS: Primary | ICD-10-CM

## 2018-04-09 DIAGNOSIS — Z90.5 SOLITARY KIDNEY, ACQUIRED: ICD-10-CM

## 2018-04-09 DIAGNOSIS — N20.1 CALCULUS OF URETER: ICD-10-CM

## 2018-04-09 DIAGNOSIS — C64.2 RENAL CELL CARCINOMA OF LEFT KIDNEY: ICD-10-CM

## 2018-04-09 LAB
BILIRUB SERPL-MCNC: ABNORMAL MG/DL
BLOOD URINE, POC: ABNORMAL
COLOR, POC UA: YELLOW
GLUCOSE UR QL STRIP: ABNORMAL
KETONES UR QL STRIP: ABNORMAL
LEUKOCYTE ESTERASE URINE, POC: ABNORMAL
NITRITE, POC UA: ABNORMAL
PH, POC UA: 5
PROTEIN, POC: ABNORMAL
SPECIFIC GRAVITY, POC UA: 1.01
UROBILINOGEN, POC UA: ABNORMAL

## 2018-04-09 PROCEDURE — 99214 OFFICE O/P EST MOD 30 MIN: CPT | Mod: 25,S$GLB,, | Performed by: UROLOGY

## 2018-04-09 PROCEDURE — 76770 US EXAM ABDO BACK WALL COMP: CPT | Mod: 26,,, | Performed by: RADIOLOGY

## 2018-04-09 PROCEDURE — 76770 US EXAM ABDO BACK WALL COMP: CPT | Mod: TC

## 2018-04-09 PROCEDURE — 81002 URINALYSIS NONAUTO W/O SCOPE: CPT | Mod: S$GLB,,, | Performed by: UROLOGY

## 2018-04-09 RX ORDER — BLOOD-GLUCOSE METER
KIT MISCELLANEOUS
Refills: 3 | COMMUNITY
Start: 2018-03-26 | End: 2022-01-31

## 2018-04-09 NOTE — PROGRESS NOTES
"Subjective:      Valery Huggins is a 77 y.o. female who returns today regarding her nephrolithiasis.    She is doing well 1 month s/p right URS (solitary kidney). Removed stent at home. No c/o today.    The following portions of the patient's history were reviewed and updated as appropriate: allergies, current medications, past family history, past medical history, past social history, past surgical history and problem list.    Review of Systems  A comprehensive multipoint review of systems was negative except as otherwise stated in the HPI.     Objective:   Vitals: /71 (BP Location: Left arm, Patient Position: Sitting, BP Method: Medium (Automatic))   Pulse 74   Ht 5' 1" (1.549 m)   Wt 63.5 kg (140 lb)   LMP 06/01/2012   BMI 26.45 kg/m²     Physical Exam   General: alert and oriented, no acute distress  Respiratory: Symmetric expansion, non-labored breathing  Neuro: no gross deficits  Psych: normal judgment and insight, normal mood/affect and non-anxious    Lab Review   Urinalysis demonstrates negative for all components  Lab Results   Component Value Date    WBC 7.00 04/05/2018    HGB 11.4 (L) 04/05/2018    HCT 33.3 (L) 04/05/2018    MCV 90 04/05/2018     04/05/2018     Lab Results   Component Value Date    CREATININE 1.9 (H) 04/05/2018    BUN 45 (H) 04/05/2018     24 Hour Urine  Date: 9/29/2015 -- Vol 2.17 L, Ca 348 mg/day, Ox 31 mg/day, Cit 702 mg/day, pH 5.692, Na 170, K 68, Mg 34, Cr 1310     Imaging (all images personally reviewed; agree with report below)  Results for orders placed during the hospital encounter of 04/09/18   US Retroperitoneal Complete (Kidney and    Narrative EXAMINATION:  US RETROPERITONEAL COMPLETE    CLINICAL HISTORY:  Calculus of ureter    TECHNIQUE:  Ultrasound of the kidneys and urinary bladder was performed including color flow and Doppler evaluation of the kidneys.    COMPARISON:  CT 02/16/2018.    FINDINGS:  Right kidney: The right kidney measures 10.6 cm. No " cortical thinning. No loss of corticomedullary distinction. Resistive index measures 0.74.  No mass. No renal stone. No hydronephrosis.    Left kidney: Surgically absent    The bladder is incompletely distended at the time of scanning and has an unremarkable appearance.      Impression No evidence of right hydronephrosis or calculi.      Electronically signed by: Dick Mann MD  Date:    04/09/2018  Time:    11:13          Assessment and Plan:   1. Nephrolithiasis  2. Renal cell carcinoma of left kidney  3. Solitary kidney, acquired  -- Reviewed 24 hour urine from 2015 - Ca was very high, but she states she is now taking much lower dose supplement than she was then  -- Will repeat 24 hour urine now   -- FU 6 weeks to review

## 2018-04-09 NOTE — TELEPHONE ENCOUNTER
----- Message from Sp Mercado MD sent at 4/9/2018 11:34 AM CDT -----  Regarding: [facility]  Please send a lithWendellk order for this patient. Thanks.

## 2018-04-26 PROBLEM — R59.0 CERVICAL LYMPHADENOPATHY: Status: ACTIVE | Noted: 2018-04-26

## 2018-05-02 NOTE — TELEPHONE ENCOUNTER
----- Message from Curry Pino sent at 2/6/2018  3:09 PM CST -----  Contact: Pt   Will like a call from [place] regarding visiting another doctor and pt was notified that she has severe anemia     Pt will like a call regarding this issue     Contact::[phone]  
Patient states her endocrinologist Dr. Maldonado states she is severely anemic. Let her know that her counts have been trending about the same for a few weeks. Discussed moving labs up a week to the day she receives her CT scan and adding on iron studies to the lab appointment. Patient states that is good, she will call if she needs anything else.   
(4) walks frequently

## 2018-05-04 ENCOUNTER — PATIENT MESSAGE (OUTPATIENT)
Dept: HEMATOLOGY/ONCOLOGY | Facility: CLINIC | Age: 78
End: 2018-05-04

## 2018-05-04 ENCOUNTER — PATIENT MESSAGE (OUTPATIENT)
Dept: UROLOGY | Facility: CLINIC | Age: 78
End: 2018-05-04

## 2018-05-08 ENCOUNTER — PATIENT MESSAGE (OUTPATIENT)
Dept: UROLOGY | Facility: CLINIC | Age: 78
End: 2018-05-08

## 2018-05-14 ENCOUNTER — OFFICE VISIT (OUTPATIENT)
Dept: UROLOGY | Facility: CLINIC | Age: 78
End: 2018-05-14
Attending: UROLOGY
Payer: COMMERCIAL

## 2018-05-14 VITALS
WEIGHT: 147.69 LBS | HEIGHT: 61 IN | HEART RATE: 78 BPM | SYSTOLIC BLOOD PRESSURE: 136 MMHG | BODY MASS INDEX: 27.88 KG/M2 | DIASTOLIC BLOOD PRESSURE: 78 MMHG

## 2018-05-14 DIAGNOSIS — Z90.5 SOLITARY KIDNEY, ACQUIRED: ICD-10-CM

## 2018-05-14 DIAGNOSIS — N20.0 NEPHROLITHIASIS: Primary | ICD-10-CM

## 2018-05-14 DIAGNOSIS — R34 DECREASED URINE VOLUME: ICD-10-CM

## 2018-05-14 DIAGNOSIS — C64.2 RENAL CELL CARCINOMA OF LEFT KIDNEY: ICD-10-CM

## 2018-05-14 DIAGNOSIS — R82.994 HYPERCALCIURIA: ICD-10-CM

## 2018-05-14 DIAGNOSIS — R82.991 HYPOCITRATURIA: ICD-10-CM

## 2018-05-14 LAB
BILIRUB SERPL-MCNC: ABNORMAL MG/DL
BLOOD URINE, POC: ABNORMAL
COLOR, POC UA: YELLOW
GLUCOSE UR QL STRIP: ABNORMAL
KETONES UR QL STRIP: ABNORMAL
LEUKOCYTE ESTERASE URINE, POC: ABNORMAL
NITRITE, POC UA: ABNORMAL
PH, POC UA: 6
PROTEIN, POC: ABNORMAL
SPECIFIC GRAVITY, POC UA: 1.01
UROBILINOGEN, POC UA: ABNORMAL

## 2018-05-14 PROCEDURE — 81002 URINALYSIS NONAUTO W/O SCOPE: CPT | Mod: S$GLB,,, | Performed by: UROLOGY

## 2018-05-14 PROCEDURE — 99214 OFFICE O/P EST MOD 30 MIN: CPT | Mod: 25,S$GLB,, | Performed by: UROLOGY

## 2018-05-14 PROCEDURE — 3075F SYST BP GE 130 - 139MM HG: CPT | Mod: CPTII,S$GLB,, | Performed by: UROLOGY

## 2018-05-14 PROCEDURE — 3078F DIAST BP <80 MM HG: CPT | Mod: CPTII,S$GLB,, | Performed by: UROLOGY

## 2018-05-14 NOTE — PROGRESS NOTES
"Subjective:      Valery Huggins is a 77 y.o. female who returns today regarding her nephrolithiasis.    She is doing well 1 month s/p right URS (solitary kidney). Removed stent at home. No c/o today.    The following portions of the patient's history were reviewed and updated as appropriate: allergies, current medications, past family history, past medical history, past social history, past surgical history and problem list.    Review of Systems  A comprehensive multipoint review of systems was negative except as otherwise stated in the HPI.     Objective:   Vitals: /78 (BP Location: Left arm, Patient Position: Sitting, BP Method: Medium (Automatic))   Pulse 78   Ht 5' 1" (1.549 m)   Wt 67 kg (147 lb 11.3 oz)   LMP 06/01/2012   BMI 27.91 kg/m²     Physical Exam   General: alert and oriented, no acute distress  Respiratory: Symmetric expansion, non-labored breathing  Neuro: no gross deficits  Psych: normal judgment and insight, normal mood/affect and non-anxious    Lab Review   Urinalysis demonstrates negative for all components  Lab Results   Component Value Date    WBC 6.90 05/03/2018    HGB 12.3 05/03/2018    HCT 36.9 (L) 05/03/2018    MCV 89 05/03/2018     05/03/2018     Lab Results   Component Value Date    CREATININE 2.0 (H) 05/03/2018    BUN 42 (H) 05/03/2018     24 Hour Urine  Date: 4/30/2018 -- Vol 1.72 L, Ca 222 mg/day, Ox 25 mg/day, Cit 284 mg/day, pH 6.391, Na 176, K 48, Mg 172, Cr 910   Date: 9/29/2015 -- Vol 2.17 L, Ca 348 mg/day, Ox 31 mg/day, Cit 702 mg/day, pH 5.692, Na 170, K 68, Mg 34, Cr 1310     Imaging   Results for orders placed during the hospital encounter of 04/09/18   US Retroperitoneal Complete (Kidney and    Narrative EXAMINATION:  US RETROPERITONEAL COMPLETE    CLINICAL HISTORY:  Calculus of ureter    TECHNIQUE:  Ultrasound of the kidneys and urinary bladder was performed including color flow and Doppler evaluation of the kidneys.    COMPARISON:  CT " 02/16/2018.    FINDINGS:  Right kidney: The right kidney measures 10.6 cm. No cortical thinning. No loss of corticomedullary distinction. Resistive index measures 0.74.  No mass. No renal stone. No hydronephrosis.    Left kidney: Surgically absent    The bladder is incompletely distended at the time of scanning and has an unremarkable appearance.      Impression No evidence of right hydronephrosis or calculi.      Electronically signed by: Dick Mann MD  Date:    04/09/2018  Time:    11:13          Assessment and Plan:   1. Nephrolithiasis  2. Solitary kidney, acquired  -- Metabolic workup reviewed - see plan below  -- FU 6 mos w/ KUB    3. Hypercalciuria  -- Marginally elevated and improved from prior in 2015  -- Encouraged to minimize Na intake to reduce urinary Ca    4. Hypocitraturia  -- Instructed to increased intake of citric acid by consuming citrus fruits and beverages including Cyrstal Light and Sprite  -- Discussed option to add UrocitK; will consider if dietary measures are inadeqaute     5. Low Urine Volume  -- Instructed in increase fluid intake for goal of 2.5 - 3.0 L UOP per day (light yellow)

## 2018-05-23 NOTE — PROGRESS NOTES
Subjective:       Patient ID: Valery Huggins is a 77 y.o. female.    Chief Complaint: RCC    HPI   77 year old female to clinic for 3 month follow-up and to review restaging scans. Patient had a recent enlarged lymph node to his neck.     Today, she denies any mouth sores, nausea, vomiting, diarrhea, constipation, abdominal pain, weight loss or loss of appetite, chest pain, shortness of breath, leg swelling, pain, headache, dizziness, or mood changes.    Her ECOG PS is 1 and she is accompanied by her .    Oncologic History (From Chart):  77 year old female, referred by Dr. Azar Donnelly, to clinic today for evaluation and management of recently diagnosed Stage III renal cell carcinoma. Patient underwent robotic left nephrectomy on 11/16/17, with clear margins.  Here to discuss adjuvant therapy.       FINAL PATHOLOGIC DIAGNOSIS  KIDNEY (LEFT RADICAL NEPHRECTOMY): CLEAR CELL (RENAL CELL) CARCINOMA (4.5 CM) EXTENDING  INTO THE RENAL SINUS; SECTIONS OF RENAL PELVIS, URETER, RENAL VESSELS, AND RESECTION  MARGINS FREE OF MALIGNANCY.  SURGICAL PATHOLOGY CANCER CASE SUMMARY  KIDNEY NEPHRECTOMY  Specimen: kidney, ureter, adipose tissue  Laterality: left  Procedure: radical nephrectomy  Tumor site: superior pole  Tumor size: 4.5 cm  Tumor focality: single focus  Histologic type: clear cell carcinoma  Sarcomatoid features: not present  Rhabdoid features: not present  Histologic grade: intermediate grade, G2  Tumor necrosis: not present  Anatomic extent of tumor: extends to renal sinus  Margins: uninvolved  Lymph-vascular invasion: not present  Lymph nodes: 0  Stage: pT3a NX  Pathologic findings in nonneoplastic kidney: chronic interstitial nephritis    Review of Systems   Constitutional: Negative for appetite change and unexpected weight change.   Eyes: Negative for visual disturbance.   Respiratory: Negative for cough and shortness of breath.    Cardiovascular: Negative for chest pain.   Gastrointestinal: Negative for  "abdominal pain and diarrhea.   Genitourinary: Negative for frequency.   Musculoskeletal: Negative for back pain.   Skin: Negative for rash.   Neurological: Negative for headaches.   Hematological: Negative for adenopathy.   Psychiatric/Behavioral: The patient is not nervous/anxious.        Allergies:  Review of patient's allergies indicates:  No Known Allergies    Medications:  Current Outpatient Prescriptions   Medication Sig Dispense Refill    amLODIPine (NORVASC) 5 MG tablet TAKE 1 TABLET (5 MG TOTAL) BY MOUTH ONCE DAILY. 90 tablet 0    ascorbic acid (VITAMIN C) 100 MG tablet Take 100 mg by mouth once daily.      aspirin (ECOTRIN) 81 MG EC tablet Take 81 mg by mouth once daily.      atenolol (TENORMIN) 25 MG tablet Take 1 tablet (25 mg total) by mouth once daily. 90 tablet 0    b complex vitamins capsule Take 1 capsule by mouth once daily.      BD ULTRA-FINE CONNIE PEN NEEDLES 32 gauge x 5/32" Ndle Use EVERY DAY  3    calcitRIOL (ROCALTROL) 0.5 MCG Cap Take 1 capsule (0.5 mcg total) by mouth once daily. 90 capsule 0    cholecalciferol, vitamin D3, (VITAMIN D3) 4,000 unit Cap Take 1 capsule by mouth once daily.      docusate sodium (COLACE) 100 MG capsule Take 100 mg by mouth 2 (two) times daily.      escitalopram oxalate (LEXAPRO) 20 MG tablet TAKE ONE TABLET BY MOUTH DAILY 90 tablet 0    ferrous sulfate 325 (65 FE) MG EC tablet Take 1 tablet (325 mg total) by mouth 3 (three) times daily. (Patient taking differently: Take 325 mg by mouth 2 (two) times daily. Takes 2 tablets in morning and 2 tablets in evening) 180 tablet 0    FREESTYLE LITE METER kit   0    FREESTYLE LITE STRIPS Strp   3    levothyroxine (SYNTHROID) 112 MCG tablet Take 1 tablet (112 mcg total) by mouth before breakfast. 90 tablet 0    magnesium oxide (MAG-OX) 400 mg tablet TAKE ONE TABLET BY MOUTH TWICE DAILY 180 tablet 0    niacin 500 MG CpSR Take 500 mg by mouth every evening.       pantoprazole (PROTONIX) 40 MG tablet Take 40 mg " by mouth once daily.   0    pantoprazole (PROTONIX) 40 MG tablet TAKE 1 TABLET (40 MG TOTAL) BY MOUTH ONCE DAILY. 90 tablet 0    VICTOZA 3-XAVIER 0.6 mg/0.1 mL (18 mg/3 mL) PnIj Inject into the skin once daily.   3     No current facility-administered medications for this visit.        PMH:  Past Medical History:   Diagnosis Date    Anemia     Cancer     thyroid    Cancer     Kidney    Depression     Diabetes mellitus     Diabetes mellitus type II     Encounter for blood transfusion     Gallstones     Hypertension     Kidney stone     MVP (mitral valve prolapse)     PONV (postoperative nausea and vomiting)     Thyroid disease        PSH:  Past Surgical History:   Procedure Laterality Date    APPENDECTOMY      cataracts      both eyes    CYSTOSCOPY      CYSTOSCOPY W/ URETERAL STENT PLACEMENT      ECTOPIC PREGNANCY SURGERY      EYE SURGERY      phoebe cataract    HYSTERECTOMY      NASAL SEPTUM SURGERY      NEPHRECTOMY Left     THYROIDECTOMY      two times    TONSILLECTOMY         FamHx:  Family History   Problem Relation Age of Onset    Hypertension Father     Kidney disease Father     Mental illness Mother     Cancer Brother        SocHx:  Social History     Social History    Marital status:      Spouse name: N/A    Number of children: N/A    Years of education: N/A     Occupational History    Not on file.     Social History Main Topics    Smoking status: Never Smoker    Smokeless tobacco: Never Used    Alcohol use No    Drug use: No    Sexual activity: Yes     Partners: Male     Other Topics Concern    Not on file     Social History Narrative    No narrative on file       Objective:      Physical Exam   Constitutional: She is oriented to person, place, and time. She appears well-developed and well-nourished. No distress.   HENT:   Head: Normocephalic and atraumatic.   Eyes: EOM are normal. Pupils are equal, round, and reactive to light.   Musculoskeletal: Normal range of  motion.   Neurological: She is alert and oriented to person, place, and time.   Skin: No erythema. No pallor.   Psychiatric: She has a normal mood and affect. Her behavior is normal. Judgment and thought content normal.       LABS:  WBC   Date Value Ref Range Status   05/25/2018 7.21 3.90 - 12.70 K/uL Final     Hemoglobin   Date Value Ref Range Status   05/25/2018 11.9 (L) 12.0 - 16.0 g/dL Final     Hematocrit   Date Value Ref Range Status   05/25/2018 35.5 (L) 37.0 - 48.5 % Final     Platelets   Date Value Ref Range Status   05/25/2018 263 150 - 350 K/uL Final       Chemistry        Component Value Date/Time     (L) 05/25/2018 0719    K 4.4 05/25/2018 0719    CL 96 05/25/2018 0719    CO2 30 (H) 05/25/2018 0719    BUN 43 (H) 05/25/2018 0719    CREATININE 1.9 (H) 05/25/2018 0719     (H) 05/25/2018 0719        Component Value Date/Time    CALCIUM 8.6 (L) 05/25/2018 0719    ALKPHOS 76 05/25/2018 0719    AST 15 05/25/2018 0719    ALT 13 05/25/2018 0719    BILITOT 0.4 05/25/2018 0719    ESTGFRAFRICA 29.3 (A) 05/25/2018 0719    EGFRNONAA 25.4 (A) 05/25/2018 0719          Assessment:       1. Renal cell carcinoma of left kidney    2. Status post Left nephrectomy    3. Chemotherapy follow-up examination    4. Stage 4 chronic kidney disease    5. Anemia in neoplastic disease        Plan:       1,2,3,4- Stage III RCC:     77 year old female, referred by Dr. Azar Donnelly, to clinic today for evaluation and management of recently diagnosed clear cell renal cell carcinoma. Patient underwent robotic left nephrectomy on 11/16/17.    Discussed rationale and risks/benefits of adjuvant therapy in RCC.  She does not qualify for our adjuvant trial, but Sutent has recently been approved for this indication.  Patient initially tried Sutent but was unable to tolerate Sutent. Restaging scans show SALMA but due to recent enlarged lymph node- will add Ct neck.    RTC 2.5 months with labs (CBC,CMP), CT CAP and CT neck and to see  Dr. Epstein.    5- Mild. Iron studies WNL. Will monitor.      The patient agrees with the plan, and all questions have been answered to their satisfaction.      More than 25 mins were spent during this encounter, greater than 50% was spent in direct counseling and/or coordination of care.     Stanislav Epstein M.D., M.S., F.A.C.P.  Hematology and Oncology Attending  [facility]

## 2018-05-25 ENCOUNTER — LAB VISIT (OUTPATIENT)
Dept: LAB | Facility: HOSPITAL | Age: 78
End: 2018-05-25
Payer: COMMERCIAL

## 2018-05-25 ENCOUNTER — OFFICE VISIT (OUTPATIENT)
Dept: HEMATOLOGY/ONCOLOGY | Facility: CLINIC | Age: 78
End: 2018-05-25
Payer: COMMERCIAL

## 2018-05-25 VITALS
TEMPERATURE: 98 F | RESPIRATION RATE: 16 BRPM | DIASTOLIC BLOOD PRESSURE: 73 MMHG | WEIGHT: 148.81 LBS | BODY MASS INDEX: 28.1 KG/M2 | SYSTOLIC BLOOD PRESSURE: 155 MMHG | OXYGEN SATURATION: 98 % | HEART RATE: 72 BPM | HEIGHT: 61 IN

## 2018-05-25 DIAGNOSIS — Z90.5 STATUS POST NEPHRECTOMY: ICD-10-CM

## 2018-05-25 DIAGNOSIS — N18.4 STAGE 4 CHRONIC KIDNEY DISEASE: ICD-10-CM

## 2018-05-25 DIAGNOSIS — C64.2 RENAL CELL CARCINOMA OF LEFT KIDNEY: Primary | ICD-10-CM

## 2018-05-25 DIAGNOSIS — Z09 CHEMOTHERAPY FOLLOW-UP EXAMINATION: ICD-10-CM

## 2018-05-25 DIAGNOSIS — D63.0 ANEMIA IN NEOPLASTIC DISEASE: ICD-10-CM

## 2018-05-25 DIAGNOSIS — C64.2 RENAL CELL CARCINOMA OF LEFT KIDNEY: ICD-10-CM

## 2018-05-25 LAB
ALBUMIN SERPL BCP-MCNC: 3.3 G/DL
ALP SERPL-CCNC: 76 U/L
ALT SERPL W/O P-5'-P-CCNC: 13 U/L
ANION GAP SERPL CALC-SCNC: 9 MMOL/L
AST SERPL-CCNC: 15 U/L
BILIRUB SERPL-MCNC: 0.4 MG/DL
BUN SERPL-MCNC: 43 MG/DL
CALCIUM SERPL-MCNC: 8.6 MG/DL
CHLORIDE SERPL-SCNC: 96 MMOL/L
CO2 SERPL-SCNC: 30 MMOL/L
CREAT SERPL-MCNC: 1.9 MG/DL
ERYTHROCYTE [DISTWIDTH] IN BLOOD BY AUTOMATED COUNT: 12.1 %
EST. GFR  (AFRICAN AMERICAN): 29.3 ML/MIN/1.73 M^2
EST. GFR  (NON AFRICAN AMERICAN): 25.4 ML/MIN/1.73 M^2
GLUCOSE SERPL-MCNC: 165 MG/DL
HCT VFR BLD AUTO: 35.5 %
HGB BLD-MCNC: 11.9 G/DL
IMM GRANULOCYTES # BLD AUTO: 0.03 K/UL
MCH RBC QN AUTO: 29.8 PG
MCHC RBC AUTO-ENTMCNC: 33.5 G/DL
MCV RBC AUTO: 89 FL
NEUTROPHILS # BLD AUTO: 3.9 K/UL
PLATELET # BLD AUTO: 263 K/UL
PMV BLD AUTO: 10.2 FL
POTASSIUM SERPL-SCNC: 4.4 MMOL/L
PROT SERPL-MCNC: 6.5 G/DL
RBC # BLD AUTO: 3.99 M/UL
SODIUM SERPL-SCNC: 135 MMOL/L
WBC # BLD AUTO: 7.21 K/UL

## 2018-05-25 PROCEDURE — 99999 PR PBB SHADOW E&M-EST. PATIENT-LVL V: CPT | Mod: PBBFAC,,, | Performed by: INTERNAL MEDICINE

## 2018-05-25 PROCEDURE — 3077F SYST BP >= 140 MM HG: CPT | Mod: CPTII,S$GLB,, | Performed by: INTERNAL MEDICINE

## 2018-05-25 PROCEDURE — 85027 COMPLETE CBC AUTOMATED: CPT

## 2018-05-25 PROCEDURE — 80053 COMPREHEN METABOLIC PANEL: CPT

## 2018-05-25 PROCEDURE — 99214 OFFICE O/P EST MOD 30 MIN: CPT | Mod: S$GLB,,, | Performed by: INTERNAL MEDICINE

## 2018-05-25 PROCEDURE — 3078F DIAST BP <80 MM HG: CPT | Mod: CPTII,S$GLB,, | Performed by: INTERNAL MEDICINE

## 2018-05-25 PROCEDURE — 36415 COLL VENOUS BLD VENIPUNCTURE: CPT

## 2018-05-27 ENCOUNTER — PATIENT MESSAGE (OUTPATIENT)
Dept: HEMATOLOGY/ONCOLOGY | Facility: CLINIC | Age: 78
End: 2018-05-27

## 2018-06-04 ENCOUNTER — PATIENT MESSAGE (OUTPATIENT)
Dept: HEMATOLOGY/ONCOLOGY | Facility: CLINIC | Age: 78
End: 2018-06-04

## 2018-06-04 DIAGNOSIS — K62.5 RECTAL BLEEDING: Primary | ICD-10-CM

## 2018-06-06 NOTE — PROGRESS NOTES
CRS Office Visit History and Physical    Referring Md:   Reina Mason, Np  [address] [place] [address]    SUBJECTIVE:     Chief Complaint: rectal bleeding    History of Present Illness:  Patient is a 77 y.o. female presents with rectal bleeding. The patient is a new patient to this practice.     Course is as follows:  77 year old female with clear cell renal cell carcinoma s/p robotic left nephrectomy on 11/16/17    Last Colonoscopy: 2013:  2mm sigmoid polyp.  Pathology was hyperplastic.      She presents today for rectal bleeding. She describes bright red blood on the toilet paper lasting 4-5 days after she had oral contrast for a CT scan which caused diarrhea. No blood in the stool. She does not strain - she takes stool softeners regularly as she is on iron supplements.     No family history of CRC or IBD.     Review of patient's allergies indicates:  No Known Allergies    Past Medical History:   Diagnosis Date    Anemia     Cancer     thyroid    Cancer     Kidney    Depression     Diabetes mellitus     Diabetes mellitus type II     Encounter for blood transfusion     Gallstones     Hypertension     Kidney stone     MVP (mitral valve prolapse)     PONV (postoperative nausea and vomiting)     Thyroid disease      Past Surgical History:   Procedure Laterality Date    APPENDECTOMY      cataracts      both eyes    CYSTOSCOPY      CYSTOSCOPY W/ URETERAL STENT PLACEMENT      ECTOPIC PREGNANCY SURGERY      EYE SURGERY      phoebe cataract    HYSTERECTOMY      NASAL SEPTUM SURGERY      NEPHRECTOMY Left     THYROIDECTOMY      two times    TONSILLECTOMY       Family History   Problem Relation Age of Onset    Hypertension Father     Kidney disease Father     Mental illness Mother     Cancer Brother      Social History   Substance Use Topics    Smoking status: Never Smoker    Smokeless tobacco: Never Used    Alcohol use No        Review of Systems:  Review of Systems    Constitutional: Negative for chills, diaphoresis, fever, malaise/fatigue and weight loss.   HENT: Negative for congestion.    Respiratory: Negative for shortness of breath.    Cardiovascular: Negative for chest pain and leg swelling.   Gastrointestinal: Positive for blood in stool and diarrhea. Negative for abdominal pain, constipation, nausea and vomiting.   Genitourinary: Negative for dysuria.   Musculoskeletal: Negative for back pain and myalgias.   Skin: Negative for rash.   Neurological: Negative for dizziness and weakness.   Endo/Heme/Allergies: Does not bruise/bleed easily.   Psychiatric/Behavioral: Negative for depression.       OBJECTIVE:     Vital Signs (Most Recent)  BP (!) 149/70 (BP Location: Left arm, Patient Position: Sitting, BP Method: Medium (Automatic))   Pulse 69   Wt 67.8 kg (149 lb 7.6 oz)   LMP 06/01/2012   BMI 28.24 kg/m²     Physical Exam:  General: White female in no distress   Neuro: alert and oriented x 4.  Moves all extremities.     HEENT: no icterus.  Trachea midline  Respiratory: respirations are even and unlabored  Cardiac: regular rate  Abdomen: soft, nontender, no masses  Extremities: Warm dry and intact  Skin: no rashes  Anorectal: external exam was normal.  No fissure was identified.  Digital exam was performed and was normal without any masses.  Anoscopy was then performed.  Grade 1 internal hemorrhoids seen in the right anterior position.  No other significant hemorrhoidal disease seen    Labs: H/H = 12/36    Imaging: none      ASSESSMENT/PLAN:     Valery was seen today for rectal bleeding.    Diagnoses and all orders for this visit:    Grade I hemorrhoids        77-year-old woman with one episode of rectal bleeding following diarrhea with grade 1 internal hemorrhoid.  It is likely that her hemorrhoids responsible for her rectal bleeding.  She had a normal colonoscopy in 2013.  She continues to have bleeding, we would proceed with repeat colonoscopy  Otherwise, she can  avoid straining, diarrhea, constipation.  We have encouraged a high-fiber diet.  She'll follow-up with us as needed.    XAVIER Koehler MD  Staff Surgeon  Colon & Rectal Surgery

## 2018-06-07 ENCOUNTER — OFFICE VISIT (OUTPATIENT)
Dept: SURGERY | Facility: CLINIC | Age: 78
End: 2018-06-07
Payer: COMMERCIAL

## 2018-06-07 VITALS
HEART RATE: 69 BPM | DIASTOLIC BLOOD PRESSURE: 70 MMHG | SYSTOLIC BLOOD PRESSURE: 149 MMHG | WEIGHT: 149.5 LBS | BODY MASS INDEX: 28.24 KG/M2

## 2018-06-07 DIAGNOSIS — K64.0 GRADE I HEMORRHOIDS: Primary | ICD-10-CM

## 2018-06-07 PROCEDURE — 3078F DIAST BP <80 MM HG: CPT | Mod: CPTII,S$GLB,, | Performed by: COLON & RECTAL SURGERY

## 2018-06-07 PROCEDURE — 46600 DIAGNOSTIC ANOSCOPY SPX: CPT | Mod: S$GLB,,, | Performed by: COLON & RECTAL SURGERY

## 2018-06-07 PROCEDURE — 3077F SYST BP >= 140 MM HG: CPT | Mod: CPTII,S$GLB,, | Performed by: COLON & RECTAL SURGERY

## 2018-06-07 PROCEDURE — 99203 OFFICE O/P NEW LOW 30 MIN: CPT | Mod: 25,S$GLB,, | Performed by: COLON & RECTAL SURGERY

## 2018-06-07 PROCEDURE — 99999 PR PBB SHADOW E&M-EST. PATIENT-LVL III: CPT | Mod: PBBFAC,,, | Performed by: COLON & RECTAL SURGERY

## 2018-06-07 NOTE — LETTER
June 7, 2018      Reina Mason, NP  [address]  [facility] [address]           [facility]  [address]  [facility] [address]  Phone: [phone]          Patient: Valery Huggins   MR Number: [id number]   YOB: 1940   Date of Visit: 6/7/2018       Dear Reina Mason:    Thank you for referring Valery Huggins to me for evaluation. Attached you will find relevant portions of my assessment and plan of care.    If you have questions, please do not hesitate to call me. I look forward to following Valery Huggins along with you.    Sincerely,    Santos Koehler MD    Enclosure  CC:  No Recipients    If you would like to receive this communication electronically, please contact [email or web address] or [phone] to request more information on [employer] access.    For providers and/or their staff who would like to refer a patient to [facility], please contact us through our one-stop-shop provider referral line, [facility] , at [phone].    If you feel you have received this communication in error or would no longer like to receive these types of communications, please e-mail [email or web address]

## 2018-07-10 ENCOUNTER — OFFICE VISIT (OUTPATIENT)
Dept: UROLOGY | Facility: CLINIC | Age: 78
End: 2018-07-10
Attending: UROLOGY
Payer: COMMERCIAL

## 2018-07-10 VITALS
WEIGHT: 151.25 LBS | BODY MASS INDEX: 28.56 KG/M2 | DIASTOLIC BLOOD PRESSURE: 68 MMHG | HEIGHT: 61 IN | SYSTOLIC BLOOD PRESSURE: 138 MMHG | HEART RATE: 60 BPM

## 2018-07-10 DIAGNOSIS — C64.2 RENAL CELL CARCINOMA OF LEFT KIDNEY: Primary | ICD-10-CM

## 2018-07-10 DIAGNOSIS — R59.1 LYMPHADENOPATHY: ICD-10-CM

## 2018-07-10 PROCEDURE — 99214 OFFICE O/P EST MOD 30 MIN: CPT | Mod: S$GLB,,, | Performed by: UROLOGY

## 2018-07-10 PROCEDURE — 3075F SYST BP GE 130 - 139MM HG: CPT | Mod: CPTII,S$GLB,, | Performed by: UROLOGY

## 2018-07-10 PROCEDURE — 3078F DIAST BP <80 MM HG: CPT | Mod: CPTII,S$GLB,, | Performed by: UROLOGY

## 2018-07-10 NOTE — PROGRESS NOTES
"Subjective:      Valery Huggins is a 77 y.o. female who returns today regarding her     Stage III RCC  Stopped adjuvant sutent due to side effects    Dr Epstein is reimaging chest ab and neck 8/20    Recently fell and lacerated her nose  Healing without sequelae    No complaints  Feels well    No symptoms of metastatic disease.    The following portions of the patient's history were reviewed and updated as appropriate: allergies, current medications, past family history, past medical history, past social history, past surgical history and problem list.    Review of Systems  Pertinent items are noted in HPI.  A comprehensive multipoint review of systems was negative except as otherwise stated in the HPI.     Objective:   Vitals: /68 (BP Location: Right arm, Patient Position: Sitting, BP Method: Medium (Automatic))   Pulse 60   Ht 5' 1" (1.549 m)   Wt 68.6 kg (151 lb 3.8 oz)   LMP 06/01/2012   BMI 28.58 kg/m²     Physical Exam   General: alert and oriented, no acute distress  Respiratory: Symmetric expansion, non-labored breathing  Cardiovascular: no peripheral edema  Abdomen: non distended   Wound well healed no hernias  Skin: normal coloration and turgor, no rashes, no suspicious skin lesions noted  Neuro: no gross deficits  Psych: normal judgment and insight, normal mood/affect and non-anxious  HEENT no adenopathy palpable    Physical Exam    Lab Review   Urinalysis demonstrates no specimen    Lab Results   Component Value Date    WBC 7.21 05/25/2018    HGB 11.9 (L) 05/25/2018    HCT 35.5 (L) 05/25/2018    MCV 89 05/25/2018     05/25/2018     Lab Results   Component Value Date    CREATININE 1.9 (H) 06/25/2018    BUN 37 (H) 06/25/2018       Imaging  CT neck reviewed 1.2cm node    Assessment and Plan:   Renal cell carcinoma of left kidney  Stage III sp left nephrectomy; stopped adjuvant sutent    Lymphadenopathy  Doubt this is related to RCC  Reimage 8/20 and follow up with heme/onc 8/22    History " of renal stones and solitary kidney  Repeat imaging planned 8/20        RTC 6 months or sooner if any new  issues

## 2018-07-31 PROBLEM — E11.9 NON-INSULIN DEPENDENT TYPE 2 DIABETES MELLITUS: Chronic | Status: ACTIVE | Noted: 2018-07-31

## 2018-08-21 NOTE — PROGRESS NOTES
Subjective:       Patient ID: Valery Huggins is a 77 y.o. female.    Chief Complaint: RCC    HPI   77 year old female to clinic for 3 month follow-up and to review restaging scans. Patient feels well, no new issues.    Today, she denies any mouth sores, nausea, vomiting, diarrhea, constipation, abdominal pain, weight loss or loss of appetite, chest pain, shortness of breath, leg swelling, pain, headache, dizziness, or mood changes.    Her ECOG PS is 1 and she is accompanied by her .    Oncologic History (From Chart):  77 year old female, referred by Dr. Azar Donnelly, to clinic today for evaluation and management of recently diagnosed Stage III renal cell carcinoma. Patient underwent robotic left nephrectomy on 11/16/17, with clear margins.  Here to discuss adjuvant therapy.       FINAL PATHOLOGIC DIAGNOSIS  KIDNEY (LEFT RADICAL NEPHRECTOMY): CLEAR CELL (RENAL CELL) CARCINOMA (4.5 CM) EXTENDING  INTO THE RENAL SINUS; SECTIONS OF RENAL PELVIS, URETER, RENAL VESSELS, AND RESECTION  MARGINS FREE OF MALIGNANCY.  SURGICAL PATHOLOGY CANCER CASE SUMMARY  KIDNEY NEPHRECTOMY  Specimen: kidney, ureter, adipose tissue  Laterality: left  Procedure: radical nephrectomy  Tumor site: superior pole  Tumor size: 4.5 cm  Tumor focality: single focus  Histologic type: clear cell carcinoma  Sarcomatoid features: not present  Rhabdoid features: not present  Histologic grade: intermediate grade, G2  Tumor necrosis: not present  Anatomic extent of tumor: extends to renal sinus  Margins: uninvolved  Lymph-vascular invasion: not present  Lymph nodes: 0  Stage: pT3a NX  Pathologic findings in nonneoplastic kidney: chronic interstitial nephritis    Review of Systems   Constitutional: Negative for appetite change and unexpected weight change.   Eyes: Negative for visual disturbance.   Respiratory: Negative for cough and shortness of breath.    Cardiovascular: Negative for chest pain.   Gastrointestinal: Negative for abdominal pain and  "diarrhea.   Genitourinary: Negative for frequency.   Musculoskeletal: Negative for back pain.   Skin: Negative for rash.   Neurological: Negative for headaches.   Hematological: Negative for adenopathy.   Psychiatric/Behavioral: The patient is not nervous/anxious.        Allergies:  Review of patient's allergies indicates:  No Known Allergies    Medications:  Current Outpatient Medications   Medication Sig Dispense Refill    amLODIPine (NORVASC) 5 MG tablet Take 1 tablet (5 mg total) by mouth once daily. 90 tablet 0    ascorbic acid (VITAMIN C) 100 MG tablet Take 100 mg by mouth once daily.      aspirin (ECOTRIN) 81 MG EC tablet Take 81 mg by mouth once daily.      atenolol (TENORMIN) 25 MG tablet Take 1 tablet (25 mg total) by mouth once daily. 90 tablet 0    b complex vitamins capsule Take 1 capsule by mouth once daily.      BD ULTRA-FINE CONNIE PEN NEEDLES 32 gauge x 5/32" Ndle Use EVERY DAY  3    calcitRIOL (ROCALTROL) 0.5 MCG Cap Take 1 capsule (0.5 mcg total) by mouth once daily. 90 capsule 0    cholecalciferol, vitamin D3, (VITAMIN D3) 4,000 unit Cap Take 1 capsule by mouth once daily.      docusate sodium (COLACE) 100 MG capsule Take 100 mg by mouth 2 (two) times daily.      escitalopram oxalate (LEXAPRO) 20 MG tablet TAKE ONE TABLET BY MOUTH DAILY 90 tablet 0    FREESTYLE LITE METER kit   0    FREESTYLE LITE STRIPS Strp   3    levothyroxine (SYNTHROID) 112 MCG tablet Take 1 tablet (112 mcg total) by mouth before breakfast. 90 tablet 0    magnesium oxide (MAG-OX) 400 mg tablet TAKE ONE TABLET BY MOUTH TWICE DAILY 180 tablet 0    niacin 500 MG CpSR Take 500 mg by mouth every evening.       pantoprazole (PROTONIX) 40 MG tablet Take 40 mg by mouth once daily.   0    pantoprazole (PROTONIX) 40 MG tablet TAKE 1 TABLET (40 MG TOTAL) BY MOUTH ONCE DAILY. 90 tablet 0    VICTOZA 3-XAVIER 0.6 mg/0.1 mL (18 mg/3 mL) PnIj Inject into the skin once daily.   3     No current facility-administered medications " for this visit.        PMH:  Past Medical History:   Diagnosis Date    Anemia     Cancer     thyroid    Cancer     Kidney    Depression     Diabetes mellitus     Diabetes mellitus type II     Encounter for blood transfusion     Gallstones     Hypertension     Kidney stone     MVP (mitral valve prolapse)     PONV (postoperative nausea and vomiting)     Thyroid disease        PSH:  Past Surgical History:   Procedure Laterality Date    APPENDECTOMY      cataracts      both eyes    CYSTOSCOPY      CYSTOSCOPY W/ URETERAL STENT PLACEMENT      ECTOPIC PREGNANCY SURGERY      EYE SURGERY      phoebe cataract    HYSTERECTOMY      NASAL SEPTUM SURGERY      NEPHRECTOMY Left     THYROIDECTOMY      two times    TONSILLECTOMY         FamHx:  Family History   Problem Relation Age of Onset    Hypertension Father     Kidney disease Father     Mental illness Mother     Cancer Brother        SocHx:  Social History     Socioeconomic History    Marital status:      Spouse name: Not on file    Number of children: Not on file    Years of education: Not on file    Highest education level: Not on file   Social Needs    Financial resource strain: Not on file    Food insecurity - worry: Not on file    Food insecurity - inability: Not on file    Transportation needs - medical: Not on file    Transportation needs - non-medical: Not on file   Occupational History    Not on file   Tobacco Use    Smoking status: Never Smoker    Smokeless tobacco: Never Used   Substance and Sexual Activity    Alcohol use: No    Drug use: No    Sexual activity: Yes     Partners: Male   Other Topics Concern    Not on file   Social History Narrative    Not on file       Objective:      Physical Exam   Constitutional: She is oriented to person, place, and time. She appears well-developed and well-nourished. No distress.   HENT:   Head: Normocephalic and atraumatic.   Eyes: EOM are normal. Pupils are equal, round, and  reactive to light.   Musculoskeletal: Normal range of motion.   Neurological: She is alert and oriented to person, place, and time.   Skin: No erythema. No pallor.   Psychiatric: She has a normal mood and affect. Her behavior is normal. Judgment and thought content normal.       LABS:  WBC   Date Value Ref Range Status   08/20/2018 6.80 3.90 - 12.70 K/uL Final     Hemoglobin   Date Value Ref Range Status   08/20/2018 11.9 (L) 12.0 - 16.0 g/dL Final     Hematocrit   Date Value Ref Range Status   08/20/2018 35.3 (L) 37.0 - 48.5 % Final     Platelets   Date Value Ref Range Status   08/20/2018 247 150 - 350 K/uL Final       Chemistry        Component Value Date/Time     (L) 08/20/2018 0951    K 4.2 08/20/2018 0951    CL 97 (L) 08/20/2018 0951    CO2 30 (H) 08/20/2018 0951    BUN 35 (H) 08/20/2018 0951    CREATININE 2.0 (H) 08/20/2018 0951     (H) 08/20/2018 0951        Component Value Date/Time    CALCIUM 8.3 (L) 08/20/2018 0951    ALKPHOS 71 08/01/2018 1057    AST 20 08/01/2018 1057    ALT 18 08/01/2018 1057    BILITOT 1.0 08/01/2018 1057    ESTGFRAFRICA 27.5 (A) 08/20/2018 0951    EGFRNONAA 23.9 (A) 08/20/2018 0951          Assessment:       1. Renal cell carcinoma of left kidney    2. Status post Left nephrectomy    3. Chemotherapy follow-up examination    4. Stage 4 chronic kidney disease    5. Anemia in neoplastic disease        Plan:       1,2,3, Stage III RCC:     77 year old female, referred by Dr. Azar Donnelly, to clinic today for evaluation and management of recently diagnosed clear cell renal cell carcinoma. Patient underwent robotic left nephrectomy on 11/16/17.    Discussed rationale and risks/benefits of adjuvant therapy in RCC.  She does not qualify for our adjuvant trial, but Sutent has recently been approved for this indication.  Patient initially tried Sutent but was unable to tolerate Sutent. Restaging scans show SALMA but due to recent enlarged lymph node- will add Ct neck.    RTC 3 months  with labs (CBC,CMP), CT CAP and to see Dr. Epstein.    4- Baseline for patient.    5-Mild, will monitor.    Patient was also seen and examined by Dr. Epstein. Patient is in agreement with the proposed treatment plan. All questions were answered to the patient's satisfaction. Pt knows to call clinic if anything is needed before the next clinic visit.    CRISTI Del Rosario-ANA MARIA  Hematology and Medical Oncology      I have reviewed the notes, assessments, and/or procedures performed by the nurse practitioner, as above.  I have personally interviewed the patient at the beside, and rounded with the nurse practitioner. I formulated the plan of care.  I concur with her documentation of Valery Huggins.  I, Dr. Stanislav Epstein, personally spent more than 25 mins during this encounter, greater than 50% was spent in direct counseling and/or coordination of care.     Stanislav Epstein M.D., M.S., F.A.C.P.  Hematology/Oncology Attending  [facility]

## 2018-08-22 ENCOUNTER — OFFICE VISIT (OUTPATIENT)
Dept: HEMATOLOGY/ONCOLOGY | Facility: CLINIC | Age: 78
End: 2018-08-22
Payer: COMMERCIAL

## 2018-08-22 VITALS
OXYGEN SATURATION: 98 % | TEMPERATURE: 98 F | BODY MASS INDEX: 29.3 KG/M2 | RESPIRATION RATE: 18 BRPM | HEIGHT: 61 IN | HEART RATE: 73 BPM | DIASTOLIC BLOOD PRESSURE: 64 MMHG | WEIGHT: 155.19 LBS | SYSTOLIC BLOOD PRESSURE: 134 MMHG

## 2018-08-22 DIAGNOSIS — C64.2 RENAL CELL CARCINOMA OF LEFT KIDNEY: Primary | ICD-10-CM

## 2018-08-22 DIAGNOSIS — Z90.5 STATUS POST NEPHRECTOMY: ICD-10-CM

## 2018-08-22 DIAGNOSIS — N18.4 STAGE 4 CHRONIC KIDNEY DISEASE: ICD-10-CM

## 2018-08-22 DIAGNOSIS — D63.0 ANEMIA IN NEOPLASTIC DISEASE: ICD-10-CM

## 2018-08-22 DIAGNOSIS — Z09 CHEMOTHERAPY FOLLOW-UP EXAMINATION: ICD-10-CM

## 2018-08-22 PROCEDURE — 3075F SYST BP GE 130 - 139MM HG: CPT | Mod: CPTII,S$GLB,, | Performed by: INTERNAL MEDICINE

## 2018-08-22 PROCEDURE — 99999 PR PBB SHADOW E&M-EST. PATIENT-LVL IV: CPT | Mod: PBBFAC,,, | Performed by: INTERNAL MEDICINE

## 2018-08-22 PROCEDURE — 99214 OFFICE O/P EST MOD 30 MIN: CPT | Mod: S$GLB,,, | Performed by: INTERNAL MEDICINE

## 2018-08-22 PROCEDURE — 3078F DIAST BP <80 MM HG: CPT | Mod: CPTII,S$GLB,, | Performed by: INTERNAL MEDICINE

## 2018-11-12 ENCOUNTER — HOSPITAL ENCOUNTER (OUTPATIENT)
Dept: RADIOLOGY | Facility: OTHER | Age: 78
Discharge: HOME | End: 2018-11-12
Attending: UROLOGY
Payer: COMMERCIAL

## 2018-11-12 ENCOUNTER — OFFICE VISIT (OUTPATIENT)
Dept: UROLOGY | Facility: CLINIC | Age: 78
End: 2018-11-12
Attending: UROLOGY
Payer: COMMERCIAL

## 2018-11-12 VITALS
HEART RATE: 78 BPM | BODY MASS INDEX: 30.24 KG/M2 | SYSTOLIC BLOOD PRESSURE: 141 MMHG | WEIGHT: 160.19 LBS | DIASTOLIC BLOOD PRESSURE: 84 MMHG | HEIGHT: 61 IN

## 2018-11-12 DIAGNOSIS — N20.0 NEPHROLITHIASIS: Primary | ICD-10-CM

## 2018-11-12 DIAGNOSIS — N20.0 NEPHROLITHIASIS: ICD-10-CM

## 2018-11-12 LAB
BILIRUB SERPL-MCNC: ABNORMAL MG/DL
BLOOD URINE, POC: ABNORMAL
COLOR, POC UA: ABNORMAL
GLUCOSE UR QL STRIP: NORMAL
KETONES UR QL STRIP: ABNORMAL
LEUKOCYTE ESTERASE URINE, POC: ABNORMAL
NITRITE, POC UA: ABNORMAL
PH, POC UA: 7
POC RESIDUAL URINE VOLUME: 23 ML (ref 0–100)
PROTEIN, POC: ABNORMAL
SPECIFIC GRAVITY, POC UA: 1.01
UROBILINOGEN, POC UA: NORMAL

## 2018-11-12 PROCEDURE — 99213 OFFICE O/P EST LOW 20 MIN: CPT | Mod: 25,S$GLB,, | Performed by: UROLOGY

## 2018-11-12 PROCEDURE — 3288F FALL RISK ASSESSMENT DOCD: CPT | Mod: CPTII,S$GLB,, | Performed by: UROLOGY

## 2018-11-12 PROCEDURE — 74018 RADEX ABDOMEN 1 VIEW: CPT | Mod: TC,FY

## 2018-11-12 PROCEDURE — 3077F SYST BP >= 140 MM HG: CPT | Mod: CPTII,S$GLB,, | Performed by: UROLOGY

## 2018-11-12 PROCEDURE — 74018 RADEX ABDOMEN 1 VIEW: CPT | Mod: 26,,, | Performed by: RADIOLOGY

## 2018-11-12 PROCEDURE — 1100F PTFALLS ASSESS-DOCD GE2>/YR: CPT | Mod: CPTII,S$GLB,, | Performed by: UROLOGY

## 2018-11-12 PROCEDURE — 3079F DIAST BP 80-89 MM HG: CPT | Mod: CPTII,S$GLB,, | Performed by: UROLOGY

## 2018-11-12 PROCEDURE — 51798 US URINE CAPACITY MEASURE: CPT | Mod: S$GLB,,, | Performed by: UROLOGY

## 2018-11-12 PROCEDURE — 81002 URINALYSIS NONAUTO W/O SCOPE: CPT | Mod: S$GLB,,, | Performed by: UROLOGY

## 2018-11-12 NOTE — PROGRESS NOTES
"Subjective:      Valery Huggins is a 77 y.o. female who returns today regarding her nephrolithiasis.    She is doing well 1 month s/p right URS (solitary kidney). Removed stent at home. No c/o today.    The following portions of the patient's history were reviewed and updated as appropriate: allergies, current medications, past family history, past medical history, past social history, past surgical history and problem list.    Review of Systems  A comprehensive multipoint review of systems was negative except as otherwise stated in the HPI.     Objective:   Vitals: BP (!) 141/84 (BP Location: Left arm, Patient Position: Sitting, BP Method: Large (Automatic))   Pulse 78   Ht 5' 1" (1.549 m)   Wt 72.7 kg (160 lb 2.6 oz)   LMP 06/01/2012   BMI 30.26 kg/m²     Physical Exam   General: alert and oriented, no acute distress  Respiratory: Symmetric expansion, non-labored breathing  Neuro: no gross deficits  Psych: normal judgment and insight, normal mood/affect and non-anxious    Lab Review   Urinalysis demonstrates negative for all components  Lab Results   Component Value Date    WBC 6.80 08/20/2018    HGB 11.9 (L) 08/20/2018    HCT 35.3 (L) 08/20/2018    MCV 89 08/20/2018     08/20/2018     Lab Results   Component Value Date    CREATININE 2.0 (H) 10/03/2018    BUN 36 (H) 10/03/2018     24 Hour Urine  Date: 4/30/2018 -- Vol 1.72 L, Ca 222 mg/day, Ox 25 mg/day, Cit 284 mg/day, pH 6.391, Na 176, K 48, Mg 172, Cr 910   Date: 9/29/2015 -- Vol 2.17 L, Ca 348 mg/day, Ox 31 mg/day, Cit 702 mg/day, pH 5.692, Na 170, K 68, Mg 34, Cr 1310     Imaging   Results for orders placed during the hospital encounter of 11/12/18   X-Ray Abdomen AP 1 View    Narrative EXAMINATION:  XR ABDOMEN AP 1 VIEW    CLINICAL HISTORY:  Calculus of kidney    TECHNIQUE:  AP radiographs of the abdomen/pelvis was performed.    COMPARISON:  12/21/2016, CT 11/09/2018    FINDINGS:  There has been a prior left-sided nephrectomy.  No right renal or " ureteral calculi identified.    Calcified phleboliths in the pelvis.    Bowel gas pattern appears nonobstructive.  No dilated loops of bowel identified.  No obvious free air.    Cholecystectomy clips.      Impression No evidence of nephrolithiasis.      Electronically signed by: Tristin Johnson MD  Date:    11/12/2018  Time:    11:36            Assessment and Plan:   1. Nephrolithiasis  2. Solitary kidney, acquired  -- Metabolic workup reviewed - see plan below  -- FU 6 mos w/ KUB    3. Hypercalciuria  -- Marginally elevated and improved from prior in 2015  -- Encouraged to minimize Na intake to reduce urinary Ca    4. Hypocitraturia  -- Instructed to increased intake of citric acid by consuming citrus fruits and beverages including Cyrstal Light and Sprite  -- Discussed option to add UrocitK; will consider if dietary measures are inadeqaute     5. Low Urine Volume  -- Instructed in increase fluid intake for goal of 2.5 - 3.0 L UOP per day (light yellow)

## 2018-11-14 ENCOUNTER — LAB VISIT (OUTPATIENT)
Dept: LAB | Facility: HOSPITAL | Age: 78
End: 2018-11-14
Payer: COMMERCIAL

## 2018-11-14 ENCOUNTER — OFFICE VISIT (OUTPATIENT)
Dept: HEMATOLOGY/ONCOLOGY | Facility: CLINIC | Age: 78
End: 2018-11-14
Payer: COMMERCIAL

## 2018-11-14 VITALS
SYSTOLIC BLOOD PRESSURE: 123 MMHG | HEART RATE: 79 BPM | WEIGHT: 159.63 LBS | BODY MASS INDEX: 30.14 KG/M2 | TEMPERATURE: 98 F | HEIGHT: 61 IN | DIASTOLIC BLOOD PRESSURE: 97 MMHG | RESPIRATION RATE: 18 BRPM

## 2018-11-14 DIAGNOSIS — C64.2 RENAL CELL CARCINOMA OF LEFT KIDNEY: Primary | ICD-10-CM

## 2018-11-14 DIAGNOSIS — C64.2 RENAL CELL CARCINOMA OF LEFT KIDNEY: ICD-10-CM

## 2018-11-14 DIAGNOSIS — Z90.5 STATUS POST NEPHRECTOMY: ICD-10-CM

## 2018-11-14 LAB
ALBUMIN SERPL BCP-MCNC: 3.3 G/DL
ALP SERPL-CCNC: 70 U/L
ALT SERPL W/O P-5'-P-CCNC: 14 U/L
ANION GAP SERPL CALC-SCNC: 10 MMOL/L
AST SERPL-CCNC: 16 U/L
BILIRUB SERPL-MCNC: 0.6 MG/DL
BUN SERPL-MCNC: 37 MG/DL
CALCIUM SERPL-MCNC: 8.6 MG/DL
CHLORIDE SERPL-SCNC: 99 MMOL/L
CO2 SERPL-SCNC: 29 MMOL/L
CREAT SERPL-MCNC: 2.1 MG/DL
ERYTHROCYTE [DISTWIDTH] IN BLOOD BY AUTOMATED COUNT: 12.7 %
EST. GFR  (AFRICAN AMERICAN): 26 ML/MIN/1.73 M^2
EST. GFR  (NON AFRICAN AMERICAN): 22.5 ML/MIN/1.73 M^2
GLUCOSE SERPL-MCNC: 190 MG/DL
HCT VFR BLD AUTO: 36.1 %
HGB BLD-MCNC: 12 G/DL
IMM GRANULOCYTES # BLD AUTO: 0.07 K/UL
MCH RBC QN AUTO: 29.2 PG
MCHC RBC AUTO-ENTMCNC: 33.2 G/DL
MCV RBC AUTO: 88 FL
NEUTROPHILS # BLD AUTO: 4.9 K/UL
PLATELET # BLD AUTO: 261 K/UL
PMV BLD AUTO: 10.4 FL
POTASSIUM SERPL-SCNC: 4.8 MMOL/L
PROT SERPL-MCNC: 6.6 G/DL
RBC # BLD AUTO: 4.11 M/UL
SODIUM SERPL-SCNC: 138 MMOL/L
WBC # BLD AUTO: 7.58 K/UL

## 2018-11-14 PROCEDURE — 80053 COMPREHEN METABOLIC PANEL: CPT

## 2018-11-14 PROCEDURE — 85027 COMPLETE CBC AUTOMATED: CPT

## 2018-11-14 PROCEDURE — 99999 PR PBB SHADOW E&M-EST. PATIENT-LVL V: CPT | Mod: PBBFAC,,, | Performed by: INTERNAL MEDICINE

## 2018-11-14 PROCEDURE — 3080F DIAST BP >= 90 MM HG: CPT | Mod: CPTII,S$GLB,, | Performed by: INTERNAL MEDICINE

## 2018-11-14 PROCEDURE — 1101F PT FALLS ASSESS-DOCD LE1/YR: CPT | Mod: CPTII,S$GLB,, | Performed by: INTERNAL MEDICINE

## 2018-11-14 PROCEDURE — 3074F SYST BP LT 130 MM HG: CPT | Mod: CPTII,S$GLB,, | Performed by: INTERNAL MEDICINE

## 2018-11-14 PROCEDURE — 36415 COLL VENOUS BLD VENIPUNCTURE: CPT

## 2018-11-14 PROCEDURE — 99214 OFFICE O/P EST MOD 30 MIN: CPT | Mod: S$GLB,,, | Performed by: INTERNAL MEDICINE

## 2018-11-14 NOTE — PROGRESS NOTES
Subjective:       Patient ID: Valery Huggins is a 77 y.o. female.    Chief Complaint: RCC    HPI     77 year old female to clinic for 3 month follow-up and to review restaging scans. Patient feels well, no new issues.    Today, she denies any mouth sores, nausea, vomiting, diarrhea, constipation, abdominal pain, weight loss or loss of appetite, chest pain, shortness of breath, leg swelling, pain, headache, dizziness, or mood changes.    Her ECOG PS is 1 and she is accompanied by her .    Oncologic History (From Chart):  77 year old female, referred by Dr. Azar Donnelly, to clinic today for evaluation and management of recently diagnosed Stage III renal cell carcinoma. Patient underwent robotic left nephrectomy on 11/16/17, with clear margins.  Here to discuss adjuvant therapy.       FINAL PATHOLOGIC DIAGNOSIS  KIDNEY (LEFT RADICAL NEPHRECTOMY): CLEAR CELL (RENAL CELL) CARCINOMA (4.5 CM) EXTENDING  INTO THE RENAL SINUS; SECTIONS OF RENAL PELVIS, URETER, RENAL VESSELS, AND RESECTION  MARGINS FREE OF MALIGNANCY.  SURGICAL PATHOLOGY CANCER CASE SUMMARY  KIDNEY NEPHRECTOMY  Specimen: kidney, ureter, adipose tissue  Laterality: left  Procedure: radical nephrectomy  Tumor site: superior pole  Tumor size: 4.5 cm  Tumor focality: single focus  Histologic type: clear cell carcinoma  Sarcomatoid features: not present  Rhabdoid features: not present  Histologic grade: intermediate grade, G2  Tumor necrosis: not present  Anatomic extent of tumor: extends to renal sinus  Margins: uninvolved  Lymph-vascular invasion: not present  Lymph nodes: 0  Stage: pT3a NX  Pathologic findings in nonneoplastic kidney: chronic interstitial nephritis    Review of Systems   Constitutional: Negative for appetite change and unexpected weight change.   Eyes: Negative for visual disturbance.   Respiratory: Negative for cough and shortness of breath.    Cardiovascular: Negative for chest pain.   Gastrointestinal: Negative for abdominal pain and  "diarrhea.   Genitourinary: Negative for frequency.   Musculoskeletal: Negative for back pain.   Skin: Negative for rash.   Neurological: Negative for headaches.   Hematological: Negative for adenopathy.   Psychiatric/Behavioral: The patient is not nervous/anxious.        Allergies:  Review of patient's allergies indicates:  No Known Allergies    Medications:  Current Outpatient Medications   Medication Sig Dispense Refill    amLODIPine (NORVASC) 5 MG tablet Take 1 tablet (5 mg total) by mouth once daily. 90 tablet 0    ascorbic acid (VITAMIN C) 100 MG tablet Take 100 mg by mouth once daily.      aspirin (ECOTRIN) 81 MG EC tablet Take 81 mg by mouth once daily.      atenolol (TENORMIN) 25 MG tablet Take 1 tablet (25 mg total) by mouth once daily. 90 tablet 0    b complex vitamins capsule Take 1 capsule by mouth once daily.      BD ULTRA-FINE CONNIE PEN NEEDLES 32 gauge x 5/32" Ndle Use EVERY DAY  3    calcitRIOL (ROCALTROL) 0.5 MCG Cap Take 1 capsule (0.5 mcg total) by mouth once daily. 90 capsule 0    cholecalciferol, vitamin D3, (VITAMIN D3) 4,000 unit Cap Take 1 capsule by mouth once daily.      docusate sodium (COLACE) 100 MG capsule Take 100 mg by mouth 2 (two) times daily.      escitalopram oxalate (LEXAPRO) 20 MG tablet TAKE ONE TABLET BY MOUTH DAILY 90 tablet 0    FREESTYLE LITE METER kit   0    FREESTYLE LITE STRIPS Strp   3    levothyroxine (SYNTHROID) 112 MCG tablet Take 1 tablet (112 mcg total) by mouth before breakfast. 90 tablet 0    magnesium oxide (MAG-OX) 400 mg tablet TAKE ONE TABLET BY MOUTH TWICE DAILY 180 tablet 0    niacin 500 MG CpSR Take 500 mg by mouth every evening.       pantoprazole (PROTONIX) 40 MG tablet Take 1 tablet (40 mg total) by mouth once daily. 90 tablet 0    TOUJEO SOLOSTAR U-300 INSULIN 300 unit/mL (1.5 mL) InPn pen Inject 30 Units into the skin once daily. 3 mL 3    VICTOZA 3-XAVIER 0.6 mg/0.1 mL (18 mg/3 mL) PnIj Inject 1.8 mg into the skin once daily. 3 mL 3    " meloxicam (MOBIC) 15 MG tablet Take 1 tablet (15 mg total) by mouth daily as needed for Pain. 30 tablet 2     No current facility-administered medications for this visit.        PMH:  Past Medical History:   Diagnosis Date    Anemia     Cancer     thyroid    Cancer     Kidney    Depression     Diabetes mellitus     Diabetes mellitus type II     Encounter for blood transfusion     Gallstones     Hypertension     Kidney stone     MVP (mitral valve prolapse)     PONV (postoperative nausea and vomiting)     Thyroid disease        PSH:  Past Surgical History:   Procedure Laterality Date    APPENDECTOMY      [id number]-TS N/A 11/2/2015    Performed [facility]rain [facility] at [facility] CATH LAB    cataracts      both eyes    CHOLECYSTECTOMY-LAPAROSCOPIC N/A 1/22/2018    [facility] OR    COLONOSCOPY N/A 5/2/2013    Performed by Rowdy Butler MD at [facility] ENDO    CYSTOSCOPY      CYSTOSCOPY W/ URETERAL STENT PLACEMENT      CYSTOSCOPY WITH STENT PLACEMENT Right 3/7/2018    Performed by Sp Mercado [facility] at [facility] OR    CYSTOSCOPY WITH STENT PLACEMENT Left 12/11/2016    Performed by Lexa Yang MD at [facility] OR    CYSTOSCOPY WITH STENT PLACEMENT Right 7/6/2015    Performed by Roxy Rodríguez MD at [facility] OR    CYSTOSCOPY WITH STENT REMOVAL Left 1/16/2017    Performed by R[facility]uez [facility] at [facility] OR    CYSTOSCOPY/ RETROGRADE PYELOGRAM/ STENT PLACEMENT/STENT EXCHANGE (ADD ON) Right 2/16/2018    Performed by XAVIER Page [facility] at [facility] OR    ECTOPIC PREGNANCY SURGERY      EXTRACTION-STONE-URETEROSCOPY N/A 3/7/2018    Performed by Sp [facility]ado [facility] at [facility] OR    EXTRACTION-STONE-URETEROSCOPY Left 1/16/2017    Performed by Roxy [facility]uez [facility] at [facility] OR    EXTRACTION-STONE-URETEROSCOPY Right 7/6/2015    Performed by R[facility]uez [facility] at [facility] OR    EYE SURGERY      phoebe cataract    HYSTERECTOMY      LITHOTRIPSY-EXTRACORPOREAL SHOCK WAVE Left 12/5/2016     [facility] at [facility] OR    NASAL SEPTUM SURGERY      NEPHRECTOMY Left     PYELOGRAM-RETROGRADE Left 1/16/2017    [facility] at [facility] OR    PYELOGRAM-RETROGRADE Bilateral 7/6/2015    Per[facility] at [facility] OR    THYROIDECTOMY      two times    TONSILLECTOMY      URETEROSCOPY Right 3/7/2018    [facility] OR     ROBOTIC ASSISTED LAPAROSCOPIC NEPHRECTOMY Left 11/16/2017    Performed by [facility]ly [facility] at [facility] OR       FamHx:  Family History   Problem Relation Age of Onset    Hypertension Father     Kidney disease Father     Mental illness Mother     Cancer Brother        SocHx:  Social History     Socioeconomic History    Marital status:      Spouse name: Not on file    Number of children: Not on file    Years of education: Not on file    Highest education level: Not on file   Social Needs    Financial resource strain: Not on file    Food insecurity - worry: Not on file    Food insecurity - inability: Not on file    Transportation needs - medical: Not on file    Transportation needs - non-medical: Not on file   Occupational History    Not on file   Tobacco Use    Smoking status: Never Smoker    Smokeless tobacco: Never Used   Substance and Sexual Activity    Alcohol use: No    Drug use: No    Sexual activity: Yes     Partners: Male   Other Topics Concern    Not on file   Social History Narrative    Not on file       Objective:      Physical Exam   Constitutional: She is oriented to person, place, and time. She appears well-developed and well-nourished. No distress.   HENT:   Head: Normocephalic and atraumatic.   Eyes: EOM are normal. Pupils are equal, round, and reactive to light.   Musculoskeletal: Normal range of motion.   Neurological: She is alert and oriented to person, place, and time.   Skin: No erythema. No pallor.   Psychiatric: She has a normal mood and affect. Her behavior is  normal. Judgment and thought content normal.       LABS:  WBC   Date Value Ref Range Status   11/14/2018 7.58 3.90 - 12.70 K/uL Final     Hemoglobin   Date Value Ref Range Status   11/14/2018 12.0 12.0 - 16.0 g/dL Final     Hematocrit   Date Value Ref Range Status   11/14/2018 36.1 (L) 37.0 - 48.5 % Final     Platelets   Date Value Ref Range Status   11/14/2018 261 150 - 350 K/uL Final       Chemistry        Component Value Date/Time     11/14/2018 0937    K 4.8 11/14/2018 0937    CL 99 11/14/2018 0937    CO2 29 11/14/2018 0937    BUN 37 (H) 11/14/2018 0937    CREATININE 2.1 (H) 11/14/2018 0937     (H) 11/14/2018 0937        Component Value Date/Time    CALCIUM 8.6 (L) 11/14/2018 0937    ALKPHOS 70 11/14/2018 0937    AST 16 11/14/2018 0937    ALT 14 11/14/2018 0937    BILITOT 0.6 11/14/2018 0937    ESTGFRAFRICA 26.0 (A) 11/14/2018 0937    EGFRNONAA 22.5 (A) 11/14/2018 0937          Assessment:       1. Renal cell carcinoma of left kidney          Plan:       1. Stage III RCC:     77 year old female, referred by Dr. Azar Donnelly, to clinic today for evaluation and management of recently diagnosed clear cell renal cell carcinoma. Patient underwent robotic left nephrectomy on 11/16/17.    Discussed rationale and risks/benefits of adjuvant therapy in RCC.  She does not qualify for our adjuvant trial, but Sutent has recently been approved for this indication.  Patient initially tried Sutent but was unable to tolerate Sutent.     Restaging scans show SALMA.     Following with nephrology for CKD.     RTC 4 months with labs (CBC,CMP), CT CAP and to see Dr. Epstein.    Patient was also seen and examined by Dr. Epstein. Patient is in agreement with the proposed treatment plan. All questions were answered to the patient's satisfaction. Pt knows to call clinic if anything is needed before the next clinic visit.     I, Dr. Stanislav Epstein, personally spent more than 25 mins during this encounter, greater than 50% was  spent in direct counseling and/or coordination of care.     Stanislav Epstein M.D., M.S., F.A.C.P.  Hematology/Oncology Attending  [facility]

## 2018-12-29 ENCOUNTER — PATIENT MESSAGE (OUTPATIENT)
Dept: HEMATOLOGY/ONCOLOGY | Facility: CLINIC | Age: 78
End: 2018-12-29

## 2018-12-29 ENCOUNTER — PATIENT MESSAGE (OUTPATIENT)
Dept: UROLOGY | Facility: CLINIC | Age: 78
End: 2018-12-29

## 2018-12-30 ENCOUNTER — PATIENT MESSAGE (OUTPATIENT)
Dept: UROLOGY | Facility: CLINIC | Age: 78
End: 2018-12-30

## 2018-12-30 ENCOUNTER — PATIENT MESSAGE (OUTPATIENT)
Dept: HEMATOLOGY/ONCOLOGY | Facility: CLINIC | Age: 78
End: 2018-12-30

## 2019-01-03 ENCOUNTER — PATIENT MESSAGE (OUTPATIENT)
Dept: UROLOGY | Facility: CLINIC | Age: 79
End: 2019-01-03

## 2019-01-23 DIAGNOSIS — C64.2 RENAL CELL CARCINOMA OF LEFT KIDNEY: Primary | ICD-10-CM

## 2019-01-28 ENCOUNTER — OFFICE VISIT (OUTPATIENT)
Dept: UROLOGY | Facility: CLINIC | Age: 79
End: 2019-01-28
Attending: UROLOGY
Payer: COMMERCIAL

## 2019-01-28 VITALS
SYSTOLIC BLOOD PRESSURE: 143 MMHG | HEART RATE: 79 BPM | DIASTOLIC BLOOD PRESSURE: 73 MMHG | HEIGHT: 62 IN | WEIGHT: 158 LBS | BODY MASS INDEX: 29.08 KG/M2

## 2019-01-28 DIAGNOSIS — R35.0 URINARY FREQUENCY: Primary | ICD-10-CM

## 2019-01-28 LAB
BILIRUB SERPL-MCNC: ABNORMAL MG/DL
BLOOD URINE, POC: ABNORMAL
COLOR, POC UA: YELLOW
GLUCOSE UR QL STRIP: ABNORMAL
KETONES UR QL STRIP: ABNORMAL
LEUKOCYTE ESTERASE URINE, POC: ABNORMAL
NITRITE, POC UA: ABNORMAL
PH, POC UA: 5
POC RESIDUAL URINE VOLUME: 15 ML (ref 0–100)
PROTEIN, POC: ABNORMAL
SPECIFIC GRAVITY, POC UA: 1.01
UROBILINOGEN, POC UA: ABNORMAL

## 2019-01-28 PROCEDURE — 51798 POCT BLADDER SCAN: ICD-10-PCS | Mod: S$GLB,,, | Performed by: UROLOGY

## 2019-01-28 PROCEDURE — 99214 OFFICE O/P EST MOD 30 MIN: CPT | Mod: 25,S$GLB,, | Performed by: UROLOGY

## 2019-01-28 PROCEDURE — 3077F SYST BP >= 140 MM HG: CPT | Mod: CPTII,S$GLB,, | Performed by: UROLOGY

## 2019-01-28 PROCEDURE — 3288F FALL RISK ASSESSMENT DOCD: CPT | Mod: CPTII,S$GLB,, | Performed by: UROLOGY

## 2019-01-28 PROCEDURE — 3078F DIAST BP <80 MM HG: CPT | Mod: CPTII,S$GLB,, | Performed by: UROLOGY

## 2019-01-28 PROCEDURE — 99214 PR OFFICE/OUTPT VISIT, EST, LEVL IV, 30-39 MIN: ICD-10-PCS | Mod: 25,S$GLB,, | Performed by: UROLOGY

## 2019-01-28 PROCEDURE — 81002 URINALYSIS NONAUTO W/O SCOPE: CPT | Mod: S$GLB,,, | Performed by: UROLOGY

## 2019-01-28 PROCEDURE — 1100F PR PT FALLS ASSESS DOC 2+ FALLS/FALL W/INJURY/YR: ICD-10-PCS | Mod: CPTII,S$GLB,, | Performed by: UROLOGY

## 2019-01-28 PROCEDURE — 3078F PR MOST RECENT DIASTOLIC BLOOD PRESSURE < 80 MM HG: ICD-10-PCS | Mod: CPTII,S$GLB,, | Performed by: UROLOGY

## 2019-01-28 PROCEDURE — 81002 POCT URINE DIPSTICK WITHOUT MICROSCOPE: ICD-10-PCS | Mod: S$GLB,,, | Performed by: UROLOGY

## 2019-01-28 PROCEDURE — 51798 US URINE CAPACITY MEASURE: CPT | Mod: S$GLB,,, | Performed by: UROLOGY

## 2019-01-28 PROCEDURE — 3077F PR MOST RECENT SYSTOLIC BLOOD PRESSURE >= 140 MM HG: ICD-10-PCS | Mod: CPTII,S$GLB,, | Performed by: UROLOGY

## 2019-01-28 PROCEDURE — 1100F PTFALLS ASSESS-DOCD GE2>/YR: CPT | Mod: CPTII,S$GLB,, | Performed by: UROLOGY

## 2019-01-28 PROCEDURE — 3288F PR FALLS RISK ASSESSMENT DOCUMENTED: ICD-10-PCS | Mod: CPTII,S$GLB,, | Performed by: UROLOGY

## 2019-01-28 PROCEDURE — 87086 URINE CULTURE/COLONY COUNT: CPT

## 2019-01-28 RX ORDER — OXYBUTYNIN CHLORIDE 10 MG/1
10 TABLET, EXTENDED RELEASE ORAL DAILY
Qty: 30 TABLET | Refills: 11 | Status: SHIPPED | OUTPATIENT
Start: 2019-01-28 | End: 2019-02-07

## 2019-01-28 NOTE — PROGRESS NOTES
"Subjective:      Valery Huggins is a 78 y.o. female who returns today regarding her nephrolithiasis and new OAB symptoms.    She is here today primarily regarding voiding symptoms.    She was treated 1 month ago for UTI (culture proven). Since then she has had increased frequency and urgency. She has had UUI previously but that is much worse in the past month as well.     She also has h/o stones s/p right URS (solitary kidney) in 2018.     The following portions of the patient's history were reviewed and updated as appropriate: allergies, current medications, past family history, past medical history, past social history, past surgical history and problem list.    Review of Systems  A comprehensive multipoint review of systems was negative except as otherwise stated in the HPI.     Objective:   Vitals: BP (!) 143/73 (BP Location: Left arm, Patient Position: Sitting, BP Method: Large (Automatic))   Pulse 79   Ht 5' 1.5" (1.562 m)   Wt 71.7 kg (158 lb)   LMP 06/01/2012   BMI 29.37 kg/m²     Physical Exam   General: alert and oriented, no acute distress  Respiratory: Symmetric expansion, non-labored breathing  Neuro: no gross deficits  Psych: normal judgment and insight, normal mood/affect and non-anxious    Lab Review   Urinalysis demonstrates negative for all components  Lab Results   Component Value Date    WBC 12.90 (H) 12/29/2018    HGB 12.7 12/29/2018    HCT 37.5 12/29/2018    MCV 87 12/29/2018     12/29/2018     Lab Results   Component Value Date    CREATININE 1.7 (H) 12/29/2018    BUN 37 (H) 12/29/2018     24 Hour Urine  Date: 4/30/2018 -- Vol 1.72 L, Ca 222 mg/day, Ox 25 mg/day, Cit 284 mg/day, pH 6.391, Na 176, K 48, Mg 172, Cr 910   Date: 9/29/2015 -- Vol 2.17 L, Ca 348 mg/day, Ox 31 mg/day, Cit 702 mg/day, pH 5.692, Na 170, K 68, Mg 34, Cr 1310       Assessment and Plan:   Urgency, Frequency, UUI  -- Trial ditropan    Nephrolithiasis  -- per prior visit    FU 6 weeks  "

## 2019-01-29 LAB
BACTERIA UR CULT: NORMAL
BACTERIA UR CULT: NORMAL

## 2019-02-05 ENCOUNTER — PATIENT MESSAGE (OUTPATIENT)
Dept: UROLOGY | Facility: CLINIC | Age: 79
End: 2019-02-05

## 2019-02-06 ENCOUNTER — PATIENT MESSAGE (OUTPATIENT)
Dept: UROLOGY | Facility: CLINIC | Age: 79
End: 2019-02-06

## 2019-02-07 RX ORDER — SOLIFENACIN SUCCINATE 10 MG/1
10 TABLET, FILM COATED ORAL DAILY
Qty: 30 TABLET | Refills: 11 | Status: SHIPPED | OUTPATIENT
Start: 2019-02-07 | End: 2019-04-22

## 2019-02-07 NOTE — PROGRESS NOTES
Please let pt know I sent rx for Vesicare to her pharmacy to replace the Myrbetriq that was denied by [employer]. It may still need PA. She failed ditropan due to side effects.

## 2019-03-11 ENCOUNTER — OFFICE VISIT (OUTPATIENT)
Dept: UROLOGY | Facility: CLINIC | Age: 79
End: 2019-03-11
Attending: UROLOGY
Payer: COMMERCIAL

## 2019-03-11 VITALS
HEART RATE: 86 BPM | WEIGHT: 158.06 LBS | BODY MASS INDEX: 29.08 KG/M2 | SYSTOLIC BLOOD PRESSURE: 127 MMHG | HEIGHT: 62 IN | DIASTOLIC BLOOD PRESSURE: 87 MMHG

## 2019-03-11 DIAGNOSIS — R35.0 URINARY FREQUENCY: Primary | ICD-10-CM

## 2019-03-11 DIAGNOSIS — N32.81 OAB (OVERACTIVE BLADDER): ICD-10-CM

## 2019-03-11 PROCEDURE — 99213 PR OFFICE/OUTPT VISIT, EST, LEVL III, 20-29 MIN: ICD-10-PCS | Mod: S$GLB,,, | Performed by: UROLOGY

## 2019-03-11 PROCEDURE — 99213 OFFICE O/P EST LOW 20 MIN: CPT | Mod: S$GLB,,, | Performed by: UROLOGY

## 2019-03-11 RX ORDER — ATORVASTATIN CALCIUM 10 MG/1
10 TABLET, FILM COATED ORAL NIGHTLY
Refills: 2 | COMMUNITY
Start: 2019-01-29 | End: 2020-08-06 | Stop reason: SDUPTHER

## 2019-03-11 NOTE — PROGRESS NOTES
"Subjective:      Valery Huggins is a 78 y.o. female who returns today regarding her nephrolithiasis and new OAB symptoms.    She is here today primarily regarding voiding symptoms.    She reports significant improvement with the addition of VESIcare.  It does result in some dry mouth and dry throat which is bothersome.  She denies any persistent urinary incontinence and has only very mild urgency.  She previously tried Ditropan was unable to tolerate due to very bothersome side effects.    She also has h/o stones s/p right URS (solitary kidney) in 2018.     The following portions of the patient's history were reviewed and updated as appropriate: allergies, current medications, past family history, past medical history, past social history, past surgical history and problem list.    Review of Systems  A comprehensive multipoint review of systems was negative except as otherwise stated in the HPI.     Objective:   Vitals: /87 (BP Location: Left arm, Patient Position: Sitting, BP Method: Large (Automatic))   Pulse 86   Ht 5' 1.5" (1.562 m)   Wt 71.7 kg (158 lb 1.1 oz)   LMP 06/01/2012   BMI 29.38 kg/m²     Physical Exam   General: alert and oriented, no acute distress  Respiratory: Symmetric expansion, non-labored breathing  Neuro: no gross deficits  Psych: normal judgment and insight, normal mood/affect and non-anxious    Bladder Scan PVR: 149cc      Lab Review   Urinalysis demonstrates negative for all components  Lab Results   Component Value Date    WBC 12.90 (H) 12/29/2018    HGB 12.7 12/29/2018    HCT 37.5 12/29/2018    MCV 87 12/29/2018     12/29/2018     Lab Results   Component Value Date    CREATININE 1.7 (H) 12/29/2018    BUN 37 (H) 12/29/2018     24 Hour Urine  Date: 4/30/2018 -- Vol 1.72 L, Ca 222 mg/day, Ox 25 mg/day, Cit 284 mg/day, pH 6.391, Na 176, K 48, Mg 172, Cr 910   Date: 9/29/2015 -- Vol 2.17 L, Ca 348 mg/day, Ox 31 mg/day, Cit 702 mg/day, pH 5.692, Na 170, K 68, Mg 34, Cr 1310 "       Assessment and Plan:   Urgency, Frequency, UUI  -- Will change to Myrbetriq to reduce side effects    Nephrolithiasis  -- per prior visit  -- obtain KUB next visit    FU 6 months

## 2019-03-15 ENCOUNTER — LAB VISIT (OUTPATIENT)
Dept: LAB | Facility: HOSPITAL | Age: 79
End: 2019-03-15
Payer: COMMERCIAL

## 2019-03-15 ENCOUNTER — OFFICE VISIT (OUTPATIENT)
Dept: HEMATOLOGY/ONCOLOGY | Facility: CLINIC | Age: 79
End: 2019-03-15
Payer: COMMERCIAL

## 2019-03-15 VITALS
WEIGHT: 160.69 LBS | BODY MASS INDEX: 30.34 KG/M2 | TEMPERATURE: 98 F | SYSTOLIC BLOOD PRESSURE: 136 MMHG | RESPIRATION RATE: 18 BRPM | HEIGHT: 61 IN | DIASTOLIC BLOOD PRESSURE: 65 MMHG | HEART RATE: 82 BPM

## 2019-03-15 DIAGNOSIS — C64.2 RENAL CELL CARCINOMA OF LEFT KIDNEY: ICD-10-CM

## 2019-03-15 DIAGNOSIS — D63.0 ANEMIA IN NEOPLASTIC DISEASE: ICD-10-CM

## 2019-03-15 DIAGNOSIS — C64.2 RENAL CELL CARCINOMA OF LEFT KIDNEY: Primary | ICD-10-CM

## 2019-03-15 DIAGNOSIS — N18.4 STAGE 4 CHRONIC KIDNEY DISEASE: ICD-10-CM

## 2019-03-15 DIAGNOSIS — Z09 CHEMOTHERAPY FOLLOW-UP EXAMINATION: ICD-10-CM

## 2019-03-15 DIAGNOSIS — Z90.5 STATUS POST NEPHRECTOMY: ICD-10-CM

## 2019-03-15 LAB
ALBUMIN SERPL BCP-MCNC: 3.4 G/DL
ALP SERPL-CCNC: 91 U/L
ALT SERPL W/O P-5'-P-CCNC: 14 U/L
ANION GAP SERPL CALC-SCNC: 9 MMOL/L
AST SERPL-CCNC: 15 U/L
BILIRUB SERPL-MCNC: 0.6 MG/DL
BUN SERPL-MCNC: 33 MG/DL
CALCIUM SERPL-MCNC: 8.9 MG/DL
CHLORIDE SERPL-SCNC: 102 MMOL/L
CO2 SERPL-SCNC: 27 MMOL/L
CREAT SERPL-MCNC: 2.2 MG/DL
ERYTHROCYTE [DISTWIDTH] IN BLOOD BY AUTOMATED COUNT: 12.9 %
EST. GFR  (AFRICAN AMERICAN): 24.4 ML/MIN/1.73 M^2
EST. GFR  (NON AFRICAN AMERICAN): 21.1 ML/MIN/1.73 M^2
GLUCOSE SERPL-MCNC: 180 MG/DL
HCT VFR BLD AUTO: 38.2 %
HGB BLD-MCNC: 12.5 G/DL
IMM GRANULOCYTES # BLD AUTO: 0.04 K/UL
MCH RBC QN AUTO: 29.6 PG
MCHC RBC AUTO-ENTMCNC: 32.7 G/DL
MCV RBC AUTO: 91 FL
NEUTROPHILS # BLD AUTO: 5.3 K/UL
PLATELET # BLD AUTO: 258 K/UL
PMV BLD AUTO: 10.1 FL
POTASSIUM SERPL-SCNC: 4.6 MMOL/L
PROT SERPL-MCNC: 6.7 G/DL
RBC # BLD AUTO: 4.22 M/UL
SODIUM SERPL-SCNC: 138 MMOL/L
WBC # BLD AUTO: 7.71 K/UL

## 2019-03-15 PROCEDURE — 3078F DIAST BP <80 MM HG: CPT | Mod: CPTII,S$GLB,, | Performed by: INTERNAL MEDICINE

## 2019-03-15 PROCEDURE — 3075F SYST BP GE 130 - 139MM HG: CPT | Mod: CPTII,S$GLB,, | Performed by: INTERNAL MEDICINE

## 2019-03-15 PROCEDURE — 3075F PR MOST RECENT SYSTOLIC BLOOD PRESS GE 130-139MM HG: ICD-10-PCS | Mod: CPTII,S$GLB,, | Performed by: INTERNAL MEDICINE

## 2019-03-15 PROCEDURE — 36415 COLL VENOUS BLD VENIPUNCTURE: CPT

## 2019-03-15 PROCEDURE — 1101F PR PT FALLS ASSESS DOC 0-1 FALLS W/OUT INJ PAST YR: ICD-10-PCS | Mod: CPTII,S$GLB,, | Performed by: INTERNAL MEDICINE

## 2019-03-15 PROCEDURE — 80053 COMPREHEN METABOLIC PANEL: CPT

## 2019-03-15 PROCEDURE — 99999 PR PBB SHADOW E&M-EST. PATIENT-LVL V: ICD-10-PCS | Mod: PBBFAC,,, | Performed by: INTERNAL MEDICINE

## 2019-03-15 PROCEDURE — 99214 PR OFFICE/OUTPT VISIT, EST, LEVL IV, 30-39 MIN: ICD-10-PCS | Mod: S$GLB,,, | Performed by: INTERNAL MEDICINE

## 2019-03-15 PROCEDURE — 85027 COMPLETE CBC AUTOMATED: CPT

## 2019-03-15 PROCEDURE — 99999 PR PBB SHADOW E&M-EST. PATIENT-LVL V: CPT | Mod: PBBFAC,,, | Performed by: INTERNAL MEDICINE

## 2019-03-15 PROCEDURE — 3078F PR MOST RECENT DIASTOLIC BLOOD PRESSURE < 80 MM HG: ICD-10-PCS | Mod: CPTII,S$GLB,, | Performed by: INTERNAL MEDICINE

## 2019-03-15 PROCEDURE — 99214 OFFICE O/P EST MOD 30 MIN: CPT | Mod: S$GLB,,, | Performed by: INTERNAL MEDICINE

## 2019-03-15 PROCEDURE — 1101F PT FALLS ASSESS-DOCD LE1/YR: CPT | Mod: CPTII,S$GLB,, | Performed by: INTERNAL MEDICINE

## 2019-03-15 NOTE — PROGRESS NOTES
Subjective:       Patient ID: Valery Huggins is a 78 y.o. female.    Chief Complaint: RCC    HPI     78 year old female to clinic for follow-up and to review restaging scans. Patient feels well, no new issues.    Today, she denies any mouth sores, nausea, vomiting, diarrhea, constipation, abdominal pain, weight loss or loss of appetite, chest pain, shortness of breath, leg swelling, pain, headache, dizziness, or mood changes.    Her ECOG PS is 0-1 and she is accompanied by her .    Oncologic History (From Chart):  77 year old female, referred by Dr. Azar Donnelly, to clinic today for evaluation and management of recently diagnosed Stage III renal cell carcinoma. Patient underwent robotic left nephrectomy on 11/16/17, with clear margins.  Here to discuss adjuvant therapy.       FINAL PATHOLOGIC DIAGNOSIS  KIDNEY (LEFT RADICAL NEPHRECTOMY): CLEAR CELL (RENAL CELL) CARCINOMA (4.5 CM) EXTENDING  INTO THE RENAL SINUS; SECTIONS OF RENAL PELVIS, URETER, RENAL VESSELS, AND RESECTION  MARGINS FREE OF MALIGNANCY.  SURGICAL PATHOLOGY CANCER CASE SUMMARY  KIDNEY NEPHRECTOMY  Specimen: kidney, ureter, adipose tissue  Laterality: left  Procedure: radical nephrectomy  Tumor site: superior pole  Tumor size: 4.5 cm  Tumor focality: single focus  Histologic type: clear cell carcinoma  Sarcomatoid features: not present  Rhabdoid features: not present  Histologic grade: intermediate grade, G2  Tumor necrosis: not present  Anatomic extent of tumor: extends to renal sinus  Margins: uninvolved  Lymph-vascular invasion: not present  Lymph nodes: 0  Stage: pT3a NX  Pathologic findings in nonneoplastic kidney: chronic interstitial nephritis    Review of Systems   Constitutional: Negative for appetite change and unexpected weight change.   Eyes: Negative for visual disturbance.   Respiratory: Negative for cough and shortness of breath.    Cardiovascular: Negative for chest pain.   Gastrointestinal: Negative for abdominal pain and  "diarrhea.   Genitourinary: Negative for frequency.   Musculoskeletal: Negative for back pain.   Skin: Negative for rash.   Neurological: Negative for headaches.   Hematological: Negative for adenopathy.   Psychiatric/Behavioral: The patient is not nervous/anxious.        Allergies:  Review of patient's allergies indicates:  No Known Allergies    Medications:  Current Outpatient Medications   Medication Sig Dispense Refill    amLODIPine (NORVASC) 5 MG tablet TAKE ONE TABLET BY MOUTH DAILY 90 tablet 0    ascorbic acid (VITAMIN C) 100 MG tablet Take 100 mg by mouth once daily.      aspirin (ECOTRIN) 81 MG EC tablet Take 81 mg by mouth once daily.      atenolol (TENORMIN) 25 MG tablet Take 1 tablet (25 mg total) by mouth once daily. 90 tablet 0    atorvastatin (LIPITOR) 10 MG tablet   2    b complex vitamins capsule Take 1 capsule by mouth once daily.      BD ULTRA-FINE CONNIE PEN NEEDLES 32 gauge x 5/32" Ndle Use EVERY DAY  3    calcitRIOL (ROCALTROL) 0.5 MCG Cap Take 1 capsule (0.5 mcg total) by mouth once daily. 90 capsule 0    cholecalciferol, vitamin D3, (VITAMIN D3) 4,000 unit Cap Take 1 capsule by mouth once daily.      docusate sodium (COLACE) 100 MG capsule Take 100 mg by mouth 2 (two) times daily.      escitalopram oxalate (LEXAPRO) 20 MG tablet TAKE ONE TABLET BY MOUTH DAILY 90 tablet 0    FREESTYLE LITE METER kit   0    FREESTYLE LITE STRIPS Strp   3    levothyroxine (SYNTHROID) 112 MCG tablet Take 1 tablet (112 mcg total) by mouth before breakfast. 90 tablet 0    magnesium oxide (MAG-OX) 400 mg tablet TAKE ONE TABLET BY MOUTH TWICE DAILY 180 tablet 0    mirabegron (MYRBETRIQ) 50 mg Tb24 Take 1 tablet (50 mg total) by mouth once daily. 30 tablet 11    pantoprazole (PROTONIX) 40 MG tablet TAKE ONE TABLET BY MOUTH DAILY 90 tablet 0    solifenacin (VESICARE) 10 MG tablet Take 1 tablet (10 mg total) by mouth once daily. 30 tablet 11    TOJOSE COREYAR U-300 INSULIN 300 unit/mL (1.5 mL) InPn pen " INJECT 30 UNITS INTO THE SKIN ONCE DAILY. 4.5 mL 3    VICTOZA 3-XAVIER 0.6 mg/0.1 mL (18 mg/3 mL) PnIj Inject 1.8 mg into the skin once daily. 3 mL 3     No current facility-administered medications for this visit.        PMH:  Past Medical History:   Diagnosis Date    Anemia     Cancer     thyroid    Cancer     Kidney    Depression     Diabetes mellitus     Diabetes mellitus type II     Encounter for blood transfusion     Gallstones     Hypertension     Kidney stone     MVP (mitral valve prolapse)     PONV (postoperative nausea and vomiting)     Thyroid disease        PSH:  Past Surgical History:   Procedure Laterality Date    APPENDECTOMY      [id number] N/A 11/2/2015    Performed by [facility]rain [facility] at [facility] CATH LAB    cataracts      both eyes    CHOLECYSTECTOMY      CHOLECYSTECTOMY-LAPAROSCOPIC N/A 1/22/2018    Performed by [facility]eta [facility] at [facility] OR    COLONOSCOPY N/A 5/2/2013    Performed by Rowdy Butler MD at [facility] ENDO    CYSTOSCOPY      CYSTOSCOPY W/ URETERAL STENT PLACEMENT      CYSTOSCOPY WITH STENT PLACEMENT Right 3/7/2018    Performed by Sp Mercado [facility] at [facility] OR    CYSTOSCOPY WITH STENT PLACEMENT Left 12/11/2016    Performed by Lexa Yang [facility] at [facility] OR    CYSTOSCOPY WITH STENT PLACEMENT Right 7/6/2015    Performed by Roxy Rod[facility]guez MD at [facility] OR    CYSTOSCOPY WITH STENT REMOVAL Left 1/16/2017    Performed by oRxy Rodríguez [facility] at [facility] OR    CYSTOSCOPY/ RETROGRADE PYELOGRAM/ STENT PLACEMENT/STENT EXCHANGE (ADD ON) Right 2/16/2018    Performed by XAVIER Page [facility] at [facility] OR    ECTOPIC PREGNANCY SURGERY      EXTRACTION-STONE-URETEROSCOPY N/A 3/7/2018    Performed by Sp [facility]ado [facility] at [facility] OR    EXTRACTION-STONE-URETEROSCOPY Left 1/16/2017    Performed by Roxy [facility]guez [facility] at [facility] OR    EXTRACTION-STONE-URETEROSCOPY Right 7/6/2015    Performed by [facility] at [facility] OR    EYE SURGERY      phoebe cataract     HYSTERECTOMY      LITHOTRIPSY-EXTRACORPOREAL SHOCK WAVE Left 12/5/2016    [facility] at [facility] OR    NASAL SEPTUM SURGERY      NEPHRECTOMY Left     PYELOGRAM-RETROGRADE Left 1/16/2017    [facility] at [facility] OR    PYELOGRAM-RETROGRADE Bilateral 7/6/2015    Per[facility] at [facility] OR    THYROIDECTOMY      two times    TONSILLECTOMY      URETEROSCOPY Right 3/7/2018    [facility]     ROBOTIC ASSISTED LAPAROSCOPIC NEPHRECTOMY Left 11/16/2017    Performed by [facility]ly [facility] at [facility] OR       FamHx:  Family History   Problem Relation Age of Onset    Hypertension Father     Kidney disease Father     Mental illness Mother     Cancer Brother        SocHx:  Social History     Socioeconomic History    Marital status:      Spouse name: Not on file    Number of children: Not on file    Years of education: Not on file    Highest education level: Not on file   Social Needs    Financial resource strain: Not on file    Food insecurity - worry: Not on file    Food insecurity - inability: Not on file    Transportation needs - medical: Not on file    Transportation needs - non-medical: Not on file   Occupational History    Not on file   Tobacco Use    Smoking status: Never Smoker    Smokeless tobacco: Never Used   Substance and Sexual Activity    Alcohol use: No    Drug use: No    Sexual activity: Yes     Partners: Male   Other Topics Concern    Not on file   Social History Narrative    Not on file       Objective:      Physical Exam   Constitutional: She is oriented to person, place, and time. She appears well-developed and well-nourished. No distress.   HENT:   Head: Normocephalic and atraumatic.   Eyes: EOM are normal. Pupils are equal, round, and reactive to light.   Musculoskeletal: Normal range of motion.   Neurological: She is alert and oriented to person, place, and time.   Skin: No erythema. No  pallor.   Psychiatric: She has a normal mood and affect. Her behavior is normal. Judgment and thought content normal.       LABS:  WBC   Date Value Ref Range Status   12/29/2018 12.90 (H) 3.90 - 12.70 K/uL Final     Hemoglobin   Date Value Ref Range Status   12/29/2018 12.7 12.0 - 16.0 g/dL Final     Hematocrit   Date Value Ref Range Status   12/29/2018 37.5 37.0 - 48.5 % Final     Platelets   Date Value Ref Range Status   12/29/2018 256 150 - 350 K/uL Final       Chemistry        Component Value Date/Time     (L) 12/29/2018 1345    K 4.2 12/29/2018 1345    CL 99 (L) 12/29/2018 1345    CO2 28 12/29/2018 1345    BUN 37 (H) 12/29/2018 1345    CREATININE 1.7 (H) 12/29/2018 1345     (H) 12/29/2018 1345        Component Value Date/Time    CALCIUM 8.6 12/29/2018 1345    ALKPHOS 66 12/29/2018 1345    AST 22 12/29/2018 1345    ALT 14 12/29/2018 1345    BILITOT 0.8 12/29/2018 1345    ESTGFRAFRICA 33.3 (A) 12/29/2018 1345    EGFRNONAA 28.9 (A) 12/29/2018 1345          Assessment:       1. Renal cell carcinoma of left kidney    2. Status post Left nephrectomy    3. Chemotherapy follow-up examination    4. Stage 4 chronic kidney disease    5. Anemia in neoplastic disease          Plan:       1,2,3- Stage III RCC:     77 year old female, referred by Dr. Azar Donnelly, to clinic today for evaluation and management of recently diagnosed clear cell renal cell carcinoma. Patient underwent robotic left nephrectomy on 11/16/17.    Discussed rationale and risks/benefits of adjuvant therapy in RCC.  She does not qualify for our adjuvant trial, but Sutent has recently been approved for this indication.  Patient initially tried Sutent but was unable to tolerate Sutent.     Restaging scans show SALMA. RTC 4 months with labs (CBC,CMP), CT CAP and to see Dr. Epstein.    4- Following with nephrology for CKD.     5- Resolved.     The patient agrees with the plan, and all questions have been answered to their satisfaction.      More  than 25 mins were spent during this encounter, greater than 50% was spent in direct counseling and/or coordination of care.     Stanislav Epstein M.D., M.S., F.A.C.P.  Hematology and Oncology Attending  [facility]

## 2019-04-22 PROBLEM — R07.9 CHEST PAIN: Status: RESOLVED | Noted: 2018-01-22 | Resolved: 2019-04-22

## 2019-05-09 DIAGNOSIS — C64.2 RENAL CELL CARCINOMA OF LEFT KIDNEY: Primary | ICD-10-CM

## 2019-05-17 ENCOUNTER — PATIENT MESSAGE (OUTPATIENT)
Dept: HEMATOLOGY/ONCOLOGY | Facility: CLINIC | Age: 79
End: 2019-05-17

## 2019-05-17 ENCOUNTER — PATIENT MESSAGE (OUTPATIENT)
Dept: SURGERY | Facility: CLINIC | Age: 79
End: 2019-05-17

## 2019-05-17 DIAGNOSIS — K62.5 RECTAL BLEEDING: Primary | ICD-10-CM

## 2019-06-18 PROBLEM — E83.42 HYPOMAGNESEMIA: Status: ACTIVE | Noted: 2019-06-18

## 2019-06-18 PROBLEM — N18.30 CKD (CHRONIC KIDNEY DISEASE), STAGE III: Status: ACTIVE | Noted: 2019-06-18

## 2019-06-18 PROBLEM — E83.51 HYPOCALCEMIA: Status: ACTIVE | Noted: 2019-06-18

## 2019-07-10 ENCOUNTER — PATIENT MESSAGE (OUTPATIENT)
Dept: HEMATOLOGY/ONCOLOGY | Facility: CLINIC | Age: 79
End: 2019-07-10

## 2019-07-12 NOTE — PROGRESS NOTES
Subjective:       Patient ID: Valery Huggins is a 78 y.o. female.    Chief Complaint: RCC    HPI     78 y.o.female to clinic for 4 month follow-up and to review labs/restaging scans. Patient feels well, recent hospitalization for hypocalcemia. Now corrected and back on oral calcium.    Today, she denies any mouth sores, nausea, vomiting, diarrhea, constipation, abdominal pain, weight loss or loss of appetite, chest pain, shortness of breath, leg swelling, pain, headache, dizziness, or mood changes.    Her ECOG PS is 0-1 and she is accompanied alone.    Oncologic History (From Chart):  78 y.o.female, referred by Dr. Azar Donnelly, to clinic today for evaluation and management of recently diagnosed Stage III renal cell carcinoma. Patient underwent robotic left nephrectomy on 11/16/17, with clear margins.  Here to discuss adjuvant therapy.       FINAL PATHOLOGIC DIAGNOSIS  KIDNEY (LEFT RADICAL NEPHRECTOMY): CLEAR CELL (RENAL CELL) CARCINOMA (4.5 CM) EXTENDING  INTO THE RENAL SINUS; SECTIONS OF RENAL PELVIS, URETER, RENAL VESSELS, AND RESECTION  MARGINS FREE OF MALIGNANCY.  SURGICAL PATHOLOGY CANCER CASE SUMMARY  KIDNEY NEPHRECTOMY  Specimen: kidney, ureter, adipose tissue  Laterality: left  Procedure: radical nephrectomy  Tumor site: superior pole  Tumor size: 4.5 cm  Tumor focality: single focus  Histologic type: clear cell carcinoma  Sarcomatoid features: not present  Rhabdoid features: not present  Histologic grade: intermediate grade, G2  Tumor necrosis: not present  Anatomic extent of tumor: extends to renal sinus  Margins: uninvolved  Lymph-vascular invasion: not present  Lymph nodes: 0  Stage: pT3a NX  Pathologic findings in nonneoplastic kidney: chronic interstitial nephritis    Review of Systems   Constitutional: Negative for appetite change and unexpected weight change.   Eyes: Negative for visual disturbance.   Respiratory: Negative for cough and shortness of breath.    Cardiovascular: Negative for chest  "pain.   Gastrointestinal: Negative for abdominal pain and diarrhea.   Genitourinary: Negative for frequency.   Musculoskeletal: Negative for back pain.   Skin: Negative for rash.   Neurological: Negative for headaches.   Hematological: Negative for adenopathy.   Psychiatric/Behavioral: The patient is not nervous/anxious.        Allergies:  Review of patient's allergies indicates:  No Known Allergies    Medications:  Current Outpatient Medications   Medication Sig Dispense Refill    amLODIPine (NORVASC) 5 MG tablet TAKE ONE TABLET BY MOUTH DAILY 90 tablet 0    ascorbic acid (VITAMIN C) 100 MG tablet Take 100 mg by mouth once daily.      aspirin (ECOTRIN) 81 MG EC tablet Take 81 mg by mouth once daily.      atorvastatin (LIPITOR) 10 MG tablet   2    b complex vitamins capsule Take 1 capsule by mouth once daily.      BD ULTRA-FINE CONNIE PEN NEEDLES 32 gauge x 5/32" Ndle Use EVERY DAY  3    calcitRIOL (ROCALTROL) 0.5 MCG Cap Take 1 capsule (0.5 mcg total) by mouth once daily. 90 capsule 0    cholecalciferol, vitamin D3, (VITAMIN D3) 4,000 unit Cap Take 1 capsule by mouth once daily.      docusate sodium (COLACE) 100 MG capsule Take 100 mg by mouth 2 (two) times daily.      escitalopram oxalate (LEXAPRO) 20 MG tablet TAKE ONE TABLET BY MOUTH DAILY 90 tablet 0    FREESTYLE LITE METER kit   0    FREESTYLE LITE STRIPS Strp   3    levothyroxine (SYNTHROID) 88 MCG tablet Take 1 tablet (88 mcg total) by mouth before breakfast. 30 tablet 11    lisinopril (PRINIVIL,ZESTRIL) 2.5 MG tablet Take 1 tablet (2.5 mg total) by mouth once daily. 90 tablet 3    magnesium oxide (MAG-OX) 400 mg tablet TAKE ONE TABLET BY MOUTH TWICE DAILY 180 tablet 0    mirabegron (MYRBETRIQ) 50 mg Tb24 Take 1 tablet (50 mg total) by mouth once daily. 30 tablet 11    pantoprazole (PROTONIX) 40 MG tablet TAKE ONE TABLET BY MOUTH DAILY 90 tablet 0    TOUJEO SOLOSTAR U-300 INSULIN 300 unit/mL (1.5 mL) InPn pen INJECT 30 UNITS INTO THE SKIN " ONCE DAILY. 4.5 mL 3    VICTOZA 3-XAVIER 0.6 mg/0.1 mL (18 mg/3 mL) PnIj Inject 1.8 mg into the skin once daily. 3 mL 3    hydrALAZINE (APRESOLINE) 25 MG tablet Take 1 tablet (25 mg total) by mouth every 12 (twelve) hours. 60 tablet 11     No current facility-administered medications for this visit.        PMH:  Past Medical History:   Diagnosis Date    Anemia     Cancer     thyroid    Cancer     Kidney    Depression     Diabetes mellitus     Diabetes mellitus type II     Encounter for blood transfusion     Gallstones     Hypertension     Kidney stone     MVP (mitral valve prolapse)     PONV (postoperative nausea and vomiting)     Thyroid disease        PSH:  Past Surgical History:   Procedure Laterality Date    APPENDECTOMY      VEGCWH-IADEHD-FH N/A 11/2/2015    [facility] CATH LAB    cataracts      both eyes    CHOLECYSTECTOMY      CHOLECYSTECTOMY-LAPAROSCOPIC N/A 1/22/2018    [facility] OR    COLONOSCOPY N/A 5/2/2013    [facility]formed [facility] Rowdy Butler [facility] at [facility] ENDO    CYSTOSCOPY      CYSTOSCOPY W/ URETERAL STENT PLACEMENT      CYSTOSCOPY WITH STENT PLACEMENT Right 3/7/2018    [facility] Sp [facility] OR    CYSTOSCOPY WITH STENT PLACEMENT Left 12/11/2016    [facility] Lexa Yang [facility] at [facility] OR    CYSTOSCOPY WITH STENT PLACEMENT Right 7/6/2015    [facility]uez [facility] at [facility] OR    CYSTOSCOPY WITH STENT REMOVAL Left 1/16/2017    [facility] OR    CYSTOSCOPY/ RETROGRADE PYELOGRAM/ STENT PLACEMENT/STENT EXCHANGE (ADD ON) Right 2/16/2018    [facility] XAVIER Page [facility] at [facility] OR    ECTOPIC PREGNANCY SURGERY      EXTRACTION-STONE-URETEROSCOPY N/A 3/7/2018    [facility] OR    EXTRACTION-STONE-URETEROSCOPY Left 1/16/2017    [facility] OR    EXTRACTION-STONE-URETEROSCOPY Right 7/6/2015     [facility] OR    EYE SURGERY      phoebe cataract    HYSTERECTOMY      LITHOTRIPSY-EXTRACORPOREAL SHOCK WAVE Left 12/5/2016    [facility] OR    NASAL SEPTUM SURGERY      NEPHRECTOMY Left     OOPHORECTOMY      PYELOGRAM-RETROGRADE Left 1/16/2017    [facility] OR    PYELOGRAM-RETROGRADE Bilateral 7/6/2015    [facility] at [facility] OR    THYROIDECTOMY      two times    TONSILLECTOMY      URETEROSCOPY Right 3/7/2018    [facility]    XI ROBOTIC ASSISTED LAPAROSCOPIC NEPHRECTOMY Left 11/16/2017    [facility]       [facility]  Family History   Problem Relation Age of Onset    Hypertension Father     Kidney disease Father     Mental illness Mother     Cancer Brother        SocHx:  Social History     Socioeconomic History    Marital status:      Spouse name: Not on file    Number of children: Not on file    Years of education: Not on file    Highest education level: Not on file   Occupational History    Not on file   Social Needs    Financial resource strain: Not on file    Food insecurity:     Worry: Not on file     Inability: Not on file    Transportation needs:     Medical: Not on file     Non-medical: Not on file   Tobacco Use    Smoking status: Never Smoker    Smokeless tobacco: Never Used   Substance and Sexual Activity    Alcohol use: No    Drug use: No    Sexual activity: Yes     Partners: Male   Lifestyle    Physical activity:     Days per week: Not on file     Minutes per session: Not on file    Stress: Not on file   Relationships    Social connections:     Talks on phone: Not on file     Gets together: Not on file     Attends Sikhism service: Not on file     Active member of club or organization: Not on file     Attends meetings of clubs or organizations: Not on file     Relationship status: Not on file    Other Topics Concern    Not on file   Social History Narrative    Not on file       Objective:       Vitals:    07/15/19 1421   BP: (!) 149/74   Pulse: 89   Resp: 16   Temp: 98.9 °F (37.2 °C)       Physical Exam   Constitutional: She is oriented to person, place, and time. She appears well-developed and well-nourished. No distress.   HENT:   Head: Normocephalic and atraumatic.   Eyes: Pupils are equal, round, and reactive to light. EOM are normal.   Musculoskeletal: Normal range of motion.   Neurological: She is alert and oriented to person, place, and time.   Skin: No erythema. No pallor.   Psychiatric: She has a normal mood and affect. Her behavior is normal. Judgment and thought content normal.       LABS:  WBC   Date Value Ref Range Status   07/15/2019 8.11 3.90 - 12.70 K/uL Final     Hemoglobin   Date Value Ref Range Status   07/15/2019 11.9 (L) 12.0 - 16.0 g/dL Final     Hematocrit   Date Value Ref Range Status   07/15/2019 36.5 (L) 37.0 - 48.5 % Final     Platelets   Date Value Ref Range Status   07/15/2019 258 150 - 350 K/uL Final       Chemistry        Component Value Date/Time     07/15/2019 1330    K 4.3 07/15/2019 1330    CL 96 07/15/2019 1330    CO2 31 (H) 07/15/2019 1330    BUN 41 (H) 07/15/2019 1330    CREATININE 2.1 (H) 07/15/2019 1330     (H) 07/15/2019 1330        Component Value Date/Time    CALCIUM 9.7 07/15/2019 1330    ALKPHOS 75 07/15/2019 1330    AST 14 07/15/2019 1330    ALT 12 07/15/2019 1330    BILITOT 0.5 07/15/2019 1330    ESTGFRAFRICA 25.8 (A) 07/15/2019 1330    EGFRNONAA 22.4 (A) 07/15/2019 1330          Assessment:       1. Renal cell carcinoma of left kidney    2. Status post Left nephrectomy    3. Stage 4 chronic kidney disease    4. Anemia associated with stage 4 chronic renal failure          Plan:       1,2- Stage III RCC:     78 y.o. female, referred by Dr. Azar Donnelly, to clinic today for evaluation and management of recently diagnosed clear cell renal cell  carcinoma. Patient underwent robotic left nephrectomy on 11/16/17.    Discussed rationale and risks/benefits of adjuvant therapy in RCC.  She does not qualify for our adjuvant trial, but Sutent has recently been approved for this indication.  Patient initially tried Sutent but was unable to tolerate Sutent.     Restaging scans show SALMA. RTC 4 months with labs (CBC,CMP), CT CAP and to see Dr. Epstein. After that, will go to every 6 month visits.    3,4- Following with nephrology for CKD.     Patient was also seen and examined by Dr. Epstein. Patient is in agreement with the proposed treatment plan. All questions were answered to the patient's satisfaction. Pt knows to call clinic if anything is needed before the next clinic visit.    CRISTI Del Rosario-ANA MARIA  Hematology and Medical Oncology      I have reviewed the notes, assessments, and/or procedures performed by the nurse practitioner, as above.  I have personally interviewed the patient at the beside, and rounded with the nurse practitioner. I formulated the plan of care.  I concur with her documentation of Valery Huggins.  I, Dr. Stanislav Epstein, personally spent more than 25 mins during this encounter, greater than 50% was spent in direct counseling and/or coordination of care.     Stanislav Epstein M.D., M.S., F.A.C.P.  Hematology/Oncology Attending  [facility]

## 2019-07-15 ENCOUNTER — LAB VISIT (OUTPATIENT)
Dept: LAB | Facility: HOSPITAL | Age: 79
End: 2019-07-15
Attending: INTERNAL MEDICINE
Payer: COMMERCIAL

## 2019-07-15 ENCOUNTER — OFFICE VISIT (OUTPATIENT)
Dept: HEMATOLOGY/ONCOLOGY | Facility: CLINIC | Age: 79
End: 2019-07-15
Payer: COMMERCIAL

## 2019-07-15 VITALS
RESPIRATION RATE: 16 BRPM | BODY MASS INDEX: 29.68 KG/M2 | TEMPERATURE: 99 F | HEART RATE: 89 BPM | OXYGEN SATURATION: 98 % | WEIGHT: 157.19 LBS | DIASTOLIC BLOOD PRESSURE: 74 MMHG | SYSTOLIC BLOOD PRESSURE: 149 MMHG | HEIGHT: 61 IN

## 2019-07-15 DIAGNOSIS — C64.2 RENAL CELL CARCINOMA OF LEFT KIDNEY: ICD-10-CM

## 2019-07-15 DIAGNOSIS — N18.4 STAGE 4 CHRONIC KIDNEY DISEASE: ICD-10-CM

## 2019-07-15 DIAGNOSIS — Z90.5 STATUS POST NEPHRECTOMY: ICD-10-CM

## 2019-07-15 DIAGNOSIS — N18.4 ANEMIA ASSOCIATED WITH STAGE 4 CHRONIC RENAL FAILURE: ICD-10-CM

## 2019-07-15 DIAGNOSIS — D63.1 ANEMIA ASSOCIATED WITH STAGE 4 CHRONIC RENAL FAILURE: ICD-10-CM

## 2019-07-15 DIAGNOSIS — C64.2 RENAL CELL CARCINOMA OF LEFT KIDNEY: Primary | ICD-10-CM

## 2019-07-15 LAB
ALBUMIN SERPL BCP-MCNC: 3.5 G/DL (ref 3.5–5.2)
ALP SERPL-CCNC: 75 U/L (ref 55–135)
ALT SERPL W/O P-5'-P-CCNC: 12 U/L (ref 10–44)
ANION GAP SERPL CALC-SCNC: 9 MMOL/L (ref 8–16)
AST SERPL-CCNC: 14 U/L (ref 10–40)
BILIRUB SERPL-MCNC: 0.5 MG/DL (ref 0.1–1)
BUN SERPL-MCNC: 41 MG/DL (ref 8–23)
CALCIUM SERPL-MCNC: 9.7 MG/DL (ref 8.7–10.5)
CHLORIDE SERPL-SCNC: 96 MMOL/L (ref 95–110)
CO2 SERPL-SCNC: 31 MMOL/L (ref 23–29)
CREAT SERPL-MCNC: 2.1 MG/DL (ref 0.5–1.4)
ERYTHROCYTE [DISTWIDTH] IN BLOOD BY AUTOMATED COUNT: 12.6 % (ref 11.5–14.5)
EST. GFR  (AFRICAN AMERICAN): 25.8 ML/MIN/1.73 M^2
EST. GFR  (NON AFRICAN AMERICAN): 22.4 ML/MIN/1.73 M^2
GLUCOSE SERPL-MCNC: 117 MG/DL (ref 70–110)
HCT VFR BLD AUTO: 36.5 % (ref 37–48.5)
HGB BLD-MCNC: 11.9 G/DL (ref 12–16)
IMM GRANULOCYTES # BLD AUTO: 0.05 K/UL (ref 0–0.04)
MCH RBC QN AUTO: 29.1 PG (ref 27–31)
MCHC RBC AUTO-ENTMCNC: 32.6 G/DL (ref 32–36)
MCV RBC AUTO: 89 FL (ref 82–98)
NEUTROPHILS # BLD AUTO: 5.2 K/UL (ref 1.8–7.7)
PLATELET # BLD AUTO: 258 K/UL (ref 150–350)
PMV BLD AUTO: 10 FL (ref 9.2–12.9)
POTASSIUM SERPL-SCNC: 4.3 MMOL/L (ref 3.5–5.1)
PROT SERPL-MCNC: 6.7 G/DL (ref 6–8.4)
RBC # BLD AUTO: 4.09 M/UL (ref 4–5.4)
SODIUM SERPL-SCNC: 136 MMOL/L (ref 136–145)
WBC # BLD AUTO: 8.11 K/UL (ref 3.9–12.7)

## 2019-07-15 PROCEDURE — 99999 PR PBB SHADOW E&M-EST. PATIENT-LVL V: CPT | Mod: PBBFAC,,, | Performed by: INTERNAL MEDICINE

## 2019-07-15 PROCEDURE — 3288F FALL RISK ASSESSMENT DOCD: CPT | Mod: CPTII,S$GLB,, | Performed by: INTERNAL MEDICINE

## 2019-07-15 PROCEDURE — 1100F PTFALLS ASSESS-DOCD GE2>/YR: CPT | Mod: CPTII,S$GLB,, | Performed by: INTERNAL MEDICINE

## 2019-07-15 PROCEDURE — 36415 COLL VENOUS BLD VENIPUNCTURE: CPT

## 2019-07-15 PROCEDURE — 1100F PR PT FALLS ASSESS DOC 2+ FALLS/FALL W/INJURY/YR: ICD-10-PCS | Mod: CPTII,S$GLB,, | Performed by: INTERNAL MEDICINE

## 2019-07-15 PROCEDURE — 80053 COMPREHEN METABOLIC PANEL: CPT

## 2019-07-15 PROCEDURE — 99214 PR OFFICE/OUTPT VISIT, EST, LEVL IV, 30-39 MIN: ICD-10-PCS | Mod: S$GLB,,, | Performed by: INTERNAL MEDICINE

## 2019-07-15 PROCEDURE — 3078F PR MOST RECENT DIASTOLIC BLOOD PRESSURE < 80 MM HG: ICD-10-PCS | Mod: CPTII,S$GLB,, | Performed by: INTERNAL MEDICINE

## 2019-07-15 PROCEDURE — 99999 PR PBB SHADOW E&M-EST. PATIENT-LVL V: ICD-10-PCS | Mod: PBBFAC,,, | Performed by: INTERNAL MEDICINE

## 2019-07-15 PROCEDURE — 99214 OFFICE O/P EST MOD 30 MIN: CPT | Mod: S$GLB,,, | Performed by: INTERNAL MEDICINE

## 2019-07-15 PROCEDURE — 3078F DIAST BP <80 MM HG: CPT | Mod: CPTII,S$GLB,, | Performed by: INTERNAL MEDICINE

## 2019-07-15 PROCEDURE — 85027 COMPLETE CBC AUTOMATED: CPT

## 2019-07-15 PROCEDURE — 3077F PR MOST RECENT SYSTOLIC BLOOD PRESSURE >= 140 MM HG: ICD-10-PCS | Mod: CPTII,S$GLB,, | Performed by: INTERNAL MEDICINE

## 2019-07-15 PROCEDURE — 3288F PR FALLS RISK ASSESSMENT DOCUMENTED: ICD-10-PCS | Mod: CPTII,S$GLB,, | Performed by: INTERNAL MEDICINE

## 2019-07-15 PROCEDURE — 3077F SYST BP >= 140 MM HG: CPT | Mod: CPTII,S$GLB,, | Performed by: INTERNAL MEDICINE

## 2019-09-09 ENCOUNTER — OFFICE VISIT (OUTPATIENT)
Dept: UROLOGY | Facility: CLINIC | Age: 79
End: 2019-09-09
Attending: UROLOGY
Payer: COMMERCIAL

## 2019-09-09 ENCOUNTER — HOSPITAL ENCOUNTER (OUTPATIENT)
Dept: RADIOLOGY | Facility: OTHER | Age: 79
Discharge: HOME | End: 2019-09-09
Attending: UROLOGY
Payer: COMMERCIAL

## 2019-09-09 VITALS
SYSTOLIC BLOOD PRESSURE: 156 MMHG | HEART RATE: 78 BPM | HEIGHT: 61 IN | DIASTOLIC BLOOD PRESSURE: 72 MMHG | BODY MASS INDEX: 29.63 KG/M2 | WEIGHT: 156.94 LBS

## 2019-09-09 DIAGNOSIS — N20.0 NEPHROLITHIASIS: ICD-10-CM

## 2019-09-09 DIAGNOSIS — N32.81 OAB (OVERACTIVE BLADDER): Primary | ICD-10-CM

## 2019-09-09 LAB
BILIRUB SERPL-MCNC: NORMAL MG/DL
BLOOD URINE, POC: NORMAL
COLOR, POC UA: YELLOW
GLUCOSE UR QL STRIP: NORMAL
KETONES UR QL STRIP: NORMAL
LEUKOCYTE ESTERASE URINE, POC: NORMAL
NITRITE, POC UA: NORMAL
PH, POC UA: 5
PROTEIN, POC: 30
SPECIFIC GRAVITY, POC UA: 1.01
UROBILINOGEN, POC UA: NORMAL

## 2019-09-09 PROCEDURE — 1101F PR PT FALLS ASSESS DOC 0-1 FALLS W/OUT INJ PAST YR: ICD-10-PCS | Mod: CPTII,S$GLB,, | Performed by: UROLOGY

## 2019-09-09 PROCEDURE — 3077F SYST BP >= 140 MM HG: CPT | Mod: CPTII,S$GLB,, | Performed by: UROLOGY

## 2019-09-09 PROCEDURE — 3078F PR MOST RECENT DIASTOLIC BLOOD PRESSURE < 80 MM HG: ICD-10-PCS | Mod: CPTII,S$GLB,, | Performed by: UROLOGY

## 2019-09-09 PROCEDURE — 99213 OFFICE O/P EST LOW 20 MIN: CPT | Mod: 25,S$GLB,, | Performed by: UROLOGY

## 2019-09-09 PROCEDURE — 74018 RADEX ABDOMEN 1 VIEW: CPT | Mod: 26,,, | Performed by: RADIOLOGY

## 2019-09-09 PROCEDURE — 81002 URINALYSIS NONAUTO W/O SCOPE: CPT | Mod: S$GLB,,, | Performed by: UROLOGY

## 2019-09-09 PROCEDURE — 3078F DIAST BP <80 MM HG: CPT | Mod: CPTII,S$GLB,, | Performed by: UROLOGY

## 2019-09-09 PROCEDURE — 74018 XR ABDOMEN AP 1 VIEW: ICD-10-PCS | Mod: 26,,, | Performed by: RADIOLOGY

## 2019-09-09 PROCEDURE — 1101F PT FALLS ASSESS-DOCD LE1/YR: CPT | Mod: CPTII,S$GLB,, | Performed by: UROLOGY

## 2019-09-09 PROCEDURE — 81002 POCT URINE DIPSTICK WITHOUT MICROSCOPE: ICD-10-PCS | Mod: S$GLB,,, | Performed by: UROLOGY

## 2019-09-09 PROCEDURE — 74018 RADEX ABDOMEN 1 VIEW: CPT | Mod: TC,FY

## 2019-09-09 PROCEDURE — 3077F PR MOST RECENT SYSTOLIC BLOOD PRESSURE >= 140 MM HG: ICD-10-PCS | Mod: CPTII,S$GLB,, | Performed by: UROLOGY

## 2019-09-09 PROCEDURE — 99213 PR OFFICE/OUTPT VISIT, EST, LEVL III, 20-29 MIN: ICD-10-PCS | Mod: 25,S$GLB,, | Performed by: UROLOGY

## 2019-09-09 RX ORDER — SOLIFENACIN SUCCINATE 5 MG/1
5 TABLET, FILM COATED ORAL DAILY
Qty: 30 TABLET | Refills: 11 | Status: SHIPPED | OUTPATIENT
Start: 2019-09-09 | End: 2020-05-06

## 2019-09-09 NOTE — PROGRESS NOTES
"Subjective:      Valery Huggins is a 78 y.o. female who returns today regarding her nephrolithiasis and new OAB symptoms.    She is here today primarily regarding voiding symptoms.    She reports significant improvement with the addition of VESIcare.  It does result in some dry mouth and dry throat which is bothersome.  She denies any persistent urinary incontinence and has only very mild urgency.  She previously tried Ditropan was unable to tolerate due to very bothersome side effects.    She also has h/o stones s/p right URS (solitary kidney) in 2018.     The following portions of the patient's history were reviewed and updated as appropriate: allergies, current medications, past family history, past medical history, past social history, past surgical history and problem list.    Review of Systems  A comprehensive multipoint review of systems was negative except as otherwise stated in the HPI.     Objective:   Vitals: BP (!) 156/72 (BP Location: Left arm, Patient Position: Sitting, BP Method: Large (Automatic))   Pulse 78   Ht 5' 1" (1.549 m)   Wt 71.2 kg (156 lb 15.5 oz)   LMP 06/01/2012   BMI 29.66 kg/m²     Physical Exam   General: alert and oriented, no acute distress  Respiratory: Symmetric expansion, non-labored breathing  Neuro: no gross deficits  Psych: normal judgment and insight, normal mood/affect and non-anxious    Bladder Scan PVR: 149cc      Lab Review   Urinalysis demonstrates negative for all components  Lab Results   Component Value Date    WBC 8.11 07/15/2019    HGB 11.9 (L) 07/15/2019    HCT 36.5 (L) 07/15/2019    MCV 89 07/15/2019     07/15/2019     Lab Results   Component Value Date    CREATININE 2.1 (H) 07/15/2019    BUN 41 (H) 07/15/2019     24 Hour Urine  Date: 4/30/2018 -- Vol 1.72 L, Ca 222 mg/day, Ox 25 mg/day, Cit 284 mg/day, pH 6.391, Na 176, K 48, Mg 172, Cr 910   Date: 9/29/2015 -- Vol 2.17 L, Ca 348 mg/day, Ox 31 mg/day, Cit 702 mg/day, pH 5.692, Na 170, K 68, Mg 34, Cr " 1310       Assessment and Plan:   Urgency, Frequency, UUI  -- Continue Myrbetriq     Nephrolithiasis  -- Recent CT reviewed - no stones  -- Will defer additional imaging for now (has CT scheduled for other reasons)    FU 6 months

## 2019-09-17 ENCOUNTER — OFFICE VISIT (OUTPATIENT)
Dept: SURGERY | Facility: CLINIC | Age: 79
End: 2019-09-17
Payer: COMMERCIAL

## 2019-09-17 VITALS
DIASTOLIC BLOOD PRESSURE: 69 MMHG | TEMPERATURE: 99 F | BODY MASS INDEX: 29.55 KG/M2 | WEIGHT: 156.5 LBS | HEIGHT: 61 IN | HEART RATE: 89 BPM | SYSTOLIC BLOOD PRESSURE: 154 MMHG

## 2019-09-17 DIAGNOSIS — K62.89 RECTAL MASS: Primary | ICD-10-CM

## 2019-09-17 DIAGNOSIS — C20 RECTAL CANCER: ICD-10-CM

## 2019-09-17 PROCEDURE — 46600 DIAGNOSTIC ANOSCOPY SPX: CPT | Mod: S$GLB,,, | Performed by: SURGERY

## 2019-09-17 PROCEDURE — 99999 PR PBB SHADOW E&M-EST. PATIENT-LVL III: ICD-10-PCS | Mod: PBBFAC,,, | Performed by: SURGERY

## 2019-09-17 PROCEDURE — 99244 OFF/OP CNSLTJ NEW/EST MOD 40: CPT | Mod: 25,S$GLB,, | Performed by: SURGERY

## 2019-09-17 PROCEDURE — 99999 PR PBB SHADOW E&M-EST. PATIENT-LVL III: CPT | Mod: PBBFAC,,, | Performed by: SURGERY

## 2019-09-17 PROCEDURE — 99244 PR OFFICE CONSULTATION,LEVEL IV: ICD-10-PCS | Mod: 25,S$GLB,, | Performed by: SURGERY

## 2019-09-17 PROCEDURE — 46600 PR DIAG2STIC A2SCOPY: ICD-10-PCS | Mod: S$GLB,,, | Performed by: SURGERY

## 2019-09-17 NOTE — LETTER
September 17, 2019      Armando Duff MD  [address] [place] [address] Bl  [address]  [place] [address]           [facility] - General Surgery  [address]  [facility] [address]  Phone: [phone]          Patient: Valery Huggins   MR Number: [id number]   YOB: 1940   Date of Visit: 9/17/2019       Dear Dr. Armando Duff:    Thank you for referring Valery Huggins to me for evaluation. Attached you will find relevant portions of my assessment and plan of care.    If you have questions, please do not hesitate to call me. I look forward to following Valery Huggins along with you.    Sincerely,    David Mendieta MD    Enclosure  CC:  No Recipients    If you would like to receive this communication electronically, please contact [email or web address] or [phone] to request more information on [employer] Link access.    For providers and/or their staff who would like to refer a patient to [facility], please contact us through our one-stop-shop provider referral line, [facility] , at [phone].    If you feel you have received this communication in error or would no longer like to receive these types of communications, please e-mail [email or web address]

## 2019-09-17 NOTE — PATIENT INSTRUCTIONS
Surgery is scheduled for 09/23/19 arrival time will be given by the the preop nurse.  The preop nurse will call you from [phone] Nothing to eat or drink after midnight.  Adult to drive you home.      THE PREOP NURSE WILL CALL, SOMETIMES AS LATE AS 4 or 5 PM IN THE AFTERNOON THE DAY BEFORE SURGERY.    Bathe the night before and the morning of your procedure with a Chlorhexidine wash such as Hibiclens, can be purchased at most Pharmacy's no prescription needed.    Special Instruction:  Purchase two Fleet Enema;s at your Pharmacy, use one the evening before the procedure and one the morning of the procedure.

## 2019-09-17 NOTE — H&P (VIEW-ONLY)
Subjective:       Patient ID: Valery Huggins is a 78 y.o. female.    Chief Complaint: Consult (Rectal mass)    HPI  Pleasant 77 yo F referred to me in consultation form Dr Duff for evaluation of rectal lesion.  Pt notes that she had colonoscopy last month in which a mass was found in the distal rectum.  Biopsies were taken confirming adenoma with high grade dysplasis.  She has occasional discomfort but no pain.  NOtes that she has had bleeding from her rectum which she presumed was from her hemorrhoids.  She denies changes inbowel habits. No abd pain or weight changes.  Pt has history of thyroid cancer and also renal cancer. She has had L nephrectomy.  PSHx is also significant for hysterectomy and LC.  PMhx is significant for HTN, DM and hypercholesteremia.   Review of Systems   Constitutional: Negative for activity change, appetite change, fever and unexpected weight change.   HENT: Negative for congestion and facial swelling.    Respiratory: Negative for chest tightness, shortness of breath and wheezing.    Cardiovascular: Negative for chest pain.   Gastrointestinal: Positive for anal bleeding and blood in stool. Negative for abdominal distention, abdominal pain, constipation, diarrhea, nausea, rectal pain and vomiting.   Genitourinary: Negative for difficulty urinating, dysuria and frequency.   Skin: Negative for color change and wound.   Neurological: Negative for dizziness and light-headedness.   Hematological: Negative for adenopathy. Does not bruise/bleed easily.   Psychiatric/Behavioral: Negative for agitation and decreased concentration.       Objective:      Physical Exam   Constitutional: She is oriented to person, place, and time. She appears well-developed and well-nourished.   HENT:   Head: Normocephalic and atraumatic.   Eyes: Pupils are equal, round, and reactive to light.   Neck: Normal range of motion. Neck supple. No tracheal deviation present. No thyromegaly present.   Cardiovascular: Normal  rate, regular rhythm and normal heart sounds.   No murmur heard.  Pulmonary/Chest: Effort normal and breath sounds normal. She exhibits no tenderness.   Abdominal: Soft. Bowel sounds are normal. She exhibits no distension, no abdominal bruit, no pulsatile midline mass and no mass. There is no hepatosplenomegaly. There is no tenderness. There is no rigidity, no rebound, no guarding, no tenderness at McBurney's point and negative Rosenberg's sign. No hernia. Hernia confirmed negative in the ventral area.   Genitourinary: Rectum normal.   Genitourinary Comments: Ext exam demonstrating no masses.  ZE palpable mass on posterior wall of rectum.  Anoscopy demonstrating hemorrhoids   Musculoskeletal: Normal range of motion. She exhibits no edema, tenderness or deformity.   Lymphadenopathy:     She has no cervical adenopathy.   Neurological: She is alert and oriented to person, place, and time.   Skin: Skin is warm. No rash noted. No erythema. No pallor.   Psychiatric: She has a normal mood and affect. Her behavior is normal.   Vitals reviewed.        Lab Results   Component Value Date    WBC 8.11 07/15/2019    HGB 11.9 (L) 07/15/2019    HCT 36.5 (L) 07/15/2019    MCV 89 07/15/2019     07/15/2019     BMP  Lab Results   Component Value Date     07/15/2019    K 4.3 07/15/2019    CL 96 07/15/2019    CO2 31 (H) 07/15/2019    BUN 41 (H) 07/15/2019    CREATININE 2.1 (H) 07/15/2019    CALCIUM 9.7 07/15/2019    ANIONGAP 9 07/15/2019    ESTGFRAFRICA 25.8 (A) 07/15/2019    EGFRNONAA 22.4 (A) 07/15/2019                 Path:    FINAL PATHOLOGIC DIAGNOSIS  1. ASCENDING COLON, POLYPECTOMY:  Multiple fragments of tubulovillous adenoma (s)  2. ASCENDING COLON, POLYPECTOMY:  Multiple fragments of tubulovillous adenoma (s)  3. RECTAL MASS, BIOPSY:  Multiple fragments of tubulovillous adenoma (s) with focal high-grade dysplasia  Negative for invasive carcinoma  (deeper levels have also been evaluated)  Assessment:     Rectal  mass  No diagnosis found.    Plan:       D/wp t.  She has a distal lesion with high grade dysplasia.  Concerned about potential malignancy.  Lesion is distal enough to which I feel that it will be amenable to a transanal approach.  Will proceed with transanal approach to remove/biopsy the lesion.  Further recommendation will be based on the results of the biopsy.  Will also need to evaluate the area with MRI to evaluate depth of invasion and also for nodes

## 2019-09-17 NOTE — Clinical Note
I saw your patient, Valery Huggins in the office.  Attached are my findings and plan.  Thank you for referring her to my office and if you have any questions please do not hesitate to call my cell [phone].Juan Jose Mendieta

## 2019-09-17 NOTE — PROGRESS NOTES
Subjective:       Patient ID: Valery Huggins is a 78 y.o. female.    Chief Complaint: Consult (Rectal mass)    HPI  Pleasant 79 yo F referred to me in consultation form Dr Duff for evaluation of rectal lesion.  Pt notes that she had colonoscopy last month in which a mass was found in the distal rectum.  Biopsies were taken confirming adenoma with high grade dysplasis.  She has occasional discomfort but no pain.  NOtes that she has had bleeding from her rectum which she presumed was from her hemorrhoids.  She denies changes inbowel habits. No abd pain or weight changes.  Pt has history of thyroid cancer and also renal cancer. She has had L nephrectomy.  PSHx is also significant for hysterectomy and LC.  PMhx is significant for HTN, DM and hypercholesteremia.   Review of Systems   Constitutional: Negative for activity change, appetite change, fever and unexpected weight change.   HENT: Negative for congestion and facial swelling.    Respiratory: Negative for chest tightness, shortness of breath and wheezing.    Cardiovascular: Negative for chest pain.   Gastrointestinal: Positive for anal bleeding and blood in stool. Negative for abdominal distention, abdominal pain, constipation, diarrhea, nausea, rectal pain and vomiting.   Genitourinary: Negative for difficulty urinating, dysuria and frequency.   Skin: Negative for color change and wound.   Neurological: Negative for dizziness and light-headedness.   Hematological: Negative for adenopathy. Does not bruise/bleed easily.   Psychiatric/Behavioral: Negative for agitation and decreased concentration.       Objective:      Physical Exam   Constitutional: She is oriented to person, place, and time. She appears well-developed and well-nourished.   HENT:   Head: Normocephalic and atraumatic.   Eyes: Pupils are equal, round, and reactive to light.   Neck: Normal range of motion. Neck supple. No tracheal deviation present. No thyromegaly present.   Cardiovascular: Normal  rate, regular rhythm and normal heart sounds.   No murmur heard.  Pulmonary/Chest: Effort normal and breath sounds normal. She exhibits no tenderness.   Abdominal: Soft. Bowel sounds are normal. She exhibits no distension, no abdominal bruit, no pulsatile midline mass and no mass. There is no hepatosplenomegaly. There is no tenderness. There is no rigidity, no rebound, no guarding, no tenderness at McBurney's point and negative Rosenberg's sign. No hernia. Hernia confirmed negative in the ventral area.   Genitourinary: Rectum normal.   Genitourinary Comments: Ext exam demonstrating no masses.  ZE palpable mass on posterior wall of rectum.  Anoscopy demonstrating hemorrhoids   Musculoskeletal: Normal range of motion. She exhibits no edema, tenderness or deformity.   Lymphadenopathy:     She has no cervical adenopathy.   Neurological: She is alert and oriented to person, place, and time.   Skin: Skin is warm. No rash noted. No erythema. No pallor.   Psychiatric: She has a normal mood and affect. Her behavior is normal.   Vitals reviewed.        Lab Results   Component Value Date    WBC 8.11 07/15/2019    HGB 11.9 (L) 07/15/2019    HCT 36.5 (L) 07/15/2019    MCV 89 07/15/2019     07/15/2019     BMP  Lab Results   Component Value Date     07/15/2019    K 4.3 07/15/2019    CL 96 07/15/2019    CO2 31 (H) 07/15/2019    BUN 41 (H) 07/15/2019    CREATININE 2.1 (H) 07/15/2019    CALCIUM 9.7 07/15/2019    ANIONGAP 9 07/15/2019    ESTGFRAFRICA 25.8 (A) 07/15/2019    EGFRNONAA 22.4 (A) 07/15/2019                 Path:    FINAL PATHOLOGIC DIAGNOSIS  1. ASCENDING COLON, POLYPECTOMY:  Multiple fragments of tubulovillous adenoma (s)  2. ASCENDING COLON, POLYPECTOMY:  Multiple fragments of tubulovillous adenoma (s)  3. RECTAL MASS, BIOPSY:  Multiple fragments of tubulovillous adenoma (s) with focal high-grade dysplasia  Negative for invasive carcinoma  (deeper levels have also been evaluated)  Assessment:     Rectal  mass  No diagnosis found.    Plan:       D/wp t.  She has a distal lesion with high grade dysplasia.  Concerned about potential malignancy.  Lesion is distal enough to which I feel that it will be amenable to a transanal approach.  Will proceed with transanal approach to remove/biopsy the lesion.  Further recommendation will be based on the results of the biopsy.  Will also need to evaluate the area with MRI to evaluate depth of invasion and also for nodes

## 2019-09-18 ENCOUNTER — HOSPITAL ENCOUNTER (OUTPATIENT)
Dept: RADIOLOGY | Facility: HOSPITAL | Age: 79
Discharge: HOME | End: 2019-09-18
Attending: SURGERY
Payer: COMMERCIAL

## 2019-09-18 DIAGNOSIS — C20 RECTAL CANCER: ICD-10-CM

## 2019-09-18 DIAGNOSIS — K62.89 RECTAL MASS: ICD-10-CM

## 2019-09-18 PROCEDURE — 72195 MRI PELVIS W/O DYE: CPT | Mod: 26,,, | Performed by: RADIOLOGY

## 2019-09-18 PROCEDURE — 72195 MRI RECTAL CANCER WITHOUT CONTRAST: ICD-10-PCS | Mod: 26,,, | Performed by: RADIOLOGY

## 2019-09-18 PROCEDURE — 72195 MRI PELVIS W/O DYE: CPT | Mod: TC

## 2019-09-18 RX ORDER — LIDOCAINE HYDROCHLORIDE 10 MG/ML
1 INJECTION, SOLUTION EPIDURAL; INFILTRATION; INTRACAUDAL; PERINEURAL ONCE
Status: CANCELLED | OUTPATIENT
Start: 2019-09-18 | End: 2019-09-18

## 2019-09-18 RX ORDER — METRONIDAZOLE 500 MG/100ML
500 INJECTION, SOLUTION INTRAVENOUS
Status: CANCELLED | OUTPATIENT
Start: 2019-09-18

## 2019-09-18 RX ORDER — SODIUM CHLORIDE 9 MG/ML
INJECTION, SOLUTION INTRAVENOUS CONTINUOUS
Status: CANCELLED | OUTPATIENT
Start: 2019-09-18

## 2019-09-20 ENCOUNTER — ANESTHESIA EVENT (OUTPATIENT)
Dept: SURGERY | Facility: HOSPITAL | Age: 79
End: 2019-09-20
Payer: COMMERCIAL

## 2019-09-23 ENCOUNTER — ANESTHESIA (OUTPATIENT)
Dept: SURGERY | Facility: HOSPITAL | Age: 79
End: 2019-09-23
Payer: COMMERCIAL

## 2019-09-23 ENCOUNTER — HOSPITAL ENCOUNTER (OUTPATIENT)
Facility: HOSPITAL | Age: 79
Discharge: HOME | End: 2019-09-23
Attending: SURGERY | Admitting: SURGERY
Payer: COMMERCIAL

## 2019-09-23 VITALS
RESPIRATION RATE: 16 BRPM | TEMPERATURE: 98 F | HEIGHT: 61 IN | BODY MASS INDEX: 29.45 KG/M2 | SYSTOLIC BLOOD PRESSURE: 165 MMHG | OXYGEN SATURATION: 97 % | HEART RATE: 93 BPM | DIASTOLIC BLOOD PRESSURE: 77 MMHG | WEIGHT: 156 LBS

## 2019-09-23 DIAGNOSIS — C20 RECTAL CANCER: ICD-10-CM

## 2019-09-23 DIAGNOSIS — K62.89 RECTAL MASS: ICD-10-CM

## 2019-09-23 LAB — POCT GLUCOSE: 100 MG/DL (ref 70–110)

## 2019-09-23 PROCEDURE — 37000009 HC ANESTHESIA EA ADD 15 MINS: Performed by: SURGERY

## 2019-09-23 PROCEDURE — 88342 IMHCHEM/IMCYTCHM 1ST ANTB: CPT | Mod: 26,,, | Performed by: PATHOLOGY

## 2019-09-23 PROCEDURE — 63600175 PHARM REV CODE 636 W HCPCS: Performed by: SURGERY

## 2019-09-23 PROCEDURE — 99900104 DSU ONLY-NO CHARGE-EA ADD'L HR (STAT): Performed by: SURGERY

## 2019-09-23 PROCEDURE — D9220A PRA ANESTHESIA: ICD-10-PCS | Mod: CRNA,,, | Performed by: NURSE ANESTHETIST, CERTIFIED REGISTERED

## 2019-09-23 PROCEDURE — 25000003 PHARM REV CODE 250: Performed by: NURSE ANESTHETIST, CERTIFIED REGISTERED

## 2019-09-23 PROCEDURE — S0030 INJECTION, METRONIDAZOLE: HCPCS | Performed by: SURGERY

## 2019-09-23 PROCEDURE — 63600175 PHARM REV CODE 636 W HCPCS: Performed by: ANESTHESIOLOGY

## 2019-09-23 PROCEDURE — 27201423 OPTIME MED/SURG SUP & DEVICES STERILE SUPPLY: Performed by: SURGERY

## 2019-09-23 PROCEDURE — 88341 IMHCHEM/IMCYTCHM EA ADD ANTB: CPT | Mod: 26,,, | Performed by: PATHOLOGY

## 2019-09-23 PROCEDURE — 88309 TISSUE EXAM BY PATHOLOGIST: CPT | Mod: 26,,, | Performed by: PATHOLOGY

## 2019-09-23 PROCEDURE — 37000008 HC ANESTHESIA 1ST 15 MINUTES: Performed by: SURGERY

## 2019-09-23 PROCEDURE — 88341 IMHCHEM/IMCYTCHM EA ADD ANTB: CPT | Mod: 59 | Performed by: PATHOLOGY

## 2019-09-23 PROCEDURE — 36000705 HC OR TIME LEV I EA ADD 15 MIN: Performed by: SURGERY

## 2019-09-23 PROCEDURE — 71000015 HC POSTOP RECOV 1ST HR: Performed by: SURGERY

## 2019-09-23 PROCEDURE — 99900103 DSU ONLY-NO CHARGE-INITIAL HR (STAT): Performed by: SURGERY

## 2019-09-23 PROCEDURE — 25000003 PHARM REV CODE 250: Performed by: SURGERY

## 2019-09-23 PROCEDURE — 71000039 HC RECOVERY, EACH ADD'L HOUR: Performed by: SURGERY

## 2019-09-23 PROCEDURE — 25000003 PHARM REV CODE 250: Performed by: ANESTHESIOLOGY

## 2019-09-23 PROCEDURE — D9220A PRA ANESTHESIA: Mod: ANES,,, | Performed by: ANESTHESIOLOGY

## 2019-09-23 PROCEDURE — 36000704 HC OR TIME LEV I 1ST 15 MIN: Performed by: SURGERY

## 2019-09-23 PROCEDURE — 71000033 HC RECOVERY, INTIAL HOUR: Performed by: SURGERY

## 2019-09-23 PROCEDURE — 45171 EXC RECT TUM TRANSANAL PART: CPT | Mod: ,,, | Performed by: SURGERY

## 2019-09-23 PROCEDURE — 88341 TISSUE SPECIMEN TO PATHOLOGY - SURGERY: ICD-10-PCS | Mod: 26,,, | Performed by: PATHOLOGY

## 2019-09-23 PROCEDURE — 88309 TISSUE SPECIMEN TO PATHOLOGY - SURGERY: ICD-10-PCS | Mod: 26,,, | Performed by: PATHOLOGY

## 2019-09-23 PROCEDURE — D9220A PRA ANESTHESIA: ICD-10-PCS | Mod: ANES,,, | Performed by: ANESTHESIOLOGY

## 2019-09-23 PROCEDURE — 45171 PR EXCIS RECTAL TUMOR, TRANSANAL, PARTIAL THICKNESS: ICD-10-PCS | Mod: ,,, | Performed by: SURGERY

## 2019-09-23 PROCEDURE — 88342 TISSUE SPECIMEN TO PATHOLOGY - SURGERY: ICD-10-PCS | Mod: 26,,, | Performed by: PATHOLOGY

## 2019-09-23 PROCEDURE — D9220A PRA ANESTHESIA: Mod: CRNA,,, | Performed by: NURSE ANESTHETIST, CERTIFIED REGISTERED

## 2019-09-23 PROCEDURE — 63600175 PHARM REV CODE 636 W HCPCS: Performed by: NURSE ANESTHETIST, CERTIFIED REGISTERED

## 2019-09-23 PROCEDURE — 88342 IMHCHEM/IMCYTCHM 1ST ANTB: CPT | Performed by: PATHOLOGY

## 2019-09-23 RX ORDER — ONDANSETRON 2 MG/ML
4 INJECTION INTRAMUSCULAR; INTRAVENOUS DAILY PRN
Status: DISCONTINUED | OUTPATIENT
Start: 2019-09-23 | End: 2019-09-23 | Stop reason: HOSPADM

## 2019-09-23 RX ORDER — SODIUM CHLORIDE 0.9 % (FLUSH) 0.9 %
3 SYRINGE (ML) INJECTION
Status: DISCONTINUED | OUTPATIENT
Start: 2019-09-23 | End: 2019-09-23 | Stop reason: HOSPADM

## 2019-09-23 RX ORDER — SODIUM CHLORIDE, SODIUM LACTATE, POTASSIUM CHLORIDE, CALCIUM CHLORIDE 600; 310; 30; 20 MG/100ML; MG/100ML; MG/100ML; MG/100ML
INJECTION, SOLUTION INTRAVENOUS CONTINUOUS
Status: DISCONTINUED | OUTPATIENT
Start: 2019-09-23 | End: 2019-09-23 | Stop reason: HOSPADM

## 2019-09-23 RX ORDER — HYDROMORPHONE HYDROCHLORIDE 2 MG/ML
1 INJECTION, SOLUTION INTRAMUSCULAR; INTRAVENOUS; SUBCUTANEOUS EVERY 4 HOURS PRN
Status: CANCELLED | OUTPATIENT
Start: 2019-09-23

## 2019-09-23 RX ORDER — HYDROCODONE BITARTRATE AND ACETAMINOPHEN 5; 325 MG/1; MG/1
1 TABLET ORAL EVERY 4 HOURS PRN
Status: CANCELLED | OUTPATIENT
Start: 2019-09-23

## 2019-09-23 RX ORDER — ONDANSETRON 2 MG/ML
INJECTION INTRAMUSCULAR; INTRAVENOUS
Status: DISCONTINUED | OUTPATIENT
Start: 2019-09-23 | End: 2019-09-23

## 2019-09-23 RX ORDER — LIDOCAINE HYDROCHLORIDE 10 MG/ML
0.5 INJECTION, SOLUTION EPIDURAL; INFILTRATION; INTRACAUDAL; PERINEURAL ONCE
Status: COMPLETED | OUTPATIENT
Start: 2019-09-23 | End: 2019-09-23

## 2019-09-23 RX ORDER — ACETAMINOPHEN 10 MG/ML
INJECTION, SOLUTION INTRAVENOUS
Status: DISCONTINUED | OUTPATIENT
Start: 2019-09-23 | End: 2019-09-23

## 2019-09-23 RX ORDER — KETAMINE HYDROCHLORIDE 100 MG/ML
INJECTION, SOLUTION INTRAMUSCULAR; INTRAVENOUS
Status: DISCONTINUED | OUTPATIENT
Start: 2019-09-23 | End: 2019-09-23

## 2019-09-23 RX ORDER — PROPOFOL 10 MG/ML
VIAL (ML) INTRAVENOUS CONTINUOUS PRN
Status: DISCONTINUED | OUTPATIENT
Start: 2019-09-23 | End: 2019-09-23

## 2019-09-23 RX ORDER — METRONIDAZOLE 500 MG/100ML
500 INJECTION, SOLUTION INTRAVENOUS
Status: COMPLETED | OUTPATIENT
Start: 2019-09-23 | End: 2019-09-23

## 2019-09-23 RX ORDER — FENTANYL CITRATE 50 UG/ML
INJECTION, SOLUTION INTRAMUSCULAR; INTRAVENOUS
Status: DISCONTINUED | OUTPATIENT
Start: 2019-09-23 | End: 2019-09-23

## 2019-09-23 RX ORDER — HYDROCODONE BITARTRATE AND ACETAMINOPHEN 5; 325 MG/1; MG/1
1 TABLET ORAL EVERY 6 HOURS PRN
Qty: 20 TABLET | Refills: 0 | Status: SHIPPED | OUTPATIENT
Start: 2019-09-23 | End: 2019-10-14

## 2019-09-23 RX ORDER — LIDOCAINE HCL/PF 100 MG/5ML
SYRINGE (ML) INTRAVENOUS
Status: DISCONTINUED | OUTPATIENT
Start: 2019-09-23 | End: 2019-09-23

## 2019-09-23 RX ORDER — GLYCOPYRROLATE 0.2 MG/ML
INJECTION INTRAMUSCULAR; INTRAVENOUS
Status: DISCONTINUED | OUTPATIENT
Start: 2019-09-23 | End: 2019-09-23

## 2019-09-23 RX ORDER — MIDAZOLAM HYDROCHLORIDE 1 MG/ML
INJECTION, SOLUTION INTRAMUSCULAR; INTRAVENOUS
Status: DISCONTINUED | OUTPATIENT
Start: 2019-09-23 | End: 2019-09-23

## 2019-09-23 RX ORDER — BUPIVACAINE HYDROCHLORIDE AND EPINEPHRINE 2.5; 5 MG/ML; UG/ML
INJECTION, SOLUTION EPIDURAL; INFILTRATION; INTRACAUDAL; PERINEURAL
Status: DISCONTINUED | OUTPATIENT
Start: 2019-09-23 | End: 2019-09-23 | Stop reason: HOSPADM

## 2019-09-23 RX ORDER — LIDOCAINE HYDROCHLORIDE 10 MG/ML
1 INJECTION, SOLUTION EPIDURAL; INFILTRATION; INTRACAUDAL; PERINEURAL ONCE
Status: DISCONTINUED | OUTPATIENT
Start: 2019-09-23 | End: 2019-09-23 | Stop reason: HOSPADM

## 2019-09-23 RX ORDER — OXYCODONE HYDROCHLORIDE 5 MG/1
5 TABLET ORAL
Status: DISCONTINUED | OUTPATIENT
Start: 2019-09-23 | End: 2019-09-23 | Stop reason: HOSPADM

## 2019-09-23 RX ORDER — ONDANSETRON 2 MG/ML
4 INJECTION INTRAMUSCULAR; INTRAVENOUS EVERY 12 HOURS PRN
Status: CANCELLED | OUTPATIENT
Start: 2019-09-23

## 2019-09-23 RX ORDER — PROPOFOL 10 MG/ML
VIAL (ML) INTRAVENOUS
Status: DISCONTINUED | OUTPATIENT
Start: 2019-09-23 | End: 2019-09-23

## 2019-09-23 RX ORDER — FENTANYL CITRATE 50 UG/ML
25 INJECTION, SOLUTION INTRAMUSCULAR; INTRAVENOUS EVERY 5 MIN PRN
Status: DISCONTINUED | OUTPATIENT
Start: 2019-09-23 | End: 2019-09-23 | Stop reason: HOSPADM

## 2019-09-23 RX ORDER — SODIUM CHLORIDE 9 MG/ML
INJECTION, SOLUTION INTRAVENOUS CONTINUOUS
Status: CANCELLED | OUTPATIENT
Start: 2019-09-23

## 2019-09-23 RX ADMIN — ACETAMINOPHEN 1000 MG: 10 INJECTION, SOLUTION INTRAVENOUS at 07:09

## 2019-09-23 RX ADMIN — LIDOCAINE HYDROCHLORIDE: 10 INJECTION, SOLUTION EPIDURAL; INFILTRATION; INTRACAUDAL; PERINEURAL at 06:09

## 2019-09-23 RX ADMIN — PROPOFOL 20 MG: 10 INJECTION, EMULSION INTRAVENOUS at 07:09

## 2019-09-23 RX ADMIN — FENTANYL CITRATE 25 MCG: 50 INJECTION, SOLUTION INTRAMUSCULAR; INTRAVENOUS at 07:09

## 2019-09-23 RX ADMIN — SODIUM CHLORIDE, SODIUM LACTATE, POTASSIUM CHLORIDE, AND CALCIUM CHLORIDE: .6; .31; .03; .02 INJECTION, SOLUTION INTRAVENOUS at 08:09

## 2019-09-23 RX ADMIN — MIDAZOLAM 0.5 MG: 1 INJECTION INTRAMUSCULAR; INTRAVENOUS at 06:09

## 2019-09-23 RX ADMIN — ONDANSETRON 4 MG: 2 INJECTION, SOLUTION INTRAMUSCULAR; INTRAVENOUS at 07:09

## 2019-09-23 RX ADMIN — KETAMINE HYDROCHLORIDE 25 MG: 100 INJECTION, SOLUTION, CONCENTRATE INTRAMUSCULAR; INTRAVENOUS at 07:09

## 2019-09-23 RX ADMIN — GLYCOPYRROLATE 0.2 MG: 0.2 INJECTION, SOLUTION INTRAMUSCULAR; INTRAVENOUS at 07:09

## 2019-09-23 RX ADMIN — METRONIDAZOLE 500 MG: 500 INJECTION, SOLUTION INTRAVENOUS at 07:09

## 2019-09-23 RX ADMIN — ESMOLOL HYDROCHLORIDE 10 MG: 20 INJECTION INTRAVENOUS at 07:09

## 2019-09-23 RX ADMIN — CEFTRIAXONE 2 G: 2 INJECTION, SOLUTION INTRAVENOUS at 07:09

## 2019-09-23 RX ADMIN — SODIUM CHLORIDE, SODIUM LACTATE, POTASSIUM CHLORIDE, AND CALCIUM CHLORIDE: .6; .31; .03; .02 INJECTION, SOLUTION INTRAVENOUS at 06:09

## 2019-09-23 RX ADMIN — OXYCODONE HYDROCHLORIDE 5 MG: 5 TABLET ORAL at 08:09

## 2019-09-23 RX ADMIN — SODIUM CHLORIDE, SODIUM LACTATE, POTASSIUM CHLORIDE, AND CALCIUM CHLORIDE: .6; .31; .03; .02 INJECTION, SOLUTION INTRAVENOUS at 07:09

## 2019-09-23 RX ADMIN — PROPOFOL 50 MCG/KG/MIN: 10 INJECTION, EMULSION INTRAVENOUS at 07:09

## 2019-09-23 RX ADMIN — LIDOCAINE HYDROCHLORIDE 50 MG: 20 INJECTION, SOLUTION INTRAVENOUS at 07:09

## 2019-09-23 NOTE — BRIEF OP NOTE
[facility]  Brief Operative Note     SUMMARY     Surgery Date: 9/23/2019     Surgeon(s) and Role:     * David Mendieta MD - Primary    Assisting Surgeon: None    Pre-op Diagnosis:  Rectal mass [K62.9]  Rectal cancer [C20]    Post-op Diagnosis:  Post-Op Diagnosis Codes:     * Rectal mass [K62.9]     * Rectal cancer [C20]    Procedure(s) (LRB):  Exam under anesthesia (N/A)    Anesthesia: General/MAC    Description of the findings of the procedure:3.5 cm villous rectal mass on posterior wall of rectum, mass excised    Findings/Key Components: see above.    Estimated Blood Loss:25 cc's       Specimens:   Specimen (12h ago, onward)     Start     Ordered    09/23/19 0726  Specimen to Pathology - Surgery  Once     Comments:  Pre-op Diagnosis: Rectal mass [K62.9]Rectal cancer [C20]Post-op Diagnosis: same as above Procedure(s):Exam under anesthesia Number of specimens: 1. Rectal MassName of specimens: Rectal Mass      09/23/19 0739                Discharge Note    SUMMARY     Admit Date: 9/23/2019    Discharge Date and Time:  09/23/2019 7:49 AM    Hospital Course (synopsis of major diagnoses, care, treatment, and services provided during the course of the hospital stay): Pt presented for excision of rectal lesion.  Procedure and post op course were without event and pt was discharged to home.         Final Diagnosis: Post-Op Diagnosis Codes:     * Rectal mass [K62.9]     * Rectal cancer [C20]    Disposition: Home or Self Care    Follow Up/Patient Instructions:     Medications:  Reconciled Home Medications:      Medication List      START taking these medications    HYDROcodone-acetaminophen 5-325 mg per tablet  Commonly known as:  NORCO  Take 1 tablet by mouth every 6 (six) hours as needed for Pain.        ASK your doctor about these medications    amLODIPine 5 MG tablet  Commonly known as:  NORVASC  TAKE ONE TABLET BY MOUTH DAILY     aspirin 81 MG EC tablet  Commonly known as:  ECOTRIN  Take 81 mg by  "mouth once daily.     atorvastatin 10 MG tablet  Commonly known as:  LIPITOR     b complex vitamins capsule  Take 1 capsule by mouth once daily.     BD ULTRA-FINE CONNIE PEN NEEDLE 32 gauge x 5/32" Ndle  Generic drug:  pen needle, diabetic  Use EVERY DAY     calcitRIOL 0.5 MCG Cap  Commonly known as:  ROCALTROL  TAKE 1 CAPSULE (0.5 MCG TOTAL) BY MOUTH ONCE DAILY.     docusate sodium 100 MG capsule  Commonly known as:  COLACE  Take 100 mg by mouth 2 (two) times daily.     escitalopram oxalate 20 MG tablet  Commonly known as:  LEXAPRO  TAKE ONE TABLET BY MOUTH DAILY     FREESTYLE LITE METER kit  Generic drug:  blood-glucose meter     FREESTYLE LITE STRIPS Strp  Generic drug:  blood sugar diagnostic     levothyroxine 88 MCG tablet  Commonly known as:  SYNTHROID  Take 1 tablet (88 mcg total) by mouth before breakfast.     magnesium oxide 400 mg (241.3 mg magnesium) tablet  Commonly known as:  MAG-OX  TAKE ONE TABLET BY MOUTH TWICE DAILY     mirabegron 50 mg Tb24  Commonly known as:  MYRBETRIQ  Take 1 tablet (50 mg total) by mouth once daily.     pantoprazole 40 MG tablet  Commonly known as:  PROTONIX  TAKE ONE TABLET BY MOUTH DAILY     solifenacin 5 MG tablet  Commonly known as:  VESICARE  Take 1 tablet (5 mg total) by mouth once daily.     TOUJEO SOLOSTAR U-300 INSULIN 300 unit/mL (1.5 mL) Inpn pen  Generic drug:  insulin glargine (TOUJEO)  INJECT 30 UNITS INTO THE SKIN ONCE DAILY.     valsartan 40 MG tablet  Commonly known as:  DIOVAN  Take 40 mg by mouth once daily.     VICTOZA 3-XAVIER 0.6 mg/0.1 mL (18 mg/3 mL) Pnij  Generic drug:  liraglutide 0.6 mg/0.1 mL (18 mg/3 mL) subq PNIJ  INJECT 1.8MG SUBCUTANEAOUSLY DAILY     VITAMIN C 100 MG tablet  Generic drug:  ascorbic acid (vitamin C)  Take 100 mg by mouth once daily.     VITAMIN D3 4,000 unit Cap  Generic drug:  cholecalciferol (vitamin D3)  Take 1 capsule by mouth once daily.          Discharge Procedure Orders   Diet general     Call MD for:  extreme fatigue     Call " MD for:  persistent dizziness or light-headedness     Call MD for:  hives     Call MD for:  redness, tenderness, or signs of infection (pain, swelling, redness, odor or green/yellow discharge around incision site)     Call MD for:  difficulty breathing, headache or visual disturbances     Call MD for:  severe uncontrolled pain     Call MD for:  persistent nausea and vomiting     Call MD for:  temperature >100.4     Remove dressing in 48 hours     Activity as tolerated     Follow-up Information     David Mendieta MD In 2 weeks.    Specialties:  General Surgery, Colon and Rectal Surgery, Surgery  Contact information:  [address] [place] [address]  [phone]

## 2019-09-23 NOTE — DISCHARGE INSTRUCTIONS
"No heavy lifting  Remove dressing in 48 hours.  Activity as tolerated.  NO STRAINING TO HAVE BOWEL MOVEMENT.        Discharge Instructions: After Your Surgery/Procedure  Youve just had surgery. During surgery you were given medicine called anesthesia to keep you relaxed and free of pain. After surgery you may have some pain or nausea. This is common. Here are some tips for feeling better and getting well after surgery.     Stay on schedule with your medication.     Going home  Your doctor or nurse will show you how to take care of yourself when you go home. He or she will also answer your questions. Have an adult family member or friend drive you home.      For your safety we recommend these precaution for the first 24 hours after your procedure:  · Do not drive or use heavy equipment.  · Do not make important decisions or sign legal papers.  · Do not drink alcohol.  · Have someone stay with you, if needed. He or she can watch for problems and help keep you safe.  · Your concentration, balance, coordination, and judgement may be impaired for many hours after anesthesia.  Use caution when ambulating or standing up.     · You may feel weak and "washed out" after anesthesia and surgery.      Subtle residual effects of general anesthesia or sedation with regional / local anesthesia can last more than 24 hours.  Rest for the remainder of the day or longer if your Doctor/Surgeon has advised you to do so.  Although you may feel normal within the first 24 hours, your reflexes and mental ability may be impaired without you realizing it.  You may feel dizzy, lightheaded or sleepy for 24 hours or longer.      Be sure to go to all follow-up visits with your doctor. And rest after your surgery for as long as your doctor tells you to.    Coping with pain  If you have pain after surgery, pain medicine will help you feel better. Take it as told, before pain becomes severe. Also, ask your doctor or pharmacist about other ways to " control pain. This might be with heat, ice, or relaxation. And follow any other instructions your surgeon or nurse gives you.    Tips for taking pain medicine  To get the best relief possible, remember these points:  · Pain medicines can upset your stomach. Taking them with a little food may help.  · Most pain relievers taken by mouth need at least 20 to 30 minutes to start to work.  · Taking medicine on a schedule can help you remember to take it. Try to time your medicine so that you can take it before starting an activity. This might be before you get dressed, go for a walk, or sit down for dinner.  · Constipation is a common side effect of pain medicines. Call your doctor before taking any medicines such as laxatives or stool softeners to help ease constipation. Also ask if you should skip any foods. Drinking lots of fluids and eating foods such as fruits and vegetables that are high in fiber can also help. Remember, do not take laxatives unless your surgeon has prescribed them.  · Drinking alcohol and taking pain medicine can cause dizziness and slow your breathing. It can even be deadly. Do not drink alcohol while taking pain medicine.  · Pain medicine can make you react more slowly to things. Do not drive or run machinery while taking pain medicine.  Your health care provider may tell you to take acetaminophen to help ease your pain. Ask him or her how much you are supposed to take each day. Acetaminophen or other pain relievers may interact with your prescription medicines or other over-the-counter (OTC) drugs. Some prescription medicines have acetaminophen and other ingredients. Using both prescription and OTC acetaminophen for pain can cause you to overdose. Read the labels on your OTC medicines with care. This will help you to clearly know the list of ingredients, how much to take, and any warnings. It may also help you not take too much acetaminophen. If you have questions or do not understand the  information, ask your pharmacist or health care provider to explain it to you before you take the OTC medicine.    Managing nausea  Some people have an upset stomach after surgery. This is often because of anesthesia, pain, or pain medicine, or the stress of surgery. These tips will help you handle nausea and eat healthy foods as you get better. If you were on a special food plan before surgery, ask your doctor if you should follow it while you get better. These tips may help:  · Do not push yourself to eat. Your body will tell you when to eat and how much.  · Start off with clear liquids and soup. They are easier to digest.  · Next try semi-solid foods, such as mashed potatoes, applesauce, and gelatin, as you feel ready.  · Slowly move to solid foods. Dont eat fatty, rich, or spicy foods at first.  · Do not force yourself to have 3 large meals a day. Instead eat smaller amounts more often.  · Take pain medicines with a small amount of solid food, such as crackers or toast, to avoid nausea.     Call your surgeon if  · You still have pain an hour after taking medicine. The medicine may not be strong enough.  · You feel too sleepy, dizzy, or groggy. The medicine may be too strong.  · You have side effects like nausea, vomiting, or skin changes, such as rash, itching, or hives.       If you have obstructive sleep apnea  You were given anesthesia medicine during surgery to keep you comfortable and free of pain. After surgery, you may have more apnea spells because of this medicine and other medicines you were given. The spells may last longer than usual.   At home:  · Keep using the continuous positive airway pressure (CPAP) device when you sleep. Unless your health care provider tells you not to, use it when you sleep, day or night. CPAP is a common device used to treat obstructive sleep apnea.  · Talk with your provider before taking any pain medicine, muscle relaxants, or sedatives. Your provider will tell you  about the possible dangers of taking these medicines.  © 2369-0239 [employer]. [address] [place] [address], [place] [address]. All rights reserved. This information is not intended as a substitute for professional medical care. Always follow your healthcare professional's instructions.      Post op instructions for prevention of DVT  What is deep vein thrombosis?    Deep vein thrombosis (DVT) is the medical term for blood clots in the deep veins of the leg.  These blood clots can be dangerous.  A DVT can block a blood vessel and keep blood from getting where it needs to go.  Another problem is that the clot can travel to other parts of the body such as the lungs.  A clot that travels to the lungs is called a pulmonary embolus (PE) and can cause serious problems with breathing which can lead to death.    Am I at risk for DVT/PE?  If you are not very active, you are at risk of DVT.  Anyone confined to bed, sitting for long periods of time, recovering from surgery, etc. increases the risk of DVT.  Other risk factors are cancer diagnosis, certain medications, estrogen replacement in any form,older age, obesity, pregnancy, smoking, history of clotting disorders, and dehydration.    How will I know if I have a DVT?   Swelling in the lower leg   Pain   Warmth, redness, hardness or bulging of the vein  If you have any of these symptoms, call your doctors office right away.  Some people will not have any symptoms until the clot moves to the lungs.    What are the symptoms of a PE?   Panting, shortness of breath, or trouble breathing   Sharp, knife-like chest pain when you breathe   Coughing or coughing up blood   Rapid heartbeat  If you have any of these symptoms or get worse quickly, call 911 for emergency treatment.    How can I prevent a DVT?   Avoid long periods of inactivity and dont cross your legs--get up and walk around every hour or so.   Stay active--walking after surgery is highly encouraged.   This means you should get out of the house and walk in the neighborhood.  Going up and down stairs will not impair healing (unless advised against such activity by your doctor).     Drink plenty of noncaffeinated, nonalcoholic fluids each day to prevent dehydration.   Wear special support stockings, if they have been advised by your doctor.   If you travel, stop at least once an hour and walk around.   Avoid smoking (assistance with stopping is available through your healthcare provider)  Always notify your doctor if you are not able to follow the post operative instructions that are given to you at the time of discharge.  It may be necessary to prescribe one of the medications available to prevent DVT.      General Information:    1.  Do not drink alcoholic beverages including beer for 24 hours or as long as you are on pain medication..  2.  Do not drive a motor vehicle, operate machinery or power tools, or signs legal papers for 24 hours or as long as you are on pain medication.   3.  You may experience light-headedness, dizziness, and sleepiness following surgery. Please do not stay alone. A responsible adult should be with you for this 24 hour period.  4.  Go home and rest.    5. Progress slowly to a normal diet unless instructed.  Otherwise, begin with liquids such as soft drinks, then soup and crackers working up to solid foods. Drink plenty of nonalcoholic fluids.  6.  Certain anesthetics and pain medications produce nausea and vomiting in certain       individuals. If nausea becomes a problem at home, call you doctor.    7. A nurse will be calling you sometime after surgery. Do not be alarmed. This is our way of finding out how you are doing.    8. Several times every hour while you are awake, take 2-3 deep breaths and cough. If you had stomach surgery hold a pillow or rolled towel firmly against your stomach before you cough. This will help with any pain the cough might cause.  9. Several times every  hour while you are awake, pump and flex your feet 5-6 times and do foot circles. This will help prevent blood clots.    10.Call your doctor for severe pain, bleeding, fever, or signs or symptoms of infection (pain, swelling, redness, foul odor, drainage).    We hope your stay was comfortable as you heal now, mend and rest.    For we have enjoyed taking care of you by giving your our best.    And as you get better, by regaining your health and strength;   We count it as a privilege to have served you and hope your time at [facility] was well spent.      Thank  You!!!

## 2019-09-23 NOTE — ANESTHESIA PREPROCEDURE EVALUATION
09/23/2019  Valery Huggins is a 78 y.o., female.    Anesthesia Evaluation    I have reviewed the Patient Summary Reports.    I have reviewed the Nursing Notes.      Review of Systems  Anesthesia Hx:  PONV   Cardiovascular:   Hypertension, well controlled    Endocrine:   Diabetes, well controlled, type 2 Hypothyroidism        Physical Exam  General:  Well nourished    Airway/Jaw/Neck:  Airway Findings: Mallampati: II                Anesthesia Plan  Type of Anesthesia, risks & benefits discussed:  Anesthesia Type:  general  Patient's Preference:   Intra-op Monitoring Plan:   Intra-op Monitoring Plan Comments:   Post Op Pain Control Plan:   Post Op Pain Control Plan Comments:   Induction:   IV  Beta Blocker:  Patient is not currently on a Beta-Blocker (No further documentation required).       Informed Consent: Patient understands risks and agrees with Anesthesia plan.  Questions answered. Anesthesia consent signed with patient.  ASA Score: 2     Day of Surgery Review of History & Physical:    H&P update referred to the surgeon.         Ready For Surgery From Anesthesia Perspective.

## 2019-09-23 NOTE — OR NURSING
at bedside. Denies needs at this time. Pt states she is comfortable. Update given. Text notifications declined.

## 2019-09-23 NOTE — ANESTHESIA POSTPROCEDURE EVALUATION
Anesthesia Post Evaluation    Patient: Valery Huggins    Procedure(s) Performed: Procedure(s) (LRB):  Exam under anesthesia (N/A)    Final Anesthesia Type: general  Patient location during evaluation: PACU  Patient participation: Yes- Able to Participate  Level of consciousness: awake and alert  Post-procedure vital signs: reviewed and stable  Pain management: adequate  Airway patency: patent  PONV status at discharge: No PONV  Anesthetic complications: no      Cardiovascular status: blood pressure returned to baseline and hemodynamically stable  Respiratory status: unassisted  Hydration status: euvolemic  Follow-up not needed.          Vitals Value Taken Time   /77 9/23/2019  7:57 AM   Temp 36.7 °C (98.1 °F) 9/23/2019  7:34 AM   Pulse 104 9/23/2019  7:58 AM   Resp 12 9/23/2019  7:58 AM   SpO2 100 % 9/23/2019  7:58 AM   Vitals shown include unvalidated device data.      No case tracking events are documented in the log.      Pain/Jake Score: No data recorded

## 2019-09-23 NOTE — PLAN OF CARE
Pt. Is AAOx4, calm and cooperative.  Pt. Denies pain or nausea.  Dressing is clean, dry and intact.  Able to ambulate to bathroom and void spontaneously.  Urine is clear, red, no clots.  Drinking water and coffee without nausea.  RN reviewed plan of care with pt. And spouse who both voiced understanding.  Written discharge information printed and given to pt. And spouse who both were able to teach back information.  IV [facility] catheter intact. [facility] with spouse in private car.

## 2019-09-23 NOTE — OP NOTE
DATE OF PROCEDURE:  09/23/2019.    STAFF SURGEON:  David Mendieta M.D.    PREOPERATIVE DIAGNOSIS:  Rectal lesion.    POSTOPERATIVE DIAGNOSIS:  Rectal lesion.    PROCEDURE:  Transanal excision of posterior rectal lesion.    ANESTHESIA:  Monitored anesthesia.    INDICATION FOR PROCEDURE:  A pleasant 78-year-old female who presented to my   office following a colonoscopy, which discovered she had a flat villous lesion   on the posterior wall of the rectum.  Biopsies were done demonstrating a   tubulovillous adenoma with focal high-grade dysplasia, no true induration or   malignancy.  She was scheduled for EUA with possible excision of lesion on the   above-mentioned date.    DESCRIPTION OF PROCEDURE:  Following signing of informed consent, she received   preoperative antibiotics, taken to the OR and placed in supine position.  SCDs   were placed.  She was rolled and placed in prone jackknife position with   buttocks taped apart.  Monitored anesthesia was administered.  Perianal area was   prepped and draped and appropriate timeout procedure was performed.  Having   performed a timeout procedure, I injected local 0.25% Marcaine mixed with   epinephrine in the perianal area for a block.  A digital rectal exam was   performed, palpable mass appreciated on the posterior wall of the rectum, a soft   firm.  I then placed an anoscope into the anal canal.  There was an   approximately 4 cm villous lesion appreciated in the rectum distally.  Tattoo   was present.  At the base, excised the lesion and sent the lesion to Pathology   for evaluation.  I then irrigated and ensured adequate hemostasis.  There was a   bleeding vessel, which I oversew.  I do place Surgicel powder into the anal   canal.  I then checked for patency of the lumen.  The lumen was widely patent.    The patient was then awakened and taken to Recovery Room in stable condition.    There were no immediate complications.  Blood loss was approximately 25  Karo      JSAMANTHA/JOSE JUAN  dd: 09/23/2019 07:53:05 (CDT)  td: 09/23/2019 08:15:58 (CDT)  Doc ID   #[id number]  Job ID #[id number]    CC:

## 2019-09-23 NOTE — PLAN OF CARE
Released from pacu per dr Franklin Vs wnl skin w+d pulse  now   dsg on backside dry intact no n+v noted pt is to resume asa in 3 days No nausea No emesis pain controlled 1/10 ivf cont due to void

## 2019-09-23 NOTE — TRANSFER OF CARE
"Anesthesia Transfer of Care Note    Patient: Valery Huggins    Procedure(s) Performed: Procedure(s) (LRB):  Exam under anesthesia (N/A)    Patient location: PACU    Anesthesia Type: general    Transport from OR: Transported from OR on 2-3 L/min O2 by NC with adequate spontaneous ventilation    Post pain: adequate analgesia    Post assessment: no apparent anesthetic complications and tolerated procedure well    Post vital signs: stable    Level of consciousness: sedated and responds to stimulation    Nausea/Vomiting: no nausea/vomiting    Complications: none    Transfer of care protocol was followed      Last vitals:   Visit Vitals  BP (!) 149/72 (BP Location: Left arm, Patient Position: Lying)   Pulse 82   Temp 36.6 °C (97.9 °F) (Skin)   Resp 18   Ht 5' 1" (1.549 m)   Wt 70.8 kg (156 lb)   LMP 06/01/2012   SpO2 98%   Breastfeeding? No   BMI 29.48 kg/m²     "

## 2019-09-26 ENCOUNTER — PATIENT MESSAGE (OUTPATIENT)
Dept: SURGERY | Facility: CLINIC | Age: 79
End: 2019-09-26

## 2019-09-26 NOTE — TELEPHONE ENCOUNTER
Pt was using her OTC cream, slight help, SITZ baths per Dr Mendieta are the best relief, can even do shower or a SITZ bath basin on the commode. Call prn any questions no other c/o.

## 2019-10-01 ENCOUNTER — PATIENT MESSAGE (OUTPATIENT)
Dept: SURGERY | Facility: CLINIC | Age: 79
End: 2019-10-01

## 2019-10-02 ENCOUNTER — OFFICE VISIT (OUTPATIENT)
Dept: SURGERY | Facility: CLINIC | Age: 79
End: 2019-10-02
Payer: COMMERCIAL

## 2019-10-02 VITALS
WEIGHT: 156.06 LBS | SYSTOLIC BLOOD PRESSURE: 179 MMHG | HEART RATE: 82 BPM | DIASTOLIC BLOOD PRESSURE: 77 MMHG | HEIGHT: 61 IN | BODY MASS INDEX: 29.47 KG/M2

## 2019-10-02 DIAGNOSIS — C20 RECTAL CANCER: ICD-10-CM

## 2019-10-02 DIAGNOSIS — Z09 POSTOP CHECK: Primary | ICD-10-CM

## 2019-10-02 PROCEDURE — 99024 POSTOP FOLLOW-UP VISIT: CPT | Mod: S$GLB,,, | Performed by: SURGERY

## 2019-10-02 PROCEDURE — 99999 PR PBB SHADOW E&M-EST. PATIENT-LVL III: CPT | Mod: PBBFAC,,, | Performed by: SURGERY

## 2019-10-02 PROCEDURE — 99024 PR POST-OP FOLLOW-UP VISIT: ICD-10-PCS | Mod: S$GLB,,, | Performed by: SURGERY

## 2019-10-02 PROCEDURE — 99999 PR PBB SHADOW E&M-EST. PATIENT-LVL III: ICD-10-PCS | Mod: PBBFAC,,, | Performed by: SURGERY

## 2019-10-02 NOTE — PROGRESS NOTES
Cc: post op    HPI: 78 y.o.  female  1 weeks s/p excision of rectal lesion.   Pt notes that she is feeling well. No pain or discomfort.  Had some bleeding for a couple of days that has since stopped.     PE: AFVSS    AAOx3  CTA  Soft/NT/nd  Inc: c/d/i        Path:     Rectum, lesion, transanal excision of posterior rectal lesion:  Invasive moderately differentiated adenocarcinoma arising in a tubulovillous adenoma with extensive  high-grade dysplasia.  Invasive adenocarcinoma component measures 10 mm (1 cm) in greatest dimension.  Adenomatous dysplasia extends to the blue inked unoriented circumferential surgical resection margin.  Invasive carcinoma is located 1 mm (0.1 cm) from the deep surgical margin of resection.  See synoptic report in comment section for further details.  Comment: Synoptic report  Procedure: Transanal excision of rectal lesion  Tumor site: Posterior rectum  Tumor size:  Greatest dimension: 10 mm (1 cm)  Macroscopic tumor perforation: Not identified  Macroscopic intactness of Mesorectum: Not applicable  Histologic type: Adenocarcinoma  Histologic grade: G2; moderately differentiated  Microscopic tumor extension: Tumor invades through the muscularis propria into the pericolorectal tissue  Margins:    All margins are uninvolved by invasive carcinoma; note deep and circumfrential mucosal margins are examined  for this transanal disc excision.  Of note, Adenomatous dysplasia from the surrounding tubulovillous adenoma is present at the circumferential  mucosal margins of this resection.  Distance of invasive carcinoma from closest margin: Invasive carcinoma is located 1 mm (0.1 cm) from the  deep surgical margin.  Treatment effect: Not known  Lymphovascular invasion: Not identified  Perineural invasion: Not identified  Tumor deposits: Cannot be determined  Regional lymph nodes:  No lymph nodes are submitted or found within the submitted tissue.  Pathologic staging:  Primary tumor:  pT3: Tumor  invades through the muscularis propria into the pericolorectal tissues .  Regional lymph nodes:  pNX: Regional lymph nodes cannot be assessed  Distant metastasis:  pMX: Cannot be assessed.  Ancillary testing: MMR testing will be completed on the invasive tumor and results will follow in a supplemental  report.  Tumor Blocks for possible Future Studies: D, E and F      MRI: no definitive invasion into mesorectum but mildly enlarged nodes noted    A/P: Rectal cancer, T3, questionable nodes on MRI   Pt doing well post surgery.   Lengthy d/w pt and her sister.  Have reviewed findings on pathology.  This confirms rectal cancer present.  Path demonstrates a T3 lesion and MRI did demonstrates slightly enlarged nodes.  As such I have recommended proceeding with neoadjuvant treatment.  D/w pt that following neoadjuvant treatment would reassess tumor and likely recommend waiting 8-12 weeks prior to proceeding with LAR and DLI.  This would then be followed by neoadjuvant treatment.  All question answered.  Will refer to Dr Winston for radiation.  Pt has an established relationship with Dr Epstein at [facility] and will refer to him for evaluation as well

## 2019-10-03 ENCOUNTER — PATIENT MESSAGE (OUTPATIENT)
Dept: SURGERY | Facility: CLINIC | Age: 79
End: 2019-10-03

## 2019-10-03 ENCOUNTER — PATIENT MESSAGE (OUTPATIENT)
Dept: HEMATOLOGY/ONCOLOGY | Facility: CLINIC | Age: 79
End: 2019-10-03

## 2019-10-04 ENCOUNTER — PATIENT MESSAGE (OUTPATIENT)
Dept: SURGERY | Facility: CLINIC | Age: 79
End: 2019-10-04

## 2019-10-08 ENCOUNTER — TELEPHONE (OUTPATIENT)
Dept: SURGERY | Facility: CLINIC | Age: 79
End: 2019-10-08

## 2019-10-08 ENCOUNTER — OFFICE VISIT (OUTPATIENT)
Dept: RADIATION ONCOLOGY | Facility: CLINIC | Age: 79
End: 2019-10-08
Payer: COMMERCIAL

## 2019-10-08 ENCOUNTER — PATIENT MESSAGE (OUTPATIENT)
Dept: SURGERY | Facility: CLINIC | Age: 79
End: 2019-10-08

## 2019-10-08 ENCOUNTER — SOCIAL WORK (OUTPATIENT)
Dept: HEMATOLOGY/ONCOLOGY | Facility: CLINIC | Age: 79
End: 2019-10-08

## 2019-10-08 ENCOUNTER — TELEPHONE (OUTPATIENT)
Dept: RADIATION ONCOLOGY | Facility: CLINIC | Age: 79
End: 2019-10-08

## 2019-10-08 ENCOUNTER — OFFICE VISIT (OUTPATIENT)
Dept: HEMATOLOGY/ONCOLOGY | Facility: CLINIC | Age: 79
End: 2019-10-08
Payer: COMMERCIAL

## 2019-10-08 ENCOUNTER — DOCUMENTATION ONLY (OUTPATIENT)
Dept: RADIATION ONCOLOGY | Facility: CLINIC | Age: 79
End: 2019-10-08

## 2019-10-08 VITALS
BODY MASS INDEX: 29.15 KG/M2 | TEMPERATURE: 98 F | HEART RATE: 72 BPM | DIASTOLIC BLOOD PRESSURE: 79 MMHG | OXYGEN SATURATION: 98 % | SYSTOLIC BLOOD PRESSURE: 163 MMHG | WEIGHT: 154.31 LBS

## 2019-10-08 VITALS
WEIGHT: 155.81 LBS | HEART RATE: 80 BPM | DIASTOLIC BLOOD PRESSURE: 82 MMHG | TEMPERATURE: 99 F | SYSTOLIC BLOOD PRESSURE: 159 MMHG | RESPIRATION RATE: 18 BRPM | HEIGHT: 60 IN | BODY MASS INDEX: 30.59 KG/M2

## 2019-10-08 DIAGNOSIS — D64.9 NORMOCYTIC ANEMIA: ICD-10-CM

## 2019-10-08 DIAGNOSIS — C64.2 RENAL CELL CARCINOMA OF LEFT KIDNEY: Primary | ICD-10-CM

## 2019-10-08 DIAGNOSIS — C20 RECTAL CANCER: ICD-10-CM

## 2019-10-08 DIAGNOSIS — K62.89 RECTAL MASS: ICD-10-CM

## 2019-10-08 DIAGNOSIS — C20 RECTAL CANCER: Primary | ICD-10-CM

## 2019-10-08 PROCEDURE — 3077F PR MOST RECENT SYSTOLIC BLOOD PRESSURE >= 140 MM HG: ICD-10-PCS | Mod: S$GLB,,, | Performed by: INTERNAL MEDICINE

## 2019-10-08 PROCEDURE — 3077F SYST BP >= 140 MM HG: CPT | Mod: S$GLB,,, | Performed by: RADIOLOGY

## 2019-10-08 PROCEDURE — 3077F PR MOST RECENT SYSTOLIC BLOOD PRESSURE >= 140 MM HG: ICD-10-PCS | Mod: S$GLB,,, | Performed by: RADIOLOGY

## 2019-10-08 PROCEDURE — 99205 OFFICE O/P NEW HI 60 MIN: CPT | Mod: S$GLB,,, | Performed by: RADIOLOGY

## 2019-10-08 PROCEDURE — 1101F PT FALLS ASSESS-DOCD LE1/YR: CPT | Mod: S$GLB,,, | Performed by: INTERNAL MEDICINE

## 2019-10-08 PROCEDURE — 3077F SYST BP >= 140 MM HG: CPT | Mod: S$GLB,,, | Performed by: INTERNAL MEDICINE

## 2019-10-08 PROCEDURE — 3078F PR MOST RECENT DIASTOLIC BLOOD PRESSURE < 80 MM HG: ICD-10-PCS | Mod: S$GLB,,, | Performed by: RADIOLOGY

## 2019-10-08 PROCEDURE — 99204 OFFICE O/P NEW MOD 45 MIN: CPT | Mod: S$GLB,,, | Performed by: INTERNAL MEDICINE

## 2019-10-08 PROCEDURE — 1101F PT FALLS ASSESS-DOCD LE1/YR: CPT | Mod: S$GLB,,, | Performed by: RADIOLOGY

## 2019-10-08 PROCEDURE — 3078F DIAST BP <80 MM HG: CPT | Mod: S$GLB,,, | Performed by: RADIOLOGY

## 2019-10-08 PROCEDURE — 3079F PR MOST RECENT DIASTOLIC BLOOD PRESSURE 80-89 MM HG: ICD-10-PCS | Mod: S$GLB,,, | Performed by: INTERNAL MEDICINE

## 2019-10-08 PROCEDURE — 1101F PR PT FALLS ASSESS DOC 0-1 FALLS W/OUT INJ PAST YR: ICD-10-PCS | Mod: S$GLB,,, | Performed by: INTERNAL MEDICINE

## 2019-10-08 PROCEDURE — 99205 PR OFFICE/OUTPT VISIT, NEW, LEVL V, 60-74 MIN: ICD-10-PCS | Mod: S$GLB,,, | Performed by: RADIOLOGY

## 2019-10-08 PROCEDURE — 1101F PR PT FALLS ASSESS DOC 0-1 FALLS W/OUT INJ PAST YR: ICD-10-PCS | Mod: S$GLB,,, | Performed by: RADIOLOGY

## 2019-10-08 PROCEDURE — 3079F DIAST BP 80-89 MM HG: CPT | Mod: S$GLB,,, | Performed by: INTERNAL MEDICINE

## 2019-10-08 PROCEDURE — 99204 PR OFFICE/OUTPT VISIT, NEW, LEVL IV, 45-59 MIN: ICD-10-PCS | Mod: S$GLB,,, | Performed by: INTERNAL MEDICINE

## 2019-10-08 NOTE — PROGRESS NOTES
Valery Huggins  [id number]  1940  10/8/2019  David Mendieta Md  [address] [place] [address]    REASON FOR CONSULTATION:  Rectal cancer  TREATMENT GOAL: neoadjuvant    HISTORY OF PRESENT ILLNESS:   78-year-old patient with a recent diagnosis of left-sided renal cell, clear cell carcinoma status post nephrectomy presented with a 12 month history of intermittent rectal bleeding.  She had been counseled with respect to colonoscopy but she delayed about 6 months, eventually undergoing examination revealing a mass in the mid rectum.  She had multiple polyps also removed.  The initial biopsy came back as nondiagnostic.  She was then referred to Dr. Mendieta who had her undergo MRI of the pelvis with the following results:  4 cm mid rectal lesion in keeping with known malignancy.  No discrete extension into the mesorectal fascia although there are several perirectal lymph nodes which raise the possibility for locoregional lymph node involvement.  I believe the lymph nodes were measuring less than 1 cm    She also underwent further proctosigmoidoscopy with biopsy make a diagnosis of invasive adenocarcinoma.  This was a transanal excision , grade 2 lesion.  Tumor was invading through the muscularis into the lonny colorectal tissue.  Margins were ascertained however the invasive component was 1 mm from the deep margin.  There was no perineural invasion.  Tumor deposits were indeterminate.    Of note she did undergo left-sided nephrectomy in July 2019 for a renal cell, clear cell carcinoma of the left kidney, stage [id number].  She was offered and did start Sutent systemic therapy was but was unable to tolerate this.    Clinically she has done well.  She has not lost any weight.  She does not describe any significant pelvic or sacral pain.  She does stool regularly.  She has no further blood in the stool at this point.  She has no evidence of a rectal obstruction.  Appetite and energy level are good.        Review of  Systems   Constitutional: Negative for fever and unexpected weight change.   HENT:   Negative for mouth sores and trouble swallowing.    Respiratory: Negative for cough and shortness of breath.    Cardiovascular: Negative for chest pain and palpitations.   Gastrointestinal: Negative for abdominal distention, abdominal pain and constipation.   Genitourinary: Negative for difficulty urinating and dysuria.    Musculoskeletal: Negative for arthralgias and back pain.   Skin: Negative for itching and rash.   Neurological: Negative for extremity weakness and headaches.   Psychiatric/Behavioral: Negative for confusion. The patient is not nervous/anxious.      Past Medical History:   Diagnosis Date    Anemia     Cancer     thyroid    Cancer     Kidney    Depression     Diabetes mellitus     Diabetes mellitus type II     Encounter for blood transfusion     Gallstones     Hypertension     Kidney stone     MVP (mitral valve prolapse)     PONV (postoperative nausea and vomiting)     Thyroid disease      Past Surgical History:   Procedure Laterality Date    APPENDECTOMY      cataracts      both eyes    CHOLECYSTECTOMY      COLONOSCOPY N/A 8/26/2019    Procedure: COLONOSCOPY;  Surgeon: Armando Duff MD;  Location[facility];  Service: Endoscopy;  Laterality: N/A;    CYSTOSCOPY      CYSTOSCOPY W/ URETERAL STENT PLACEMENT      ECTOPIC PREGNANCY SURGERY      EXAMINATION UNDER ANESTHESIA N/A 9/23/2019    Procedure: Exam under anesthesia;  Surgeon: David Mendieta MD;  Location: [facility];  Service: General;  Laterality: N/A;    EYE SURGERY      phoebe cataract    HYSTERECTOMY      NASAL SEPTUM SURGERY      NEPHRECTOMY Left     OOPHORECTOMY      THYROIDECTOMY      two times    TONSILLECTOMY       Social History     Socioeconomic History    Marital status:      Spouse name: Not on file    Number of children: Not on file    Years of education: Not on file    Highest education level: Not on file  "  Occupational History    Not on file   Social Needs    Financial resource strain: Not on file    Food insecurity:     Worry: Not on file     Inability: Not on file    Transportation needs:     Medical: Not on file     Non-medical: Not on file   Tobacco Use    Smoking status: Never Smoker    Smokeless tobacco: Never Used   Substance and Sexual Activity    Alcohol use: No    Drug use: No    Sexual activity: Yes     Partners: Male   Lifestyle    Physical activity:     Days per week: Not on file     Minutes per session: Not on file    Stress: Not on file   Relationships    Social connections:     Talks on phone: Not on file     Gets together: Not on file     Attends [employer] service: Not on file     Active member of club or organization: Not on file     Attends meetings of clubs or organizations: Not on file     Relationship status: Not on file   Other Topics Concern    Not on file   Social History Narrative    Not on file     Family History   Problem Relation Age of Onset    Hypertension Father     Kidney disease Father     Mental illness Mother     Cancer Brother        PRIOR HISTORY OF CHEMOTHERAPY OR RADIOTHERAPY: Please see HPI for patients prior oncologic history.    Medication List with Changes/Refills   Current Medications    AMLODIPINE (NORVASC) 5 MG TABLET    TAKE ONE TABLET BY MOUTH DAILY    AMOXICILLIN-CLAVULANATE 500-125MG (AUGMENTIN) 500-125 MG TAB    Take 1 tablet (500 mg total) by mouth 2 (two) times daily.    ASCORBIC ACID (VITAMIN C) 100 MG TABLET    Take 100 mg by mouth once daily.    ASPIRIN (ECOTRIN) 81 MG EC TABLET    Take 81 mg by mouth once daily.    ATORVASTATIN (LIPITOR) 10 MG TABLET        B COMPLEX VITAMINS CAPSULE    Take 1 capsule by mouth once daily.    BD ULTRA-FINE CONNIE PEN NEEDLES 32 GAUGE X 5/32" NDLE    Use EVERY DAY    CALCITRIOL (ROCALTROL) 0.5 MCG CAP    TAKE 1 CAPSULE (0.5 MCG TOTAL) BY MOUTH ONCE DAILY.    CHOLECALCIFEROL, VITAMIN D3, (VITAMIN D3) 4,000 UNIT " CAP    Take 1 capsule by mouth once daily.    DOCUSATE SODIUM (COLACE) 100 MG CAPSULE    Take 100 mg by mouth 2 (two) times daily.    ESCITALOPRAM OXALATE (LEXAPRO) 20 MG TABLET    TAKE ONE TABLET BY MOUTH DAILY    FREESTYLE LITE METER KIT        FREESTYLE LITE STRIPS STRP        HYDROCODONE-ACETAMINOPHEN (NORCO) 5-325 MG PER TABLET    Take 1 tablet by mouth every 6 (six) hours as needed for Pain.    LEVOTHYROXINE (SYNTHROID) 88 MCG TABLET    Take 1 tablet (88 mcg total) by mouth before breakfast.    MAGNESIUM OXIDE (MAG-OX) 400 MG TABLET    TAKE ONE TABLET BY MOUTH TWICE DAILY    MIRABEGRON (MYRBETRIQ) 50 MG TB24    Take 1 tablet (50 mg total) by mouth once daily.    PANTOPRAZOLE (PROTONIX) 40 MG TABLET    TAKE ONE TABLET BY MOUTH DAILY    SOLIFENACIN (VESICARE) 5 MG TABLET    Take 1 tablet (5 mg total) by mouth once daily.    TOUJEO SOLOSTAR U-300 INSULIN 300 UNIT/ML (1.5 ML) INPN PEN    INJECT 30 UNITS INTO THE SKIN ONCE DAILY.    VALSARTAN (DIOVAN) 40 MG TABLET    Take 40 mg by mouth once daily.     VICTOZA 3-XAVIER 0.6 MG/0.1 ML (18 MG/3 ML) PNIJ    INJECT 1.8MG SUBCUTANEAOUSLY DAILY     Review of patient's allergies indicates:  No Known Allergies    QUALITY OF LIFE: 90%- Able to Carry on Normal Activity: Minor Symptoms of Disease    Vitals:    10/08/19 0942   BP: (!) 163/79   Pulse: 72   Temp: 98.4 °F (36.9 °C)   TempSrc: Oral   SpO2: 98%   Weight: 70 kg (154 lb 4.8 oz)   PainSc: 0-No pain       PHYSICAL EXAM:  Well groomed oriented alert no distress  GENERAL: alert; in no apparent distress.   HEAD: normocephalic, atraumatic.  EYES: pupils are equal, round, reactive to light and accommodation. Sclera anicteric. Conjunctiva not injected.   NOSE/THROAT: no nasal erythema or rhinorrhea. Oropharynx pink, without erythema, ulcerations or thrush.   NECK: no cervical motion rigidity; supple with no masses.  CHEST: clear to auscultation bilaterally; no wheezes, crackles or rubs. Patient is speaking comfortably on room  air with normal work of breathing without using accessory muscles of respiration.  CARDIOVASCULAR: regular rate and rhythm; no murmurs, rubs or gallops.  ABDOMEN: soft, nontender, nondistended. Bowel sounds present.   MUSCULOSKELETAL: no tenderness to palpation along the spine or scapulae. Normal range of motion.  NEUROLOGIC: cranial nerves II-XII intact bilaterally. Strength 5/5 in bilateral upper and lower extremities. No sensory deficits appreciated. Reflexes globally intact. No cerebellar signs. Normal gait.  LYMPHATIC: no cervical, supraclavicular or axillary adenopathy appreciated bilaterally.   EXTREMITIES: no clubbing, cyanosis, edema.  SKIN: no erythema, rashes, ulcerations noted.     REVIEW OF IMAGING/PATHOLOGY/LABS: Please see HPI. All images reviewed personally by dictating physician.     ASSESSMENT:  78-year-old patient, with prior recent history of left renal cell, clear cell carcinoma ([id number])  with mid rectal adenocarcinoma, moderately differentiated, MRI imaging staged [id number], KPS   PLAN:  Because of the large size of the mass and the depth of invasion agree that neoadjuvant radiation chemotherapy is the treatment of choice.  She has been seen by Dr. Mendieta who is in agreement with this.    I explained to her that radiation therapy will hopefully shrink the mass, down staging it making it more resectable.  As per Dr. Mendieta  note I believe she would be a candidate for LAR.    She is scheduled see Dr. Goode for consideration of concomitant neoadjuvant chemotherapy.    I also would recommend CT PET imaging to help identify if any of her lymph nodes have metastatic deposits.  This would help with radiation treatment planning.    I recommend 45 Gy to the pelvic lymph nodes with a 5.4 Gy boost to the rectal mass and  any PET positive lymph nodes for total tumor site dose of 50.4 Gy.      Depending on the time frame for her PET scan, anticipated treatment to begin on or around the week of  10/21/2019.      Side effects and possible complications were addressed with her.        We discussed the risks and benefits of the above treatment and have gone over in detail the acute and late toxicities of radiation therapy to the pelvis/rectum. The patient expressed  understanding and has signed a consent form which is included in the patients chart. The patient has our contact information and understands that they are free to contact us at any time with questions or concerns regarding radiation therapy.    DISPOSITION: RTC FOR CT SIM    TIME SPENT WITH PATIENT: I have personally seen and evaluated this patient. Greater than 50% of this time was spent discussing coordination of care and/or counseling.     PHYSICIAN: Fareed Hassan MD

## 2019-10-08 NOTE — PROGRESS NOTES
Valery Huggins  [id number]  1940  10/8/2019  No referring provider defined for this encounter.    DIAGNOSIS:rectal cancer, adenocarcinoma  TREATMENT SITE(S):  Pelvis, rectal boost    INTENT: CURATIVE    TREATMENT SETTING: NEOADJUVANT     MODALITY: PHOTON    TECHNIQUE:  INTENSITY MODULATED RADIOTHERAPY (IMRT)    IMRT MEDICAL NECESSITY:IMRT MEDICAL NECESSITY: Target volume irregular shape/proximate to critical structures, Narrow margins needed to protect adjacent structures and High precision necessary     HPI:  78-year-old patient, history of left-sided renal cell carcinoma, with a mid rectal tumor mass,  measuring approximately 4 cm biopsy-proven adenocarcinoma with perirectal invasion, stage [id number], KPS 90    I have personally performed treatment planning for the patient, reviewing relevant history/physical and imaging. I have defined GTV, CTV, PTV and organs at risk.     In order to accomplish this plan, I am ordering:  SIMULATION: CT SIMULATION FOR PLACEMENT OF TREATMENT FIELDS    CONTRAST:none    TO ACCOMPLISH REPRODUCIBLE POSITION: VACLOC and FULL BLADDER    DEVICES FOR BEAM SHAPING: CUSTOMIZED MLC    CUSTOMIZED BOLUS: none    IMAGING: CBCT DAILY    I have ordered a weekly physics check.  SPECIAL PHYSICS CONSULT: NO  REASON: N/A    SPECIAL TREATMENT CIRCUMSTANCE: YES   Concurrent or recent administration of chemotherapeutic agents which are  known potent radiosensitizers and thus will require vigilant monitoring for  exaggerated radiation toxicities.    LABS: NONE    ANTICIPATED PRESCRIPTION:  45 Gy to the pelvis, 5.4 Gy boost to the tumor mass mid rectum, total dose 50.4 Gy    TREATMENT: DAILY    PHYSICIAN: Fareed Hassan MD

## 2019-10-08 NOTE — PROGRESS NOTES
[facility] Hematolgy/Oncology  History & Physical    Subjective:      Patient ID:   NAME: Valery Huggins : 1940     78 y.o. female    Referring Doc: David Mendieta MD  Other Physicians: Armando Nath; Stanislav Epstein; Azar Donnelly        Chief Complaint: left RCC hx and new rectal cancer      HPI:  78 y.o. female with diagnosis of prior left RCC who has been referred by David Mendieta MD for evaluation by medical hematology/oncology. She has been followed by Dr Stanislav Epstein with [facility] at the [facility] in [place]. She last saw Dr Epstein in 2019. She was recently diagnosed with rectal mass by Dr Armando Duff which was found on colonoscopy on 2019. She had presented with lower Gi bleeding at that time. The pathology from those biopsies showed no evidence of malignancy at that time. She was subsequently seen by Dr David Mendieta and underwent trans-anal surgical biopsy on 2019. The pathology from the surgical biopsy showed rectal carcinoma.     She has been seen by Dr Fareed Hassan with Rad/onc for radiation therapy evaluation.     We discussed giving her combined chemotherapy with the XRt to try to reduce her risk of recurrence. She is agreeable to this plan. Dr Hassan has scheduled a PET.     She will also need a portacath to facilitate the chemotherapy administartion      She is here with her . She denies any CP, SOB, HA's or N/V.             ROS:   GEN: normal without any fever, night sweats or weight loss  HEENT: normal with no HA's, sore throat, stiff neck, changes in vision  CV: normal with no CP, SOB, PND, RAYA or orthopnea  PULM: normal with no SOB, cough, hemoptysis, sputum or pleuritic pain  GI: normal with no abdominal pain, nausea, vomiting, constipation, diarrhea, melanotic stools, BRBPR, or hematemesis  : normal with no hematuria, dysuria  BREAST: normal with no mass, discharge, pain  SKIN: normal with no rash, erythema, bruising, or swelling       Past  Medical/Surgical History:  Past Medical History:   Diagnosis Date    Anemia     Cancer     thyroid    Cancer     Kidney    Depression     Diabetes mellitus     Diabetes mellitus type II     Encounter for blood transfusion     Gallstones     Hypertension     Kidney stone     MVP (mitral valve prolapse)     PONV (postoperative nausea and vomiting)     Thyroid disease      Past Surgical History:   Procedure Laterality Date    APPENDECTOMY      cataracts      both eyes    CHOLECYSTECTOMY      COLONOSCOPY N/A 8/26/2019    Procedure: COLONOSCOPY;  Surgeon: Armando Dfuf MD;  Location: [facility];  Service: Endoscopy;  Laterality: N/A;    CYSTOSCOPY      CYSTOSCOPY W/ URETERAL STENT PLACEMENT      ECTOPIC PREGNANCY SURGERY      EXAMINATION UNDER ANESTHESIA N/A 9/23/2019    Procedure: Exam under anesthesia;  Surgeon: David Mendieta MD;  [facility];  Service: General;  Laterality: N/A;    EYE SURGERY      phoebe cataract    HYSTERECTOMY      NASAL SEPTUM SURGERY      NEPHRECTOMY Left     OOPHORECTOMY      THYROIDECTOMY      two times    TONSILLECTOMY           Allergies:  Review of patient's allergies indicates:   Allergen Reactions    Sutent [sunitinib] Diarrhea and Nausea And Vomiting       Social/Family History:  Social History     Socioeconomic History    Marital status:      Spouse name: Not on file    Number of children: Not on file    Years of education: Not on file    Highest education level: Not on file   Occupational History    Not on file   Social Needs    Financial resource strain: Not on file    Food insecurity:     Worry: Not on file     Inability: Not on file    Transportation needs:     Medical: Not on file     Non-medical: Not on file   Tobacco Use    Smoking status: Never Smoker    Smokeless tobacco: Never Used   Substance and Sexual Activity    Alcohol use: No    Drug use: No    Sexual activity: Yes     Partners: Male   Lifestyle    Physical  "activity:     Days per week: Not on file     Minutes per session: Not on file    Stress: Not on file   Relationships    Social connections:     Talks on phone: Not on file     Gets together: Not on file     Attends Restorationist service: Not on file     Active member of club or organization: Not on file     Attends meetings of clubs or organizations: Not on file     Relationship status: Not on file   Other Topics Concern    Not on file   Social History Narrative    Not on file     Family History   Problem Relation Age of Onset    Hypertension Father     Kidney disease Father     Mental illness Mother     Cancer Brother          Medications:    Current Outpatient Medications:     amLODIPine (NORVASC) 5 MG tablet, TAKE ONE TABLET BY MOUTH DAILY, Disp: 90 tablet, Rfl: 0    amoxicillin-clavulanate 500-125mg (AUGMENTIN) 500-125 mg Tab, Take 1 tablet (500 mg total) by mouth 2 (two) times daily., Disp: 10 tablet, Rfl: 0    ascorbic acid (VITAMIN C) 100 MG tablet, Take 100 mg by mouth once daily., Disp: , Rfl:     aspirin (ECOTRIN) 81 MG EC tablet, Take 81 mg by mouth once daily., Disp: , Rfl:     atorvastatin (LIPITOR) 10 MG tablet, , Disp: , Rfl: 2    b complex vitamins capsule, Take 1 capsule by mouth once daily., Disp: , Rfl:     BD ULTRA-FINE CONNIE PEN NEEDLES 32 gauge x 5/32" Ndle, Use EVERY DAY, Disp: , Rfl: 3    calcitRIOL (ROCALTROL) 0.5 MCG Cap, TAKE 1 CAPSULE (0.5 MCG TOTAL) BY MOUTH ONCE DAILY., Disp: 90 capsule, Rfl: 0    cholecalciferol, vitamin D3, (VITAMIN D3) 4,000 unit Cap, Take 1 capsule by mouth once daily., Disp: , Rfl:     docusate sodium (COLACE) 100 MG capsule, Take 100 mg by mouth 2 (two) times daily., Disp: , Rfl:     escitalopram oxalate (LEXAPRO) 20 MG tablet, TAKE ONE TABLET BY MOUTH DAILY, Disp: 90 tablet, Rfl: 0    FREESTYLE LITE METER kit, , Disp: , Rfl: 0    FREESTYLE LITE STRIPS Strp, , Disp: , Rfl: 3    HYDROcodone-acetaminophen (NORCO) 5-325 mg per tablet, Take 1 tablet " by mouth every 6 (six) hours as needed for Pain., Disp: 20 tablet, Rfl: 0    levothyroxine (SYNTHROID) 88 MCG tablet, Take 1 tablet (88 mcg total) by mouth before breakfast., Disp: 30 tablet, Rfl: 11    magnesium oxide (MAG-OX) 400 mg tablet, TAKE ONE TABLET BY MOUTH TWICE DAILY, Disp: 180 tablet, Rfl: 0    mirabegron (MYRBETRIQ) 50 mg Tb24, Take 1 tablet (50 mg total) by mouth once daily., Disp: 30 tablet, Rfl: 11    pantoprazole (PROTONIX) 40 MG tablet, TAKE ONE TABLET BY MOUTH DAILY, Disp: 90 tablet, Rfl: 0    solifenacin (VESICARE) 5 MG tablet, Take 1 tablet (5 mg total) by mouth once daily., Disp: 30 tablet, Rfl: 11    TOUJEO SOLOSTAR U-300 INSULIN 300 unit/mL (1.5 mL) InPn pen, INJECT 30 UNITS INTO THE SKIN ONCE DAILY., Disp: 4.5 mL, Rfl: 3    valsartan (DIOVAN) 40 MG tablet, Take 40 mg by mouth once daily. , Disp: , Rfl: 3    VICTOZA 3-XAVIER 0.6 mg/0.1 mL (18 mg/3 mL) PnIj, INJECT 1.8MG SUBCUTANEAOUSLY DAILY, Disp: 9 mL, Rfl: 0      Pathology:  Cancer Staging  No matching staging information was found for the patient.        Trans-anal surgical biopsy 9/23/2019;      FINAL PATHOLOGIC DIAGNOSIS  Rectum, lesion, transanal excision of posterior rectal lesion:  Invasive moderately differentiated adenocarcinoma arising in a tubulovillous adenoma with extensive  high-grade dysplasia.   Invasive adenocarcinoma component measures 10 mm (1 cm) in greatest dimension.  Adenomatous dysplasia extends to the blue inked unoriented circumferential surgical resection margin.  Invasive carcinoma is located 1 mm (0.1 cm) from the deep surgical margin of resection.  See synoptic report in comment section for further details.    Immunohistochemistry (IHC) Testing for Mismatch Repair (MMR) Proteins:  MLH1 - Intact nuclear expression  MSH2 - Intact nuclear expression  MSH6 - Intact nuclear expression  PMS2 - Intact nuclear expression  Background nonneoplastic tissue/internal control with intact nuclear expression  IHC  Interpretation  No loss of nuclear expression of MMR proteins: low probability of microsatellite instability    Histologic type: Adenocarcinoma  Histologic grade: G2; moderately differentiated  Microscopic tumor extension: Tumor invades through the muscularis propria into the pericolorectal tissue    Margins:  All margins are uninvolved by invasive carcinoma; note deep and circumfrential mucosal margins are examined for this transanal disc excision.  Of note, Adenomatous dysplasia from the surrounding tubulovillous adenoma is present at the circumferential mucosal margins of this resection.  Distance of invasive carcinoma from closest margin: Invasive carcinoma is located 1 mm (0.1 cm) from the deep surgical margin.    Lymphovascular invasion: Not identified  Perineural invasion: Not identified  Tumor deposits: Cannot be determined  Regional lymph nodes:  No lymph nodes are submitted or found within the submitted tissue.  Pathologic staging:  Primary tumor:  pT3: Tumor invades through the muscularis propria into the pericolorectal tissues .  Regional lymph nodes:  pNX: Regional lymph nodes cannot be assessed  Distant metastasis:  pMX: Cannot be assessed.    Colonoscopy 8/26/2019:    FINAL PATHOLOGIC DIAGNOSIS  1. ASCENDING COLON, POLYPECTOMY:  Multiple fragments of tubulovillous adenoma (s)  2. ASCENDING COLON, POLYPECTOMY:  Multiple fragments of tubulovillous adenoma (s)  3. RECTAL MASS, BIOPSY:  Multiple fragments of tubulovillous adenoma (s) with focal high-grade dysplasia  Negative for invasive carcinoma  (deeper levels have also been evaluated)    Objective:   Vitals:  Blood pressure (!) 159/82, pulse 80, temperature 98.7 °F (37.1 °C), resp. rate 18, height 5' (1.524 m), weight 70.7 kg (155 lb 12.8 oz), last menstrual period 06/01/2012.    Physical Examination:   GEN: no apparent distress, comfortable; AAOx3  HEAD: atraumatic and normocephalic  EYES: no pallor, no icterus, PERRLA  ENT: OMM, no pharyngeal  erythema, external ears WNL; no nasal discharge; no thrush  NECK: no masses, thyroid normal, trachea midline, no LAD/LN's, supple  CV: RRR with no murmur; normal pulse; normal S1 and S2; no pedal edema  CHEST: Normal respiratory effort; CTAB; normal breath sounds; no wheeze or crackles  ABDOM: nontender and nondistended; soft; normal bowel sounds; no rebound/guarding  MUSC/Skeletal: ROM normal; no crepitus; joints normal; no deformities or arthropathy  EXTREM: no clubbing, cyanosis, inflammation or swelling  SKIN: no rashes, lesions, ulcers, petechiae or subcutaneous nodules  : no hendrickson  NEURO: grossly intact; motor/sensory WNL; AAOx3; no tremors  PSYCH: normal mood, affect and behavior  LYMPH: normal cervical, supraclavicular, axillary and groin LN's      Labs:     9/18/2019  Lab Results   Component Value Date    WBC 7.84 09/18/2019    HGB 11.3 (L) 09/18/2019    HCT 34.1 (L) 09/18/2019    MCV 87 09/18/2019     09/18/2019    CMP  Sodium   Date Value Ref Range Status   09/18/2019 139 136 - 145 mmol/L Final     Potassium   Date Value Ref Range Status   09/18/2019 4.7 3.5 - 5.1 mmol/L Final     Chloride   Date Value Ref Range Status   09/18/2019 99 95 - 110 mmol/L Final     CO2   Date Value Ref Range Status   09/18/2019 31 (H) 23 - 29 mmol/L Final     Glucose   Date Value Ref Range Status   09/18/2019 149 (H) 70 - 110 mg/dL Final     BUN, Bld   Date Value Ref Range Status   09/18/2019 36 (H) 8 - 23 mg/dL Final     Creatinine   Date Value Ref Range Status   09/18/2019 1.8 (H) 0.5 - 1.4 mg/dL Final     Calcium   Date Value Ref Range Status   09/18/2019 9.1 8.7 - 10.5 mg/dL Final     Total Protein   Date Value Ref Range Status   09/18/2019 6.5 6.0 - 8.4 g/dL Final     Albumin   Date Value Ref Range Status   09/18/2019 3.6 3.5 - 5.2 g/dL Final     Total Bilirubin   Date Value Ref Range Status   09/18/2019 0.7 0.1 - 1.0 mg/dL Final     Comment:     For infants and newborns, interpretation of results should be  based  on gestational age, weight and in agreement with clinical  observations.  Premature Infant recommended reference ranges:  Up to 24 hours.............<8.0 mg/dL  Up to 48 hours............<12.0 mg/dL  3-5 days..................<15.0 mg/dL  6-29 days.................<15.0 mg/dL       Alkaline Phosphatase   Date Value Ref Range Status   09/18/2019 87 55 - 135 U/L Final     AST   Date Value Ref Range Status   09/18/2019 17 10 - 40 U/L Final     ALT   Date Value Ref Range Status   09/18/2019 22 10 - 44 U/L Final     Anion Gap   Date Value Ref Range Status   09/18/2019 9 8 - 16 mmol/L Final     eGFR if    Date Value Ref Range Status   09/18/2019 31 (A) >60 mL/min/1.73 m^2 Final     eGFR if non    Date Value Ref Range Status   09/18/2019 27 (A) >60 mL/min/1.73 m^2 Final     Comment:     Calculation used to obtain the estimated glomerular filtration  rate (eGFR) is the CKD-EPI equation.            Radiology/Diagnostic Studies:    MRI pelvis 9/18/2019;    Impression       4 cm mid rectal lesion in keeping with known malignancy.  No discrete extension into the mesorectal fascia although there are several perirectal lymph nodes which raise the possibility for locoregional lymph node involvement     CT Chest/abdomen  7/12/2019:    Impression       1. Absent left kidney with no evidence of recurrent or metastatic renal malignancy.  2. Status post cholecystectomy and hysterectomy.  3. No significant change since prior study.               All lab results and imaging results have been reviewed and discussed with the patient    Assessment:   (1) 78 y.o. female diagnosis of prior left RCC who has been referred by David Mendieta MD for evaluation by medical hematology/oncology. She has been followed by Dr Stanislav Epstein with [facility] at the [facility] in [place]. She last saw Dr Epstein in July 2019. She was recently diagnosed with rectal mass by Dr Armando Duff which was found on  colonoscopy on 8/26/2019. She had presented with lower Gi bleeding at that time. The pathology from those biopsies showed no evidence of malignancy at that time. She was subsequently seen by Dr David Mendieta and underwent trans-anal surgical biopsy on 9/23/2019. The pathology from the surgical biopsy showed rectal carcinoma.       She has been seen by Dr Fareed Hassan with Rad/onc for radiation therapy evaluation.     We discussed giving her combined chemotherapy with the XRt to try to reduce her risk of recurrence. She is agreeable to this plan. Dr Hassan has scheduled a PET.     She will also need a portacath to facilitate the chemotherapy administration    She may or may not require additional adjuvant chemotherapy pending outcome of the neoadjuvant therapy response.         (2) Left renal cell carcinoma s/p left nephrectomy with Dr Azar Donnelly - diagnosed about 2 years ago    (3) Ureterolithiasis    (4) CRI - stage III - followed by Dr Antonio    (5) HTN and hypercholesterolemia    (6) DM - followed by Dr Brower with endocrinology    (7) Hx/of gall stones    (8) Chronic left shoulder issues    (9) MVP    (10) Thyroid disease with prior hx/of thyroid cancer s/p thyroidectomy     (11) Chronic anemia - most likley multifactorial due to iron deficiency, GIB and anemia of chronic renal disease    VISIT DIAGNOSES:              Renal cell carcinoma of left kidney    Normocytic anemia    Rectal mass    Rectal cancer  -     Ambulatory referral to [facility]            Plan:     PLAN:  1. Proceed with the planned PET scan  2. Set up with chemotherapy education nurse  3. She will need to f/u with Dr Mendieta for purpose of portacath placement to facilitate chemotherapy administration  4. RTC in  1 week  Fax note to David Mendieta MD; Pham Epstein Tandrun, Paco; Mo; Andrzej    Pathology Discussion:    I reviewed and discussed the pathology report(s) and radiograph reports (if available) in as simple to  "understand and/or laymen's terms to the best of my ability. I had an indepth conversation with the patient and went over the patient's individual diagnosis based on the information that was currently available. I discussed the TNM staging process with regard to the patient's particular cancer type, and the calculated stage based on the currently available TNM data and literature. I discussed the available prognostic data with regard to the current staging information and how it relates to the prognosis of their particular neoplastic process.        NCCN Guidelines:    I discussed the available treatment option(s) in accordance with the latest literature from the NCCN Clinical Practice Guidelines for the patient's particular type of cancer disorder. The NCCN Guidelines provide a "document evidence-based (and) consensus-driven management" of the care of oncology patients. The treatment recommendations were made not only in accordance to the NCCN guidelines, but also factored in to account the patient's overall age, condition, performance status and their medical co-morbidities. I went over the risks and benefits of the the treatment options (if any could be made) with regard to their particular cancer type, their cancer stage, their age, and their co-morbidities.       Chemotherapy Discussion:      I discussed the available treatment option(s) in accordance with the latest/current national evidence-based guidelines (NCCN, UpToDate, NCI, ASCO, etc where applicable), their overall age/condition and their co-morbidities. I also went over the risks and benefits of the chemotherapy with regard to their particular cancer type, their cancer stage, their age/condition, and their co-morbidities. I provided literature on the chemotherapy regimen and discussed the chemotherapy side-effect profiles of the drug(s). I discussed the importance of compliance with obtaining and monitoring weekly lab work, and went over the potential " hematopathology issues and risks with anemia, leucopenia and thrombocytopenia that can occur with chemotherapy. I discussed the potential risks of liver and kidney damage, which could be permanent and could necessitate dialysis long-term if kidney failure developed. I discussed the emetic and/or diarrheal potential of the regimen and the potential need for use of antiemetic and anti-diarrheal medications. I discussed the risk for development of anaphylactic shock, bronchospasm, dysrhythmia, and respiratory/cardiovascular arrest and/or failure. I discussed the potential risks for development of alopecia, cold sensory issues, ringing in ears, vertigo, cataracts, glaucoma, and neuropathy, all of which could end up being chronic and life-long. Some chemotherpyI discussed the risks of hand-foot syndrome and rashes, and development of other autoimmune mediated processes such as pneumonitis, hepatitis, and colitis which could be life threatening. I discussed the risks of the potential development of a rare but fatal viral mediated disease known as PML (Progressive Multifocal Leukoencephalopathy), and risk of future development of leukemia and/or lymphoma from use of certain chemotherapy agents. I discussed the need for neutropenic precautions, basic hygiene/sanitation behaviors and dietary restrictions.    The patient's consent has been obtained to proceed with the chemotherapy.The patient will be referred to Chemotherapy [facility] /[facility] for training and education on chemotherapy, use of antiemetics and/or anti-diarrheals, use of NSAID's, potential chemotherapy side-effects, and any specific recommendations and precautions with the particular chemotherapy agents.      I answered all of the patient's (and family's, if applicable) questions to the best of my ability and to their complete satisfaction. The patient acknowledged full understanding of the risks, recommendations and plan(s).       I have explained and  the patient understands all of  the current recommendation(s). I have answered all of their questions to the best of my ability and to their complete satisfaction.             Thank you for allowing me to participate in this patient's care. Please call with any questions or concerns.    Electronically signed Carrillo Goode MD  Answers for HPI/ROS submitted by the patient on 10/7/2019   appetite change : Yes  unexpected weight change: No  visual disturbance: No  cough: No  shortness of breath: No  chest pain: No  abdominal pain: No  diarrhea: No  frequency: No  back pain: No  rash: No  headaches: No  adenopathy: No  nervous/ anxious: No

## 2019-10-08 NOTE — PATIENT INSTRUCTIONS
What Is Colon and Rectal Cancer (Colorectal Cancer)?    Cancer happens when cells in the body begin changing and multiplying out of control. These cells can form lumps of tissue called tumors. Cancer that forms in the colon is called colon cancer. Cancer that forms in the rectum is called rectal cancer. These cancers are similar, so they are sometimes just called colorectal cancer.  Understanding the colon and rectum  The colon and rectum make up the large intestine (or large bowel), which is last part of the digestive tract. The colon is a muscular tube that forms the last part of the digestive tract. It absorbs water and stores food waste. The colon is about 5 feet long. The rectum is the last 6 inches of the large intestine. The colon and rectum have a smooth inner lining composed of millions of cells. Changes in these cells can lead to growths in the colon that can become cancerous and should be removed.  When the colon lining changes  Changes that happen in the cells that line the colon or rectum can lead to growths called polyps. Over a period of years, polyps can turn cancerous. Removing polyps early may prevent cancer from ever forming.  · Polyps are fleshy clumps of tissue that form on the inner lining of the colon or rectum. Small polyps are usually benign (not cancerous). However, over time, cells in a type of polyp known as an adenomatous polyp (also called an adenoma) can change and become cancerous. The longer a polyp is there and the larger a polyp grows, the more likely this is to happen.   · Colorectal cancers usually start when polyp cells begin growing abnormally. As a cancerous tumor grows, it can invade into the deeper layers of the colon or rectal wall. In time, cancer can grow beyond the colon or rectum and into nearby organs or it can spread to nearby lymph nodes. The cancer cells can also travel to other parts of the body. This is known as metastasis. The earlier a cancerous tumor is  removed, the better the chance of preventing its spread.  Treatment choices for colon and rectal cancer  You and your healthcare provider will discuss a treatment plan thats best for your needs.  · Surgery is often done to remove the cancerous parts of the colon and rectum. Some surrounding tissue is removed as well, possibly including nearby lymph nodes.  · Chemotherapy may be done in addition to surgery, or instead of surgery if the cancer is advanced. This therapy uses medicines to attack cancer cells. It is considered systemic therapy because it works throughout the body. It's usually done as an outpatient procedure in a healthcare provider's office, clinic, or hospital. You may receive the medicine in pill form or through an IV line or infusion pump (a device that slowly releases medicine into your bloodstream).  · Radiation therapy may be done, especially for rectal cancer. This treatment uses high-energy X-rays to kill cancer cells. Its considered localized therapy because it targets the specific area of the body affected by the cancer. It is usually performed on an outpatient basis in a hospital or radiation clinic.  · Targeted therapy uses drugs that target proteins or cell functions that help cancer cells grow. Some of these drugs are given along with chemotherapy drugs, while others are used by themselves.   Date Last Reviewed: 11/1/2015  © 2693-1766 [employer] [address] [place] [address], [place] [address]. All rights reserved. This information is not intended as a substitute for professional medical care. Always follow your healthcare professional's instructions.

## 2019-10-08 NOTE — TELEPHONE ENCOUNTER
----- Message from Kalli Apple sent at 10/8/2019 11:00 AM CDT -----  Good morning,    Dr. Goode saw Mrs. Rasmussen this morning and plans on starting her on a chemotherapy treatment in the near future, we would like to ask for someone to contact her and schedule an appointment for a port placement please. Thank you.

## 2019-10-08 NOTE — PROGRESS NOTES
I met with patient to complete new patient orientation and to complete the NCCN Distress Screening; patient indicated a rating of 3.  Patient stated she is currently taking Lexapro for depression but does not feel counseling services is warranted at this time.  She stated that is a little nervous and thinks she will feel better once she speaks with Dr. Hassan.  I informed her that in the event she would like to pursue counseling  I will assist with finding someone in her area that accepts her insurance.  She stated that she has lost her appetite since being DX but knows the importance of eating due to being DX with diabetes.  She was interested in speaking with our dietician, I emailed Karo Dennison, regarding aforementioned.  I provided patient with my contact information in the event she needs supportive services in the future.

## 2019-10-08 NOTE — PATIENT INSTRUCTIONS
Radiation to the female pelvis and skin care instruction sheet given.  BRODIE booklet also given.  Patient verbalized understanding.

## 2019-10-09 DIAGNOSIS — Z45.2 ENCOUNTER FOR INSERTION OF VENOUS ACCESS PORT: Primary | ICD-10-CM

## 2019-10-09 DIAGNOSIS — C20 RECTAL CANCER: ICD-10-CM

## 2019-10-09 RX ORDER — LIDOCAINE HYDROCHLORIDE 10 MG/ML
1 INJECTION, SOLUTION EPIDURAL; INFILTRATION; INTRACAUDAL; PERINEURAL ONCE
Status: CANCELLED | OUTPATIENT
Start: 2019-10-09 | End: 2019-10-09

## 2019-10-09 RX ORDER — SODIUM CHLORIDE 9 MG/ML
INJECTION, SOLUTION INTRAVENOUS CONTINUOUS
Status: CANCELLED | OUTPATIENT
Start: 2019-10-09

## 2019-10-09 NOTE — TELEPHONE ENCOUNTER
Surgery is scheduled for 10/15/19 arrival time will be given by the the preop nurse.  The preop nurse will call you from [phone]  Nothing to eat or drink after midnight.  Someone to drive you home.    THE PREOP NURSE WILL CALL, SOMETIMES AS LATE AS 4 or 5 PM IN THE AFTERNOON THE DAY BEFORE SURGERY.    Bathe the night before and the morning of your procedure with a Chlorhexidine wash such as Hibiclens, can be purchased at most Pharmacy's no prescription needed.    Special Instruction: Your surgery is scheduled at the [facility] in [address] [place].

## 2019-10-10 ENCOUNTER — ANESTHESIA EVENT (OUTPATIENT)
Dept: SURGERY | Facility: HOSPITAL | Age: 79
End: 2019-10-10
Payer: COMMERCIAL

## 2019-10-14 RX ORDER — SODIUM CHLORIDE, SODIUM LACTATE, POTASSIUM CHLORIDE, CALCIUM CHLORIDE 600; 310; 30; 20 MG/100ML; MG/100ML; MG/100ML; MG/100ML
500 INJECTION, SOLUTION INTRAVENOUS ONCE
Status: CANCELLED | OUTPATIENT
Start: 2019-10-14 | End: 2019-10-14

## 2019-10-15 ENCOUNTER — HOSPITAL ENCOUNTER (OUTPATIENT)
Facility: HOSPITAL | Age: 79
Discharge: HOME | End: 2019-10-15
Attending: SURGERY | Admitting: SURGERY
Payer: COMMERCIAL

## 2019-10-15 ENCOUNTER — TELEPHONE (OUTPATIENT)
Dept: SURGERY | Facility: CLINIC | Age: 79
End: 2019-10-15

## 2019-10-15 ENCOUNTER — ANESTHESIA (OUTPATIENT)
Dept: SURGERY | Facility: HOSPITAL | Age: 79
End: 2019-10-15
Payer: COMMERCIAL

## 2019-10-15 VITALS
HEIGHT: 61 IN | RESPIRATION RATE: 16 BRPM | HEART RATE: 87 BPM | BODY MASS INDEX: 28.7 KG/M2 | OXYGEN SATURATION: 96 % | SYSTOLIC BLOOD PRESSURE: 165 MMHG | DIASTOLIC BLOOD PRESSURE: 80 MMHG | WEIGHT: 152 LBS | TEMPERATURE: 98 F

## 2019-10-15 DIAGNOSIS — Z45.2 ENCOUNTER FOR INSERTION OF VENOUS ACCESS PORT: ICD-10-CM

## 2019-10-15 DIAGNOSIS — C20 RECTAL CANCER: ICD-10-CM

## 2019-10-15 LAB
GLUCOSE SERPL-MCNC: 80 MG/DL (ref 70–110)
POCT GLUCOSE: 78 MG/DL (ref 70–110)

## 2019-10-15 PROCEDURE — 99900103 DSU ONLY-NO CHARGE-INITIAL HR (STAT): Performed by: SURGERY

## 2019-10-15 PROCEDURE — 63600175 PHARM REV CODE 636 W HCPCS: Performed by: ANESTHESIOLOGY

## 2019-10-15 PROCEDURE — 63600175 PHARM REV CODE 636 W HCPCS: Performed by: NURSE ANESTHETIST, CERTIFIED REGISTERED

## 2019-10-15 PROCEDURE — 77001 CHG FLUOROGUIDE CNTRL VEN ACCESS,PLACE,REPLACE,REMOVE: ICD-10-PCS | Mod: 26,,, | Performed by: SURGERY

## 2019-10-15 PROCEDURE — D9220A PRA ANESTHESIA: Mod: ANES,,, | Performed by: ANESTHESIOLOGY

## 2019-10-15 PROCEDURE — 36000706: Performed by: SURGERY

## 2019-10-15 PROCEDURE — 63600175 PHARM REV CODE 636 W HCPCS: Performed by: SURGERY

## 2019-10-15 PROCEDURE — 25000003 PHARM REV CODE 250: Performed by: ANESTHESIOLOGY

## 2019-10-15 PROCEDURE — C1788 PORT, INDWELLING, IMP: HCPCS | Performed by: SURGERY

## 2019-10-15 PROCEDURE — 99900104 DSU ONLY-NO CHARGE-EA ADD'L HR (STAT): Performed by: SURGERY

## 2019-10-15 PROCEDURE — 71000015 HC POSTOP RECOV 1ST HR: Performed by: SURGERY

## 2019-10-15 PROCEDURE — D9220A PRA ANESTHESIA: ICD-10-PCS | Mod: CRNA,,, | Performed by: NURSE ANESTHETIST, CERTIFIED REGISTERED

## 2019-10-15 PROCEDURE — 77001 FLUOROGUIDE FOR VEIN DEVICE: CPT | Mod: 26,,, | Performed by: SURGERY

## 2019-10-15 PROCEDURE — 37000009 HC ANESTHESIA EA ADD 15 MINS: Performed by: SURGERY

## 2019-10-15 PROCEDURE — 25000003 PHARM REV CODE 250: Performed by: SURGERY

## 2019-10-15 PROCEDURE — 71000033 HC RECOVERY, INTIAL HOUR: Performed by: SURGERY

## 2019-10-15 PROCEDURE — 36561 PR INSERT TUNNELED CV CATH WITH PORT: ICD-10-PCS | Mod: 79,RT,, | Performed by: SURGERY

## 2019-10-15 PROCEDURE — D9220A PRA ANESTHESIA: ICD-10-PCS | Mod: ANES,,, | Performed by: ANESTHESIOLOGY

## 2019-10-15 PROCEDURE — 71000039 HC RECOVERY, EACH ADD'L HOUR: Performed by: SURGERY

## 2019-10-15 PROCEDURE — 37000008 HC ANESTHESIA 1ST 15 MINUTES: Performed by: SURGERY

## 2019-10-15 PROCEDURE — D9220A PRA ANESTHESIA: Mod: CRNA,,, | Performed by: NURSE ANESTHETIST, CERTIFIED REGISTERED

## 2019-10-15 PROCEDURE — 36000707: Performed by: SURGERY

## 2019-10-15 PROCEDURE — 36561 INSERT TUNNELED CV CATH: CPT | Mod: 79,RT,, | Performed by: SURGERY

## 2019-10-15 DEVICE — KIT POWERPORT SINGLE 8FR: Type: IMPLANTABLE DEVICE | Site: SUBCLAVIAN | Status: FUNCTIONAL

## 2019-10-15 RX ORDER — DEXAMETHASONE SODIUM PHOSPHATE 4 MG/ML
INJECTION, SOLUTION INTRA-ARTICULAR; INTRALESIONAL; INTRAMUSCULAR; INTRAVENOUS; SOFT TISSUE
Status: DISCONTINUED | OUTPATIENT
Start: 2019-10-15 | End: 2019-10-15

## 2019-10-15 RX ORDER — GLYCOPYRROLATE 1 MG/1
2 TABLET ORAL
Status: COMPLETED | OUTPATIENT
Start: 2019-10-15 | End: 2019-10-15

## 2019-10-15 RX ORDER — HYDROCODONE BITARTRATE AND ACETAMINOPHEN 5; 325 MG/1; MG/1
1 TABLET ORAL EVERY 4 HOURS PRN
Status: DISCONTINUED | OUTPATIENT
Start: 2019-10-15 | End: 2019-10-15 | Stop reason: HOSPADM

## 2019-10-15 RX ORDER — SODIUM CHLORIDE 0.9 % (FLUSH) 0.9 %
3 SYRINGE (ML) INJECTION EVERY 8 HOURS
Status: DISCONTINUED | OUTPATIENT
Start: 2019-10-15 | End: 2019-10-15 | Stop reason: HOSPADM

## 2019-10-15 RX ORDER — ONDANSETRON 2 MG/ML
4 INJECTION INTRAMUSCULAR; INTRAVENOUS EVERY 12 HOURS PRN
Status: DISCONTINUED | OUTPATIENT
Start: 2019-10-15 | End: 2019-10-15 | Stop reason: HOSPADM

## 2019-10-15 RX ORDER — FENTANYL CITRATE 50 UG/ML
INJECTION, SOLUTION INTRAMUSCULAR; INTRAVENOUS
Status: DISCONTINUED | OUTPATIENT
Start: 2019-10-15 | End: 2019-10-15

## 2019-10-15 RX ORDER — HEPARIN SODIUM (PORCINE) LOCK FLUSH IV SOLN 100 UNIT/ML 100 UNIT/ML
SOLUTION INTRAVENOUS
Status: DISCONTINUED | OUTPATIENT
Start: 2019-10-15 | End: 2019-10-15 | Stop reason: HOSPADM

## 2019-10-15 RX ORDER — HYDROCODONE BITARTRATE AND ACETAMINOPHEN 5; 325 MG/1; MG/1
1 TABLET ORAL EVERY 6 HOURS PRN
Qty: 20 TABLET | Refills: 0 | Status: SHIPPED | OUTPATIENT
Start: 2019-10-15 | End: 2019-11-21

## 2019-10-15 RX ORDER — OXYCODONE HYDROCHLORIDE 5 MG/1
5 TABLET ORAL ONCE AS NEEDED
Status: COMPLETED | OUTPATIENT
Start: 2019-10-15 | End: 2019-10-15

## 2019-10-15 RX ORDER — ONDANSETRON 2 MG/ML
4 INJECTION INTRAMUSCULAR; INTRAVENOUS ONCE AS NEEDED
Status: DISCONTINUED | OUTPATIENT
Start: 2019-10-15 | End: 2019-10-15 | Stop reason: HOSPADM

## 2019-10-15 RX ORDER — ONDANSETRON 2 MG/ML
INJECTION INTRAMUSCULAR; INTRAVENOUS
Status: DISCONTINUED | OUTPATIENT
Start: 2019-10-15 | End: 2019-10-15

## 2019-10-15 RX ORDER — LIDOCAINE HYDROCHLORIDE 10 MG/ML
1 INJECTION, SOLUTION EPIDURAL; INFILTRATION; INTRACAUDAL; PERINEURAL ONCE
Status: DISCONTINUED | OUTPATIENT
Start: 2019-10-15 | End: 2019-10-15 | Stop reason: HOSPADM

## 2019-10-15 RX ORDER — OXYCODONE HYDROCHLORIDE 5 MG/1
5 TABLET ORAL ONCE AS NEEDED
Status: DISCONTINUED | OUTPATIENT
Start: 2019-10-15 | End: 2019-10-15 | Stop reason: HOSPADM

## 2019-10-15 RX ORDER — SODIUM CHLORIDE, SODIUM LACTATE, POTASSIUM CHLORIDE, CALCIUM CHLORIDE 600; 310; 30; 20 MG/100ML; MG/100ML; MG/100ML; MG/100ML
10 INJECTION, SOLUTION INTRAVENOUS CONTINUOUS
Status: DISCONTINUED | OUTPATIENT
Start: 2019-10-15 | End: 2019-10-15 | Stop reason: HOSPADM

## 2019-10-15 RX ORDER — FENTANYL CITRATE 50 UG/ML
25 INJECTION, SOLUTION INTRAMUSCULAR; INTRAVENOUS EVERY 5 MIN PRN
Status: DISCONTINUED | OUTPATIENT
Start: 2019-10-15 | End: 2019-10-15 | Stop reason: HOSPADM

## 2019-10-15 RX ORDER — PROPOFOL 10 MG/ML
VIAL (ML) INTRAVENOUS
Status: DISCONTINUED | OUTPATIENT
Start: 2019-10-15 | End: 2019-10-15

## 2019-10-15 RX ORDER — LIDOCAINE HCL/PF 100 MG/5ML
SYRINGE (ML) INTRAVENOUS
Status: DISCONTINUED | OUTPATIENT
Start: 2019-10-15 | End: 2019-10-15

## 2019-10-15 RX ORDER — SODIUM CHLORIDE 9 MG/ML
INJECTION, SOLUTION INTRAVENOUS CONTINUOUS
Status: DISCONTINUED | OUTPATIENT
Start: 2019-10-15 | End: 2019-10-15 | Stop reason: HOSPADM

## 2019-10-15 RX ORDER — CEFAZOLIN SODIUM 2 G/50ML
2 SOLUTION INTRAVENOUS
Status: DISCONTINUED | OUTPATIENT
Start: 2019-10-15 | End: 2019-10-15 | Stop reason: HOSPADM

## 2019-10-15 RX ADMIN — OXYCODONE HYDROCHLORIDE 5 MG: 5 TABLET ORAL at 01:10

## 2019-10-15 RX ADMIN — GLYCOPYRROLATE 2 MG: 1 TABLET ORAL at 11:10

## 2019-10-15 RX ADMIN — FENTANYL CITRATE 25 MCG: 0.05 INJECTION, SOLUTION INTRAMUSCULAR; INTRAVENOUS at 01:10

## 2019-10-15 RX ADMIN — DEXAMETHASONE SODIUM PHOSPHATE 4 MG: 4 INJECTION, SOLUTION INTRAMUSCULAR; INTRAVENOUS at 12:10

## 2019-10-15 RX ADMIN — PROPOFOL 40 MG: 10 INJECTION, EMULSION INTRAVENOUS at 12:10

## 2019-10-15 RX ADMIN — PROPOFOL 20 MG: 10 INJECTION, EMULSION INTRAVENOUS at 12:10

## 2019-10-15 RX ADMIN — FENTANYL CITRATE 50 MCG: 50 INJECTION, SOLUTION INTRAMUSCULAR; INTRAVENOUS at 12:10

## 2019-10-15 RX ADMIN — CEFAZOLIN SODIUM 2 G: 2 SOLUTION INTRAVENOUS at 12:10

## 2019-10-15 RX ADMIN — LIDOCAINE HYDROCHLORIDE 80 MG: 20 INJECTION, SOLUTION INTRAVENOUS at 12:10

## 2019-10-15 RX ADMIN — ONDANSETRON 4 MG: 2 INJECTION, SOLUTION INTRAMUSCULAR; INTRAVENOUS at 12:10

## 2019-10-15 RX ADMIN — SODIUM CHLORIDE, SODIUM LACTATE, POTASSIUM CHLORIDE, AND CALCIUM CHLORIDE 10 ML/HR: .6; .31; .03; .02 INJECTION, SOLUTION INTRAVENOUS at 10:10

## 2019-10-15 NOTE — TELEPHONE ENCOUNTER
----- Message from Alvina Dias sent at 10/15/2019  1:49 PM CDT -----  Type: Needs Medical Advice     Who Called:  [facility] post op   Symptoms (please be specific):  Request a two week post op appt in [place]   How long has patient had these symptoms:  Next available is 11/05 (3 weeks)  Pharmacy name and phone #:     Best Call Back Number: [phone] (home)     Additional Information: please call to schedule pt

## 2019-10-15 NOTE — OP NOTE
DATE OF PROCEDURE:  10/15/2019    STAFF SURGEON:  David Mendieta M.D.    PREOPERATIVE DIAGNOSIS:  Rectal cancer.    POSTOPERATIVE DIAGNOSIS:  Rectal cancer.    PROCEDURE PERFORMED:  Placement of right subclavian Port-A-Cath.    ANESTHESIA:  Monitored anesthesia.    INDICATION FOR PROCEDURE:  A pleasant 78-year-old female with rectal cancer with   suspected lymph node involvement scheduled to receive neoadjuvant treatment,   needed a port for treatment.    DESCRIPTION OF PROCEDURE:  Following signing of informed consent, she received   preoperative IV antibiotics, taken to the OR and placed in supine position.    SCDs were placed.  Monitored anesthesia was administered without event.  Abdomen   was prepped and draped in standard fashion and appropriate timeout procedure   was performed without event.  Both arms were tucked and shoulder roll was placed   prior to this.  Having prepped and draped the chest and neck, I performed   timeout procedure.  I attempted to obtain access to the left subclavian vein.    Multiple attempts were used to obtain access.  Unfortunately, unable to obtain   access to the vein on the left.  I therefore went to the right side.  First, I got   arterial bleeding.  I stopped the whole pressure for 5 minutes and then obtained   access to the right subclavian vein.  A guidewire was placed and fluoroscopy   was used to confirm position.  I then made an incision incorporating the   insertion site into my incision, carried down to deep dermal tissue down to the   fascia of the pectoralis muscle, raising a flap inferiorly to easily hold the   port.  I took a tunneling sheath and dilator and passing it over the guidewire   under fluoroscopic visualization.  I then removed the guidewire and the dilator.    I next passed the catheter through the tunneling sheath and positioned.  I   removed the tunneling sheath and positioned the catheter on to the port.  I then   secured the port down to the chest  wall with 2-0 Vicryl suture.  I irrigated   and ensured adequate hemostasis.  I closed the wound in 2 layers with 3-0 Vicryl   and 4-0 Monocryl.  The patient was then awakened and taken to Recovery Room in   stable condition.  There were no immediate complications.  Blood loss was 10 mL.      ANGEL/IN  dd: 10/15/2019 13:05:23 (CDT)  td: 10/15/2019 15:24:44 (CDT)  Doc ID   #[id number]  Job ID #[id number]    CC:

## 2019-10-15 NOTE — ANESTHESIA PREPROCEDURE EVALUATION
10/15/2019  Valery Huggins is a 78 y.o., female.    Anesthesia Evaluation    I have reviewed the Patient Summary Reports.    I have reviewed the Nursing Notes.   I have reviewed the Medications.     Review of Systems  Anesthesia Hx:  Hx of Anesthetic complications PONV   Social:  Non-Smoker    Hematology/Oncology:         -- Anemia: Current/Recent Cancer. --  Cancer in past history:  Other (see Oncology comments) surgery    Cardiovascular:   Hypertension Valvular problems/Murmurs, MVP    Renal/:   Chronic Renal Disease, CRI    Neurological:  Neurology Normal    Endocrine:   Diabetes Hypothyroidism    Psych:   Psychiatric History          Physical Exam  General:  Well nourished    Airway/Jaw/Neck:  Airway Findings: Mouth Opening: Normal Tongue: Normal  General Airway Assessment: Adult  Mallampati: II  TM Distance: Normal, at least 6 cm        Eyes/Ears/Nose:  EYES/EARS/NOSE FINDINGS: Normal   Dental:  DENTAL FINDINGS: Normal   Chest/Lungs:  Chest/Lungs Findings: Clear to auscultation, Normal Respiratory Rate     Heart/Vascular:  Heart Findings: Rate: Normal  Rhythm: Regular Rhythm        Mental Status:  Mental Status Findings:  Cooperative, Alert and Oriented         Anesthesia Plan  Type of Anesthesia, risks & benefits discussed:  Anesthesia Type:  MAC  Patient's Preference:   Intra-op Monitoring Plan: standard ASA monitors  Intra-op Monitoring Plan Comments:   Post Op Pain Control Plan: IV/PO Opioids PRN  Post Op Pain Control Plan Comments:   Induction:   IV  Beta Blocker:  Patient is not currently on a Beta-Blocker (No further documentation required).       Informed Consent: Patient understands risks and agrees with Anesthesia plan.  Questions answered. Anesthesia consent signed with patient.  ASA Score: 3     Day of Surgery Review of History & Physical: I have interviewed and examined the patient. I have  reviewed the patient's H&P dated:  There are no significant changes.  H&P update referred to the surgeon.         Ready For Surgery From Anesthesia Perspective.

## 2019-10-15 NOTE — TELEPHONE ENCOUNTER
I called recovery and spoke to Brinda to inform her of the post op appointment on Tuesday, 10/29/19 at 12:30pm in [place].  [place]

## 2019-10-15 NOTE — PLAN OF CARE
Pt aaox4, denies pain and nausea tolerating clear liquids, vss, dressing cdi, spouse updated, pt released by anesthesia to transfer to phase II, report given to ALTAGRACIA King

## 2019-10-15 NOTE — ANESTHESIA POSTPROCEDURE EVALUATION
Anesthesia Post Evaluation    Patient: Valery Huggins    Procedure(s) Performed: Procedure(s) (LRB):  [id number] (Right)    Final Anesthesia Type: MAC  Patient location during evaluation: PACU  Patient participation: Yes- Able to Participate  Level of consciousness: awake and alert  Post-procedure vital signs: reviewed and stable  Pain management: adequate  Airway patency: patent  PONV status at discharge: No PONV  Anesthetic complications: no      Cardiovascular status: hemodynamically stable  Respiratory status: unassisted and room air  Hydration status: euvolemic  Follow-up not needed.          Vitals Value Taken Time   /80 10/15/2019  2:26 PM   Temp 36.5 °C (97.7 °F) 10/15/2019  1:50 PM   Pulse 87 10/15/2019  2:26 PM   Resp 16 10/15/2019  2:26 PM   SpO2 96 % 10/15/2019  2:26 PM         Event Time     Out of Recovery 14:15:00          Pain/Jaek Score: Pain Rating Prior to Med Admin: 6 (10/15/2019  2:00 PM)  Pain Rating Post Med Admin: 0 (10/15/2019  2:06 PM)  Jake Score: 10 (10/15/2019  2:06 PM)

## 2019-10-15 NOTE — TRANSFER OF CARE
"Anesthesia Transfer of Care Note    Patient: Valery Huggins    Procedure(s) Performed: Procedure(s) (LRB):  [id number] (Right)    Patient location: PACU    Anesthesia Type: MAC    Transport from OR: Transported from OR on 2-3 L/min O2 by NC with adequate spontaneous ventilation    Post pain: adequate analgesia    Post assessment: no apparent anesthetic complications and tolerated procedure well    Post vital signs: stable    Level of consciousness: awake, alert and oriented    Nausea/Vomiting: no nausea/vomiting    Complications: none    Transfer of care protocol was followed      Last vitals:   Visit Vitals  BP (!) 171/80 (BP Location: Left arm, Patient Position: Lying)   Pulse 83   Temp 36.5 °C (97.7 °F)   Resp 16   Ht 5' 1" (1.549 m)   Wt 68.9 kg (152 lb)   LMP 06/01/2012   SpO2 99%   Breastfeeding? No   BMI 28.72 kg/m²     "

## 2019-10-15 NOTE — DISCHARGE INSTRUCTIONS
"Discharge Instructions: After Your Surgery/Procedure  Youve just had surgery. During surgery you were given medicine called anesthesia to keep you relaxed and free of pain. After surgery you may have some pain or nausea. This is common. Here are some tips for feeling better and getting well after surgery.     Stay on schedule with your medication.   Going home  Your doctor or nurse will show you how to take care of yourself when you go home. He or she will also answer your questions. Have an adult family member or friend drive you home.      For your safety we recommend these precaution for the first 24 hours after your procedure:  · Do not drive or use heavy equipment.  · Do not make important decisions or sign legal papers.  · Do not drink alcohol.  · Have someone stay with you, if needed. He or she can watch for problems and help keep you safe.  · Your concentration, balance, coordination, and judgement may be impaired for many hours after anesthesia.  Use caution when ambulating or standing up.     · You may feel weak and "washed out" after anesthesia and surgery.      Subtle residual effects of general anesthesia or sedation with regional / local anesthesia can last more than 24 hours.  Rest for the remainder of the day or longer if your Doctor/Surgeon has advised you to do so.  Although you may feel normal within the first 24 hours, your reflexes and mental ability may be impaired without you realizing it.  You may feel dizzy, lightheaded or sleepy for 24 hours or longer.      Be sure to go to all follow-up visits with your doctor. And rest after your surgery for as long as your doctor tells you to.  Coping with pain  If you have pain after surgery, pain medicine will help you feel better. Take it as told, before pain becomes severe. Also, ask your doctor or pharmacist about other ways to control pain. This might be with heat, ice, or relaxation. And follow any other instructions your surgeon or nurse gives " you.  Tips for taking pain medicine  To get the best relief possible, remember these points:  · Pain medicines can upset your stomach. Taking them with a little food may help.  · Most pain relievers taken by mouth need at least 20 to 30 minutes to start to work.  · Taking medicine on a schedule can help you remember to take it. Try to time your medicine so that you can take it before starting an activity. This might be before you get dressed, go for a walk, or sit down for dinner.  · Constipation is a common side effect of pain medicines. Call your doctor before taking any medicines such as laxatives or stool softeners to help ease constipation. Also ask if you should skip any foods. Drinking lots of fluids and eating foods such as fruits and vegetables that are high in fiber can also help. Remember, do not take laxatives unless your surgeon has prescribed them.  · Drinking alcohol and taking pain medicine can cause dizziness and slow your breathing. It can even be deadly. Do not drink alcohol while taking pain medicine.  · Pain medicine can make you react more slowly to things. Do not drive or run machinery while taking pain medicine.  Your health care provider may tell you to take acetaminophen to help ease your pain. Ask him or her how much you are supposed to take each day. Acetaminophen or other pain relievers may interact with your prescription medicines or other over-the-counter (OTC) drugs. Some prescription medicines have acetaminophen and other ingredients. Using both prescription and OTC acetaminophen for pain can cause you to overdose. Read the labels on your OTC medicines with care. This will help you to clearly know the list of ingredients, how much to take, and any warnings. It may also help you not take too much acetaminophen. If you have questions or do not understand the information, ask your pharmacist or health care provider to explain it to you before you take the OTC medicine.  Managing  nausea  Some people have an upset stomach after surgery. This is often because of anesthesia, pain, or pain medicine, or the stress of surgery. These tips will help you handle nausea and eat healthy foods as you get better. If you were on a special food plan before surgery, ask your doctor if you should follow it while you get better. These tips may help:  · Do not push yourself to eat. Your body will tell you when to eat and how much.  · Start off with clear liquids and soup. They are easier to digest.  · Next try semi-solid foods, such as mashed potatoes, applesauce, and gelatin, as you feel ready.  · Slowly move to solid foods. Dont eat fatty, rich, or spicy foods at first.  · Do not force yourself to have 3 large meals a day. Instead eat smaller amounts more often.  · Take pain medicines with a small amount of solid food, such as crackers or toast, to avoid nausea.     Call your surgeon if  · You still have pain an hour after taking medicine. The medicine may not be strong enough.  · You feel too sleepy, dizzy, or groggy. The medicine may be too strong.  · You have side effects like nausea, vomiting, or skin changes, such as rash, itching, or hives.       If you have obstructive sleep apnea  You were given anesthesia medicine during surgery to keep you comfortable and free of pain. After surgery, you may have more apnea spells because of this medicine and other medicines you were given. The spells may last longer than usual.   At home:  · Keep using the continuous positive airway pressure (CPAP) device when you sleep. Unless your health care provider tells you not to, use it when you sleep, day or night. CPAP is a common device used to treat obstructive sleep apnea.  · Talk with your provider before taking any pain medicine, muscle relaxants, or sedatives. Your provider will tell you about the possible dangers of taking these medicines.  © 5981-6821 [employer] [address] [place] [address]  [place] [address]. All rights reserved. This information is not intended as a substitute for professional medical care. Always follow your healthcare professional's instructions.  Post op instructions for prevention of DVT  What is deep vein thrombosis?  Deep vein thrombosis (DVT) is the medical term for blood clots in the deep veins of the leg.  These blood clots can be dangerous.  A DVT can block a blood vessel and keep blood from getting where it needs to go.  Another problem is that the clot can travel to other parts of the body such as the lungs.  A clot that travels to the lungs is called a pulmonary embolus (PE) and can cause serious problems with breathing which can lead to death.  Am I at risk for DVT/PE?  If you are not very active, you are at risk of DVT.  Anyone confined to bed, sitting for long periods of time, recovering from surgery, etc. increases the risk of DVT.  Other risk factors are cancer diagnosis, certain medications, estrogen replacement in any form,older age, obesity, pregnancy, smoking, history of clotting disorders, and dehydration.  How will I know if I have a DVT?   Swelling in the lower leg   Pain   Warmth, redness, hardness or bulging of the vein  If you have any of these symptoms, call your doctors office right away.  Some people will not have any symptoms until the clot moves to the lungs.  What are the symptoms of a PE?   Panting, shortness of breath, or trouble breathing   Sharp, knife-like chest pain when you breathe   Coughing or coughing up blood   Rapid heartbeat  If you have any of these symptoms or get worse quickly, call 911 for emergency treatment.  How can I prevent a DVT?   Avoid long periods of inactivity and dont cross your legs--get up and walk around every hour or so.   Stay active--walking after surgery is highly encouraged.  This means you should get out of the house and walk in the neighborhood.  Going up and down stairs will not impair healing (unless advised  against such activity by your doctor).     Drink plenty of noncaffeinated, nonalcoholic fluids each day to prevent dehydration.   Wear special support stockings, if they have been advised by your doctor.   If you travel, stop at least once an hour and walk around.   Avoid smoking (assistance with stopping is available through your healthcare provider)  Always notify your doctor if you are not able to follow the post operative instructions that are given to you at the time of discharge.  It may be necessary to prescribe one of the medications available to prevent DVT.

## 2019-10-15 NOTE — BRIEF OP NOTE
[facility]  Brief Operative Note     SUMMARY     Surgery Date: 10/15/2019     Surgeon(s) and Role:     * David Mendieta MD - Primary    Assisting Surgeon: None    Pre-op Diagnosis:  Encounter for insertion of venous access port [Z45.2]  Rectal cancer [C20]    Post-op Diagnosis:  Post-Op Diagnosis Codes:     * Encounter for insertion of venous access port [Z45.2]     * Rectal cancer [C20]    Procedure(s) (LRB):  [id number] (Right)    Anesthesia: General    Description of the findings of the procedure: Normal venous anatomy R sided subclavian port placed.  Unable to obtain access on L despite multiple attempts.  R sided placed successfully on second attempt    Findings/Key Components: see above    Estimated Blood Loss: 10 mL         Specimens:   Specimen (12h ago, onward)    None          Discharge Note    SUMMARY     Admit Date: 10/15/2019    Discharge Date and Time:  10/15/2019 1:02 PM    Hospital Course (synopsis of major diagnoses, care, treatment, and services provided during the course of the hospital stay): Pt presented for port placement.  Procedure and post op course were without event and pt was discharged to home.         Final Diagnosis: Post-Op Diagnosis Codes:     * Encounter for insertion of venous access port [Z45.2]     * Rectal cancer [C20]    Disposition: Home or Self Care    Follow Up/Patient Instructions:     Medications:  Reconciled Home Medications:      Medication List      START taking these medications    HYDROcodone-acetaminophen 5-325 mg per tablet  Commonly known as:  NORCO  Take 1 tablet by mouth every 6 (six) hours as needed for Pain.        ASK your doctor about these medications    amLODIPine 5 MG tablet  Commonly known as:  NORVASC  TAKE ONE TABLET BY MOUTH DAILY     aspirin 81 MG EC tablet  Commonly known as:  ECOTRIN  Take 81 mg by mouth once daily.     atorvastatin 10 MG tablet  Commonly known as:  LIPITOR  Take 10 mg by mouth every evening.     b  "complex vitamins capsule  Take 1 capsule by mouth once daily.     BD ULTRA-FINE CONNIE PEN NEEDLE 32 gauge x 5/32" Ndle  Generic drug:  pen needle, diabetic  Use EVERY DAY     calcitRIOL 0.5 MCG Cap  Commonly known as:  ROCALTROL  TAKE 1 CAPSULE (0.5 MCG TOTAL) BY MOUTH ONCE DAILY.     docusate sodium 100 MG capsule  Commonly known as:  COLACE  Take 100 mg by mouth 2 (two) times daily.     escitalopram oxalate 20 MG tablet  Commonly known as:  LEXAPRO  TAKE ONE TABLET BY MOUTH DAILY     FREESTYLE LITE METER kit  Generic drug:  blood-glucose meter     FREESTYLE LITE STRIPS Strp  Generic drug:  blood sugar diagnostic     levothyroxine 88 MCG tablet  Commonly known as:  SYNTHROID  Take 1 tablet (88 mcg total) by mouth before breakfast.     magnesium oxide 400 mg (241.3 mg magnesium) tablet  Commonly known as:  MAG-OX  TAKE ONE TABLET BY MOUTH TWICE DAILY     mirabegron 50 mg Tb24  Commonly known as:  MYRBETRIQ  Take 1 tablet (50 mg total) by mouth once daily.     pantoprazole 40 MG tablet  Commonly known as:  PROTONIX  TAKE ONE TABLET BY MOUTH DAILY     solifenacin 5 MG tablet  Commonly known as:  VESICARE  Take 1 tablet (5 mg total) by mouth once daily.     TOUJEO SOLOSTAR U-300 INSULIN 300 unit/mL (1.5 mL) Inpn pen  Generic drug:  insulin glargine (TOUJEO)  INJECT 30 UNITS INTO THE SKIN ONCE DAILY.     valsartan 40 MG tablet  Commonly known as:  DIOVAN  Take 40 mg by mouth once daily.     VICTOZA 3-XAVIER 0.6 mg/0.1 mL (18 mg/3 mL) Pnij  Generic drug:  liraglutide 0.6 mg/0.1 mL (18 mg/3 mL) subq PNIJ  INJECT 1.8MG SUBCUTANEAOUSLY DAILY     VITAMIN C 100 MG tablet  Generic drug:  ascorbic acid (vitamin C)  Take 100 mg by mouth once daily.     VITAMIN D3 4,000 unit Cap  Generic drug:  cholecalciferol (vitamin D3)  Take 1 capsule by mouth once daily.          Discharge Procedure Orders   Diet general     Call MD for:  extreme fatigue     Call MD for:  persistent dizziness or light-headedness     Call MD for:  hives     Call " MD for:  redness, tenderness, or signs of infection (pain, swelling, redness, odor or green/yellow discharge around incision site)     Call MD for:  difficulty breathing, headache or visual disturbances     Call MD for:  severe uncontrolled pain     Call MD for:  persistent nausea and vomiting     Call MD for:  temperature >100.4     Remove dressing in 48 hours     Activity as tolerated     Follow-up Information     David Mendieta MD.    Specialties:  General Surgery, Colon and Rectal Surgery, Surgery  Contact information:  [address] [place] [address]  [phone]

## 2019-10-15 NOTE — INTERVAL H&P NOTE
The patient has been examined and the H&P has been reviewed:    I concur with the findings and changes have been noted since the H&P was written: Lesion has been biopsied and confirmed to be rectal cancer. Pt with enlarged node on MRI and will recieved neoadjuvant treatment    Anesthesia/Surgery risks, benefits and alternative options discussed and understood by patient/family.          Active Hospital Problems    Diagnosis  POA    Encounter for insertion of venous access port [Z45.2]  Not Applicable      Resolved Hospital Problems   No resolved problems to display.

## 2019-10-15 NOTE — PLAN OF CARE
Pt in phase Ii rec. Has 2x2 dressing covered with tegaderm on right shoulder dry and intact  With icepack intact   at bedside explained dc instructions to pt and  both verb understanding  Will wheel to car via wheelchair when able  brenden Rene RN states port a cath is ready to be used.

## 2019-10-16 ENCOUNTER — TELEPHONE (OUTPATIENT)
Dept: HEMATOLOGY/ONCOLOGY | Facility: CLINIC | Age: 79
End: 2019-10-16
Payer: MEDICARE

## 2019-10-16 ENCOUNTER — OFFICE VISIT (OUTPATIENT)
Dept: HEMATOLOGY/ONCOLOGY | Facility: CLINIC | Age: 79
End: 2019-10-16
Payer: COMMERCIAL

## 2019-10-16 ENCOUNTER — CLINICAL SUPPORT (OUTPATIENT)
Dept: HEMATOLOGY/ONCOLOGY | Facility: CLINIC | Age: 79
End: 2019-10-16
Payer: COMMERCIAL

## 2019-10-16 ENCOUNTER — PATIENT MESSAGE (OUTPATIENT)
Dept: SURGERY | Facility: CLINIC | Age: 79
End: 2019-10-16

## 2019-10-16 VITALS
DIASTOLIC BLOOD PRESSURE: 82 MMHG | SYSTOLIC BLOOD PRESSURE: 140 MMHG | RESPIRATION RATE: 20 BRPM | HEART RATE: 99 BPM | WEIGHT: 156.19 LBS | TEMPERATURE: 98 F | BODY MASS INDEX: 29.51 KG/M2

## 2019-10-16 DIAGNOSIS — R59.0 CERVICAL LYMPHADENOPATHY: ICD-10-CM

## 2019-10-16 DIAGNOSIS — Z23 NEED FOR PROPHYLACTIC VACCINATION AND INOCULATION AGAINST INFLUENZA: Primary | ICD-10-CM

## 2019-10-16 DIAGNOSIS — Z90.5 STATUS POST NEPHRECTOMY: ICD-10-CM

## 2019-10-16 DIAGNOSIS — C64.2 RENAL CELL CARCINOMA OF LEFT KIDNEY: ICD-10-CM

## 2019-10-16 DIAGNOSIS — K62.89 RECTAL MASS: ICD-10-CM

## 2019-10-16 DIAGNOSIS — C20 RECTAL CANCER: Primary | ICD-10-CM

## 2019-10-16 PROCEDURE — 3077F PR MOST RECENT SYSTOLIC BLOOD PRESSURE >= 140 MM HG: ICD-10-PCS | Mod: S$GLB,,, | Performed by: INTERNAL MEDICINE

## 2019-10-16 PROCEDURE — 90471 FLU VACCINE - HIGH DOSE (65+) PRESERVATIVE FREE IM: ICD-10-PCS | Mod: S$GLB,,, | Performed by: INTERNAL MEDICINE

## 2019-10-16 PROCEDURE — 90662 IIV NO PRSV INCREASED AG IM: CPT | Mod: S$GLB,,, | Performed by: INTERNAL MEDICINE

## 2019-10-16 PROCEDURE — 1101F PT FALLS ASSESS-DOCD LE1/YR: CPT | Mod: S$GLB,,, | Performed by: INTERNAL MEDICINE

## 2019-10-16 PROCEDURE — 90662 FLU VACCINE - HIGH DOSE (65+) PRESERVATIVE FREE IM: ICD-10-PCS | Mod: S$GLB,,, | Performed by: INTERNAL MEDICINE

## 2019-10-16 PROCEDURE — 3079F PR MOST RECENT DIASTOLIC BLOOD PRESSURE 80-89 MM HG: ICD-10-PCS | Mod: S$GLB,,, | Performed by: INTERNAL MEDICINE

## 2019-10-16 PROCEDURE — 90471 IMMUNIZATION ADMIN: CPT | Mod: S$GLB,,, | Performed by: INTERNAL MEDICINE

## 2019-10-16 PROCEDURE — 3079F DIAST BP 80-89 MM HG: CPT | Mod: S$GLB,,, | Performed by: INTERNAL MEDICINE

## 2019-10-16 PROCEDURE — 99215 PR OFFICE/OUTPT VISIT, EST, LEVL V, 40-54 MIN: ICD-10-PCS | Mod: 25,S$GLB,, | Performed by: INTERNAL MEDICINE

## 2019-10-16 PROCEDURE — 99215 OFFICE O/P EST HI 40 MIN: CPT | Mod: 25,S$GLB,, | Performed by: INTERNAL MEDICINE

## 2019-10-16 PROCEDURE — 1101F PR PT FALLS ASSESS DOC 0-1 FALLS W/OUT INJ PAST YR: ICD-10-PCS | Mod: S$GLB,,, | Performed by: INTERNAL MEDICINE

## 2019-10-16 PROCEDURE — 3077F SYST BP >= 140 MM HG: CPT | Mod: S$GLB,,, | Performed by: INTERNAL MEDICINE

## 2019-10-16 NOTE — PROGRESS NOTES
[facility] Hematology/Oncology  PROGRESS NOTE - 2nd Follow-up Visit      Subjective:       Patient ID:   NAME: Valery Huggins : 1940     78 y.o. female    Referring Doc: David Mendieta MD  Other Physicians: Armando Nath; Stanislav Epstein; Azar Donnelly; Mo    Chief Complaint:  Left RCC and rectal CA f/u    History of Present Illness:     Patient returns today for a 2nd regularly scheduled follow-up visit.  The patient is here today to go over the results of the recently ordered labs, tests and studies. She had portacath placed yesterday with Dr Mendieta. She saw Dr Hassan last week with rad/onc. She is having PET tomorrow. She is here with her . She is still waiting to get chemotherapy school and is to have that today. She is breathing ok. No CP, SOB, HA's or N/V.             ROS:   GEN: normal without any fever, night sweats or weight loss  HEENT: normal with no HA's, sore throat, stiff neck, changes in vision  CV: normal with no CP, SOB, PND, RAYA or orthopnea  PULM: normal with no SOB, cough, hemoptysis, sputum or pleuritic pain  GI: normal with no abdominal pain, nausea, vomiting, constipation, diarrhea, melanotic stools, BRBPR, or hematemesis  : normal with no hematuria, dysuria  BREAST: normal with no mass, discharge, pain  SKIN: normal with no rash, erythema, bruising, or swelling    Allergies:  Review of patient's allergies indicates:   Allergen Reactions    Sutent [sunitinib] Diarrhea and Nausea And Vomiting       Medications:    Current Outpatient Medications:     amLODIPine (NORVASC) 5 MG tablet, TAKE ONE TABLET BY MOUTH DAILY, Disp: 90 tablet, Rfl: 0    ascorbic acid (VITAMIN C) 100 MG tablet, Take 100 mg by mouth once daily., Disp: , Rfl:     aspirin (ECOTRIN) 81 MG EC tablet, Take 81 mg by mouth once daily., Disp: , Rfl:     atorvastatin (LIPITOR) 10 MG tablet, Take 10 mg by mouth every evening. , Disp: , Rfl: 2    b complex vitamins capsule, Take 1 capsule by mouth once daily.,  "Disp: , Rfl:     BD ULTRA-FINE CONNIE PEN NEEDLES 32 gauge x 5/32" Ndle, Use EVERY DAY, Disp: , Rfl: 3    calcitRIOL (ROCALTROL) 0.5 MCG Cap, TAKE 1 CAPSULE (0.5 MCG TOTAL) BY MOUTH ONCE DAILY., Disp: 90 capsule, Rfl: 0    cholecalciferol, vitamin D3, (VITAMIN D3) 4,000 unit Cap, Take 1 capsule by mouth once daily., Disp: , Rfl:     docusate sodium (COLACE) 100 MG capsule, Take 100 mg by mouth 2 (two) times daily., Disp: , Rfl:     escitalopram oxalate (LEXAPRO) 20 MG tablet, TAKE ONE TABLET BY MOUTH DAILY, Disp: 90 tablet, Rfl: 0    FREESTYLE LITE METER kit, , Disp: , Rfl: 0    FREESTYLE LITE STRIPS Strp, , Disp: , Rfl: 3    HYDROcodone-acetaminophen (NORCO) 5-325 mg per tablet, Take 1 tablet by mouth every 6 (six) hours as needed for Pain., Disp: 20 tablet, Rfl: 0    levothyroxine (SYNTHROID) 88 MCG tablet, Take 1 tablet (88 mcg total) by mouth before breakfast., Disp: 30 tablet, Rfl: 11    magnesium oxide (MAG-OX) 400 mg tablet, TAKE ONE TABLET BY MOUTH TWICE DAILY, Disp: 180 tablet, Rfl: 0    mirabegron (MYRBETRIQ) 50 mg Tb24, Take 1 tablet (50 mg total) by mouth once daily., Disp: 30 tablet, Rfl: 11    pantoprazole (PROTONIX) 40 MG tablet, TAKE ONE TABLET BY MOUTH DAILY, Disp: 90 tablet, Rfl: 0    solifenacin (VESICARE) 5 MG tablet, Take 1 tablet (5 mg total) by mouth once daily., Disp: 30 tablet, Rfl: 11    TOUJEO SOLOSTAR U-300 INSULIN 300 unit/mL (1.5 mL) InPn pen, INJECT 30 UNITS INTO THE SKIN ONCE DAILY., Disp: 4.5 mL, Rfl: 3    valsartan (DIOVAN) 40 MG tablet, Take 40 mg by mouth once daily. , Disp: , Rfl: 3    VICTOZA 3-XAVIER 0.6 mg/0.1 mL (18 mg/3 mL) PnIj, INJECT 1.8MG SUBCUTANEAOUSLY DAILY, Disp: 9 mL, Rfl: 0  No current facility-administered medications for this visit.     PMHx/PSHx Updates:  See patient's last visit with me on 10/8/2019.  See H&P on 10/8/2019        Pathology:  Cancer Staging  No matching staging information was found for the patient.          Objective: "     Vitals:  Blood pressure (!) 140/82, pulse 99, temperature 98.4 °F (36.9 °C), temperature source Oral, resp. rate 20, weight 70.9 kg (156 lb 3.2 oz), last menstrual period 06/01/2012.    Physical Examination:   GEN: no apparent distress, comfortable; AAOx3  HEAD: atraumatic and normocephalic  EYES: no pallor, no icterus, PERRLA  ENT: OMM, no pharyngeal erythema, external ears WNL; no nasal discharge; no thrush  NECK: no masses, thyroid normal, trachea midline, no LAD/LN's, supple  CV: RRR with no murmur; normal pulse; normal S1 and S2; no pedal edema  CHEST: Normal respiratory effort; CTAB; normal breath sounds; no wheeze or crackles; portactah on right CW  ABDOM: nontender and nondistended; soft; normal bowel sounds; no rebound/guarding  MUSC/Skeletal: ROM normal; no crepitus; joints normal; no deformities or arthropathy  EXTREM: no clubbing, cyanosis, inflammation or swelling  SKIN: no rashes, lesions, ulcers, petechiae or subcutaneous nodules  : no hendrickson  NEURO: grossly intact; motor/sensory WNL; AAOx3; no tremors  PSYCH: normal mood, affect and behavior  LYMPH: normal cervical, supraclavicular, axillary and groin LN's            Labs:     9/18/2019  Lab Results   Component Value Date    WBC 7.84 09/18/2019    HGB 11.3 (L) 09/18/2019    HCT 34.1 (L) 09/18/2019    MCV 87 09/18/2019     09/18/2019     CMP  Sodium   Date Value Ref Range Status   09/18/2019 139 136 - 145 mmol/L Final     Potassium   Date Value Ref Range Status   09/18/2019 4.7 3.5 - 5.1 mmol/L Final     Chloride   Date Value Ref Range Status   09/18/2019 99 95 - 110 mmol/L Final     CO2   Date Value Ref Range Status   09/18/2019 31 (H) 23 - 29 mmol/L Final     Glucose   Date Value Ref Range Status   09/18/2019 149 (H) 70 - 110 mg/dL Final     BUN, Bld   Date Value Ref Range Status   09/18/2019 36 (H) 8 - 23 mg/dL Final     Creatinine   Date Value Ref Range Status   09/18/2019 1.8 (H) 0.5 - 1.4 mg/dL Final     Calcium   Date Value Ref  Range Status   09/18/2019 9.1 8.7 - 10.5 mg/dL Final     Total Protein   Date Value Ref Range Status   09/18/2019 6.5 6.0 - 8.4 g/dL Final     Albumin   Date Value Ref Range Status   09/18/2019 3.6 3.5 - 5.2 g/dL Final     Total Bilirubin   Date Value Ref Range Status   09/18/2019 0.7 0.1 - 1.0 mg/dL Final     Comment:     For infants and newborns, interpretation of results should be based  on gestational age, weight and in agreement with clinical  observations.  Premature Infant recommended reference ranges:  Up to 24 hours.............<8.0 mg/dL  Up to 48 hours............<12.0 mg/dL  3-5 days..................<15.0 mg/dL  6-29 days.................<15.0 mg/dL       Alkaline Phosphatase   Date Value Ref Range Status   09/18/2019 87 55 - 135 U/L Final     AST   Date Value Ref Range Status   09/18/2019 17 10 - 40 U/L Final     ALT   Date Value Ref Range Status   09/18/2019 22 10 - 44 U/L Final     Anion Gap   Date Value Ref Range Status   09/18/2019 9 8 - 16 mmol/L Final     eGFR if    Date Value Ref Range Status   09/18/2019 31 (A) >60 mL/min/1.73 m^2 Final     eGFR if non    Date Value Ref Range Status   09/18/2019 27 (A) >60 mL/min/1.73 m^2 Final     Comment:     Calculation used to obtain the estimated glomerular filtration  rate (eGFR) is the CKD-EPI equation.              Radiology/Diagnostic Studies:    X-ray Chest Ap Portable    Result Date: 10/15/2019  EXAMINATION: XR CHEST AP PORTABLE CLINICAL HISTORY: s/p port; Encounter for adjustment and management of vascular access device TECHNIQUE: Single frontal view of the chest was performed. COMPARISON: 6/18/2019 FINDINGS: The cardiomediastinal silhouette is stable.  Lungs are clear of infiltrate.  No pleural effusions.  Laya catheter has been inserted from the region of the right subclavian vein with the tip at the level the proximal SVC.     Laya catheter inserted with the tip at the level the proximal SVC.  Lungs are expanded  and clear.  No pneumothorax. Electronically signed by: Nubia Venegas MD Date:    10/15/2019 Time:    14:05    Surg Fl Surgery Fluoro Usage    Result Date: 10/15/2019  See OP Notes for results. IMPRESSION: See OP Notes for results. This procedure was auto-finalized by: [facility]     Mri Rectal Cancer Without Contrast    Result Date: 9/18/2019  EXAMINATION: MRI RECTAL CANCER WITHOUT CONTRAST CLINICAL HISTORY: Rectal cancer, staging, locoregional;rectal anal mass;  Malignant neoplasm of rectum TECHNIQUE: Multiplanar multisequence MR imaging of the pelvis without contrast. COMPARISON: 07/12/2019 FINDINGS: Approximately 4 cm from the anal verge there is a peripherally located lobular mass along the dorsal rectal wall.  This measures 4 cm in length and 3.3 cm in diameter.  There is heterogeneous signal intensity.  There is restricted diffusion in the lesion and no discrete extra colonic extension.  Several lymph nodes are noted in the mesorectal fat including two which measure 3 mm near the inferior sacrum, series 8, image 16, and a 5 mm lymph node at the S2 level, series 8, image 8.  Prominent but nonenlarged bilateral obturator chain lymph nodes also noted, on the right at 4 mm and left at 6 mm.  There is urinary bladder wall thickening which is diffuse.  Moderate degree of stool in the colon.  No dilated bowel loops.  Hysterectomy.     4 cm mid rectal lesion in keeping with known malignancy.  No discrete extension into the mesorectal fascia although there are several perirectal lymph nodes which raise the possibility for locoregional lymph node involvement. Electronically signed by: Bebo Kapoor Date:    09/18/2019 Time:    16:45      I have reviewed all available lab results and radiology reports.    Assessment/Plan:   (1) 78 y.o. female with diagnosis of prior left RCC who has been referred by David Mendieta MD for evaluation by medical hematology/oncology. She has been followed by Dr Stanislav Epstein  with [facility] the [facility] in [place]. She last saw Dr Epstein in July 2019. She was recently diagnosed with rectal mass by Dr Armando Duff which was found on colonoscopy on 8/26/2019. She had presented with lower Gi bleeding at that time. The pathology from those biopsies showed no evidence of malignancy at that time. She was subsequently seen by Dr David Mendieta and underwent trans-anal surgical biopsy on 9/23/2019. The pathology from the surgical biopsy showed rectal carcinoma.         She has been seen by Dr Fareed Hassan with Rad/onc for radiation therapy evaluation.      We discussed giving her combined chemotherapy with the XRt to try to reduce her risk of recurrence. She is agreeable to this plan. Dr Hassan has scheduled a PET.      She had portacath placed yesterday with Dr Mendieta. She saw Dr Hassan last week with rad/onc. She is having PET tomorrow.  She is still waiting to get chemotherapy school and is to have that today    She may or may not require additional adjuvant chemotherapy pending outcome of the neoadjuvant therapy response.            (2) Left renal cell carcinoma s/p left nephrectomy with Dr Azar Donnelly - diagnosed about 2 years ago     (3) Ureterolithiasis     (4) CRI - stage III - followed by Dr Antonio     (5) HTN and hypercholesterolemia     (6) DM - followed by Dr Brower with endocrinology     (7) Hx/of gall stones     (8) Chronic left shoulder issues     (9) MVP     (10) Thyroid disease with prior hx/of thyroid cancer s/p thyroidectomy      (11) Chronic anemia - most likley multifactorial due to iron deficiency, GIB and anemia of chronic renal disease       VISIT DIAGNOSES:      Rectal cancer    Status post Left nephrectomy    Renal cell carcinoma of left kidney    Rectal mass    Cervical lymphadenopathy          PLAN:  1. PET tomorrow  2. Chemotherapy school today  3. Set up to start next week  4. RTC in 1 week  Fax note to [facility] Hassan Parks; [place]  [place]    Discussion:       I spent over 25 mins of time with the patient. Reviewed results of the recently ordered labs, tests and studies; made directives with regards to the results. Over half of this time was spent couseling and coordinating care.    I have explained all of the above in detail and the patient understands all of the current recommendation(s). I have answered all of their questions to the best of my ability and to their complete satisfaction.   The patient is to continue with the current management plan.            Electronically signed by Carrillo Goode MD

## 2019-10-16 NOTE — PROGRESS NOTES
Valery Huggins  [id number]    [facility]   Cancer Center    TITLE: PLAN OF CARE FOR THE CHEMOTHERAPY PATIENT / TEACHING PROTOCOL    PURPOSE: To involve the patient / significant other in the plan of care and to provide teaching to the significant other & patient receiving chemotherapy.    LEVEL: Independent.    CONTENT: The Plan of Care for the chemotherapy patient is individualized and appropriate to the patients needs, strengths, limitations, & goals.  Education includes information regarding chemotherapy side effects, the treatment itself, and self-care  Activities.    GOAL / OUTCOME STANDARDS    PHYSIOLOGIC: The client will remain free or experience minimal side effects or toxicities throughout the chemotherapy treatment period.     PSYCHOLOGIC: The client/significant others will demonstrate positive coping mechanisms in relation to chemotherapy and its side effects.      COGINITIVE: The client/significant others will verbalize understanding of self-care measure to avoid/minimize side effects of the chemotherapy regime.    EVALUATION / COMMENT KEY:    V = Audiovisual/Video  S = Successfully meets outcome  N = Needs further instruction  NA = Not applicable to the patient  P = Previous knowledge  U = Unable to comprehend  * = See progress notes          PLAN OF CARE  INFORMATION TO BE DELIVERED / NURSING INTERVENTIONS DATE EVALUATION   1. Assessment of client/caregiver,         knowledge of cancer diagnosis,         and chemotherapy as a treatment. 1a. Evaluate patient/caregiver learning ability    b. Plan teaching sessions with patient/caregiver according to needs and present anxiety level/ability to learn.    c. Provide Chemotherapy Education Packet,        Mouth Care Protocol,         Specific Patient Education Sheets. 10/16/2019 S   2. Individual chemotherapy treatment         plan. 2a. Review of Chemotherapy Education handout from [employer]            10/16/2019   S   3. Knowledge Deficit &  Self-Management of general side effects common to all chemotherapy:  a. Nausea/Vomiting  b.   Diarrhea  c. Mouth Care  d. Dental care  e. Constipation  f. Hair Loss  g. Potential for infection  h. Fatigue   3a. Reinforce that the majority of side effects from chemotherapy are reversible and are  controlled both in the hospital and at home        (blood counts recover, hair grows back).   b.  Refer to the following for reinforcement of         information post-treatment:  1. Mouth Care Protocol.  2. Bowel Protocol for constipation or diarrhea.  3.  Drug Specific Chemotherapy Information Sheets for each medication patient receiving.    10/16/2019     S     PLAN OF CARE  INFORMATION TO BE DELIVERED / NURSING INTERVENTIONS DATE EVALUATION   h. Potential for bleeding         i. Potential anemia/fatigue         j. Potential sunburn         k. Birth control measures  l. Safety measures post treatment 4.  Chemotherapy Home Care Instruction  and Safety Information Sheet.  A. patient/caregivers to thoroughly cook shellfish (shrimp, crab, etc) to decrease the chance of infection.    B.  Use sunscreen and protective clothing while in the sun.   10/16/2019      4. Knowledge deficit & Self Management of Drug Specific  Side Effects.    a. BLADDER EFFECTS        (Hemorrhagic Cystitis)                  Preventable with adequate hydration; occurs 2-3 days or more post treatment.   1.  Instruct patient to:  a.   Void at least every 2 hours; increase intake.  b.   DO NOT hold urine; go when urge is felt.  c.    Empty bladder at bedtime and on         awakening.  d.   Observe for color changes (red to tea           colored), amount and frequency changes.  e.   Notify oncologist of any abnormalities           in urine or voiding or if you cannot               drink adequate fluids.   10/16/2019   S   b.   CHANGES IN URINE        COLOR:      1.   Instruct patient:  a.   Most evident in first 2-3 voidings after            administration.  b. Lasts less than 24 hours.  c. If urine is discolored 2 or more days post- treatment, notify oncologist.      10/16/2019 S   c.    KIDNEY EFFECTS           (Nephrotoxicity)   1.  Instruct patient to:  a.   Drink 8-16 glasses of fluid/day the day   pre-treatment and 3-4 days post-treatment to maintain hydration; the best way to minimize kidney problems.  b.   Notify oncologist immediately if unable to drink fluids or if changes are noted in urinary elimination.     10/16/2019   S   a. PULMONARY TOXICITY    1. Instruct patient to report symptoms such as shortness of breath, chest pain, shallow breathing, or chest wall discomfort to physician.  2. Reinforce preventative measures used by the health care team.  a. Baseline and periodic PFT and chest x-ray.   10/16/2019   S     PLAN OF CARE INFORMATION TO BE DELIVERED / NURSING INTERVENTIONS DATE EVALUATION   b. NERVE & MUSCLE EFFECTS (neurotoxocity; neuropathy, possible visual/hearing changes)        3. Instruct patient to:    a. Report numbness or tingling of the hands/feet, loss of fine motor movement (buttoning shirt, tying shoelaces), or gait changes to your oncologist.  b. If numbness/tingling are present:  1. protect feet with shoes at all times.  2. Use gloves for washing dishes/gardening & potholders in kitchen.       10/16/2019   S   c. CARDIOTOXICITY  Decreased effectiveness of             cardiac function. Effective are                  cumulative and irreversible.                                    CARDIAC ARRYTHMIAS              4   Instruct:  a. Heart function may be tested before treatment and perdiocally during treatment.  b. Notify oncologist of irregular pulse, palpitations, shortness of breath, or swelling in lower extremities/feet.          Taxol and Taxotere can cause arrhythmias on infusion that resolve once infusion discontinued. Instruct nurse if any irregularity felt.    10/16/2019   S   d. EXTRAVASTION  Occurs when vesicants  leak outside of vein and cause damage to the skin and underlying tissues.   1. Reinforce preventive measures used to avoid complications.  a. Fresh IV site or central line monitored continuously with vesicant IVP.  b. Continuous infusion via central line site and blood return monitored periodically around the clock.  2. Instruct to:  a. Notify nurse of any discomfort, burning, stinging, etc. at IV site during chemotherapy administration.  b. Notify oncologist of any redness, pain, or swelling at IV site after discharge from hospital.   10/16/2019   S   e. HYPERSENSITIVITY can happen with any medication.   1. Instruct patient:  a. Nurse is with them during the initial part of treatment and will be close by to monitor.  b. Pre-medication ordered by the oncologist must be taken on time. If doses are missed, treatment will need to be re-scheduled.  c. Skin redness, itching, or hives appearing after discharge should be reported to oncologist. 10/16/2019   S       PLAN OF CARE INFORMATION TO BE DELIVERED / NURSING INTERVENTIONS DATE EVALUATION   f. FLU-LIKE SYNDROME      1. Instruct patient symptoms are hard to prevent and may include fever, shaking chills, muscle and body aches.  a. Taking prescribed medications from physician if needed.  b. Adequate fluids are important.    2. Reinforce the need to call if temperature is         elevated to 100.4 or more  10/16/2019   S   g. HAND-FOOT SYNDROME  causes painful, symmetric swelling and redness of palms and soles                  5. Instruct patient to report any numbness or tingling in the hands or feet.  6. Explain prevention techniques, such as     a. Use heavy moisturizers to lessen skin dryness and itching, but to avoid if skin is cracked or broken  b. Bathe in tepid water, use non-perfumed soap, and wash gently. Baths with oatmeal or diluted baking soda may be soothing.  c. Avoid tight fitting shoes and repetitive actions, such as rubbing hands or applying pressure to  hands/feet.  7. Review measures to take should syndrome occur:  a. Cold compresses and elevation for          edema  b. Pain medications and other measures as ordered by oncologist.   4.   Syndrome resolves few weeks after therapy. 10/16/2019   S   5. DISCHARGE PLANNING /        EDUCATION 1.    Explain importance of compliance with follow- up  tests (CBC, CMP).  2.    Verify patient/caregiver know:  a.    Oncologists office phone number.  b.    Dates of follow-up appointments.  c.    Prescriptions given for nausea  3.   Review side effects to monitor and notify          oncologist about.  4.   Reinforce the need for patient and caregivers to:  a.    Review information given.  b.    Call oncologists office with questions          or symptoms  5.   Provide Cancer [facility] [employer] Brochure make referrals if needed for financial or .   10/16/2019   S     PROGRESS NOTES:     I met with the patient and spouse today for chemotherapy education. she will be starting treatment with Fluorouracil . We discussed the mechanism of action, potential side effects of this treatment as well as ways she can manage them at home. Some of these side effects include but or not limited to fever, nausea, vomiting, decreased appetite, fatigue, weakness, cytopenias, myalgia/arthralgia, constipation, diarrhea, bleeding, headache, shortness of breath, nail changes, taste change, hair thinning/loss, mood disturbances, or edema. We also discussed dietary modifications she should make although this will be discussed in more detail with the dietician. she was provided with anti-emetic medication, a copy of all of the information we discussed today as well as our contact information. she will be provided a schedule on his first day of treatment. We will obtain labs on a weekly basis and the patient will follow-up with the physician for toxicity monitoring throughout treatment. All questions were answered and an informed consent was  obtained. she was reminded to certainly contact us sooner if needed.  Attached to the patients folder and discussed with the patient the 24 hour/ 7 days a week after hours telephone number for the physician.  Patient notified to call anytime 24/7 because their is a physician on call for any problems that may arise.  Patient also notified to report to [facility] if they can not get in touch with a physician after hours.  Discussed the five wishes booklet with the patient and their family.

## 2019-10-17 ENCOUNTER — HOSPITAL ENCOUNTER (OUTPATIENT)
Dept: RADIOLOGY | Facility: HOSPITAL | Age: 79
Discharge: HOME | End: 2019-10-17
Attending: RADIOLOGY
Payer: COMMERCIAL

## 2019-10-17 VITALS — WEIGHT: 154 LBS | HEIGHT: 61 IN | BODY MASS INDEX: 29.07 KG/M2

## 2019-10-17 DIAGNOSIS — C20 RECTAL CANCER: ICD-10-CM

## 2019-10-17 LAB — GLUCOSE SERPL-MCNC: 98 MG/DL (ref 70–110)

## 2019-10-17 PROCEDURE — A9552 F18 FDG: HCPCS | Mod: PO

## 2019-10-17 PROCEDURE — 78815 PET IMAGE W/CT SKULL-THIGH: CPT | Mod: TC,PO

## 2019-10-18 ENCOUNTER — PATIENT MESSAGE (OUTPATIENT)
Dept: HEMATOLOGY/ONCOLOGY | Facility: CLINIC | Age: 79
End: 2019-10-18

## 2019-10-18 NOTE — PROGRESS NOTES
I followed-up with patient during chemo school; we initially met on 10/8/19 prior to her radiation consult at which time we completed new patient orientation at time she indicated a distress rating of 3.  Patient stated that she does not have any new concerns associated with having to get chemo.  She denied needing psychosocial support at this time.  She has my contact information in the event she needs assistance in the future.

## 2019-10-21 ENCOUNTER — SOCIAL WORK (OUTPATIENT)
Dept: HEMATOLOGY/ONCOLOGY | Facility: CLINIC | Age: 79
End: 2019-10-21

## 2019-10-21 ENCOUNTER — INFUSION (OUTPATIENT)
Dept: INFUSION THERAPY | Facility: HOSPITAL | Age: 79
End: 2019-10-21
Attending: INTERNAL MEDICINE
Payer: COMMERCIAL

## 2019-10-21 VITALS
SYSTOLIC BLOOD PRESSURE: 155 MMHG | DIASTOLIC BLOOD PRESSURE: 80 MMHG | BODY MASS INDEX: 29.13 KG/M2 | RESPIRATION RATE: 18 BRPM | HEART RATE: 90 BPM | OXYGEN SATURATION: 96 % | HEIGHT: 61 IN | TEMPERATURE: 98 F | WEIGHT: 154.31 LBS

## 2019-10-21 DIAGNOSIS — C20 RECTAL CANCER: Primary | ICD-10-CM

## 2019-10-21 PROCEDURE — 63600175 PHARM REV CODE 636 W HCPCS: Performed by: INTERNAL MEDICINE

## 2019-10-21 PROCEDURE — 96367 TX/PROPH/DG ADDL SEQ IV INF: CPT

## 2019-10-21 PROCEDURE — 96365 THER/PROPH/DIAG IV INF INIT: CPT

## 2019-10-21 PROCEDURE — 96413 CHEMO IV INFUSION 1 HR: CPT

## 2019-10-21 PROCEDURE — 96416 CHEMO PROLONG INFUSE W/PUMP: CPT

## 2019-10-21 RX ORDER — HEPARIN 100 UNIT/ML
500 SYRINGE INTRAVENOUS
Status: CANCELLED | OUTPATIENT
Start: 2019-10-21

## 2019-10-21 RX ORDER — ONDANSETRON 2 MG/ML
8 INJECTION INTRAMUSCULAR; INTRAVENOUS
Status: DISCONTINUED | OUTPATIENT
Start: 2019-10-21 | End: 2019-10-21

## 2019-10-21 RX ORDER — SODIUM CHLORIDE 0.9 % (FLUSH) 0.9 %
10 SYRINGE (ML) INJECTION
Status: CANCELLED | OUTPATIENT
Start: 2019-10-21

## 2019-10-21 RX ORDER — ONDANSETRON 2 MG/ML
8 INJECTION INTRAMUSCULAR; INTRAVENOUS
Status: CANCELLED | OUTPATIENT
Start: 2019-10-21

## 2019-10-21 RX ORDER — SODIUM CHLORIDE 0.9 % (FLUSH) 0.9 %
10 SYRINGE (ML) INJECTION
Status: DISCONTINUED | OUTPATIENT
Start: 2019-10-21 | End: 2019-10-21 | Stop reason: HOSPADM

## 2019-10-21 RX ORDER — HEPARIN 100 UNIT/ML
500 SYRINGE INTRAVENOUS
Status: DISCONTINUED | OUTPATIENT
Start: 2019-10-21 | End: 2019-10-21 | Stop reason: HOSPADM

## 2019-10-21 RX ADMIN — ONDANSETRON: 2 INJECTION INTRAMUSCULAR; INTRAVENOUS at 01:10

## 2019-10-21 RX ADMIN — FLUOROURACIL 1555 MG: 50 INJECTION, SOLUTION INTRAVENOUS at 01:10

## 2019-10-21 NOTE — PLAN OF CARE
Problem: Nausea and Vomiting (Chemotherapy Effects)  Goal: Fluid and Electrolyte Balance  Outcome: Ongoing, Progressing  Intervention: Prevent and Manage Nausea and Vomiting  Flowsheets (Taken 10/21/2019 1505)  Nausea/Vomiting Interventions: slow deep breathing encouraged;nausea triggers minimized  Environmental Support: calm environment promoted;personal routine supported;rest periods encouraged     Problem: Oral Mucositis (Chemotherapy Effects)  Goal: Improved Oral Mucous Membrane Integrity  Outcome: Ongoing, Progressing  Intervention: Promote Oral Comfort and Health  Flowsheets (Taken 10/21/2019 1506)  Oral Mucous Membrane Protection: nonirritating oral foods promoted; nonirritating oral fluids promoted  Oral Care: mouth wash rinse

## 2019-10-21 NOTE — PLAN OF CARE
Problem: Nausea and Vomiting (Chemotherapy Effects)  Goal: Fluid and Electrolyte Balance  Outcome: Ongoing, Progressing  Intervention: Prevent and Manage Nausea and Vomiting  Flowsheets (Taken 10/21/2019 1505)  Nausea/Vomiting Interventions: slow deep breathing encouraged; nausea triggers minimized  Environmental Support: calm environment promoted; personal routine supported; rest periods encouraged     Problem: Oral Mucositis (Chemotherapy Effects)  Goal: Improved Oral Mucous Membrane Integrity  Outcome: Ongoing, Progressing

## 2019-10-22 ENCOUNTER — PATIENT MESSAGE (OUTPATIENT)
Dept: HEMATOLOGY/ONCOLOGY | Facility: CLINIC | Age: 79
End: 2019-10-22

## 2019-10-23 RX ORDER — HEPARIN 100 UNIT/ML
500 SYRINGE INTRAVENOUS
Status: CANCELLED | OUTPATIENT
Start: 2019-10-28

## 2019-10-23 RX ORDER — SODIUM CHLORIDE 0.9 % (FLUSH) 0.9 %
10 SYRINGE (ML) INJECTION
Status: CANCELLED | OUTPATIENT
Start: 2019-10-28

## 2019-10-23 NOTE — TELEPHONE ENCOUNTER
Patient just had petscan with her local oncologist---she is scheduled for CT with you in approx 3-4 weeks. Do you want her to keep that imaging with you?  ~pina

## 2019-10-23 NOTE — PROGRESS NOTES
"[facility] Hematology/Oncology  PROGRESS NOTE -   Follow-up Visit      Subjective:       Patient ID:   NAME: Valery Huggins : 1940     78 y.o. female    Referring Doc: David Mendieta MD  Other Physicians: Armando Nath; Stanislav Epstein; Azar Donnelly; Mo    Chief Complaint:  Left RCC and rectal CA f/u    History of Present Illness:     Patient returns today for a  regularly scheduled follow-up visit.  The patient is here today to go over the results of the recently ordered labs, tests and studies. She has since been on combination XRt and chemotherapy. No CP, SOB, HA's or N/V. She is here by herself. She is doing well so far with no current side-effects.             ROS:   GEN: normal without any fever, night sweats or weight loss  HEENT: normal with no HA's, sore throat, stiff neck, changes in vision  CV: normal with no CP, SOB, PND, RAYA or orthopnea  PULM: normal with no SOB, cough, hemoptysis, sputum or pleuritic pain  GI: normal with no abdominal pain, nausea, vomiting, constipation, diarrhea, melanotic stools, BRBPR, or hematemesis  : normal with no hematuria, dysuria  BREAST: normal with no mass, discharge, pain  SKIN: normal with no rash, erythema, bruising, or swelling    Allergies:  Review of patient's allergies indicates:   Allergen Reactions    Sutent [sunitinib] Diarrhea and Nausea And Vomiting       Medications:    Current Outpatient Medications:     amLODIPine (NORVASC) 5 MG tablet, TAKE ONE TABLET BY MOUTH DAILY, Disp: 90 tablet, Rfl: 0    ascorbic acid (VITAMIN C) 100 MG tablet, Take 100 mg by mouth once daily., Disp: , Rfl:     aspirin (ECOTRIN) 81 MG EC tablet, Take 81 mg by mouth once daily., Disp: , Rfl:     atorvastatin (LIPITOR) 10 MG tablet, Take 10 mg by mouth every evening. , Disp: , Rfl: 2    b complex vitamins capsule, Take 1 capsule by mouth once daily., Disp: , Rfl:     BD ULTRA-FINE CONNIE PEN NEEDLES 32 gauge x " Ndle, Use EVERY DAY, Disp: , Rfl: 3    calcitRIOL " (ROCALTROL) 0.5 MCG Cap, TAKE 1 CAPSULE (0.5 MCG TOTAL) BY MOUTH ONCE DAILY., Disp: 90 capsule, Rfl: 0    cholecalciferol, vitamin D3, (VITAMIN D3) 4,000 unit Cap, Take 1 capsule by mouth once daily., Disp: , Rfl:     docusate sodium (COLACE) 100 MG capsule, Take 100 mg by mouth 2 (two) times daily., Disp: , Rfl:     escitalopram oxalate (LEXAPRO) 20 MG tablet, TAKE ONE TABLET BY MOUTH DAILY, Disp: 90 tablet, Rfl: 0    FREESTYLE LITE METER kit, , Disp: , Rfl: 0    FREESTYLE LITE STRIPS Strp, , Disp: , Rfl: 3    HYDROcodone-acetaminophen (NORCO) 5-325 mg per tablet, Take 1 tablet by mouth every 6 (six) hours as needed for Pain., Disp: 20 tablet, Rfl: 0    levothyroxine (SYNTHROID) 88 MCG tablet, Take 1 tablet (88 mcg total) by mouth before breakfast., Disp: 30 tablet, Rfl: 11    magnesium oxide (MAG-OX) 400 mg tablet, TAKE ONE TABLET BY MOUTH TWICE DAILY, Disp: 180 tablet, Rfl: 0    mirabegron (MYRBETRIQ) 50 mg Tb24, Take 1 tablet (50 mg total) by mouth once daily., Disp: 30 tablet, Rfl: 11    pantoprazole (PROTONIX) 40 MG tablet, TAKE ONE TABLET BY MOUTH DAILY, Disp: 90 tablet, Rfl: 0    solifenacin (VESICARE) 5 MG tablet, Take 1 tablet (5 mg total) by mouth once daily., Disp: 30 tablet, Rfl: 11    TOUJEO SOLOSTAR U-300 INSULIN 300 unit/mL (1.5 mL) InPn pen, INJECT 30 UNITS INTO THE SKIN ONCE DAILY., Disp: 4.5 mL, Rfl: 3    valsartan (DIOVAN) 40 MG tablet, Take 40 mg by mouth once daily. , Disp: , Rfl: 3    VICTOZA 3-XAVIER 0.6 mg/0.1 mL (18 mg/3 mL) PnIj, INJECT 1.8MG SUBCUTANEAOUSLY DAILY, Disp: 9 mL, Rfl: 0    PMHx/PSHx Updates:  See patient's last visit with me on 10/16/2019.  See H&P on 10/8/2019        Pathology:  Cancer Staging  No matching staging information was found for the patient.          Objective:     Vitals:  Blood pressure (!) 178/61, pulse 85, temperature 98.4 °F (36.9 °C), temperature source Oral, resp. rate 20, weight 70.6 kg (155 lb 11.2 oz), last menstrual period  06/01/2012.    Physical Examination:   GEN: no apparent distress, comfortable; AAOx3  HEAD: atraumatic and normocephalic  EYES: no pallor, no icterus, PERRLA  ENT: OMM, no pharyngeal erythema, external ears WNL; no nasal discharge; no thrush  NECK: no masses, thyroid normal, trachea midline, no LAD/LN's, supple  CV: RRR with no murmur; normal pulse; normal S1 and S2; no pedal edema  CHEST: Normal respiratory effort; CTAB; normal breath sounds; no wheeze or crackles; portactah on right CW  ABDOM: nontender and nondistended; soft; normal bowel sounds; no rebound/guarding  MUSC/Skeletal: ROM normal; no crepitus; joints normal; no deformities or arthropathy  EXTREM: no clubbing, cyanosis, inflammation or swelling  SKIN: no rashes, lesions, ulcers, petechiae or subcutaneous nodules  : no hendrickson  NEURO: grossly intact; motor/sensory WNL; AAOx3; no tremors  PSYCH: normal mood, affect and behavior  LYMPH: normal cervical, supraclavicular, axillary and groin LN's            Labs:     9/18/2019  Lab Results   Component Value Date    WBC 7.84 09/18/2019    HGB 11.3 (L) 09/18/2019    HCT 34.1 (L) 09/18/2019    MCV 87 09/18/2019     09/18/2019     CMP  Sodium   Date Value Ref Range Status   09/18/2019 139 136 - 145 mmol/L Final     Potassium   Date Value Ref Range Status   09/18/2019 4.7 3.5 - 5.1 mmol/L Final     Chloride   Date Value Ref Range Status   09/18/2019 99 95 - 110 mmol/L Final     CO2   Date Value Ref Range Status   09/18/2019 31 (H) 23 - 29 mmol/L Final     Glucose   Date Value Ref Range Status   09/18/2019 149 (H) 70 - 110 mg/dL Final     BUN, Bld   Date Value Ref Range Status   09/18/2019 36 (H) 8 - 23 mg/dL Final     Creatinine   Date Value Ref Range Status   09/18/2019 1.8 (H) 0.5 - 1.4 mg/dL Final     Calcium   Date Value Ref Range Status   09/18/2019 9.1 8.7 - 10.5 mg/dL Final     Total Protein   Date Value Ref Range Status   09/18/2019 6.5 6.0 - 8.4 g/dL Final     Albumin   Date Value Ref Range  Status   09/18/2019 3.6 3.5 - 5.2 g/dL Final     Total Bilirubin   Date Value Ref Range Status   09/18/2019 0.7 0.1 - 1.0 mg/dL Final     Comment:     For infants and newborns, interpretation of results should be based  on gestational age, weight and in agreement with clinical  observations.  Premature Infant recommended reference ranges:  Up to 24 hours.............<8.0 mg/dL  Up to 48 hours............<12.0 mg/dL  3-5 days..................<15.0 mg/dL  6-29 days.................<15.0 mg/dL       Alkaline Phosphatase   Date Value Ref Range Status   09/18/2019 87 55 - 135 U/L Final     AST   Date Value Ref Range Status   09/18/2019 17 10 - 40 U/L Final     ALT   Date Value Ref Range Status   09/18/2019 22 10 - 44 U/L Final     Anion Gap   Date Value Ref Range Status   09/18/2019 9 8 - 16 mmol/L Final     eGFR if    Date Value Ref Range Status   09/18/2019 31 (A) >60 mL/min/1.73 m^2 Final     eGFR if non    Date Value Ref Range Status   09/18/2019 27 (A) >60 mL/min/1.73 m^2 Final     Comment:     Calculation used to obtain the estimated glomerular filtration  rate (eGFR) is the CKD-EPI equation.              Radiology/Diagnostic Studies:    PET  10/17/2019    Impression       1. Left paratracheal small mass extending into superior mediastinum with associated moderate FDG activity is unchanged in size and appearance since 02/16/2018 CT.  This is unlikely to represent metastatic disease or malignancy, given stability, and may represent residual thyroid parenchyma but is otherwise nonspecific.  2. Unchanged 6 mm right upper lobe nodule since 02/16/2018.  Benign etiology is likely the continued CT thorax without IV contrast follow-up is recommended in 12 months to document greater than 2 years of stability.  3. No current convincing evidence of metastatic disease.  4. Previous rectal mass is no longer evident on this exam.               X-ray Chest Ap Portable    Result Date:  10/15/2019  EXAMINATION: XR CHEST AP PORTABLE CLINICAL HISTORY: s/p port; Encounter for adjustment and management of vascular access device TECHNIQUE: Single frontal view of the chest was performed. COMPARISON: 6/18/2019 FINDINGS: The cardiomediastinal silhouette is stable.  Lungs are clear of infiltrate.  No pleural effusions.  Laya catheter has been inserted from the region of the right subclavian vein with the tip at the level the proximal SVC.     Laya catheter inserted with the tip at the level the proximal SVC.  Lungs are expanded and clear.  No pneumothorax. Electronically signed by: Nubia Venegas MD Date:    10/15/2019 Time:    14:05    Surg Fl Surgery Fluoro Usage    Result Date: 10/15/2019  See OP Notes for results. IMPRESSION: See OP Notes for results. This procedure was auto-finalized by: [facility]     Mri Rectal Cancer Without Contrast    Result Date: 9/18/2019  EXAMINATION: MRI RECTAL CANCER WITHOUT CONTRAST CLINICAL HISTORY: Rectal cancer, staging, locoregional;rectal anal mass;  Malignant neoplasm of rectum TECHNIQUE: Multiplanar multisequence MR imaging of the pelvis without contrast. COMPARISON: 07/12/2019 FINDINGS: Approximately 4 cm from the anal verge there is a peripherally located lobular mass along the dorsal rectal wall.  This measures 4 cm in length and 3.3 cm in diameter.  There is heterogeneous signal intensity.  There is restricted diffusion in the lesion and no discrete extra colonic extension.  Several lymph nodes are noted in the mesorectal fat including two which measure 3 mm near the inferior sacrum, series 8, image 16, and a 5 mm lymph node at the S2 level, series 8, image 8.  Prominent but nonenlarged bilateral obturator chain lymph nodes also noted, on the right at 4 mm and left at 6 mm.  There is urinary bladder wall thickening which is diffuse.  Moderate degree of stool in the colon.  No dilated bowel loops.  Hysterectomy.     4 cm mid rectal lesion in keeping with  known malignancy.  No discrete extension into the mesorectal fascia although there are several perirectal lymph nodes which raise the possibility for locoregional lymph node involvement. Electronically signed by: Bebo Kapoor Date:    09/18/2019 Time:    16:45      I have reviewed all available lab results and radiology reports.    Assessment/Plan:   (1) 78 y.o. female with diagnosis of prior left RCC who has been referred by David Mendieta MD for evaluation by medical hematology/oncology. She has been followed by Dr Stanislav Epstein with [facility] at the [facility] [place] in [place]. She last saw Dr Epstein in July 2019. She was recently diagnosed with rectal mass by Dr Armando Duff which was found on colonoscopy on 8/26/2019. She had presented with lower Gi bleeding at that time. The pathology from those biopsies showed no evidence of malignancy at that time. She was subsequently seen by Dr David Mendieta and underwent trans-anal surgical biopsy on 9/23/2019. The pathology from the surgical biopsy showed rectal carcinoma.         She has been seen by Dr Fareed Hassan with Rad/onc for radiation therapy evaluation.     PET was done on 10/17/2019     We previously discussed giving her combined chemotherapy with the XRt to try to reduce her risk of recurrence. She was agreeable to this plan.    She had portacath placed with Dr Mendieta. She saw Dr Hassan last week with rad/onc. She has since started weekly XRT and chemotherapy this past Monday.          (2) Left renal cell carcinoma s/p left nephrectomy with Dr Azar Donnelly - diagnosed about 2 years ago     (3) Ureterolithiasis     (4) CRI - stage III - followed by Dr Antonio     (5) HTN and hypercholesterolemia     (6) DM - followed by Dr Brower with endocrinology     (7) Hx/of gall stones     (8) Chronic left shoulder issues     (9) MVP     (10) Thyroid disease with prior hx/of thyroid cancer s/p thyroidectomy      (11) Chronic anemia - most likley multifactorial  due to iron deficiency, GIB and anemia of chronic renal disease       VISIT DIAGNOSES:      Rectal cancer    Normocytic anemia    Renal cell carcinoma of left kidney    Rectal mass    Cervical lymphadenopathy          PLAN:  1.  Encouraged compliance with weekly labs  2.  Continue with current regimen  3.  F/u with PCP, GI, rad/onc , etc  4. She is to call her PCP about her BP  5. RTC in 2-3 weeks  Fax note to Mo/Pham Winston Parks; Tete Duff Tandron    Discussion:       I spent over 25 mins of time with the patient. Reviewed results of the recently ordered labs, tests and studies; made directives with regards to the results. Over half of this time was spent couseling and coordinating care.    I have explained all of the above in detail and the patient understands all of the current recommendation(s). I have answered all of their questions to the best of my ability and to their complete satisfaction.   The patient is to continue with the current management plan.            Electronically signed by Carrillo Goode MD

## 2019-10-24 ENCOUNTER — OFFICE VISIT (OUTPATIENT)
Dept: HEMATOLOGY/ONCOLOGY | Facility: CLINIC | Age: 79
End: 2019-10-24
Payer: COMMERCIAL

## 2019-10-24 VITALS
RESPIRATION RATE: 20 BRPM | SYSTOLIC BLOOD PRESSURE: 178 MMHG | HEART RATE: 85 BPM | DIASTOLIC BLOOD PRESSURE: 61 MMHG | TEMPERATURE: 98 F | WEIGHT: 155.69 LBS | BODY MASS INDEX: 29.42 KG/M2

## 2019-10-24 DIAGNOSIS — K62.89 RECTAL MASS: ICD-10-CM

## 2019-10-24 DIAGNOSIS — C64.2 RENAL CELL CARCINOMA OF LEFT KIDNEY: ICD-10-CM

## 2019-10-24 DIAGNOSIS — D64.9 NORMOCYTIC ANEMIA: ICD-10-CM

## 2019-10-24 DIAGNOSIS — C20 RECTAL CANCER: Primary | ICD-10-CM

## 2019-10-24 DIAGNOSIS — R59.0 CERVICAL LYMPHADENOPATHY: ICD-10-CM

## 2019-10-24 PROCEDURE — 99214 PR OFFICE/OUTPT VISIT, EST, LEVL IV, 30-39 MIN: ICD-10-PCS | Mod: S$GLB,,, | Performed by: INTERNAL MEDICINE

## 2019-10-24 PROCEDURE — 3077F SYST BP >= 140 MM HG: CPT | Mod: S$GLB,,, | Performed by: INTERNAL MEDICINE

## 2019-10-24 PROCEDURE — 1101F PR PT FALLS ASSESS DOC 0-1 FALLS W/OUT INJ PAST YR: ICD-10-PCS | Mod: S$GLB,,, | Performed by: INTERNAL MEDICINE

## 2019-10-24 PROCEDURE — 3078F PR MOST RECENT DIASTOLIC BLOOD PRESSURE < 80 MM HG: ICD-10-PCS | Mod: S$GLB,,, | Performed by: INTERNAL MEDICINE

## 2019-10-24 PROCEDURE — 3077F PR MOST RECENT SYSTOLIC BLOOD PRESSURE >= 140 MM HG: ICD-10-PCS | Mod: S$GLB,,, | Performed by: INTERNAL MEDICINE

## 2019-10-24 PROCEDURE — 1101F PT FALLS ASSESS-DOCD LE1/YR: CPT | Mod: S$GLB,,, | Performed by: INTERNAL MEDICINE

## 2019-10-24 PROCEDURE — 3078F DIAST BP <80 MM HG: CPT | Mod: S$GLB,,, | Performed by: INTERNAL MEDICINE

## 2019-10-24 PROCEDURE — 99214 OFFICE O/P EST MOD 30 MIN: CPT | Mod: S$GLB,,, | Performed by: INTERNAL MEDICINE

## 2019-10-24 NOTE — PROGRESS NOTES
Patient attended the Look Good Feel Better workshop where she received a complimentary make-up kit and tips on how to use it.

## 2019-10-25 ENCOUNTER — INFUSION (OUTPATIENT)
Dept: INFUSION THERAPY | Facility: HOSPITAL | Age: 79
End: 2019-10-25
Attending: INTERNAL MEDICINE
Payer: COMMERCIAL

## 2019-10-25 VITALS
RESPIRATION RATE: 17 BRPM | HEIGHT: 61 IN | SYSTOLIC BLOOD PRESSURE: 177 MMHG | HEART RATE: 90 BPM | BODY MASS INDEX: 29.23 KG/M2 | DIASTOLIC BLOOD PRESSURE: 80 MMHG | OXYGEN SATURATION: 99 % | WEIGHT: 154.81 LBS | TEMPERATURE: 99 F

## 2019-10-25 DIAGNOSIS — C20 RECTAL CANCER: Primary | ICD-10-CM

## 2019-10-25 PROCEDURE — 96523 IRRIG DRUG DELIVERY DEVICE: CPT

## 2019-10-25 PROCEDURE — 25000003 PHARM REV CODE 250: Performed by: INTERNAL MEDICINE

## 2019-10-25 PROCEDURE — 63600175 PHARM REV CODE 636 W HCPCS: Performed by: INTERNAL MEDICINE

## 2019-10-25 PROCEDURE — A4216 STERILE WATER/SALINE, 10 ML: HCPCS | Performed by: INTERNAL MEDICINE

## 2019-10-25 RX ORDER — SODIUM CHLORIDE 0.9 % (FLUSH) 0.9 %
10 SYRINGE (ML) INJECTION
Status: DISCONTINUED | OUTPATIENT
Start: 2019-10-25 | End: 2019-10-25 | Stop reason: HOSPADM

## 2019-10-25 RX ORDER — HEPARIN 100 UNIT/ML
500 SYRINGE INTRAVENOUS
Status: CANCELLED
Start: 2019-10-25

## 2019-10-25 RX ORDER — HEPARIN 100 UNIT/ML
500 SYRINGE INTRAVENOUS
Status: COMPLETED | OUTPATIENT
Start: 2019-10-25 | End: 2019-10-25

## 2019-10-25 RX ORDER — SODIUM CHLORIDE 0.9 % (FLUSH) 0.9 %
10 SYRINGE (ML) INJECTION
Status: CANCELLED
Start: 2019-10-25

## 2019-10-25 RX ADMIN — HEPARIN 500 UNITS: 100 SYRINGE at 01:10

## 2019-10-25 RX ADMIN — SODIUM CHLORIDE, PRESERVATIVE FREE 10 ML: 5 INJECTION INTRAVENOUS at 01:10

## 2019-10-28 ENCOUNTER — INFUSION (OUTPATIENT)
Dept: INFUSION THERAPY | Facility: HOSPITAL | Age: 79
End: 2019-10-28
Attending: INTERNAL MEDICINE
Payer: COMMERCIAL

## 2019-10-28 VITALS
TEMPERATURE: 98 F | HEART RATE: 92 BPM | RESPIRATION RATE: 18 BRPM | SYSTOLIC BLOOD PRESSURE: 137 MMHG | WEIGHT: 154.81 LBS | DIASTOLIC BLOOD PRESSURE: 74 MMHG | BODY MASS INDEX: 29.23 KG/M2 | HEIGHT: 61 IN

## 2019-10-28 DIAGNOSIS — C20 RECTAL CANCER: Primary | ICD-10-CM

## 2019-10-28 PROCEDURE — 96367 TX/PROPH/DG ADDL SEQ IV INF: CPT

## 2019-10-28 PROCEDURE — 96365 THER/PROPH/DIAG IV INF INIT: CPT

## 2019-10-28 PROCEDURE — 96416 CHEMO PROLONG INFUSE W/PUMP: CPT

## 2019-10-28 PROCEDURE — 63600175 PHARM REV CODE 636 W HCPCS: Performed by: INTERNAL MEDICINE

## 2019-10-28 RX ORDER — SODIUM CHLORIDE 0.9 % (FLUSH) 0.9 %
10 SYRINGE (ML) INJECTION
Status: DISCONTINUED | OUTPATIENT
Start: 2019-10-28 | End: 2019-10-28 | Stop reason: HOSPADM

## 2019-10-28 RX ADMIN — ONDANSETRON: 2 INJECTION INTRAMUSCULAR; INTRAVENOUS at 01:10

## 2019-10-28 RX ADMIN — FLUOROURACIL 1555 MG: 50 INJECTION, SOLUTION INTRAVENOUS at 01:10

## 2019-10-28 NOTE — PLAN OF CARE
Problem: Fatigue  Goal: Improved Activity Tolerance  Outcome: Ongoing, Not Progressing  Intervention: Promote Energy Conservation  Flowsheets (Taken 10/28/2019 1341)  Fatigue Management: paced activity encouraged; frequent rest breaks encouraged  Sleep/Rest Enhancement: regular sleep/rest pattern promoted; consistent schedule promoted

## 2019-10-29 ENCOUNTER — OFFICE VISIT (OUTPATIENT)
Dept: SURGERY | Facility: CLINIC | Age: 79
End: 2019-10-29
Payer: COMMERCIAL

## 2019-10-29 ENCOUNTER — PATIENT MESSAGE (OUTPATIENT)
Dept: HEMATOLOGY/ONCOLOGY | Facility: CLINIC | Age: 79
End: 2019-10-29

## 2019-10-29 VITALS — BODY MASS INDEX: 28.89 KG/M2 | TEMPERATURE: 99 F | WEIGHT: 153 LBS | HEIGHT: 61 IN

## 2019-10-29 DIAGNOSIS — Z09 POSTOP CHECK: Primary | ICD-10-CM

## 2019-10-29 PROCEDURE — 99024 PR POST-OP FOLLOW-UP VISIT: ICD-10-PCS | Mod: S$GLB,,, | Performed by: SURGERY

## 2019-10-29 PROCEDURE — 99999 PR PBB SHADOW E&M-EST. PATIENT-LVL III: ICD-10-PCS | Mod: PBBFAC,,, | Performed by: SURGERY

## 2019-10-29 PROCEDURE — 99999 PR PBB SHADOW E&M-EST. PATIENT-LVL III: CPT | Mod: PBBFAC,,, | Performed by: SURGERY

## 2019-10-29 PROCEDURE — 99024 POSTOP FOLLOW-UP VISIT: CPT | Mod: S$GLB,,, | Performed by: SURGERY

## 2019-10-29 RX ORDER — PRASTERONE 6.5 MG/1
1 INSERT VAGINAL DAILY
Refills: 12 | Status: ON HOLD | COMMUNITY
Start: 2019-10-23 | End: 2020-01-28 | Stop reason: CLARIF

## 2019-10-29 NOTE — PROGRESS NOTES
Cc: post op    HPI: 78 y.o.  female  2 weeks s/p port polacement.   Pt notes that she is feeling well.  She started chemorad tx last week.  No n/v.  No pain.      PE: AFVSS    AAOx3  CTA  Soft/NT/nd  Inc: c/d/i        Path: Rectal cancer    A/P:   Pt doing well post surgery.   F/U with me after completion of treatment

## 2019-11-01 ENCOUNTER — INFUSION (OUTPATIENT)
Dept: INFUSION THERAPY | Facility: HOSPITAL | Age: 79
End: 2019-11-01
Attending: INTERNAL MEDICINE
Payer: COMMERCIAL

## 2019-11-01 VITALS
RESPIRATION RATE: 18 BRPM | HEART RATE: 76 BPM | BODY MASS INDEX: 28.89 KG/M2 | DIASTOLIC BLOOD PRESSURE: 89 MMHG | HEIGHT: 61 IN | WEIGHT: 153 LBS | TEMPERATURE: 98 F | SYSTOLIC BLOOD PRESSURE: 165 MMHG

## 2019-11-01 DIAGNOSIS — C20 RECTAL CANCER: Primary | ICD-10-CM

## 2019-11-01 PROCEDURE — 25000003 PHARM REV CODE 250: Performed by: INTERNAL MEDICINE

## 2019-11-01 PROCEDURE — 96523 IRRIG DRUG DELIVERY DEVICE: CPT

## 2019-11-01 PROCEDURE — 63600175 PHARM REV CODE 636 W HCPCS: Performed by: INTERNAL MEDICINE

## 2019-11-01 PROCEDURE — A4216 STERILE WATER/SALINE, 10 ML: HCPCS | Performed by: INTERNAL MEDICINE

## 2019-11-01 RX ORDER — HEPARIN 100 UNIT/ML
SYRINGE INTRAVENOUS
Status: DISCONTINUED
Start: 2019-11-01 | End: 2019-11-01 | Stop reason: HOSPADM

## 2019-11-01 RX ORDER — HEPARIN 100 UNIT/ML
500 SYRINGE INTRAVENOUS
Status: COMPLETED | OUTPATIENT
Start: 2019-11-01 | End: 2019-11-01

## 2019-11-01 RX ORDER — SODIUM CHLORIDE 0.9 % (FLUSH) 0.9 %
10 SYRINGE (ML) INJECTION
Status: DISCONTINUED | OUTPATIENT
Start: 2019-11-01 | End: 2019-11-01 | Stop reason: HOSPADM

## 2019-11-01 RX ADMIN — SODIUM CHLORIDE, PRESERVATIVE FREE 10 ML: 5 INJECTION INTRAVENOUS at 01:11

## 2019-11-01 RX ADMIN — HEPARIN 500 UNITS: 100 SYRINGE at 01:11

## 2019-11-01 NOTE — PLAN OF CARE
Problem: Fatigue  Goal: Improved Activity Tolerance  Outcome: Ongoing, Not Progressing  Intervention: Promote Energy Conservation  Flowsheets (Taken 11/1/2019 1326)  Sleep/Rest Enhancement: consistent schedule promoted; regular sleep/rest pattern promoted

## 2019-11-03 RX ORDER — HEPARIN 100 UNIT/ML
500 SYRINGE INTRAVENOUS
Status: CANCELLED
Start: 2019-11-04

## 2019-11-03 RX ORDER — HEPARIN 100 UNIT/ML
500 SYRINGE INTRAVENOUS
Status: CANCELLED
Start: 2019-11-08

## 2019-11-03 RX ORDER — HEPARIN 100 UNIT/ML
500 SYRINGE INTRAVENOUS
Status: CANCELLED | OUTPATIENT
Start: 2019-11-04

## 2019-11-03 RX ORDER — SODIUM CHLORIDE 0.9 % (FLUSH) 0.9 %
10 SYRINGE (ML) INJECTION
Status: CANCELLED
Start: 2019-11-04

## 2019-11-03 RX ORDER — SODIUM CHLORIDE 0.9 % (FLUSH) 0.9 %
10 SYRINGE (ML) INJECTION
Status: CANCELLED | OUTPATIENT
Start: 2019-11-04

## 2019-11-03 RX ORDER — SODIUM CHLORIDE 0.9 % (FLUSH) 0.9 %
10 SYRINGE (ML) INJECTION
Status: CANCELLED
Start: 2019-11-08

## 2019-11-04 ENCOUNTER — INFUSION (OUTPATIENT)
Dept: INFUSION THERAPY | Facility: HOSPITAL | Age: 79
End: 2019-11-04
Attending: INTERNAL MEDICINE
Payer: COMMERCIAL

## 2019-11-04 VITALS
BODY MASS INDEX: 28.89 KG/M2 | WEIGHT: 153 LBS | RESPIRATION RATE: 19 BRPM | HEIGHT: 61 IN | TEMPERATURE: 99 F | HEART RATE: 86 BPM | DIASTOLIC BLOOD PRESSURE: 98 MMHG | SYSTOLIC BLOOD PRESSURE: 149 MMHG

## 2019-11-04 DIAGNOSIS — C20 RECTAL CANCER: Primary | ICD-10-CM

## 2019-11-04 PROCEDURE — 96367 TX/PROPH/DG ADDL SEQ IV INF: CPT

## 2019-11-04 PROCEDURE — 63600175 PHARM REV CODE 636 W HCPCS: Performed by: INTERNAL MEDICINE

## 2019-11-04 PROCEDURE — 96416 CHEMO PROLONG INFUSE W/PUMP: CPT

## 2019-11-04 PROCEDURE — 96365 THER/PROPH/DIAG IV INF INIT: CPT | Mod: 59

## 2019-11-04 PROCEDURE — A4216 STERILE WATER/SALINE, 10 ML: HCPCS | Performed by: INTERNAL MEDICINE

## 2019-11-04 PROCEDURE — 25000003 PHARM REV CODE 250: Performed by: INTERNAL MEDICINE

## 2019-11-04 RX ORDER — SODIUM CHLORIDE 0.9 % (FLUSH) 0.9 %
10 SYRINGE (ML) INJECTION
Status: DISCONTINUED | OUTPATIENT
Start: 2019-11-04 | End: 2019-11-04 | Stop reason: HOSPADM

## 2019-11-04 RX ADMIN — SODIUM CHLORIDE, PRESERVATIVE FREE 10 ML: 5 INJECTION INTRAVENOUS at 01:11

## 2019-11-04 RX ADMIN — FLUOROURACIL 1555 MG: 50 INJECTION, SOLUTION INTRAVENOUS at 01:11

## 2019-11-04 RX ADMIN — ONDANSETRON: 2 INJECTION INTRAMUSCULAR; INTRAVENOUS at 01:11

## 2019-11-04 NOTE — PLAN OF CARE
Problem: Fatigue  Goal: Improved Activity Tolerance  11/4/2019 1559 by Silvana Ferreira RN  Outcome: Ongoing, Progressing  11/4/2019 1350 by Silvana Ferreira RN  Outcome: Ongoing, Not Progressing  Intervention: Promote Energy Conservation  Flowsheets (Taken 11/4/2019 1350)  Sleep/Rest Enhancement: family presence promoted;regular sleep/rest pattern promoted

## 2019-11-04 NOTE — PLAN OF CARE
Problem: Fatigue  Goal: Improved Activity Tolerance  Outcome: Ongoing, Not Progressing  Intervention: Promote Energy Conservation  Flowsheets (Taken 11/4/2019 1320)  Sleep/Rest Enhancement: family presence promoted; regular sleep/rest pattern promoted

## 2019-11-05 NOTE — PROGRESS NOTES
"[facility] Hematology/Oncology  PROGRESS NOTE -   Follow-up Visit      Subjective:       Patient ID:   NAME: Valery Huggins : 1940     78 y.o. female    Referring Doc: David Mendieta MD  Other Physicians: Armando Nath; Stanislav Epstein; Azar Donnelly; Mo    Chief Complaint:  Left RCC and rectal CA f/u    History of Present Illness:     Patient returns today for a  regularly scheduled follow-up visit.  The patient is here today to go over the results of the recently ordered labs, tests and studies. She has since been on combination XRt and chemotherapy. No CP, SOB, HA's or N/V. She is here with her . She is doing well so far with no current side-effects.             ROS:   GEN: normal without any fever, night sweats or weight loss  HEENT: normal with no HA's, sore throat, stiff neck, changes in vision  CV: normal with no CP, SOB, PND, RAYA or orthopnea  PULM: normal with no SOB, cough, hemoptysis, sputum or pleuritic pain  GI: normal with no abdominal pain, nausea, vomiting, constipation, diarrhea, melanotic stools, BRBPR, or hematemesis  : normal with no hematuria, dysuria  BREAST: normal with no mass, discharge, pain  SKIN: normal with no rash, erythema, bruising, or swelling    Allergies:  Review of patient's allergies indicates:   Allergen Reactions    Sutent [sunitinib] Diarrhea and Nausea And Vomiting       Medications:    Current Outpatient Medications:     amLODIPine (NORVASC) 5 MG tablet, TAKE ONE TABLET BY MOUTH DAILY, Disp: 90 tablet, Rfl: 0    ascorbic acid (VITAMIN C) 100 MG tablet, Take 100 mg by mouth once daily., Disp: , Rfl:     aspirin (ECOTRIN) 81 MG EC tablet, Take 81 mg by mouth once daily., Disp: , Rfl:     atorvastatin (LIPITOR) 10 MG tablet, Take 10 mg by mouth every evening. , Disp: , Rfl: 2    b complex vitamins capsule, Take 1 capsule by mouth once daily., Disp: , Rfl:     BD ULTRA-FINE CONNIE PEN NEEDLES 32 gauge x 5/32" Ndle, Use EVERY DAY, Disp: , Rfl: 3    " calcitRIOL (ROCALTROL) 0.5 MCG Cap, TAKE 1 CAPSULE (0.5 MCG TOTAL) BY MOUTH ONCE DAILY., Disp: 90 capsule, Rfl: 0    cholecalciferol, vitamin D3, (VITAMIN D3) 4,000 unit Cap, Take 1 capsule by mouth once daily., Disp: , Rfl:     docusate sodium (COLACE) 100 MG capsule, Take 100 mg by mouth 2 (two) times daily., Disp: , Rfl:     escitalopram oxalate (LEXAPRO) 20 MG tablet, TAKE ONE TABLET BY MOUTH DAILY, Disp: 90 tablet, Rfl: 0    FREESTYLE LITE METER kit, , Disp: , Rfl: 0    FREESTYLE LITE STRIPS Strp, , Disp: , Rfl: 3    HYDROcodone-acetaminophen (NORCO) 5-325 mg per tablet, Take 1 tablet by mouth every 6 (six) hours as needed for Pain., Disp: 20 tablet, Rfl: 0    INTRAROSA 6.5 mg Inst, Place 1 suppository vaginally once daily., Disp: , Rfl: 12    levothyroxine (SYNTHROID) 88 MCG tablet, Take 1 tablet (88 mcg total) by mouth before breakfast., Disp: 30 tablet, Rfl: 11    magnesium oxide (MAG-OX) 400 mg tablet, TAKE ONE TABLET BY MOUTH TWICE DAILY, Disp: 180 tablet, Rfl: 0    mirabegron (MYRBETRIQ) 50 mg Tb24, Take 1 tablet (50 mg total) by mouth once daily., Disp: 30 tablet, Rfl: 11    pantoprazole (PROTONIX) 40 MG tablet, TAKE ONE TABLET BY MOUTH DAILY, Disp: 90 tablet, Rfl: 0    solifenacin (VESICARE) 5 MG tablet, Take 1 tablet (5 mg total) by mouth once daily., Disp: 30 tablet, Rfl: 11    TOUJEO SOLOSTAR U-300 INSULIN 300 unit/mL (1.5 mL) InPn pen, INJECT 30 UNITS INTO THE SKIN ONCE DAILY., Disp: 4.5 mL, Rfl: 3    valsartan (DIOVAN) 40 MG tablet, Take 40 mg by mouth once daily. , Disp: , Rfl: 3    VICTOZA 3-XAVIER 0.6 mg/0.1 mL (18 mg/3 mL) PnIj, INJECT 1.8MG SUBCUTANEAOUSLY DAILY, Disp: 9 mL, Rfl: 0    PMHx/PSHx Updates:  See patient's last visit with me on 10/16/2019.  See H&P on 10/8/2019        Pathology:  Cancer Staging  No matching staging information was found for the patient.          Objective:     Vitals:  Blood pressure 130/76, pulse 89, temperature 98.8 °F (37.1 °C), temperature source  Oral, resp. rate 18, weight 68.7 kg (151 lb 8 oz), last menstrual period 06/01/2012.    Physical Examination:   GEN: no apparent distress, comfortable; AAOx3  HEAD: atraumatic and normocephalic  EYES: no pallor, no icterus, PERRLA  ENT: OMM, no pharyngeal erythema, external ears WNL; no nasal discharge; no thrush  NECK: no masses, thyroid normal, trachea midline, no LAD/LN's, supple  CV: RRR with no murmur; normal pulse; normal S1 and S2; no pedal edema  CHEST: Normal respiratory effort; CTAB; normal breath sounds; no wheeze or crackles; portactah on right CW  ABDOM: nontender and nondistended; soft; normal bowel sounds; no rebound/guarding  MUSC/Skeletal: ROM normal; no crepitus; joints normal; no deformities or arthropathy  EXTREM: no clubbing, cyanosis, inflammation or swelling  SKIN: no rashes, lesions, ulcers, petechiae or subcutaneous nodules  : no hendrickson  NEURO: grossly intact; motor/sensory WNL; AAOx3; no tremors  PSYCH: normal mood, affect and behavior  LYMPH: normal cervical, supraclavicular, axillary and groin LN's            Labs:     10/25/2019  Lab Results   Component Value Date    WBC 5.3 10/25/2019    HGB 10.0 (L) 10/25/2019    HCT 29.5 (L) 10/25/2019    MCV 87.5 10/25/2019     10/25/2019     CMP  Sodium   Date Value Ref Range Status   10/25/2019 136 135 - 146 mmol/L Final     Potassium   Date Value Ref Range Status   10/25/2019 4.3 3.5 - 5.3 mmol/L Final     Chloride   Date Value Ref Range Status   10/25/2019 97 (L) 98 - 110 mmol/L Final     CO2   Date Value Ref Range Status   10/25/2019 30 20 - 32 mmol/L Final     Glucose   Date Value Ref Range Status   10/25/2019 119 (H) 65 - 99 mg/dL Final     Comment:                   Fasting reference interval     For someone without known diabetes, a glucose value  between 100 and 125 mg/dL is consistent with  prediabetes and should be confirmed with a  follow-up test.          BUN, Bld   Date Value Ref Range Status   10/25/2019 35 (H) 7 - 25 mg/dL  Final     Creatinine   Date Value Ref Range Status   10/25/2019 2.08 (H) 0.60 - 0.93 mg/dL Final     Comment:     For patients >49 years of age, the reference limit  for Creatinine is approximately 13% higher for people  identified as -American.          Calcium   Date Value Ref Range Status   10/25/2019 8.9 8.6 - 10.4 mg/dL Final     Total Protein   Date Value Ref Range Status   10/25/2019 5.9 (L) 6.1 - 8.1 g/dL Final     Albumin   Date Value Ref Range Status   10/25/2019 3.6 3.6 - 5.1 g/dL Final     Total Bilirubin   Date Value Ref Range Status   10/25/2019 0.5 0.2 - 1.2 mg/dL Final     Alkaline Phosphatase   Date Value Ref Range Status   10/25/2019 58 33 - 130 U/L Final     AST   Date Value Ref Range Status   10/25/2019 17 10 - 35 U/L Final     ALT   Date Value Ref Range Status   10/25/2019 14 6 - 29 U/L Final     Anion Gap   Date Value Ref Range Status   09/18/2019 9 8 - 16 mmol/L Final     eGFR if    Date Value Ref Range Status   10/25/2019 26 (L) > OR = 60 mL/min/1.73m2 Final     eGFR if non    Date Value Ref Range Status   10/25/2019 23 (L) > OR = 60 mL/min/1.73m2 Final           Radiology/Diagnostic Studies:    PET  10/17/2019    Impression       1. Left paratracheal small mass extending into superior mediastinum with associated moderate FDG activity is unchanged in size and appearance since 02/16/2018 CT.  This is unlikely to represent metastatic disease or malignancy, given stability, and may represent residual thyroid parenchyma but is otherwise nonspecific.  2. Unchanged 6 mm right upper lobe nodule since 02/16/2018.  Benign etiology is likely the continued CT thorax without IV contrast follow-up is recommended in 12 months to document greater than 2 years of stability.  3. No current convincing evidence of metastatic disease.  4. Previous rectal mass is no longer evident on this exam.               X-ray Chest Ap Portable    Result Date: 10/15/2019  EXAMINATION: XR  CHEST AP PORTABLE CLINICAL HISTORY: s/p port; Encounter for adjustment and management of vascular access device TECHNIQUE: Single frontal view of the chest was performed. COMPARISON: 6/18/2019 FINDINGS: The cardiomediastinal silhouette is stable.  Lungs are clear of infiltrate.  No pleural effusions.  Laya catheter has been inserted from the region of the right subclavian vein with the tip at the level the proximal SVC.     Laya catheter inserted with the tip at the level the proximal SVC.  Lungs are expanded and clear.  No pneumothorax. Electronically signed by: Nubia Venegas MD Date:    10/15/2019 Time:    14:05    Surg Fl Surgery Fluoro Usage    Result Date: 10/15/2019  See OP Notes for results. IMPRESSION: See OP Notes for results. This procedure was auto-finalized by: [facility]     Mri Rectal Cancer Without Contrast    Result Date: 9/18/2019  EXAMINATION: MRI RECTAL CANCER WITHOUT CONTRAST CLINICAL HISTORY: Rectal cancer, staging, locoregional;rectal anal mass;  Malignant neoplasm of rectum TECHNIQUE: Multiplanar multisequence MR imaging of the pelvis without contrast. COMPARISON: 07/12/2019 FINDINGS: Approximately 4 cm from the anal verge there is a peripherally located lobular mass along the dorsal rectal wall.  This measures 4 cm in length and 3.3 cm in diameter.  There is heterogeneous signal intensity.  There is restricted diffusion in the lesion and no discrete extra colonic extension.  Several lymph nodes are noted in the mesorectal fat including two which measure 3 mm near the inferior sacrum, series 8, image 16, and a 5 mm lymph node at the S2 level, series 8, image 8.  Prominent but nonenlarged bilateral obturator chain lymph nodes also noted, on the right at 4 mm and left at 6 mm.  There is urinary bladder wall thickening which is diffuse.  Moderate degree of stool in the colon.  No dilated bowel loops.  Hysterectomy.     4 cm mid rectal lesion in keeping with known malignancy.  No  discrete extension into the mesorectal fascia although there are several perirectal lymph nodes which raise the possibility for locoregional lymph node involvement. Electronically signed by: Bebo Kapoor Date:    09/18/2019 Time:    16:45      I have reviewed all available lab results and radiology reports.    Assessment/Plan:   (1) 78 y.o. female with diagnosis of prior left RCC who has been referred by David Mendieta MD for evaluation by medical hematology/oncology. She has been followed by Dr Stanislav Epstein with [facility] at the [facility] in [place]. She last saw Dr Epstein in July 2019. She was recently diagnosed with rectal mass by Dr Armando Duff which was found on colonoscopy on 8/26/2019. She had presented with lower Gi bleeding at that time. The pathology from those biopsies showed no evidence of malignancy at that time. She was subsequently seen by Dr David Mendieta and underwent trans-anal surgical biopsy on 9/23/2019. The pathology from the surgical biopsy showed rectal carcinoma.         She has been seen by Dr Fareed Hassan with Rad/onc for radiation therapy evaluation.     PET was done on 10/17/2019     We previously discussed giving her combined chemotherapy with the XRt to try to reduce her risk of recurrence. She was agreeable to this plan.    She had portacath placed with Dr Mendieta. She saw Dr Hassan last week with rad/onc. She has since started weekly XRT and chemotherapy and is on her third week.       (2) Left renal cell carcinoma s/p left nephrectomy with Dr Azar Donnelly - diagnosed about 2 years ago     (3) Ureterolithiasis     (4) CRI - stage III - followed by Dr Antonio     (5) HTN and hypercholesterolemia     (6) DM - followed by Dr Brower with endocrinology     (7) Hx/of gall stones     (8) Chronic left shoulder issues     (9) MVP     (10) Thyroid disease with prior hx/of thyroid cancer s/p thyroidectomy      (11) Chronic anemia - most likley multifactorial due to iron  deficiency, GIB and anemia of chronic renal disease       VISIT DIAGNOSES:      Renal cell carcinoma of left kidney    Rectal cancer    Normocytic anemia          PLAN:  1.  Encouraged compliance with weekly labs  2.  Continue with current regimen  3.  F/u with PCP, GI, rad/onc , etc  4. She is to see nephrology next week  5. RTC in 2 weeks  Fax note to Mo/Pham Winston Parks; Tete Duff Tandron    Discussion:       I spent over 25 mins of time with the patient. Reviewed results of the recently ordered labs, tests and studies; made directives with regards to the results. Over half of this time was spent couseling and coordinating care.    I have explained all of the above in detail and the patient understands all of the current recommendation(s). I have answered all of their questions to the best of my ability and to their complete satisfaction.   The patient is to continue with the current management plan.            Electronically signed by Carrillo Goode MD

## 2019-11-06 ENCOUNTER — OFFICE VISIT (OUTPATIENT)
Dept: HEMATOLOGY/ONCOLOGY | Facility: CLINIC | Age: 79
End: 2019-11-06
Payer: COMMERCIAL

## 2019-11-06 VITALS
HEART RATE: 89 BPM | DIASTOLIC BLOOD PRESSURE: 76 MMHG | SYSTOLIC BLOOD PRESSURE: 130 MMHG | WEIGHT: 151.5 LBS | RESPIRATION RATE: 18 BRPM | TEMPERATURE: 99 F | BODY MASS INDEX: 28.63 KG/M2

## 2019-11-06 DIAGNOSIS — C20 RECTAL CANCER: ICD-10-CM

## 2019-11-06 DIAGNOSIS — D64.9 NORMOCYTIC ANEMIA: ICD-10-CM

## 2019-11-06 DIAGNOSIS — C64.2 RENAL CELL CARCINOMA OF LEFT KIDNEY: Primary | ICD-10-CM

## 2019-11-06 PROCEDURE — 3078F PR MOST RECENT DIASTOLIC BLOOD PRESSURE < 80 MM HG: ICD-10-PCS | Mod: S$GLB,,, | Performed by: INTERNAL MEDICINE

## 2019-11-06 PROCEDURE — 99214 PR OFFICE/OUTPT VISIT, EST, LEVL IV, 30-39 MIN: ICD-10-PCS | Mod: S$GLB,,, | Performed by: INTERNAL MEDICINE

## 2019-11-06 PROCEDURE — 1101F PR PT FALLS ASSESS DOC 0-1 FALLS W/OUT INJ PAST YR: ICD-10-PCS | Mod: S$GLB,,, | Performed by: INTERNAL MEDICINE

## 2019-11-06 PROCEDURE — 99214 OFFICE O/P EST MOD 30 MIN: CPT | Mod: S$GLB,,, | Performed by: INTERNAL MEDICINE

## 2019-11-06 PROCEDURE — 1101F PT FALLS ASSESS-DOCD LE1/YR: CPT | Mod: S$GLB,,, | Performed by: INTERNAL MEDICINE

## 2019-11-06 PROCEDURE — 3075F PR MOST RECENT SYSTOLIC BLOOD PRESS GE 130-139MM HG: ICD-10-PCS | Mod: S$GLB,,, | Performed by: INTERNAL MEDICINE

## 2019-11-06 PROCEDURE — 3078F DIAST BP <80 MM HG: CPT | Mod: S$GLB,,, | Performed by: INTERNAL MEDICINE

## 2019-11-06 PROCEDURE — 3075F SYST BP GE 130 - 139MM HG: CPT | Mod: S$GLB,,, | Performed by: INTERNAL MEDICINE

## 2019-11-08 ENCOUNTER — INFUSION (OUTPATIENT)
Dept: INFUSION THERAPY | Facility: HOSPITAL | Age: 79
End: 2019-11-08
Attending: INTERNAL MEDICINE
Payer: COMMERCIAL

## 2019-11-08 VITALS
HEART RATE: 99 BPM | WEIGHT: 154.63 LBS | BODY MASS INDEX: 29.22 KG/M2 | OXYGEN SATURATION: 99 % | DIASTOLIC BLOOD PRESSURE: 68 MMHG | RESPIRATION RATE: 18 BRPM | SYSTOLIC BLOOD PRESSURE: 150 MMHG | TEMPERATURE: 98 F

## 2019-11-08 DIAGNOSIS — C20 RECTAL CANCER: Primary | ICD-10-CM

## 2019-11-08 LAB
ALBUMIN SERPL BCP-MCNC: 3.6 G/DL (ref 3.5–5.2)
ALP SERPL-CCNC: 56 U/L (ref 55–135)
ALT SERPL W/O P-5'-P-CCNC: 14 U/L (ref 10–44)
ANION GAP SERPL CALC-SCNC: 9 MMOL/L (ref 8–16)
AST SERPL-CCNC: 17 U/L (ref 10–40)
BASOPHILS # BLD AUTO: 0.01 K/UL (ref 0–0.2)
BASOPHILS NFR BLD: 0.2 % (ref 0–1.9)
BILIRUB SERPL-MCNC: 0.9 MG/DL (ref 0.1–1)
BUN SERPL-MCNC: 29 MG/DL (ref 8–23)
CALCIUM SERPL-MCNC: 8.4 MG/DL (ref 8.7–10.5)
CHLORIDE SERPL-SCNC: 96 MMOL/L (ref 95–110)
CO2 SERPL-SCNC: 30 MMOL/L (ref 23–29)
CREAT SERPL-MCNC: 2.2 MG/DL (ref 0.5–1.4)
DIFFERENTIAL METHOD: ABNORMAL
EOSINOPHIL # BLD AUTO: 0.2 K/UL (ref 0–0.5)
EOSINOPHIL NFR BLD: 4.9 % (ref 0–8)
ERYTHROCYTE [DISTWIDTH] IN BLOOD BY AUTOMATED COUNT: 13.9 % (ref 11.5–14.5)
EST. GFR  (AFRICAN AMERICAN): 24.4 ML/MIN/1.73 M^2
EST. GFR  (NON AFRICAN AMERICAN): 21.1 ML/MIN/1.73 M^2
GLUCOSE SERPL-MCNC: 181 MG/DL (ref 70–110)
HCT VFR BLD AUTO: 29.8 % (ref 37–48.5)
HGB BLD-MCNC: 9.9 G/DL (ref 12–16)
IMM GRANULOCYTES # BLD AUTO: 0.02 K/UL (ref 0–0.04)
IMM GRANULOCYTES NFR BLD AUTO: 0.4 % (ref 0–0.5)
LYMPHOCYTES # BLD AUTO: 0.6 K/UL (ref 1–4.8)
LYMPHOCYTES NFR BLD: 12 % (ref 18–48)
MCH RBC QN AUTO: 29.6 PG (ref 27–31)
MCHC RBC AUTO-ENTMCNC: 33.2 G/DL (ref 32–36)
MCV RBC AUTO: 89 FL (ref 82–98)
MONOCYTES # BLD AUTO: 0.4 K/UL (ref 0.3–1)
MONOCYTES NFR BLD: 8 % (ref 4–15)
NEUTROPHILS # BLD AUTO: 3.5 K/UL (ref 1.8–7.7)
NEUTROPHILS NFR BLD: 74.5 % (ref 38–73)
NRBC BLD-RTO: 0 /100 WBC
PLATELET # BLD AUTO: 183 K/UL (ref 150–350)
PMV BLD AUTO: 9 FL (ref 9.2–12.9)
POTASSIUM SERPL-SCNC: 4 MMOL/L (ref 3.5–5.1)
PROT SERPL-MCNC: 6.5 G/DL (ref 6–8.4)
RBC # BLD AUTO: 3.35 M/UL (ref 4–5.4)
SODIUM SERPL-SCNC: 135 MMOL/L (ref 136–145)
WBC # BLD AUTO: 4.65 K/UL (ref 3.9–12.7)

## 2019-11-08 PROCEDURE — 80053 COMPREHEN METABOLIC PANEL: CPT

## 2019-11-08 PROCEDURE — 63600175 PHARM REV CODE 636 W HCPCS: Performed by: INTERNAL MEDICINE

## 2019-11-08 PROCEDURE — 85025 COMPLETE CBC W/AUTO DIFF WBC: CPT

## 2019-11-08 PROCEDURE — 36591 DRAW BLOOD OFF VENOUS DEVICE: CPT

## 2019-11-08 RX ORDER — HEPARIN 100 UNIT/ML
500 SYRINGE INTRAVENOUS
Status: COMPLETED | OUTPATIENT
Start: 2019-11-08 | End: 2019-11-08

## 2019-11-08 RX ORDER — SODIUM CHLORIDE 0.9 % (FLUSH) 0.9 %
10 SYRINGE (ML) INJECTION
Status: DISCONTINUED | OUTPATIENT
Start: 2019-11-08 | End: 2019-11-08 | Stop reason: HOSPADM

## 2019-11-08 RX ADMIN — HEPARIN 500 UNITS: 100 SYRINGE at 01:11

## 2019-11-10 RX ORDER — HEPARIN 100 UNIT/ML
500 SYRINGE INTRAVENOUS
Status: CANCELLED
Start: 2019-11-15

## 2019-11-10 RX ORDER — HEPARIN 100 UNIT/ML
500 SYRINGE INTRAVENOUS
Status: CANCELLED | OUTPATIENT
Start: 2019-11-11

## 2019-11-10 RX ORDER — SODIUM CHLORIDE 0.9 % (FLUSH) 0.9 %
10 SYRINGE (ML) INJECTION
Status: CANCELLED
Start: 2019-11-15

## 2019-11-10 RX ORDER — SODIUM CHLORIDE 0.9 % (FLUSH) 0.9 %
10 SYRINGE (ML) INJECTION
Status: CANCELLED | OUTPATIENT
Start: 2019-11-11

## 2019-11-10 RX ORDER — HEPARIN 100 UNIT/ML
500 SYRINGE INTRAVENOUS
Status: CANCELLED
Start: 2019-11-11

## 2019-11-10 RX ORDER — SODIUM CHLORIDE 0.9 % (FLUSH) 0.9 %
10 SYRINGE (ML) INJECTION
Status: CANCELLED
Start: 2019-11-11

## 2019-11-11 ENCOUNTER — INFUSION (OUTPATIENT)
Dept: INFUSION THERAPY | Facility: HOSPITAL | Age: 79
End: 2019-11-11
Attending: INTERNAL MEDICINE
Payer: COMMERCIAL

## 2019-11-11 VITALS
DIASTOLIC BLOOD PRESSURE: 73 MMHG | RESPIRATION RATE: 18 BRPM | HEIGHT: 61 IN | WEIGHT: 152.63 LBS | HEART RATE: 97 BPM | BODY MASS INDEX: 28.82 KG/M2 | TEMPERATURE: 98 F | SYSTOLIC BLOOD PRESSURE: 143 MMHG

## 2019-11-11 DIAGNOSIS — C20 RECTAL CANCER: Primary | ICD-10-CM

## 2019-11-11 PROCEDURE — 96365 THER/PROPH/DIAG IV INF INIT: CPT

## 2019-11-11 PROCEDURE — 63600175 PHARM REV CODE 636 W HCPCS: Performed by: INTERNAL MEDICINE

## 2019-11-11 PROCEDURE — 96416 CHEMO PROLONG INFUSE W/PUMP: CPT

## 2019-11-11 RX ORDER — SODIUM CHLORIDE 0.9 % (FLUSH) 0.9 %
10 SYRINGE (ML) INJECTION
Status: DISCONTINUED | OUTPATIENT
Start: 2019-11-11 | End: 2019-11-11 | Stop reason: HOSPADM

## 2019-11-11 RX ORDER — HEPARIN 100 UNIT/ML
500 SYRINGE INTRAVENOUS
Status: DISCONTINUED | OUTPATIENT
Start: 2019-11-11 | End: 2019-11-11 | Stop reason: HOSPADM

## 2019-11-11 RX ADMIN — ONDANSETRON: 2 INJECTION INTRAMUSCULAR; INTRAVENOUS at 01:11

## 2019-11-11 RX ADMIN — FLUOROURACIL 1555 MG: 50 INJECTION, SOLUTION INTRAVENOUS at 01:11

## 2019-11-11 NOTE — PLAN OF CARE
Problem: Fatigue  Goal: Improved Activity Tolerance  Outcome: Ongoing, Progressing  Intervention: Promote Energy Conservation  Flowsheets (Taken 11/11/2019 2279)  Fatigue Management: frequent rest breaks encouraged  Sleep/Rest Enhancement: regular sleep/rest pattern promoted  Activity Management: ambulated - L4; activity encouraged

## 2019-11-15 ENCOUNTER — INFUSION (OUTPATIENT)
Dept: INFUSION THERAPY | Facility: HOSPITAL | Age: 79
End: 2019-11-15
Attending: INTERNAL MEDICINE
Payer: COMMERCIAL

## 2019-11-15 VITALS
SYSTOLIC BLOOD PRESSURE: 125 MMHG | HEART RATE: 93 BPM | BODY MASS INDEX: 29 KG/M2 | WEIGHT: 153.5 LBS | RESPIRATION RATE: 18 BRPM | TEMPERATURE: 99 F | DIASTOLIC BLOOD PRESSURE: 67 MMHG

## 2019-11-15 DIAGNOSIS — C20 RECTAL CANCER: Primary | ICD-10-CM

## 2019-11-15 PROCEDURE — 96523 IRRIG DRUG DELIVERY DEVICE: CPT

## 2019-11-15 PROCEDURE — 25000003 PHARM REV CODE 250: Performed by: INTERNAL MEDICINE

## 2019-11-15 PROCEDURE — 63600175 PHARM REV CODE 636 W HCPCS: Performed by: INTERNAL MEDICINE

## 2019-11-15 PROCEDURE — A4216 STERILE WATER/SALINE, 10 ML: HCPCS | Performed by: INTERNAL MEDICINE

## 2019-11-15 RX ORDER — SODIUM CHLORIDE 0.9 % (FLUSH) 0.9 %
10 SYRINGE (ML) INJECTION
Status: DISCONTINUED | OUTPATIENT
Start: 2019-11-15 | End: 2019-11-15 | Stop reason: HOSPADM

## 2019-11-15 RX ORDER — HEPARIN 100 UNIT/ML
500 SYRINGE INTRAVENOUS
Status: COMPLETED | OUTPATIENT
Start: 2019-11-15 | End: 2019-11-15

## 2019-11-15 RX ADMIN — HEPARIN 500 UNITS: 100 SYRINGE at 01:11

## 2019-11-15 RX ADMIN — SODIUM CHLORIDE, PRESERVATIVE FREE 10 ML: 5 INJECTION INTRAVENOUS at 01:11

## 2019-11-17 ENCOUNTER — PATIENT MESSAGE (OUTPATIENT)
Dept: HEMATOLOGY/ONCOLOGY | Facility: CLINIC | Age: 79
End: 2019-11-17

## 2019-11-17 RX ORDER — SODIUM CHLORIDE 0.9 % (FLUSH) 0.9 %
10 SYRINGE (ML) INJECTION
Status: CANCELLED
Start: 2019-11-18

## 2019-11-17 RX ORDER — HEPARIN 100 UNIT/ML
500 SYRINGE INTRAVENOUS
Status: CANCELLED | OUTPATIENT
Start: 2019-11-25

## 2019-11-17 RX ORDER — HEPARIN 100 UNIT/ML
500 SYRINGE INTRAVENOUS
Status: CANCELLED | OUTPATIENT
Start: 2019-11-18

## 2019-11-17 RX ORDER — SODIUM CHLORIDE 0.9 % (FLUSH) 0.9 %
10 SYRINGE (ML) INJECTION
Status: CANCELLED | OUTPATIENT
Start: 2019-11-18

## 2019-11-17 RX ORDER — HEPARIN 100 UNIT/ML
500 SYRINGE INTRAVENOUS
Status: CANCELLED
Start: 2019-11-22

## 2019-11-17 RX ORDER — HEPARIN 100 UNIT/ML
500 SYRINGE INTRAVENOUS
Status: CANCELLED
Start: 2019-11-25

## 2019-11-17 RX ORDER — SODIUM CHLORIDE 0.9 % (FLUSH) 0.9 %
10 SYRINGE (ML) INJECTION
Status: CANCELLED
Start: 2019-11-22

## 2019-11-17 RX ORDER — HEPARIN 100 UNIT/ML
500 SYRINGE INTRAVENOUS
Status: CANCELLED
Start: 2019-11-18

## 2019-11-17 RX ORDER — SODIUM CHLORIDE 0.9 % (FLUSH) 0.9 %
10 SYRINGE (ML) INJECTION
Status: CANCELLED
Start: 2019-11-25

## 2019-11-17 RX ORDER — SODIUM CHLORIDE 0.9 % (FLUSH) 0.9 %
10 SYRINGE (ML) INJECTION
Status: CANCELLED | OUTPATIENT
Start: 2019-11-25

## 2019-11-18 ENCOUNTER — INFUSION (OUTPATIENT)
Dept: INFUSION THERAPY | Facility: HOSPITAL | Age: 79
End: 2019-11-18
Attending: INTERNAL MEDICINE
Payer: COMMERCIAL

## 2019-11-18 VITALS
DIASTOLIC BLOOD PRESSURE: 66 MMHG | RESPIRATION RATE: 18 BRPM | HEIGHT: 61 IN | TEMPERATURE: 99 F | HEART RATE: 93 BPM | BODY MASS INDEX: 28.62 KG/M2 | WEIGHT: 151.56 LBS | SYSTOLIC BLOOD PRESSURE: 129 MMHG

## 2019-11-18 DIAGNOSIS — C20 RECTAL CANCER: Primary | ICD-10-CM

## 2019-11-18 PROCEDURE — 96365 THER/PROPH/DIAG IV INF INIT: CPT

## 2019-11-18 PROCEDURE — 96367 TX/PROPH/DG ADDL SEQ IV INF: CPT

## 2019-11-18 PROCEDURE — 63600175 PHARM REV CODE 636 W HCPCS: Performed by: INTERNAL MEDICINE

## 2019-11-18 PROCEDURE — 96366 THER/PROPH/DIAG IV INF ADDON: CPT

## 2019-11-18 PROCEDURE — 96416 CHEMO PROLONG INFUSE W/PUMP: CPT

## 2019-11-18 RX ORDER — SODIUM CHLORIDE 0.9 % (FLUSH) 0.9 %
10 SYRINGE (ML) INJECTION
Status: DISCONTINUED | OUTPATIENT
Start: 2019-11-18 | End: 2019-11-18 | Stop reason: HOSPADM

## 2019-11-18 RX ORDER — HEPARIN 100 UNIT/ML
500 SYRINGE INTRAVENOUS
Status: DISCONTINUED | OUTPATIENT
Start: 2019-11-18 | End: 2019-11-18 | Stop reason: HOSPADM

## 2019-11-18 RX ADMIN — FLUOROURACIL 1555 MG: 50 INJECTION, SOLUTION INTRAVENOUS at 12:11

## 2019-11-18 RX ADMIN — ONDANSETRON: 2 INJECTION INTRAMUSCULAR; INTRAVENOUS at 11:11

## 2019-11-19 DIAGNOSIS — C20 RECTAL CANCER: Primary | ICD-10-CM

## 2019-11-19 RX ORDER — LIDOCAINE HYDROCHLORIDE 20 MG/ML
SOLUTION OROPHARYNGEAL EVERY 4 HOURS
Qty: 100 ML | Refills: 5 | Status: SHIPPED | OUTPATIENT
Start: 2019-11-19 | End: 2020-01-14

## 2019-11-19 NOTE — PROGRESS NOTES
[facility] Hematology/Oncology  PROGRESS NOTE -   Follow-up Visit      Subjective:       Patient ID:   NAME: Valery Huggins : 1940     78 y.o. female    Referring Doc: David Mendieta MD  Other Physicians: Armando Nath; Stanislav Epstein; Azar Donnelly; Mo    Chief Complaint:  Left RCC and rectal CA f/u    History of Present Illness:     Patient returns today for a  regularly scheduled follow-up visit.  The patient is here today to go over the results of the recently ordered labs, tests and studies. She has since been on combination XRt and chemotherapy. No CP, SOB, HA's or N/V. She is here with her . She is doing well so far with no current side-effects. She saw Dr Antonio recently and her kidney function is stable per his report per patient. This is her last week of chemotherapy. She only has XRT through next Wednesday.            ROS:   GEN: normal without any fever, night sweats or weight loss  HEENT: normal with no HA's, sore throat, stiff neck, changes in vision  CV: normal with no CP, SOB, PND, RAYA or orthopnea  PULM: normal with no SOB, cough, hemoptysis, sputum or pleuritic pain  GI: normal with no abdominal pain, nausea, vomiting, constipation, diarrhea, melanotic stools, BRBPR, or hematemesis  : normal with no hematuria, dysuria  BREAST: normal with no mass, discharge, pain  SKIN: normal with no rash, erythema, bruising, or swelling    Allergies:  Review of patient's allergies indicates:   Allergen Reactions    Sutent [sunitinib] Diarrhea and Nausea And Vomiting       Medications:    Current Outpatient Medications:     amLODIPine (NORVASC) 5 MG tablet, TAKE ONE TABLET BY MOUTH DAILY, Disp: 90 tablet, Rfl: 0    ascorbic acid (VITAMIN C) 100 MG tablet, Take 100 mg by mouth once daily., Disp: , Rfl:     aspirin (ECOTRIN) 81 MG EC tablet, Take 81 mg by mouth once daily., Disp: , Rfl:     atorvastatin (LIPITOR) 10 MG tablet, Take 10 mg by mouth every evening. , Disp: , Rfl: 2    b  "complex vitamins capsule, Take 1 capsule by mouth once daily., Disp: , Rfl:     BD ULTRA-FINE CONNIE PEN NEEDLES 32 gauge x 5/32" Ndle, Use EVERY DAY, Disp: , Rfl: 3    calcitRIOL (ROCALTROL) 0.5 MCG Cap, TAKE 1 CAPSULE (0.5 MCG TOTAL) BY MOUTH ONCE DAILY., Disp: 90 capsule, Rfl: 0    cholecalciferol, vitamin D3, (VITAMIN D3) 4,000 unit Cap, Take 1 capsule by mouth once daily., Disp: , Rfl:     docusate sodium (COLACE) 100 MG capsule, Take 100 mg by mouth 2 (two) times daily., Disp: , Rfl:     escitalopram oxalate (LEXAPRO) 20 MG tablet, TAKE ONE TABLET BY MOUTH DAILY, Disp: 90 tablet, Rfl: 0    FREESTYLE LITE METER kit, , Disp: , Rfl: 0    FREESTYLE LITE STRIPS Strp, , Disp: , Rfl: 3    HYDROcodone-acetaminophen (NORCO) 5-325 mg per tablet, Take 1 tablet by mouth every 6 (six) hours as needed for Pain., Disp: 20 tablet, Rfl: 0    INTRAROSA 6.5 mg Inst, Place 1 suppository vaginally once daily., Disp: , Rfl: 12    levothyroxine (SYNTHROID) 88 MCG tablet, Take 1 tablet (88 mcg total) by mouth before breakfast., Disp: 30 tablet, Rfl: 11    lidocaine HCl 2% (XYLOCAINE) 2 % Soln, by Mucous Membrane route every 4 (four) hours. 1 teaspoon up to every 2 hours as needed, Disp: 100 mL, Rfl: 5    magnesium oxide (MAG-OX) 400 mg tablet, TAKE ONE TABLET BY MOUTH TWICE DAILY, Disp: 180 tablet, Rfl: 0    mirabegron (MYRBETRIQ) 50 mg Tb24, Take 1 tablet (50 mg total) by mouth once daily., Disp: 30 tablet, Rfl: 11    pantoprazole (PROTONIX) 40 MG tablet, TAKE ONE TABLET BY MOUTH DAILY, Disp: 90 tablet, Rfl: 0    solifenacin (VESICARE) 5 MG tablet, Take 1 tablet (5 mg total) by mouth once daily., Disp: 30 tablet, Rfl: 11    TOUJEO SOLOSTAR U-300 INSULIN 300 unit/mL (1.5 mL) InPn pen, INJECT 30 UNITS INTO THE SKIN ONCE DAILY., Disp: 4.5 mL, Rfl: 3    valsartan (DIOVAN) 40 MG tablet, Take 40 mg by mouth once daily. , Disp: , Rfl: 3    VICTOZA 3-XAVIER 0.6 mg/0.1 mL (18 mg/3 mL) Kajal, INJECT 1.8MG SUBCUTANEAOUSLY DAILY, " Disp: 9 mL, Rfl: 0    PMHx/PSHx Updates:  See patient's last visit with me on 11/6/2019.  See H&P on 10/8/2019        Pathology:  Cancer Staging  No matching staging information was found for the patient.          Objective:     Vitals:  Blood pressure 134/63, pulse 99, temperature 98.8 °F (37.1 °C), temperature source Oral, resp. rate 19, weight 67.9 kg (149 lb 12.8 oz), last menstrual period 06/01/2012.    Physical Examination:   GEN: no apparent distress, comfortable; AAOx3  HEAD: atraumatic and normocephalic  EYES: no pallor, no icterus, PERRLA  ENT: OMM, no pharyngeal erythema, external ears WNL; no nasal discharge; no thrush  NECK: no masses, thyroid normal, trachea midline, no LAD/LN's, supple  CV: RRR with no murmur; normal pulse; normal S1 and S2; no pedal edema  CHEST: Normal respiratory effort; CTAB; normal breath sounds; no wheeze or crackles; portactah on right CW  ABDOM: nontender and nondistended; soft; normal bowel sounds; no rebound/guarding  MUSC/Skeletal: ROM normal; no crepitus; joints normal; no deformities or arthropathy  EXTREM: no clubbing, cyanosis, inflammation or swelling  SKIN: no rashes, lesions, ulcers, petechiae or subcutaneous nodules  : no hendrickson  NEURO: grossly intact; motor/sensory WNL; AAOx3; no tremors  PSYCH: normal mood, affect and behavior  LYMPH: normal cervical, supraclavicular, axillary and groin LN's            Labs:     11/8/2019  Lab Results   Component Value Date    WBC 4.65 11/08/2019    HGB 9.9 (L) 11/08/2019    HCT 29.8 (L) 11/08/2019    MCV 89 11/08/2019     11/08/2019     CMP  Sodium   Date Value Ref Range Status   11/08/2019 135 (L) 136 - 145 mmol/L Final     Potassium   Date Value Ref Range Status   11/08/2019 4.0 3.5 - 5.1 mmol/L Final     Chloride   Date Value Ref Range Status   11/08/2019 96 95 - 110 mmol/L Final     CO2   Date Value Ref Range Status   11/08/2019 30 (H) 23 - 29 mmol/L Final     Glucose   Date Value Ref Range Status   11/08/2019 181 (H)  70 - 110 mg/dL Final     BUN, Bld   Date Value Ref Range Status   11/08/2019 29 (H) 8 - 23 mg/dL Final     Creatinine   Date Value Ref Range Status   11/08/2019 2.2 (H) 0.5 - 1.4 mg/dL Final     Calcium   Date Value Ref Range Status   11/08/2019 8.4 (L) 8.7 - 10.5 mg/dL Final     Total Protein   Date Value Ref Range Status   11/08/2019 6.5 6.0 - 8.4 g/dL Final     Albumin   Date Value Ref Range Status   11/08/2019 3.6 3.5 - 5.2 g/dL Final     Total Bilirubin   Date Value Ref Range Status   11/08/2019 0.9 0.1 - 1.0 mg/dL Final     Comment:     For infants and newborns, interpretation of results should be based  on gestational age, weight and in agreement with clinical  observations.  Premature Infant recommended reference ranges:  Up to 24 hours.............<8.0 mg/dL  Up to 48 hours............<12.0 mg/dL  3-5 days..................<15.0 mg/dL  6-29 days.................<15.0 mg/dL       Alkaline Phosphatase   Date Value Ref Range Status   11/08/2019 56 55 - 135 U/L Final     AST   Date Value Ref Range Status   11/08/2019 17 10 - 40 U/L Final     ALT   Date Value Ref Range Status   11/08/2019 14 10 - 44 U/L Final     Anion Gap   Date Value Ref Range Status   11/08/2019 9 8 - 16 mmol/L Final     eGFR if    Date Value Ref Range Status   11/08/2019 24.4 (A) >60 mL/min/1.73 m^2 Final     eGFR if non    Date Value Ref Range Status   11/08/2019 21.1 (A) >60 mL/min/1.73 m^2 Final     Comment:     Calculation used to obtain the estimated glomerular filtration  rate (eGFR) is the CKD-EPI equation.              Radiology/Diagnostic Studies:    PET  10/17/2019    Impression       1. Left paratracheal small mass extending into superior mediastinum with associated moderate FDG activity is unchanged in size and appearance since 02/16/2018 CT.  This is unlikely to represent metastatic disease or malignancy, given stability, and may represent residual thyroid parenchyma but is otherwise  nonspecific.  2. Unchanged 6 mm right upper lobe nodule since 02/16/2018.  Benign etiology is likely the continued CT thorax without IV contrast follow-up is recommended in 12 months to document greater than 2 years of stability.  3. No current convincing evidence of metastatic disease.  4. Previous rectal mass is no longer evident on this exam.               X-ray Chest Ap Portable    Result Date: 10/15/2019  EXAMINATION: XR CHEST AP PORTABLE CLINICAL HISTORY: s/p port; Encounter for adjustment and management of vascular access device TECHNIQUE: Single frontal view of the chest was performed. COMPARISON: 6/18/2019 FINDINGS: The cardiomediastinal silhouette is stable.  Lungs are clear of infiltrate.  No pleural effusions.  Laya catheter has been inserted from the region of the right subclavian vein with the tip at the level the proximal SVC.     Laya catheter inserted with the tip at the level the proximal SVC.  Lungs are expanded and clear.  No pneumothorax. Electronically signed by: Nubia Venegas MD Date:    10/15/2019 Time:    14:05    Surg Fl Surgery Fluoro Usage    Result Date: 10/15/2019  See OP Notes for results. IMPRESSION: See OP Notes for results. This procedure was auto-finalized by: [facility]     Mri Rectal Cancer Without Contrast    Result Date: 9/18/2019  EXAMINATION: MRI RECTAL CANCER WITHOUT CONTRAST CLINICAL HISTORY: Rectal cancer, staging, locoregional;rectal anal mass;  Malignant neoplasm of rectum TECHNIQUE: Multiplanar multisequence MR imaging of the pelvis without contrast. COMPARISON: 07/12/2019 FINDINGS: Approximately 4 cm from the anal verge there is a peripherally located lobular mass along the dorsal rectal wall.  This measures 4 cm in length and 3.3 cm in diameter.  There is heterogeneous signal intensity.  There is restricted diffusion in the lesion and no discrete extra colonic extension.  Several lymph nodes are noted in the mesorectal fat including two which measure 3 mm  near the inferior sacrum, series 8, image 16, and a 5 mm lymph node at the S2 level, series 8, image 8.  Prominent but nonenlarged bilateral obturator chain lymph nodes also noted, on the right at 4 mm and left at 6 mm.  There is urinary bladder wall thickening which is diffuse.  Moderate degree of stool in the colon.  No dilated bowel loops.  Hysterectomy.     4 cm mid rectal lesion in keeping with known malignancy.  No discrete extension into the mesorectal fascia although there are several perirectal lymph nodes which raise the possibility for locoregional lymph node involvement. Electronically signed by: Bebo Kapoor Date:    09/18/2019 Time:    16:45      I have reviewed all available lab results and radiology reports.    Assessment/Plan:   (1) 78 y.o. female with diagnosis of prior left RCC who has been referred by David Mendieta MD for evaluation by medical hematology/oncology. She has been followed by Dr Stanislav Epstein with [facility] at the [facility] in [place]. She last saw Dr Epstein in July 2019. She was recently diagnosed with rectal mass by Dr Armando Duff which was found on colonoscopy on 8/26/2019. She had presented with lower Gi bleeding at that time. The pathology from those biopsies showed no evidence of malignancy at that time. She was subsequently seen by Dr David Mendieta and underwent trans-anal surgical biopsy on 9/23/2019. The pathology from the surgical biopsy showed rectal carcinoma.         She has been seen by Dr Fareed Hassan with Rad/onc for radiation therapy evaluation.     PET was done on 10/17/2019     We previously discussed giving her combined chemotherapy with the XRt to try to reduce her risk of recurrence. She was agreeable to this plan.    She had portacath placed with Dr Mendieta. She saw Dr Hassan last week with rad/onc. She has since started weekly XRT and chemotherapy and is on her third week.       (2) Left renal cell carcinoma s/p left nephrectomy with   Azar Donnelly - diagnosed about 2 years ago     (3) Ureterolithiasis     (4) CRI - stage III - followed by Dr Antonio; she saw him recently and per patient's report, her kidney function is stable per Dr Antonio     (5) HTN and hypercholesterolemia     (6) DM - followed by Dr Brower with endocrinology     (7) Hx/of gall stones     (8) Chronic left shoulder issues     (9) MVP     (10) Thyroid disease with prior hx/of thyroid cancer s/p thyroidectomy      (11) Chronic anemia - most likley multifactorial due to iron deficiency, GIB and anemia of chronic renal disease  - hgb at 9.9 currently       VISIT DIAGNOSES:      Normocytic anemia    Rectal cancer    Renal cell carcinoma of left kidney    Cervical lymphadenopathy    Status post Left nephrectomy          PLAN:  1.  Encouraged compliance with weekly labs  2.  Continue with current regimen - last week this week  3.  F/u with PCP, GI, rad/onc , etc  4.  F/u nephrology about the increase in creatinine  5. RTC in 2 weeks    Fax note to Mo/Pham Winston Parks; Tete Duff Tandron    Discussion:       I spent over 25 mins of time with the patient. Reviewed results of the recently ordered labs, tests and studies; made directives with regards to the results. Over half of this time was spent couseling and coordinating care.    I have explained all of the above in detail and the patient understands all of the current recommendation(s). I have answered all of their questions to the best of my ability and to their complete satisfaction.   The patient is to continue with the current management plan.            Electronically signed by Carrillo Goode MD

## 2019-11-20 ENCOUNTER — TELEPHONE (OUTPATIENT)
Dept: HEMATOLOGY/ONCOLOGY | Facility: CLINIC | Age: 79
End: 2019-11-20

## 2019-11-20 ENCOUNTER — PATIENT MESSAGE (OUTPATIENT)
Dept: HEMATOLOGY/ONCOLOGY | Facility: CLINIC | Age: 79
End: 2019-11-20

## 2019-11-20 ENCOUNTER — OFFICE VISIT (OUTPATIENT)
Dept: HEMATOLOGY/ONCOLOGY | Facility: CLINIC | Age: 79
End: 2019-11-20
Payer: COMMERCIAL

## 2019-11-20 VITALS
HEART RATE: 99 BPM | DIASTOLIC BLOOD PRESSURE: 63 MMHG | WEIGHT: 149.81 LBS | SYSTOLIC BLOOD PRESSURE: 134 MMHG | BODY MASS INDEX: 28.3 KG/M2 | RESPIRATION RATE: 19 BRPM | TEMPERATURE: 99 F

## 2019-11-20 DIAGNOSIS — C64.2 RENAL CELL CARCINOMA OF LEFT KIDNEY: ICD-10-CM

## 2019-11-20 DIAGNOSIS — R59.0 CERVICAL LYMPHADENOPATHY: ICD-10-CM

## 2019-11-20 DIAGNOSIS — Z90.5 STATUS POST NEPHRECTOMY: ICD-10-CM

## 2019-11-20 DIAGNOSIS — C20 RECTAL CANCER: ICD-10-CM

## 2019-11-20 DIAGNOSIS — D64.9 NORMOCYTIC ANEMIA: Primary | ICD-10-CM

## 2019-11-20 PROCEDURE — 1101F PT FALLS ASSESS-DOCD LE1/YR: CPT | Mod: S$GLB,,, | Performed by: INTERNAL MEDICINE

## 2019-11-20 PROCEDURE — 1101F PR PT FALLS ASSESS DOC 0-1 FALLS W/OUT INJ PAST YR: ICD-10-PCS | Mod: S$GLB,,, | Performed by: INTERNAL MEDICINE

## 2019-11-20 PROCEDURE — 1159F PR MEDICATION LIST DOCUMENTED IN MEDICAL RECORD: ICD-10-PCS | Mod: S$GLB,,, | Performed by: INTERNAL MEDICINE

## 2019-11-20 PROCEDURE — 99214 PR OFFICE/OUTPT VISIT, EST, LEVL IV, 30-39 MIN: ICD-10-PCS | Mod: S$GLB,,, | Performed by: INTERNAL MEDICINE

## 2019-11-20 PROCEDURE — 3075F SYST BP GE 130 - 139MM HG: CPT | Mod: S$GLB,,, | Performed by: INTERNAL MEDICINE

## 2019-11-20 PROCEDURE — 1125F AMNT PAIN NOTED PAIN PRSNT: CPT | Mod: S$GLB,,, | Performed by: INTERNAL MEDICINE

## 2019-11-20 PROCEDURE — 3075F PR MOST RECENT SYSTOLIC BLOOD PRESS GE 130-139MM HG: ICD-10-PCS | Mod: S$GLB,,, | Performed by: INTERNAL MEDICINE

## 2019-11-20 PROCEDURE — 99214 OFFICE O/P EST MOD 30 MIN: CPT | Mod: S$GLB,,, | Performed by: INTERNAL MEDICINE

## 2019-11-20 PROCEDURE — 3078F DIAST BP <80 MM HG: CPT | Mod: S$GLB,,, | Performed by: INTERNAL MEDICINE

## 2019-11-20 PROCEDURE — 3078F PR MOST RECENT DIASTOLIC BLOOD PRESSURE < 80 MM HG: ICD-10-PCS | Mod: S$GLB,,, | Performed by: INTERNAL MEDICINE

## 2019-11-20 PROCEDURE — 1125F PR PAIN SEVERITY QUANTIFIED, PAIN PRESENT: ICD-10-PCS | Mod: S$GLB,,, | Performed by: INTERNAL MEDICINE

## 2019-11-20 PROCEDURE — 1159F MED LIST DOCD IN RCRD: CPT | Mod: S$GLB,,, | Performed by: INTERNAL MEDICINE

## 2019-11-20 NOTE — TELEPHONE ENCOUNTER
Called patient and instructed her that Dr. Goode would like her to follow-up with her Nephrologist (Dr. Antonio) because her BUN and creat are elevated.

## 2019-11-21 PROBLEM — N18.4 CKD (CHRONIC KIDNEY DISEASE) STAGE 4, GFR 15-29 ML/MIN: Status: ACTIVE | Noted: 2019-06-18

## 2019-11-21 PROBLEM — E86.0 DEHYDRATION: Status: RESOLVED | Noted: 2018-01-22 | Resolved: 2019-11-21

## 2019-11-22 ENCOUNTER — INFUSION (OUTPATIENT)
Dept: INFUSION THERAPY | Facility: HOSPITAL | Age: 79
End: 2019-11-22
Attending: INTERNAL MEDICINE
Payer: COMMERCIAL

## 2019-11-22 VITALS
WEIGHT: 149.56 LBS | DIASTOLIC BLOOD PRESSURE: 67 MMHG | HEIGHT: 61 IN | TEMPERATURE: 98 F | RESPIRATION RATE: 20 BRPM | SYSTOLIC BLOOD PRESSURE: 124 MMHG | HEART RATE: 84 BPM | BODY MASS INDEX: 28.24 KG/M2

## 2019-11-22 DIAGNOSIS — C20 RECTAL CANCER: Primary | ICD-10-CM

## 2019-11-22 PROCEDURE — A4216 STERILE WATER/SALINE, 10 ML: HCPCS | Performed by: INTERNAL MEDICINE

## 2019-11-22 PROCEDURE — 25000003 PHARM REV CODE 250: Performed by: INTERNAL MEDICINE

## 2019-11-22 PROCEDURE — 96523 IRRIG DRUG DELIVERY DEVICE: CPT

## 2019-11-22 PROCEDURE — 63600175 PHARM REV CODE 636 W HCPCS: Performed by: INTERNAL MEDICINE

## 2019-11-22 RX ORDER — HEPARIN 100 UNIT/ML
500 SYRINGE INTRAVENOUS
Status: COMPLETED | OUTPATIENT
Start: 2019-11-22 | End: 2019-11-22

## 2019-11-22 RX ORDER — SODIUM CHLORIDE 0.9 % (FLUSH) 0.9 %
10 SYRINGE (ML) INJECTION
Status: DISCONTINUED | OUTPATIENT
Start: 2019-11-22 | End: 2019-11-22 | Stop reason: HOSPADM

## 2019-11-22 RX ADMIN — HEPARIN 500 UNITS: 100 SYRINGE at 01:11

## 2019-11-22 RX ADMIN — SODIUM CHLORIDE, PRESERVATIVE FREE 10 ML: 5 INJECTION INTRAVENOUS at 01:11

## 2019-11-25 ENCOUNTER — PATIENT MESSAGE (OUTPATIENT)
Dept: SURGERY | Facility: CLINIC | Age: 79
End: 2019-11-25

## 2019-11-26 ENCOUNTER — PATIENT MESSAGE (OUTPATIENT)
Dept: SURGERY | Facility: CLINIC | Age: 79
End: 2019-11-26

## 2019-12-05 ENCOUNTER — PATIENT MESSAGE (OUTPATIENT)
Dept: HEMATOLOGY/ONCOLOGY | Facility: CLINIC | Age: 79
End: 2019-12-05

## 2019-12-09 NOTE — PROGRESS NOTES
[facility] Hematology/Oncology  PROGRESS NOTE -   Follow-up Visit      Subjective:       Patient ID:   NAME: Valery Huggins : 1940     79 y.o. female    Referring Doc: David Mendieta MD  Other Physicians: Armando Nath; Stanislav Epstein; Azar Donnelly; Mo    Chief Complaint:  Left RCC and rectal CA f/u    History of Present Illness:     Patient returns today for a  regularly scheduled follow-up visit.  The patient is here today to go over the results of the recently ordered labs, tests and studies. She had recently completed combination XRt and chemotherapy. No CP, SOB, HA's or N/V. She is here with her . She is doing well so far with no current side-effects.     She saw Dr Mendieta earlier today and he is planning an MRI and a colonoscopy for visual inspection.           ROS:   GEN: normal without any fever, night sweats or weight loss  HEENT: normal with no HA's, sore throat, stiff neck, changes in vision  CV: normal with no CP, SOB, PND, RAYA or orthopnea  PULM: normal with no SOB, cough, hemoptysis, sputum or pleuritic pain  GI: normal with no abdominal pain, nausea, vomiting, constipation, diarrhea, melanotic stools, BRBPR, or hematemesis  : normal with no hematuria, dysuria  BREAST: normal with no mass, discharge, pain  SKIN: normal with no rash, erythema, bruising, or swelling    Allergies:  Review of patient's allergies indicates:   Allergen Reactions    Sutent [sunitinib] Diarrhea and Nausea And Vomiting       Medications:    Current Outpatient Medications:     amLODIPine (NORVASC) 5 MG tablet, TAKE ONE TABLET BY MOUTH DAILY, Disp: 90 tablet, Rfl: 0    ascorbic acid (VITAMIN C) 100 MG tablet, Take 100 mg by mouth once daily., Disp: , Rfl:     aspirin (ECOTRIN) 81 MG EC tablet, Take 81 mg by mouth once daily., Disp: , Rfl:     atorvastatin (LIPITOR) 10 MG tablet, Take 10 mg by mouth every evening. , Disp: , Rfl: 2    b complex vitamins capsule, Take 1 capsule by mouth once daily., Disp:  ", Rfl:     BD ULTRA-FINE CONNIE PEN NEEDLES 32 gauge x 5/32" Ndle, Use EVERY DAY, Disp: , Rfl: 3    calcitRIOL (ROCALTROL) 0.5 MCG Cap, TAKE 1 CAPSULE (0.5 MCG TOTAL) BY MOUTH ONCE DAILY., Disp: 90 capsule, Rfl: 0    cholecalciferol, vitamin D3, (VITAMIN D3) 4,000 unit Cap, Take 1 capsule by mouth once daily., Disp: , Rfl:     docusate sodium (COLACE) 100 MG capsule, Take 100 mg by mouth 2 (two) times daily., Disp: , Rfl:     escitalopram oxalate (LEXAPRO) 20 MG tablet, TAKE ONE TABLET BY MOUTH DAILY, Disp: 90 tablet, Rfl: 0    fluticasone (VERAMYST) 27.5 mcg/actuation nasal spray, 2 sprays by Nasal route once daily. (Patient not taking: Reported on 12/10/2019), Disp: 1 g, Rfl: 0    FREESTYLE LITE METER kit, , Disp: , Rfl: 0    FREESTYLE LITE STRIPS Strp, , Disp: , Rfl: 3    INTRAROSA 6.5 mg Inst, Place 1 suppository vaginally once daily., Disp: , Rfl: 12    INV sodium chloride 0.9 % SolP with INV ADRUCIL (5FU) 50 mg/ml Soln 2,400 mg/m2, Inject 1,555 mg/m2 into the vein once. FOR INVESTIGATIONAL USE ONLY, Disp: , Rfl:     levothyroxine (SYNTHROID) 88 MCG tablet, Take 1 tablet (88 mcg total) by mouth before breakfast., Disp: 30 tablet, Rfl: 11    lidocaine HCl 2% (XYLOCAINE) 2 % Soln, by Mucous Membrane route every 4 (four) hours. 1 teaspoon up to every 2 hours as needed (Patient not taking: Reported on 12/10/2019), Disp: 100 mL, Rfl: 5    magnesium oxide (MAG-OX) 400 mg tablet, TAKE ONE TABLET BY MOUTH TWICE DAILY, Disp: 180 tablet, Rfl: 0    mirabegron (MYRBETRIQ) 50 mg Tb24, Take 1 tablet (50 mg total) by mouth once daily., Disp: 30 tablet, Rfl: 11    pantoprazole (PROTONIX) 40 MG tablet, TAKE ONE TABLET BY MOUTH DAILY, Disp: 90 tablet, Rfl: 0    solifenacin (VESICARE) 5 MG tablet, Take 1 tablet (5 mg total) by mouth once daily., Disp: 30 tablet, Rfl: 11    TOUJEO SOLOSTAR U-300 INSULIN 300 unit/mL (1.5 mL) InPn pen, INJECT 30 UNITS INTO THE SKIN ONCE DAILY., Disp: 4.5 mL, Rfl: 3    valsartan " (DIOVAN) 40 MG tablet, Take 40 mg by mouth once daily. , Disp: , Rfl: 3    VICTOZA 3-XAVIER 0.6 mg/0.1 mL (18 mg/3 mL) PnIj, INJECT 1.8MG SUBCUTANEAOUSLY DAILY, Disp: 9 mL, Rfl: 0    PMHx/PSHx Updates:  See patient's last visit with me on 11/20/2019.  See H&P on 10/8/2019        Pathology:  Cancer Staging  No matching staging information was found for the patient.          Objective:     Vitals:  Blood pressure 126/60, pulse 88, temperature 99.1 °F (37.3 °C), resp. rate 18, weight 66.9 kg (147 lb 8 oz), last menstrual period 06/01/2012.    Physical Examination:   GEN: no apparent distress, comfortable; AAOx3  HEAD: atraumatic and normocephalic  EYES: no pallor, no icterus, PERRLA  ENT: OMM, no pharyngeal erythema, external ears WNL; no nasal discharge; no thrush  NECK: no masses, thyroid normal, trachea midline, no LAD/LN's, supple  CV: RRR with no murmur; normal pulse; normal S1 and S2; no pedal edema  CHEST: Normal respiratory effort; CTAB; normal breath sounds; no wheeze or crackles; portactah on right CW  ABDOM: nontender and nondistended; soft; normal bowel sounds; no rebound/guarding  MUSC/Skeletal: ROM normal; no crepitus; joints normal; no deformities or arthropathy  EXTREM: no clubbing, cyanosis, inflammation or swelling  SKIN: no rashes, lesions, ulcers, petechiae or subcutaneous nodules  : no hendrickson  NEURO: grossly intact; motor/sensory WNL; AAOx3; no tremors  PSYCH: normal mood, affect and behavior  LYMPH: normal cervical, supraclavicular, axillary and groin LN's            Labs:     12/9/2019  Lab Results   Component Value Date    WBC 5.9 12/09/2019    HGB 9.7 (L) 12/09/2019    HCT 30.2 (L) 12/09/2019    MCV 92.9 12/09/2019     12/09/2019     CMP  Sodium   Date Value Ref Range Status   12/09/2019 135 135 - 146 mmol/L Final     Potassium   Date Value Ref Range Status   12/09/2019 4.4 3.5 - 5.3 mmol/L Final     Chloride   Date Value Ref Range Status   12/09/2019 95 (L) 98 - 110 mmol/L Final     CO2    Date Value Ref Range Status   12/09/2019 31 20 - 32 mmol/L Final     Glucose   Date Value Ref Range Status   12/09/2019 149 (H) 65 - 99 mg/dL Final     Comment:                   Fasting reference interval     For someone without known diabetes, a glucose  value >125 mg/dL indicates that they may have  diabetes and this should be confirmed with a  follow-up test.          BUN, Bld   Date Value Ref Range Status   12/09/2019 29 (H) 7 - 25 mg/dL Final     Creatinine   Date Value Ref Range Status   12/09/2019 1.92 (H) 0.60 - 0.93 mg/dL Final     Comment:     For patients >49 years of age, the reference limit  for Creatinine is approximately 13% higher for people  identified as -American.          Calcium   Date Value Ref Range Status   12/09/2019 8.3 (L) 8.6 - 10.4 mg/dL Final     Total Protein   Date Value Ref Range Status   12/09/2019 6.0 (L) 6.1 - 8.1 g/dL Final     Albumin   Date Value Ref Range Status   12/09/2019 3.6 3.6 - 5.1 g/dL Final     Total Bilirubin   Date Value Ref Range Status   12/09/2019 0.6 0.2 - 1.2 mg/dL Final     Alkaline Phosphatase   Date Value Ref Range Status   12/09/2019 85 33 - 130 U/L Final     AST   Date Value Ref Range Status   12/09/2019 14 10 - 35 U/L Final     ALT   Date Value Ref Range Status   12/09/2019 14 6 - 29 U/L Final     Anion Gap   Date Value Ref Range Status   11/08/2019 9 8 - 16 mmol/L Final     eGFR if    Date Value Ref Range Status   12/09/2019 29 (L) > OR = 60 mL/min/1.73m2 Final     eGFR if non    Date Value Ref Range Status   12/09/2019 25 (L) > OR = 60 mL/min/1.73m2 Final           Radiology/Diagnostic Studies:    PET  10/17/2019    Impression       1. Left paratracheal small mass extending into superior mediastinum with associated moderate FDG activity is unchanged in size and appearance since 02/16/2018 CT.  This is unlikely to represent metastatic disease or malignancy, given stability, and may represent residual thyroid  parenchyma but is otherwise nonspecific.  2. Unchanged 6 mm right upper lobe nodule since 02/16/2018.  Benign etiology is likely the continued CT thorax without IV contrast follow-up is recommended in 12 months to document greater than 2 years of stability.  3. No current convincing evidence of metastatic disease.  4. Previous rectal mass is no longer evident on this exam.               X-ray Chest Ap Portable    Result Date: 10/15/2019  EXAMINATION: XR CHEST AP PORTABLE CLINICAL HISTORY: s/p port; Encounter for adjustment and management of vascular access device TECHNIQUE: Single frontal view of the chest was performed. COMPARISON: 6/18/2019 FINDINGS: The cardiomediastinal silhouette is stable.  Lungs are clear of infiltrate.  No pleural effusions.  Laya catheter has been inserted from the region of the right subclavian vein with the tip at the level the proximal SVC.     Laya catheter inserted with the tip at the level the proximal SVC.  Lungs are expanded and clear.  No pneumothorax. Electronically signed by: Nubia eVnegas MD Date:    10/15/2019 Time:    14:05    Surg Fl Surgery Fluoro Usage    Result Date: 10/15/2019  See OP Notes for results. IMPRESSION: See OP Notes for results. This procedure was auto-finalized by: Virtual Radiologist     Mri Rectal Cancer Without Contrast    Result Date: 9/18/2019  EXAMINATION: MRI RECTAL CANCER WITHOUT CONTRAST CLINICAL HISTORY: Rectal cancer, staging, locoregional;rectal anal mass;  Malignant neoplasm of rectum TECHNIQUE: Multiplanar multisequence MR imaging of the pelvis without contrast. COMPARISON: 07/12/2019 FINDINGS: Approximately 4 cm from the anal verge there is a peripherally located lobular mass along the dorsal rectal wall.  This measures 4 cm in length and 3.3 cm in diameter.  There is heterogeneous signal intensity.  There is restricted diffusion in the lesion and no discrete extra colonic extension.  Several lymph nodes are noted in the mesorectal fat  including two which measure 3 mm near the inferior sacrum, series 8, image 16, and a 5 mm lymph node at the S2 level, series 8, image 8.  Prominent but nonenlarged bilateral obturator chain lymph nodes also noted, on the right at 4 mm and left at 6 mm.  There is urinary bladder wall thickening which is diffuse.  Moderate degree of stool in the colon.  No dilated bowel loops.  Hysterectomy.     4 cm mid rectal lesion in keeping with known malignancy.  No discrete extension into the mesorectal fascia although there are several perirectal lymph nodes which raise the possibility for locoregional lymph node involvement. Electronically signed by: Bebo Kapoor Date:    09/18/2019 Time:    16:45      I have reviewed all available lab results and radiology reports.    Assessment/Plan:   (1) 79 y.o. female with diagnosis of prior left RCC who has been referred by David Mendieta MD for evaluation by medical hematology/oncology. She has been followed by Dr Stanislav Epstein with [facility] at the [facility] [place]. She last saw Dr Epstein in July 2019. She was recently diagnosed with rectal mass by Dr Armando Duff which was found on colonoscopy on 8/26/2019. She had presented with lower Gi bleeding at that time. The pathology from those biopsies showed no evidence of malignancy at that time. She was subsequently seen by Dr David Mendieta and underwent trans-anal surgical biopsy on 9/23/2019. The pathology from the surgical biopsy showed rectal carcinoma.         She has been seen by Dr Fareed Hassan with Rad/onc for radiation therapy evaluation.     PET was done on 10/17/2019     We previously discussed giving her combined chemotherapy with the XRt to try to reduce her risk of recurrence. She was agreeable to this plan.    She had portacath placed with Dr Mendieta. She saw Dr Hassan last week with rad/onc. She has since completed weekly XRT and chemotherapy    -  She saw Dr Mendieta earlier today and he is planning an  MRI and a colonoscopy for visual inspection.      (2) Left renal cell carcinoma s/p left nephrectomy with Dr Azar Donnelly - diagnosed about 2 years ago     (3) Ureterolithiasis     (4) CRI - stage III - followed by Dr Antonio; she saw him recently and per patient's report, her kidney function is stable per Dr Antonio     (5) HTN and hypercholesterolemia     (6) DM - followed by Dr Brower with endocrinology     (7) Hx/of gall stones     (8) Chronic left shoulder issues     (9) MVP     (10) Thyroid disease with prior hx/of thyroid cancer s/p thyroidectomy      (11) Chronic anemia - most likley multifactorial due to iron deficiency, GIB and anemia of chronic renal disease  - hgb at 9.7 currently       VISIT DIAGNOSES:      Renal cell carcinoma of left kidney    Rectal cancer    Normocytic anemia    Rectal mass          PLAN:  1.  Encouraged compliance with weekly labs  2.  Proceed with planned MRI and a colonoscopy for visual inspection by Dr Mendieta  3.  F/u with PCP, GI, rad/onc , etc  4.  F/u nephrology    5. RTC in 4-6weeks    Fax note to Mo/Pham Winston Parks; Tete Duff Tandron    Discussion:       I spent over 25 mins of time with the patient. Reviewed results of the recently ordered labs, tests and studies; made directives with regards to the results. Over half of this time was spent couseling and coordinating care.    I have explained all of the above in detail and the patient understands all of the current recommendation(s). I have answered all of their questions to the best of my ability and to their complete satisfaction.   The patient is to continue with the current management plan.            Electronically signed by Carrillo Goode MD

## 2019-12-10 ENCOUNTER — OFFICE VISIT (OUTPATIENT)
Dept: HEMATOLOGY/ONCOLOGY | Facility: CLINIC | Age: 79
End: 2019-12-10
Payer: COMMERCIAL

## 2019-12-10 ENCOUNTER — OFFICE VISIT (OUTPATIENT)
Dept: SURGERY | Facility: CLINIC | Age: 79
End: 2019-12-10
Payer: COMMERCIAL

## 2019-12-10 VITALS
WEIGHT: 147.5 LBS | DIASTOLIC BLOOD PRESSURE: 60 MMHG | BODY MASS INDEX: 27.87 KG/M2 | RESPIRATION RATE: 18 BRPM | HEART RATE: 88 BPM | SYSTOLIC BLOOD PRESSURE: 126 MMHG | TEMPERATURE: 99 F

## 2019-12-10 VITALS
TEMPERATURE: 99 F | HEIGHT: 61 IN | WEIGHT: 147.69 LBS | BODY MASS INDEX: 27.88 KG/M2 | HEART RATE: 88 BPM | DIASTOLIC BLOOD PRESSURE: 63 MMHG | SYSTOLIC BLOOD PRESSURE: 134 MMHG

## 2019-12-10 DIAGNOSIS — C20 RECTAL CANCER: ICD-10-CM

## 2019-12-10 DIAGNOSIS — K62.89 RECTAL MASS: ICD-10-CM

## 2019-12-10 DIAGNOSIS — C64.2 RENAL CELL CARCINOMA OF LEFT KIDNEY: Primary | ICD-10-CM

## 2019-12-10 DIAGNOSIS — C20 RECTAL CANCER: Primary | ICD-10-CM

## 2019-12-10 DIAGNOSIS — D64.9 NORMOCYTIC ANEMIA: ICD-10-CM

## 2019-12-10 PROCEDURE — 1126F AMNT PAIN NOTED NONE PRSNT: CPT | Mod: S$GLB,,, | Performed by: SURGERY

## 2019-12-10 PROCEDURE — 1101F PT FALLS ASSESS-DOCD LE1/YR: CPT | Mod: S$GLB,,, | Performed by: INTERNAL MEDICINE

## 2019-12-10 PROCEDURE — 1101F PR PT FALLS ASSESS DOC 0-1 FALLS W/OUT INJ PAST YR: ICD-10-PCS | Mod: S$GLB,,, | Performed by: INTERNAL MEDICINE

## 2019-12-10 PROCEDURE — 99214 PR OFFICE/OUTPT VISIT, EST, LEVL IV, 30-39 MIN: ICD-10-PCS | Mod: S$GLB,,, | Performed by: INTERNAL MEDICINE

## 2019-12-10 PROCEDURE — 1126F PR PAIN SEVERITY QUANTIFIED, NO PAIN PRESENT: ICD-10-PCS | Mod: S$GLB,,, | Performed by: SURGERY

## 2019-12-10 PROCEDURE — 3074F SYST BP LT 130 MM HG: CPT | Mod: S$GLB,,, | Performed by: INTERNAL MEDICINE

## 2019-12-10 PROCEDURE — 3078F PR MOST RECENT DIASTOLIC BLOOD PRESSURE < 80 MM HG: ICD-10-PCS | Mod: CPTII,S$GLB,, | Performed by: SURGERY

## 2019-12-10 PROCEDURE — 1159F MED LIST DOCD IN RCRD: CPT | Mod: S$GLB,,, | Performed by: SURGERY

## 2019-12-10 PROCEDURE — 99999 PR PBB SHADOW E&M-EST. PATIENT-LVL III: CPT | Mod: PBBFAC,,, | Performed by: SURGERY

## 2019-12-10 PROCEDURE — 1159F PR MEDICATION LIST DOCUMENTED IN MEDICAL RECORD: ICD-10-PCS | Mod: S$GLB,,, | Performed by: SURGERY

## 2019-12-10 PROCEDURE — 3075F PR MOST RECENT SYSTOLIC BLOOD PRESS GE 130-139MM HG: ICD-10-PCS | Mod: CPTII,S$GLB,, | Performed by: SURGERY

## 2019-12-10 PROCEDURE — 1159F PR MEDICATION LIST DOCUMENTED IN MEDICAL RECORD: ICD-10-PCS | Mod: S$GLB,,, | Performed by: INTERNAL MEDICINE

## 2019-12-10 PROCEDURE — 99999 PR PBB SHADOW E&M-EST. PATIENT-LVL III: ICD-10-PCS | Mod: PBBFAC,,, | Performed by: SURGERY

## 2019-12-10 PROCEDURE — 3075F SYST BP GE 130 - 139MM HG: CPT | Mod: CPTII,S$GLB,, | Performed by: SURGERY

## 2019-12-10 PROCEDURE — 3078F PR MOST RECENT DIASTOLIC BLOOD PRESSURE < 80 MM HG: ICD-10-PCS | Mod: S$GLB,,, | Performed by: INTERNAL MEDICINE

## 2019-12-10 PROCEDURE — 1101F PR PT FALLS ASSESS DOC 0-1 FALLS W/OUT INJ PAST YR: ICD-10-PCS | Mod: CPTII,S$GLB,, | Performed by: SURGERY

## 2019-12-10 PROCEDURE — 3074F PR MOST RECENT SYSTOLIC BLOOD PRESSURE < 130 MM HG: ICD-10-PCS | Mod: S$GLB,,, | Performed by: INTERNAL MEDICINE

## 2019-12-10 PROCEDURE — 99214 OFFICE O/P EST MOD 30 MIN: CPT | Mod: S$GLB,,, | Performed by: INTERNAL MEDICINE

## 2019-12-10 PROCEDURE — 99024 POSTOP FOLLOW-UP VISIT: CPT | Mod: S$GLB,,, | Performed by: SURGERY

## 2019-12-10 PROCEDURE — 1159F MED LIST DOCD IN RCRD: CPT | Mod: S$GLB,,, | Performed by: INTERNAL MEDICINE

## 2019-12-10 PROCEDURE — 3078F DIAST BP <80 MM HG: CPT | Mod: CPTII,S$GLB,, | Performed by: SURGERY

## 2019-12-10 PROCEDURE — 1101F PT FALLS ASSESS-DOCD LE1/YR: CPT | Mod: CPTII,S$GLB,, | Performed by: SURGERY

## 2019-12-10 PROCEDURE — 3078F DIAST BP <80 MM HG: CPT | Mod: S$GLB,,, | Performed by: INTERNAL MEDICINE

## 2019-12-10 PROCEDURE — 99024 PR POST-OP FOLLOW-UP VISIT: ICD-10-PCS | Mod: S$GLB,,, | Performed by: SURGERY

## 2019-12-10 NOTE — PROGRESS NOTES
78 yo F whom I am familiar with.  Pt with distal rectal cancer.  SHe had enlarged nodes on MRI and threatened CRM invasion.  She received neoadjuvant treatemnt.   Pt notes that she tolerated well. S eh is feeling well.  NOtes no longer having any bleeding.  No fever/chills.  NO Notes good bowel function    AFVSS  AAOx3  Soft/nd/nt  2+ pulses B    A/P: REctal cancer    D/w pt.  WIll check a MRI to evaluate for tumor response.  Will also plan flex sig ti evaluate.  WIll plan both of these for around 4 week mustapha.

## 2019-12-10 NOTE — PATIENT INSTRUCTIONS
Colonoscopy is scheduled for 01/28/20 the arrival time will be given to you by the preop nurse.  The preop nurse will call you from [phone]  Fasting after midnight.  Someone to drive home.        THE PREOP NURSE WILL CALL, SOMETIMES AS LATE AS 4 PM IN THE AFTERNOON THE DAY BEFORE SURGERY.        Special Instruction:  Your colonoscopy is scheduled at the [facility] in [address] Lim.  Bowel Prep is included with this letter, call Christopher at [phone] if you have any questions or concerns or if you need to reschedule your procedure.

## 2019-12-11 RX ORDER — SODIUM CHLORIDE, SODIUM LACTATE, POTASSIUM CHLORIDE, CALCIUM CHLORIDE 600; 310; 30; 20 MG/100ML; MG/100ML; MG/100ML; MG/100ML
INJECTION, SOLUTION INTRAVENOUS CONTINUOUS
Status: CANCELLED | OUTPATIENT
Start: 2019-12-11

## 2019-12-11 RX ORDER — SODIUM CHLORIDE 0.9 % (FLUSH) 0.9 %
10 SYRINGE (ML) INJECTION
Status: CANCELLED | OUTPATIENT
Start: 2019-12-11

## 2019-12-13 ENCOUNTER — PATIENT MESSAGE (OUTPATIENT)
Dept: HEMATOLOGY/ONCOLOGY | Facility: CLINIC | Age: 79
End: 2019-12-13

## 2019-12-23 ENCOUNTER — OFFICE VISIT (OUTPATIENT)
Dept: RADIATION ONCOLOGY | Facility: CLINIC | Age: 79
End: 2019-12-23
Payer: COMMERCIAL

## 2019-12-23 DIAGNOSIS — C20 MALIGNANT NEOPLASM OF RECTUM: ICD-10-CM

## 2019-12-23 NOTE — PROGRESS NOTES
Valery Huggins  [id number]  1940 12/23/2019  David Mendieta Md  [address]  [address] [place] [address]    DIAGNOSIS:  Rectal cancer/renal cell cancer  REASON FOR VISIT: Routine scheduled follow-up.    HISTORY OF PRESENT ILLNESS:   78-year-old patient with a recent diagnosis of left-sided renal cell, clear cell carcinoma status post nephrectomy presented with a 12 month history of intermittent rectal bleeding.  She had been counseled with respect to colonoscopy but she delayed about 6 months, eventually undergoing examination revealing a mass in the mid rectum.  She had multiple polyps also removed.  The initial biopsy came back as nondiagnostic.  She was then referred to Dr. Mendieta who had her undergo MRI of the pelvis with the following results:  4 cm mid rectal lesion in keeping with known malignancy.  No discrete extension into the mesorectal fascia although there are several perirectal lymph nodes which raise the possibility for locoregional lymph node involvement.  I believe the lymph nodes were measuring less than 1 cm     She also underwent further proctosigmoidoscopy with biopsy make a diagnosis of invasive adenocarcinoma.  This was a transanal excision , grade 2 lesion.  Tumor was invading through the muscularis into the lonny colorectal tissue.  Margins were ascertained however the invasive component was 1 mm from the deep margin.  There was no perineural invasion.  Tumor deposits were indeterminate.     Of note she did undergo left-sided nephrectomy in July 2019 for a renal cell, clear cell carcinoma of the left kidney, stage [id number].  She was offered and did start Sutent systemic therapy was but was unable to tolerate this.    INTERVAL HISTORY:   Patient completed her combined neoadjuvant treatment on 11/27/2019.  Since finishing she has done very well.  Her bowel movements and stooling has returned to baseline.  She has no pain in the pelvic or abdominal area.  Urinary status is  stable as well    Review of Systems   Constitutional: Negative for fever.   Gastrointestinal: Negative for abdominal pain, blood in stool and rectal pain.   Musculoskeletal: Negative for back pain and flank pain.   Skin: Negative for itching and rash.     Past Medical History:   Diagnosis Date    Anemia     Cancer     thyroid    Cancer     Kidney    Depression     Diabetes mellitus     Diabetes mellitus type II     Encounter for blood transfusion     Gallstones     Hypertension     Kidney stone     MVP (mitral valve prolapse)     PONV (postoperative nausea and vomiting)     Thyroid disease      Past Surgical History:   Procedure Laterality Date    APPENDECTOMY      cataracts      both eyes    CHOLECYSTECTOMY      COLONOSCOPY N/A 8/26/2019    Procedure: COLONOSCOPY;  Surgeon: Armando Duff MD;  Location: [facility];  Service: Endoscopy;  Laterality: N/A;    CYSTOSCOPY      CYSTOSCOPY W/ URETERAL STENT PLACEMENT      ECTOPIC PREGNANCY SURGERY      EXAMINATION UNDER ANESTHESIA N/A 9/23/2019    Procedure: Exam under anesthesia;  Surgeon: David Mendieta MD;  Location: [facility];  Service: General;  Laterality: N/A;    EYE SURGERY      phoebe cataract    HYSTERECTOMY      INSERTION OF TUNNELED CENTRAL VENOUS CATHETER (CVC) WITH SUBCUTANEOUS PORT Right 10/15/2019    Procedure: [id number];  Surgeon: David Mendieta MD;  Location: [facility];  Service: General;  Laterality: Right;    NASAL SEPTUM SURGERY      NEPHRECTOMY Left     OOPHORECTOMY      THYROIDECTOMY      two times    TONSILLECTOMY       Social History     Socioeconomic History    Marital status:      Spouse name: Not on file    Number of children: Not on file    Years of education: Not on file    Highest education level: Not on file   Occupational History    Not on file   Social Needs    Financial resource strain: Not on file    Food insecurity:     Worry: Not on file     Inability: Not on file    Transportation  "needs:     Medical: Not on file     Non-medical: Not on file   Tobacco Use    Smoking status: Never Smoker    Smokeless tobacco: Never Used   Substance and Sexual Activity    Alcohol use: No    Drug use: No    Sexual activity: Yes     Partners: Male   Lifestyle    Physical activity:     Days per week: Not on file     Minutes per session: Not on file    Stress: Not on file   Relationships    Social connections:     Talks on phone: Not on file     Gets together: Not on file     Attends Holiness service: Not on file     Active member of club or organization: Not on file     Attends meetings of clubs or organizations: Not on file     Relationship status: Not on file   Other Topics Concern    Not on file   Social History Narrative    Not on file     Family History   Problem Relation Age of Onset    Hypertension Father     Kidney disease Father     Mental illness Mother     Cancer Brother      Medication List with Changes/Refills   Current Medications    AMLODIPINE (NORVASC) 5 MG TABLET    TAKE ONE TABLET BY MOUTH DAILY    ASCORBIC ACID (VITAMIN C) 100 MG TABLET    Take 100 mg by mouth once daily.    ASPIRIN (ECOTRIN) 81 MG EC TABLET    Take 81 mg by mouth once daily.    ATORVASTATIN (LIPITOR) 10 MG TABLET    Take 10 mg by mouth every evening.     B COMPLEX VITAMINS CAPSULE    Take 1 capsule by mouth once daily.    BD ULTRA-FINE CONNIE PEN NEEDLE 32 GAUGE X 5/32" NDLE    INJECT TWICE DAILY AS DIRECTED    CALCITRIOL (ROCALTROL) 0.5 MCG CAP    TAKE 1 CAPSULE (0.5 MCG TOTAL) BY MOUTH ONCE DAILY.    CHOLECALCIFEROL, VITAMIN D3, (VITAMIN D3) 4,000 UNIT CAP    Take 1 capsule by mouth once daily.    DOCUSATE SODIUM (COLACE) 100 MG CAPSULE    Take 100 mg by mouth 2 (two) times daily.    ESCITALOPRAM OXALATE (LEXAPRO) 20 MG TABLET    TAKE ONE TABLET BY MOUTH DAILY    FLUTICASONE (VERAMYST) 27.5 MCG/ACTUATION NASAL SPRAY    2 sprays by Nasal route once daily.    FREESTYLE LITE METER KIT        FREESTYLE LITE STRIPS " STRP        INTRAROSA 6.5 MG INST    Place 1 suppository vaginally once daily.    INV SODIUM CHLORIDE 0.9 % SOLP WITH INV ADRUCIL (5FU) 50 MG/ML SOLN 2,400 MG/M2    Inject 1,555 mg/m2 into the vein once. FOR INVESTIGATIONAL USE ONLY    LEVOTHYROXINE (SYNTHROID) 88 MCG TABLET    Take 1 tablet (88 mcg total) by mouth before breakfast.    LIDOCAINE HCL 2% (XYLOCAINE) 2 % SOLN    by Mucous Membrane route every 4 (four) hours. 1 teaspoon up to every 2 hours as needed    MAGNESIUM OXIDE (MAG-OX) 400 MG TABLET    TAKE ONE TABLET BY MOUTH TWICE DAILY    MIRABEGRON (MYRBETRIQ) 50 MG TB24    Take 1 tablet (50 mg total) by mouth once daily.    PANTOPRAZOLE (PROTONIX) 40 MG TABLET    TAKE ONE TABLET BY MOUTH DAILY    SOLIFENACIN (VESICARE) 5 MG TABLET    Take 1 tablet (5 mg total) by mouth once daily.    TOUJEO SOLOSTAR U-300 INSULIN 300 UNIT/ML (1.5 ML) INPN PEN    INJECT 30 UNITS INTO THE SKIN ONCE DAILY.    VALSARTAN (DIOVAN) 40 MG TABLET    Take 40 mg by mouth once daily.     VICTOZA 3-XAVIER 0.6 MG/0.1 ML (18 MG/3 ML) PNIJ    INJECT 1.8MG SUBCUTANEAOUSLY DAILY     Review of patient's allergies indicates:   Allergen Reactions    Sutent [sunitinib] Diarrhea and Nausea And Vomiting       QUALITY OF LIFE: 90%- Able to Carry on Normal Activity: Minor Symptoms of Disease    There were no vitals filed for this visit.    PHYSICAL EXAM:  Oriented alert no distress  GENERAL: alert; in no apparent distress.   HEAD: normocephalic, atraumatic.  EYES: pupils are equal, round, reactive to light and accommodation. Sclera anicteric. Conjunctiva not injected.   NOSE/THROAT: no nasal erythema or rhinorrhea. Oropharynx pink, without erythema, ulcerations or thrush.   NECK: no cervical motion rigidity; supple with no masses.  CHEST: clear to auscultation bilaterally; no wheezes, crackles or rubs. Patient is speaking comfortably on room air with normal work of breathing without using accessory muscles of respiration.  CARDIOVASCULAR: regular rate  and rhythm; no murmurs, rubs or gallops.  ABDOMEN: soft, nontender, nondistended. Bowel sounds present.   MUSCULOSKELETAL: no tenderness to palpation along the spine or scapulae. Normal range of motion.  NEUROLOGIC: cranial nerves II-XII intact bilaterally. Strength 5/5 in bilateral upper and lower extremities. No sensory deficits appreciated. Reflexes globally intact. No cerebellar signs. Normal gait.  LYMPHATIC: no cervical, supraclavicular or axillary adenopathy appreciated bilaterally.   EXTREMITIES: no clubbing, cyanosis, edema.  SKIN: no erythema, rashes, ulcerations noted.     ANCILLARY DATA:     ASSESSMENT: 78-year-old patient, with prior recent history of left renal cell, clear cell carcinoma ([id number])  with mid rectal adenocarcinoma, moderately differentiated, MRI imaging staged [id number], KPS , status post neoadjuvant chemo and radiation therapy.  Completed 50.4 Gy on 11/27/2019 showing good clinical response  PLAN:  She is scheduled for restaging with MRI and flex sig examination in about 4 weeks.  This I believe will be followed by surgery.  Follow-up with us in 3 months, or sooner postop if needed    All questions answered and contact information provided. Patient understands free to call us anytime with any questions or concerns regarding radiation therapy.    TIME SPENT WITH PATIENT: I have personally seen and evaluated this patient. Greater than 50% of this time was spent discussing coordination of care and/or counseling.      PHYSICIAN: Fareed Hassan MD

## 2020-01-08 ENCOUNTER — HOSPITAL ENCOUNTER (OUTPATIENT)
Dept: RADIOLOGY | Facility: HOSPITAL | Age: 80
Discharge: HOME | End: 2020-01-08
Attending: SURGERY
Payer: COMMERCIAL

## 2020-01-08 DIAGNOSIS — C20 RECTAL CANCER: ICD-10-CM

## 2020-01-08 PROCEDURE — 72195 MRI PELVIS W/O DYE: CPT | Mod: 26,,, | Performed by: RADIOLOGY

## 2020-01-08 PROCEDURE — 72195 MRI RECTAL CANCER WITHOUT CONTRAST: ICD-10-PCS | Mod: 26,,, | Performed by: RADIOLOGY

## 2020-01-08 PROCEDURE — 72195 MRI PELVIS W/O DYE: CPT | Mod: TC

## 2020-01-11 ENCOUNTER — PATIENT MESSAGE (OUTPATIENT)
Dept: ENDOSCOPY | Facility: HOSPITAL | Age: 80
End: 2020-01-11

## 2020-01-13 ENCOUNTER — PATIENT MESSAGE (OUTPATIENT)
Dept: SURGERY | Facility: CLINIC | Age: 80
End: 2020-01-13

## 2020-01-15 ENCOUNTER — OFFICE VISIT (OUTPATIENT)
Dept: HEMATOLOGY/ONCOLOGY | Facility: CLINIC | Age: 80
End: 2020-01-15
Payer: COMMERCIAL

## 2020-01-15 VITALS
RESPIRATION RATE: 19 BRPM | SYSTOLIC BLOOD PRESSURE: 126 MMHG | TEMPERATURE: 99 F | BODY MASS INDEX: 28.53 KG/M2 | WEIGHT: 151 LBS | HEART RATE: 89 BPM | DIASTOLIC BLOOD PRESSURE: 70 MMHG

## 2020-01-15 DIAGNOSIS — D64.9 NORMOCYTIC ANEMIA: ICD-10-CM

## 2020-01-15 DIAGNOSIS — C64.2 RENAL CELL CARCINOMA OF LEFT KIDNEY: ICD-10-CM

## 2020-01-15 DIAGNOSIS — K62.89 RECTAL MASS: ICD-10-CM

## 2020-01-15 DIAGNOSIS — C20 RECTAL CANCER: Primary | ICD-10-CM

## 2020-01-15 DIAGNOSIS — C20 MALIGNANT NEOPLASM OF RECTUM: ICD-10-CM

## 2020-01-15 PROCEDURE — 1101F PT FALLS ASSESS-DOCD LE1/YR: CPT | Mod: S$GLB,,, | Performed by: INTERNAL MEDICINE

## 2020-01-15 PROCEDURE — 3078F DIAST BP <80 MM HG: CPT | Mod: S$GLB,,, | Performed by: INTERNAL MEDICINE

## 2020-01-15 PROCEDURE — 3074F PR MOST RECENT SYSTOLIC BLOOD PRESSURE < 130 MM HG: ICD-10-PCS | Mod: S$GLB,,, | Performed by: INTERNAL MEDICINE

## 2020-01-15 PROCEDURE — 1101F PR PT FALLS ASSESS DOC 0-1 FALLS W/OUT INJ PAST YR: ICD-10-PCS | Mod: S$GLB,,, | Performed by: INTERNAL MEDICINE

## 2020-01-15 PROCEDURE — 99214 PR OFFICE/OUTPT VISIT, EST, LEVL IV, 30-39 MIN: ICD-10-PCS | Mod: S$GLB,,, | Performed by: INTERNAL MEDICINE

## 2020-01-15 PROCEDURE — 3078F PR MOST RECENT DIASTOLIC BLOOD PRESSURE < 80 MM HG: ICD-10-PCS | Mod: S$GLB,,, | Performed by: INTERNAL MEDICINE

## 2020-01-15 PROCEDURE — 1159F PR MEDICATION LIST DOCUMENTED IN MEDICAL RECORD: ICD-10-PCS | Mod: S$GLB,,, | Performed by: INTERNAL MEDICINE

## 2020-01-15 PROCEDURE — 1126F AMNT PAIN NOTED NONE PRSNT: CPT | Mod: S$GLB,,, | Performed by: INTERNAL MEDICINE

## 2020-01-15 PROCEDURE — 99214 OFFICE O/P EST MOD 30 MIN: CPT | Mod: S$GLB,,, | Performed by: INTERNAL MEDICINE

## 2020-01-15 PROCEDURE — 3074F SYST BP LT 130 MM HG: CPT | Mod: S$GLB,,, | Performed by: INTERNAL MEDICINE

## 2020-01-15 PROCEDURE — 1159F MED LIST DOCD IN RCRD: CPT | Mod: S$GLB,,, | Performed by: INTERNAL MEDICINE

## 2020-01-15 PROCEDURE — 1126F PR PAIN SEVERITY QUANTIFIED, NO PAIN PRESENT: ICD-10-PCS | Mod: S$GLB,,, | Performed by: INTERNAL MEDICINE

## 2020-01-15 NOTE — PROGRESS NOTES
[facility] Hematology/Oncology  PROGRESS NOTE -   Follow-up Visit      Subjective:       Patient ID:   NAME: Valery Huggins : 1940     79 y.o. female    Referring Doc: David Mendieta MD  Other Physicians: Armando Nath; Stanislav Epstein; Azar Donnelly; Mo    Chief Complaint:  Left RCC and rectal CA f/u    History of Present Illness:     Patient returns today for a  regularly scheduled follow-up visit.  The patient is here today to go over the results of the recently ordered labs, tests and studies. She had recently completed combination XRt and chemotherapy. No CP, SOB, HA's or N/V. She is here with her . She is doing well so far with no current side-effects.     She saw Dr Hassan with rad/onc on 2019.    She previously saw Dr Mendieta on 12/10/2019  and had MRI on 2020.  They are planning scope on 2020.    She has UTI and is on antibiotics.           ROS:   GEN: normal without any fever, night sweats or weight loss  HEENT: normal with no HA's, sore throat, stiff neck, changes in vision  CV: normal with no CP, SOB, PND, RAYA or orthopnea  PULM: normal with no SOB, cough, hemoptysis, sputum or pleuritic pain  GI: normal with no abdominal pain, nausea, vomiting, constipation, diarrhea, melanotic stools, BRBPR, or hematemesis  : normal with no hematuria, dysuria  BREAST: normal with no mass, discharge, pain  SKIN: normal with no rash, erythema, bruising, or swelling    Allergies:  Review of patient's allergies indicates:  No Known Allergies    Medications:    Current Outpatient Medications:     amLODIPine (NORVASC) 5 MG tablet, TAKE ONE TABLET BY MOUTH DAILY, Disp: 90 tablet, Rfl: 0    ascorbic acid (VITAMIN C) 100 MG tablet, Take 100 mg by mouth once daily., Disp: , Rfl:     aspirin (ECOTRIN) 81 MG EC tablet, Take 81 mg by mouth once daily., Disp: , Rfl:     atorvastatin (LIPITOR) 10 MG tablet, Take 10 mg by mouth every evening. , Disp: , Rfl: 2    b complex vitamins capsule,  "Take 1 capsule by mouth once daily., Disp: , Rfl:     BD ULTRA-FINE CONNIE PEN NEEDLE 32 gauge x 5/32" Ndle, INJECT TWICE DAILY AS DIRECTED, Disp: 200 each, Rfl: 0    calcitRIOL (ROCALTROL) 0.5 MCG Cap, TAKE 1 CAPSULE (0.5 MCG TOTAL) BY MOUTH ONCE DAILY., Disp: 90 capsule, Rfl: 0    cholecalciferol, vitamin D3, (VITAMIN D3) 4,000 unit Cap, Take 1 capsule by mouth once daily., Disp: , Rfl:     ciprofloxacin HCl (CIPRO) 250 MG tablet, Take 1 tablet (250 mg total) by mouth every 12 (twelve) hours., Disp: 20 tablet, Rfl: 0    docusate sodium (COLACE) 100 MG capsule, Take 100 mg by mouth 2 (two) times daily., Disp: , Rfl:     escitalopram oxalate (LEXAPRO) 20 MG tablet, TAKE ONE TABLET BY MOUTH DAILY, Disp: 90 tablet, Rfl: 0    FREESTYLE LITE METER kit, , Disp: , Rfl: 0    FREESTYLE LITE STRIPS Strp, , Disp: , Rfl: 3    INTRAROSA 6.5 mg Inst, Place 1 suppository vaginally once daily., Disp: , Rfl: 12    levothyroxine (SYNTHROID) 88 MCG tablet, Take 1 tablet (88 mcg total) by mouth before breakfast., Disp: 30 tablet, Rfl: 11    magnesium oxide (MAG-OX) 400 mg tablet, TAKE ONE TABLET BY MOUTH TWICE DAILY, Disp: 180 tablet, Rfl: 0    mirabegron (MYRBETRIQ) 50 mg Tb24, Take 1 tablet (50 mg total) by mouth once daily., Disp: 30 tablet, Rfl: 11    pantoprazole (PROTONIX) 40 MG tablet, TAKE ONE TABLET BY MOUTH DAILY, Disp: 90 tablet, Rfl: 0    solifenacin (VESICARE) 5 MG tablet, Take 1 tablet (5 mg total) by mouth once daily., Disp: 30 tablet, Rfl: 11    TOUJEO SOLOSTAR U-300 INSULIN 300 unit/mL (1.5 mL) InPn pen, INJECT 30 UNITS INTO THE SKIN ONCE DAILY., Disp: 4.5 mL, Rfl: 3    valsartan (DIOVAN) 40 MG tablet, Take 40 mg by mouth once daily. , Disp: , Rfl: 3    VICTOZA 3-XAVIER 0.6 mg/0.1 mL (18 mg/3 mL) PnIj, INJECT 1.8MG SUBCUTANEAOUSLY DAILY, Disp: 9 mL, Rfl: 0    PMHx/PSHx Updates:  See patient's last visit with me on 12/10/2019.  See H&P on 10/8/2019        Pathology:  Cancer Staging  No matching staging " information was found for the patient.          Objective:     Vitals:  Blood pressure 126/70, pulse 89, temperature 98.7 °F (37.1 °C), temperature source Oral, resp. rate 19, weight 68.5 kg (151 lb), last menstrual period 06/01/2012.    Physical Examination:   GEN: no apparent distress, comfortable; AAOx3  HEAD: atraumatic and normocephalic  EYES: no pallor, no icterus, PERRLA  ENT: OMM, no pharyngeal erythema, external ears WNL; no nasal discharge; no thrush  NECK: no masses, thyroid normal, trachea midline, no LAD/LN's, supple  CV: RRR with no murmur; normal pulse; normal S1 and S2; no pedal edema  CHEST: Normal respiratory effort; CTAB; normal breath sounds; no wheeze or crackles; portactah on right CW  ABDOM: nontender and nondistended; soft; normal bowel sounds; no rebound/guarding  MUSC/Skeletal: ROM normal; no crepitus; joints normal; no deformities or arthropathy  EXTREM: no clubbing, cyanosis, inflammation or swelling  SKIN: no rashes, lesions, ulcers, petechiae or subcutaneous nodules  : no hendrickson  NEURO: grossly intact; motor/sensory WNL; AAOx3; no tremors  PSYCH: normal mood, affect and behavior  LYMPH: normal cervical, supraclavicular, axillary and groin LN's            Labs:     1/13/2020  Lab Results   Component Value Date    WBC 8.4 01/13/2020    HGB 9.9 (L) 01/13/2020    HCT 29.8 (L) 01/13/2020    MCV 89.8 01/13/2020     01/13/2020     CMP  Sodium   Date Value Ref Range Status   01/14/2020 136 136 - 145 mmol/L Final     Potassium   Date Value Ref Range Status   01/14/2020 4.2 3.5 - 5.1 mmol/L Final     Chloride   Date Value Ref Range Status   01/14/2020 96 (L) 101 - 111 mmol/L Final     CO2   Date Value Ref Range Status   01/14/2020 31 (H) 23 - 29 mmol/L Final     Glucose   Date Value Ref Range Status   01/14/2020 148 (H) 74 - 118 mg/dL Final     BUN, Bld   Date Value Ref Range Status   01/14/2020 31 (H) 8 - 23 mg/dL Final     Creatinine   Date Value Ref Range Status   01/14/2020 2.0 (H)  0.5 - 1.4 mg/dL Final     Calcium   Date Value Ref Range Status   01/14/2020 8.2 (L) 8.6 - 10.0 mg/dL Final     Total Protein   Date Value Ref Range Status   01/14/2020 6.6 6.0 - 8.4 g/dL Final     Albumin   Date Value Ref Range Status   01/14/2020 3.4 (L) 3.5 - 5.2 g/dL Final     Total Bilirubin   Date Value Ref Range Status   01/14/2020 0.7 0.3 - 1.2 mg/dL Final     Comment:     For infants and newborns, interpretation of results should be based  on gestational age, weight and in agreement with clinical  observations.  Premature Infant recommended reference ranges:  Up to 24 hours.............<8.0 mg/dL  Up to 48 hours............<12.0 mg/dL  3-5 days..................<15.0 mg/dL  6-29 days.................<15.0 mg/dL       Alkaline Phosphatase   Date Value Ref Range Status   01/14/2020 61 38 - 126 U/L Final     AST   Date Value Ref Range Status   01/14/2020 14 (L) 15 - 41 U/L Final     ALT   Date Value Ref Range Status   01/14/2020 12 (L) 14 - 54 U/L Final     Anion Gap   Date Value Ref Range Status   01/14/2020 9 8 - 16 mmol/L Final     eGFR if    Date Value Ref Range Status   01/14/2020 27.2 (A) >60 mL/min/1.73 m^2 Final     eGFR if non    Date Value Ref Range Status   01/14/2020 23.6 (A) >60 mL/min/1.73 m^2 Final     Comment:     Calculation used to obtain the estimated glomerular filtration  rate (eGFR) is the CKD-EPI equation.              Radiology/Diagnostic Studies:    MRI  1/8/2020:  Impression       Significant reduction in size of the patient's known mid rectal mass, which is no longer distinctly visible.  Reduction in size of 3 mesorectal lymph nodes as above.               PET  10/17/2019    Impression       1. Left paratracheal small mass extending into superior mediastinum with associated moderate FDG activity is unchanged in size and appearance since 02/16/2018 CT.  This is unlikely to represent metastatic disease or malignancy, given stability, and may represent  residual thyroid parenchyma but is otherwise nonspecific.  2. Unchanged 6 mm right upper lobe nodule since 02/16/2018.  Benign etiology is likely the continued CT thorax without IV contrast follow-up is recommended in 12 months to document greater than 2 years of stability.  3. No current convincing evidence of metastatic disease.  4. Previous rectal mass is no longer evident on this exam.               X-ray Chest Ap Portable    Result Date: 10/15/2019  EXAMINATION: XR CHEST AP PORTABLE CLINICAL HISTORY: s/p port; Encounter for adjustment and management of vascular access device TECHNIQUE: Single frontal view of the chest was performed. COMPARISON: 6/18/2019 FINDINGS: The cardiomediastinal silhouette is stable.  Lungs are clear of infiltrate.  No pleural effusions.  Laya catheter has been inserted from the region of the right subclavian vein with the tip at the level the proximal SVC.     Laya catheter inserted with the tip at the level the proximal SVC.  Lungs are expanded and clear.  No pneumothorax. Electronically signed by: Nubia Venegas MD Date:    10/15/2019 Time:    14:05    Surg Fl Surgery Fluoro Usage    Result Date: 10/15/2019  See OP Notes for results. IMPRESSION: See OP Notes for results. This procedure was auto-finalized by: Virtual Radiologist     Mri Rectal Cancer Without Contrast    Result Date: 9/18/2019  EXAMINATION: MRI RECTAL CANCER WITHOUT CONTRAST CLINICAL HISTORY: Rectal cancer, staging, locoregional;rectal anal mass;  Malignant neoplasm of rectum TECHNIQUE: Multiplanar multisequence MR imaging of the pelvis without contrast. COMPARISON: 07/12/2019 FINDINGS: Approximately 4 cm from the anal verge there is a peripherally located lobular mass along the dorsal rectal wall.  This measures 4 cm in length and 3.3 cm in diameter.  There is heterogeneous signal intensity.  There is restricted diffusion in the lesion and no discrete extra colonic extension.  Several lymph nodes are noted in the  mesorectal fat including two which measure 3 mm near the inferior sacrum, series 8, image 16, and a 5 mm lymph node at the S2 level, series 8, image 8.  Prominent but nonenlarged bilateral obturator chain lymph nodes also noted, on the right at 4 mm and left at 6 mm.  There is urinary bladder wall thickening which is diffuse.  Moderate degree of stool in the colon.  No dilated bowel loops.  Hysterectomy.     4 cm mid rectal lesion in keeping with known malignancy.  No discrete extension into the mesorectal fascia although there are several perirectal lymph nodes which raise the possibility for locoregional lymph node involvement. Electronically signed by: Bebo Kapoor Date:    09/18/2019 Time:    16:45      I have reviewed all available lab results and radiology reports.    Assessment/Plan:   (1) 79 y.o. female with diagnosis of prior left RCC who has been referred by David Mendieta MD for evaluation by medical hematology/oncology. She has been followed by Dr Stanislav Epstein with [facility] the [facility] in [place]. She last saw Dr Epstein in July 2019. She was recently diagnosed with rectal mass by Dr Armando Duff which was found on colonoscopy on 8/26/2019. She had presented with lower Gi bleeding at that time. The pathology from those biopsies showed no evidence of malignancy at that time. She was subsequently seen by Dr David Mendieta and underwent trans-anal surgical biopsy on 9/23/2019. The pathology from the surgical biopsy showed rectal carcinoma.         She has been seen by Dr Fareed Hassan with Rad/onc for radiation therapy evaluation.     PET was done on 10/17/2019     We previously discussed giving her combined chemotherapy with the XRt to try to reduce her risk of recurrence. She was agreeable to this plan.    She had portacath placed with Dr Mendieta. She saw Dr Hassan last week with rad/onc. She has since completed weekly XRT and chemotherapy    -  She saw Dr Mendieta 12/10/2019 and had  MRI on 1/8/2020 which looked good  - she is having scope on 1/28/2020 with Dr Mendieta  - will await Dr Mendieta decision and plans, she may require adjuvant chemotherapy at some point      (2) Left renal cell carcinoma s/p left nephrectomy with Dr Azar Donnelly - diagnosed about 2 years ago     (3) Ureterolithiasis     (4) CRI - stage III - followed by Dr Antonio; she saw him recently and per patient's report, her kidney function is stable per Dr Antonio     (5) HTN and hypercholesterolemia     (6) DM - followed by Dr Brower with endocrinology     (7) Hx/of gall stones     (8) Chronic left shoulder issues     (9) MVP     (10) Thyroid disease with prior hx/of thyroid cancer s/p thyroidectomy      (11) Chronic anemia - most likley multifactorial due to iron deficiency, GIB and anemia of chronic renal disease  - hgb at 9.7 currently       VISIT DIAGNOSES:      Rectal cancer    Malignant neoplasm of rectum    Normocytic anemia    Renal cell carcinoma of left kidney    Rectal mass          PLAN:  1.  Encouraged compliance with labs  2.  Proceed with pcolonoscopy for visual inspection by Dr Mendieta on 1/28/2020 and await his decision  3.  F/u with PCP, GI, rad/onc , etc  4.  F/u nephrology     5. RTC in 4 weeks    Fax note to Mo/Pham Winston Parks; Tete Duff Tandron; Paco    Discussion:       I spent over 25 mins of time with the patient. Reviewed results of the recently ordered labs, tests and studies; made directives with regards to the results. Over half of this time was spent couseling and coordinating care.    I have explained all of the above in detail and the patient understands all of the current recommendation(s). I have answered all of their questions to the best of my ability and to their complete satisfaction.   The patient is to continue with the current management plan.            Electronically signed by Carrillo Goode MD

## 2020-01-17 ENCOUNTER — PATIENT MESSAGE (OUTPATIENT)
Dept: ENDOSCOPY | Facility: HOSPITAL | Age: 80
End: 2020-01-17

## 2020-01-25 ENCOUNTER — PATIENT MESSAGE (OUTPATIENT)
Dept: ENDOSCOPY | Facility: HOSPITAL | Age: 80
End: 2020-01-25

## 2020-01-27 ENCOUNTER — PATIENT MESSAGE (OUTPATIENT)
Dept: ENDOSCOPY | Facility: HOSPITAL | Age: 80
End: 2020-01-27

## 2020-01-28 ENCOUNTER — ANESTHESIA (OUTPATIENT)
Dept: ENDOSCOPY | Facility: HOSPITAL | Age: 80
End: 2020-01-28
Payer: COMMERCIAL

## 2020-01-28 ENCOUNTER — HOSPITAL ENCOUNTER (OUTPATIENT)
Facility: HOSPITAL | Age: 80
Discharge: HOME | End: 2020-01-28
Attending: SURGERY | Admitting: SURGERY
Payer: COMMERCIAL

## 2020-01-28 ENCOUNTER — ANESTHESIA EVENT (OUTPATIENT)
Dept: ENDOSCOPY | Facility: HOSPITAL | Age: 80
End: 2020-01-28
Payer: COMMERCIAL

## 2020-01-28 ENCOUNTER — PATIENT MESSAGE (OUTPATIENT)
Dept: ENDOSCOPY | Facility: HOSPITAL | Age: 80
End: 2020-01-28

## 2020-01-28 ENCOUNTER — TELEPHONE (OUTPATIENT)
Dept: SURGERY | Facility: CLINIC | Age: 80
End: 2020-01-28

## 2020-01-28 DIAGNOSIS — K62.89 RECTAL MASS: ICD-10-CM

## 2020-01-28 PROCEDURE — 45331 SIGMOIDOSCOPY AND BIOPSY: CPT | Mod: ,,, | Performed by: SURGERY

## 2020-01-28 PROCEDURE — 27201012 HC FORCEPS, HOT/COLD, DISP: Performed by: SURGERY

## 2020-01-28 PROCEDURE — 88305 TISSUE EXAM BY PATHOLOGIST: ICD-10-PCS | Mod: 26,,, | Performed by: PATHOLOGY

## 2020-01-28 PROCEDURE — 88305 TISSUE EXAM BY PATHOLOGIST: CPT | Mod: 26,,, | Performed by: PATHOLOGY

## 2020-01-28 PROCEDURE — 37000008 HC ANESTHESIA 1ST 15 MINUTES: Performed by: SURGERY

## 2020-01-28 PROCEDURE — 88305 TISSUE EXAM BY PATHOLOGIST: CPT | Performed by: PATHOLOGY

## 2020-01-28 PROCEDURE — 63600175 PHARM REV CODE 636 W HCPCS: Performed by: NURSE ANESTHETIST, CERTIFIED REGISTERED

## 2020-01-28 PROCEDURE — D9220A PRA ANESTHESIA: Mod: ANES,,, | Performed by: ANESTHESIOLOGY

## 2020-01-28 PROCEDURE — D9220A PRA ANESTHESIA: ICD-10-PCS | Mod: ANES,,, | Performed by: ANESTHESIOLOGY

## 2020-01-28 PROCEDURE — 37000009 HC ANESTHESIA EA ADD 15 MINS: Performed by: SURGERY

## 2020-01-28 PROCEDURE — D9220A PRA ANESTHESIA: ICD-10-PCS | Mod: CRNA,,, | Performed by: NURSE ANESTHETIST, CERTIFIED REGISTERED

## 2020-01-28 PROCEDURE — 45331 PR SIGMOIDOSCOPY,BIOPSY: ICD-10-PCS | Mod: ,,, | Performed by: SURGERY

## 2020-01-28 PROCEDURE — 45331 SIGMOIDOSCOPY AND BIOPSY: CPT | Performed by: SURGERY

## 2020-01-28 PROCEDURE — D9220A PRA ANESTHESIA: Mod: CRNA,,, | Performed by: NURSE ANESTHETIST, CERTIFIED REGISTERED

## 2020-01-28 PROCEDURE — 63600175 PHARM REV CODE 636 W HCPCS: Performed by: SURGERY

## 2020-01-28 RX ORDER — LIDOCAINE HCL/PF 100 MG/5ML
SYRINGE (ML) INTRAVENOUS
Status: DISCONTINUED | OUTPATIENT
Start: 2020-01-28 | End: 2020-01-28

## 2020-01-28 RX ORDER — SODIUM CHLORIDE 9 MG/ML
INJECTION, SOLUTION INTRAVENOUS CONTINUOUS
Status: DISCONTINUED | OUTPATIENT
Start: 2020-01-28 | End: 2020-01-28 | Stop reason: HOSPADM

## 2020-01-28 RX ORDER — PROPOFOL 10 MG/ML
VIAL (ML) INTRAVENOUS
Status: DISCONTINUED | OUTPATIENT
Start: 2020-01-28 | End: 2020-01-28

## 2020-01-28 RX ADMIN — PROPOFOL 20 MG: 10 INJECTION, EMULSION INTRAVENOUS at 07:01

## 2020-01-28 RX ADMIN — SODIUM CHLORIDE: 0.9 INJECTION, SOLUTION INTRAVENOUS at 07:01

## 2020-01-28 RX ADMIN — Medication 50 MG: at 07:01

## 2020-01-28 RX ADMIN — PROPOFOL 80 MG: 10 INJECTION, EMULSION INTRAVENOUS at 07:01

## 2020-01-28 NOTE — H&P
[facility]  History & Physical     Subjective:     Chief Complaint/Reason for Admission: flexible sigmoidoscopy    History of Present Illness:   Patient 79 y.o. female presents for flexible sigmoidoscopy.  Pt with distal rectal cancer.  She completed neoadjuvant treatment on 11/26.  Pt has had f/u MRI demonstrating apparent complete response.  NO mass or enlarged LNs noted on MRI.  Pt presents for flex sig to evaluate response and notes that she strongly desires to avoid surgery if possible.     Patient Active Problem List    Diagnosis Date Noted    Malignant neoplasm of rectum 12/23/2019    Encounter for insertion of venous access port 10/15/2019    Rectal cancer 10/08/2019    Rectal mass 09/23/2019    Hypocalcemia 06/18/2019    Hypomagnesemia 06/18/2019    CKD (chronic kidney disease) stage 4, GFR 15-29 ml/min 06/18/2019    Non-insulin dependent type 2 diabetes mellitus 07/31/2018    Cervical lymphadenopathy 04/26/2018    Calculus of kidney and ureter 03/07/2018    Urinary tract obstruction by kidney stone 02/16/2018    Hyponatremia 01/22/2018    Normocytic anemia 01/22/2018    VICTOR M (acute kidney injury) 01/21/2018    Acute biliary pancreatitis 01/21/2018    Calculus of gallbladder without cholecystitis 01/21/2018    Status post Left nephrectomy 11/17/2017    Renal cell carcinoma 11/16/2017    Essential hypertension 09/26/2017    Ureteral calculus, left 01/16/2017    Ureterolithiasis 12/11/2016    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/2016    Renal cell carcinoma of left kidney 12/11/2016    Renal calculus, left 12/05/2016    BMI 28.0-28.9,adult 05/19/2016    Renal mass, left 11/02/2015    Calculus of ureter 07/06/2015    Chronic pain in left shoulder 09/05/2012    Hypercholesteremia 09/05/2012        Medications Prior to Admission   Medication Sig Dispense Refill Last Dose    amLODIPine (NORVASC) 5 MG tablet TAKE ONE TABLET BY MOUTH DAILY 90 tablet 0 1/28/2020 at  "Unknown time    ascorbic acid (VITAMIN C) 100 MG tablet Take 100 mg by mouth once daily.   1/27/2020 at Unknown time    atorvastatin (LIPITOR) 10 MG tablet Take 10 mg by mouth every evening.   2 1/27/2020 at Unknown time    b complex vitamins capsule Take 1 capsule by mouth once daily.   1/27/2020 at Unknown time    calcitRIOL (ROCALTROL) 0.5 MCG Cap TAKE 1 CAPSULE (0.5 MCG TOTAL) BY MOUTH ONCE DAILY. 90 capsule 0 1/27/2020 at Unknown time    cholecalciferol, vitamin D3, (VITAMIN D3) 4,000 unit Cap Take 1 capsule by mouth once daily.   1/27/2020 at Unknown time    docusate sodium (COLACE) 100 MG capsule Take 100 mg by mouth 2 (two) times daily.   1/27/2020 at Unknown time    escitalopram oxalate (LEXAPRO) 20 MG tablet TAKE ONE TABLET BY MOUTH DAILY 90 tablet 0 1/27/2020 at Unknown time    levothyroxine (SYNTHROID) 88 MCG tablet Take 1 tablet (88 mcg total) by mouth before breakfast. 30 tablet 11 1/28/2020 at Unknown time    magnesium oxide (MAG-OX) 400 mg tablet TAKE ONE TABLET BY MOUTH TWICE DAILY 180 tablet 0 1/27/2020 at Unknown time    mirabegron (MYRBETRIQ) 50 mg Tb24 Take 1 tablet (50 mg total) by mouth once daily. 30 tablet 11 1/27/2020 at Unknown time    pantoprazole (PROTONIX) 40 MG tablet TAKE ONE TABLET BY MOUTH DAILY 90 tablet 0 1/27/2020 at Unknown time    solifenacin (VESICARE) 5 MG tablet Take 1 tablet (5 mg total) by mouth once daily. 30 tablet 11 1/27/2020 at Unknown time    TOUJEO SOLOSTAR U-300 INSULIN 300 unit/mL (1.5 mL) InPn pen INJECT 30 UNITS INTO THE SKIN ONCE DAILY. 4.5 mL 3 1/27/2020 at Unknown time    valsartan (DIOVAN) 40 MG tablet Take 40 mg by mouth once daily.   3 1/28/2020 at Unknown time    VICTOZA 3-XAVIER 0.6 mg/0.1 mL (18 mg/3 mL) PnIj INJECT 1.8MG SUBCUTANEAOUSLY DAILY 9 mL 0 1/27/2020 at Unknown time    aspirin (ECOTRIN) 81 MG EC tablet Take 81 mg by mouth once daily.   1/21/2020    BD ULTRA-FINE CONNIE PEN NEEDLE 32 gauge x 5/32" Ndle INJECT TWICE DAILY AS DIRECTED " 200 each 0 Unknown at Unknown time    ciprofloxacin HCl (CIPRO) 250 MG tablet Take 1 tablet (250 mg total) by mouth every 12 (twelve) hours. 20 tablet 0 Taking    FREESTYLE LITE METER kit   0 Unknown at Unknown time    FREESTYLE LITE STRIPS Strp   3 Unknown at Unknown time     Review of patient's allergies indicates:  No Known Allergies     Past Medical History:   Diagnosis Date    Anemia     Cancer     thyroid    Cancer     Kidney    Depression     Diabetes mellitus     Diabetes mellitus type II     Encounter for blood transfusion     Gallstones     Hypertension     Kidney stone     MVP (mitral valve prolapse)     PONV (postoperative nausea and vomiting)     Thyroid disease       Past Surgical History:   Procedure Laterality Date    APPENDECTOMY      cataracts      both eyes    CHOLECYSTECTOMY      COLONOSCOPY N/A 8/26/2019    Procedure: COLONOSCOPY;  Surgeon: Armando Duff MD;  Location: [facility];  Service: Endoscopy;  Laterality: N/A;    CYSTOSCOPY      CYSTOSCOPY W/ URETERAL STENT PLACEMENT      ECTOPIC PREGNANCY SURGERY      EXAMINATION UNDER ANESTHESIA N/A 9/23/2019    Procedure: Exam under anesthesia;  Surgeon: David Mendieta MD;  Location: [facility] OR;  Service: General;  Laterality: N/A;    EYE SURGERY      phoebe cataract    HYSTERECTOMY      INSERTION OF TUNNELED CENTRAL VENOUS CATHETER (CVC) WITH SUBCUTANEOUS PORT Right 10/15/2019    Procedure: [id number];  Surgeon: David Mendieta MD;  Location: [facility] OR;  Service: General;  Laterality: Right;    NASAL SEPTUM SURGERY      NEPHRECTOMY Left     OOPHORECTOMY      THYROIDECTOMY      two times    TONSILLECTOMY        Family History   Problem Relation Age of Onset    Hypertension Father     Kidney disease Father     Mental illness Mother     Cancer Brother       Social History     Tobacco Use    Smoking status: Never Smoker    Smokeless tobacco: Never Used   Substance Use Topics    Alcohol use: No        Review  "of Systems:  A comprehensive review of systems was negative.    OBJECTIVE:     Patient Vitals for the past 8 hrs:   BP Temp Temp src Pulse Resp SpO2 Height Weight   01/28/20 0707 (!) 142/67 98.1 °F (36.7 °C) Skin 82 18 98 % 5' 1" (1.549 m) 66.7 kg (147 lb)     AAOx3  CTA B  Soft/nt/nd  2+ pulses B          ASSESSMENT/PLAN:     Active Hospital Problems    Diagnosis  POA    Rectal mass [K62.89]  Yes      Resolved Hospital Problems   No resolved problems to display.       Plan:  To have flex sig today  "

## 2020-01-28 NOTE — TRANSFER OF CARE
"Anesthesia Transfer of Care Note    Patient: Valery Huggins    Procedure(s) Performed: Procedure(s) (LRB):  SIGMOIDOSCOPY, FLEXIBLE (N/A)    Patient location: PACU    Anesthesia Type: general    Transport from OR: Transported from OR on room air with adequate spontaneous ventilation    Post pain: adequate analgesia    Post assessment: no apparent anesthetic complications and tolerated procedure well    Post vital signs: stable    Level of consciousness: awake, alert and oriented    Nausea/Vomiting: no nausea/vomiting    Complications: none    Transfer of care protocol was followed      Last vitals:   Visit Vitals  BP (!) 142/67 (BP Location: Left arm, Patient Position: Lying)   Pulse 82   Temp 36.7 °C (98.1 °F) (Skin)   Resp 18   Ht 5' 1" (1.549 m)   Wt 66.7 kg (147 lb)   LMP 06/01/2012   SpO2 98%   Breastfeeding? No   BMI 27.78 kg/m²     "

## 2020-01-28 NOTE — OP NOTE
DATE OF PROCEDURE:  01/28/2020    STAFF SURGEON:  David Mendieta M.D.    PREOPERATIVE DIAGNOSIS:  History of rectal cancer.    POSTOPERATIVE DIAGNOSIS:  History of rectal cancer.    PROCEDURE PERFORMED:  Flexible sigmoidoscopy.    ANESTHESIA:  Monitored anesthesia.    INDICATION FOR PROCEDURE:  A pleasant 79-year-old female whom I am familiar.    She completed neoadjuvant treatment for rectal cancer.  The patient had an MRI   demonstrating no obvious residual lesion and was scheduled for a flex sig to   evaluate for response to chemoradiation.    DESCRIPTION OF PROCEDURE:  Following signing of informed consent, she was   brought to the Endoscopy Suite and placed in left lateral decubitus position.    Monitored anesthesia was administered.  Appropriate timeout procedure was then   performed.  I then performed digital rectal exam.  There was some slight   scarring posteriorly from where the mass had been excised transanally.  No   palpable masses appreciated.  I then placed an endoscope through the anal canal   into the rectum.  Distally in the rectum, there was a tattoo present and   scarring was noted.  No mass was appreciated.  Biopsies were taken of this area.    Pictures were downloaded into Epic.  I then advanced the endoscope up into the   sigmoid colon.  No other lesions or masses appreciated.  The patient was   awakened and taken to Recovery Room in stable condition.  There were no   immediate complications.  Blood loss was none.      ANGEL/IN  dd: 01/28/2020 08:13:35 (CST)  td: 01/28/2020 10:34:48 (CST)  Doc ID   #[id number]  Job ID #[id number]    CC:

## 2020-01-28 NOTE — ANESTHESIA PREPROCEDURE EVALUATION
01/28/2020  Valery Huggins is a 79 y.o., female.    Anesthesia Evaluation    I have reviewed the Patient Summary Reports.    I have reviewed the Nursing Notes.      Review of Systems  Anesthesia Hx:  PONV   Cardiovascular:   Hypertension, well controlled    Endocrine:   Diabetes, well controlled, type 2 Hypothyroidism        Physical Exam  General:  Well nourished    Airway/Jaw/Neck:  Airway Findings: Mallampati: II                Anesthesia Plan  Type of Anesthesia, risks & benefits discussed:  Anesthesia Type:  general  Patient's Preference:   Intra-op Monitoring Plan:   Intra-op Monitoring Plan Comments:   Post Op Pain Control Plan:   Post Op Pain Control Plan Comments:   Induction:   IV  Beta Blocker:  Patient is not currently on a Beta-Blocker (No further documentation required).       Informed Consent: Patient understands risks and agrees with Anesthesia plan.  Questions answered. Anesthesia consent signed with patient.  ASA Score: 2     Day of Surgery Review of History & Physical:    H&P update referred to the surgeon.         Ready For Surgery From Anesthesia Perspective.

## 2020-01-28 NOTE — PLAN OF CARE
Patient awake, alert, and oriented.  No complaints of pain; abdomen soft and tender.  Patient passing flatus without difficulty.  Vital signs stable.  Patient tolerated po fluids well.  Dr. Mendieta spoke with patient and family member prior to discharge.  Patient and family verbalize understanding of all discharge instructions given.  Patient discharged to home accompanied by family

## 2020-01-28 NOTE — TELEPHONE ENCOUNTER
----- Message from Surya Barksdale sent at 1/28/2020 10:59 AM CST -----  Contact: pt  Type:  Sooner Apoointment Request    Caller is requesting a sooner appointment.  Caller declined first available appointment listed below.  Caller will not accept being placed on the waitlist and is requesting a message be sent to doctor.    Name of Caller:  pt  When is the first available appointment?  2.18.20  Symptoms:  post op  Best Call Back Number:  [phone]  Additional Information:

## 2020-01-28 NOTE — ANESTHESIA POSTPROCEDURE EVALUATION
Anesthesia Post Evaluation    Patient: Valery Huggins    Procedure(s) Performed: Procedure(s) (LRB):  SIGMOIDOSCOPY, FLEXIBLE (N/A)    Final Anesthesia Type: general    Patient location during evaluation: PACU  Patient participation: Yes- Able to Participate  Level of consciousness: awake and alert  Post-procedure vital signs: reviewed and stable  Pain management: adequate  Airway patency: patent    PONV status at discharge: No PONV  Anesthetic complications: no      Cardiovascular status: blood pressure returned to baseline and hemodynamically stable  Respiratory status: unassisted  Hydration status: euvolemic  Follow-up not needed.          Vitals Value Taken Time   /61 1/28/2020  8:05 AM   Temp 36.7 °C (98.1 °F) 1/28/2020  7:07 AM   Pulse 81 1/28/2020  8:05 AM   Resp 16 1/28/2020  8:05 AM   SpO2 80 % 1/28/2020  8:05 AM         No case tracking events are documented in the log.      Pain/Jake Score: Jake Score: 10 (1/28/2020  8:05 AM)

## 2020-01-29 VITALS
HEART RATE: 82 BPM | TEMPERATURE: 98 F | OXYGEN SATURATION: 100 % | DIASTOLIC BLOOD PRESSURE: 76 MMHG | WEIGHT: 147 LBS | SYSTOLIC BLOOD PRESSURE: 138 MMHG | BODY MASS INDEX: 27.75 KG/M2 | HEIGHT: 61 IN | RESPIRATION RATE: 17 BRPM

## 2020-02-01 NOTE — DISCHARGE SUMMARY
[facility]  Discharge Note  Short Stay    Procedure(s) (LRB):  SIGMOIDOSCOPY, FLEXIBLE (N/A)    OUTCOME: Patient tolerated treatment/procedure well without complication and is now ready for discharge.    DISPOSITION: Home or Self Care    FINAL DIAGNOSIS:  REctal cancer    FOLLOWUP: In clinic

## 2020-02-04 ENCOUNTER — PATIENT MESSAGE (OUTPATIENT)
Dept: HEMATOLOGY/ONCOLOGY | Facility: CLINIC | Age: 80
End: 2020-02-04

## 2020-02-04 LAB
COMMENT: NORMAL
FINAL PATHOLOGIC DIAGNOSIS: NORMAL
GROSS: NORMAL

## 2020-02-11 ENCOUNTER — OFFICE VISIT (OUTPATIENT)
Dept: SURGERY | Facility: CLINIC | Age: 80
End: 2020-02-11
Payer: COMMERCIAL

## 2020-02-11 VITALS
TEMPERATURE: 98 F | WEIGHT: 148.13 LBS | HEART RATE: 84 BPM | DIASTOLIC BLOOD PRESSURE: 83 MMHG | BODY MASS INDEX: 27.97 KG/M2 | HEIGHT: 61 IN | SYSTOLIC BLOOD PRESSURE: 129 MMHG

## 2020-02-11 DIAGNOSIS — C20 RECTAL CANCER: Primary | ICD-10-CM

## 2020-02-11 PROCEDURE — 99213 PR OFFICE/OUTPT VISIT, EST, LEVL III, 20-29 MIN: ICD-10-PCS | Mod: S$GLB,,, | Performed by: SURGERY

## 2020-02-11 PROCEDURE — 1101F PR PT FALLS ASSESS DOC 0-1 FALLS W/OUT INJ PAST YR: ICD-10-PCS | Mod: CPTII,S$GLB,, | Performed by: SURGERY

## 2020-02-11 PROCEDURE — 3079F PR MOST RECENT DIASTOLIC BLOOD PRESSURE 80-89 MM HG: ICD-10-PCS | Mod: CPTII,S$GLB,, | Performed by: SURGERY

## 2020-02-11 PROCEDURE — 99999 PR PBB SHADOW E&M-EST. PATIENT-LVL III: CPT | Mod: PBBFAC,,, | Performed by: SURGERY

## 2020-02-11 PROCEDURE — 3079F DIAST BP 80-89 MM HG: CPT | Mod: CPTII,S$GLB,, | Performed by: SURGERY

## 2020-02-11 PROCEDURE — 1159F PR MEDICATION LIST DOCUMENTED IN MEDICAL RECORD: ICD-10-PCS | Mod: S$GLB,,, | Performed by: SURGERY

## 2020-02-11 PROCEDURE — 1101F PT FALLS ASSESS-DOCD LE1/YR: CPT | Mod: CPTII,S$GLB,, | Performed by: SURGERY

## 2020-02-11 PROCEDURE — 99213 OFFICE O/P EST LOW 20 MIN: CPT | Mod: S$GLB,,, | Performed by: SURGERY

## 2020-02-11 PROCEDURE — 3074F SYST BP LT 130 MM HG: CPT | Mod: CPTII,S$GLB,, | Performed by: SURGERY

## 2020-02-11 PROCEDURE — 1159F MED LIST DOCD IN RCRD: CPT | Mod: S$GLB,,, | Performed by: SURGERY

## 2020-02-11 PROCEDURE — 1126F PR PAIN SEVERITY QUANTIFIED, NO PAIN PRESENT: ICD-10-PCS | Mod: S$GLB,,, | Performed by: SURGERY

## 2020-02-11 PROCEDURE — 3074F PR MOST RECENT SYSTOLIC BLOOD PRESSURE < 130 MM HG: ICD-10-PCS | Mod: CPTII,S$GLB,, | Performed by: SURGERY

## 2020-02-11 PROCEDURE — 99999 PR PBB SHADOW E&M-EST. PATIENT-LVL III: ICD-10-PCS | Mod: PBBFAC,,, | Performed by: SURGERY

## 2020-02-11 PROCEDURE — 1126F AMNT PAIN NOTED NONE PRSNT: CPT | Mod: S$GLB,,, | Performed by: SURGERY

## 2020-02-11 NOTE — PROGRESS NOTES
79-year-old female whom I am familiar with.  Patient completed neoadjuvant treatment for distal rectal cancer approximately 10 weeks ago.  Patient had CT and MRI in September demonstrating suspicious nodes.  Follow-up MRI in December had demonstrated no mass and no obvious nodes consistent with good response to neoadjuvant treatment.  Patient had follow-up flexible sigmoidoscopy by me on January 31st.  There were no masses appreciated within the rectum.  There was scar present from where the mass was but no true mass.  Multiple biopsies were taken from the scar and sent to pathology.    Patient presents to discuss findings.    Afebrile vital signs stable  Awake alert orient x3  Abdomen soft nontender nondistended    PAth    Diagnosis Rectum (biopsy):   - Minute fragment of benign colonic mucosa, no histologic abnormality   - No morphologic evidence of dysplasia or malignancy (see comment)   - Multiple levels examined     A/P: HIstory of Rectal Cancer    Patient demonstrates a clinical complete response to neoadjuvant treatment.  At present I see no evidence of a mass or cancer based on endoscopic findings, or radiographic findings. I had a lengthy discussion with the patient reviewing management options.  Discussed with her that surgical resection which would most likely require an APR would still be most likely considered the gold standard of treatment.  However given her age and the significance of that surgery a reasonable alternative would be to proceed with systemic chemotherapy and very close observation moving forward from there.  She is at higher risk of recurrence with this approach and if recurrence were to occur with certainly require an APR.  The patient is fully aware of this and desiring to proceed with non operative management.  I have discussed this with her oncologist who also agrees with proceeding in this manner. We will plan on following closely.  She will return to me after completion of  chemotherapy at which point we will proceed with flexible sigmoidoscopy

## 2020-02-11 NOTE — PROGRESS NOTES
Medical- Oncology Note;    I spoke to Dr David Mendieta by phone. His recent scope of the patient was negative. Given the patient's age and comorbidity factors, he is leaning away for surgery and opting for close observation. He is meeting with the patient in his clinic today. She will require adjuvant chemotherapy for about 6 months to reduce her risk of cancer recurrence.

## 2020-02-12 NOTE — PROGRESS NOTES
[facility] Hematology/Oncology  PROGRESS NOTE -   Follow-up Visit      Subjective:       Patient ID:   NAME: Valery Huggins : 1940     79 y.o. female    Referring Doc: David Mendieta MD  Other Physicians: Armando Nath; Stanislav Epstein; Azar Donnelly; Mo    Chief Complaint:  Left RCC and rectal CA f/u    History of Present Illness:     Patient returns today for a  regularly scheduled follow-up visit.  The patient is here today to go over the results of the recently ordered labs, tests and studies. She had recently completed combination XRt and chemotherapy. No CP, SOB, HA's or N/V. She is here with her . She is doing well so far with no current side-effects.     She saw Dr Hassan with rad/onc on 2019.    She previously saw Dr Mendieta on 12/10/2019  and had MRI on 2020.  They did scope on 2020 and I spoke to dr mendieta by phone last week. The scope looked good and both the patient and Dr Mendieta does not want to proceed in direction of operation especially given her age and co-morbidities.    We discussed adjuvant chemotherapy with FOLOFOX x 10-12 cycles especially since she is not having surgery and she is agreeable.    Will provide literature on the regimen and set up chemotherapy              ROS:   GEN: normal without any fever, night sweats or weight loss  HEENT: normal with no HA's, sore throat, stiff neck, changes in vision  CV: normal with no CP, SOB, PND, RAYA or orthopnea  PULM: normal with no SOB, cough, hemoptysis, sputum or pleuritic pain  GI: normal with no abdominal pain, nausea, vomiting, constipation, diarrhea, melanotic stools, BRBPR, or hematemesis  : normal with no hematuria, dysuria  BREAST: normal with no mass, discharge, pain  SKIN: normal with no rash, erythema, bruising, or swelling    Allergies:  Review of patient's allergies indicates:  No Known Allergies    Medications:    Current Outpatient Medications:     amLODIPine (NORVASC) 10 MG tablet, Take 1 tablet  "(10 mg total) by mouth once daily., Disp: 90 tablet, Rfl: 0    ascorbic acid (VITAMIN C) 100 MG tablet, Take 100 mg by mouth once daily., Disp: , Rfl:     aspirin (ECOTRIN) 81 MG EC tablet, Take 81 mg by mouth once daily., Disp: , Rfl:     atorvastatin (LIPITOR) 10 MG tablet, Take 10 mg by mouth every evening. , Disp: , Rfl: 2    b complex vitamins capsule, Take 1 capsule by mouth once daily., Disp: , Rfl:     BD ULTRA-FINE CONNIE PEN NEEDLE 32 gauge x 5/32" Ndle, INJECT TWICE DAILY AS DIRECTED, Disp: 200 each, Rfl: 0    calcitRIOL (ROCALTROL) 0.5 MCG Cap, TAKE 1 CAPSULE (0.5 MCG TOTAL) BY MOUTH ONCE DAILY., Disp: 90 capsule, Rfl: 0    cholecalciferol, vitamin D3, (VITAMIN D3) 4,000 unit Cap, Take 1 capsule by mouth once daily., Disp: , Rfl:     docusate sodium (COLACE) 100 MG capsule, Take 100 mg by mouth 2 (two) times daily., Disp: , Rfl:     escitalopram oxalate (LEXAPRO) 20 MG tablet, TAKE ONE TABLET BY MOUTH DAILY, Disp: 90 tablet, Rfl: 0    FREESTYLE LITE METER kit, , Disp: , Rfl: 0    FREESTYLE LITE STRIPS Strp, , Disp: , Rfl: 3    levothyroxine (SYNTHROID) 88 MCG tablet, Take 1 tablet (88 mcg total) by mouth before breakfast., Disp: 30 tablet, Rfl: 11    magnesium oxide (MAG-OX) 400 mg tablet, TAKE ONE TABLET BY MOUTH TWICE DAILY, Disp: 180 tablet, Rfl: 0    mirabegron (MYRBETRIQ) 50 mg Tb24, Take 1 tablet (50 mg total) by mouth once daily., Disp: 30 tablet, Rfl: 11    pantoprazole (PROTONIX) 40 MG tablet, TAKE ONE TABLET BY MOUTH DAILY, Disp: 90 tablet, Rfl: 0    solifenacin (VESICARE) 5 MG tablet, Take 1 tablet (5 mg total) by mouth once daily., Disp: 30 tablet, Rfl: 11    TOUJEO SOLOSTAR U-300 INSULIN 300 unit/mL (1.5 mL) InPn pen, INJECT 30 UNITS INTO THE SKIN ONCE DAILY., Disp: 4.5 mL, Rfl: 3    valsartan (DIOVAN) 40 MG tablet, Take 40 mg by mouth once daily. , Disp: , Rfl: 3    VICTOZA 3-XAVIER 0.6 mg/0.1 mL (18 mg/3 mL) PnIj, INJECT 1.8MG SUBCUTANEAOUSLY DAILY, Disp: 9 mL, Rfl: " 0    PMHx/PSHx Updates:  See patient's last visit with me on 1/15/2020  See H&P on 10/8/2019        Pathology:  Cancer Staging  No matching staging information was found for the patient.          Objective:     Vitals:  Blood pressure 134/74, pulse 87, temperature 98 °F (36.7 °C), temperature source Oral, resp. rate 18, weight 67.8 kg (149 lb 8 oz), last menstrual period 06/01/2012.    Physical Examination:   GEN: no apparent distress, comfortable; AAOx3  HEAD: atraumatic and normocephalic  EYES: no pallor, no icterus, PERRLA  ENT: OMM, no pharyngeal erythema, external ears WNL; no nasal discharge; no thrush  NECK: no masses, thyroid normal, trachea midline, no LAD/LN's, supple  CV: RRR with no murmur; normal pulse; normal S1 and S2; no pedal edema  CHEST: Normal respiratory effort; CTAB; normal breath sounds; no wheeze or crackles; portactah on right CW  ABDOM: nontender and nondistended; soft; normal bowel sounds; no rebound/guarding  MUSC/Skeletal: ROM normal; no crepitus; joints normal; no deformities or arthropathy  EXTREM: no clubbing, cyanosis, inflammation or swelling  SKIN: no rashes, lesions, ulcers, petechiae or subcutaneous nodules  : no hendrickson  NEURO: grossly intact; motor/sensory WNL; AAOx3; no tremors  PSYCH: normal mood, affect and behavior  LYMPH: normal cervical, supraclavicular, axillary and groin LN's            Labs:     2/11/2020  Lab Results   Component Value Date    WBC 6.6 02/11/2020    HGB 10.5 (L) 02/11/2020    HCT 32.2 (L) 02/11/2020    MCV 90.4 02/11/2020     02/11/2020     CMP  Sodium   Date Value Ref Range Status   02/11/2020 139 135 - 146 mmol/L Final     Potassium   Date Value Ref Range Status   02/11/2020 4.3 3.5 - 5.3 mmol/L Final     Chloride   Date Value Ref Range Status   02/11/2020 99 98 - 110 mmol/L Final     CO2   Date Value Ref Range Status   02/11/2020 30 20 - 32 mmol/L Final     Glucose   Date Value Ref Range Status   02/11/2020 113 (H) 65 - 99 mg/dL Final      Comment:                   Fasting reference interval     For someone without known diabetes, a glucose value  between 100 and 125 mg/dL is consistent with  prediabetes and should be confirmed with a  follow-up test.          BUN, Bld   Date Value Ref Range Status   02/11/2020 34 (H) 7 - 25 mg/dL Final     Creatinine   Date Value Ref Range Status   02/11/2020 2.09 (H) 0.60 - 0.93 mg/dL Final     Comment:     For patients >49 years of age, the reference limit  for Creatinine is approximately 13% higher for people  identified as -American.          Calcium   Date Value Ref Range Status   02/11/2020 8.5 (L) 8.6 - 10.4 mg/dL Final     Total Protein   Date Value Ref Range Status   02/11/2020 6.1 6.1 - 8.1 g/dL Final     Albumin   Date Value Ref Range Status   02/11/2020 3.7 3.6 - 5.1 g/dL Final     Total Bilirubin   Date Value Ref Range Status   02/11/2020 0.5 0.2 - 1.2 mg/dL Final     Alkaline Phosphatase   Date Value Ref Range Status   02/11/2020 60 37 - 153 U/L Final     AST   Date Value Ref Range Status   02/11/2020 16 10 - 35 U/L Final     ALT   Date Value Ref Range Status   02/11/2020 15 6 - 29 U/L Final     Anion Gap   Date Value Ref Range Status   01/14/2020 9 8 - 16 mmol/L Final     eGFR if    Date Value Ref Range Status   02/11/2020 26 (L) > OR = 60 mL/min/1.73m2 Final     eGFR if non    Date Value Ref Range Status   02/11/2020 22 (L) > OR = 60 mL/min/1.73m2 Final           Radiology/Diagnostic Studies:    MRI  1/8/2020:  Impression       Significant reduction in size of the patient's known mid rectal mass, which is no longer distinctly visible.  Reduction in size of 3 mesorectal lymph nodes as above.               PET  10/17/2019    Impression       1. Left paratracheal small mass extending into superior mediastinum with associated moderate FDG activity is unchanged in size and appearance since 02/16/2018 CT.  This is unlikely to represent metastatic disease or  malignancy, given stability, and may represent residual thyroid parenchyma but is otherwise nonspecific.  2. Unchanged 6 mm right upper lobe nodule since 02/16/2018.  Benign etiology is likely the continued CT thorax without IV contrast follow-up is recommended in 12 months to document greater than 2 years of stability.  3. No current convincing evidence of metastatic disease.  4. Previous rectal mass is no longer evident on this exam.               X-ray Chest Ap Portable    Result Date: 10/15/2019  EXAMINATION: XR CHEST AP PORTABLE CLINICAL HISTORY: s/p port; Encounter for adjustment and management of vascular access device TECHNIQUE: Single frontal view of the chest was performed. COMPARISON: 6/18/2019 FINDINGS: The cardiomediastinal silhouette is stable.  Lungs are clear of infiltrate.  No pleural effusions.  Laya catheter has been inserted from the region of the right subclavian vein with the tip at the level the proximal SVC.     Laya catheter inserted with the tip at the level the proximal SVC.  Lungs are expanded and clear.  No pneumothorax. Electronically signed by: Nubia Venegas MD Date:    10/15/2019 Time:    14:05    Surg Fl Surgery Fluoro Usage    Result Date: 10/15/2019  See OP Notes for results. IMPRESSION: See OP Notes for results. This procedure was auto-finalized by: Virtual Radi[facility]     Mri Rectal Cancer Without Contrast    Result Date: 9/18/2019  EXAMINATION: MRI RECTAL CANCER WITHOUT CONTRAST CLINICAL HISTORY: Rectal cancer, staging, locoregional;rectal anal mass;  Malignant neoplasm of rectum TECHNIQUE: Multiplanar multisequence MR imaging of the pelvis without contrast. COMPARISON: 07/12/2019 FINDINGS: Approximately 4 cm from the anal verge there is a peripherally located lobular mass along the dorsal rectal wall.  This measures 4 cm in length and 3.3 cm in diameter.  There is heterogeneous signal intensity.  There is restricted diffusion in the lesion and no discrete extra colonic  extension.  Several lymph nodes are noted in the mesorectal fat including two which measure 3 mm near the inferior sacrum, series 8, image 16, and a 5 mm lymph node at the S2 level, series 8, image 8.  Prominent but nonenlarged bilateral obturator chain lymph nodes also noted, on the right at 4 mm and left at 6 mm.  There is urinary bladder wall thickening which is diffuse.  Moderate degree of stool in the colon.  No dilated bowel loops.  Hysterectomy.     4 cm mid rectal lesion in keeping with known malignancy.  No discrete extension into the mesorectal fascia although there are several perirectal lymph nodes which raise the possibility for locoregional lymph node involvement. Electronically signed by: Bebo Kapoor Date:    09/18/2019 Time:    16:45      I have reviewed all available lab results and radiology reports.    Assessment/Plan:   (1) 79 y.o. female with diagnosis of prior left RCC who has been referred by David Mendieta MD for evaluation by medical hematology/oncology. She has been followed by Dr Stanislav Epstein with [facility] at the [facility] in [place]. She last saw Dr Epstein in July 2019. She was recently diagnosed with rectal mass by Dr Armando Duff which was found on colonoscopy on 8/26/2019. She had presented with lower Gi bleeding at that time. The pathology from those biopsies showed no evidence of malignancy at that time. She was subsequently seen by Dr David Mendieta and underwent trans-anal surgical biopsy on 9/23/2019. The pathology from the surgical biopsy showed rectal carcinoma.         She has been seen by Dr Fareed Hassan with Rad/onc for radiation therapy evaluation.     PET was done on 10/17/2019     We previously discussed giving her combined chemotherapy with the XRt to try to reduce her risk of recurrence. She was agreeable to this plan.    She had portacath placed with Dr Mendieta. She saw Dr Hassan last week with rad/onc. She has since completed weekly XRT and  chemotherapy    - She previously saw Dr Mendieta on 12/10/2019  and had MRI on 1/8/2020.  They did scope on 1/28th 2020 and I spoke to dr mendieta by phone last week. The scope looked good and both the patient and Dr Mendieta does not want to proceed in direction of operation especially given her age and co-morbidities.    - We discussed adjuvant chemotherapy with FOLOFOX x 10-12 cycles especially since she is not having surgery and she is agreeable.    - Will provide literature on the regimen and set up chemotherapy        (2) Left renal cell carcinoma s/p left nephrectomy with Dr Azar Donnelly - diagnosed about 2 years ago     (3) Ureterolithiasis     (4) CRI - stage III - followed by Dr Antonio; she saw him recently and per patient's report, her kidney function is stable per Dr Antonio     (5) HTN and hypercholesterolemia     (6) DM - followed by Dr Brower with endocrinology     (7) Hx/of gall stones     (8) Chronic left shoulder issues     (9) MVP     (10) Thyroid disease with prior hx/of thyroid cancer s/p thyroidectomy      (11) Chronic anemia - most likley multifactorial due to iron deficiency, GIB and anemia of chronic renal disease  - hgb at 9.7 currently       VISIT DIAGNOSES:      Malignant neoplasm of rectum    Normocytic anemia    Rectal cancer    Renal cell carcinoma of left kidney          PLAN:  1.  Encouraged compliance with labs  2. Set up chemotherapy school and set up FOLFOX - she wants to wait till the week after [place]  3.  F/u with PCP, GI, rad/onc , etc  4.  F/u nephrology  5. Set up CT abdom/pelvis     6. RTC in 4 weeks    Fax note to Pham Winston Parks; Tete Duff Tandron; Paco    Discussion:       I spent over 25 mins of time with the patient. Reviewed results of the recently ordered labs, tests and studies; made directives with regards to the results. Over half of this time was spent couseling and coordinating care.    I have explained all of the above in detail and the patient  understands all of the current recommendation(s). I have answered all of their questions to the best of my ability and to their complete satisfaction.   The patient is to continue with the current management plan.            Electronically signed by Carrillo Goode MD

## 2020-02-13 ENCOUNTER — OFFICE VISIT (OUTPATIENT)
Dept: HEMATOLOGY/ONCOLOGY | Facility: CLINIC | Age: 80
End: 2020-02-13
Payer: COMMERCIAL

## 2020-02-13 VITALS
BODY MASS INDEX: 28.25 KG/M2 | SYSTOLIC BLOOD PRESSURE: 134 MMHG | DIASTOLIC BLOOD PRESSURE: 74 MMHG | WEIGHT: 149.5 LBS | RESPIRATION RATE: 18 BRPM | TEMPERATURE: 98 F | HEART RATE: 87 BPM

## 2020-02-13 DIAGNOSIS — C64.2 RENAL CELL CARCINOMA OF LEFT KIDNEY: ICD-10-CM

## 2020-02-13 DIAGNOSIS — D64.9 NORMOCYTIC ANEMIA: ICD-10-CM

## 2020-02-13 DIAGNOSIS — C20 MALIGNANT NEOPLASM OF RECTUM: Primary | ICD-10-CM

## 2020-02-13 DIAGNOSIS — C20 RECTAL CANCER: ICD-10-CM

## 2020-02-13 PROCEDURE — 1101F PR PT FALLS ASSESS DOC 0-1 FALLS W/OUT INJ PAST YR: ICD-10-PCS | Mod: S$GLB,,, | Performed by: INTERNAL MEDICINE

## 2020-02-13 PROCEDURE — 99214 OFFICE O/P EST MOD 30 MIN: CPT | Mod: S$GLB,,, | Performed by: INTERNAL MEDICINE

## 2020-02-13 PROCEDURE — 1126F AMNT PAIN NOTED NONE PRSNT: CPT | Mod: S$GLB,,, | Performed by: INTERNAL MEDICINE

## 2020-02-13 PROCEDURE — 1101F PT FALLS ASSESS-DOCD LE1/YR: CPT | Mod: S$GLB,,, | Performed by: INTERNAL MEDICINE

## 2020-02-13 PROCEDURE — 1126F PR PAIN SEVERITY QUANTIFIED, NO PAIN PRESENT: ICD-10-PCS | Mod: S$GLB,,, | Performed by: INTERNAL MEDICINE

## 2020-02-13 PROCEDURE — 3078F DIAST BP <80 MM HG: CPT | Mod: S$GLB,,, | Performed by: INTERNAL MEDICINE

## 2020-02-13 PROCEDURE — 3078F PR MOST RECENT DIASTOLIC BLOOD PRESSURE < 80 MM HG: ICD-10-PCS | Mod: S$GLB,,, | Performed by: INTERNAL MEDICINE

## 2020-02-13 PROCEDURE — 3075F PR MOST RECENT SYSTOLIC BLOOD PRESS GE 130-139MM HG: ICD-10-PCS | Mod: S$GLB,,, | Performed by: INTERNAL MEDICINE

## 2020-02-13 PROCEDURE — 1159F PR MEDICATION LIST DOCUMENTED IN MEDICAL RECORD: ICD-10-PCS | Mod: S$GLB,,, | Performed by: INTERNAL MEDICINE

## 2020-02-13 PROCEDURE — 3075F SYST BP GE 130 - 139MM HG: CPT | Mod: S$GLB,,, | Performed by: INTERNAL MEDICINE

## 2020-02-13 PROCEDURE — 99214 PR OFFICE/OUTPT VISIT, EST, LEVL IV, 30-39 MIN: ICD-10-PCS | Mod: S$GLB,,, | Performed by: INTERNAL MEDICINE

## 2020-02-13 PROCEDURE — 1159F MED LIST DOCD IN RCRD: CPT | Mod: S$GLB,,, | Performed by: INTERNAL MEDICINE

## 2020-02-13 RX ORDER — HEPARIN 100 UNIT/ML
500 SYRINGE INTRAVENOUS
Status: CANCELLED | OUTPATIENT
Start: 2020-03-04

## 2020-02-13 RX ORDER — EPINEPHRINE 0.3 MG/.3ML
0.3 INJECTION SUBCUTANEOUS ONCE AS NEEDED
Status: CANCELLED | OUTPATIENT
Start: 2020-03-02

## 2020-02-13 RX ORDER — FLUOROURACIL 50 MG/ML
400 INJECTION, SOLUTION INTRAVENOUS
Status: CANCELLED | OUTPATIENT
Start: 2020-03-02

## 2020-02-13 RX ORDER — DIPHENHYDRAMINE HYDROCHLORIDE 50 MG/ML
50 INJECTION INTRAMUSCULAR; INTRAVENOUS ONCE AS NEEDED
Status: CANCELLED | OUTPATIENT
Start: 2020-03-02

## 2020-02-13 RX ORDER — SODIUM CHLORIDE 0.9 % (FLUSH) 0.9 %
10 SYRINGE (ML) INJECTION
Status: CANCELLED | OUTPATIENT
Start: 2020-03-02

## 2020-02-13 RX ORDER — SODIUM CHLORIDE 0.9 % (FLUSH) 0.9 %
10 SYRINGE (ML) INJECTION
Status: CANCELLED | OUTPATIENT
Start: 2020-03-04

## 2020-02-13 RX ORDER — HEPARIN 100 UNIT/ML
500 SYRINGE INTRAVENOUS
Status: CANCELLED | OUTPATIENT
Start: 2020-03-02

## 2020-02-19 ENCOUNTER — CLINICAL SUPPORT (OUTPATIENT)
Dept: HEMATOLOGY/ONCOLOGY | Facility: CLINIC | Age: 80
End: 2020-02-19
Payer: COMMERCIAL

## 2020-02-19 DIAGNOSIS — C20 MALIGNANT NEOPLASM OF RECTUM: ICD-10-CM

## 2020-02-19 DIAGNOSIS — D64.9 NORMOCYTIC ANEMIA: ICD-10-CM

## 2020-02-19 DIAGNOSIS — C20 RECTAL CANCER: ICD-10-CM

## 2020-02-19 DIAGNOSIS — C64.2 RENAL CELL CARCINOMA OF LEFT KIDNEY: ICD-10-CM

## 2020-02-19 NOTE — PROGRESS NOTES
Valery Huggins  [id number]    [facility]    TITLE: PLAN OF CARE FOR THE CHEMOTHERAPY PATIENT / TEACHING PROTOCOL    PURPOSE: To involve the patient / significant other in the plan of care and to provide teaching to the significant other & patient receiving chemotherapy.    LEVEL: Independent.    CONTENT: The Plan of Care for the chemotherapy patient is individualized and appropriate to the patients needs, strengths, limitations, & goals.  Education includes information regarding chemotherapy side effects, the treatment itself, and self-care  Activities.    GOAL / OUTCOME STANDARDS    PHYSIOLOGIC: The client will remain free or experience minimal side effects or toxicities throughout the chemotherapy treatment period.     PSYCHOLOGIC: The client/significant others will demonstrate positive coping mechanisms in relation to chemotherapy and its side effects.      COGINITIVE: The client/significant others will verbalize understanding of self-care measure to avoid/minimize side effects of the chemotherapy regime.    EVALUATION / COMMENT KEY:    V = Audiovisual/Video  S = Successfully meets outcome  N = Needs further instruction  NA = Not applicable to the patient  P = Previous knowledge  U = Unable to comprehend  * = See progress notes          PLAN OF CARE  INFORMATION TO BE DELIVERED / NURSING INTERVENTIONS DATE EVALUATION   1. Assessment of client/caregiver,         knowledge of cancer diagnosis,         and chemotherapy as a treatment. 1a. Evaluate patient/caregiver learning ability    b. Plan teaching sessions with patient/caregiver according to needs and present anxiety level/ability to learn.    c. Provide Chemotherapy Education Packet,        Mouth Care Protocol,         Specific Patient Education Sheets. 02/19/2020 S   2. Individual chemotherapy treatment         plan. 2a. Review of Chemotherapy Education handout from [facility]            02/19/2020   S   3. Knowledge Deficit &  Self-Management of general side effects common to all chemotherapy:  a. Nausea/Vomiting  b.   Diarrhea  c. Mouth Care  d. Dental care  e. Constipation  f. Hair Loss  g. Potential for infection  h. Fatigue   3a. Reinforce that the majority of side effects from chemotherapy are reversible and are  controlled both in the hospital and at home        (blood counts recover, hair grows back).   b.  Refer to the following for reinforcement of         information post-treatment:  1. Mouth Care Protocol.  2. Bowel Protocol for constipation or diarrhea.  3.  Drug Specific Chemotherapy Information Sheets for each medication patient receiving.    02/19/2020     S     PLAN OF CARE  INFORMATION TO BE DELIVERED / NURSING INTERVENTIONS DATE EVALUATION   h. Potential for bleeding         i. Potential anemia/fatigue         j. Potential sunburn         k. Birth control measures  l. Safety measures post treatment 4.  Chemotherapy Home Care Instruction  and Safety Information Sheet.  A. patient/caregivers to thoroughly cook shellfish (shrimp, crab, etc) to decrease the chance of infection.    B.  Use sunscreen and protective clothing while in the sun.   02/19/2020      4. Knowledge deficit & Self Management of Drug Specific  Side Effects.    a. BLADDER EFFECTS        (Hemorrhagic Cystitis)                  Preventable with adequate hydration; occurs 2-3 days or more post treatment.   1.  Instruct patient to:  a.   Void at least every 2 hours; increase intake.  b.   DO NOT hold urine; go when urge is felt.  c.    Empty bladder at bedtime and on         awakening.  d.   Observe for color changes (red to tea           colored), amount and frequency changes.  e.   Notify oncologist of any abnormalities           in urine or voiding or if you cannot               drink adequate fluids.   02/19/2020   S   b.   CHANGES IN URINE        COLOR:      1.   Instruct patient:  a.   Most evident in first 2-3 voidings after            administration.  b. Lasts less than 24 hours.  c. If urine is discolored 2 or more days post- treatment, notify oncologist.      02/19/2020 S   c.    KIDNEY EFFECTS           (Nephrotoxicity)   1.  Instruct patient to:  a.   Drink 8-16 glasses of fluid/day the day   pre-treatment and 3-4 days post-treatment to maintain hydration; the best way to minimize kidney problems.  b.   Notify oncologist immediately if unable to drink fluids or if changes are noted in urinary elimination.     02/19/2020   S   a. PULMONARY TOXICITY    1. Instruct patient to report symptoms such as shortness of breath, chest pain, shallow breathing, or chest wall discomfort to physician.  2. Reinforce preventative measures used by the health care team.  a. Baseline and periodic PFT and chest x-ray.   02/19/2020   S     PLAN OF CARE INFORMATION TO BE DELIVERED / NURSING INTERVENTIONS DATE EVALUATION   b. NERVE & MUSCLE EFFECTS (neurotoxocity; neuropathy, possible visual/hearing changes)        3. Instruct patient to:    a. Report numbness or tingling of the hands/feet, loss of fine motor movement (buttoning shirt, tying shoelaces), or gait changes to your oncologist.  b. If numbness/tingling are present:  1. protect feet with shoes at all times.  2. Use gloves for washing dishes/gardening & potholders in kitchen.       02/19/2020   S   c. CARDIOTOXICITY  Decreased effectiveness of             cardiac function. Effective are                  cumulative and irreversible.                                    CARDIAC ARRYTHMIAS              4   Instruct:  a. Heart function may be tested before treatment and perdiocally during treatment.  b. Notify oncologist of irregular pulse, palpitations, shortness of breath, or swelling in lower extremities/feet.          Taxol and Taxotere can cause arrhythmias on infusion that resolve once infusion discontinued. Instruct nurse if any irregularity felt.    02/19/2020   S   d. EXTRAVASTION  Occurs when vesicants  leak outside of vein and cause damage to the skin and underlying tissues.   1. Reinforce preventive measures used to avoid complications.  a. Fresh IV site or central line monitored continuously with vesicant IVP.  b. Continuous infusion via central line site and blood return monitored periodically around the clock.  2. Instruct to:  a. Notify nurse of any discomfort, burning, stinging, etc. at IV site during chemotherapy administration.  b. Notify oncologist of any redness, pain, or swelling at IV site after discharge from hospital.   02/19/2020   S   e. HYPERSENSITIVITY can happen with any medication.   1. Instruct patient:  a. Nurse is with them during the initial part of treatment and will be close by to monitor.  b. Pre-medication ordered by the oncologist must be taken on time. If doses are missed, treatment will need to be re-scheduled.  c. Skin redness, itching, or hives appearing after discharge should be reported to oncologist. 02/19/2020   S       PLAN OF CARE INFORMATION TO BE DELIVERED / NURSING INTERVENTIONS DATE EVALUATION   f. FLU-LIKE SYNDROME      1. Instruct patient symptoms are hard to prevent and may include fever, shaking chills, muscle and body aches.  a. Taking prescribed medications from physician if needed.  b. Adequate fluids are important.    2. Reinforce the need to call if temperature is         elevated to 100.4 or more  02/19/2020   S   g. HAND-FOOT SYNDROME  causes painful, symmetric swelling and redness of palms and soles                  5. Instruct patient to report any numbness or tingling in the hands or feet.  6. Explain prevention techniques, such as     a. Use heavy moisturizers to lessen skin dryness and itching, but to avoid if skin is cracked or broken  b. Bathe in tepid water, use non-perfumed soap, and wash gently. Baths with oatmeal or diluted baking soda may be soothing.  c. Avoid tight fitting shoes and repetitive actions, such as rubbing hands or applying pressure to  hands/feet.  7. Review measures to take should syndrome occur:  a. Cold compresses and elevation for          edema  b. Pain medications and other measures as ordered by oncologist.   4.   Syndrome resolves few weeks after therapy. 02/19/2020   S   5. DISCHARGE PLANNING /        EDUCATION 1.    Explain importance of compliance with follow- up  tests (CBC, CMP).  2.    Verify patient/caregiver know:  a.    Oncologists office phone number.  b.    Dates of follow-up appointments.  c.    Prescriptions given for nausea  3.   Review side effects to monitor and notify          oncologist about.  4.   Reinforce the need for patient and caregivers to:  a.    Review information given.  b.    Call oncologists office with questions          or symptoms  5.   Provide [facility] [place] Brochure make referrals if needed for financial or .   02/19/2020   S     PROGRESS NOTES:   I met with the patient  today for chemotherapy education. she will be starting treatment with Oxaliplatin, Leucovorin, Fluorouracil . We discussed the mechanism of action, potential side effects of this treatment as well as ways she can manage them at home. Some of these side effects include but or not limited to fever, nausea, vomiting, decreased appetite, fatigue, weakness, cytopenias, myalgia/arthralgia, constipation, diarrhea, bleeding, headache, shortness of breath, nail changes, taste change, hair thinning/loss, mood disturbances, or edema. We also discussed dietary modifications she should make although this will be discussed in more detail with the dietician. she was provided with a copy of all of the information we discussed today as well as our contact information. she will be provided a schedule on his first day of treatment. We will obtain labs on a weekly basis and the patient will follow-up with the physician for toxicity monitoring throughout treatment. All questions were answered and an informed consent was obtained. she  was reminded to certainly contact us sooner if needed.  Attached to the patients folder and discussed with the patient the 24 hour/ 7 days a week after hours telephone number for the physician.  Patient notified to call anytime 24/7 because their is a physician on call for any problems that may arise.  Patient also notified to report to [facility] if they can not get in touch with a physician after hours.  Discussed the five wishes booklet with the patient and their family.

## 2020-02-19 NOTE — PROGRESS NOTES
Met with patient to update her distress screening and provide her with an updated community events flyer.  She stated that she does not need psychosocial support at this time.  She has my contact information in the event she needs assistance in the future.

## 2020-02-20 ENCOUNTER — PATIENT MESSAGE (OUTPATIENT)
Dept: HEMATOLOGY/ONCOLOGY | Facility: CLINIC | Age: 80
End: 2020-02-20

## 2020-02-24 ENCOUNTER — PATIENT MESSAGE (OUTPATIENT)
Dept: HEMATOLOGY/ONCOLOGY | Facility: CLINIC | Age: 80
End: 2020-02-24

## 2020-02-26 ENCOUNTER — PATIENT MESSAGE (OUTPATIENT)
Dept: HEMATOLOGY/ONCOLOGY | Facility: CLINIC | Age: 80
End: 2020-02-26

## 2020-03-02 ENCOUNTER — INFUSION (OUTPATIENT)
Dept: INFUSION THERAPY | Facility: HOSPITAL | Age: 80
End: 2020-03-02
Attending: INTERNAL MEDICINE
Payer: COMMERCIAL

## 2020-03-02 VITALS
SYSTOLIC BLOOD PRESSURE: 157 MMHG | WEIGHT: 151 LBS | TEMPERATURE: 99 F | HEIGHT: 61 IN | DIASTOLIC BLOOD PRESSURE: 87 MMHG | BODY MASS INDEX: 28.51 KG/M2 | RESPIRATION RATE: 18 BRPM | HEART RATE: 87 BPM

## 2020-03-02 DIAGNOSIS — C20 RECTAL CANCER: Primary | ICD-10-CM

## 2020-03-02 PROCEDURE — 63600175 PHARM REV CODE 636 W HCPCS: Performed by: INTERNAL MEDICINE

## 2020-03-02 PROCEDURE — 96411 CHEMO IV PUSH ADDL DRUG: CPT

## 2020-03-02 PROCEDURE — 96367 TX/PROPH/DG ADDL SEQ IV INF: CPT

## 2020-03-02 PROCEDURE — 96413 CHEMO IV INFUSION 1 HR: CPT

## 2020-03-02 PROCEDURE — 96415 CHEMO IV INFUSION ADDL HR: CPT

## 2020-03-02 PROCEDURE — 96416 CHEMO PROLONG INFUSE W/PUMP: CPT

## 2020-03-02 PROCEDURE — 96368 THER/DIAG CONCURRENT INF: CPT

## 2020-03-02 PROCEDURE — 96366 THER/PROPH/DIAG IV INF ADDON: CPT

## 2020-03-02 RX ORDER — SODIUM CHLORIDE 0.9 % (FLUSH) 0.9 %
10 SYRINGE (ML) INJECTION
Status: DISCONTINUED | OUTPATIENT
Start: 2020-03-02 | End: 2020-03-02 | Stop reason: HOSPADM

## 2020-03-02 RX ORDER — HEPARIN 100 UNIT/ML
500 SYRINGE INTRAVENOUS
Status: DISCONTINUED | OUTPATIENT
Start: 2020-03-02 | End: 2020-03-02 | Stop reason: HOSPADM

## 2020-03-02 RX ORDER — FLUOROURACIL 50 MG/ML
400 INJECTION, SOLUTION INTRAVENOUS
Status: COMPLETED | OUTPATIENT
Start: 2020-03-02 | End: 2020-03-02

## 2020-03-02 RX ADMIN — OXALIPLATIN 146 MG: 50 INJECTION, SOLUTION, CONCENTRATE INTRAVENOUS at 08:03

## 2020-03-02 RX ADMIN — PALONOSETRON HYDROCHLORIDE: 0.25 INJECTION INTRAVENOUS at 07:03

## 2020-03-02 RX ADMIN — FLUOROURACIL 690 MG: 50 INJECTION, SOLUTION INTRAVENOUS at 10:03

## 2020-03-02 RX ADMIN — LEUCOVORIN CALCIUM 690 MG: 350 INJECTION, POWDER, LYOPHILIZED, FOR SOLUTION INTRAMUSCULAR; INTRAVENOUS at 08:03

## 2020-03-02 RX ADMIN — FLUOROURACIL 4130 MG: 50 INJECTION, SOLUTION INTRAVENOUS at 10:03

## 2020-03-04 ENCOUNTER — INFUSION (OUTPATIENT)
Dept: INFUSION THERAPY | Facility: HOSPITAL | Age: 80
End: 2020-03-04
Attending: INTERNAL MEDICINE
Payer: COMMERCIAL

## 2020-03-04 VITALS
BODY MASS INDEX: 28.42 KG/M2 | SYSTOLIC BLOOD PRESSURE: 128 MMHG | WEIGHT: 150.38 LBS | HEART RATE: 92 BPM | TEMPERATURE: 99 F | RESPIRATION RATE: 20 BRPM | OXYGEN SATURATION: 99 % | DIASTOLIC BLOOD PRESSURE: 75 MMHG

## 2020-03-04 DIAGNOSIS — C20 RECTAL CANCER: Primary | ICD-10-CM

## 2020-03-04 PROCEDURE — 96523 IRRIG DRUG DELIVERY DEVICE: CPT

## 2020-03-04 PROCEDURE — 63600175 PHARM REV CODE 636 W HCPCS: Performed by: INTERNAL MEDICINE

## 2020-03-04 RX ORDER — SODIUM CHLORIDE 0.9 % (FLUSH) 0.9 %
10 SYRINGE (ML) INJECTION
Status: DISCONTINUED | OUTPATIENT
Start: 2020-03-04 | End: 2020-03-04 | Stop reason: HOSPADM

## 2020-03-04 RX ORDER — HEPARIN 100 UNIT/ML
500 SYRINGE INTRAVENOUS
Status: DISCONTINUED | OUTPATIENT
Start: 2020-03-04 | End: 2020-03-04 | Stop reason: HOSPADM

## 2020-03-04 RX ADMIN — HEPARIN 500 UNITS: 100 SYRINGE at 09:03

## 2020-03-06 ENCOUNTER — TELEPHONE (OUTPATIENT)
Dept: HEMATOLOGY/ONCOLOGY | Facility: CLINIC | Age: 80
End: 2020-03-06

## 2020-03-06 ENCOUNTER — PATIENT MESSAGE (OUTPATIENT)
Dept: HEMATOLOGY/ONCOLOGY | Facility: CLINIC | Age: 80
End: 2020-03-06

## 2020-03-06 NOTE — TELEPHONE ENCOUNTER
----- Message from Carrillo Goode MD sent at 3/6/2020  1:53 PM CST -----  Call her with the good report

## 2020-03-09 ENCOUNTER — OFFICE VISIT (OUTPATIENT)
Dept: UROLOGY | Facility: CLINIC | Age: 80
End: 2020-03-09
Attending: UROLOGY
Payer: COMMERCIAL

## 2020-03-09 VITALS
WEIGHT: 150 LBS | HEART RATE: 89 BPM | BODY MASS INDEX: 28.32 KG/M2 | SYSTOLIC BLOOD PRESSURE: 120 MMHG | DIASTOLIC BLOOD PRESSURE: 74 MMHG | HEIGHT: 61 IN

## 2020-03-09 DIAGNOSIS — N20.0 NEPHROLITHIASIS: ICD-10-CM

## 2020-03-09 DIAGNOSIS — N32.81 OAB (OVERACTIVE BLADDER): Primary | ICD-10-CM

## 2020-03-09 PROCEDURE — 1126F AMNT PAIN NOTED NONE PRSNT: CPT | Mod: S$GLB,,, | Performed by: UROLOGY

## 2020-03-09 PROCEDURE — 1101F PR PT FALLS ASSESS DOC 0-1 FALLS W/OUT INJ PAST YR: ICD-10-PCS | Mod: CPTII,S$GLB,, | Performed by: UROLOGY

## 2020-03-09 PROCEDURE — 51798 POCT BLADDER SCAN: ICD-10-PCS | Mod: S$GLB,,, | Performed by: UROLOGY

## 2020-03-09 PROCEDURE — 81002 POCT URINE DIPSTICK WITHOUT MICROSCOPE: ICD-10-PCS | Mod: S$GLB,,, | Performed by: UROLOGY

## 2020-03-09 PROCEDURE — 1159F PR MEDICATION LIST DOCUMENTED IN MEDICAL RECORD: ICD-10-PCS | Mod: S$GLB,,, | Performed by: UROLOGY

## 2020-03-09 PROCEDURE — 3074F SYST BP LT 130 MM HG: CPT | Mod: CPTII,S$GLB,, | Performed by: UROLOGY

## 2020-03-09 PROCEDURE — 81002 URINALYSIS NONAUTO W/O SCOPE: CPT | Mod: S$GLB,,, | Performed by: UROLOGY

## 2020-03-09 PROCEDURE — 3078F DIAST BP <80 MM HG: CPT | Mod: CPTII,S$GLB,, | Performed by: UROLOGY

## 2020-03-09 PROCEDURE — 1159F MED LIST DOCD IN RCRD: CPT | Mod: S$GLB,,, | Performed by: UROLOGY

## 2020-03-09 PROCEDURE — 51798 US URINE CAPACITY MEASURE: CPT | Mod: S$GLB,,, | Performed by: UROLOGY

## 2020-03-09 PROCEDURE — 1101F PT FALLS ASSESS-DOCD LE1/YR: CPT | Mod: CPTII,S$GLB,, | Performed by: UROLOGY

## 2020-03-09 PROCEDURE — 3074F PR MOST RECENT SYSTOLIC BLOOD PRESSURE < 130 MM HG: ICD-10-PCS | Mod: CPTII,S$GLB,, | Performed by: UROLOGY

## 2020-03-09 PROCEDURE — 1126F PR PAIN SEVERITY QUANTIFIED, NO PAIN PRESENT: ICD-10-PCS | Mod: S$GLB,,, | Performed by: UROLOGY

## 2020-03-09 PROCEDURE — 99213 PR OFFICE/OUTPT VISIT, EST, LEVL III, 20-29 MIN: ICD-10-PCS | Mod: 25,S$GLB,, | Performed by: UROLOGY

## 2020-03-09 PROCEDURE — 3078F PR MOST RECENT DIASTOLIC BLOOD PRESSURE < 80 MM HG: ICD-10-PCS | Mod: CPTII,S$GLB,, | Performed by: UROLOGY

## 2020-03-09 PROCEDURE — 99213 OFFICE O/P EST LOW 20 MIN: CPT | Mod: 25,S$GLB,, | Performed by: UROLOGY

## 2020-03-09 NOTE — PROGRESS NOTES
"Subjective:      Valery Huggins is a 79 y.o. female who returns today regarding her nephrolithiasis and new OAB symptoms.    She is here today primarily regarding voiding symptoms.    She again reports significant improvement with the combination of VESIcare and Myrbtriq. She denies any persistent urinary incontinence and has only very mild urgency. She previously tried Ditropan was unable to tolerate due to very bothersome side effects. She wonders, however, if she needs to continue these medications.    She also has h/o stones s/p right URS (solitary kidney) in 2018.     She was recently diagnosed with rectal cancer and has completed chemo/radiation.    The following portions of the patient's history were reviewed and updated as appropriate: allergies, current medications, past family history, past medical history, past social history, past surgical history and problem list.    Review of Systems  A comprehensive multipoint review of systems was negative except as otherwise stated in the HPI.     Objective:   Vitals: /74 (BP Location: Right arm, Patient Position: Sitting, BP Method: Large (Automatic))   Pulse 89   Ht 5' 1" (1.549 m)   Wt 68 kg (150 lb)   LMP 06/01/2012   BMI 28.34 kg/m²     Physical Exam   General: alert and oriented, no acute distress  Respiratory: Symmetric expansion, non-labored breathing  Neuro: no gross deficits  Psych: normal judgment and insight, normal mood/affect and non-anxious    Bladder Scan PVR: 192cc     Lab Review   Urinalysis demonstrates negative for all components  Lab Results   Component Value Date    WBC 4.5 03/06/2020    HGB 11.2 (L) 03/06/2020    HCT 33.6 (L) 03/06/2020    MCV 87.5 03/06/2020     03/06/2020     Lab Results   Component Value Date    CREATININE 1.83 (H) 03/06/2020    BUN 36 (H) 03/06/2020     24 Hour Urine  Date: 4/30/2018 -- Vol 1.72 L, Ca 222 mg/day, Ox 25 mg/day, Cit 284 mg/day, pH 6.391, Na 176, K 48, Mg 172, Cr 910   Date: 9/29/2015 -- Vol " 2.17 L, Ca 348 mg/day, Ox 31 mg/day, Cit 702 mg/day, pH 5.692, Na 170, K 68, Mg 34, Cr 1310       Assessment and Plan:   Urgency, Frequency, UUI  -- Discussed medications. Wishes to try without. Will stop Vesicare and, if doing well in 1 month, then stop Myrbetriq. Will restart if symptoms recur.    Nephrolithiasis  -- Recent CT reviewed - no stones  -- Will defer additional imaging for now (she has routine abdominal imaging for other reasons)    FU 6 months at [facility]

## 2020-03-10 ENCOUNTER — OFFICE VISIT (OUTPATIENT)
Dept: HEMATOLOGY/ONCOLOGY | Facility: CLINIC | Age: 80
End: 2020-03-10
Payer: COMMERCIAL

## 2020-03-10 VITALS
BODY MASS INDEX: 28.02 KG/M2 | RESPIRATION RATE: 12 BRPM | SYSTOLIC BLOOD PRESSURE: 129 MMHG | TEMPERATURE: 99 F | DIASTOLIC BLOOD PRESSURE: 80 MMHG | HEART RATE: 83 BPM | WEIGHT: 148.31 LBS

## 2020-03-10 DIAGNOSIS — D64.9 NORMOCYTIC ANEMIA: ICD-10-CM

## 2020-03-10 DIAGNOSIS — C20 MALIGNANT NEOPLASM OF RECTUM: Primary | ICD-10-CM

## 2020-03-10 DIAGNOSIS — R11.0 NAUSEA: ICD-10-CM

## 2020-03-10 DIAGNOSIS — C20 RECTAL CANCER: ICD-10-CM

## 2020-03-10 DIAGNOSIS — C64.2 RENAL CELL CARCINOMA OF LEFT KIDNEY: ICD-10-CM

## 2020-03-10 DIAGNOSIS — R11.0 NAUSEA: Primary | ICD-10-CM

## 2020-03-10 PROCEDURE — 3074F SYST BP LT 130 MM HG: CPT | Mod: S$GLB,,, | Performed by: INTERNAL MEDICINE

## 2020-03-10 PROCEDURE — 3079F PR MOST RECENT DIASTOLIC BLOOD PRESSURE 80-89 MM HG: ICD-10-PCS | Mod: S$GLB,,, | Performed by: INTERNAL MEDICINE

## 2020-03-10 PROCEDURE — 1101F PT FALLS ASSESS-DOCD LE1/YR: CPT | Mod: S$GLB,,, | Performed by: INTERNAL MEDICINE

## 2020-03-10 PROCEDURE — 99214 OFFICE O/P EST MOD 30 MIN: CPT | Mod: S$GLB,,, | Performed by: INTERNAL MEDICINE

## 2020-03-10 PROCEDURE — 1126F PR PAIN SEVERITY QUANTIFIED, NO PAIN PRESENT: ICD-10-PCS | Mod: S$GLB,,, | Performed by: INTERNAL MEDICINE

## 2020-03-10 PROCEDURE — 3079F DIAST BP 80-89 MM HG: CPT | Mod: S$GLB,,, | Performed by: INTERNAL MEDICINE

## 2020-03-10 PROCEDURE — 1159F MED LIST DOCD IN RCRD: CPT | Mod: S$GLB,,, | Performed by: INTERNAL MEDICINE

## 2020-03-10 PROCEDURE — 3074F PR MOST RECENT SYSTOLIC BLOOD PRESSURE < 130 MM HG: ICD-10-PCS | Mod: S$GLB,,, | Performed by: INTERNAL MEDICINE

## 2020-03-10 PROCEDURE — 99214 PR OFFICE/OUTPT VISIT, EST, LEVL IV, 30-39 MIN: ICD-10-PCS | Mod: S$GLB,,, | Performed by: INTERNAL MEDICINE

## 2020-03-10 PROCEDURE — 1159F PR MEDICATION LIST DOCUMENTED IN MEDICAL RECORD: ICD-10-PCS | Mod: S$GLB,,, | Performed by: INTERNAL MEDICINE

## 2020-03-10 PROCEDURE — 1126F AMNT PAIN NOTED NONE PRSNT: CPT | Mod: S$GLB,,, | Performed by: INTERNAL MEDICINE

## 2020-03-10 PROCEDURE — 1101F PR PT FALLS ASSESS DOC 0-1 FALLS W/OUT INJ PAST YR: ICD-10-PCS | Mod: S$GLB,,, | Performed by: INTERNAL MEDICINE

## 2020-03-10 RX ORDER — PROMETHAZINE HYDROCHLORIDE 25 MG/1
25 TABLET ORAL
Qty: 30 TABLET | Refills: 5 | Status: SHIPPED | OUTPATIENT
Start: 2020-03-10 | End: 2020-08-06

## 2020-03-10 RX ORDER — PROMETHAZINE HYDROCHLORIDE 25 MG/1
25 TABLET ORAL
Qty: 30 TABLET | Refills: 5 | Status: SHIPPED | OUTPATIENT
Start: 2020-03-10 | End: 2020-03-10 | Stop reason: SDUPTHER

## 2020-03-10 RX ORDER — ONDANSETRON HYDROCHLORIDE 8 MG/1
8 TABLET, FILM COATED ORAL EVERY 8 HOURS PRN
Qty: 30 TABLET | Refills: 5 | Status: SHIPPED | OUTPATIENT
Start: 2020-03-10 | End: 2020-08-06

## 2020-03-10 RX ORDER — ONDANSETRON HYDROCHLORIDE 8 MG/1
8 TABLET, FILM COATED ORAL EVERY 8 HOURS PRN
Qty: 30 TABLET | Refills: 5 | Status: SHIPPED | OUTPATIENT
Start: 2020-03-10 | End: 2020-03-10 | Stop reason: SDUPTHER

## 2020-03-11 LAB
BILIRUB SERPL-MCNC: NEGATIVE MG/DL
BLOOD URINE, POC: NEGATIVE
COLOR, POC UA: YELLOW
GLUCOSE UR QL STRIP: NORMAL
KETONES UR QL STRIP: NEGATIVE
LEUKOCYTE ESTERASE URINE, POC: NEGATIVE
NITRITE, POC UA: NEGATIVE
PH, POC UA: 6
POC RESIDUAL URINE VOLUME: 192 ML (ref 0–100)
PROTEIN, POC: ABNORMAL
SPECIFIC GRAVITY, POC UA: 1.01
UROBILINOGEN, POC UA: NORMAL

## 2020-03-11 RX ORDER — FLUOROURACIL 50 MG/ML
400 INJECTION, SOLUTION INTRAVENOUS
Status: CANCELLED | OUTPATIENT
Start: 2020-03-16

## 2020-03-11 RX ORDER — HEPARIN 100 UNIT/ML
500 SYRINGE INTRAVENOUS
Status: CANCELLED | OUTPATIENT
Start: 2020-03-16

## 2020-03-11 RX ORDER — EPINEPHRINE 0.3 MG/.3ML
0.3 INJECTION SUBCUTANEOUS ONCE AS NEEDED
Status: CANCELLED | OUTPATIENT
Start: 2020-03-16

## 2020-03-11 RX ORDER — SODIUM CHLORIDE 0.9 % (FLUSH) 0.9 %
10 SYRINGE (ML) INJECTION
Status: CANCELLED | OUTPATIENT
Start: 2020-03-16

## 2020-03-11 RX ORDER — SODIUM CHLORIDE 0.9 % (FLUSH) 0.9 %
10 SYRINGE (ML) INJECTION
Status: CANCELLED | OUTPATIENT
Start: 2020-03-18

## 2020-03-11 RX ORDER — DIPHENHYDRAMINE HYDROCHLORIDE 50 MG/ML
50 INJECTION INTRAMUSCULAR; INTRAVENOUS ONCE AS NEEDED
Status: CANCELLED | OUTPATIENT
Start: 2020-03-16

## 2020-03-11 RX ORDER — HEPARIN 100 UNIT/ML
500 SYRINGE INTRAVENOUS
Status: CANCELLED | OUTPATIENT
Start: 2020-03-18

## 2020-03-16 ENCOUNTER — INFUSION (OUTPATIENT)
Dept: INFUSION THERAPY | Facility: HOSPITAL | Age: 80
End: 2020-03-16
Attending: INTERNAL MEDICINE
Payer: COMMERCIAL

## 2020-03-16 VITALS
HEART RATE: 100 BPM | HEIGHT: 61 IN | BODY MASS INDEX: 28.01 KG/M2 | SYSTOLIC BLOOD PRESSURE: 162 MMHG | TEMPERATURE: 99 F | RESPIRATION RATE: 18 BRPM | WEIGHT: 148.38 LBS | DIASTOLIC BLOOD PRESSURE: 94 MMHG

## 2020-03-16 DIAGNOSIS — C20 RECTAL CANCER: ICD-10-CM

## 2020-03-16 DIAGNOSIS — C20 MALIGNANT NEOPLASM OF RECTUM: Primary | ICD-10-CM

## 2020-03-16 DIAGNOSIS — C20 RECTAL CANCER: Primary | ICD-10-CM

## 2020-03-16 DIAGNOSIS — R11.0 NAUSEA: Primary | ICD-10-CM

## 2020-03-16 DIAGNOSIS — R59.0 CERVICAL LYMPHADENOPATHY: ICD-10-CM

## 2020-03-16 PROCEDURE — 96368 THER/DIAG CONCURRENT INF: CPT

## 2020-03-16 PROCEDURE — 63600175 PHARM REV CODE 636 W HCPCS: Performed by: INTERNAL MEDICINE

## 2020-03-16 PROCEDURE — 96416 CHEMO PROLONG INFUSE W/PUMP: CPT

## 2020-03-16 PROCEDURE — 96413 CHEMO IV INFUSION 1 HR: CPT

## 2020-03-16 PROCEDURE — 96367 TX/PROPH/DG ADDL SEQ IV INF: CPT

## 2020-03-16 PROCEDURE — 96415 CHEMO IV INFUSION ADDL HR: CPT

## 2020-03-16 PROCEDURE — 96411 CHEMO IV PUSH ADDL DRUG: CPT

## 2020-03-16 PROCEDURE — 96366 THER/PROPH/DIAG IV INF ADDON: CPT

## 2020-03-16 RX ORDER — DIPHENHYDRAMINE HYDROCHLORIDE 50 MG/ML
50 INJECTION INTRAMUSCULAR; INTRAVENOUS ONCE AS NEEDED
Status: DISCONTINUED | OUTPATIENT
Start: 2020-03-16 | End: 2020-03-16 | Stop reason: HOSPADM

## 2020-03-16 RX ORDER — FLUOROURACIL 50 MG/ML
400 INJECTION, SOLUTION INTRAVENOUS
Status: COMPLETED | OUTPATIENT
Start: 2020-03-16 | End: 2020-03-16

## 2020-03-16 RX ORDER — EPINEPHRINE 0.3 MG/.3ML
0.3 INJECTION SUBCUTANEOUS ONCE AS NEEDED
Status: DISCONTINUED | OUTPATIENT
Start: 2020-03-16 | End: 2020-03-16 | Stop reason: HOSPADM

## 2020-03-16 RX ORDER — HEPARIN 100 UNIT/ML
500 SYRINGE INTRAVENOUS
Status: DISCONTINUED | OUTPATIENT
Start: 2020-03-16 | End: 2020-03-16 | Stop reason: HOSPADM

## 2020-03-16 RX ORDER — SODIUM CHLORIDE 0.9 % (FLUSH) 0.9 %
10 SYRINGE (ML) INJECTION
Status: DISCONTINUED | OUTPATIENT
Start: 2020-03-16 | End: 2020-03-16 | Stop reason: HOSPADM

## 2020-03-16 RX ADMIN — OXALIPLATIN 145 MG: 5 INJECTION, SOLUTION, CONCENTRATE INTRAVENOUS at 11:03

## 2020-03-16 RX ADMIN — DEXTROSE: 5 SOLUTION INTRAVENOUS at 10:03

## 2020-03-16 RX ADMIN — DEXTROSE 680 MG: 5 SOLUTION INTRAVENOUS at 11:03

## 2020-03-16 RX ADMIN — FLUOROURACIL 680 MG: 50 INJECTION, SOLUTION INTRAVENOUS at 01:03

## 2020-03-16 RX ADMIN — FLUOROURACIL 4080 MG: 50 INJECTION, SOLUTION INTRAVENOUS at 01:03

## 2020-03-16 RX ADMIN — PALONOSETRON HYDROCHLORIDE: 0.25 INJECTION INTRAVENOUS at 10:03

## 2020-03-16 NOTE — PLAN OF CARE
Problem: Anemia (Chemotherapy Effects)  Goal: Anemia Symptom Improvement  Outcome: Ongoing, Progressing     Problem: Urinary Bleeding Risk or Actual (Chemotherapy Effects)  Goal: Absence of Hematuria  Outcome: Ongoing, Progressing     Problem: Nausea and Vomiting (Chemotherapy Effects)  Goal: Fluid and Electrolyte Balance  Outcome: Ongoing, Progressing     Problem: Neurotoxicity (Chemotherapy Effects)  Goal: Neurotoxicity Symptom Control  Outcome: Ongoing, Progressing     Problem: Neutropenia (Chemotherapy Effects)  Goal: Absence of Infection  Outcome: Ongoing, Progressing     Problem: Oral Mucositis (Chemotherapy Effects)  Goal: Improved Oral Mucous Membrane Integrity  Outcome: Ongoing, Progressing     Problem: Thrombocytopenia Bleeding Risk (Chemotherapy Effects)  Goal: Absence of Bleeding  Outcome: Ongoing, Progressing

## 2020-03-18 ENCOUNTER — INFUSION (OUTPATIENT)
Dept: INFUSION THERAPY | Facility: HOSPITAL | Age: 80
End: 2020-03-18
Attending: INTERNAL MEDICINE
Payer: COMMERCIAL

## 2020-03-18 ENCOUNTER — OFFICE VISIT (OUTPATIENT)
Dept: HEMATOLOGY/ONCOLOGY | Facility: CLINIC | Age: 80
End: 2020-03-18
Payer: COMMERCIAL

## 2020-03-18 ENCOUNTER — PATIENT MESSAGE (OUTPATIENT)
Dept: HEMATOLOGY/ONCOLOGY | Facility: CLINIC | Age: 80
End: 2020-03-18

## 2020-03-18 VITALS
SYSTOLIC BLOOD PRESSURE: 134 MMHG | BODY MASS INDEX: 28.17 KG/M2 | TEMPERATURE: 99 F | WEIGHT: 149.13 LBS | HEART RATE: 103 BPM | RESPIRATION RATE: 18 BRPM | DIASTOLIC BLOOD PRESSURE: 75 MMHG | DIASTOLIC BLOOD PRESSURE: 75 MMHG | RESPIRATION RATE: 18 BRPM | SYSTOLIC BLOOD PRESSURE: 134 MMHG | HEART RATE: 103 BPM | WEIGHT: 149.13 LBS | TEMPERATURE: 99 F | BODY MASS INDEX: 28.15 KG/M2 | HEIGHT: 61 IN

## 2020-03-18 DIAGNOSIS — C64.2 RENAL CELL CARCINOMA OF LEFT KIDNEY: ICD-10-CM

## 2020-03-18 DIAGNOSIS — K59.00 CONSTIPATION, UNSPECIFIED CONSTIPATION TYPE: ICD-10-CM

## 2020-03-18 DIAGNOSIS — N20.1 URETEROLITHIASIS: ICD-10-CM

## 2020-03-18 DIAGNOSIS — D64.9 NORMOCYTIC ANEMIA: ICD-10-CM

## 2020-03-18 DIAGNOSIS — E83.51 HYPOCALCEMIA: ICD-10-CM

## 2020-03-18 DIAGNOSIS — C20 MALIGNANT NEOPLASM OF RECTUM: Primary | ICD-10-CM

## 2020-03-18 DIAGNOSIS — N17.9 AKI (ACUTE KIDNEY INJURY): ICD-10-CM

## 2020-03-18 DIAGNOSIS — R11.0 NAUSEA: ICD-10-CM

## 2020-03-18 DIAGNOSIS — E11.9 NON-INSULIN DEPENDENT TYPE 2 DIABETES MELLITUS: Chronic | ICD-10-CM

## 2020-03-18 DIAGNOSIS — C20 RECTAL CANCER: Primary | ICD-10-CM

## 2020-03-18 DIAGNOSIS — E78.00 HYPERCHOLESTEREMIA: Chronic | ICD-10-CM

## 2020-03-18 DIAGNOSIS — I10 ESSENTIAL HYPERTENSION: Chronic | ICD-10-CM

## 2020-03-18 PROCEDURE — 99214 PR OFFICE/OUTPT VISIT, EST, LEVL IV, 30-39 MIN: ICD-10-PCS | Mod: S$GLB,,, | Performed by: NURSE PRACTITIONER

## 2020-03-18 PROCEDURE — 3078F DIAST BP <80 MM HG: CPT | Mod: S$GLB,,, | Performed by: NURSE PRACTITIONER

## 2020-03-18 PROCEDURE — 96523 IRRIG DRUG DELIVERY DEVICE: CPT

## 2020-03-18 PROCEDURE — 3075F PR MOST RECENT SYSTOLIC BLOOD PRESS GE 130-139MM HG: ICD-10-PCS | Mod: S$GLB,,, | Performed by: NURSE PRACTITIONER

## 2020-03-18 PROCEDURE — 1159F PR MEDICATION LIST DOCUMENTED IN MEDICAL RECORD: ICD-10-PCS | Mod: S$GLB,,, | Performed by: NURSE PRACTITIONER

## 2020-03-18 PROCEDURE — 99214 OFFICE O/P EST MOD 30 MIN: CPT | Mod: S$GLB,,, | Performed by: NURSE PRACTITIONER

## 2020-03-18 PROCEDURE — 3078F PR MOST RECENT DIASTOLIC BLOOD PRESSURE < 80 MM HG: ICD-10-PCS | Mod: S$GLB,,, | Performed by: NURSE PRACTITIONER

## 2020-03-18 PROCEDURE — 3075F SYST BP GE 130 - 139MM HG: CPT | Mod: S$GLB,,, | Performed by: NURSE PRACTITIONER

## 2020-03-18 PROCEDURE — 1101F PR PT FALLS ASSESS DOC 0-1 FALLS W/OUT INJ PAST YR: ICD-10-PCS | Mod: S$GLB,,, | Performed by: NURSE PRACTITIONER

## 2020-03-18 PROCEDURE — 1101F PT FALLS ASSESS-DOCD LE1/YR: CPT | Mod: S$GLB,,, | Performed by: NURSE PRACTITIONER

## 2020-03-18 PROCEDURE — 1159F MED LIST DOCD IN RCRD: CPT | Mod: S$GLB,,, | Performed by: NURSE PRACTITIONER

## 2020-03-18 PROCEDURE — 63600175 PHARM REV CODE 636 W HCPCS: Performed by: INTERNAL MEDICINE

## 2020-03-18 RX ORDER — HEPARIN 100 UNIT/ML
500 SYRINGE INTRAVENOUS
Status: DISCONTINUED | OUTPATIENT
Start: 2020-03-18 | End: 2020-03-18 | Stop reason: HOSPADM

## 2020-03-18 RX ORDER — SODIUM CHLORIDE 0.9 % (FLUSH) 0.9 %
10 SYRINGE (ML) INJECTION
Status: DISCONTINUED | OUTPATIENT
Start: 2020-03-18 | End: 2020-03-18 | Stop reason: HOSPADM

## 2020-03-18 RX ADMIN — HEPARIN 500 UNITS: 100 SYRINGE at 11:03

## 2020-03-18 NOTE — PROGRESS NOTES
[facility] Hematology/Oncology  PROGRESS NOTE -   Follow-up Visit      Subjective:       Patient ID:   NAME: Valery Huggins : 1940     79 y.o. female    Referring Doc: David Mendieta MD  Other Physicians: Armando Nath; Stanislav Epstein; Azar Donnelly; Mo    Chief Complaint:  Left RCC and rectal CA f/u    History of Present Illness:     Patient returns today for a  regularly scheduled follow-up visit.  The patient is here today to go over the results of the recently ordered labs, tests and studies. She had recently completed combination XRt and chemotherapy. No fever, swelling, diarrhea, CP, SOB, HA's or N/V. She is here by herself. She states she is doing much better with nausea since adding the Sancuso on Monday. She does have occasional constipation since putting the Sancuso patch on.      She previously saw Dr Mendieta on 12/10/2019  and had MRI on 2020.  They did scope on 2020 and I spoke to dr mendieta by phone last week. The scope looked good and both the patient and Dr Mendieta does not want to proceed in direction of operation especially given her age and co-morbidities.      ROS:   GEN: normal without any fever, night sweats or weight loss  HEENT: normal with no HA's, sore throat, stiff neck, changes in vision  CV: normal with no CP, SOB, PND, RAYA or orthopnea  PULM: normal with no SOB, cough, hemoptysis, sputum or pleuritic pain  GI: normal with no abdominal pain, nausea, vomiting, constipation, diarrhea, melanotic stools, BRBPR, or hematemesis  : normal with no hematuria, dysuria  SKIN: normal with no rash, erythema, bruising, or swelling    Allergies:  Review of patient's allergies indicates:  No Known Allergies    Medications:    Current Outpatient Medications:     amLODIPine (NORVASC) 10 MG tablet, Take 1 tablet (10 mg total) by mouth once daily., Disp: 90 tablet, Rfl: 0    ascorbic acid (VITAMIN C) 100 MG tablet, Take 100 mg by mouth once daily., Disp: , Rfl:     aspirin (ECOTRIN)  "81 MG EC tablet, Take 81 mg by mouth once daily., Disp: , Rfl:     atorvastatin (LIPITOR) 10 MG tablet, Take 10 mg by mouth every evening. , Disp: , Rfl: 2    b complex vitamins capsule, Take 1 capsule by mouth once daily., Disp: , Rfl:     BD ULTRA-FINE CONNIE PEN NEEDLE 32 gauge x 5/32" Ndle, INJECT TWICE DAILY AS DIRECTED, Disp: 200 each, Rfl: 0    calcitRIOL (ROCALTROL) 0.5 MCG Cap, TAKE 1 CAPSULE (0.5 MCG TOTAL) BY MOUTH ONCE DAILY., Disp: 90 capsule, Rfl: 0    cholecalciferol, vitamin D3, 4,000 unit Cap, Take 1 capsule by mouth once daily., Disp: , Rfl:     docusate sodium (COLACE) 100 MG capsule, Take 100 mg by mouth 2 (two) times daily., Disp: , Rfl:     escitalopram oxalate (LEXAPRO) 20 MG tablet, TAKE ONE TABLET BY MOUTH DAILY, Disp: 90 tablet, Rfl: 0    FREESTYLE LITE METER kit, , Disp: , Rfl: 0    FREESTYLE LITE STRIPS Strp, , Disp: , Rfl: 3    granisetron (SANCUSO) 3.1 mg/24 hour, Place 1 patch (3.1 mg total) onto the skin once. for 1 dose, Disp: 2 patch, Rfl: 5    levothyroxine (SYNTHROID) 88 MCG tablet, Take 1 tablet (88 mcg total) by mouth before breakfast., Disp: 30 tablet, Rfl: 11    magnesium oxide (MAG-OX) 400 mg tablet, TAKE ONE TABLET BY MOUTH TWICE DAILY, Disp: 180 tablet, Rfl: 0    mirabegron (MYRBETRIQ) 50 mg Tb24, Take 1 tablet (50 mg total) by mouth once daily., Disp: 30 tablet, Rfl: 11    ondansetron (ZOFRAN) 8 MG tablet, Take 1 tablet (8 mg total) by mouth every 8 (eight) hours as needed., Disp: 30 tablet, Rfl: 5    pantoprazole (PROTONIX) 40 MG tablet, TAKE ONE TABLET BY MOUTH DAILY, Disp: 90 tablet, Rfl: 0    promethazine (PHENERGAN) 25 MG tablet, Take 1 tablet (25 mg total) by mouth every 4 to 6 hours as needed., Disp: 30 tablet, Rfl: 5    solifenacin (VESICARE) 5 MG tablet, Take 1 tablet (5 mg total) by mouth once daily. (Patient not taking: Reported on 3/10/2020), Disp: 30 tablet, Rfl: 11    TOUJEO SOLOSTAR U-300 INSULIN 300 unit/mL (1.5 mL) InPn pen, INJECT 30 UNITS " INTO THE SKIN ONCE DAILY., Disp: 4.5 mL, Rfl: 3    valsartan (DIOVAN) 40 MG tablet, Take 40 mg by mouth once daily. , Disp: , Rfl: 3    VICTOZA 3-XAVIER 0.6 mg/0.1 mL (18 mg/3 mL) PnIj, INJECT 1.8MG SUBCUTANEAOUSLY DAILY, Disp: 9 mL, Rfl: 0  No current facility-administered medications for this visit.     PMHx/PSHx Updates:  See patient's last visit with Dr. Goode on 2/13/2020  See H&P on 10/8/2019        Pathology:  Cancer Staging  No matching staging information was found for the patient.      Objective:     Vitals:  Blood pressure 134/75, pulse 103, temperature 98.8 °F (37.1 °C), resp. rate 18, weight 67.6 kg (149 lb 1.6 oz), last menstrual period 06/01/2012.    Physical Examination:   GEN: no apparent distress, comfortable; AAOx3  HEAD: atraumatic and normocephalic  EYES: no pallor, no icterus, PERRLA  ENT: OMM, no pharyngeal erythema, external ears WNL; no nasal discharge; no thrush  NECK: no masses, thyroid normal, trachea midline, no LAD/LN's, supple  CV: RRR with no murmur; normal pulse; normal S1 and S2; no pedal edema  CHEST: Normal respiratory effort; CTAB; normal breath sounds; no wheeze or crackles; portacath on right CW  ABDOM: nontender and nondistended; soft; normal bowel sounds; no rebound/guarding  MUSC/Skeletal: ROM normal; no crepitus; joints normal; no deformities or arthropathy  EXTREM: no clubbing, cyanosis, inflammation or swelling  SKIN: no rashes, lesions, ulcers, petechiae or subcutaneous nodules  : no hendrickson  NEURO: grossly intact; motor/sensory WNL; AAOx3; no tremors  PSYCH: normal mood, affect and behavior  LYMPH: normal cervical, supraclavicular, axillary and groin LN's            Labs:     3/6/2020  Lab Results   Component Value Date    WBC 6.1 03/13/2020    HGB 9.8 (L) 03/13/2020    HCT 30.2 (L) 03/13/2020    MCV 88.8 03/13/2020     03/13/2020     CMP  Sodium   Date Value Ref Range Status   03/13/2020 139 135 - 146 mmol/L Final     Potassium   Date Value Ref Range Status    03/13/2020 4.2 3.5 - 5.3 mmol/L Final     Chloride   Date Value Ref Range Status   03/13/2020 101 98 - 110 mmol/L Final     CO2   Date Value Ref Range Status   03/13/2020 29 20 - 32 mmol/L Final     Glucose   Date Value Ref Range Status   03/13/2020 117 65 - 139 mg/dL Final     Comment:               Non-fasting reference interval          BUN, Bld   Date Value Ref Range Status   03/13/2020 29 (H) 7 - 25 mg/dL Final     Creatinine   Date Value Ref Range Status   03/13/2020 1.73 (H) 0.60 - 0.93 mg/dL Final     Comment:     For patients >49 years of age, the reference limit  for Creatinine is approximately 13% higher for people  identified as -American.          Calcium   Date Value Ref Range Status   03/13/2020 7.3 (L) 8.6 - 10.4 mg/dL Final     Total Protein   Date Value Ref Range Status   03/13/2020 5.8 (L) 6.1 - 8.1 g/dL Final     Albumin   Date Value Ref Range Status   03/13/2020 3.6 3.6 - 5.1 g/dL Final     Total Bilirubin   Date Value Ref Range Status   03/13/2020 0.4 0.2 - 1.2 mg/dL Final     Alkaline Phosphatase   Date Value Ref Range Status   03/13/2020 65 37 - 153 U/L Final     AST   Date Value Ref Range Status   03/13/2020 12 10 - 35 U/L Final     ALT   Date Value Ref Range Status   03/13/2020 11 6 - 29 U/L Final     Anion Gap   Date Value Ref Range Status   01/14/2020 9 8 - 16 mmol/L Final     eGFR if    Date Value Ref Range Status   03/13/2020 32 (L) > OR = 60 mL/min/1.73m2 Final     eGFR if non    Date Value Ref Range Status   03/13/2020 28 (L) > OR = 60 mL/min/1.73m2 Final           Radiology/Diagnostic Studies:    CT  3/5/2020    Impression       Status post left nephrectomy without evidence of recurrent renal malignancy.    No significant change since prior study.           MRI  1/8/2020:  Impression       Significant reduction in size of the patient's known mid rectal mass, which is no longer distinctly visible.  Reduction in size of 3 mesorectal lymph nodes  as above.               PET  10/17/2019    Impression       1. Left paratracheal small mass extending into superior mediastinum with associated moderate FDG activity is unchanged in size and appearance since 02/16/2018 CT.  This is unlikely to represent metastatic disease or malignancy, given stability, and may represent residual thyroid parenchyma but is otherwise nonspecific.  2. Unchanged 6 mm right upper lobe nodule since 02/16/2018.  Benign etiology is likely the continued CT thorax without IV contrast follow-up is recommended in 12 months to document greater than 2 years of stability.  3. No current convincing evidence of metastatic disease.  4. Previous rectal mass is no longer evident on this exam.               X-ray Chest Ap Portable    Result Date: 10/15/2019  EXAMINATION: XR CHEST AP PORTABLE CLINICAL HISTORY: s/p port; Encounter for adjustment and management of vascular access device TECHNIQUE: Single frontal view of the chest was performed. COMPARISON: 6/18/2019 FINDINGS: The cardiomediastinal silhouette is stable.  Lungs are clear of infiltrate.  No pleural effusions.  Laya catheter has been inserted from the region of the right subclavian vein with the tip at the level the proximal SVC.     Laya catheter inserted with the tip at the level the proximal SVC.  Lungs are expanded and clear.  No pneumothorax. Electronically signed by: Nubia Venegas MD Date:    10/15/2019 Time:    14:05    Surg Fl Surgery Fluoro Usage    Result Date: 10/15/2019  See OP Notes for results. IMPRESSION: See OP Notes for results. This procedure was auto-finalized by: [facility]     Mri Rectal Cancer Without Contrast    Result Date: 9/18/2019  EXAMINATION: MRI RECTAL CANCER WITHOUT CONTRAST CLINICAL HISTORY: Rectal cancer, staging, locoregional;rectal anal mass;  Malignant neoplasm of rectum TECHNIQUE: Multiplanar multisequence MR imaging of the pelvis without contrast. COMPARISON: 07/12/2019 FINDINGS: Approximately  4 cm from the anal verge there is a peripherally located lobular mass along the dorsal rectal wall.  This measures 4 cm in length and 3.3 cm in diameter.  There is heterogeneous signal intensity.  There is restricted diffusion in the lesion and no discrete extra colonic extension.  Several lymph nodes are noted in the mesorectal fat including two which measure 3 mm near the inferior sacrum, series 8, image 16, and a 5 mm lymph node at the S2 level, series 8, image 8.  Prominent but nonenlarged bilateral obturator chain lymph nodes also noted, on the right at 4 mm and left at 6 mm.  There is urinary bladder wall thickening which is diffuse.  Moderate degree of stool in the colon.  No dilated bowel loops.  Hysterectomy.     4 cm mid rectal lesion in keeping with known malignancy.  No discrete extension into the mesorectal fascia although there are several perirectal lymph nodes which raise the possibility for locoregional lymph node involvement.    Assessment/Plan:   (1) 79 y.o. female with diagnosis of prior left RCC who has been referred by David Mendieta MD for evaluation by medical hematology/oncology. She has been followed by Dr Stanislav Epstein with [facility] the [facility] in [place]. She last saw Dr Epstein in July 2019. She was recently diagnosed with rectal mass by Dr Armando Duff which was found on colonoscopy on 8/26/2019. She had presented with lower Gi bleeding at that time. The pathology from those biopsies showed no evidence of malignancy at that time. She was subsequently seen by Dr David Mendieta and underwent trans-anal surgical biopsy on 9/23/2019. The pathology from the surgical biopsy showed rectal carcinoma.         She has been seen by Dr Fareed Hassan with Rad/onc for radiation therapy evaluation.     PET was done on 10/17/2019     We previously discussed giving her combined chemotherapy with the XRt to try to reduce her risk of recurrence. She was agreeable to this plan.    She  had portacath placed with Dr Mendieta. She saw Dr Hassan last week with rad/onc. She has since completed weekly XRT and chemotherapy    - She previously saw Dr Mendieta on 12/10/2019  and had MRI on 1/8/2020.  They did scope on 1/28th 2020 and I spoke to dr mendieta by phone last week. The scope looked good and both the patient and Dr Mendieta does not want to proceed in direction of operation especially given her age and co-morbidities.    - We previously discussed adjuvant chemotherapy with FOLOFOX x 10-12 cycles especially since she is not having surgery and she is agreeable.    - Will previously  provide literature on the regimen and set up chemotherapy    - Completed cycle # 2 today, return on 3/30/2020  for cycle #3      (2) Left renal cell carcinoma s/p left nephrectomy with Dr Azar Donnelly - diagnosed about 2 years ago     (3) Ureterolithiasis     (4) CRI - stage III - followed by Dr Antonio; she saw him recently and per patient's report, her kidney function is stable per Dr Antonio  - she is seeing Dr Antonio this coming Thursday     (5) HTN and hypercholesterolemia     (6) DM - followed by Dr Brower with endocrinology, decrease Decadron to 8mg- it is increasing her BS.     (7) Hx/of gall stones     (8) Chronic left shoulder issues     (9) MVP- Continue f/u with cardiology     (10) Thyroid disease with prior hx/of thyroid cancer s/p thyroidectomy      (11) Chronic anemia - most likley multifactorial due to iron deficiency, GIB and anemia of chronic renal disease  - hgb at 11.2 currently       (12) Nausea- Continue Zofran and Phenergan PRN and added Sancuso with each treatment cycle.    (13) Constipation- Colace and Miralax    (14) Hypocalcemia- Added Calcium 500 mg BID    VISIT DIAGNOSES:      Malignant neoplasm of rectum    Normocytic anemia    Renal cell carcinoma of left kidney    Non-insulin dependent type 2 diabetes mellitus    Hypercholesteremia    Essential hypertension    VICTOR M (acute kidney  injury)    Ureterolithiasis    Nausea    Constipation, unspecified constipation type    Hypocalcemia          PLAN:  1.  Encouraged compliance with labs  2. Continue current regimen of FOLFOX   3.  F/u with PCP, GI, rad/onc , etc  4.  F/u nephrology   5. Continue antiemetics- Added Sancuso  6. Start Calcium 500 mg BID  7. RTC in 2 weeks with Dr. Goode and in 4 weeks with me      Discussion:       I spent over 25 mins of time with the patient. Reviewed results of the recently ordered labs, tests and studies; made directives with regards to the results. Over half of this time was spent couseling and coordinating care.    I have explained all of the above in detail and the patient understands all of the current recommendation(s). I have answered all of their questions to the best of my ability and to their complete satisfaction.   The patient is to continue with the current management plan.      Electronically signed by RIA Conde

## 2020-03-23 ENCOUNTER — PATIENT MESSAGE (OUTPATIENT)
Dept: HEMATOLOGY/ONCOLOGY | Facility: CLINIC | Age: 80
End: 2020-03-23

## 2020-03-25 RX ORDER — EPINEPHRINE 0.3 MG/.3ML
0.3 INJECTION SUBCUTANEOUS ONCE AS NEEDED
Status: CANCELLED | OUTPATIENT
Start: 2020-04-06

## 2020-03-25 RX ORDER — FLUOROURACIL 50 MG/ML
400 INJECTION, SOLUTION INTRAVENOUS
Status: CANCELLED | OUTPATIENT
Start: 2020-04-06

## 2020-03-25 RX ORDER — DIPHENHYDRAMINE HYDROCHLORIDE 50 MG/ML
50 INJECTION INTRAMUSCULAR; INTRAVENOUS ONCE AS NEEDED
Status: CANCELLED | OUTPATIENT
Start: 2020-04-06

## 2020-03-25 RX ORDER — SODIUM CHLORIDE 0.9 % (FLUSH) 0.9 %
10 SYRINGE (ML) INJECTION
Status: CANCELLED | OUTPATIENT
Start: 2020-04-06

## 2020-03-25 RX ORDER — HEPARIN 100 UNIT/ML
500 SYRINGE INTRAVENOUS
Status: CANCELLED | OUTPATIENT
Start: 2020-04-08

## 2020-03-25 RX ORDER — SODIUM CHLORIDE 0.9 % (FLUSH) 0.9 %
10 SYRINGE (ML) INJECTION
Status: CANCELLED | OUTPATIENT
Start: 2020-04-08

## 2020-03-25 RX ORDER — HEPARIN 100 UNIT/ML
500 SYRINGE INTRAVENOUS
Status: CANCELLED | OUTPATIENT
Start: 2020-04-06

## 2020-04-03 NOTE — PROGRESS NOTES
[facility] Hematology/Oncology  PROGRESS NOTE -   Follow-up Visit      Subjective:       Patient ID:   NAME: Valery Huggins : 1940     79 y.o. female    Referring Doc: David Mendieta MD  Other Physicians: Armando Nath; Stanislav Epstein; Azar Donnelly; Mo    Chief Complaint:  Left RCC and rectal CA f/u    History of Present Illness:     Patient returns today for a  regularly scheduled follow-up visit.  The patient is here today to go over the results of the recently ordered labs, tests and studies. She had recently completed combination XRt and chemotherapy. No CP, SOB, HA's or N/V. She is here by herself today  She last saw Dr Hassan with rad/onc on 2019.    She previously saw Dr Mendieta on 12/10/2019  and had MRI on 2020.  They did scope on 2020 and I spoke to dr mendieta by phone last week. The scope looked good and both the patient and Dr Mendieta does not want to proceed in direction of operation especially given her age and co-morbidities.    We previously discussed adjuvant chemotherapy with FOLOFOX x 10-12 cycles especially since she is not having surgery and she was agreeable.    Will previously provided literature on the regimen and set up chemotherapy    She has since started chemotherapy and is on cycle #3 today. She had some mild nausea which was controlled with antiemetics and a patch. She has some tingling in hands and feet and oral cold sensory issues.                   ROS:   GEN: normal without any fever, night sweats or weight loss  HEENT: normal with no HA's, sore throat, stiff neck, changes in vision; small ulcer on upper left lip  CV: normal with no CP, SOB, PND, RAYA or orthopnea  PULM: normal with no SOB, cough, hemoptysis, sputum or pleuritic pain  GI: some nausea which is under control; intermittent constipation  : normal with no hematuria, dysuria  BREAST: normal with no mass, discharge, pain  SKIN: normal with no rash, erythema, bruising, or  "swelling    Allergies:  Review of patient's allergies indicates:  No Known Allergies    Medications:    Current Outpatient Medications:     amLODIPine (NORVASC) 10 MG tablet, Take 1 tablet (10 mg total) by mouth once daily., Disp: 90 tablet, Rfl: 0    ascorbic acid (VITAMIN C) 100 MG tablet, Take 100 mg by mouth once daily., Disp: , Rfl:     aspirin (ECOTRIN) 81 MG EC tablet, Take 81 mg by mouth once daily., Disp: , Rfl:     atorvastatin (LIPITOR) 10 MG tablet, Take 10 mg by mouth every evening. , Disp: , Rfl: 2    b complex vitamins capsule, Take 1 capsule by mouth once daily., Disp: , Rfl:     BD ULTRA-FINE CONNIE PEN NEEDLE 32 gauge x 5/32" Ndle, INJECT TWICE DAILY AS DIRECTED, Disp: 200 each, Rfl: 0    calcitRIOL (ROCALTROL) 0.5 MCG Cap, TAKE 1 CAPSULE (0.5 MCG TOTAL) BY MOUTH ONCE DAILY., Disp: 90 capsule, Rfl: 0    cholecalciferol, vitamin D3, 4,000 unit Cap, Take 1 capsule by mouth once daily., Disp: , Rfl:     docusate sodium (COLACE) 100 MG capsule, Take 100 mg by mouth 2 (two) times daily., Disp: , Rfl:     escitalopram oxalate (LEXAPRO) 20 MG tablet, TAKE ONE TABLET BY MOUTH DAILY, Disp: 90 tablet, Rfl: 0    FREESTYLE LITE METER kit, , Disp: , Rfl: 0    FREESTYLE LITE STRIPS Strp, , Disp: , Rfl: 3    levothyroxine (SYNTHROID) 88 MCG tablet, Take 1 tablet (88 mcg total) by mouth before breakfast., Disp: 30 tablet, Rfl: 11    magnesium oxide (MAG-OX) 400 mg tablet, TAKE ONE TABLET BY MOUTH TWICE DAILY, Disp: 180 tablet, Rfl: 0    mirabegron (MYRBETRIQ) 50 mg Tb24, Take 1 tablet (50 mg total) by mouth once daily., Disp: 30 tablet, Rfl: 11    ondansetron (ZOFRAN) 8 MG tablet, Take 1 tablet (8 mg total) by mouth every 8 (eight) hours as needed., Disp: 30 tablet, Rfl: 5    pantoprazole (PROTONIX) 40 MG tablet, TAKE ONE TABLET BY MOUTH DAILY, Disp: 90 tablet, Rfl: 0    promethazine (PHENERGAN) 25 MG tablet, Take 1 tablet (25 mg total) by mouth every 4 to 6 hours as needed., Disp: 30 tablet, Rfl: " 5    solifenacin (VESICARE) 5 MG tablet, Take 1 tablet (5 mg total) by mouth once daily. (Patient not taking: Reported on 3/10/2020), Disp: 30 tablet, Rfl: 11    TOUJEO SOLOSTAR U-300 INSULIN 300 unit/mL (1.5 mL) InPn pen, INJECT 30 UNITS INTO THE SKIN ONCE DAILY., Disp: 4.5 mL, Rfl: 3    valsartan (DIOVAN) 40 MG tablet, Take 40 mg by mouth once daily. , Disp: , Rfl: 3    VICTOZA 3-XAVIER 0.6 mg/0.1 mL (18 mg/3 mL) PnIj, INJECT 1.8MG SUBCUTANEAOUSLY DAILY, Disp: 9 mL, Rfl: 0  No current facility-administered medications for this visit.     Facility-Administered Medications Ordered in Other Visits:     dextrose 5 % 250 mL flush bag, , Intravenous, 1 time in Clinic/HOD, Carrillo Goode MD    fluorouracil (ADRUCIL) 2,400 mg/m2 = 4,080 mg in sodium chloride 0.9% 250 mL chemo infusion, 2,400 mg/m2, Intravenous, over 46 hr, Carrillo Goode MD    fluorouraciL injection 680 mg, 400 mg/m2, Intravenous, 1 time in Clinic/HOD, Carrillo Goode MD    heparin, porcine (PF) 100 unit/mL injection flush 500 Units, 500 Units, Intravenous, PRN, Carrillo Goode MD    leucovorin calcium 400 mg/m2 = 680 mg in dextrose 5 % 284 mL infusion, 400 mg/m2, Intravenous, 1 time in Clinic/HOD, Carrillo Goode MD, Last Rate: 142 mL/hr at 04/06/20 0858, 680 mg at 04/06/20 0858    oxaliplatin (ELOXATIN) 85 mg/m2 = 145 mg in dextrose 5 % 529 mL chemo infusion, 85 mg/m2, Intravenous, 1 time in Clinic/HOD, Carrillo Goode MD, Last Rate: 264.5 mL/hr at 04/06/20 0858, 145 mg at 04/06/20 0858    sodium chloride 0.9% flush 10 mL, 10 mL, Intravenous, PRN, Carrillo Goode MD    PMHx/PSHx Updates:  See patient's last visit with me on 3/10/2020  See H&P on 10/8/2019        Pathology:  Cancer Staging  No matching staging information was found for the patient.          Objective:     Vitals:  Blood pressure (!) 151/80, pulse 98, temperature 97.9 °F (36.6 °C), resp. rate 18, weight 66.7 kg (147 lb 0.8 oz), last menstrual period  06/01/2012.    Physical Examination:   GEN: no apparent distress, comfortable; AAOx3  HEAD: atraumatic and normocephalic  EYES: no pallor, no icterus, PERRLA  ENT: OMM, no pharyngeal erythema, external ears WNL; small ulcer on upper left lip  ; no nasal discharge; no thrush  NECK: no masses, thyroid normal, trachea midline, no LAD/LN's, supple  CV: RRR with no murmur; normal pulse; normal S1 and S2; no pedal edema  CHEST: Normal respiratory effort; CTAB; normal breath sounds; no wheeze or crackles; portacath on right CW  ABDOM: nontender and nondistended; soft; normal bowel sounds; no rebound/guarding  MUSC/Skeletal: ROM normal; no crepitus; joints normal; no deformities or arthropathy  EXTREM: no clubbing, cyanosis, inflammation or swelling  SKIN: no rashes, lesions, ulcers, petechiae or subcutaneous nodules  : no hendrickson  NEURO: grossly intact; motor/sensory WNL; AAOx3; no tremors  PSYCH: normal mood, affect and behavior  LYMPH: normal cervical, supraclavicular, axillary and groin LN's            Labs:     4/3/2020  Lab Results   Component Value Date    WBC 3.5 (L) 04/03/2020    HGB 10.1 (L) 04/03/2020    HCT 30.6 (L) 04/03/2020    MCV 87.2 04/03/2020     04/03/2020     CMP  Sodium   Date Value Ref Range Status   04/03/2020 137 135 - 146 mmol/L Final     Potassium   Date Value Ref Range Status   04/03/2020 5.2 3.5 - 5.3 mmol/L Final     Chloride   Date Value Ref Range Status   04/03/2020 97 (L) 98 - 110 mmol/L Final     CO2   Date Value Ref Range Status   04/03/2020 31 20 - 32 mmol/L Final     Glucose   Date Value Ref Range Status   04/03/2020 157 (H) 65 - 99 mg/dL Final     Comment:                   Fasting reference interval     For someone without known diabetes, a glucose  value >125 mg/dL indicates that they may have  diabetes and this should be confirmed with a  follow-up test.          BUN, Bld   Date Value Ref Range Status   04/03/2020 30 (H) 7 - 25 mg/dL Final     Creatinine   Date Value Ref Range  Status   04/03/2020 1.85 (H) 0.60 - 0.93 mg/dL Final     Comment:     For patients >49 years of age, the reference limit  for Creatinine is approximately 13% higher for people  identified as -American.          Calcium   Date Value Ref Range Status   04/03/2020 8.2 (L) 8.6 - 10.4 mg/dL Final     Total Protein   Date Value Ref Range Status   04/03/2020 5.7 (L) 6.1 - 8.1 g/dL Final     Albumin   Date Value Ref Range Status   04/03/2020 3.5 (L) 3.6 - 5.1 g/dL Final     Total Bilirubin   Date Value Ref Range Status   04/03/2020 0.5 0.2 - 1.2 mg/dL Final     Alkaline Phosphatase   Date Value Ref Range Status   04/03/2020 121 37 - 153 U/L Final     AST   Date Value Ref Range Status   04/03/2020 32 10 - 35 U/L Final     ALT   Date Value Ref Range Status   04/03/2020 45 (H) 6 - 29 U/L Final     Anion Gap   Date Value Ref Range Status   01/14/2020 9 8 - 16 mmol/L Final     eGFR if    Date Value Ref Range Status   04/03/2020 30 (L) > OR = 60 mL/min/1.73m2 Final     eGFR if non    Date Value Ref Range Status   04/03/2020 26 (L) > OR = 60 mL/min/1.73m2 Final           Radiology/Diagnostic Studies:    CT  3/5/2020    Impression       Status post left nephrectomy without evidence of recurrent renal malignancy.    No significant change since prior study.           MRI  1/8/2020:  Impression       Significant reduction in size of the patient's known mid rectal mass, which is no longer distinctly visible.  Reduction in size of 3 mesorectal lymph nodes as above.               PET  10/17/2019    Impression       1. Left paratracheal small mass extending into superior mediastinum with associated moderate FDG activity is unchanged in size and appearance since 02/16/2018 CT.  This is unlikely to represent metastatic disease or malignancy, given stability, and may represent residual thyroid parenchyma but is otherwise nonspecific.  2. Unchanged 6 mm right upper lobe nodule since 02/16/2018.  Benign  etiology is likely the continued CT thorax without IV contrast follow-up is recommended in 12 months to document greater than 2 years of stability.  3. No current convincing evidence of metastatic disease.  4. Previous rectal mass is no longer evident on this exam.               X-ray Chest Ap Portable    Result Date: 10/15/2019  EXAMINATION: XR CHEST AP PORTABLE CLINICAL HISTORY: s/p port; Encounter for adjustment and management of vascular access device TECHNIQUE: Single frontal view of the chest was performed. COMPARISON: 6/18/2019 FINDINGS: The cardiomediastinal silhouette is stable.  Lungs are clear of infiltrate.  No pleural effusions.  Laya catheter has been inserted from the region of the right subclavian vein with the tip at the level the proximal SVC.     Laya catheter inserted with the tip at the level the proximal SVC.  Lungs are expanded and clear.  No pneumothorax. Electronically signed by: Nubia Venegas MD Date:    10/15/2019 Time:    14:05    Surg Fl Surgery Fluoro Usage    Result Date: 10/15/2019  See OP Notes for results. IMPRESSION: See OP Notes for results. This procedure was auto-finalized by: [facility]     Mri Rectal Cancer Without Contrast    Result Date: 9/18/2019  EXAMINATION: MRI RECTAL CANCER WITHOUT CONTRAST CLINICAL HISTORY: Rectal cancer, staging, locoregional;rectal anal mass;  Malignant neoplasm of rectum TECHNIQUE: Multiplanar multisequence MR imaging of the pelvis without contrast. COMPARISON: 07/12/2019 FINDINGS: Approximately 4 cm from the anal verge there is a peripherally located lobular mass along the dorsal rectal wall.  This measures 4 cm in length and 3.3 cm in diameter.  There is heterogeneous signal intensity.  There is restricted diffusion in the lesion and no discrete extra colonic extension.  Several lymph nodes are noted in the mesorectal fat including two which measure 3 mm near the inferior sacrum, series 8, image 16, and a 5 mm lymph node at the S2  level, series 8, image 8.  Prominent but nonenlarged bilateral obturator chain lymph nodes also noted, on the right at 4 mm and left at 6 mm.  There is urinary bladder wall thickening which is diffuse.  Moderate degree of stool in the colon.  No dilated bowel loops.  Hysterectomy.     4 cm mid rectal lesion in keeping with known malignancy.  No discrete extension into the mesorectal fascia although there are several perirectal lymph nodes which raise the possibility for locoregional lymph node involvement. Electronically signed by: Bebo Kapoor Date:    09/18/2019 Time:    16:45      I have reviewed all available lab results and radiology reports.    Assessment/Plan:   (1) 79 y.o. female with diagnosis of prior left RCC who has been referred by David Saldaña MD for evaluation by medical hematology/oncology. She has been followed by Dr Stanislav Epstein with [facility] in [place]. She last saw Dr Epstein in July 2019. She was recently diagnosed with rectal mass by Dr Armando Duff which was found on colonoscopy on 8/26/2019. She had presented with lower Gi bleeding at that time. The pathology from those biopsies showed no evidence of malignancy at that time. She was subsequently seen by Dr David Saldaña and underwent trans-anal surgical biopsy on 9/23/2019. The pathology from the surgical biopsy showed rectal carcinoma.         She has been seen by Dr Fareed Hassan with Rad/onc for radiation therapy evaluation.     PET was done on 10/17/2019     We previously discussed giving her combined chemotherapy with the XRt to try to reduce her risk of recurrence. She was agreeable to this plan.    She had portacath placed with Dr Saldaña. She saw Dr Hassan last week with rad/onc. She has since completed weekly XRT and chemotherapy    - She previously saw Dr Saldaña on 12/10/2019  and had MRI on 1/8/2020.  They did scope on 1/28th 2020 and I spoke to dr saldaña by phone last week. The scope looked good  and both the patient and Dr Mendieat does not want to proceed in direction of operation especially given her age and co-morbidities.    - We previously discussed adjuvant chemotherapy with FOLOFOX x 10-12 cycles especially since she is not having surgery and she is agreeable.    - Will previously  provide literature on the regimen and set up chemotherapy    - she since started chemotherapy and is now getting cycle #3        (2) Left renal cell carcinoma s/p left nephrectomy with Dr Azar Donnelly - diagnosed about 2 years ago     (3) Ureterolithiasis     (4) CRI - stage III - followed by Dr Antonio; she saw him recently and per patient's report, her kidney function is stable per Dr Antonio  - she is seeing Dr Antonio this coming Thursday     (5) HTN and hypercholesterolemia     (6) DM - followed by Dr Brower with endocrinology     (7) Hx/of gall stones     (8) Chronic left shoulder issues     (9) MVP     (10) Thyroid disease with prior hx/of thyroid cancer s/p thyroidectomy      (11) Chronic anemia - most likley multifactorial due to iron deficiency, GIB and anemia of chronic renal disease  - hgb at 10.1 currently       VISIT DIAGNOSES:      Renal cell carcinoma of left kidney    Rectal cancer    Normocytic anemia    Malignant neoplasm of rectum          PLAN:  1.  Encouraged compliance with labs; discussed COVID19 precautions  2. Continue current regimen of FOLFOX - but will reduce dose by 20%  3.  F/u with PCP, GI, rad/onc , etc  4.  F/u nephrology - Dr Antonio regularly while on this regimen  5. Refill antiemetics as needed  6. RTC in 4 weeks with me     Fax note to Pham Winston Parks; Tete Duff Tandron; Paco; Leonarda    Discussion:       I spent over 25 mins of time with the patient. Reviewed results of the recently ordered labs, tests and studies; made directives with regards to the results. Over half of this time was spent couseling and coordinating care.    COVID-19 Discussion:    I had long discussion  with patient and any applicable family about the COVID-19 coronavirus epidemic and the recommended precautions with regard to cancer and/or hematology patients. I have re-iterated the CDC recommendations for adequate hand washing, use of hand -like products, and coughing into elbow, etc. In addition, especially for our patients who are on chemotherapy and/or our otherwise immunocompromised patients, I have recommended avoidance of crowds, including movie theaters, restaurants, churches, etc. I have recommended avoidance of any sick or symptomatic family members and/or friends. I have also recommended avoidance of any raw and unwashed food products, and general avoidance of food items that have not been prepared by themselves. The patient has been asked to call us immediately with any symptom developments, issues, questions or other general concerns.       Chemotherapy Discussion:      I discussed the available treatment option(s) in accordance with the latest/current national evidence-based guidelines (NCCN, UpToDate, NCI, ASCO, etc where applicable), their overall age/condition and their co-morbidities. I also went over the risks and benefits of the chemotherapy with regard to their particular cancer type, their cancer stage, their age/condition, and their co-morbidities. I provided literature on the chemotherapy regimen and discussed the chemotherapy side-effect profiles of the drug(s). I discussed the importance of compliance with obtaining and monitoring weekly lab work, and went over the potential hematopathology issues and risks with anemia, leucopenia and thrombocytopenia that can occur with chemotherapy. I discussed the potential risks of liver and kidney damage, which could be permanent and could necessitate dialysis long-term if kidney failure developed. I discussed the emetic and/or diarrheal potential of the regimen and the potential need for use of antiemetic and anti-diarrheal medications. I  discussed the risk for development of anaphylactic shock, bronchospasm, dysrhythmia, and respiratory/cardiovascular arrest and/or failure. I discussed the potential risks for development of alopecia, cold sensory issues, ringing in ears, vertigo, cataracts, glaucoma, and neuropathy, all of which could end up being chronic and life-long. Some chemotherpyI discussed the risks of hand-foot syndrome and rashes, and development of other autoimmune mediated processes such as pneumonitis, hepatitis, and colitis which could be life threatening. I discussed the risks of the potential development of a rare but fatal viral mediated disease known as PML (Progressive Multifocal Leukoencephalopathy), and risk of future development of leukemia and/or lymphoma from use of certain chemotherapy agents. I discussed the need for neutropenic precautions, basic hygiene/sanitation behaviors and dietary restrictions.    The patient's consent has been obtained to proceed with the chemotherapy.The patient will be referred to [facility]otherapy [facility] /[facility] for training and education on chemotherapy, use of antiemetics and/or anti-diarrheals, use of NSAID's, potential chemotherapy side-effects, and any specific recommendations and precautions with the particular chemotherapy agents.      I answered all of the patient's (and family's, if applicable) questions to the best of my ability and to their complete satisfaction. The patient acknowledged full understanding of the risks, recommendations and plan(s).         I have explained all of the above in detail and the patient understands all of the current recommendation(s). I have answered all of their questions to the best of my ability and to their complete satisfaction.   The patient is to continue with the current management plan.            Electronically signed by Carrillo Goode MD

## 2020-04-06 ENCOUNTER — INFUSION (OUTPATIENT)
Dept: INFUSION THERAPY | Facility: HOSPITAL | Age: 80
End: 2020-04-06
Attending: INTERNAL MEDICINE
Payer: COMMERCIAL

## 2020-04-06 ENCOUNTER — OFFICE VISIT (OUTPATIENT)
Dept: HEMATOLOGY/ONCOLOGY | Facility: CLINIC | Age: 80
End: 2020-04-06
Payer: COMMERCIAL

## 2020-04-06 ENCOUNTER — TELEPHONE (OUTPATIENT)
Dept: HEMATOLOGY/ONCOLOGY | Facility: CLINIC | Age: 80
End: 2020-04-06

## 2020-04-06 VITALS
RESPIRATION RATE: 18 BRPM | BODY MASS INDEX: 27.78 KG/M2 | HEART RATE: 98 BPM | SYSTOLIC BLOOD PRESSURE: 151 MMHG | DIASTOLIC BLOOD PRESSURE: 80 MMHG | WEIGHT: 147.06 LBS | TEMPERATURE: 98 F

## 2020-04-06 VITALS
DIASTOLIC BLOOD PRESSURE: 96 MMHG | HEART RATE: 89 BPM | RESPIRATION RATE: 18 BRPM | HEIGHT: 61 IN | BODY MASS INDEX: 27.79 KG/M2 | WEIGHT: 147.19 LBS | SYSTOLIC BLOOD PRESSURE: 175 MMHG | TEMPERATURE: 98 F

## 2020-04-06 DIAGNOSIS — D64.9 NORMOCYTIC ANEMIA: ICD-10-CM

## 2020-04-06 DIAGNOSIS — C20 RECTAL CANCER: Primary | ICD-10-CM

## 2020-04-06 DIAGNOSIS — C64.2 RENAL CELL CARCINOMA OF LEFT KIDNEY: Primary | ICD-10-CM

## 2020-04-06 DIAGNOSIS — C20 RECTAL CANCER: ICD-10-CM

## 2020-04-06 DIAGNOSIS — C20 MALIGNANT NEOPLASM OF RECTUM: ICD-10-CM

## 2020-04-06 PROCEDURE — 96367 TX/PROPH/DG ADDL SEQ IV INF: CPT

## 2020-04-06 PROCEDURE — 1159F MED LIST DOCD IN RCRD: CPT | Mod: S$GLB,,, | Performed by: INTERNAL MEDICINE

## 2020-04-06 PROCEDURE — 96413 CHEMO IV INFUSION 1 HR: CPT

## 2020-04-06 PROCEDURE — 25000003 PHARM REV CODE 250: Performed by: INTERNAL MEDICINE

## 2020-04-06 PROCEDURE — 96366 THER/PROPH/DIAG IV INF ADDON: CPT

## 2020-04-06 PROCEDURE — 96416 CHEMO PROLONG INFUSE W/PUMP: CPT

## 2020-04-06 PROCEDURE — 96411 CHEMO IV PUSH ADDL DRUG: CPT

## 2020-04-06 PROCEDURE — 1159F PR MEDICATION LIST DOCUMENTED IN MEDICAL RECORD: ICD-10-PCS | Mod: S$GLB,,, | Performed by: INTERNAL MEDICINE

## 2020-04-06 PROCEDURE — 3079F PR MOST RECENT DIASTOLIC BLOOD PRESSURE 80-89 MM HG: ICD-10-PCS | Mod: S$GLB,,, | Performed by: INTERNAL MEDICINE

## 2020-04-06 PROCEDURE — 96368 THER/DIAG CONCURRENT INF: CPT

## 2020-04-06 PROCEDURE — 3077F SYST BP >= 140 MM HG: CPT | Mod: S$GLB,,, | Performed by: INTERNAL MEDICINE

## 2020-04-06 PROCEDURE — 3079F DIAST BP 80-89 MM HG: CPT | Mod: S$GLB,,, | Performed by: INTERNAL MEDICINE

## 2020-04-06 PROCEDURE — 1101F PT FALLS ASSESS-DOCD LE1/YR: CPT | Mod: S$GLB,,, | Performed by: INTERNAL MEDICINE

## 2020-04-06 PROCEDURE — 96415 CHEMO IV INFUSION ADDL HR: CPT

## 2020-04-06 PROCEDURE — 99214 OFFICE O/P EST MOD 30 MIN: CPT | Mod: S$GLB,,, | Performed by: INTERNAL MEDICINE

## 2020-04-06 PROCEDURE — 1101F PR PT FALLS ASSESS DOC 0-1 FALLS W/OUT INJ PAST YR: ICD-10-PCS | Mod: S$GLB,,, | Performed by: INTERNAL MEDICINE

## 2020-04-06 PROCEDURE — 99214 PR OFFICE/OUTPT VISIT, EST, LEVL IV, 30-39 MIN: ICD-10-PCS | Mod: S$GLB,,, | Performed by: INTERNAL MEDICINE

## 2020-04-06 PROCEDURE — 63600175 PHARM REV CODE 636 W HCPCS: Performed by: INTERNAL MEDICINE

## 2020-04-06 PROCEDURE — 3077F PR MOST RECENT SYSTOLIC BLOOD PRESSURE >= 140 MM HG: ICD-10-PCS | Mod: S$GLB,,, | Performed by: INTERNAL MEDICINE

## 2020-04-06 RX ORDER — FLUOROURACIL 50 MG/ML
400 INJECTION, SOLUTION INTRAVENOUS
Status: COMPLETED | OUTPATIENT
Start: 2020-04-06 | End: 2020-04-06

## 2020-04-06 RX ORDER — SODIUM CHLORIDE 0.9 % (FLUSH) 0.9 %
10 SYRINGE (ML) INJECTION
Status: DISCONTINUED | OUTPATIENT
Start: 2020-04-06 | End: 2020-04-06 | Stop reason: HOSPADM

## 2020-04-06 RX ORDER — HEPARIN 100 UNIT/ML
500 SYRINGE INTRAVENOUS
Status: DISCONTINUED | OUTPATIENT
Start: 2020-04-06 | End: 2020-04-06 | Stop reason: HOSPADM

## 2020-04-06 RX ADMIN — FLUOROURACIL 4080 MG: 50 INJECTION, SOLUTION INTRAVENOUS at 10:04

## 2020-04-06 RX ADMIN — DEXTROSE 680 MG: 5 SOLUTION INTRAVENOUS at 08:04

## 2020-04-06 RX ADMIN — PALONOSETRON HYDROCHLORIDE: 0.25 INJECTION INTRAVENOUS at 08:04

## 2020-04-06 RX ADMIN — FLUOROURACIL 680 MG: 50 INJECTION, SOLUTION INTRAVENOUS at 10:04

## 2020-04-06 RX ADMIN — OXALIPLATIN 145 MG: 5 INJECTION, SOLUTION, CONCENTRATE INTRAVENOUS at 08:04

## 2020-04-06 NOTE — TELEPHONE ENCOUNTER
----- Message from Carrillo Goode MD sent at 4/6/2020  8:14 AM CDT -----  Make sure she is followoing up with her nephrologist

## 2020-04-06 NOTE — TELEPHONE ENCOUNTER
Called the patient and asked her if she has a follow-up appointment with her nephrologist. Patient sees Dr. Armando Pennington and is going to move up her appointment.

## 2020-04-07 ENCOUNTER — PATIENT MESSAGE (OUTPATIENT)
Dept: HEMATOLOGY/ONCOLOGY | Facility: CLINIC | Age: 80
End: 2020-04-07

## 2020-04-08 ENCOUNTER — INFUSION (OUTPATIENT)
Dept: INFUSION THERAPY | Facility: HOSPITAL | Age: 80
End: 2020-04-08
Attending: INTERNAL MEDICINE
Payer: COMMERCIAL

## 2020-04-08 VITALS
SYSTOLIC BLOOD PRESSURE: 138 MMHG | DIASTOLIC BLOOD PRESSURE: 79 MMHG | HEART RATE: 106 BPM | BODY MASS INDEX: 27.51 KG/M2 | RESPIRATION RATE: 18 BRPM | TEMPERATURE: 99 F | WEIGHT: 145.63 LBS

## 2020-04-08 DIAGNOSIS — C20 RECTAL CANCER: Primary | ICD-10-CM

## 2020-04-08 PROCEDURE — 63600175 PHARM REV CODE 636 W HCPCS: Performed by: INTERNAL MEDICINE

## 2020-04-08 PROCEDURE — 96523 IRRIG DRUG DELIVERY DEVICE: CPT

## 2020-04-08 RX ORDER — SODIUM CHLORIDE 0.9 % (FLUSH) 0.9 %
10 SYRINGE (ML) INJECTION
Status: DISCONTINUED | OUTPATIENT
Start: 2020-04-08 | End: 2020-04-08 | Stop reason: HOSPADM

## 2020-04-08 RX ORDER — HEPARIN 100 UNIT/ML
500 SYRINGE INTRAVENOUS
Status: DISCONTINUED | OUTPATIENT
Start: 2020-04-08 | End: 2020-04-08 | Stop reason: HOSPADM

## 2020-04-08 RX ADMIN — HEPARIN 500 UNITS: 100 SYRINGE at 09:04

## 2020-04-17 DIAGNOSIS — Z03.818 ENCNTR FOR OBS FOR SUSP EXPSR TO OTH BIOLG AGENTS RULED OUT: Primary | ICD-10-CM

## 2020-04-17 RX ORDER — SODIUM CHLORIDE 0.9 % (FLUSH) 0.9 %
10 SYRINGE (ML) INJECTION
Status: CANCELLED | OUTPATIENT
Start: 2020-04-22

## 2020-04-17 RX ORDER — HEPARIN 100 UNIT/ML
500 SYRINGE INTRAVENOUS
Status: CANCELLED | OUTPATIENT
Start: 2020-04-22

## 2020-04-17 RX ORDER — SODIUM CHLORIDE 0.9 % (FLUSH) 0.9 %
10 SYRINGE (ML) INJECTION
Status: CANCELLED | OUTPATIENT
Start: 2020-04-20

## 2020-04-17 RX ORDER — FLUOROURACIL 50 MG/ML
320 INJECTION, SOLUTION INTRAVENOUS
Status: CANCELLED | OUTPATIENT
Start: 2020-04-20

## 2020-04-17 RX ORDER — HEPARIN 100 UNIT/ML
500 SYRINGE INTRAVENOUS
Status: CANCELLED | OUTPATIENT
Start: 2020-04-20

## 2020-04-17 RX ORDER — EPINEPHRINE 0.3 MG/.3ML
0.3 INJECTION SUBCUTANEOUS ONCE AS NEEDED
Status: CANCELLED | OUTPATIENT
Start: 2020-04-20

## 2020-04-17 RX ORDER — DIPHENHYDRAMINE HYDROCHLORIDE 50 MG/ML
50 INJECTION INTRAMUSCULAR; INTRAVENOUS ONCE AS NEEDED
Status: CANCELLED | OUTPATIENT
Start: 2020-04-20

## 2020-04-20 ENCOUNTER — OFFICE VISIT (OUTPATIENT)
Dept: HEMATOLOGY/ONCOLOGY | Facility: CLINIC | Age: 80
End: 2020-04-20
Payer: COMMERCIAL

## 2020-04-20 ENCOUNTER — INFUSION (OUTPATIENT)
Dept: INFUSION THERAPY | Facility: HOSPITAL | Age: 80
End: 2020-04-20
Attending: INTERNAL MEDICINE
Payer: COMMERCIAL

## 2020-04-20 VITALS
BODY MASS INDEX: 27.8 KG/M2 | SYSTOLIC BLOOD PRESSURE: 165 MMHG | RESPIRATION RATE: 18 BRPM | DIASTOLIC BLOOD PRESSURE: 79 MMHG | HEART RATE: 84 BPM | WEIGHT: 147.25 LBS | TEMPERATURE: 98 F | HEIGHT: 61 IN

## 2020-04-20 VITALS
TEMPERATURE: 98 F | HEART RATE: 90 BPM | WEIGHT: 147.25 LBS | RESPIRATION RATE: 18 BRPM | SYSTOLIC BLOOD PRESSURE: 132 MMHG | DIASTOLIC BLOOD PRESSURE: 75 MMHG | BODY MASS INDEX: 27.83 KG/M2

## 2020-04-20 DIAGNOSIS — R68.89 COLD SENSITIVITY: ICD-10-CM

## 2020-04-20 DIAGNOSIS — E11.9 TYPE 2 DIABETES MELLITUS WITHOUT COMPLICATION, UNSPECIFIED WHETHER LONG TERM INSULIN USE: ICD-10-CM

## 2020-04-20 DIAGNOSIS — D69.6 THROMBOCYTOPENIA: ICD-10-CM

## 2020-04-20 DIAGNOSIS — D64.9 NORMOCYTIC ANEMIA: ICD-10-CM

## 2020-04-20 DIAGNOSIS — C20 RECTAL CANCER: Primary | ICD-10-CM

## 2020-04-20 DIAGNOSIS — R11.0 NAUSEA: ICD-10-CM

## 2020-04-20 PROCEDURE — 1101F PT FALLS ASSESS-DOCD LE1/YR: CPT | Mod: S$GLB,,, | Performed by: NURSE PRACTITIONER

## 2020-04-20 PROCEDURE — 25000003 PHARM REV CODE 250: Performed by: INTERNAL MEDICINE

## 2020-04-20 PROCEDURE — 96415 CHEMO IV INFUSION ADDL HR: CPT

## 2020-04-20 PROCEDURE — 99214 PR OFFICE/OUTPT VISIT, EST, LEVL IV, 30-39 MIN: ICD-10-PCS | Mod: S$GLB,,, | Performed by: NURSE PRACTITIONER

## 2020-04-20 PROCEDURE — 63600175 PHARM REV CODE 636 W HCPCS: Performed by: INTERNAL MEDICINE

## 2020-04-20 PROCEDURE — 3075F PR MOST RECENT SYSTOLIC BLOOD PRESS GE 130-139MM HG: ICD-10-PCS | Mod: S$GLB,,, | Performed by: NURSE PRACTITIONER

## 2020-04-20 PROCEDURE — 1159F PR MEDICATION LIST DOCUMENTED IN MEDICAL RECORD: ICD-10-PCS | Mod: S$GLB,,, | Performed by: NURSE PRACTITIONER

## 2020-04-20 PROCEDURE — 3075F SYST BP GE 130 - 139MM HG: CPT | Mod: S$GLB,,, | Performed by: NURSE PRACTITIONER

## 2020-04-20 PROCEDURE — 1101F PR PT FALLS ASSESS DOC 0-1 FALLS W/OUT INJ PAST YR: ICD-10-PCS | Mod: S$GLB,,, | Performed by: NURSE PRACTITIONER

## 2020-04-20 PROCEDURE — 96416 CHEMO PROLONG INFUSE W/PUMP: CPT

## 2020-04-20 PROCEDURE — 99214 OFFICE O/P EST MOD 30 MIN: CPT | Mod: S$GLB,,, | Performed by: NURSE PRACTITIONER

## 2020-04-20 PROCEDURE — 1159F MED LIST DOCD IN RCRD: CPT | Mod: S$GLB,,, | Performed by: NURSE PRACTITIONER

## 2020-04-20 PROCEDURE — 3078F PR MOST RECENT DIASTOLIC BLOOD PRESSURE < 80 MM HG: ICD-10-PCS | Mod: S$GLB,,, | Performed by: NURSE PRACTITIONER

## 2020-04-20 PROCEDURE — 96368 THER/DIAG CONCURRENT INF: CPT

## 2020-04-20 PROCEDURE — 96367 TX/PROPH/DG ADDL SEQ IV INF: CPT

## 2020-04-20 PROCEDURE — 96366 THER/PROPH/DIAG IV INF ADDON: CPT

## 2020-04-20 PROCEDURE — 96411 CHEMO IV PUSH ADDL DRUG: CPT

## 2020-04-20 PROCEDURE — 96413 CHEMO IV INFUSION 1 HR: CPT

## 2020-04-20 PROCEDURE — 3078F DIAST BP <80 MM HG: CPT | Mod: S$GLB,,, | Performed by: NURSE PRACTITIONER

## 2020-04-20 RX ORDER — HEPARIN 100 UNIT/ML
500 SYRINGE INTRAVENOUS
Status: DISCONTINUED | OUTPATIENT
Start: 2020-04-20 | End: 2020-04-20 | Stop reason: HOSPADM

## 2020-04-20 RX ORDER — SODIUM CHLORIDE 0.9 % (FLUSH) 0.9 %
10 SYRINGE (ML) INJECTION
Status: DISCONTINUED | OUTPATIENT
Start: 2020-04-20 | End: 2020-04-20 | Stop reason: HOSPADM

## 2020-04-20 RX ORDER — FLUOROURACIL 50 MG/ML
320 INJECTION, SOLUTION INTRAVENOUS
Status: COMPLETED | OUTPATIENT
Start: 2020-04-20 | End: 2020-04-20

## 2020-04-20 RX ADMIN — FLUOROURACIL 3265 MG: 50 INJECTION, SOLUTION INTRAVENOUS at 11:04

## 2020-04-20 RX ADMIN — OXALIPLATIN 116 MG: 5 INJECTION, SOLUTION INTRAVENOUS at 09:04

## 2020-04-20 RX ADMIN — PALONOSETRON HYDROCHLORIDE: 0.25 INJECTION, SOLUTION INTRAVENOUS at 09:04

## 2020-04-20 RX ADMIN — FLUOROURACIL 545 MG: 50 INJECTION, SOLUTION INTRAVENOUS at 11:04

## 2020-04-20 RX ADMIN — LEUCOVORIN CALCIUM 545 MG: 350 INJECTION, POWDER, LYOPHILIZED, FOR SOLUTION INTRAMUSCULAR; INTRAVENOUS at 09:04

## 2020-04-20 NOTE — PROGRESS NOTES
"       [facility] HEM/ONC PROGRESS NOTE      Subjective:       Patient ID:   Valery Huggins  79 y.o. female.  1940    Chief Complaint: Left RCC and Rectal Cancer    HPI  78 yo female with rectal cancer here for cycle # 4 FOLFOX. Patient returns today for a regularly scheduled follow-up visit.   She completed combination XRT and chemotherapy. She denies CP, SOB, HA's or N/V. She is here by herself today. She states the nausea she previously had is much better controlled with the Sancuso patches and the Zofran/Phenergan.    She previously saw Dr Mendieta on 12/10/2019  and had MRI on 1/8/2020.  They did scope on 1/28th 2020 the scope looked good and both the patient and Dr Mendieta does not want to proceed in direction of operation especially given her age and co-morbidities.     ROS  GEN: normal without any fever, night sweats or weight loss  HEENT: No visual changes.   CV: No chest pain or SOB.   PULM: No SOB or cough.   GI: No abdominal pain, melena, BRBPR.   : normal with no hematuria, dysuria  SKIN: No rash    Answers for HPI/ROS submitted by the patient on 4/17/2020   appetite change : No  unexpected weight change: No  visual disturbance: No  adenopathy: No    Allergies:  Review of patient's allergies indicates:  No Known Allergies    Medications:    Current Outpatient Medications:     amLODIPine (NORVASC) 10 MG tablet, Take 1 tablet (10 mg total) by mouth once daily., Disp: 90 tablet, Rfl: 0    ascorbic acid (VITAMIN C) 100 MG tablet, Take 100 mg by mouth once daily., Disp: , Rfl:     aspirin (ECOTRIN) 81 MG EC tablet, Take 81 mg by mouth once daily., Disp: , Rfl:     atorvastatin (LIPITOR) 10 MG tablet, Take 10 mg by mouth every evening. , Disp: , Rfl: 2    b complex vitamins capsule, Take 1 capsule by mouth once daily., Disp: , Rfl:     BD ULTRA-FINE CONNIE PEN NEEDLE 32 gauge x 5/32" Ndle, INJECT TWICE DAILY AS DIRECTED, Disp: 200 each, Rfl: 0    calcitRIOL (ROCALTROL) 0.5 MCG Cap, TAKE 1 CAPSULE (0.5 MCG " TOTAL) BY MOUTH ONCE DAILY., Disp: 90 capsule, Rfl: 0    cholecalciferol, vitamin D3, 4,000 unit Cap, Take 1 capsule by mouth once daily., Disp: , Rfl:     docusate sodium (COLACE) 100 MG capsule, Take 100 mg by mouth 2 (two) times daily., Disp: , Rfl:     escitalopram oxalate (LEXAPRO) 20 MG tablet, TAKE ONE TABLET BY MOUTH DAILY, Disp: 90 tablet, Rfl: 0    FREESTYLE LITE METER kit, , Disp: , Rfl: 0    FREESTYLE LITE STRIPS Strp, , Disp: , Rfl: 3    granisetron (SANCUSO) 3.1 mg/24 hour, Place 1 patch (3.1 mg total) onto the skin once. for 1 dose, Disp: 2 patch, Rfl: 5    levothyroxine (SYNTHROID) 88 MCG tablet, Take 1 tablet (88 mcg total) by mouth before breakfast., Disp: 30 tablet, Rfl: 11    magnesium oxide (MAG-OX) 400 mg tablet, TAKE ONE TABLET BY MOUTH TWICE DAILY, Disp: 180 tablet, Rfl: 0    mirabegron (MYRBETRIQ) 50 mg Tb24, Take 1 tablet (50 mg total) by mouth once daily., Disp: 30 tablet, Rfl: 11    ondansetron (ZOFRAN) 8 MG tablet, Take 1 tablet (8 mg total) by mouth every 8 (eight) hours as needed., Disp: 30 tablet, Rfl: 5    pantoprazole (PROTONIX) 40 MG tablet, TAKE ONE TABLET BY MOUTH DAILY, Disp: 90 tablet, Rfl: 0    promethazine (PHENERGAN) 25 MG tablet, Take 1 tablet (25 mg total) by mouth every 4 to 6 hours as needed., Disp: 30 tablet, Rfl: 5    solifenacin (VESICARE) 5 MG tablet, Take 1 tablet (5 mg total) by mouth once daily. (Patient not taking: Reported on 3/10/2020), Disp: 30 tablet, Rfl: 11    TOUJEO SOLOSTAR U-300 INSULIN 300 unit/mL (1.5 mL) InPn pen, INJECT 30 UNITS INTO THE SKIN ONCE DAILY., Disp: 4.5 mL, Rfl: 3    valsartan (DIOVAN) 40 MG tablet, Take 40 mg by mouth once daily. , Disp: , Rfl: 3    VICTOZA 3-XAVIER 0.6 mg/0.1 mL (18 mg/3 mL) PnIj, INJECT 1.8MG SUBCUTANEAOUSLY DAILY, Disp: 9 mL, Rfl: 0  No current facility-administered medications for this visit.     Facility-Administered Medications Ordered in Other Visits:     dextrose 5 % 250 mL flush bag, , Intravenous,  1 time in Clinic/HOD, Carrillo Goode MD    fluorouraciL 1,920 mg/m2 = 3,265 mg in sodium chloride 0.9% 250 mL chemo infusion, 1,920 mg/m2, Intravenous, over 46 hr, Carrillo Goode MD    fluorouraciL injection 545 mg, 320 mg/m2, Intravenous, 1 time in Clinic/HOD, Carrillo Goode MD    heparin, porcine (PF) 100 unit/mL injection flush 500 Units, 500 Units, Intravenous, PRN, Carrillo Goode MD    leucovorin calcium 320 mg/m2 = 545 mg in dextrose 5 % 277.25 mL infusion, 320 mg/m2, Intravenous, 1 time in Clinic/HOD, Carrillo Goode MD    oxaliplatin (ELOXATIN) 68 mg/m2 = 116 mg in dextrose 5 % 523.2 mL chemo infusion, 68 mg/m2, Intravenous, 1 time in Clinic/HOD, Carrillo Goode MD    sodium chloride 0.9% flush 10 mL, 10 mL, Intravenous, PRN, Carrillo Goode MD      Objective:     Vitals:  Blood pressure 132/75, pulse 90, temperature 98 °F (36.7 °C), resp. rate 18, weight 66.8 kg (147 lb 4.3 oz), last menstrual period 06/01/2012.    Physical Exam   GEN: no apparent distress, comfortable; AAOx3  HEAD: atraumatic and normocephalic  EYES: no pallor, no icterus, PERRLA  ENT: OMM, no pharyngeal erythema, external ears WNL; no nasal discharge; no thrush  NECK: no masses, thyroid normal, trachea midline, no LAD/LN's, supple  CV: RRR with no murmur; normal pulse; normal S1 and S2; no pedal edema  CHEST: Normal respiratory effort; CTAB; normal breath sounds; no wheeze or crackles  ABDOM: nontender and nondistended; soft; normal bowel sounds.   EXTREM: no clubbing, cyanosis, inflammation or swelling  NEURO: grossly intact; motor/sensory grossly intact; AAOx3; no tremors  PSYCH: normal mood, affect and behavior  LYMPH: normal with no cervical, supraclavicular, axillary lymph nodes palpable.       Labs:   Lab Results   Component Value Date    WBC 4.5 04/17/2020    HGB 9.3 (L) 04/17/2020    HCT 28.7 (L) 04/17/2020    MCV 87.2 04/17/2020     (L) 04/17/2020    CMP  Sodium   Date Value Ref Range  Status   04/17/2020 139 135 - 146 mmol/L Final     Potassium   Date Value Ref Range Status   04/17/2020 4.5 3.5 - 5.3 mmol/L Final     Chloride   Date Value Ref Range Status   04/17/2020 100 98 - 110 mmol/L Final     CO2   Date Value Ref Range Status   04/17/2020 30 20 - 32 mmol/L Final     Glucose   Date Value Ref Range Status   04/17/2020 117 65 - 139 mg/dL Final     Comment:               Non-fasting reference interval          BUN, Bld   Date Value Ref Range Status   04/17/2020 30 (H) 7 - 25 mg/dL Final     Creatinine   Date Value Ref Range Status   04/17/2020 1.93 (H) 0.60 - 0.93 mg/dL Final     Comment:     For patients >49 years of age, the reference limit  for Creatinine is approximately 13% higher for people  identified as -American.          Calcium   Date Value Ref Range Status   04/17/2020 8.1 (L) 8.6 - 10.4 mg/dL Final     Total Protein   Date Value Ref Range Status   04/17/2020 5.8 (L) 6.1 - 8.1 g/dL Final     Albumin   Date Value Ref Range Status   04/17/2020 3.6 3.6 - 5.1 g/dL Final     Total Bilirubin   Date Value Ref Range Status   04/17/2020 0.8 0.2 - 1.2 mg/dL Final     Alkaline Phosphatase   Date Value Ref Range Status   04/17/2020 75 37 - 153 U/L Final     AST   Date Value Ref Range Status   04/17/2020 14 10 - 35 U/L Final     ALT   Date Value Ref Range Status   04/17/2020 10 6 - 29 U/L Final     Anion Gap   Date Value Ref Range Status   01/14/2020 9 8 - 16 mmol/L Final     eGFR if    Date Value Ref Range Status   04/17/2020 28 (L) > OR = 60 mL/min/1.73m2 Final     eGFR if non    Date Value Ref Range Status   04/17/2020 25 (L) > OR = 60 mL/min/1.73m2 Final     I have reviewed all available lab results and radiology reports.    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    No significant change since prior study.       Assessment/Plan:   (1) 79 y.o.  female with diagnosis of Rectal Cancer- Continue with Cycle #4 today.    (2) Nausea- Controlled with Sancuso    (3) Anemia-Hgb 9.3- continue weekly cbc    (4) Thrombocytopenia- Plt 127K continue weekly cbc    (5)  DM- Continue follow up with Dr. 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        ICD-10-CM ICD-9-CM   1. Rectal cancer C20 154.1   2. Nausea R11.0 787.02   3. Normocytic anemia D64.9 285.9   4. Thrombocytopenia D69.6 287.5   5. Type 2 diabetes mellitus without complication, unspecified whether long term insulin use E11.9 250.00   6. Cold sensitivity R68.89 780.99       Discussion:     I have explained and the patient understands all of  the current recommendation(s). I have answered all of their questions to the best of my ability and to their complete satisfaction.   The patient is to continue with the current management plan.    RTC in 2 weeks with virtual visit with Dr. Goode and 4 weeks with me.        Electronically signed by: Anika Sinclair, MSN, APRN, AGNP-C, OCN

## 2020-04-21 ENCOUNTER — PATIENT MESSAGE (OUTPATIENT)
Dept: HEMATOLOGY/ONCOLOGY | Facility: CLINIC | Age: 80
End: 2020-04-21

## 2020-04-22 ENCOUNTER — PATIENT MESSAGE (OUTPATIENT)
Dept: HEMATOLOGY/ONCOLOGY | Facility: CLINIC | Age: 80
End: 2020-04-22

## 2020-04-22 ENCOUNTER — APPOINTMENT (OUTPATIENT)
Dept: INFUSION THERAPY | Facility: HOSPITAL | Age: 80
End: 2020-04-22
Attending: NURSE PRACTITIONER
Payer: COMMERCIAL

## 2020-04-22 ENCOUNTER — INFUSION (OUTPATIENT)
Dept: INFUSION THERAPY | Facility: HOSPITAL | Age: 80
End: 2020-04-22
Attending: INTERNAL MEDICINE
Payer: COMMERCIAL

## 2020-04-22 VITALS
HEART RATE: 100 BPM | TEMPERATURE: 97 F | BODY MASS INDEX: 27.44 KG/M2 | SYSTOLIC BLOOD PRESSURE: 127 MMHG | RESPIRATION RATE: 20 BRPM | DIASTOLIC BLOOD PRESSURE: 58 MMHG | WEIGHT: 145.19 LBS

## 2020-04-22 DIAGNOSIS — C20 RECTAL CANCER: Primary | ICD-10-CM

## 2020-04-22 DIAGNOSIS — Z03.818 ENCOUNTER FOR OBSERVATION FOR SUSPECTED EXPOSURE TO OTHER BIOLOGICAL AGENTS RULED OUT: Primary | ICD-10-CM

## 2020-04-22 PROCEDURE — 96523 IRRIG DRUG DELIVERY DEVICE: CPT

## 2020-04-22 PROCEDURE — 63600175 PHARM REV CODE 636 W HCPCS: Performed by: INTERNAL MEDICINE

## 2020-04-22 PROCEDURE — A4216 STERILE WATER/SALINE, 10 ML: HCPCS | Performed by: INTERNAL MEDICINE

## 2020-04-22 PROCEDURE — 25000003 PHARM REV CODE 250: Performed by: INTERNAL MEDICINE

## 2020-04-22 PROCEDURE — U0002 COVID-19 LAB TEST NON-CDC: HCPCS

## 2020-04-22 RX ORDER — SODIUM CHLORIDE 0.9 % (FLUSH) 0.9 %
10 SYRINGE (ML) INJECTION
Status: DISCONTINUED | OUTPATIENT
Start: 2020-04-22 | End: 2020-04-22 | Stop reason: HOSPADM

## 2020-04-22 RX ORDER — HEPARIN 100 UNIT/ML
500 SYRINGE INTRAVENOUS
Status: DISCONTINUED | OUTPATIENT
Start: 2020-04-22 | End: 2020-04-22 | Stop reason: HOSPADM

## 2020-04-22 RX ADMIN — HEPARIN 500 UNITS: 100 SYRINGE at 10:04

## 2020-04-22 RX ADMIN — SODIUM CHLORIDE, PRESERVATIVE FREE 10 ML: 5 INJECTION INTRAVENOUS at 10:04

## 2020-04-23 LAB — SARS-COV-2 RNA RESP QL NAA+PROBE: NOT DETECTED

## 2020-04-27 ENCOUNTER — TELEPHONE (OUTPATIENT)
Dept: HEMATOLOGY/ONCOLOGY | Facility: CLINIC | Age: 80
End: 2020-04-27

## 2020-04-27 PROBLEM — D70.1 CHEMOTHERAPY INDUCED NEUTROPENIA: Status: ACTIVE | Noted: 2020-04-27

## 2020-04-27 PROBLEM — T45.1X5A CHEMOTHERAPY INDUCED NEUTROPENIA: Status: ACTIVE | Noted: 2020-04-27

## 2020-04-27 NOTE — TELEPHONE ENCOUNTER
Called the patient and instructed her that her WBC is 1.2.  I instructed her that Dr. Goode wants her to get Neupogen injections for 4 days to help build up her WBC.  I instructed her that it will stimulate her bone marrow and she may have some bone pain.  I instructed her that she may take Tylenol or Motrin to help with the pain.

## 2020-04-27 NOTE — TELEPHONE ENCOUNTER
----- Message from Carrillo Goode MD sent at 4/27/2020  8:36 AM CDT -----  If she did not get neulasta, start her on neupogen 300 daily for 4 days

## 2020-04-28 ENCOUNTER — INFUSION (OUTPATIENT)
Dept: INFUSION THERAPY | Facility: HOSPITAL | Age: 80
End: 2020-04-28
Attending: INTERNAL MEDICINE
Payer: COMMERCIAL

## 2020-04-28 ENCOUNTER — PATIENT MESSAGE (OUTPATIENT)
Dept: HEMATOLOGY/ONCOLOGY | Facility: CLINIC | Age: 80
End: 2020-04-28

## 2020-04-28 VITALS
HEART RATE: 95 BPM | BODY MASS INDEX: 27.76 KG/M2 | RESPIRATION RATE: 18 BRPM | DIASTOLIC BLOOD PRESSURE: 66 MMHG | SYSTOLIC BLOOD PRESSURE: 147 MMHG | WEIGHT: 146.88 LBS | TEMPERATURE: 98 F

## 2020-04-28 DIAGNOSIS — D70.1 CHEMOTHERAPY INDUCED NEUTROPENIA: Primary | ICD-10-CM

## 2020-04-28 DIAGNOSIS — T45.1X5A CHEMOTHERAPY INDUCED NEUTROPENIA: Primary | ICD-10-CM

## 2020-04-28 PROCEDURE — 63600175 PHARM REV CODE 636 W HCPCS: Mod: JG | Performed by: NURSE PRACTITIONER

## 2020-04-28 PROCEDURE — 96372 THER/PROPH/DIAG INJ SC/IM: CPT

## 2020-04-28 RX ADMIN — FILGRASTIM 300 MCG: 300 INJECTION, SOLUTION INTRAVENOUS; SUBCUTANEOUS at 03:04

## 2020-04-29 ENCOUNTER — PATIENT MESSAGE (OUTPATIENT)
Dept: HEMATOLOGY/ONCOLOGY | Facility: CLINIC | Age: 80
End: 2020-04-29

## 2020-04-29 ENCOUNTER — INFUSION (OUTPATIENT)
Dept: INFUSION THERAPY | Facility: HOSPITAL | Age: 80
End: 2020-04-29
Attending: INTERNAL MEDICINE
Payer: COMMERCIAL

## 2020-04-29 VITALS
DIASTOLIC BLOOD PRESSURE: 69 MMHG | SYSTOLIC BLOOD PRESSURE: 126 MMHG | HEART RATE: 88 BPM | RESPIRATION RATE: 18 BRPM | BODY MASS INDEX: 27.76 KG/M2 | TEMPERATURE: 99 F | WEIGHT: 146.88 LBS

## 2020-04-29 DIAGNOSIS — D70.1 CHEMOTHERAPY INDUCED NEUTROPENIA: Primary | ICD-10-CM

## 2020-04-29 DIAGNOSIS — T45.1X5A CHEMOTHERAPY INDUCED NEUTROPENIA: Primary | ICD-10-CM

## 2020-04-29 PROCEDURE — 96372 THER/PROPH/DIAG INJ SC/IM: CPT

## 2020-04-29 PROCEDURE — 63600175 PHARM REV CODE 636 W HCPCS: Mod: JG | Performed by: NURSE PRACTITIONER

## 2020-04-29 RX ADMIN — FILGRASTIM 300 MCG: 300 INJECTION, SOLUTION INTRAVENOUS; SUBCUTANEOUS at 01:04

## 2020-04-30 ENCOUNTER — APPOINTMENT (OUTPATIENT)
Dept: INFUSION THERAPY | Facility: HOSPITAL | Age: 80
End: 2020-04-30
Attending: NURSE PRACTITIONER
Payer: COMMERCIAL

## 2020-04-30 ENCOUNTER — INFUSION (OUTPATIENT)
Dept: INFUSION THERAPY | Facility: HOSPITAL | Age: 80
End: 2020-04-30
Attending: INTERNAL MEDICINE
Payer: COMMERCIAL

## 2020-04-30 VITALS
DIASTOLIC BLOOD PRESSURE: 72 MMHG | SYSTOLIC BLOOD PRESSURE: 138 MMHG | TEMPERATURE: 99 F | WEIGHT: 146.5 LBS | HEART RATE: 88 BPM | BODY MASS INDEX: 27.68 KG/M2

## 2020-04-30 DIAGNOSIS — Z03.818 ENCOUNTER FOR OBSERVATION FOR SUSPECTED EXPOSURE TO OTHER BIOLOGICAL AGENTS RULED OUT: Primary | ICD-10-CM

## 2020-04-30 DIAGNOSIS — T45.1X5A CHEMOTHERAPY INDUCED NEUTROPENIA: Primary | ICD-10-CM

## 2020-04-30 DIAGNOSIS — D70.1 CHEMOTHERAPY INDUCED NEUTROPENIA: Primary | ICD-10-CM

## 2020-04-30 PROCEDURE — U0002 COVID-19 LAB TEST NON-CDC: HCPCS

## 2020-04-30 PROCEDURE — 96372 THER/PROPH/DIAG INJ SC/IM: CPT

## 2020-04-30 PROCEDURE — 63600175 PHARM REV CODE 636 W HCPCS: Mod: JG | Performed by: NURSE PRACTITIONER

## 2020-04-30 RX ADMIN — FILGRASTIM 300 MCG: 300 INJECTION, SOLUTION INTRAVENOUS; SUBCUTANEOUS at 01:04

## 2020-05-01 LAB — SARS-COV-2 RNA RESP QL NAA+PROBE: NOT DETECTED

## 2020-05-03 RX ORDER — EPINEPHRINE 0.3 MG/.3ML
0.3 INJECTION SUBCUTANEOUS ONCE AS NEEDED
Status: CANCELLED | OUTPATIENT
Start: 2020-05-04

## 2020-05-03 RX ORDER — FLUOROURACIL 50 MG/ML
320 INJECTION, SOLUTION INTRAVENOUS
Status: CANCELLED | OUTPATIENT
Start: 2020-05-04

## 2020-05-03 RX ORDER — HEPARIN 100 UNIT/ML
500 SYRINGE INTRAVENOUS
Status: CANCELLED | OUTPATIENT
Start: 2020-05-04

## 2020-05-03 RX ORDER — HEPARIN 100 UNIT/ML
500 SYRINGE INTRAVENOUS
Status: CANCELLED | OUTPATIENT
Start: 2020-05-06

## 2020-05-03 RX ORDER — SODIUM CHLORIDE 0.9 % (FLUSH) 0.9 %
10 SYRINGE (ML) INJECTION
Status: CANCELLED | OUTPATIENT
Start: 2020-05-06

## 2020-05-03 RX ORDER — SODIUM CHLORIDE 0.9 % (FLUSH) 0.9 %
10 SYRINGE (ML) INJECTION
Status: CANCELLED | OUTPATIENT
Start: 2020-05-04

## 2020-05-03 RX ORDER — DIPHENHYDRAMINE HYDROCHLORIDE 50 MG/ML
50 INJECTION INTRAMUSCULAR; INTRAVENOUS ONCE AS NEEDED
Status: CANCELLED | OUTPATIENT
Start: 2020-05-04

## 2020-05-04 ENCOUNTER — INFUSION (OUTPATIENT)
Dept: INFUSION THERAPY | Facility: HOSPITAL | Age: 80
End: 2020-05-04
Attending: INTERNAL MEDICINE
Payer: COMMERCIAL

## 2020-05-04 VITALS
SYSTOLIC BLOOD PRESSURE: 156 MMHG | OXYGEN SATURATION: 98 % | HEIGHT: 61 IN | WEIGHT: 147.19 LBS | BODY MASS INDEX: 27.79 KG/M2 | TEMPERATURE: 99 F | DIASTOLIC BLOOD PRESSURE: 88 MMHG | RESPIRATION RATE: 18 BRPM | HEART RATE: 85 BPM

## 2020-05-04 DIAGNOSIS — D70.1 CHEMOTHERAPY INDUCED NEUTROPENIA: Primary | ICD-10-CM

## 2020-05-04 DIAGNOSIS — C20 RECTAL CANCER: ICD-10-CM

## 2020-05-04 DIAGNOSIS — T45.1X5A CHEMOTHERAPY INDUCED NEUTROPENIA: Primary | ICD-10-CM

## 2020-05-04 PROCEDURE — 96411 CHEMO IV PUSH ADDL DRUG: CPT

## 2020-05-04 PROCEDURE — 96413 CHEMO IV INFUSION 1 HR: CPT

## 2020-05-04 PROCEDURE — 96416 CHEMO PROLONG INFUSE W/PUMP: CPT

## 2020-05-04 PROCEDURE — 96415 CHEMO IV INFUSION ADDL HR: CPT

## 2020-05-04 PROCEDURE — 96367 TX/PROPH/DG ADDL SEQ IV INF: CPT

## 2020-05-04 PROCEDURE — 96368 THER/DIAG CONCURRENT INF: CPT

## 2020-05-04 PROCEDURE — 96366 THER/PROPH/DIAG IV INF ADDON: CPT

## 2020-05-04 PROCEDURE — 25000003 PHARM REV CODE 250: Performed by: INTERNAL MEDICINE

## 2020-05-04 PROCEDURE — 63600175 PHARM REV CODE 636 W HCPCS: Performed by: INTERNAL MEDICINE

## 2020-05-04 RX ORDER — SODIUM CHLORIDE 0.9 % (FLUSH) 0.9 %
10 SYRINGE (ML) INJECTION
Status: DISCONTINUED | OUTPATIENT
Start: 2020-05-04 | End: 2020-05-04 | Stop reason: HOSPADM

## 2020-05-04 RX ORDER — HEPARIN 100 UNIT/ML
500 SYRINGE INTRAVENOUS
Status: DISCONTINUED | OUTPATIENT
Start: 2020-05-04 | End: 2020-05-04 | Stop reason: HOSPADM

## 2020-05-04 RX ORDER — FLUOROURACIL 50 MG/ML
320 INJECTION, SOLUTION INTRAVENOUS
Status: COMPLETED | OUTPATIENT
Start: 2020-05-04 | End: 2020-05-04

## 2020-05-04 RX ADMIN — OXALIPLATIN 116 MG: 5 INJECTION, SOLUTION INTRAVENOUS at 09:05

## 2020-05-04 RX ADMIN — DEXTROSE MONOHYDRATE 545 MG: 50 INJECTION, SOLUTION INTRAVENOUS at 09:05

## 2020-05-04 RX ADMIN — PALONOSETRON HYDROCHLORIDE: 0.25 INJECTION, SOLUTION INTRAVENOUS at 09:05

## 2020-05-04 RX ADMIN — FLUOROURACIL 3265 MG: 50 INJECTION, SOLUTION INTRAVENOUS at 11:05

## 2020-05-04 RX ADMIN — DEXTROSE: 5 SOLUTION INTRAVENOUS at 09:05

## 2020-05-04 RX ADMIN — FLUOROURACIL 545 MG: 50 INJECTION, SOLUTION INTRAVENOUS at 11:05

## 2020-05-04 NOTE — PLAN OF CARE
Problem: Neutropenia  Goal: Absence of Infection  Outcome: Ongoing, Progressing  Intervention: Prevent Infection and Maximize Resistance  Flowsheets (Taken 5/4/2020 0921)  Infection Prevention: equipment surfaces disinfected;personal protective equipment utilized;visitors restricted/screened

## 2020-05-04 NOTE — PROGRESS NOTES
[facility] Hematology/Oncology  PROGRESS NOTE -   Telemedicine Visit      Subjective:       Patient ID:   NAME: Valery Huggins : 1940     79 y.o. female    Referring Doc: David Mendieta MD  Other Physicians: Armando Nath; Stanislav Epstein; Azar Donnelly; Mo    Chief Complaint:  Left RCC and rectal CA f/u    History of Present Illness:     Patient is being seen today via a telemedicine follow-up visit in lieu of a normal in-person visit due to the recent COVID19 outbreak. The patient is currently located at home. This visit type is a virtual visit with synchronous audio with video.        The patient is on today to go over the results of the recently ordered labs, tests and studies.     She had previously completed combination XRT and chemotherapy. No CP, SOB, HA's or N/V. She is on by herself today. She last saw Dr Hassan with rad/onc on 2019.    She previously saw Dr Mendieta on 12/10/2019  and had MRI on 2020.  They did scope on 2020 and I spoke to dr mendieta by phone last week. The scope looked good and both the patient and Dr Mendieta does not want to proceed in direction of operation especially given her age and co-morbidities.    We previously discussed adjuvant chemotherapy with FOLOFOX x 10-12 cycles especially since she is not having surgery and she was agreeable.    Will previously provided literature on the regimen and set up the chemotherapy    She has since started chemotherapy and is on cycle #5 today.    Discussed Covid19 precautions                ROS:   GEN: normal without any fever, night sweats or weight loss  HEENT: normal with no HA's, sore throat, stiff neck, changes in vision;    CV: normal with no CP, SOB, PND, RAYA or orthopnea  PULM: normal with no SOB, cough, hemoptysis, sputum or pleuritic pain  GI: no nausea since starting patch  : normal with no hematuria, dysuria  BREAST: normal with no mass, discharge, pain  SKIN: normal with no rash, erythema, bruising, or  "swelling    Allergies:  Review of patient's allergies indicates:  No Known Allergies    Medications:    Current Outpatient Medications:     amLODIPine (NORVASC) 10 MG tablet, Take 1 tablet (10 mg total) by mouth once daily., Disp: 90 tablet, Rfl: 0    ascorbic acid (VITAMIN C) 100 MG tablet, Take 100 mg by mouth once daily., Disp: , Rfl:     aspirin (ECOTRIN) 81 MG EC tablet, Take 81 mg by mouth once daily., Disp: , Rfl:     atorvastatin (LIPITOR) 10 MG tablet, Take 10 mg by mouth every evening. , Disp: , Rfl: 2    b complex vitamins capsule, Take 1 capsule by mouth once daily., Disp: , Rfl:     BD ULTRA-FINE CONNIE PEN NEEDLE 32 gauge x 5/32" Ndle, INJECT TWICE DAILY AS DIRECTED, Disp: 200 each, Rfl: 0    calcitRIOL (ROCALTROL) 0.5 MCG Cap, TAKE 1 CAPSULE (0.5 MCG TOTAL) BY MOUTH ONCE DAILY., Disp: 90 capsule, Rfl: 0    cholecalciferol, vitamin D3, 4,000 unit Cap, Take 1 capsule by mouth once daily., Disp: , Rfl:     docusate sodium (COLACE) 100 MG capsule, Take 100 mg by mouth 2 (two) times daily., Disp: , Rfl:     escitalopram oxalate (LEXAPRO) 20 MG tablet, TAKE ONE TABLET BY MOUTH DAILY, Disp: 90 tablet, Rfl: 0    FREESTYLE LITE METER kit, , Disp: , Rfl: 0    FREESTYLE LITE STRIPS Strp, , Disp: , Rfl: 3    levothyroxine (SYNTHROID) 88 MCG tablet, Take 1 tablet (88 mcg total) by mouth before breakfast., Disp: 30 tablet, Rfl: 11    magnesium oxide (MAG-OX) 400 mg tablet, TAKE ONE TABLET BY MOUTH TWICE DAILY, Disp: 180 tablet, Rfl: 0    mirabegron (MYRBETRIQ) 50 mg Tb24, Take 1 tablet (50 mg total) by mouth once daily., Disp: 30 tablet, Rfl: 11    ondansetron (ZOFRAN) 8 MG tablet, Take 1 tablet (8 mg total) by mouth every 8 (eight) hours as needed., Disp: 30 tablet, Rfl: 5    pantoprazole (PROTONIX) 40 MG tablet, TAKE ONE TABLET BY MOUTH DAILY, Disp: 90 tablet, Rfl: 0    promethazine (PHENERGAN) 25 MG tablet, Take 1 tablet (25 mg total) by mouth every 4 to 6 hours as needed., Disp: 30 tablet, Rfl: " 5    solifenacin (VESICARE) 5 MG tablet, Take 1 tablet (5 mg total) by mouth once daily. (Patient not taking: Reported on 3/10/2020), Disp: 30 tablet, Rfl: 11    TOUJEO SOLOSTAR U-300 INSULIN 300 unit/mL (1.5 mL) InPn pen, INJECT 30 UNITS INTO THE SKIN ONCE DAILY., Disp: 4.5 mL, Rfl: 3    valsartan (DIOVAN) 40 MG tablet, Take 40 mg by mouth once daily. , Disp: , Rfl: 3    VICTOZA 3-XAVIER 0.6 mg/0.1 mL (18 mg/3 mL) PnIj, INJECT 1.8MG SUBCUTANEAOUSLY DAILY, Disp: 9 mL, Rfl: 0  No current facility-administered medications for this visit.     Facility-Administered Medications Ordered in Other Visits:     alteplase injection 2 mg, 2 mg, Intra-Catheter, PRN, Carrillo Goode MD    fluorouracil (ADRUCIL) 1,920 mg/m2 = 3,265 mg in sodium chloride 0.9% 250 mL chemo infusion, 1,920 mg/m2, Intravenous, over 46 hr, Carrillo Goode MD    fluorouraciL injection 545 mg, 320 mg/m2, Intravenous, 1 time in Clinic/HOD, Carrillo Goode MD    heparin, porcine (PF) 100 unit/mL injection flush 500 Units, 500 Units, Intravenous, PRN, Carrillo Goode MD    leucovorin calcium 320 mg/m2 = 545 mg in dextrose 5 % 277.25 mL infusion, 320 mg/m2, Intravenous, 1 time in Clinic/HOD, Carrillo Goode MD    oxaliplatin (ELOXATIN) 68 mg/m2 = 116 mg in dextrose 5 % 523.2 mL chemo infusion, 68 mg/m2, Intravenous, 1 time in Clinic/HOD, Carrillo Goode MD    palonosetron (ALOXI) 0.25 mg, dexamethasone (DECADRON) 8 mg in sodium chloride 0.9% 50 mL IVPB, , Intravenous, 1 time in Clinic/HOD, Carrillo Goode MD, Last Rate: 150 mL/hr at 05/04/20 0925    sodium chloride 0.9% flush 10 mL, 10 mL, Intravenous, PRN, Carrillo Goode MD    PMHx/PSHx Updates:  See patient's last visit with me on 4/6/2020  See H&P on 10/8/2019        Pathology:  Cancer Staging  No matching staging information was found for the patient.          Objective:     Vitals:  Afebrile   147#    Physical Examination:   GEN: no apparent distress,  comfortable; AAOx3  HEAD: atraumatic and normocephalic  EYES: no conjunctival pallor or muddiness, no icterus; normal pupil reaction to ambient light  ENT: OMM, no pharyngeal erythema, external bilateral ears WNL; no visible thrush or ulcers  NECK: no masses or swelling, trachea midline, no visible LAD/LN's   CV: no palpitations; no pedal edema; no noticeable JVD or neck vein distension;  portacath on right CW  CHEST: Normal respiratory effort; chest wall breath movements symmetrical; no audible wheezing  ABDOM: non-distended; no bloating  MUSC/Skeletal: ROM normal; joints visibly normal; no deformities or arthropathy  EXTREM: no clubbing, cyanosis, inflammation or swelling  SKIN: no rashes, lesions, ulcers, petechiae or subcutaneous nodules  : no hendrickson  NEURO: moving all 4 extremities; AAOx3; no tremors  PSYCH: normal mood, affect and behavior  LYMPH: no visible LN's or LAD              Labs:     5/1/2020  Lab Results   Component Value Date    WBC 17.4 (H) 05/01/2020    HGB 9.5 (L) 05/01/2020    HCT 29.0 (L) 05/01/2020    MCV 90.1 05/01/2020     05/01/2020     CMP  Sodium   Date Value Ref Range Status   05/01/2020 139 135 - 146 mmol/L Final     Potassium   Date Value Ref Range Status   05/01/2020 4.6 3.5 - 5.3 mmol/L Final     Chloride   Date Value Ref Range Status   05/01/2020 100 98 - 110 mmol/L Final     CO2   Date Value Ref Range Status   05/01/2020 31 20 - 32 mmol/L Final     Glucose   Date Value Ref Range Status   05/01/2020 127 65 - 139 mg/dL Final     Comment:               Non-fasting reference interval          BUN, Bld   Date Value Ref Range Status   05/01/2020 24 7 - 25 mg/dL Final     Creatinine   Date Value Ref Range Status   05/01/2020 1.94 (H) 0.60 - 0.93 mg/dL Final     Comment:     For patients >49 years of age, the reference limit  for Creatinine is approximately 13% higher for people  identified as -American.          Calcium   Date Value Ref Range Status   05/01/2020 7.7 (L) 8.6  - 10.4 mg/dL Final     Total Protein   Date Value Ref Range Status   05/01/2020 5.8 (L) 6.1 - 8.1 g/dL Final     Albumin   Date Value Ref Range Status   05/01/2020 3.5 (L) 3.6 - 5.1 g/dL Final     Total Bilirubin   Date Value Ref Range Status   05/01/2020 0.7 0.2 - 1.2 mg/dL Final     Alkaline Phosphatase   Date Value Ref Range Status   05/01/2020 147 37 - 153 U/L Final     AST   Date Value Ref Range Status   05/01/2020 31 10 - 35 U/L Final     ALT   Date Value Ref Range Status   05/01/2020 24 6 - 29 U/L Final     Anion Gap   Date Value Ref Range Status   01/14/2020 9 8 - 16 mmol/L Final     eGFR if    Date Value Ref Range Status   05/01/2020 28 (L) > OR = 60 mL/min/1.73m2 Final     eGFR if non    Date Value Ref Range Status   05/01/2020 24 (L) > OR = 60 mL/min/1.73m2 Final     SARS-CoV2 (COVID-19) Qualitative PCR Not Detected           Radiology/Diagnostic Studies:    CT  3/5/2020    Impression       Status post left nephrectomy without evidence of recurrent renal malignancy.    No significant change since prior study.           MRI  1/8/2020:  Impression       Significant reduction in size of the patient's known mid rectal mass, which is no longer distinctly visible.  Reduction in size of 3 mesorectal lymph nodes as above.               PET  10/17/2019    Impression       1. Left paratracheal small mass extending into superior mediastinum with associated moderate FDG activity is unchanged in size and appearance since 02/16/2018 CT.  This is unlikely to represent metastatic disease or malignancy, given stability, and may represent residual thyroid parenchyma but is otherwise nonspecific.  2. Unchanged 6 mm right upper lobe nodule since 02/16/2018.  Benign etiology is likely the continued CT thorax without IV contrast follow-up is recommended in 12 months to document greater than 2 years of stability.  3. No current convincing evidence of metastatic disease.  4. Previous rectal mass  is no longer evident on this exam.               X-ray Chest Ap Portable    Result Date: 10/15/2019  EXAMINATION: XR CHEST AP PORTABLE CLINICAL HISTORY: s/p port; Encounter for adjustment and management of vascular access device TECHNIQUE: Single frontal view of the chest was performed. COMPARISON: 6/18/2019 FINDINGS: The cardiomediastinal silhouette is stable.  Lungs are clear of infiltrate.  No pleural effusions.  Laya catheter has been inserted from the region of the right subclavian vein with the tip at the level the proximal SVC.     Laya catheter inserted with the tip at the level the proximal SVC.  Lungs are expanded and clear.  No pneumothorax. Electronically signed by: Nubia Venegas MD Date:    10/15/2019 Time:    14:05    Surg Fl Surgery Fluoro Usage    Result Date: 10/15/2019  See OP Notes for results. IMPRESSION: See OP Notes for results. This procedure was auto-finalized by: [facility]     Mri Rectal Cancer Without Contrast    Result Date: 9/18/2019  EXAMINATION: MRI RECTAL CANCER WITHOUT CONTRAST CLINICAL HISTORY: Rectal cancer, staging, locoregional;rectal anal mass;  Malignant neoplasm of rectum TECHNIQUE: Multiplanar multisequence MR imaging of the pelvis without contrast. COMPARISON: 07/12/2019 FINDINGS: Approximately 4 cm from the anal verge there is a peripherally located lobular mass along the dorsal rectal wall.  This measures 4 cm in length and 3.3 cm in diameter.  There is heterogeneous signal intensity.  There is restricted diffusion in the lesion and no discrete extra colonic extension.  Several lymph nodes are noted in the mesorectal fat including two which measure 3 mm near the inferior sacrum, series 8, image 16, and a 5 mm lymph node at the S2 level, series 8, image 8.  Prominent but nonenlarged bilateral obturator chain lymph nodes also noted, on the right at 4 mm and left at 6 mm.  There is urinary bladder wall thickening which is diffuse.  Moderate degree of stool in the  colon.  No dilated bowel loops.  Hysterectomy.     4 cm mid rectal lesion in keeping with known malignancy.  No discrete extension into the mesorectal fascia although there are several perirectal lymph nodes which raise the possibility for locoregional lymph node involvement. Electronically signed by: Bebo Kapoor Date:    09/18/2019 Time:    16:45      I have reviewed all available lab results and radiology reports.    Assessment/Plan:   (1) 79 y.o. female with diagnosis of prior left RCC who has been referred by David Saldaña MD for evaluation by medical hematology/oncology. She has been followed by Dr Stanislav Epstein with [facility] at the [facility] in [place]. She last saw Dr Epstein in July 2019. She was recently diagnosed with rectal mass by Dr Armando Duff which was found on colonoscopy on 8/26/2019. She had presented with lower Gi bleeding at that time. The pathology from those biopsies showed no evidence of malignancy at that time. She was subsequently seen by Dr David Saldaña and underwent trans-anal surgical biopsy on 9/23/2019. The pathology from the surgical biopsy showed rectal carcinoma.         She has been seen by Dr Fareed Hassan with Rad/onc for radiation therapy evaluation.     PET was done on 10/17/2019     We previously discussed giving her combined chemotherapy with the XRt to try to reduce her risk of recurrence. She was agreeable to this plan.    She had portacath placed with Dr Saldaña. She saw Dr Hassan last week with rad/onc. She has since completed weekly XRT and chemotherapy    - She previously saw Dr Saldaña on 12/10/2019  and had MRI on 1/8/2020.  They did scope on 1/28th 2020 and I spoke to dr saldaña by phone last week. The scope looked good and both the patient and Dr Saldaña does not want to proceed in direction of operation especially given her age and co-morbidities.    - We previously discussed adjuvant chemotherapy with FOLOFOX x 10-12 cycles especially since she is  not having surgery and she is agreeable.    - Will previously  provide literature on the regimen and set up chemotherapy    - she since started chemotherapy and is now getting cycle #5        (2) Left renal cell carcinoma s/p left nephrectomy with Dr Azar Donnelly - diagnosed about 2 years ago     (3) Ureterolithiasis     (4) CRI - stage III - followed by Dr Antonio; she saw him recently and per patient's report, her kidney function is stable per Dr Antonio  - she is seeing Dr Antonio this coming Thursday     (5) HTN and hypercholesterolemia     (6) DM - followed by Dr Brower with endocrinology     (7) Hx/of gall stones     (8) Chronic left shoulder issues     (9) MVP     (10) Thyroid disease with prior hx/of thyroid cancer s/p thyroidectomy      (11) Chronic anemia - most likley multifactorial due to iron deficiency, GIB and anemia of chronic renal disease  - hgb at 9.5 currently       VISIT DIAGNOSES:      Malignant neoplasm of rectum    Chemotherapy induced neutropenia    Normocytic anemia    Rectal cancer    Renal cell carcinoma of left kidney          PLAN:  1.  Encouraged compliance with labs; discussed COVID19 precautions  2. Continue current regimen of FOLFOX - but stay at the reduced dose by 20%  3.  F/u with PCP, GI, rad/onc , etc  4.  F/u nephrology - Dr Antonio regularly while on this regimen  5. Refill antiemetics as needed  6. RTC in 4 weeks with me     Fax note to Ph[facility] Winston Parks; Tete Duff Tandron; Iraida Brower    Discussion:       I spent over 25 mins of time with the patient. Reviewed results of the recently ordered labs, tests and studies; made directives with regards to the results. Over half of this time was spent couseling and coordinating care.    COVID-19 Discussion:    I had long discussion with patient and any applicable family about the COVID-19 coronavirus epidemic and the recommended precautions with regard to cancer and/or hematology patients. I have re-iterated the CDC  recommendations for adequate hand washing, use of hand -like products, and coughing into elbow, etc. In addition, especially for our patients who are on chemotherapy and/or our otherwise immunocompromised patients, I have recommended avoidance of crowds, including movie theaters, restaurants, churches, etc. I have recommended avoidance of any sick or symptomatic family members and/or friends. I have also recommended avoidance of any raw and unwashed food products, and general avoidance of food items that have not been prepared by themselves. The patient has been asked to call us immediately with any symptom developments, issues, questions or other general concerns.       Chemotherapy Discussion:      I discussed the available treatment option(s) in accordance with the latest/current national evidence-based guidelines (NCCN, UpToDate, NCI, ASCO, etc where applicable), their overall age/condition and their co-morbidities. I also went over the risks and benefits of the chemotherapy with regard to their particular cancer type, their cancer stage, their age/condition, and their co-morbidities. I provided literature on the chemotherapy regimen and discussed the chemotherapy side-effect profiles of the drug(s). I discussed the importance of compliance with obtaining and monitoring weekly lab work, and went over the potential hematopathology issues and risks with anemia, leucopenia and thrombocytopenia that can occur with chemotherapy. I discussed the potential risks of liver and kidney damage, which could be permanent and could necessitate dialysis long-term if kidney failure developed. I discussed the emetic and/or diarrheal potential of the regimen and the potential need for use of antiemetic and anti-diarrheal medications. I discussed the risk for development of anaphylactic shock, bronchospasm, dysrhythmia, and respiratory/cardiovascular arrest and/or failure. I discussed the potential risks for development of  alopecia, cold sensory issues, ringing in ears, vertigo, cataracts, glaucoma, and neuropathy, all of which could end up being chronic and life-long. Some chemotherpyI discussed the risks of hand-foot syndrome and rashes, and development of other autoimmune mediated processes such as pneumonitis, hepatitis, and colitis which could be life threatening. I discussed the risks of the potential development of a rare but fatal viral mediated disease known as PML (Progressive Multifocal Leukoencephalopathy), and risk of future development of leukemia and/or lymphoma from use of certain chemotherapy agents. I discussed the need for neutropenic precautions, basic hygiene/sanitation behaviors and dietary restrictions.    The patient's consent has been obtained to proceed with the chemotherapy.The patient will be referred to [facility] /[facility] for training and education on chemotherapy, use of antiemetics and/or anti-diarrheals, use of NSAID's, potential chemotherapy side-effects, and any specific recommendations and precautions with the particular chemotherapy agents.      I answered all of the patient's (and family's, if applicable) questions to the best of my ability and to their complete satisfaction. The patient acknowledged full understanding of the risks, recommendations and plan(s).     Telemedicine Statement:    The patient acknowledged and agreed to the audio/video encounter and the patient who is being provided medical services by telemedicine is:  (1) informed of the relationship between the physician and patient and the respective role of any other health care provider with respect to management of the patient; and (2) notified that he or she may decline to receive medical services by telemedicine and may withdraw from such care at any time.      I have explained all of the above in detail and the patient understands all of the current recommendation(s). I have answered all of their questions  to the best of my ability and to their complete satisfaction.   The patient is to continue with the current management plan.            Electronically signed by Carrillo Goode MD        Answers for HPI/ROS submitted by the patient on 4/29/2020   appetite change : No  unexpected weight change: No  visual disturbance: No  cough: No  shortness of breath: No  chest pain: No  abdominal pain: No  diarrhea: No  frequency: No  back pain: No  rash: No  headaches: No  adenopathy: No  nervous/ anxious: No

## 2020-05-05 ENCOUNTER — OFFICE VISIT (OUTPATIENT)
Dept: HEMATOLOGY/ONCOLOGY | Facility: CLINIC | Age: 80
End: 2020-05-05
Payer: COMMERCIAL

## 2020-05-05 DIAGNOSIS — C20 MALIGNANT NEOPLASM OF RECTUM: Primary | ICD-10-CM

## 2020-05-05 DIAGNOSIS — D64.9 NORMOCYTIC ANEMIA: ICD-10-CM

## 2020-05-05 DIAGNOSIS — C64.2 RENAL CELL CARCINOMA OF LEFT KIDNEY: ICD-10-CM

## 2020-05-05 DIAGNOSIS — C20 RECTAL CANCER: ICD-10-CM

## 2020-05-05 DIAGNOSIS — T45.1X5A CHEMOTHERAPY INDUCED NEUTROPENIA: ICD-10-CM

## 2020-05-05 DIAGNOSIS — D70.1 CHEMOTHERAPY INDUCED NEUTROPENIA: ICD-10-CM

## 2020-05-05 PROCEDURE — 99214 OFFICE O/P EST MOD 30 MIN: CPT | Mod: 95,,, | Performed by: INTERNAL MEDICINE

## 2020-05-05 PROCEDURE — 1101F PR PT FALLS ASSESS DOC 0-1 FALLS W/OUT INJ PAST YR: ICD-10-PCS | Mod: ,,, | Performed by: INTERNAL MEDICINE

## 2020-05-05 PROCEDURE — 99214 PR OFFICE/OUTPT VISIT, EST, LEVL IV, 30-39 MIN: ICD-10-PCS | Mod: 95,,, | Performed by: INTERNAL MEDICINE

## 2020-05-05 PROCEDURE — 1159F PR MEDICATION LIST DOCUMENTED IN MEDICAL RECORD: ICD-10-PCS | Mod: ,,, | Performed by: INTERNAL MEDICINE

## 2020-05-05 PROCEDURE — 1101F PT FALLS ASSESS-DOCD LE1/YR: CPT | Mod: ,,, | Performed by: INTERNAL MEDICINE

## 2020-05-05 PROCEDURE — 1159F MED LIST DOCD IN RCRD: CPT | Mod: ,,, | Performed by: INTERNAL MEDICINE

## 2020-05-06 ENCOUNTER — INFUSION (OUTPATIENT)
Dept: INFUSION THERAPY | Facility: HOSPITAL | Age: 80
End: 2020-05-06
Attending: INTERNAL MEDICINE
Payer: COMMERCIAL

## 2020-05-06 VITALS
TEMPERATURE: 99 F | BODY MASS INDEX: 27.61 KG/M2 | RESPIRATION RATE: 18 BRPM | WEIGHT: 146.13 LBS | SYSTOLIC BLOOD PRESSURE: 116 MMHG | OXYGEN SATURATION: 98 % | DIASTOLIC BLOOD PRESSURE: 65 MMHG | HEART RATE: 97 BPM

## 2020-05-06 DIAGNOSIS — D70.1 CHEMOTHERAPY INDUCED NEUTROPENIA: Primary | ICD-10-CM

## 2020-05-06 DIAGNOSIS — C20 RECTAL CANCER: ICD-10-CM

## 2020-05-06 DIAGNOSIS — T45.1X5A CHEMOTHERAPY INDUCED NEUTROPENIA: Primary | ICD-10-CM

## 2020-05-06 PROCEDURE — 63600175 PHARM REV CODE 636 W HCPCS: Performed by: INTERNAL MEDICINE

## 2020-05-06 PROCEDURE — 25000003 PHARM REV CODE 250: Performed by: INTERNAL MEDICINE

## 2020-05-06 PROCEDURE — A4216 STERILE WATER/SALINE, 10 ML: HCPCS | Performed by: INTERNAL MEDICINE

## 2020-05-06 PROCEDURE — 96523 IRRIG DRUG DELIVERY DEVICE: CPT

## 2020-05-06 RX ORDER — HEPARIN 100 UNIT/ML
500 SYRINGE INTRAVENOUS
Status: DISCONTINUED | OUTPATIENT
Start: 2020-05-06 | End: 2020-05-06 | Stop reason: HOSPADM

## 2020-05-06 RX ORDER — SODIUM CHLORIDE 0.9 % (FLUSH) 0.9 %
10 SYRINGE (ML) INJECTION
Status: DISCONTINUED | OUTPATIENT
Start: 2020-05-06 | End: 2020-05-06 | Stop reason: HOSPADM

## 2020-05-06 RX ADMIN — HEPARIN 500 UNITS: 100 SYRINGE at 10:05

## 2020-05-06 RX ADMIN — SODIUM CHLORIDE, PRESERVATIVE FREE 10 ML: 5 INJECTION INTRAVENOUS at 10:05

## 2020-05-10 ENCOUNTER — PATIENT MESSAGE (OUTPATIENT)
Dept: HEMATOLOGY/ONCOLOGY | Facility: CLINIC | Age: 80
End: 2020-05-10

## 2020-05-11 ENCOUNTER — PATIENT MESSAGE (OUTPATIENT)
Dept: HEMATOLOGY/ONCOLOGY | Facility: CLINIC | Age: 80
End: 2020-05-11

## 2020-05-12 RX ORDER — HEPARIN 100 UNIT/ML
500 SYRINGE INTRAVENOUS
Status: CANCELLED | OUTPATIENT
Start: 2020-05-20

## 2020-05-12 RX ORDER — HEPARIN 100 UNIT/ML
500 SYRINGE INTRAVENOUS
Status: CANCELLED | OUTPATIENT
Start: 2020-05-18

## 2020-05-12 RX ORDER — EPINEPHRINE 0.3 MG/.3ML
0.3 INJECTION SUBCUTANEOUS ONCE AS NEEDED
Status: CANCELLED | OUTPATIENT
Start: 2020-05-18

## 2020-05-12 RX ORDER — DIPHENHYDRAMINE HYDROCHLORIDE 50 MG/ML
50 INJECTION INTRAMUSCULAR; INTRAVENOUS ONCE AS NEEDED
Status: CANCELLED | OUTPATIENT
Start: 2020-05-18

## 2020-05-12 RX ORDER — SODIUM CHLORIDE 0.9 % (FLUSH) 0.9 %
10 SYRINGE (ML) INJECTION
Status: CANCELLED | OUTPATIENT
Start: 2020-05-20

## 2020-05-12 RX ORDER — FLUOROURACIL 50 MG/ML
320 INJECTION, SOLUTION INTRAVENOUS
Status: CANCELLED | OUTPATIENT
Start: 2020-05-18

## 2020-05-12 RX ORDER — SODIUM CHLORIDE 0.9 % (FLUSH) 0.9 %
10 SYRINGE (ML) INJECTION
Status: CANCELLED | OUTPATIENT
Start: 2020-05-18

## 2020-05-13 DIAGNOSIS — Z03.818 ENCNTR FOR OBS FOR SUSP EXPSR TO OTH BIOLG AGENTS RULED OUT: Primary | ICD-10-CM

## 2020-05-14 ENCOUNTER — LAB VISIT (OUTPATIENT)
Dept: INFUSION THERAPY | Facility: HOSPITAL | Age: 80
End: 2020-05-14
Attending: NURSE PRACTITIONER
Payer: COMMERCIAL

## 2020-05-14 DIAGNOSIS — Z03.818 ENCNTR FOR OBS FOR SUSP EXPSR TO OTH BIOLG AGENTS RULED OUT: ICD-10-CM

## 2020-05-14 PROCEDURE — U0002 COVID-19 LAB TEST NON-CDC: HCPCS

## 2020-05-15 LAB — SARS-COV-2 RNA RESP QL NAA+PROBE: NOT DETECTED

## 2020-05-18 ENCOUNTER — INFUSION (OUTPATIENT)
Dept: INFUSION THERAPY | Facility: HOSPITAL | Age: 80
End: 2020-05-18
Attending: INTERNAL MEDICINE
Payer: COMMERCIAL

## 2020-05-18 ENCOUNTER — LAB VISIT (OUTPATIENT)
Dept: LAB | Facility: HOSPITAL | Age: 80
End: 2020-05-18
Attending: INTERNAL MEDICINE
Payer: COMMERCIAL

## 2020-05-18 VITALS
HEART RATE: 92 BPM | DIASTOLIC BLOOD PRESSURE: 80 MMHG | HEIGHT: 61 IN | RESPIRATION RATE: 18 BRPM | WEIGHT: 147.13 LBS | BODY MASS INDEX: 27.78 KG/M2 | TEMPERATURE: 97 F | OXYGEN SATURATION: 99 % | SYSTOLIC BLOOD PRESSURE: 163 MMHG

## 2020-05-18 DIAGNOSIS — D70.1 CHEMOTHERAPY INDUCED NEUTROPENIA: Primary | ICD-10-CM

## 2020-05-18 DIAGNOSIS — C20 MALIGNANT NEOPLASM OF RECTUM: ICD-10-CM

## 2020-05-18 DIAGNOSIS — R59.0 CERVICAL LYMPHADENOPATHY: ICD-10-CM

## 2020-05-18 DIAGNOSIS — C20 RECTAL CANCER: ICD-10-CM

## 2020-05-18 DIAGNOSIS — T45.1X5A CHEMOTHERAPY INDUCED NEUTROPENIA: Primary | ICD-10-CM

## 2020-05-18 LAB
BASOPHILS # BLD AUTO: 0.03 K/UL (ref 0–0.2)
BASOPHILS NFR BLD: 0.9 % (ref 0–1.9)
DIFFERENTIAL METHOD: ABNORMAL
EOSINOPHIL # BLD AUTO: 0.1 K/UL (ref 0–0.5)
EOSINOPHIL NFR BLD: 3.5 % (ref 0–8)
ERYTHROCYTE [DISTWIDTH] IN BLOOD BY AUTOMATED COUNT: 20 % (ref 11.5–14.5)
HCT VFR BLD AUTO: 25.1 % (ref 37–48.5)
HGB BLD-MCNC: 8.2 G/DL (ref 12–16)
IMM GRANULOCYTES # BLD AUTO: 0.03 K/UL (ref 0–0.04)
IMM GRANULOCYTES NFR BLD AUTO: 0.9 % (ref 0–0.5)
LYMPHOCYTES # BLD AUTO: 0.6 K/UL (ref 1–4.8)
LYMPHOCYTES NFR BLD: 19.8 % (ref 18–48)
MCH RBC QN AUTO: 29.7 PG (ref 27–31)
MCHC RBC AUTO-ENTMCNC: 32.7 G/DL (ref 32–36)
MCV RBC AUTO: 91 FL (ref 82–98)
MONOCYTES # BLD AUTO: 0.6 K/UL (ref 0.3–1)
MONOCYTES NFR BLD: 18.9 % (ref 4–15)
NEUTROPHILS # BLD AUTO: 1.8 K/UL (ref 1.8–7.7)
NEUTROPHILS NFR BLD: 56 % (ref 38–73)
NRBC BLD-RTO: 0 /100 WBC
PLATELET # BLD AUTO: 117 K/UL (ref 150–350)
PMV BLD AUTO: 10.1 FL (ref 9.2–12.9)
RBC # BLD AUTO: 2.76 M/UL (ref 4–5.4)
WBC # BLD AUTO: 3.18 K/UL (ref 3.9–12.7)

## 2020-05-18 PROCEDURE — 96367 TX/PROPH/DG ADDL SEQ IV INF: CPT

## 2020-05-18 PROCEDURE — 25000003 PHARM REV CODE 250: Performed by: INTERNAL MEDICINE

## 2020-05-18 PROCEDURE — 96368 THER/DIAG CONCURRENT INF: CPT

## 2020-05-18 PROCEDURE — 96416 CHEMO PROLONG INFUSE W/PUMP: CPT

## 2020-05-18 PROCEDURE — 96411 CHEMO IV PUSH ADDL DRUG: CPT

## 2020-05-18 PROCEDURE — 63600175 PHARM REV CODE 636 W HCPCS: Performed by: INTERNAL MEDICINE

## 2020-05-18 PROCEDURE — 36415 COLL VENOUS BLD VENIPUNCTURE: CPT

## 2020-05-18 PROCEDURE — 96366 THER/PROPH/DIAG IV INF ADDON: CPT

## 2020-05-18 PROCEDURE — 85025 COMPLETE CBC W/AUTO DIFF WBC: CPT

## 2020-05-18 PROCEDURE — 96415 CHEMO IV INFUSION ADDL HR: CPT

## 2020-05-18 PROCEDURE — 96413 CHEMO IV INFUSION 1 HR: CPT

## 2020-05-18 RX ORDER — SODIUM CHLORIDE 0.9 % (FLUSH) 0.9 %
10 SYRINGE (ML) INJECTION
Status: DISCONTINUED | OUTPATIENT
Start: 2020-05-18 | End: 2020-05-18 | Stop reason: HOSPADM

## 2020-05-18 RX ORDER — HEPARIN 100 UNIT/ML
500 SYRINGE INTRAVENOUS
Status: DISCONTINUED | OUTPATIENT
Start: 2020-05-18 | End: 2020-05-18 | Stop reason: HOSPADM

## 2020-05-18 RX ORDER — EPINEPHRINE 0.3 MG/.3ML
0.3 INJECTION SUBCUTANEOUS ONCE AS NEEDED
Status: DISCONTINUED | OUTPATIENT
Start: 2020-05-18 | End: 2020-05-18 | Stop reason: HOSPADM

## 2020-05-18 RX ORDER — FLUOROURACIL 50 MG/ML
320 INJECTION, SOLUTION INTRAVENOUS
Status: COMPLETED | OUTPATIENT
Start: 2020-05-18 | End: 2020-05-18

## 2020-05-18 RX ORDER — DIPHENHYDRAMINE HYDROCHLORIDE 50 MG/ML
50 INJECTION INTRAMUSCULAR; INTRAVENOUS ONCE AS NEEDED
Status: DISCONTINUED | OUTPATIENT
Start: 2020-05-18 | End: 2020-05-18 | Stop reason: HOSPADM

## 2020-05-18 RX ADMIN — PALONOSETRON HYDROCHLORIDE: 0.25 INJECTION, SOLUTION INTRAVENOUS at 10:05

## 2020-05-18 RX ADMIN — DEXTROSE: 5 SOLUTION INTRAVENOUS at 10:05

## 2020-05-18 RX ADMIN — OXALIPLATIN 115 MG: 5 INJECTION, SOLUTION INTRAVENOUS at 10:05

## 2020-05-18 RX ADMIN — FLUOROURACIL 3245 MG: 50 INJECTION, SOLUTION INTRAVENOUS at 12:05

## 2020-05-18 RX ADMIN — DEXTROSE MONOHYDRATE 540 MG: 50 INJECTION, SOLUTION INTRAVENOUS at 10:05

## 2020-05-18 RX ADMIN — FLUOROURACIL 540 MG: 50 INJECTION, SOLUTION INTRAVENOUS at 12:05

## 2020-05-19 ENCOUNTER — PATIENT MESSAGE (OUTPATIENT)
Dept: HEMATOLOGY/ONCOLOGY | Facility: CLINIC | Age: 80
End: 2020-05-19

## 2020-05-19 DIAGNOSIS — C64.2 RENAL CELL CARCINOMA OF LEFT KIDNEY: ICD-10-CM

## 2020-05-19 DIAGNOSIS — C20 MALIGNANT NEOPLASM OF RECTUM: Primary | ICD-10-CM

## 2020-05-19 DIAGNOSIS — D63.1 ANEMIA ASSOCIATED WITH STAGE 4 CHRONIC RENAL FAILURE: ICD-10-CM

## 2020-05-19 DIAGNOSIS — N18.4 ANEMIA ASSOCIATED WITH STAGE 4 CHRONIC RENAL FAILURE: ICD-10-CM

## 2020-05-19 RX ORDER — FUROSEMIDE 10 MG/ML
10 INJECTION INTRAMUSCULAR; INTRAVENOUS ONCE
Status: CANCELLED | OUTPATIENT
Start: 2020-05-19

## 2020-05-19 RX ORDER — HYDROCODONE BITARTRATE AND ACETAMINOPHEN 500; 5 MG/1; MG/1
TABLET ORAL ONCE
Status: CANCELLED | OUTPATIENT
Start: 2020-05-19 | End: 2020-05-19

## 2020-05-19 RX ORDER — ACETAMINOPHEN 325 MG/1
325 TABLET ORAL ONCE
Status: CANCELLED | OUTPATIENT
Start: 2020-05-19

## 2020-05-19 RX ORDER — DIPHENHYDRAMINE HYDROCHLORIDE 50 MG/ML
25 INJECTION INTRAMUSCULAR; INTRAVENOUS ONCE
Status: CANCELLED | OUTPATIENT
Start: 2020-05-19

## 2020-05-20 ENCOUNTER — INFUSION (OUTPATIENT)
Dept: INFUSION THERAPY | Facility: HOSPITAL | Age: 80
End: 2020-05-20
Attending: INTERNAL MEDICINE
Payer: COMMERCIAL

## 2020-05-20 VITALS
OXYGEN SATURATION: 98 % | HEART RATE: 83 BPM | SYSTOLIC BLOOD PRESSURE: 145 MMHG | DIASTOLIC BLOOD PRESSURE: 78 MMHG | TEMPERATURE: 99 F | RESPIRATION RATE: 14 BRPM

## 2020-05-20 VITALS
DIASTOLIC BLOOD PRESSURE: 78 MMHG | SYSTOLIC BLOOD PRESSURE: 126 MMHG | RESPIRATION RATE: 18 BRPM | HEIGHT: 61 IN | WEIGHT: 147.06 LBS | HEART RATE: 90 BPM | BODY MASS INDEX: 27.77 KG/M2 | TEMPERATURE: 98 F | OXYGEN SATURATION: 99 %

## 2020-05-20 DIAGNOSIS — C20 MALIGNANT NEOPLASM OF RECTUM: ICD-10-CM

## 2020-05-20 DIAGNOSIS — D63.1 ANEMIA ASSOCIATED WITH STAGE 4 CHRONIC RENAL FAILURE: ICD-10-CM

## 2020-05-20 DIAGNOSIS — D70.1 CHEMOTHERAPY INDUCED NEUTROPENIA: Primary | ICD-10-CM

## 2020-05-20 DIAGNOSIS — C20 RECTAL CANCER: ICD-10-CM

## 2020-05-20 DIAGNOSIS — N18.4 ANEMIA ASSOCIATED WITH STAGE 4 CHRONIC RENAL FAILURE: ICD-10-CM

## 2020-05-20 DIAGNOSIS — C64.2 RENAL CELL CARCINOMA OF LEFT KIDNEY: ICD-10-CM

## 2020-05-20 DIAGNOSIS — T45.1X5A CHEMOTHERAPY INDUCED NEUTROPENIA: Primary | ICD-10-CM

## 2020-05-20 LAB
ABO + RH BLD: NORMAL
BLD GP AB SCN CELLS X3 SERPL QL: NORMAL
BLD PROD TYP BPU: NORMAL
BLOOD UNIT EXPIRATION DATE: NORMAL
BLOOD UNIT TYPE CODE: 5100
BLOOD UNIT TYPE: NORMAL
CODING SYSTEM: NORMAL
DISPENSE STATUS: NORMAL
NUM UNITS TRANS PACKED RBC: NORMAL

## 2020-05-20 PROCEDURE — 86850 RBC ANTIBODY SCREEN: CPT

## 2020-05-20 PROCEDURE — 63600175 PHARM REV CODE 636 W HCPCS: Performed by: INTERNAL MEDICINE

## 2020-05-20 PROCEDURE — 36591 DRAW BLOOD OFF VENOUS DEVICE: CPT

## 2020-05-20 PROCEDURE — 96374 THER/PROPH/DIAG INJ IV PUSH: CPT

## 2020-05-20 PROCEDURE — 86920 COMPATIBILITY TEST SPIN: CPT

## 2020-05-20 PROCEDURE — 25000003 PHARM REV CODE 250: Performed by: INTERNAL MEDICINE

## 2020-05-20 PROCEDURE — 96375 TX/PRO/DX INJ NEW DRUG ADDON: CPT

## 2020-05-20 PROCEDURE — P9016 RBC LEUKOCYTES REDUCED: HCPCS

## 2020-05-20 PROCEDURE — 36430 TRANSFUSION BLD/BLD COMPNT: CPT

## 2020-05-20 RX ORDER — HEPARIN 100 UNIT/ML
500 SYRINGE INTRAVENOUS
Status: DISCONTINUED | OUTPATIENT
Start: 2020-05-20 | End: 2020-05-20 | Stop reason: HOSPADM

## 2020-05-20 RX ORDER — FUROSEMIDE 10 MG/ML
10 INJECTION INTRAMUSCULAR; INTRAVENOUS ONCE
Status: COMPLETED | OUTPATIENT
Start: 2020-05-20 | End: 2020-05-20

## 2020-05-20 RX ORDER — HEPARIN 100 UNIT/ML
500 SYRINGE INTRAVENOUS ONCE
Status: COMPLETED | OUTPATIENT
Start: 2020-05-20 | End: 2020-05-20

## 2020-05-20 RX ORDER — ACETAMINOPHEN 325 MG/1
325 TABLET ORAL ONCE
Status: COMPLETED | OUTPATIENT
Start: 2020-05-20 | End: 2020-05-20

## 2020-05-20 RX ORDER — HYDROCODONE BITARTRATE AND ACETAMINOPHEN 500; 5 MG/1; MG/1
TABLET ORAL ONCE
Status: DISCONTINUED | OUTPATIENT
Start: 2020-05-20 | End: 2022-04-28 | Stop reason: CLARIF

## 2020-05-20 RX ORDER — SODIUM CHLORIDE 0.9 % (FLUSH) 0.9 %
10 SYRINGE (ML) INJECTION
Status: DISCONTINUED | OUTPATIENT
Start: 2020-05-20 | End: 2020-05-20 | Stop reason: HOSPADM

## 2020-05-20 RX ORDER — DIPHENHYDRAMINE HYDROCHLORIDE 50 MG/ML
25 INJECTION INTRAMUSCULAR; INTRAVENOUS ONCE
Status: COMPLETED | OUTPATIENT
Start: 2020-05-20 | End: 2020-05-20

## 2020-05-20 RX ADMIN — ACETAMINOPHEN 325 MG: 325 TABLET ORAL at 12:05

## 2020-05-20 RX ADMIN — DIPHENHYDRAMINE HYDROCHLORIDE 25 MG: 50 INJECTION INTRAMUSCULAR; INTRAVENOUS at 12:05

## 2020-05-20 RX ADMIN — FUROSEMIDE 10 MG: 10 INJECTION, SOLUTION INTRAMUSCULAR; INTRAVENOUS at 03:05

## 2020-05-20 RX ADMIN — Medication 500 UNITS: at 04:05

## 2020-05-20 NOTE — PLAN OF CARE
Type and Cross drawn for future blood transfusion  Problem: Anemia  Goal: Anemia Symptom Improvement  Intervention: Monitor and Manage Anemia  Flowsheets (Taken 5/20/2020 1036)  Fatigue Management: fatigue-related activity identified

## 2020-05-25 ENCOUNTER — PATIENT MESSAGE (OUTPATIENT)
Dept: HEMATOLOGY/ONCOLOGY | Facility: CLINIC | Age: 80
End: 2020-05-25

## 2020-05-25 DIAGNOSIS — C20 MALIGNANT NEOPLASM OF RECTUM: Primary | ICD-10-CM

## 2020-05-25 NOTE — PROGRESS NOTES
Patient states she is not feeling well. Orders for cbc, cmp placed at [facility].

## 2020-05-26 ENCOUNTER — PATIENT MESSAGE (OUTPATIENT)
Dept: HEMATOLOGY/ONCOLOGY | Facility: CLINIC | Age: 80
End: 2020-05-26

## 2020-05-27 ENCOUNTER — PATIENT MESSAGE (OUTPATIENT)
Dept: HEMATOLOGY/ONCOLOGY | Facility: CLINIC | Age: 80
End: 2020-05-27

## 2020-05-27 ENCOUNTER — TELEPHONE (OUTPATIENT)
Dept: HEMATOLOGY/ONCOLOGY | Facility: CLINIC | Age: 80
End: 2020-05-27

## 2020-05-27 DIAGNOSIS — Z03.818 ENCOUNTER FOR OBSERVATION FOR SUSPECTED EXPOSURE TO OTHER BIOLOGICAL AGENTS RULED OUT: Primary | ICD-10-CM

## 2020-05-27 NOTE — TELEPHONE ENCOUNTER
----- Message from Carrillo Goode MD sent at 5/25/2020  1:11 PM CDT -----  Lets give her some neupogen for 3 days - I dont want her dropping low with the COVID19 stuff

## 2020-05-27 NOTE — TELEPHONE ENCOUNTER
Called the patient and instructed her that her WBC is low and Dr. Goode wants her to have Neupogen because of Covid.  I instructed her that we will call her with a date and time once we have insurance approval.

## 2020-05-28 ENCOUNTER — LAB VISIT (OUTPATIENT)
Dept: INFUSION THERAPY | Facility: HOSPITAL | Age: 80
End: 2020-05-28
Attending: NURSE PRACTITIONER
Payer: COMMERCIAL

## 2020-05-28 ENCOUNTER — INFUSION (OUTPATIENT)
Dept: INFUSION THERAPY | Facility: HOSPITAL | Age: 80
End: 2020-05-28
Attending: INTERNAL MEDICINE
Payer: COMMERCIAL

## 2020-05-28 ENCOUNTER — PATIENT MESSAGE (OUTPATIENT)
Dept: HEMATOLOGY/ONCOLOGY | Facility: CLINIC | Age: 80
End: 2020-05-28

## 2020-05-28 VITALS
DIASTOLIC BLOOD PRESSURE: 84 MMHG | BODY MASS INDEX: 27.5 KG/M2 | OXYGEN SATURATION: 100 % | WEIGHT: 145.69 LBS | RESPIRATION RATE: 19 BRPM | TEMPERATURE: 99 F | HEIGHT: 61 IN | SYSTOLIC BLOOD PRESSURE: 147 MMHG | HEART RATE: 87 BPM

## 2020-05-28 DIAGNOSIS — D70.1 CHEMOTHERAPY INDUCED NEUTROPENIA: Primary | ICD-10-CM

## 2020-05-28 DIAGNOSIS — Z03.818 ENCOUNTER FOR OBSERVATION FOR SUSPECTED EXPOSURE TO OTHER BIOLOGICAL AGENTS RULED OUT: ICD-10-CM

## 2020-05-28 DIAGNOSIS — T45.1X5A CHEMOTHERAPY INDUCED NEUTROPENIA: Primary | ICD-10-CM

## 2020-05-28 PROCEDURE — U0003 INFECTIOUS AGENT DETECTION BY NUCLEIC ACID (DNA OR RNA); SEVERE ACUTE RESPIRATORY SYNDROME CORONAVIRUS 2 (SARS-COV-2) (CORONAVIRUS DISEASE [COVID-19]), AMPLIFIED PROBE TECHNIQUE, MAKING USE OF HIGH THROUGHPUT TECHNOLOGIES AS DESCRIBED BY CMS-2020-01-R: HCPCS

## 2020-05-28 PROCEDURE — 63600175 PHARM REV CODE 636 W HCPCS: Mod: JG | Performed by: NURSE PRACTITIONER

## 2020-05-28 PROCEDURE — 96372 THER/PROPH/DIAG INJ SC/IM: CPT

## 2020-05-28 RX ORDER — EPINEPHRINE 0.3 MG/.3ML
0.3 INJECTION SUBCUTANEOUS ONCE AS NEEDED
Status: CANCELLED | OUTPATIENT
Start: 2020-06-01

## 2020-05-28 RX ORDER — FLUOROURACIL 50 MG/ML
280 INJECTION, SOLUTION INTRAVENOUS
Status: CANCELLED | OUTPATIENT
Start: 2020-06-01

## 2020-05-28 RX ORDER — SODIUM CHLORIDE 0.9 % (FLUSH) 0.9 %
10 SYRINGE (ML) INJECTION
Status: CANCELLED | OUTPATIENT
Start: 2020-06-01

## 2020-05-28 RX ORDER — HEPARIN 100 UNIT/ML
500 SYRINGE INTRAVENOUS
Status: CANCELLED | OUTPATIENT
Start: 2020-06-10

## 2020-05-28 RX ORDER — SODIUM CHLORIDE 0.9 % (FLUSH) 0.9 %
10 SYRINGE (ML) INJECTION
Status: CANCELLED | OUTPATIENT
Start: 2020-06-10

## 2020-05-28 RX ORDER — DIPHENHYDRAMINE HYDROCHLORIDE 50 MG/ML
50 INJECTION INTRAMUSCULAR; INTRAVENOUS ONCE AS NEEDED
Status: CANCELLED | OUTPATIENT
Start: 2020-06-01

## 2020-05-28 RX ORDER — HEPARIN 100 UNIT/ML
500 SYRINGE INTRAVENOUS
Status: CANCELLED | OUTPATIENT
Start: 2020-06-01

## 2020-05-28 RX ADMIN — FILGRASTIM 300 MCG: 300 INJECTION, SOLUTION INTRAVENOUS; SUBCUTANEOUS at 09:05

## 2020-05-28 NOTE — PLAN OF CARE
Problem: Neutropenia  Goal: Absence of Infection  Outcome: Ongoing, Progressing  Intervention: Prevent Infection and Maximize Resistance  Flowsheets (Taken 5/28/2020 0911)  Infection Prevention: equipment surfaces disinfected; visitors restricted/screened

## 2020-05-29 ENCOUNTER — INFUSION (OUTPATIENT)
Dept: INFUSION THERAPY | Facility: HOSPITAL | Age: 80
End: 2020-05-29
Attending: INTERNAL MEDICINE
Payer: COMMERCIAL

## 2020-05-29 VITALS
BODY MASS INDEX: 27.34 KG/M2 | OXYGEN SATURATION: 97 % | HEART RATE: 97 BPM | TEMPERATURE: 98 F | WEIGHT: 144.69 LBS | SYSTOLIC BLOOD PRESSURE: 144 MMHG | DIASTOLIC BLOOD PRESSURE: 72 MMHG | RESPIRATION RATE: 18 BRPM

## 2020-05-29 DIAGNOSIS — T45.1X5A CHEMOTHERAPY INDUCED NEUTROPENIA: Primary | ICD-10-CM

## 2020-05-29 DIAGNOSIS — D70.1 CHEMOTHERAPY INDUCED NEUTROPENIA: Primary | ICD-10-CM

## 2020-05-29 LAB — SARS-COV-2 RNA RESP QL NAA+PROBE: NOT DETECTED

## 2020-05-29 PROCEDURE — 96372 THER/PROPH/DIAG INJ SC/IM: CPT

## 2020-05-29 PROCEDURE — 63600175 PHARM REV CODE 636 W HCPCS: Mod: JG | Performed by: NURSE PRACTITIONER

## 2020-05-29 RX ADMIN — FILGRASTIM 300 MCG: 300 INJECTION, SOLUTION INTRAVENOUS; SUBCUTANEOUS at 11:05

## 2020-05-29 NOTE — PLAN OF CARE
Problem: Neutropenia  Goal: Absence of Infection  Outcome: Ongoing, Progressing  Intervention: Prevent Infection and Maximize Resistance  Flowsheets (Taken 5/29/2020 1134)  Infection Prevention: equipment surfaces disinfected; personal protective equipment utilized; visitors restricted/screened

## 2020-06-01 ENCOUNTER — TELEPHONE (OUTPATIENT)
Dept: HEMATOLOGY/ONCOLOGY | Facility: CLINIC | Age: 80
End: 2020-06-01

## 2020-06-01 ENCOUNTER — INFUSION (OUTPATIENT)
Dept: INFUSION THERAPY | Facility: HOSPITAL | Age: 80
End: 2020-06-01
Attending: INTERNAL MEDICINE
Payer: COMMERCIAL

## 2020-06-01 VITALS
HEIGHT: 61 IN | SYSTOLIC BLOOD PRESSURE: 139 MMHG | DIASTOLIC BLOOD PRESSURE: 74 MMHG | HEART RATE: 91 BPM | WEIGHT: 143.31 LBS | OXYGEN SATURATION: 99 % | BODY MASS INDEX: 27.06 KG/M2 | RESPIRATION RATE: 18 BRPM | TEMPERATURE: 98 F

## 2020-06-01 DIAGNOSIS — C20 RECTAL CANCER: ICD-10-CM

## 2020-06-01 DIAGNOSIS — T45.1X5A CHEMOTHERAPY INDUCED NEUTROPENIA: ICD-10-CM

## 2020-06-01 DIAGNOSIS — C20 MALIGNANT NEOPLASM OF RECTUM: Primary | ICD-10-CM

## 2020-06-01 DIAGNOSIS — R59.0 CERVICAL LYMPHADENOPATHY: ICD-10-CM

## 2020-06-01 DIAGNOSIS — D70.1 CHEMOTHERAPY INDUCED NEUTROPENIA: ICD-10-CM

## 2020-06-01 LAB
ALBUMIN SERPL BCP-MCNC: 3.2 G/DL (ref 3.5–5.2)
ALP SERPL-CCNC: 150 U/L (ref 55–135)
ALT SERPL W/O P-5'-P-CCNC: 66 U/L (ref 10–44)
ANION GAP SERPL CALC-SCNC: 13 MMOL/L (ref 8–16)
ANISOCYTOSIS BLD QL SMEAR: SLIGHT
AST SERPL-CCNC: 82 U/L (ref 10–40)
BASOPHILS NFR BLD: 1 % (ref 0–1.9)
BILIRUB SERPL-MCNC: 1 MG/DL (ref 0.1–1)
BUN SERPL-MCNC: 26 MG/DL (ref 8–23)
CALCIUM SERPL-MCNC: 6.9 MG/DL (ref 8.7–10.5)
CHLORIDE SERPL-SCNC: 99 MMOL/L (ref 95–110)
CO2 SERPL-SCNC: 27 MMOL/L (ref 23–29)
CREAT SERPL-MCNC: 1.9 MG/DL (ref 0.5–1.4)
DACRYOCYTES BLD QL SMEAR: ABNORMAL
DIFFERENTIAL METHOD: ABNORMAL
EOSINOPHIL NFR BLD: 1 % (ref 0–8)
ERYTHROCYTE [DISTWIDTH] IN BLOOD BY AUTOMATED COUNT: 19.8 % (ref 11.5–14.5)
EST. GFR  (AFRICAN AMERICAN): 28.9 ML/MIN/1.73 M^2
EST. GFR  (NON AFRICAN AMERICAN): 25.1 ML/MIN/1.73 M^2
GLUCOSE SERPL-MCNC: 114 MG/DL (ref 70–110)
HCT VFR BLD AUTO: 30.5 % (ref 37–48.5)
HGB BLD-MCNC: 10.1 G/DL (ref 12–16)
IMM GRANULOCYTES # BLD AUTO: ABNORMAL K/UL (ref 0–0.04)
IMM GRANULOCYTES NFR BLD AUTO: ABNORMAL % (ref 0–0.5)
LYMPHOCYTES NFR BLD: 15 % (ref 18–48)
MCH RBC QN AUTO: 30.8 PG (ref 27–31)
MCHC RBC AUTO-ENTMCNC: 33.1 G/DL (ref 32–36)
MCV RBC AUTO: 93 FL (ref 82–98)
METAMYELOCYTES NFR BLD MANUAL: 2 %
MONOCYTES NFR BLD: 16 % (ref 4–15)
MYELOCYTES NFR BLD MANUAL: 4 %
NEUTROPHILS NFR BLD: 61 % (ref 38–73)
NRBC BLD-RTO: 1 /100 WBC
OVALOCYTES BLD QL SMEAR: ABNORMAL
PLATELET # BLD AUTO: 78 K/UL (ref 150–350)
PLATELET BLD QL SMEAR: ABNORMAL
PMV BLD AUTO: 10.6 FL (ref 9.2–12.9)
POIKILOCYTOSIS BLD QL SMEAR: SLIGHT
POLYCHROMASIA BLD QL SMEAR: ABNORMAL
POTASSIUM SERPL-SCNC: 4.2 MMOL/L (ref 3.5–5.1)
PROT SERPL-MCNC: 6.2 G/DL (ref 6–8.4)
RBC # BLD AUTO: 3.28 M/UL (ref 4–5.4)
SODIUM SERPL-SCNC: 139 MMOL/L (ref 136–145)
TARGETS BLD QL SMEAR: ABNORMAL
WBC # BLD AUTO: 8.76 K/UL (ref 3.9–12.7)

## 2020-06-01 PROCEDURE — 25000003 PHARM REV CODE 250: Performed by: INTERNAL MEDICINE

## 2020-06-01 PROCEDURE — 80053 COMPREHEN METABOLIC PANEL: CPT

## 2020-06-01 PROCEDURE — 85027 COMPLETE CBC AUTOMATED: CPT

## 2020-06-01 PROCEDURE — 36591 DRAW BLOOD OFF VENOUS DEVICE: CPT

## 2020-06-01 PROCEDURE — 85007 BL SMEAR W/DIFF WBC COUNT: CPT

## 2020-06-01 PROCEDURE — 63600175 PHARM REV CODE 636 W HCPCS: Performed by: INTERNAL MEDICINE

## 2020-06-01 PROCEDURE — A4216 STERILE WATER/SALINE, 10 ML: HCPCS | Performed by: INTERNAL MEDICINE

## 2020-06-01 RX ORDER — SODIUM CHLORIDE 0.9 % (FLUSH) 0.9 %
10 SYRINGE (ML) INJECTION
Status: CANCELLED | OUTPATIENT
Start: 2020-06-01

## 2020-06-01 RX ORDER — HEPARIN 100 UNIT/ML
500 SYRINGE INTRAVENOUS
Status: COMPLETED | OUTPATIENT
Start: 2020-06-01 | End: 2020-06-01

## 2020-06-01 RX ORDER — HEPARIN 100 UNIT/ML
500 SYRINGE INTRAVENOUS
Status: CANCELLED | OUTPATIENT
Start: 2020-06-01

## 2020-06-01 RX ORDER — SODIUM CHLORIDE 0.9 % (FLUSH) 0.9 %
10 SYRINGE (ML) INJECTION
Status: COMPLETED | OUTPATIENT
Start: 2020-06-01 | End: 2020-06-01

## 2020-06-01 RX ADMIN — HEPARIN 500 UNITS: 100 SYRINGE at 08:06

## 2020-06-01 RX ADMIN — SODIUM CHLORIDE, PRESERVATIVE FREE 10 ML: 5 INJECTION INTRAVENOUS at 08:06

## 2020-06-01 NOTE — TELEPHONE ENCOUNTER
Called the patient and asked her if she was taking her calcium supplements? Patient stated that they just increased her calcium from 2 to 3 a day.

## 2020-06-01 NOTE — NURSING
Per Dr. Hurley, Patient's chemo to be held one week due to low platelet count. Patient and scheduling notified.

## 2020-06-01 NOTE — PROGRESS NOTES
[facility] Hematology/Oncology  PROGRESS NOTE -   Telemedicine Visit      Subjective:       Patient ID:   NAME: Valery Huggins : 1940     79 y.o. female    Referring Doc: David Mendieta MD  Other Physicians: Armando Nath; Stanislav Epstein; Azar Donnelly; Mo    Chief Complaint:  Left RCC and rectal CA f/u    History of Present Illness:     Patient is being seen today via a telemedicine follow-up visit in lieu of a normal in-person visit due to the recent COVID19 outbreak. The patient is currently located at home. This visit type is a virtual visit with synchronous audio with video.        The patient is on today to go over the results of the recently ordered labs, tests and studies.     She had previously completed combination XRT and chemotherapy. No CP, SOB, HA's or N/V. She is on by herself today. She last saw Dr Hassan with rad/onc on 2019.    She previously saw Dr Mendieta on 12/10/2019  and had MRI on 2020.  They did scope on 2020 and I spoke to dr mendieta by phone last week. The scope looked good and both the patient and Dr Mendieta does not want to proceed in direction of operation especially given her age and co-morbidities.    We previously discussed adjuvant chemotherapy with FOLOFOX x 10-12 cycles especially since she is not having surgery and she was agreeable.    Will previously provided literature on the regimen and set up the chemotherapy    She has since started chemotherapy and she has had cycle #6 and is due for #7 next week (it was held this week due to counts)    Discussed Covid19 precautions again                ROS:   GEN: normal without any fever, night sweats or weight loss  HEENT: normal with no HA's, sore throat, stiff neck, changes in vision;    CV: normal with no CP, SOB, PND, RAYA or orthopnea  PULM: normal with no SOB, cough, hemoptysis, sputum or pleuritic pain  GI: no nausea since starting patch  : normal with no hematuria, dysuria  BREAST: normal with no mass,  "discharge, pain  SKIN: normal with no rash, erythema, bruising, or swelling    Allergies:  Review of patient's allergies indicates:  No Known Allergies    Medications:    Current Outpatient Medications:     amLODIPine (NORVASC) 10 MG tablet, Take 1 tablet (10 mg total) by mouth once daily., Disp: 90 tablet, Rfl: 0    ascorbic acid (VITAMIN C) 100 MG tablet, Take 100 mg by mouth once daily., Disp: , Rfl:     aspirin (ECOTRIN) 81 MG EC tablet, Take 81 mg by mouth once daily., Disp: , Rfl:     atorvastatin (LIPITOR) 10 MG tablet, Take 10 mg by mouth every evening. , Disp: , Rfl: 2    b complex vitamins capsule, Take 1 capsule by mouth once daily., Disp: , Rfl:     BD ULTRA-FINE CONNIE PEN NEEDLE 32 gauge x 5/32" Ndle, INJECT TWICE DAILY AS DIRECTED, Disp: 200 each, Rfl: 0    calcitRIOL (ROCALTROL) 0.5 MCG Cap, TAKE 1 CAPSULE (0.5 MCG TOTAL) BY MOUTH ONCE DAILY., Disp: 90 capsule, Rfl: 0    calcium carbonate (CALCIUM 300 ORAL), , Disp: , Rfl:     cholecalciferol, vitamin D3, 4,000 unit Cap, Take 1 capsule by mouth once daily., Disp: , Rfl:     docusate sodium (COLACE) 100 MG capsule, Take 100 mg by mouth 2 (two) times daily., Disp: , Rfl:     escitalopram oxalate (LEXAPRO) 20 MG tablet, TAKE ONE TABLET BY MOUTH DAILY, Disp: 90 tablet, Rfl: 0    FREESTYLE LITE METER kit, , Disp: , Rfl: 0    FREESTYLE LITE STRIPS Strp, , Disp: , Rfl: 3    granisetron (SANCUSO) 3.1 mg/24 hour, , Disp: , Rfl:     levothyroxine (SYNTHROID) 88 MCG tablet, Take 1 tablet (88 mcg total) by mouth before breakfast., Disp: 30 tablet, Rfl: 11    magnesium oxide (MAG-OX) 400 mg tablet, TAKE ONE TABLET BY MOUTH TWICE DAILY, Disp: 180 tablet, Rfl: 0    ondansetron (ZOFRAN) 8 MG tablet, Take 1 tablet (8 mg total) by mouth every 8 (eight) hours as needed., Disp: 30 tablet, Rfl: 5    pantoprazole (PROTONIX) 40 MG tablet, TAKE ONE TABLET BY MOUTH DAILY, Disp: 90 tablet, Rfl: 0    promethazine (PHENERGAN) 25 MG tablet, Take 1 tablet (25 mg " total) by mouth every 4 to 6 hours as needed., Disp: 30 tablet, Rfl: 5    TOUJEO SOLOSTAR U-300 INSULIN 300 unit/mL (1.5 mL) InPn pen, INJECT 30 UNITS INTO THE SKIN ONCE DAILY. (Patient not taking: Reported on 5/6/2020), Disp: 4.5 mL, Rfl: 3    valsartan (DIOVAN) 40 MG tablet, Take 40 mg by mouth once daily. , Disp: , Rfl: 3    VICTOZA 3-XAVIER 0.6 mg/0.1 mL (18 mg/3 mL) PnIj, INJECT 1.8MG SUBCUTANEAOUSLY DAILY, Disp: 9 mL, Rfl: 0    Current Facility-Administered Medications:     0.9%  NaCl infusion (for blood administration), , Intravenous, Once, Carrillo Goode MD    PMHx/PSHx Updates:  See patient's last visit with me on 5/5/2020  See H&P on 10/8/2019        Pathology:  Cancer Staging  No matching staging information was found for the patient.          Objective:     Vitals:  Afebrile   144#    Physical Examination:   GEN: no apparent distress, comfortable; AAOx3  HEAD: atraumatic and normocephalic  EYES: no conjunctival pallor or muddiness, no icterus; normal pupil reaction to ambient light  ENT: OMM, no pharyngeal erythema, external bilateral ears WNL; no visible thrush or ulcers  NECK: no masses or swelling, trachea midline, no visible LAD/LN's   CV: no palpitations; no pedal edema; no noticeable JVD or neck vein distension;  portacath on right CW  CHEST: Normal respiratory effort; chest wall breath movements symmetrical; no audible wheezing  ABDOM: non-distended; no bloating  MUSC/Skeletal: ROM normal; joints visibly normal; no deformities or arthropathy  EXTREM: no clubbing, cyanosis, inflammation or swelling  SKIN: no rashes, lesions, ulcers, petechiae or subcutaneous nodules  : no hendrickson  NEURO: moving all 4 extremities; AAOx3; no tremors  PSYCH: normal mood, affect and behavior  LYMPH: no visible LN's or LAD              Labs:     6/1/2020  Lab Results   Component Value Date    WBC 8.76 06/01/2020    HGB 10.1 (L) 06/01/2020    HCT 30.5 (L) 06/01/2020    MCV 93 06/01/2020    PLT 78 (L) 06/01/2020      CMP  Sodium   Date Value Ref Range Status   06/01/2020 139 136 - 145 mmol/L Final     Potassium   Date Value Ref Range Status   06/01/2020 4.2 3.5 - 5.1 mmol/L Final     Chloride   Date Value Ref Range Status   06/01/2020 99 95 - 110 mmol/L Final     CO2   Date Value Ref Range Status   06/01/2020 27 23 - 29 mmol/L Final     Glucose   Date Value Ref Range Status   06/01/2020 114 (H) 70 - 110 mg/dL Final     BUN, Bld   Date Value Ref Range Status   06/01/2020 26 (H) 8 - 23 mg/dL Final     Creatinine   Date Value Ref Range Status   06/01/2020 1.9 (H) 0.5 - 1.4 mg/dL Final     Calcium   Date Value Ref Range Status   06/01/2020 6.9 (LL) 8.7 - 10.5 mg/dL Final     Comment:     Calcium critical result(s) repeated. Called and verbal readback   obtained from Aracelis Moody RN/Dr. Goode's office by [id number]   06/01/2020 11:34       Total Protein   Date Value Ref Range Status   06/01/2020 6.2 6.0 - 8.4 g/dL Final     Albumin   Date Value Ref Range Status   06/01/2020 3.2 (L) 3.5 - 5.2 g/dL Final     Total Bilirubin   Date Value Ref Range Status   06/01/2020 1.0 0.1 - 1.0 mg/dL Final     Comment:     For infants and newborns, interpretation of results should be based  on gestational age, weight and in agreement with clinical  observations.  Premature Infant recommended reference ranges:  Up to 24 hours.............<8.0 mg/dL  Up to 48 hours............<12.0 mg/dL  3-5 days..................<15.0 mg/dL  6-29 days.................<15.0 mg/dL       Alkaline Phosphatase   Date Value Ref Range Status   06/01/2020 150 (H) 55 - 135 U/L Final     AST   Date Value Ref Range Status   06/01/2020 82 (H) 10 - 40 U/L Final     ALT   Date Value Ref Range Status   06/01/2020 66 (H) 10 - 44 U/L Final     Anion Gap   Date Value Ref Range Status   06/01/2020 13 8 - 16 mmol/L Final     eGFR if    Date Value Ref Range Status   06/01/2020 28.9 (A) >60 mL/min/1.73 m^2 Final     eGFR if non    Date Value Ref Range  Status   06/01/2020 25.1 (A) >60 mL/min/1.73 m^2 Final     Comment:     Calculation used to obtain the estimated glomerular filtration  rate (eGFR) is the CKD-EPI equation.        SARS-CoV2 (COVID-19) Qualitative PCR Not Detected           Radiology/Diagnostic Studies:    CT  3/5/2020    Impression       Status post left nephrectomy without evidence of recurrent renal malignancy.    No significant change since prior study.           MRI  1/8/2020:  Impression       Significant reduction in size of the patient's known mid rectal mass, which is no longer distinctly visible.  Reduction in size of 3 mesorectal lymph nodes as above.               PET  10/17/2019    Impression       1. Left paratracheal small mass extending into superior mediastinum with associated moderate FDG activity is unchanged in size and appearance since 02/16/2018 CT.  This is unlikely to represent metastatic disease or malignancy, given stability, and may represent residual thyroid parenchyma but is otherwise nonspecific.  2. Unchanged 6 mm right upper lobe nodule since 02/16/2018.  Benign etiology is likely the continued CT thorax without IV contrast follow-up is recommended in 12 months to document greater than 2 years of stability.  3. No current convincing evidence of metastatic disease.  4. Previous rectal mass is no longer evident on this exam.               X-ray Chest Ap Portable    Result Date: 10/15/2019  EXAMINATION: XR CHEST AP PORTABLE CLINICAL HISTORY: s/p port; Encounter for adjustment and management of vascular access device TECHNIQUE: Single frontal view of the chest was performed. COMPARISON: 6/18/2019 FINDINGS: The cardiomediastinal silhouette is stable.  Lungs are clear of infiltrate.  No pleural effusions.  Laya catheter has been inserted from the region of the right subclavian vein with the tip at the level the proximal SVC.     Laya catheter inserted with the tip at the level the proximal SVC.  Lungs are expanded and  clear.  No pneumothorax. Electronically signed by: Nubia Venegas MD Date:    10/15/2019 Time:    14:05    Surg Fl Surgery Fluoro Usage    Result Date: 10/15/2019  See OP Notes for results. IMPRESSION: See OP Notes for results. This procedure was auto-finalized by: Virtual Radiologist     Mri Rectal Cancer Without Contrast    Result Date: 9/18/2019  EXAMINATION: MRI RECTAL CANCER WITHOUT CONTRAST CLINICAL HISTORY: Rectal cancer, staging, locoregional;rectal anal mass;  Malignant neoplasm of rectum TECHNIQUE: Multiplanar multisequence MR imaging of the pelvis without contrast. COMPARISON: 07/12/2019 FINDINGS: Approximately 4 cm from the anal verge there is a peripherally located lobular mass along the dorsal rectal wall.  This measures 4 cm in length and 3.3 cm in diameter.  There is heterogeneous signal intensity.  There is restricted diffusion in the lesion and no discrete extra colonic extension.  Several lymph nodes are noted in the mesorectal fat including two which measure 3 mm near the inferior sacrum, series 8, image 16, and a 5 mm lymph node at the S2 level, series 8, image 8.  Prominent but nonenlarged bilateral obturator chain lymph nodes also noted, on the right at 4 mm and left at 6 mm.  There is urinary bladder wall thickening which is diffuse.  Moderate degree of stool in the colon.  No dilated bowel loops.  Hysterectomy.     4 cm mid rectal lesion in keeping with known malignancy.  No discrete extension into the mesorectal fascia although there are several perirectal lymph nodes which raise the possibility for locoregional lymph node involvement. Electronically signed by: Bebo Kapoor Date:    09/18/2019 Time:    16:45      I have reviewed all available lab results and radiology reports.    Assessment/Plan:   (1) 79 y.o. female with diagnosis of prior left RCC who has been referred by David Mendieta MD for evaluation by medical hematology/oncology. She has been followed by Dr Stanislav Epstein with  [facility] the [facility] in [place]. She last saw Dr Epstein in July 2019. She was recently diagnosed with rectal mass by Dr Armando Duff which was found on colonoscopy on 8/26/2019. She had presented with lower Gi bleeding at that time. The pathology from those biopsies showed no evidence of malignancy at that time. She was subsequently seen by Dr David Mendieta and underwent trans-anal surgical biopsy on 9/23/2019. The pathology from the surgical biopsy showed rectal carcinoma.         She has been seen by Dr Fareed Hassan with Rad/onc for radiation therapy evaluation.     PET was done on 10/17/2019     We previously discussed giving her combined chemotherapy with the XRt to try to reduce her risk of recurrence. She was agreeable to this plan.    She had portacath placed with Dr Mendieta. She saw Dr Hassan last week with rad/onc. She has since completed weekly XRT and chemotherapy    - She previously saw Dr Mendieta on 12/10/2019  and had MRI on 1/8/2020.  They did scope on 1/28th 2020 and I spoke to dr mendieta by phone last week. The scope looked good and both the patient and Dr Mendieta does not want to proceed in direction of operation especially given her age and co-morbidities.    - We previously discussed adjuvant chemotherapy with FOLOFOX x 10-12 cycles especially since she is not having surgery and she is agreeable.    - Will previously  provide literature on the regimen and set up chemotherapy    - she since started chemotherapy and she has had cycle #6; #7 is held till next week due to counts        (2) Left renal cell carcinoma s/p left nephrectomy with Dr Azar Donnelly - diagnosed about 2 years ago     (3) Ureterolithiasis     (4) CRI - stage III - followed by Dr Antonio; she saw him recently and per patient's report, her kidney function is stable per Dr Antonio  - she is seeing Dr Antonio this coming Thursday     (5) HTN and hypercholesterolemia     (6) DM - followed by Dr Brower with  endocrinology     (7) Hx/of gall stones     (8) Chronic left shoulder issues     (9) MVP     (10) Thyroid disease with prior hx/of thyroid cancer s/p thyroidectomy      (11) Chronic anemia - most likley multifactorial due to iron deficiency, GIB and anemia of chronic renal disease  - hgb at 10.1 currently    (12) hypocalcemia - followed by Dr Serrano       VISIT DIAGNOSES:      Malignant neoplasm of rectum    Chemotherapy induced neutropenia    Normocytic anemia    Rectal cancer    Renal cell carcinoma of left kidney    Rectal mass          PLAN:  1.  Encouraged compliance with labs; discussed COVID19 precautions  2. Continue current regimen of FOLFOX - at reduced dose by 20%  3.  F/u with PCP, GI, rad/onc , etc  4.  F/u nephrology - Dr Antonio regularly while on this regimen  5. Refill antiemetics as needed  6. RTC in 4 weeks     Fax note to Pham Winston, Gayatri; Tete Duff Tandron; Paco; Maggie    Discussion:       I spent over 25 mins of time with the patient. Reviewed results of the recently ordered labs, tests and studies; made directives with regards to the results. Over half of this time was spent couseling and coordinating care.    COVID-19 Discussion:    I had long discussion with patient and any applicable family about the COVID-19 coronavirus epidemic and the recommended precautions with regard to cancer and/or hematology patients. I have re-iterated the CDC recommendations for adequate hand washing, use of hand -like products, and coughing into elbow, etc. In addition, especially for our patients who are on chemotherapy and/or our otherwise immunocompromised patients, I have recommended avoidance of crowds, including movie theaters, restaurants, churches, etc. I have recommended avoidance of any sick or symptomatic family members and/or friends. I have also recommended avoidance of any raw and unwashed food products, and general avoidance of food items that have not been prepared by  themselves. The patient has been asked to call us immediately with any symptom developments, issues, questions or other general concerns.       Chemotherapy Discussion:      I discussed the available treatment option(s) in accordance with the latest/current national evidence-based guidelines (NCCN, UpToDate, NCI, ASCO, etc where applicable), their overall age/condition and their co-morbidities. I also went over the risks and benefits of the chemotherapy with regard to their particular cancer type, their cancer stage, their age/condition, and their co-morbidities. I provided literature on the chemotherapy regimen and discussed the chemotherapy side-effect profiles of the drug(s). I discussed the importance of compliance with obtaining and monitoring weekly lab work, and went over the potential hematopathology issues and risks with anemia, leucopenia and thrombocytopenia that can occur with chemotherapy. I discussed the potential risks of liver and kidney damage, which could be permanent and could necessitate dialysis long-term if kidney failure developed. I discussed the emetic and/or diarrheal potential of the regimen and the potential need for use of antiemetic and anti-diarrheal medications. I discussed the risk for development of anaphylactic shock, bronchospasm, dysrhythmia, and respiratory/cardiovascular arrest and/or failure. I discussed the potential risks for development of alopecia, cold sensory issues, ringing in ears, vertigo, cataracts, glaucoma, and neuropathy, all of which could end up being chronic and life-long. Some chemotherpyI discussed the risks of hand-foot syndrome and rashes, and development of other autoimmune mediated processes such as pneumonitis, hepatitis, and colitis which could be life threatening. I discussed the risks of the potential development of a rare but fatal viral mediated disease known as PML (Progressive Multifocal Leukoencephalopathy), and risk of future development of  leukemia and/or lymphoma from use of certain chemotherapy agents. I discussed the need for neutropenic precautions, basic hygiene/sanitation behaviors and dietary restrictions.    The patient's consent has been obtained to proceed with the chemotherapy.The patient will be referred to [facility] for training and education on chemotherapy, use of antiemetics and/or anti-diarrheals, use of NSAID's, potential chemotherapy side-effects, and any specific recommendations and precautions with the particular chemotherapy agents.      I answered all of the patient's (and family's, if applicable) questions to the best of my ability and to their complete satisfaction. The patient acknowledged full understanding of the risks, recommendations and plan(s).     Telemedicine Statement:    The patient acknowledged and agreed to the audio/video encounter and the patient who is being provided medical services by telemedicine is:  (1) informed of the relationship between the physician and patient and the respective role of any other health care provider with respect to management of the patient; and (2) notified that he or she may decline to receive medical services by telemedicine and may withdraw from such care at any time.      I have explained all of the above in detail and the patient understands all of the current recommendation(s). I have answered all of their questions to the best of my ability and to their complete satisfaction.   The patient is to continue with the current management plan.            Electronically signed by Carrillo Goode MD          Answers for HPI/ROS submitted by the patient on 6/2/2020   appetite change : No  unexpected weight change: No  visual disturbance: No  cough: No  shortness of breath: No  chest pain: No  abdominal pain: No  diarrhea: No  frequency: No  back pain: No  rash: No  headaches: No  adenopathy: No  nervous/ anxious: No

## 2020-06-01 NOTE — PLAN OF CARE
Problem: Nausea and Vomiting (Chemotherapy Effects)  Goal: Fluid and Electrolyte Balance  Outcome: Ongoing, Progressing  Intervention: Prevent and Manage Nausea and Vomiting  Flowsheets (Taken 6/1/2020 0750)  Nausea/Vomiting Interventions: nausea triggers minimized  Environmental Support: rest periods encouraged     Problem: Neutropenia (Chemotherapy Effects)  Goal: Absence of Infection  Outcome: Ongoing, Progressing  Intervention: Prevent Infection and Maximize Resistance  Flowsheets (Taken 6/1/2020 0750)  Infection Prevention: equipment surfaces disinfected; personal protective equipment utilized; visitors restricted/screened

## 2020-06-02 ENCOUNTER — OFFICE VISIT (OUTPATIENT)
Dept: HEMATOLOGY/ONCOLOGY | Facility: CLINIC | Age: 80
End: 2020-06-02
Payer: COMMERCIAL

## 2020-06-02 DIAGNOSIS — D64.9 NORMOCYTIC ANEMIA: ICD-10-CM

## 2020-06-02 DIAGNOSIS — C20 RECTAL CANCER: ICD-10-CM

## 2020-06-02 DIAGNOSIS — C64.2 RENAL CELL CARCINOMA OF LEFT KIDNEY: ICD-10-CM

## 2020-06-02 DIAGNOSIS — C20 MALIGNANT NEOPLASM OF RECTUM: Primary | ICD-10-CM

## 2020-06-02 DIAGNOSIS — K62.89 RECTAL MASS: ICD-10-CM

## 2020-06-02 DIAGNOSIS — D70.1 CHEMOTHERAPY INDUCED NEUTROPENIA: ICD-10-CM

## 2020-06-02 DIAGNOSIS — T45.1X5A CHEMOTHERAPY INDUCED NEUTROPENIA: ICD-10-CM

## 2020-06-02 PROCEDURE — 99214 OFFICE O/P EST MOD 30 MIN: CPT | Mod: 95,,, | Performed by: INTERNAL MEDICINE

## 2020-06-02 PROCEDURE — 1101F PT FALLS ASSESS-DOCD LE1/YR: CPT | Mod: ,,, | Performed by: INTERNAL MEDICINE

## 2020-06-02 PROCEDURE — 1159F PR MEDICATION LIST DOCUMENTED IN MEDICAL RECORD: ICD-10-PCS | Mod: ,,, | Performed by: INTERNAL MEDICINE

## 2020-06-02 PROCEDURE — 1101F PR PT FALLS ASSESS DOC 0-1 FALLS W/OUT INJ PAST YR: ICD-10-PCS | Mod: ,,, | Performed by: INTERNAL MEDICINE

## 2020-06-02 PROCEDURE — 99214 PR OFFICE/OUTPT VISIT, EST, LEVL IV, 30-39 MIN: ICD-10-PCS | Mod: 95,,, | Performed by: INTERNAL MEDICINE

## 2020-06-02 PROCEDURE — 1159F MED LIST DOCD IN RCRD: CPT | Mod: ,,, | Performed by: INTERNAL MEDICINE

## 2020-06-08 ENCOUNTER — INFUSION (OUTPATIENT)
Dept: INFUSION THERAPY | Facility: HOSPITAL | Age: 80
End: 2020-06-08
Attending: INTERNAL MEDICINE
Payer: COMMERCIAL

## 2020-06-08 VITALS
SYSTOLIC BLOOD PRESSURE: 155 MMHG | DIASTOLIC BLOOD PRESSURE: 92 MMHG | RESPIRATION RATE: 18 BRPM | WEIGHT: 144.31 LBS | HEIGHT: 61 IN | HEART RATE: 92 BPM | TEMPERATURE: 98 F | BODY MASS INDEX: 27.25 KG/M2

## 2020-06-08 DIAGNOSIS — E83.51 HYPOCALCEMIA: Primary | ICD-10-CM

## 2020-06-08 DIAGNOSIS — D70.1 CHEMOTHERAPY INDUCED NEUTROPENIA: ICD-10-CM

## 2020-06-08 DIAGNOSIS — T45.1X5A CHEMOTHERAPY INDUCED NEUTROPENIA: ICD-10-CM

## 2020-06-08 DIAGNOSIS — C20 RECTAL CANCER: ICD-10-CM

## 2020-06-08 DIAGNOSIS — E83.51 HYPOCALCEMIA: ICD-10-CM

## 2020-06-08 LAB — MAGNESIUM SERPL-MCNC: 1.7 MG/DL (ref 1.6–2.6)

## 2020-06-08 PROCEDURE — 96411 CHEMO IV PUSH ADDL DRUG: CPT

## 2020-06-08 PROCEDURE — 36591 DRAW BLOOD OFF VENOUS DEVICE: CPT

## 2020-06-08 PROCEDURE — 96366 THER/PROPH/DIAG IV INF ADDON: CPT

## 2020-06-08 PROCEDURE — 25000003 PHARM REV CODE 250: Performed by: INTERNAL MEDICINE

## 2020-06-08 PROCEDURE — 63600175 PHARM REV CODE 636 W HCPCS: Performed by: NURSE PRACTITIONER

## 2020-06-08 PROCEDURE — 96367 TX/PROPH/DG ADDL SEQ IV INF: CPT

## 2020-06-08 PROCEDURE — 63600175 PHARM REV CODE 636 W HCPCS: Performed by: INTERNAL MEDICINE

## 2020-06-08 PROCEDURE — 83735 ASSAY OF MAGNESIUM: CPT

## 2020-06-08 PROCEDURE — 25000003 PHARM REV CODE 250: Performed by: NURSE PRACTITIONER

## 2020-06-08 PROCEDURE — 96415 CHEMO IV INFUSION ADDL HR: CPT

## 2020-06-08 PROCEDURE — 96368 THER/DIAG CONCURRENT INF: CPT

## 2020-06-08 PROCEDURE — 96413 CHEMO IV INFUSION 1 HR: CPT

## 2020-06-08 PROCEDURE — 96416 CHEMO PROLONG INFUSE W/PUMP: CPT

## 2020-06-08 RX ORDER — FLUOROURACIL 50 MG/ML
280 INJECTION, SOLUTION INTRAVENOUS
Status: COMPLETED | OUTPATIENT
Start: 2020-06-08 | End: 2020-06-08

## 2020-06-08 RX ORDER — HEPARIN 100 UNIT/ML
500 SYRINGE INTRAVENOUS
Status: DISCONTINUED | OUTPATIENT
Start: 2020-06-08 | End: 2020-06-08 | Stop reason: HOSPADM

## 2020-06-08 RX ORDER — SODIUM CHLORIDE 0.9 % (FLUSH) 0.9 %
10 SYRINGE (ML) INJECTION
Status: DISCONTINUED | OUTPATIENT
Start: 2020-06-08 | End: 2020-06-08 | Stop reason: HOSPADM

## 2020-06-08 RX ORDER — EPINEPHRINE 0.3 MG/.3ML
0.3 INJECTION SUBCUTANEOUS ONCE AS NEEDED
Status: DISCONTINUED | OUTPATIENT
Start: 2020-06-08 | End: 2020-06-08 | Stop reason: HOSPADM

## 2020-06-08 RX ORDER — DIPHENHYDRAMINE HYDROCHLORIDE 50 MG/ML
50 INJECTION INTRAMUSCULAR; INTRAVENOUS ONCE AS NEEDED
Status: DISCONTINUED | OUTPATIENT
Start: 2020-06-08 | End: 2020-06-08 | Stop reason: HOSPADM

## 2020-06-08 RX ADMIN — FLUOROURACIL 2820 MG: 50 INJECTION, SOLUTION INTRAVENOUS at 01:06

## 2020-06-08 RX ADMIN — FLUOROURACIL 470 MG: 50 INJECTION, SOLUTION INTRAVENOUS at 01:06

## 2020-06-08 RX ADMIN — LEUCOVORIN CALCIUM 470 MG: 350 INJECTION, POWDER, LYOPHILIZED, FOR SOLUTION INTRAMUSCULAR; INTRAVENOUS at 11:06

## 2020-06-08 RX ADMIN — CALCIUM GLUCONATE 1000 MG: 98 INJECTION, SOLUTION INTRAVENOUS at 09:06

## 2020-06-08 RX ADMIN — OXALIPLATIN 100 MG: 5 INJECTION, SOLUTION INTRAVENOUS at 11:06

## 2020-06-08 RX ADMIN — PALONOSETRON HYDROCHLORIDE: 0.25 INJECTION, SOLUTION INTRAVENOUS at 11:06

## 2020-06-08 NOTE — PROGRESS NOTES
Patients calcium gluconate 1 G today and will repeat CMP on Wednesday prior to pump d/c. If still low with repeat calcium gluconate

## 2020-06-10 ENCOUNTER — INFUSION (OUTPATIENT)
Dept: INFUSION THERAPY | Facility: HOSPITAL | Age: 80
End: 2020-06-10
Attending: INTERNAL MEDICINE
Payer: COMMERCIAL

## 2020-06-10 VITALS
DIASTOLIC BLOOD PRESSURE: 77 MMHG | WEIGHT: 144.69 LBS | SYSTOLIC BLOOD PRESSURE: 134 MMHG | OXYGEN SATURATION: 100 % | RESPIRATION RATE: 18 BRPM | TEMPERATURE: 98 F | BODY MASS INDEX: 27.34 KG/M2 | HEART RATE: 92 BPM

## 2020-06-10 DIAGNOSIS — C20 RECTAL CANCER: ICD-10-CM

## 2020-06-10 DIAGNOSIS — E83.51 HYPOCALCEMIA: Primary | ICD-10-CM

## 2020-06-10 DIAGNOSIS — T45.1X5A CHEMOTHERAPY INDUCED NEUTROPENIA: ICD-10-CM

## 2020-06-10 DIAGNOSIS — D70.1 CHEMOTHERAPY INDUCED NEUTROPENIA: ICD-10-CM

## 2020-06-10 PROCEDURE — 96365 THER/PROPH/DIAG IV INF INIT: CPT

## 2020-06-10 PROCEDURE — A4216 STERILE WATER/SALINE, 10 ML: HCPCS | Performed by: INTERNAL MEDICINE

## 2020-06-10 PROCEDURE — 63600175 PHARM REV CODE 636 W HCPCS: Performed by: NURSE PRACTITIONER

## 2020-06-10 PROCEDURE — 63600175 PHARM REV CODE 636 W HCPCS: Performed by: INTERNAL MEDICINE

## 2020-06-10 PROCEDURE — 25000003 PHARM REV CODE 250: Performed by: NURSE PRACTITIONER

## 2020-06-10 PROCEDURE — 25000003 PHARM REV CODE 250: Performed by: INTERNAL MEDICINE

## 2020-06-10 RX ORDER — HEPARIN 100 UNIT/ML
500 SYRINGE INTRAVENOUS
Status: DISCONTINUED | OUTPATIENT
Start: 2020-06-10 | End: 2020-06-10 | Stop reason: HOSPADM

## 2020-06-10 RX ORDER — SODIUM CHLORIDE 0.9 % (FLUSH) 0.9 %
10 SYRINGE (ML) INJECTION
Status: DISCONTINUED | OUTPATIENT
Start: 2020-06-10 | End: 2020-06-10 | Stop reason: HOSPADM

## 2020-06-10 RX ADMIN — CALCIUM GLUCONATE 1000 MG: 98 INJECTION, SOLUTION INTRAVENOUS at 11:06

## 2020-06-10 RX ADMIN — HEPARIN 500 UNITS: 100 SYRINGE at 12:06

## 2020-06-10 RX ADMIN — SODIUM CHLORIDE, PRESERVATIVE FREE 10 ML: 5 INJECTION INTRAVENOUS at 12:06

## 2020-06-14 RX ORDER — FLUOROURACIL 50 MG/ML
280 INJECTION, SOLUTION INTRAVENOUS
Status: CANCELLED | OUTPATIENT
Start: 2020-06-22

## 2020-06-14 RX ORDER — DIPHENHYDRAMINE HYDROCHLORIDE 50 MG/ML
50 INJECTION INTRAMUSCULAR; INTRAVENOUS ONCE AS NEEDED
Status: CANCELLED | OUTPATIENT
Start: 2020-06-22

## 2020-06-14 RX ORDER — HEPARIN 100 UNIT/ML
500 SYRINGE INTRAVENOUS
Status: CANCELLED | OUTPATIENT
Start: 2020-06-22

## 2020-06-14 RX ORDER — EPINEPHRINE 0.3 MG/.3ML
0.3 INJECTION SUBCUTANEOUS ONCE AS NEEDED
Status: CANCELLED | OUTPATIENT
Start: 2020-06-22

## 2020-06-14 RX ORDER — SODIUM CHLORIDE 0.9 % (FLUSH) 0.9 %
10 SYRINGE (ML) INJECTION
Status: CANCELLED | OUTPATIENT
Start: 2020-06-24

## 2020-06-14 RX ORDER — SODIUM CHLORIDE 0.9 % (FLUSH) 0.9 %
10 SYRINGE (ML) INJECTION
Status: CANCELLED | OUTPATIENT
Start: 2020-06-22

## 2020-06-14 RX ORDER — HEPARIN 100 UNIT/ML
500 SYRINGE INTRAVENOUS
Status: CANCELLED | OUTPATIENT
Start: 2020-06-24

## 2020-06-22 ENCOUNTER — INFUSION (OUTPATIENT)
Dept: INFUSION THERAPY | Facility: HOSPITAL | Age: 80
End: 2020-06-22
Attending: INTERNAL MEDICINE
Payer: COMMERCIAL

## 2020-06-22 VITALS
SYSTOLIC BLOOD PRESSURE: 110 MMHG | HEART RATE: 92 BPM | WEIGHT: 144.69 LBS | BODY MASS INDEX: 27.32 KG/M2 | RESPIRATION RATE: 18 BRPM | DIASTOLIC BLOOD PRESSURE: 78 MMHG | TEMPERATURE: 98 F | HEIGHT: 61 IN

## 2020-06-22 DIAGNOSIS — D70.1 CHEMOTHERAPY INDUCED NEUTROPENIA: ICD-10-CM

## 2020-06-22 DIAGNOSIS — E83.51 HYPOCALCEMIA: Primary | ICD-10-CM

## 2020-06-22 DIAGNOSIS — T45.1X5A CHEMOTHERAPY INDUCED NEUTROPENIA: ICD-10-CM

## 2020-06-22 DIAGNOSIS — C20 RECTAL CANCER: ICD-10-CM

## 2020-06-22 PROCEDURE — 96415 CHEMO IV INFUSION ADDL HR: CPT

## 2020-06-22 PROCEDURE — 96411 CHEMO IV PUSH ADDL DRUG: CPT

## 2020-06-22 PROCEDURE — 96366 THER/PROPH/DIAG IV INF ADDON: CPT

## 2020-06-22 PROCEDURE — 96367 TX/PROPH/DG ADDL SEQ IV INF: CPT

## 2020-06-22 PROCEDURE — 96368 THER/DIAG CONCURRENT INF: CPT

## 2020-06-22 PROCEDURE — 96416 CHEMO PROLONG INFUSE W/PUMP: CPT

## 2020-06-22 PROCEDURE — 96413 CHEMO IV INFUSION 1 HR: CPT

## 2020-06-22 PROCEDURE — 63600175 PHARM REV CODE 636 W HCPCS: Performed by: INTERNAL MEDICINE

## 2020-06-22 PROCEDURE — 25000003 PHARM REV CODE 250: Performed by: INTERNAL MEDICINE

## 2020-06-22 RX ORDER — DIPHENHYDRAMINE HYDROCHLORIDE 50 MG/ML
50 INJECTION INTRAMUSCULAR; INTRAVENOUS ONCE AS NEEDED
Status: DISCONTINUED | OUTPATIENT
Start: 2020-06-22 | End: 2020-06-22 | Stop reason: HOSPADM

## 2020-06-22 RX ORDER — SODIUM CHLORIDE 0.9 % (FLUSH) 0.9 %
10 SYRINGE (ML) INJECTION
Status: DISCONTINUED | OUTPATIENT
Start: 2020-06-22 | End: 2020-06-22 | Stop reason: HOSPADM

## 2020-06-22 RX ORDER — HEPARIN 100 UNIT/ML
500 SYRINGE INTRAVENOUS
Status: DISCONTINUED | OUTPATIENT
Start: 2020-06-22 | End: 2020-06-22 | Stop reason: HOSPADM

## 2020-06-22 RX ORDER — FLUOROURACIL 50 MG/ML
280 INJECTION, SOLUTION INTRAVENOUS
Status: COMPLETED | OUTPATIENT
Start: 2020-06-22 | End: 2020-06-22

## 2020-06-22 RX ORDER — EPINEPHRINE 0.3 MG/.3ML
0.3 INJECTION SUBCUTANEOUS ONCE AS NEEDED
Status: DISCONTINUED | OUTPATIENT
Start: 2020-06-22 | End: 2020-06-22 | Stop reason: HOSPADM

## 2020-06-22 RX ADMIN — DEXAMETHASONE SODIUM PHOSPHATE: 4 INJECTION, SOLUTION INTRA-ARTICULAR; INTRALESIONAL; INTRAMUSCULAR; INTRAVENOUS; SOFT TISSUE at 10:06

## 2020-06-22 RX ADMIN — FLUOROURACIL 470 MG: 50 INJECTION, SOLUTION INTRAVENOUS at 01:06

## 2020-06-22 RX ADMIN — FLUOROURACIL 2820 MG: 50 INJECTION, SOLUTION INTRAVENOUS at 01:06

## 2020-06-22 RX ADMIN — OXALIPLATIN 100 MG: 5 INJECTION, SOLUTION INTRAVENOUS at 10:06

## 2020-06-22 RX ADMIN — DEXTROSE 470 MG: 5 SOLUTION INTRAVENOUS at 10:06

## 2020-06-22 NOTE — PLAN OF CARE
Problem: Fatigue  Goal: Improved Activity Tolerance  Intervention: Promote Energy Conservation  Flowsheets (Taken 6/22/2020 1026)  Fatigue Management:   frequent rest breaks encouraged   fatigue-related activity identified  Sleep/Rest Enhancement:   relaxation techniques promoted   regular sleep/rest pattern promoted  Activity Management: activity minimized

## 2020-06-24 ENCOUNTER — INFUSION (OUTPATIENT)
Dept: INFUSION THERAPY | Facility: HOSPITAL | Age: 80
End: 2020-06-24
Attending: INTERNAL MEDICINE
Payer: COMMERCIAL

## 2020-06-24 VITALS
RESPIRATION RATE: 18 BRPM | BODY MASS INDEX: 27.15 KG/M2 | HEART RATE: 98 BPM | SYSTOLIC BLOOD PRESSURE: 122 MMHG | TEMPERATURE: 98 F | WEIGHT: 143.69 LBS | DIASTOLIC BLOOD PRESSURE: 65 MMHG

## 2020-06-24 DIAGNOSIS — T45.1X5A CHEMOTHERAPY INDUCED NEUTROPENIA: ICD-10-CM

## 2020-06-24 DIAGNOSIS — C20 RECTAL CANCER: ICD-10-CM

## 2020-06-24 DIAGNOSIS — E83.51 HYPOCALCEMIA: Primary | ICD-10-CM

## 2020-06-24 DIAGNOSIS — D70.1 CHEMOTHERAPY INDUCED NEUTROPENIA: ICD-10-CM

## 2020-06-24 PROCEDURE — 96523 IRRIG DRUG DELIVERY DEVICE: CPT

## 2020-06-24 PROCEDURE — 25000003 PHARM REV CODE 250: Performed by: INTERNAL MEDICINE

## 2020-06-24 PROCEDURE — 63600175 PHARM REV CODE 636 W HCPCS: Performed by: INTERNAL MEDICINE

## 2020-06-24 PROCEDURE — A4216 STERILE WATER/SALINE, 10 ML: HCPCS | Performed by: INTERNAL MEDICINE

## 2020-06-24 RX ORDER — SODIUM CHLORIDE 0.9 % (FLUSH) 0.9 %
10 SYRINGE (ML) INJECTION
Status: DISCONTINUED | OUTPATIENT
Start: 2020-06-24 | End: 2020-06-24 | Stop reason: HOSPADM

## 2020-06-24 RX ORDER — HEPARIN 100 UNIT/ML
500 SYRINGE INTRAVENOUS
Status: DISCONTINUED | OUTPATIENT
Start: 2020-06-24 | End: 2020-06-24 | Stop reason: HOSPADM

## 2020-06-24 RX ADMIN — SODIUM CHLORIDE, PRESERVATIVE FREE 10 ML: 5 INJECTION INTRAVENOUS at 11:06

## 2020-06-24 RX ADMIN — HEPARIN 500 UNITS: 100 SYRINGE at 11:06

## 2020-06-27 LAB
ALBUMIN SERPL-MCNC: 3.2 G/DL (ref 3.6–5.1)
ALBUMIN/GLOB SERPL: 1.3 (CALC) (ref 1–2.5)
ALP SERPL-CCNC: 109 U/L (ref 37–153)
ALT SERPL-CCNC: 22 U/L (ref 6–29)
AST SERPL-CCNC: 24 U/L (ref 10–35)
BASOPHILS # BLD AUTO: 10 CELLS/UL (ref 0–200)
BASOPHILS NFR BLD AUTO: 0.5 %
BILIRUB SERPL-MCNC: 0.6 MG/DL (ref 0.2–1.2)
BUN SERPL-MCNC: 40 MG/DL (ref 7–25)
BUN/CREAT SERPL: 23 (CALC) (ref 6–22)
CALCIUM SERPL-MCNC: 8.6 MG/DL (ref 8.6–10.4)
CHLORIDE SERPL-SCNC: 97 MMOL/L (ref 98–110)
CO2 SERPL-SCNC: 29 MMOL/L (ref 20–32)
CREAT SERPL-MCNC: 1.75 MG/DL (ref 0.6–0.93)
EOSINOPHIL # BLD AUTO: 30 CELLS/UL (ref 15–500)
EOSINOPHIL NFR BLD AUTO: 1.5 %
ERYTHROCYTE [DISTWIDTH] IN BLOOD BY AUTOMATED COUNT: 18.5 % (ref 11–15)
GFRSERPLBLD MDRD-ARVRAT: 28 ML/MIN/1.73M2
GLOBULIN SER CALC-MCNC: 2.5 G/DL (CALC) (ref 1.9–3.7)
GLUCOSE SERPL-MCNC: 132 MG/DL (ref 65–139)
HCT VFR BLD AUTO: 28.1 % (ref 35–45)
HGB BLD-MCNC: 9.3 G/DL (ref 11.7–15.5)
LYMPHOCYTES # BLD AUTO: 598 CELLS/UL (ref 850–3900)
LYMPHOCYTES NFR BLD AUTO: 29.9 %
MCH RBC QN AUTO: 32.2 PG (ref 27–33)
MCHC RBC AUTO-ENTMCNC: 33.1 G/DL (ref 32–36)
MCV RBC AUTO: 97.2 FL (ref 80–100)
MONOCYTES # BLD AUTO: 118 CELLS/UL (ref 200–950)
MONOCYTES NFR BLD AUTO: 5.9 %
NEUTROPHILS # BLD AUTO: 1244 CELLS/UL (ref 1500–7800)
NEUTROPHILS NFR BLD AUTO: 62.2 %
PLATELET # BLD AUTO: 132 THOUSAND/UL (ref 140–400)
PMV BLD REES-ECKER: 10.5 FL (ref 7.5–12.5)
POTASSIUM SERPL-SCNC: 4.8 MMOL/L (ref 3.5–5.3)
PROT SERPL-MCNC: 5.7 G/DL (ref 6.1–8.1)
RBC # BLD AUTO: 2.89 MILLION/UL (ref 3.8–5.1)
SODIUM SERPL-SCNC: 136 MMOL/L (ref 135–146)
WBC # BLD AUTO: 2 THOUSAND/UL (ref 3.8–10.8)

## 2020-07-01 NOTE — PROGRESS NOTES
[facility] Hematology/Oncology  PROGRESS NOTE -   Follow-up Visit      Subjective:       Patient ID:   NAME: Valery Huggins : 1940     79 y.o. female    Referring Doc: David Mendieta MD  Other Physicians: Armando Nath; Stanislav Epstein; Azar Donnelly; Mo    Chief Complaint:  Left RCC and rectal CA f/u    History of Present Illness:     Patient is being seen today     The patient is on today to go over the results of the recently ordered labs, tests and studies.     She had previously completed combination XRT and chemotherapy. No CP, SOB, HA's or N/V. She is on by herself today. She last saw Dr Hassan with rad/onc on 2019.    She previously saw Dr Mendieta on 12/10/2019  and had MRI on 2020.  They did scope on 2020 and I spoke to dr mendieta by phone last week. The scope looked good and both the patient and Dr Mendieta does not want to proceed in direction of operation especially given her age and co-morbidities.    We previously discussed adjuvant chemotherapy with FOLOFOX x 10-12 cycles especially since she is not having surgery and she was agreeable.    Will previously provided literature on the regimen and set up the chemotherapy    She has since started chemotherapy and she has had 8 cycles so far; chemotherapy was held last time due to general malaise. She feels good and has some minimal and tolerable neuropathy in hands and feet.  She has some intermittent constipation.    She wants to continue with therapy and I would like to at least get 10 cycles in if possible.     She had labs today      Discussed Covid19 precautions again                ROS:   GEN: normal without any fever, night sweats or weight loss  HEENT: normal with no HA's, sore throat, stiff neck, changes in vision;    CV: normal with no CP, SOB, PND, RAYA or orthopnea  PULM: normal with no SOB, cough, hemoptysis, sputum or pleuritic pain  GI: no nausea since starting patch; some intermittent constipation  : normal with no  "hematuria, dysuria  BREAST: normal with no mass, discharge, pain  SKIN: normal with no rash, erythema, bruising, or swelling    Allergies:  Review of patient's allergies indicates:  No Known Allergies    Medications:    Current Outpatient Medications:     amLODIPine (NORVASC) 10 MG tablet, Take 1 tablet (10 mg total) by mouth once daily., Disp: 90 tablet, Rfl: 0    ascorbic acid (VITAMIN C) 100 MG tablet, Take 100 mg by mouth once daily., Disp: , Rfl:     aspirin (ECOTRIN) 81 MG EC tablet, Take 81 mg by mouth once daily., Disp: , Rfl:     atorvastatin (LIPITOR) 10 MG tablet, Take 10 mg by mouth every evening. , Disp: , Rfl: 2    b complex vitamins capsule, Take 1 capsule by mouth once daily., Disp: , Rfl:     BD ULTRA-FINE CONNIE PEN NEEDLE 32 gauge x 5/32" Ndle, INJECT TWICE DAILY AS DIRECTED, Disp: 200 each, Rfl: 0    calcitRIOL (ROCALTROL) 0.5 MCG Cap, TAKE 1 CAPSULE (0.5 MCG TOTAL) BY MOUTH ONCE DAILY., Disp: 90 capsule, Rfl: 0    calcium carbonate (CALCIUM 300 ORAL), , Disp: , Rfl:     cholecalciferol, vitamin D3, 4,000 unit Cap, Take 1 capsule by mouth once daily., Disp: , Rfl:     docusate sodium (COLACE) 100 MG capsule, Take 100 mg by mouth 2 (two) times daily., Disp: , Rfl:     escitalopram oxalate (LEXAPRO) 20 MG tablet, TAKE ONE TABLET BY MOUTH DAILY, Disp: 90 tablet, Rfl: 0    FREESTYLE LITE METER kit, , Disp: , Rfl: 0    FREESTYLE LITE STRIPS Strp, , Disp: , Rfl: 3    gabapentin (NEURONTIN) 100 MG capsule, Take 1 capsule (100 mg total) by mouth 3 (three) times daily., Disp: 90 capsule, Rfl: 2    granisetron (SANCUSO) 3.1 mg/24 hour, , Disp: , Rfl:     levothyroxine (SYNTHROID) 88 MCG tablet, Take 1 tablet (88 mcg total) by mouth before breakfast., Disp: 30 tablet, Rfl: 11    magnesium oxide (MAG-OX) 400 mg tablet, TAKE ONE TABLET BY MOUTH TWICE DAILY, Disp: 180 tablet, Rfl: 0    ondansetron (ZOFRAN) 8 MG tablet, Take 1 tablet (8 mg total) by mouth every 8 (eight) hours as needed., Disp: " 30 tablet, Rfl: 5    pantoprazole (PROTONIX) 40 MG tablet, TAKE ONE TABLET BY MOUTH DAILY, Disp: 90 tablet, Rfl: 0    promethazine (PHENERGAN) 25 MG tablet, Take 1 tablet (25 mg total) by mouth every 4 to 6 hours as needed., Disp: 30 tablet, Rfl: 5    TOUJEO SOLOSTAR U-300 INSULIN 300 unit/mL (1.5 mL) InPn pen, INJECT 30 UNITS INTO THE SKIN ONCE DAILY. (Patient not taking: Reported on 5/6/2020), Disp: 4.5 mL, Rfl: 3    valsartan (DIOVAN) 40 MG tablet, Take 40 mg by mouth once daily. , Disp: , Rfl: 3    VICTOZA 3-XAVIER 0.6 mg/0.1 mL (18 mg/3 mL) PnIj, INJECT 1.8MG SUBCUTANEAOUSLY DAILY, Disp: 9 mL, Rfl: 0    Current Facility-Administered Medications:     0.9%  NaCl infusion (for blood administration), , Intravenous, Once, Carrillo Goode MD    PMHx/PSHx Updates:  See patient's last visit with me on 6/2/2020  See H&P on 10/8/2019        Pathology:  Cancer Staging  No matching staging information was found for the patient.          Objective:     Vitals:  Blood pressure (!) 146/85, pulse 97, temperature 97.4 °F (36.3 °C), temperature source Oral, resp. rate 16, weight 66.6 kg (146 lb 14.4 oz), last menstrual period 06/01/2012.        Physical Examination:   GEN: no apparent distress, comfortable; AAOx3  HEAD: atraumatic and normocephalic  EYES: no conjunctival pallor or muddiness, no icterus; normal pupil reaction to ambient light  ENT: OMM, no pharyngeal erythema, external bilateral ears WNL; no visible thrush or ulcers  NECK: no masses or swelling, trachea midline, no visible LAD/LN's   CV: no palpitations; no pedal edema; no noticeable JVD or neck vein distension;  portacath on right CW  CHEST: Normal respiratory effort; chest wall breath movements symmetrical; no audible wheezing  ABDOM: non-distended; no bloating  MUSC/Skeletal: ROM normal; joints visibly normal; no deformities or arthropathy  EXTREM: no clubbing, cyanosis, inflammation or swelling  SKIN: no rashes, lesions, ulcers, petechiae or  subcutaneous nodules  : no hendrickson  NEURO: moving all 4 extremities; AAOx3; no tremors  PSYCH: normal mood, affect and behavior  LYMPH: no visible LN's or LAD              Labs:      7/2/2020  pending      Lab Results   Component Value Date    WBC 2.0 (L) 06/26/2020    HGB 9.3 (L) 06/26/2020    HCT 28.1 (L) 06/26/2020    MCV 97.2 06/26/2020     (L) 06/26/2020     CMP  Sodium   Date Value Ref Range Status   06/26/2020 136 135 - 146 mmol/L Final     Potassium   Date Value Ref Range Status   06/26/2020 4.8 3.5 - 5.3 mmol/L Final     Chloride   Date Value Ref Range Status   06/26/2020 97 (L) 98 - 110 mmol/L Final     CO2   Date Value Ref Range Status   06/26/2020 29 20 - 32 mmol/L Final     Glucose   Date Value Ref Range Status   06/26/2020 132 65 - 139 mg/dL Final     Comment:               Non-fasting reference interval          BUN, Bld   Date Value Ref Range Status   06/26/2020 40 (H) 7 - 25 mg/dL Final     Creatinine   Date Value Ref Range Status   06/26/2020 1.75 (H) 0.60 - 0.93 mg/dL Final     Comment:     For patients >49 years of age, the reference limit  for Creatinine is approximately 13% higher for people  identified as -American.          Calcium   Date Value Ref Range Status   06/26/2020 8.6 8.6 - 10.4 mg/dL Final     Total Protein   Date Value Ref Range Status   06/26/2020 5.7 (L) 6.1 - 8.1 g/dL Final     Albumin   Date Value Ref Range Status   06/26/2020 3.2 (L) 3.6 - 5.1 g/dL Final     Total Bilirubin   Date Value Ref Range Status   06/26/2020 0.6 0.2 - 1.2 mg/dL Final     Alkaline Phosphatase   Date Value Ref Range Status   06/26/2020 109 37 - 153 U/L Final     AST   Date Value Ref Range Status   06/26/2020 24 10 - 35 U/L Final     ALT   Date Value Ref Range Status   06/26/2020 22 6 - 29 U/L Final     Anion Gap   Date Value Ref Range Status   06/10/2020 14 8 - 16 mmol/L Final     eGFR if    Date Value Ref Range Status   06/26/2020 32 (L) > OR = 60 mL/min/1.73m2 Final      eGFR if non    Date Value Ref Range Status   06/26/2020 28 (L) > OR = 60 mL/min/1.73m2 Final     SARS-CoV2 (COVID-19) Qualitative PCR Not Detected           Radiology/Diagnostic Studies:    CT  3/5/2020    Impression       Status post left nephrectomy without evidence of recurrent renal malignancy.    No significant change since prior study.           MRI  1/8/2020:  Impression       Significant reduction in size of the patient's known mid rectal mass, which is no longer distinctly visible.  Reduction in size of 3 mesorectal lymph nodes as above.               PET  10/17/2019    Impression       1. Left paratracheal small mass extending into superior mediastinum with associated moderate FDG activity is unchanged in size and appearance since 02/16/2018 CT.  This is unlikely to represent metastatic disease or malignancy, given stability, and may represent residual thyroid parenchyma but is otherwise nonspecific.  2. Unchanged 6 mm right upper lobe nodule since 02/16/2018.  Benign etiology is likely the continued CT thorax without IV contrast follow-up is recommended in 12 months to document greater than 2 years of stability.  3. No current convincing evidence of metastatic disease.  4. Previous rectal mass is no longer evident on this exam.               X-ray Chest Ap Portable    Result Date: 10/15/2019  EXAMINATION: XR CHEST AP PORTABLE CLINICAL HISTORY: s/p port; Encounter for adjustment and management of vascular access device TECHNIQUE: Single frontal view of the chest was performed. COMPARISON: 6/18/2019 FINDINGS: The cardiomediastinal silhouette is stable.  Lungs are clear of infiltrate.  No pleural effusions.  Laya catheter has been inserted from the region of the right subclavian vein with the tip at the level the proximal SVC.     Laya catheter inserted with the tip at the level the proximal SVC.  Lungs are expanded and clear.  No pneumothorax. Electronically signed by: Nubia Venegas  MD Date:    10/15/2019 Time:    14:05    Surg Fl Surgery Fluoro Usage    Result Date: 10/15/2019  See OP Notes for results. IMPRESSION: See OP Notes for results. This procedure was auto-finalized by: [facility]     Mri Rectal Cancer Without Contrast    Result Date: 9/18/2019  EXAMINATION: MRI RECTAL CANCER WITHOUT CONTRAST CLINICAL HISTORY: Rectal cancer, staging, locoregional;rectal anal mass;  Malignant neoplasm of rectum TECHNIQUE: Multiplanar multisequence MR imaging of the pelvis without contrast. COMPARISON: 07/12/2019 FINDINGS: Approximately 4 cm from the anal verge there is a peripherally located lobular mass along the dorsal rectal wall.  This measures 4 cm in length and 3.3 cm in diameter.  There is heterogeneous signal intensity.  There is restricted diffusion in the lesion and no discrete extra colonic extension.  Several lymph nodes are noted in the mesorectal fat including two which measure 3 mm near the inferior sacrum, series 8, image 16, and a 5 mm lymph node at the S2 level, series 8, image 8.  Prominent but nonenlarged bilateral obturator chain lymph nodes also noted, on the right at 4 mm and left at 6 mm.  There is urinary bladder wall thickening which is diffuse.  Moderate degree of stool in the colon.  No dilated bowel loops.  Hysterectomy.     4 cm mid rectal lesion in keeping with known malignancy.  No discrete extension into the mesorectal fascia although there are several perirectal lymph nodes which raise the possibility for locoregional lymph node involvement. Electronically signed by: Bebo Kapoor Date:    09/18/2019 Time:    16:45      I have reviewed all available lab results and radiology reports.    Assessment/Plan:   (1) 79 y.o. female with diagnosis of prior left RCC who has been referred by David Mendieta MD for evaluation by medical hematology/oncology. She has been followed by Dr Stanislav Epstein with [facility] at the [facility] in [place]. She last saw   Tete in July 2019. She was recently diagnosed with rectal mass by Dr Armando Duff which was found on colonoscopy on 8/26/2019. She had presented with lower Gi bleeding at that time. The pathology from those biopsies showed no evidence of malignancy at that time. She was subsequently seen by Dr David Mendieta and underwent trans-anal surgical biopsy on 9/23/2019. The pathology from the surgical biopsy showed rectal carcinoma.         She has been seen by Dr Fareed Hassan with Rad/onc for radiation therapy evaluation.     PET was done on 10/17/2019     We previously discussed giving her combined chemotherapy with the XRt to try to reduce her risk of recurrence. She was agreeable to this plan.    She had portacath placed with Dr Mendieta. She saw Dr Hassan last week with rad/onc. She has since completed weekly XRT and chemotherapy    - She previously saw Dr Mendieta on 12/10/2019  and had MRI on 1/8/2020.  They did scope on 1/28th 2020 and I spoke to dr mendieta by phone last week. The scope looked good and both the patient and Dr Mendieta does not want to proceed in direction of operation especially given her age and co-morbidities.    - We previously discussed adjuvant chemotherapy with FOLOFOX x 10-12 cycles especially since she is not having surgery and she is agreeable.    - Will previously  provide literature on the regimen and set up chemotherapy      7/2/2020:  - She has since started chemotherapy and she has had 8 cycles so far; chemotherapy was held last time due to general malaise. She feels good and has some minimal and tolerable neuropathy in hands and feet.  She has some intermittent constipation.    She wants to continue with therapy and I would like to at least get 10 cycles in if possible.       (2) Left renal cell carcinoma s/p left nephrectomy with Dr Azar Donnelly - diagnosed about 2 years ago     (3) Ureterolithiasis     (4) CRI - stage III - followed by Dr Antonio; she saw him recently and per patient's  report, her kidney function is stable per Dr Antonio  - she is seeing Dr Antonio this coming Thursday     (5) HTN and hypercholesterolemia     (6) DM - followed by Dr Brower with endocrinology     (7) Hx/of gall stones     (8) Chronic left shoulder issues     (9) MVP     (10) Thyroid disease with prior hx/of thyroid cancer s/p thyroidectomy      (11) Chronic anemia - most likley multifactorial due to iron deficiency, GIB and anemia of chronic renal disease  - hgb at 9.3 currently    (12) hypocalcemia - followed by Dr Serrano    (13) 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  Encouraged compliance with labs; discussed COVID19 precautions  2. Continue current regimen of FOLFOX - at reduced dose by 20% - try to get in at least 10 cycles total  3.  F/u with PCP, GI, rad/onc , etc  4.  F/u nephrology - Dr Antonio regularly while on this regimen  5. Refill antiemetics as needed  6. RTC in 4 weeks     Fax note to Pham Winston, Gayatri; Tete Duff Tandron; Paco;     Discussion:       I spent over 25 mins of time with the patient. Reviewed results of the recently ordered labs, tests and studies; made directives with regards to the results. Over half of this time was spent couseling and coordinating care.    COVID-19 Discussion:    I had long discussion with patient and any applicable family about the COVID-19 coronavirus epidemic and the recommended precautions with regard to cancer and/or hematology patients. I have re-iterated the CDC recommendations for adequate hand washing, use of hand -like products, and coughing into elbow, etc. In addition, especially for our patients who are on chemotherapy and/or our otherwise immunocompromised patients, I have recommended avoidance of crowds,  including movie theaters, restaurants, churches, etc. I have recommended avoidance of any sick or symptomatic family members and/or friends. I have also recommended avoidance of any raw and unwashed food products, and general avoidance of food items that have not been prepared by themselves. The patient has been asked to call us immediately with any symptom developments, issues, questions or other general concerns.       Chemotherapy Discussion:      I discussed the available treatment option(s) in accordance with the latest/current national evidence-based guidelines (NCCN, UpToDate, NCI, ASCO, etc where applicable), their overall age/condition and their co-morbidities. I also went over the risks and benefits of the chemotherapy with regard to their particular cancer type, their cancer stage, their age/condition, and their co-morbidities. I provided literature on the chemotherapy regimen and discussed the chemotherapy side-effect profiles of the drug(s). I discussed the importance of compliance with obtaining and monitoring weekly lab work, and went over the potential hematopathology issues and risks with anemia, leucopenia and thrombocytopenia that can occur with chemotherapy. I discussed the potential risks of liver and kidney damage, which could be permanent and could necessitate dialysis long-term if kidney failure developed. I discussed the emetic and/or diarrheal potential of the regimen and the potential need for use of antiemetic and anti-diarrheal medications. I discussed the risk for development of anaphylactic shock, bronchospasm, dysrhythmia, and respiratory/cardiovascular arrest and/or failure. I discussed the potential risks for development of alopecia, cold sensory issues, ringing in ears, vertigo, cataracts, glaucoma, and neuropathy, all of which could end up being chronic and life-long. Some chemotherpyI discussed the risks of hand-foot syndrome and rashes, and development of other autoimmune  mediated processes such as pneumonitis, hepatitis, and colitis which could be life threatening. I discussed the risks of the potential development of a rare but fatal viral mediated disease known as PML (Progressive Multifocal Leukoencephalopathy), and risk of future development of leukemia and/or lymphoma from use of certain chemotherapy agents. I discussed the need for neutropenic precautions, basic hygiene/sanitation behaviors and dietary restrictions.    The patient's consent has been obtained to proceed with the chemotherapy.The patient will be referred to [facility] for training and education on chemotherapy, use of antiemetics and/or anti-diarrheals, use of NSAID's, potential chemotherapy side-effects, and any specific recommendations and precautions with the particular chemotherapy agents.      I answered all of the patient's (and family's, if applicable) questions to the best of my ability and to their complete satisfaction. The patient acknowledged full understanding of the risks, recommendations and plan(s).          I have explained all of the above in detail and the patient understands all of the current recommendation(s). I have answered all of their questions to the best of my ability and to their complete satisfaction.   The patient is to continue with the current management plan.            Electronically signed by Carrillo Goode MD             Answers for HPI/ROS submitted by the patient on 6/30/2020   appetite change : No  unexpected weight change: No  visual disturbance: No  cough: No  shortness of breath: No  chest pain: No  diarrhea: No  frequency: No  back pain: No  rash: No  headaches: No  adenopathy: No  nervous/ anxious: No

## 2020-07-02 ENCOUNTER — OFFICE VISIT (OUTPATIENT)
Dept: HEMATOLOGY/ONCOLOGY | Facility: CLINIC | Age: 80
End: 2020-07-02
Payer: COMMERCIAL

## 2020-07-02 VITALS
RESPIRATION RATE: 16 BRPM | TEMPERATURE: 97 F | SYSTOLIC BLOOD PRESSURE: 146 MMHG | BODY MASS INDEX: 27.76 KG/M2 | WEIGHT: 146.88 LBS | HEART RATE: 97 BPM | DIASTOLIC BLOOD PRESSURE: 85 MMHG

## 2020-07-02 DIAGNOSIS — C20 RECTAL CANCER: Primary | ICD-10-CM

## 2020-07-02 DIAGNOSIS — D64.9 NORMOCYTIC ANEMIA: ICD-10-CM

## 2020-07-02 DIAGNOSIS — K62.89 RECTAL MASS: ICD-10-CM

## 2020-07-02 DIAGNOSIS — T45.1X5A CHEMOTHERAPY INDUCED NEUTROPENIA: ICD-10-CM

## 2020-07-02 DIAGNOSIS — C64.2 RENAL CELL CARCINOMA OF LEFT KIDNEY: ICD-10-CM

## 2020-07-02 DIAGNOSIS — C20 MALIGNANT NEOPLASM OF RECTUM: ICD-10-CM

## 2020-07-02 DIAGNOSIS — D70.1 CHEMOTHERAPY INDUCED NEUTROPENIA: ICD-10-CM

## 2020-07-02 PROCEDURE — 99214 OFFICE O/P EST MOD 30 MIN: CPT | Mod: S$GLB,,, | Performed by: INTERNAL MEDICINE

## 2020-07-02 PROCEDURE — 99214 PR OFFICE/OUTPT VISIT, EST, LEVL IV, 30-39 MIN: ICD-10-PCS | Mod: S$GLB,,, | Performed by: INTERNAL MEDICINE

## 2020-07-02 PROCEDURE — 1159F PR MEDICATION LIST DOCUMENTED IN MEDICAL RECORD: ICD-10-PCS | Mod: S$GLB,,, | Performed by: INTERNAL MEDICINE

## 2020-07-02 PROCEDURE — 1159F MED LIST DOCD IN RCRD: CPT | Mod: S$GLB,,, | Performed by: INTERNAL MEDICINE

## 2020-07-02 PROCEDURE — 1126F AMNT PAIN NOTED NONE PRSNT: CPT | Mod: S$GLB,,, | Performed by: INTERNAL MEDICINE

## 2020-07-02 PROCEDURE — 1126F PR PAIN SEVERITY QUANTIFIED, NO PAIN PRESENT: ICD-10-PCS | Mod: S$GLB,,, | Performed by: INTERNAL MEDICINE

## 2020-07-06 ENCOUNTER — INFUSION (OUTPATIENT)
Dept: INFUSION THERAPY | Facility: HOSPITAL | Age: 80
End: 2020-07-06
Attending: INTERNAL MEDICINE
Payer: COMMERCIAL

## 2020-07-06 VITALS
RESPIRATION RATE: 18 BRPM | SYSTOLIC BLOOD PRESSURE: 159 MMHG | HEIGHT: 61 IN | WEIGHT: 147.31 LBS | OXYGEN SATURATION: 100 % | DIASTOLIC BLOOD PRESSURE: 83 MMHG | BODY MASS INDEX: 27.81 KG/M2 | TEMPERATURE: 98 F | HEART RATE: 83 BPM

## 2020-07-06 DIAGNOSIS — T45.1X5A CHEMOTHERAPY INDUCED NEUTROPENIA: ICD-10-CM

## 2020-07-06 DIAGNOSIS — C20 RECTAL CANCER: ICD-10-CM

## 2020-07-06 DIAGNOSIS — E83.51 HYPOCALCEMIA: Primary | ICD-10-CM

## 2020-07-06 DIAGNOSIS — D70.1 CHEMOTHERAPY INDUCED NEUTROPENIA: ICD-10-CM

## 2020-07-06 PROCEDURE — 96367 TX/PROPH/DG ADDL SEQ IV INF: CPT

## 2020-07-06 PROCEDURE — 96411 CHEMO IV PUSH ADDL DRUG: CPT

## 2020-07-06 PROCEDURE — 25000003 PHARM REV CODE 250: Performed by: INTERNAL MEDICINE

## 2020-07-06 PROCEDURE — 96366 THER/PROPH/DIAG IV INF ADDON: CPT

## 2020-07-06 PROCEDURE — 96415 CHEMO IV INFUSION ADDL HR: CPT

## 2020-07-06 PROCEDURE — 96416 CHEMO PROLONG INFUSE W/PUMP: CPT

## 2020-07-06 PROCEDURE — 96368 THER/DIAG CONCURRENT INF: CPT

## 2020-07-06 PROCEDURE — 96413 CHEMO IV INFUSION 1 HR: CPT

## 2020-07-06 PROCEDURE — 63600175 PHARM REV CODE 636 W HCPCS: Performed by: INTERNAL MEDICINE

## 2020-07-06 RX ORDER — SODIUM CHLORIDE 0.9 % (FLUSH) 0.9 %
10 SYRINGE (ML) INJECTION
Status: CANCELLED | OUTPATIENT
Start: 2020-07-08

## 2020-07-06 RX ORDER — HEPARIN 100 UNIT/ML
500 SYRINGE INTRAVENOUS
Status: CANCELLED | OUTPATIENT
Start: 2020-07-08

## 2020-07-06 RX ORDER — SODIUM CHLORIDE 0.9 % (FLUSH) 0.9 %
10 SYRINGE (ML) INJECTION
Status: DISCONTINUED | OUTPATIENT
Start: 2020-07-06 | End: 2020-07-06 | Stop reason: HOSPADM

## 2020-07-06 RX ORDER — EPINEPHRINE 0.3 MG/.3ML
0.3 INJECTION SUBCUTANEOUS ONCE AS NEEDED
Status: DISCONTINUED | OUTPATIENT
Start: 2020-07-06 | End: 2020-07-06 | Stop reason: HOSPADM

## 2020-07-06 RX ORDER — DIPHENHYDRAMINE HYDROCHLORIDE 50 MG/ML
50 INJECTION INTRAMUSCULAR; INTRAVENOUS ONCE AS NEEDED
Status: CANCELLED | OUTPATIENT
Start: 2020-07-06

## 2020-07-06 RX ORDER — FLUOROURACIL 50 MG/ML
280 INJECTION, SOLUTION INTRAVENOUS
Status: CANCELLED | OUTPATIENT
Start: 2020-07-06

## 2020-07-06 RX ORDER — SODIUM CHLORIDE 0.9 % (FLUSH) 0.9 %
10 SYRINGE (ML) INJECTION
Status: CANCELLED | OUTPATIENT
Start: 2020-07-06

## 2020-07-06 RX ORDER — EPINEPHRINE 0.3 MG/.3ML
0.3 INJECTION SUBCUTANEOUS ONCE AS NEEDED
Status: CANCELLED | OUTPATIENT
Start: 2020-07-06

## 2020-07-06 RX ORDER — DIPHENHYDRAMINE HYDROCHLORIDE 50 MG/ML
50 INJECTION INTRAMUSCULAR; INTRAVENOUS ONCE AS NEEDED
Status: DISCONTINUED | OUTPATIENT
Start: 2020-07-06 | End: 2020-07-06 | Stop reason: HOSPADM

## 2020-07-06 RX ORDER — HEPARIN 100 UNIT/ML
500 SYRINGE INTRAVENOUS
Status: CANCELLED | OUTPATIENT
Start: 2020-07-06

## 2020-07-06 RX ORDER — HEPARIN 100 UNIT/ML
500 SYRINGE INTRAVENOUS
Status: DISCONTINUED | OUTPATIENT
Start: 2020-07-06 | End: 2020-07-06 | Stop reason: HOSPADM

## 2020-07-06 RX ORDER — FLUOROURACIL 50 MG/ML
280 INJECTION, SOLUTION INTRAVENOUS
Status: COMPLETED | OUTPATIENT
Start: 2020-07-06 | End: 2020-07-06

## 2020-07-06 RX ADMIN — DEXTROSE 475 MG: 5 SOLUTION INTRAVENOUS at 10:07

## 2020-07-06 RX ADMIN — OXALIPLATIN 101 MG: 5 INJECTION, SOLUTION INTRAVENOUS at 10:07

## 2020-07-06 RX ADMIN — DEXAMETHASONE SODIUM PHOSPHATE: 4 INJECTION, SOLUTION INTRA-ARTICULAR; INTRALESIONAL; INTRAMUSCULAR; INTRAVENOUS; SOFT TISSUE at 09:07

## 2020-07-06 RX ADMIN — FLUOROURACIL 2840 MG: 50 INJECTION, SOLUTION INTRAVENOUS at 12:07

## 2020-07-06 RX ADMIN — FLUOROURACIL 475 MG: 50 INJECTION, SOLUTION INTRAVENOUS at 12:07

## 2020-07-06 NOTE — PLAN OF CARE
Problem: Nausea and Vomiting (Chemotherapy Effects)  Goal: Fluid and Electrolyte Balance  Outcome: Ongoing, Progressing  Intervention: Prevent and Manage Nausea and Vomiting  Flowsheets (Taken 7/6/2020 0900)  Nausea/Vomiting Interventions: nausea triggers minimized  Environmental Support: rest periods encouraged     Problem: Oral Mucositis (Chemotherapy Effects)  Goal: Improved Oral Mucous Membrane Integrity  Outcome: Ongoing, Progressing  Intervention: Promote Oral Comfort and Health  Flowsheets (Taken 7/6/2020 0900)  Oral Mucous Membrane Protection:   nonirritating oral foods promoted   nonirritating oral fluids promoted

## 2020-07-08 ENCOUNTER — INFUSION (OUTPATIENT)
Dept: INFUSION THERAPY | Facility: HOSPITAL | Age: 80
End: 2020-07-08
Attending: INTERNAL MEDICINE
Payer: COMMERCIAL

## 2020-07-08 VITALS
OXYGEN SATURATION: 95 % | HEART RATE: 102 BPM | TEMPERATURE: 99 F | WEIGHT: 147.38 LBS | SYSTOLIC BLOOD PRESSURE: 115 MMHG | RESPIRATION RATE: 18 BRPM | DIASTOLIC BLOOD PRESSURE: 69 MMHG | BODY MASS INDEX: 27.85 KG/M2

## 2020-07-08 DIAGNOSIS — D70.1 CHEMOTHERAPY INDUCED NEUTROPENIA: ICD-10-CM

## 2020-07-08 DIAGNOSIS — E83.51 HYPOCALCEMIA: Primary | ICD-10-CM

## 2020-07-08 DIAGNOSIS — C20 RECTAL CANCER: ICD-10-CM

## 2020-07-08 DIAGNOSIS — T45.1X5A CHEMOTHERAPY INDUCED NEUTROPENIA: ICD-10-CM

## 2020-07-08 PROCEDURE — 96523 IRRIG DRUG DELIVERY DEVICE: CPT

## 2020-07-08 PROCEDURE — 25000003 PHARM REV CODE 250: Performed by: INTERNAL MEDICINE

## 2020-07-08 PROCEDURE — A4216 STERILE WATER/SALINE, 10 ML: HCPCS | Performed by: INTERNAL MEDICINE

## 2020-07-08 PROCEDURE — 63600175 PHARM REV CODE 636 W HCPCS: Performed by: INTERNAL MEDICINE

## 2020-07-08 RX ORDER — HEPARIN 100 UNIT/ML
500 SYRINGE INTRAVENOUS
Status: DISCONTINUED | OUTPATIENT
Start: 2020-07-08 | End: 2020-07-08 | Stop reason: HOSPADM

## 2020-07-08 RX ORDER — SODIUM CHLORIDE 0.9 % (FLUSH) 0.9 %
10 SYRINGE (ML) INJECTION
Status: DISCONTINUED | OUTPATIENT
Start: 2020-07-08 | End: 2020-07-08 | Stop reason: HOSPADM

## 2020-07-08 RX ADMIN — HEPARIN 500 UNITS: 100 SYRINGE at 10:07

## 2020-07-08 RX ADMIN — SODIUM CHLORIDE, PRESERVATIVE FREE 10 ML: 5 INJECTION INTRAVENOUS at 10:07

## 2020-07-13 RX ORDER — DIPHENHYDRAMINE HYDROCHLORIDE 50 MG/ML
50 INJECTION INTRAMUSCULAR; INTRAVENOUS ONCE AS NEEDED
Status: CANCELLED | OUTPATIENT
Start: 2020-07-27

## 2020-07-13 RX ORDER — HEPARIN 100 UNIT/ML
500 SYRINGE INTRAVENOUS
Status: CANCELLED | OUTPATIENT
Start: 2020-07-29

## 2020-07-13 RX ORDER — SODIUM CHLORIDE 0.9 % (FLUSH) 0.9 %
10 SYRINGE (ML) INJECTION
Status: CANCELLED | OUTPATIENT
Start: 2020-07-27

## 2020-07-13 RX ORDER — FLUOROURACIL 50 MG/ML
280 INJECTION, SOLUTION INTRAVENOUS
Status: CANCELLED | OUTPATIENT
Start: 2020-07-27

## 2020-07-13 RX ORDER — EPINEPHRINE 0.3 MG/.3ML
0.3 INJECTION SUBCUTANEOUS ONCE AS NEEDED
Status: CANCELLED | OUTPATIENT
Start: 2020-07-27

## 2020-07-13 RX ORDER — HEPARIN 100 UNIT/ML
500 SYRINGE INTRAVENOUS
Status: CANCELLED | OUTPATIENT
Start: 2020-07-27

## 2020-07-13 RX ORDER — SODIUM CHLORIDE 0.9 % (FLUSH) 0.9 %
10 SYRINGE (ML) INJECTION
Status: CANCELLED | OUTPATIENT
Start: 2020-07-29

## 2020-07-26 NOTE — PROGRESS NOTES
[facility] Hematology/Oncology  PROGRESS NOTE -   Follow-up Visit      Subjective:       Patient ID:   NAME: Valery Huggins : 1940     79 y.o. female    Referring Doc: David Mendieta MD  Other Physicians: Armando Nath; Stanislav Epstein; Azar Donnelly; Mo    Chief Complaint:  Left RCC and rectal CA f/u    History of Present Illness:     Patient is being seen today in person for her last chemotherapy (#10)     The patient is on today to go over the results of the recently ordered labs, tests and studies.     She had previously completed combination XRT and chemotherapy. No CP, SOB, HA's or N/V. She is on by herself today. She last saw Dr Hassan with rad/onc on 2019.    She previously saw Dr Mendieta on 12/10/2019  and had MRI on 2020.  They did scope on 2020 and I spoke to dr mendieta by phone last week. The scope looked good and both the patient and Dr Mendieta does not want to proceed in direction of operation especially given her age and co-morbidities.    We previously discussed adjuvant chemotherapy with FOLOFOX x 10-12 cycles especially since she is not having surgery and she was agreeable.    Will previously provided literature on the regimen and set up the chemotherapy    She has since started chemotherapy and she has had 9 cycles so far with #10 today;  She feels good and has some minimal and tolerable neuropathy in hands and feet.  She has some intermittent constipation.    She wanted to discontinue with therapy after # 10 cycle and that is reasonable          Discussed Covid19 precautions again                ROS:   GEN: normal without any fever, night sweats or weight loss  HEENT: normal with no HA's, sore throat, stiff neck, changes in vision;    CV: normal with no CP, SOB, PND, RAYA or orthopnea  PULM: normal with no SOB, cough, hemoptysis, sputum or pleuritic pain  GI: no nausea since starting patch; some intermittent constipation  : normal with no hematuria, dysuria  BREAST:  "normal with no mass, discharge, pain  SKIN: normal with no rash, erythema, bruising, or swelling    Allergies:  Review of patient's allergies indicates:  No Known Allergies    Medications:    Current Outpatient Medications:     amLODIPine (NORVASC) 10 MG tablet, Take 1 tablet (10 mg total) by mouth once daily., Disp: 90 tablet, Rfl: 0    ascorbic acid (VITAMIN C) 100 MG tablet, Take 100 mg by mouth once daily., Disp: , Rfl:     aspirin (ECOTRIN) 81 MG EC tablet, Take 81 mg by mouth once daily., Disp: , Rfl:     atorvastatin (LIPITOR) 10 MG tablet, Take 10 mg by mouth every evening. , Disp: , Rfl: 2    b complex vitamins capsule, Take 1 capsule by mouth once daily., Disp: , Rfl:     BD ULTRA-FINE CONNIE PEN NEEDLE 32 gauge x 5/32" Ndle, INJECT TWICE DAILY AS DIRECTED, Disp: 200 each, Rfl: 0    calcitRIOL (ROCALTROL) 0.5 MCG Cap, TAKE 1 CAPSULE (0.5 MCG TOTAL) BY MOUTH ONCE DAILY., Disp: 90 capsule, Rfl: 0    calcium carbonate (CALCIUM 300 ORAL), , Disp: , Rfl:     cholecalciferol, vitamin D3, 4,000 unit Cap, Take 1 capsule by mouth once daily., Disp: , Rfl:     docusate sodium (COLACE) 100 MG capsule, Take 100 mg by mouth 2 (two) times daily., Disp: , Rfl:     escitalopram oxalate (LEXAPRO) 20 MG tablet, TAKE ONE TABLET BY MOUTH DAILY, Disp: 90 tablet, Rfl: 0    FREESTYLE LITE METER kit, , Disp: , Rfl: 0    FREESTYLE LITE STRIPS Strp, , Disp: , Rfl: 3    gabapentin (NEURONTIN) 100 MG capsule, Take 1 capsule (100 mg total) by mouth 3 (three) times daily., Disp: 90 capsule, Rfl: 2    granisetron (SANCUSO) 3.1 mg/24 hour, , Disp: , Rfl:     levothyroxine (SYNTHROID) 88 MCG tablet, Take 1 tablet (88 mcg total) by mouth before breakfast., Disp: 30 tablet, Rfl: 11    magnesium oxide (MAG-OX) 400 mg tablet, TAKE ONE TABLET BY MOUTH TWICE DAILY, Disp: 180 tablet, Rfl: 0    ondansetron (ZOFRAN) 8 MG tablet, Take 1 tablet (8 mg total) by mouth every 8 (eight) hours as needed., Disp: 30 tablet, Rfl: 5    " pantoprazole (PROTONIX) 40 MG tablet, TAKE ONE TABLET BY MOUTH DAILY, Disp: 90 tablet, Rfl: 0    promethazine (PHENERGAN) 25 MG tablet, Take 1 tablet (25 mg total) by mouth every 4 to 6 hours as needed., Disp: 30 tablet, Rfl: 5    TOUJEO SOLOSTAR U-300 INSULIN 300 unit/mL (1.5 mL) InPn pen, INJECT 30 UNITS INTO THE SKIN ONCE DAILY., Disp: 4.5 mL, Rfl: 3    valsartan (DIOVAN) 40 MG tablet, Take 40 mg by mouth once daily. , Disp: , Rfl: 3    VICTOZA 3-XAVIER 0.6 mg/0.1 mL (18 mg/3 mL) PnIj, INJECT 1.8MG SUBCUTANEAOUSLY DAILY, Disp: 9 mL, Rfl: 0    Current Facility-Administered Medications:     0.9%  NaCl infusion (for blood administration), , Intravenous, Once, Carrillo Goode MD    Facility-Administered Medications Ordered in Other Visits:     alteplase injection 2 mg, 2 mg, Intra-Catheter, PRN, Carrillo Goode MD    dextrose 5 % 250 mL flush bag, , Intravenous, 1 time in Clinic/HOD, Carrillo Goode MD    diphenhydrAMINE injection 50 mg, 50 mg, Intravenous, Once PRN, Carrillo Goode MD    EPINEPHrine (EPIPEN) 0.3 mg/0.3 mL pen injection 0.3 mg, 0.3 mg, Intramuscular, Once PRN, Carrillo Goode MD    fluorouracil (ADRUCIL) 1,680 mg/m2 = 2,855 mg in sodium chloride 0.9% 250 mL chemo infusion, 1,680 mg/m2, Intravenous, over 46 hr, Carrillo Goode MD    fluorouraciL injection 475 mg, 280 mg/m2, Intravenous, 1 time in Clinic/HOD, Carrillo Goode MD    heparin, porcine (PF) 100 unit/mL injection flush 500 Units, 500 Units, Intravenous, PRN, Carrillo Goode MD    hydrocortisone sodium succinate injection 100 mg, 100 mg, Intravenous, Once PRN, Carrillo Goode MD    leucovorin calcium 280 mg/m2 = 475 mg in dextrose 5 % 273.75 mL infusion, 280 mg/m2, Intravenous, 1 time in Clinic/HOD, Carirllo Goode MD    oxaliplatin (ELOXATIN) 59.5 mg/m2 = 101 mg in dextrose 5 % 520.2 mL chemo infusion, 59.5 mg/m2, Intravenous, 1 time in Clinic/HOD, Carrillo Goode MD    sodium chloride  0.9% 100 mL flush bag, , Intravenous, 1 time in Clinic/HOD, Carrillo Goode MD    sodium chloride 0.9% flush 10 mL, 10 mL, Intravenous, PRN, Carrillo Goode MD    PMHx/PSHx Updates:  See patient's last visit with me on 6/2/2020  See H&P on 10/8/2019        Pathology:  Cancer Staging  No matching staging information was found for the patient.          Objective:     Vitals:  Blood pressure (!) 164/88, pulse 95, temperature 97.9 °F (36.6 °C), resp. rate 18, weight 66.8 kg (147 lb 4.3 oz), last menstrual period 06/01/2012.        Physical Examination:   GEN: no apparent distress, comfortable; AAOx3  HEAD: atraumatic and normocephalic  EYES: no conjunctival pallor or muddiness, no icterus; normal pupil reaction to ambient light  ENT: OMM, no pharyngeal erythema, external bilateral ears WNL; no visible thrush or ulcers  NECK: no masses or swelling, trachea midline, no visible LAD/LN's   CV: no palpitations; no pedal edema; no noticeable JVD or neck vein distension;  portacath on right CW  CHEST: Normal respiratory effort; chest wall breath movements symmetrical; no audible wheezing  ABDOM: non-distended; no bloating  MUSC/Skeletal: ROM normal; joints visibly normal; no deformities or arthropathy  EXTREM: no clubbing, cyanosis, inflammation or swelling  SKIN: no rashes, lesions, ulcers, petechiae or subcutaneous nodules  : no hendrickson  NEURO: moving all 4 extremities; AAOx3; no tremors  PSYCH: normal mood, affect and behavior  LYMPH: no visible LN's or LAD              Labs:          Lab Results   Component Value Date    WBC 2.8 (L) 07/23/2020    HGB 9.3 (L) 07/23/2020    HCT 28.4 (L) 07/23/2020    MCV 99.6 07/23/2020     (L) 07/23/2020     CMP  Sodium   Date Value Ref Range Status   07/23/2020 138 135 - 146 mmol/L Final     Potassium   Date Value Ref Range Status   07/23/2020 4.6 3.5 - 5.3 mmol/L Final     Chloride   Date Value Ref Range Status   07/23/2020 100 98 - 110 mmol/L Final     CO2   Date Value  Ref Range Status   07/23/2020 30 20 - 32 mmol/L Final     Glucose   Date Value Ref Range Status   07/23/2020 147 (H) 65 - 99 mg/dL Final     Comment:                   Fasting reference interval     For someone without known diabetes, a glucose  value >125 mg/dL indicates that they may have  diabetes and this should be confirmed with a  follow-up test.          BUN, Bld   Date Value Ref Range Status   07/23/2020 27 (H) 7 - 25 mg/dL Final     Creatinine   Date Value Ref Range Status   07/23/2020 1.76 (H) 0.60 - 0.93 mg/dL Final     Comment:     For patients >49 years of age, the reference limit  for Creatinine is approximately 13% higher for people  identified as -American.          Calcium   Date Value Ref Range Status   07/23/2020 7.7 (L) 8.6 - 10.4 mg/dL Final     Total Protein   Date Value Ref Range Status   07/23/2020 5.7 (L) 6.1 - 8.1 g/dL Final     Albumin   Date Value Ref Range Status   07/23/2020 3.4 (L) 3.6 - 5.1 g/dL Final     Total Bilirubin   Date Value Ref Range Status   07/23/2020 0.6 0.2 - 1.2 mg/dL Final     Alkaline Phosphatase   Date Value Ref Range Status   07/23/2020 126 37 - 153 U/L Final     AST   Date Value Ref Range Status   07/23/2020 43 (H) 10 - 35 U/L Final     ALT   Date Value Ref Range Status   07/23/2020 33 (H) 6 - 29 U/L Final     Anion Gap   Date Value Ref Range Status   06/10/2020 14 8 - 16 mmol/L Final     eGFR if    Date Value Ref Range Status   07/23/2020 32 (L) > OR = 60 mL/min/1.73m2 Final     eGFR if non    Date Value Ref Range Status   07/23/2020 27 (L) > OR = 60 mL/min/1.73m2 Final     SARS-CoV2 (COVID-19) Qualitative PCR Not Detected           Radiology/Diagnostic Studies:    CT  3/5/2020    Impression       Status post left nephrectomy without evidence of recurrent renal malignancy.    No significant change since prior study.           MRI  1/8/2020:  Impression       Significant reduction in size of the patient's known mid rectal  mass, which is no longer distinctly visible.  Reduction in size of 3 mesorectal lymph nodes as above.               PET  10/17/2019    Impression       1. Left paratracheal small mass extending into superior mediastinum with associated moderate FDG activity is unchanged in size and appearance since 02/16/2018 CT.  This is unlikely to represent metastatic disease or malignancy, given stability, and may represent residual thyroid parenchyma but is otherwise nonspecific.  2. Unchanged 6 mm right upper lobe nodule since 02/16/2018.  Benign etiology is likely the continued CT thorax without IV contrast follow-up is recommended in 12 months to document greater than 2 years of stability.  3. No current convincing evidence of metastatic disease.  4. Previous rectal mass is no longer evident on this exam.               X-ray Chest Ap Portable    Result Date: 10/15/2019  EXAMINATION: XR CHEST AP PORTABLE CLINICAL HISTORY: s/p port; Encounter for adjustment and management of vascular access device TECHNIQUE: Single frontal view of the chest was performed. COMPARISON: 6/18/2019 FINDINGS: The cardiomediastinal silhouette is stable.  Lungs are clear of infiltrate.  No pleural effusions.  Laya catheter has been inserted from the region of the right subclavian vein with the tip at the level the proximal SVC.     Laya catheter inserted with the tip at the level the proximal SVC.  Lungs are expanded and clear.  No pneumothorax. Electronically signed by: Nuiba Venegas MD Date:    10/15/2019 Time:    14:05    Surg Fl Surgery Fluoro Usage    Result Date: 10/15/2019  See OP Notes for results. IMPRESSION: See OP Notes for results. This procedure was auto-finalized by: [facility]     Mri Rectal Cancer Without Contrast    Result Date: 9/18/2019  EXAMINATION: MRI RECTAL CANCER WITHOUT CONTRAST CLINICAL HISTORY: Rectal cancer, staging, locoregional;rectal anal mass;  Malignant neoplasm of rectum TECHNIQUE: Multiplanar  multisequence MR imaging of the pelvis without contrast. COMPARISON: 07/12/2019 FINDINGS: Approximately 4 cm from the anal verge there is a peripherally located lobular mass along the dorsal rectal wall.  This measures 4 cm in length and 3.3 cm in diameter.  There is heterogeneous signal intensity.  There is restricted diffusion in the lesion and no discrete extra colonic extension.  Several lymph nodes are noted in the mesorectal fat including two which measure 3 mm near the inferior sacrum, series 8, image 16, and a 5 mm lymph node at the S2 level, series 8, image 8.  Prominent but nonenlarged bilateral obturator chain lymph nodes also noted, on the right at 4 mm and left at 6 mm.  There is urinary bladder wall thickening which is diffuse.  Moderate degree of stool in the colon.  No dilated bowel loops.  Hysterectomy.     4 cm mid rectal lesion in keeping with known malignancy.  No discrete extension into the mesorectal fascia although there are several perirectal lymph nodes which raise the possibility for locoregional lymph node involvement. Electronically signed by: Bebo Kapoor Date:    09/18/2019 Time:    16:45      I have reviewed all available lab results and radiology reports.    Assessment/Plan:   (1) 79 y.o. female with diagnosis of prior left RCC who has been referred by David Mendieta MD for evaluation by medical hematology/oncology. She has been followed by Dr Stanislav Epstein with [facility] at the [facility] in [place]. She last saw Dr Epstein in July 2019. She was recently diagnosed with rectal mass by Dr Armando Duff which was found on colonoscopy on 8/26/2019. She had presented with lower Gi bleeding at that time. The pathology from those biopsies showed no evidence of malignancy at that time. She was subsequently seen by Dr David Mendieta and underwent trans-anal surgical biopsy on 9/23/2019. The pathology from the surgical biopsy showed rectal carcinoma.         She has been seen by  Dr Fareed Hassan with Rad/onc for radiation therapy evaluation.     PET was done on 10/17/2019     We previously discussed giving her combined chemotherapy with the XRt to try to reduce her risk of recurrence. She was agreeable to this plan.    She had portacath placed with Dr Mendieta. She saw Dr Hassan last week with rad/onc. She has since completed weekly XRT and chemotherapy    - She previously saw Dr Mendieta on 12/10/2019  and had MRI on 1/8/2020.  They did scope on 1/28th 2020 and I spoke to dr mendieta by phone last week. The scope looked good and both the patient and Dr Mendieta does not want to proceed in direction of operation especially given her age and co-morbidities.    - We previously discussed adjuvant chemotherapy with FOLOFOX x 10-12 cycles especially since she is not having surgery and she is agreeable.    - Will previously  provide literature on the regimen and set up chemotherapy      7/2/2020:  - She has since started chemotherapy and she has had 8 cycles so far; chemotherapy was held last time due to general malaise. She feels good and has some minimal and tolerable neuropathy in hands and feet.  She has some intermittent constipation.    She wants to continue with therapy and I would like to at least get 10 cycles in if possible.     7/27/2020:  - she is on her last cycle #10 today  - check labs weekly for next 4 weeks  - she will need to follow-up with dr mendieta and also get repeat scans every 6 months      (2) Left renal cell carcinoma s/p left nephrectomy with Dr Azar Donnelly - diagnosed about 2 years ago     (3) Ureterolithiasis     (4) CRI - stage III - followed by Dr Antonio; she saw him recently and per patient's report, her kidney function is stable per Dr Antonio  - she is seeing Dr Antonio this coming Thursday     (5) HTN and hypercholesterolemia     (6) DM - followed by Dr Brower with endocrinology     (7) Hx/of gall stones     (8) Chronic left shoulder issues     (9) MVP     (10) Thyroid  disease with prior hx/of thyroid cancer s/p thyroidectomy      (11) Chronic anemia - most likley multifactorial due to iron deficiency, GIB and anemia of chronic renal disease  - hgb at 9.3 currently    (12) hypocalcemia - followed by Dr Serrano    (13) 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  Encouraged compliance with labs; discussed COVID19 precautions  2.  check labs weekly for next 4 weeks -  she will need to follow-up with Dr Mendieta and also get repeat scans every 6 months  3.  F/u with PCP, GI, rad/onc , etc  4.  F/u nephrology - Dr Antonio   5. Refill antiemetics as needed  6. RTC in 4 weeks     Fax note to Pham Winston, Gayatri; Tete Duff Tandron; Paco;     Discussion:       I spent over 25 mins of time with the patient. Reviewed results of the recently ordered labs, tests and studies; made directives with regards to the results. Over half of this time was spent couseling and coordinating care.    COVID-19 Discussion:    I had long discussion with patient and any applicable family about the COVID-19 coronavirus epidemic and the recommended precautions with regard to cancer and/or hematology patients. I have re-iterated the CDC recommendations for adequate hand washing, use of hand -like products, and coughing into elbow, etc. In addition, especially for our patients who are on chemotherapy and/or our otherwise immunocompromised patients, I have recommended avoidance of crowds, including movie theaters, restaurants, churches, etc. I have recommended avoidance of any sick or symptomatic family members and/or friends. I have also recommended avoidance of any raw and unwashed food products, and general avoidance of food items that have not been prepared by themselves. The patient has been  asked to call us immediately with any symptom developments, issues, questions or other general concerns.       Chemotherapy Discussion:      I discussed the available treatment option(s) in accordance with the latest/current national evidence-based guidelines (NCCN, UpToDate, NCI, ASCO, etc where applicable), their overall age/condition and their co-morbidities. I also went over the risks and benefits of the chemotherapy with regard to their particular cancer type, their cancer stage, their age/condition, and their co-morbidities. I provided literature on the chemotherapy regimen and discussed the chemotherapy side-effect profiles of the drug(s). I discussed the importance of compliance with obtaining and monitoring weekly lab work, and went over the potential hematopathology issues and risks with anemia, leucopenia and thrombocytopenia that can occur with chemotherapy. I discussed the potential risks of liver and kidney damage, which could be permanent and could necessitate dialysis long-term if kidney failure developed. I discussed the emetic and/or diarrheal potential of the regimen and the potential need for use of antiemetic and anti-diarrheal medications. I discussed the risk for development of anaphylactic shock, bronchospasm, dysrhythmia, and respiratory/cardiovascular arrest and/or failure. I discussed the potential risks for development of alopecia, cold sensory issues, ringing in ears, vertigo, cataracts, glaucoma, and neuropathy, all of which could end up being chronic and life-long. Some chemotherpyI discussed the risks of hand-foot syndrome and rashes, and development of other autoimmune mediated processes such as pneumonitis, hepatitis, and colitis which could be life threatening. I discussed the risks of the potential development of a rare but fatal viral mediated disease known as PML (Progressive Multifocal Leukoencephalopathy), and risk of future development of leukemia and/or lymphoma from use of  certain chemotherapy agents. I discussed the need for neutropenic precautions, basic hygiene/sanitation behaviors and dietary restrictions.    The patient's consent has been obtained to proceed with the chemotherapy.The patient will be referred to [facility] for training and education on chemotherapy, use of antiemetics and/or anti-diarrheals, use of NSAID's, potential chemotherapy side-effects, and any specific recommendations and precautions with the particular chemotherapy agents.      I answered all of the patient's (and family's, if applicable) questions to the best of my ability and to their complete satisfaction. The patient acknowledged full understanding of the risks, recommendations and plan(s).          I have explained all of the above in detail and the patient understands all of the current recommendation(s). I have answered all of their questions to the best of my ability and to their complete satisfaction.   The patient is to continue with the current management plan.            Electronically signed by Carrillo Goode MD                   Answers for HPI/ROS submitted by the patient on 7/24/2020   appetite change : No  unexpected weight change: No  visual disturbance: Yes  cough: No  shortness of breath: No  chest pain: No  abdominal pain: No  diarrhea: No  frequency: No  back pain: No  rash: No  headaches: No  adenopathy: No  nervous/ anxious: No

## 2020-07-27 ENCOUNTER — OFFICE VISIT (OUTPATIENT)
Dept: HEMATOLOGY/ONCOLOGY | Facility: CLINIC | Age: 80
End: 2020-07-27
Payer: COMMERCIAL

## 2020-07-27 ENCOUNTER — INFUSION (OUTPATIENT)
Dept: INFUSION THERAPY | Facility: HOSPITAL | Age: 80
End: 2020-07-27
Attending: INTERNAL MEDICINE
Payer: COMMERCIAL

## 2020-07-27 VITALS
WEIGHT: 147.25 LBS | BODY MASS INDEX: 27.83 KG/M2 | RESPIRATION RATE: 18 BRPM | DIASTOLIC BLOOD PRESSURE: 88 MMHG | SYSTOLIC BLOOD PRESSURE: 164 MMHG | TEMPERATURE: 98 F | HEART RATE: 95 BPM

## 2020-07-27 VITALS
WEIGHT: 147.19 LBS | TEMPERATURE: 98 F | HEART RATE: 86 BPM | BODY MASS INDEX: 27.79 KG/M2 | SYSTOLIC BLOOD PRESSURE: 157 MMHG | HEIGHT: 61 IN | OXYGEN SATURATION: 96 % | DIASTOLIC BLOOD PRESSURE: 75 MMHG | RESPIRATION RATE: 18 BRPM

## 2020-07-27 DIAGNOSIS — D64.9 NORMOCYTIC ANEMIA: ICD-10-CM

## 2020-07-27 DIAGNOSIS — C64.2 RENAL CELL CARCINOMA OF LEFT KIDNEY: ICD-10-CM

## 2020-07-27 DIAGNOSIS — T45.1X5A CHEMOTHERAPY INDUCED NEUTROPENIA: ICD-10-CM

## 2020-07-27 DIAGNOSIS — C20 RECTAL CANCER: ICD-10-CM

## 2020-07-27 DIAGNOSIS — D70.1 CHEMOTHERAPY INDUCED NEUTROPENIA: ICD-10-CM

## 2020-07-27 DIAGNOSIS — E83.51 HYPOCALCEMIA: Primary | ICD-10-CM

## 2020-07-27 DIAGNOSIS — C20 RECTAL CANCER: Primary | ICD-10-CM

## 2020-07-27 DIAGNOSIS — C20 MALIGNANT NEOPLASM OF RECTUM: ICD-10-CM

## 2020-07-27 PROCEDURE — 3079F DIAST BP 80-89 MM HG: CPT | Mod: S$GLB,,, | Performed by: INTERNAL MEDICINE

## 2020-07-27 PROCEDURE — 96368 THER/DIAG CONCURRENT INF: CPT

## 2020-07-27 PROCEDURE — 99214 OFFICE O/P EST MOD 30 MIN: CPT | Mod: S$GLB,,, | Performed by: INTERNAL MEDICINE

## 2020-07-27 PROCEDURE — 96411 CHEMO IV PUSH ADDL DRUG: CPT

## 2020-07-27 PROCEDURE — 96367 TX/PROPH/DG ADDL SEQ IV INF: CPT

## 2020-07-27 PROCEDURE — 96415 CHEMO IV INFUSION ADDL HR: CPT

## 2020-07-27 PROCEDURE — 96413 CHEMO IV INFUSION 1 HR: CPT

## 2020-07-27 PROCEDURE — 1159F MED LIST DOCD IN RCRD: CPT | Mod: S$GLB,,, | Performed by: INTERNAL MEDICINE

## 2020-07-27 PROCEDURE — 1159F PR MEDICATION LIST DOCUMENTED IN MEDICAL RECORD: ICD-10-PCS | Mod: S$GLB,,, | Performed by: INTERNAL MEDICINE

## 2020-07-27 PROCEDURE — 99214 PR OFFICE/OUTPT VISIT, EST, LEVL IV, 30-39 MIN: ICD-10-PCS | Mod: S$GLB,,, | Performed by: INTERNAL MEDICINE

## 2020-07-27 PROCEDURE — 1101F PT FALLS ASSESS-DOCD LE1/YR: CPT | Mod: S$GLB,,, | Performed by: INTERNAL MEDICINE

## 2020-07-27 PROCEDURE — 96366 THER/PROPH/DIAG IV INF ADDON: CPT

## 2020-07-27 PROCEDURE — 63600175 PHARM REV CODE 636 W HCPCS: Performed by: INTERNAL MEDICINE

## 2020-07-27 PROCEDURE — 25000003 PHARM REV CODE 250: Performed by: INTERNAL MEDICINE

## 2020-07-27 PROCEDURE — 3077F PR MOST RECENT SYSTOLIC BLOOD PRESSURE >= 140 MM HG: ICD-10-PCS | Mod: S$GLB,,, | Performed by: INTERNAL MEDICINE

## 2020-07-27 PROCEDURE — 96416 CHEMO PROLONG INFUSE W/PUMP: CPT

## 2020-07-27 PROCEDURE — 3079F PR MOST RECENT DIASTOLIC BLOOD PRESSURE 80-89 MM HG: ICD-10-PCS | Mod: S$GLB,,, | Performed by: INTERNAL MEDICINE

## 2020-07-27 PROCEDURE — 3077F SYST BP >= 140 MM HG: CPT | Mod: S$GLB,,, | Performed by: INTERNAL MEDICINE

## 2020-07-27 PROCEDURE — 1101F PR PT FALLS ASSESS DOC 0-1 FALLS W/OUT INJ PAST YR: ICD-10-PCS | Mod: S$GLB,,, | Performed by: INTERNAL MEDICINE

## 2020-07-27 RX ORDER — FLUOROURACIL 50 MG/ML
280 INJECTION, SOLUTION INTRAVENOUS
Status: COMPLETED | OUTPATIENT
Start: 2020-07-27 | End: 2020-07-27

## 2020-07-27 RX ORDER — EPINEPHRINE 0.3 MG/.3ML
0.3 INJECTION SUBCUTANEOUS ONCE AS NEEDED
Status: DISCONTINUED | OUTPATIENT
Start: 2020-07-27 | End: 2020-07-27 | Stop reason: HOSPADM

## 2020-07-27 RX ORDER — DIPHENHYDRAMINE HYDROCHLORIDE 50 MG/ML
50 INJECTION INTRAMUSCULAR; INTRAVENOUS ONCE AS NEEDED
Status: DISCONTINUED | OUTPATIENT
Start: 2020-07-27 | End: 2020-07-27 | Stop reason: HOSPADM

## 2020-07-27 RX ORDER — HEPARIN 100 UNIT/ML
500 SYRINGE INTRAVENOUS
Status: DISCONTINUED | OUTPATIENT
Start: 2020-07-27 | End: 2020-07-27 | Stop reason: HOSPADM

## 2020-07-27 RX ORDER — SODIUM CHLORIDE 0.9 % (FLUSH) 0.9 %
10 SYRINGE (ML) INJECTION
Status: DISCONTINUED | OUTPATIENT
Start: 2020-07-27 | End: 2020-07-27 | Stop reason: HOSPADM

## 2020-07-27 RX ADMIN — DEXTROSE 475 MG: 5 SOLUTION INTRAVENOUS at 09:07

## 2020-07-27 RX ADMIN — FLUOROURACIL 475 MG: 50 INJECTION, SOLUTION INTRAVENOUS at 11:07

## 2020-07-27 RX ADMIN — FLUOROURACIL 2855 MG: 50 INJECTION, SOLUTION INTRAVENOUS at 11:07

## 2020-07-27 RX ADMIN — OXALIPLATIN 101 MG: 5 INJECTION, SOLUTION INTRAVENOUS at 09:07

## 2020-07-27 RX ADMIN — DEXAMETHASONE SODIUM PHOSPHATE: 4 INJECTION, SOLUTION INTRA-ARTICULAR; INTRALESIONAL; INTRAMUSCULAR; INTRAVENOUS; SOFT TISSUE at 08:07

## 2020-07-27 RX ADMIN — DEXTROSE: 5 SOLUTION INTRAVENOUS at 09:07

## 2020-07-27 NOTE — PLAN OF CARE
Problem: Anemia (Chemotherapy Effects)  Goal: Anemia Symptom Improvement  Outcome: Ongoing, Progressing

## 2020-07-29 ENCOUNTER — INFUSION (OUTPATIENT)
Dept: INFUSION THERAPY | Facility: HOSPITAL | Age: 80
End: 2020-07-29
Attending: INTERNAL MEDICINE
Payer: COMMERCIAL

## 2020-07-29 VITALS
TEMPERATURE: 98 F | HEIGHT: 61 IN | DIASTOLIC BLOOD PRESSURE: 78 MMHG | BODY MASS INDEX: 27.75 KG/M2 | SYSTOLIC BLOOD PRESSURE: 131 MMHG | WEIGHT: 147 LBS | OXYGEN SATURATION: 98 % | HEART RATE: 93 BPM | RESPIRATION RATE: 17 BRPM

## 2020-07-29 DIAGNOSIS — E83.51 HYPOCALCEMIA: Primary | ICD-10-CM

## 2020-07-29 DIAGNOSIS — C20 RECTAL CANCER: ICD-10-CM

## 2020-07-29 DIAGNOSIS — D70.1 CHEMOTHERAPY INDUCED NEUTROPENIA: ICD-10-CM

## 2020-07-29 DIAGNOSIS — T45.1X5A CHEMOTHERAPY INDUCED NEUTROPENIA: ICD-10-CM

## 2020-07-29 PROCEDURE — 25000003 PHARM REV CODE 250: Performed by: INTERNAL MEDICINE

## 2020-07-29 PROCEDURE — 63600175 PHARM REV CODE 636 W HCPCS: Performed by: INTERNAL MEDICINE

## 2020-07-29 PROCEDURE — 96523 IRRIG DRUG DELIVERY DEVICE: CPT

## 2020-07-29 PROCEDURE — A4216 STERILE WATER/SALINE, 10 ML: HCPCS | Performed by: INTERNAL MEDICINE

## 2020-07-29 RX ORDER — HEPARIN 100 UNIT/ML
500 SYRINGE INTRAVENOUS
Status: DISCONTINUED | OUTPATIENT
Start: 2020-07-29 | End: 2020-07-29 | Stop reason: HOSPADM

## 2020-07-29 RX ORDER — SODIUM CHLORIDE 0.9 % (FLUSH) 0.9 %
10 SYRINGE (ML) INJECTION
Status: DISCONTINUED | OUTPATIENT
Start: 2020-07-29 | End: 2020-07-29 | Stop reason: HOSPADM

## 2020-07-29 RX ADMIN — SODIUM CHLORIDE, PRESERVATIVE FREE 10 ML: 5 INJECTION INTRAVENOUS at 10:07

## 2020-07-29 RX ADMIN — HEPARIN 500 UNITS: 100 SYRINGE at 10:07

## 2020-07-30 ENCOUNTER — PATIENT MESSAGE (OUTPATIENT)
Dept: SURGERY | Facility: CLINIC | Age: 80
End: 2020-07-30

## 2020-07-31 ENCOUNTER — TELEPHONE (OUTPATIENT)
Dept: HEMATOLOGY/ONCOLOGY | Facility: CLINIC | Age: 80
End: 2020-07-31

## 2020-07-31 NOTE — TELEPHONE ENCOUNTER
Called the patient and instructed her that I have her letter for the travel company ready. She instructed me that she would like me to mail the letter.  Verified her address and will mail her the letter today.

## 2020-08-06 ENCOUNTER — OFFICE VISIT (OUTPATIENT)
Dept: SURGERY | Facility: CLINIC | Age: 80
End: 2020-08-06
Payer: COMMERCIAL

## 2020-08-06 VITALS
HEIGHT: 61 IN | HEART RATE: 86 BPM | SYSTOLIC BLOOD PRESSURE: 172 MMHG | TEMPERATURE: 98 F | BODY MASS INDEX: 28.14 KG/M2 | DIASTOLIC BLOOD PRESSURE: 77 MMHG | WEIGHT: 149.06 LBS

## 2020-08-06 DIAGNOSIS — Z85.048 HISTORY OF RECTAL CANCER: ICD-10-CM

## 2020-08-06 PROCEDURE — 99213 OFFICE O/P EST LOW 20 MIN: CPT | Mod: 25,S$GLB,, | Performed by: SURGERY

## 2020-08-06 PROCEDURE — 1101F PR PT FALLS ASSESS DOC 0-1 FALLS W/OUT INJ PAST YR: ICD-10-PCS | Mod: CPTII,S$GLB,, | Performed by: SURGERY

## 2020-08-06 PROCEDURE — 99213 PR OFFICE/OUTPT VISIT, EST, LEVL III, 20-29 MIN: ICD-10-PCS | Mod: 25,S$GLB,, | Performed by: SURGERY

## 2020-08-06 PROCEDURE — 99999 PR PBB SHADOW E&M-EST. PATIENT-LVL V: ICD-10-PCS | Mod: PBBFAC,,, | Performed by: SURGERY

## 2020-08-06 PROCEDURE — 1159F PR MEDICATION LIST DOCUMENTED IN MEDICAL RECORD: ICD-10-PCS | Mod: S$GLB,,, | Performed by: SURGERY

## 2020-08-06 PROCEDURE — 46600 PR DIAG2STIC A2SCOPY: ICD-10-PCS | Mod: S$GLB,,, | Performed by: SURGERY

## 2020-08-06 PROCEDURE — 3077F PR MOST RECENT SYSTOLIC BLOOD PRESSURE >= 140 MM HG: ICD-10-PCS | Mod: CPTII,S$GLB,, | Performed by: SURGERY

## 2020-08-06 PROCEDURE — 1125F PR PAIN SEVERITY QUANTIFIED, PAIN PRESENT: ICD-10-PCS | Mod: S$GLB,,, | Performed by: SURGERY

## 2020-08-06 PROCEDURE — 1159F MED LIST DOCD IN RCRD: CPT | Mod: S$GLB,,, | Performed by: SURGERY

## 2020-08-06 PROCEDURE — 1101F PT FALLS ASSESS-DOCD LE1/YR: CPT | Mod: CPTII,S$GLB,, | Performed by: SURGERY

## 2020-08-06 PROCEDURE — 3078F PR MOST RECENT DIASTOLIC BLOOD PRESSURE < 80 MM HG: ICD-10-PCS | Mod: CPTII,S$GLB,, | Performed by: SURGERY

## 2020-08-06 PROCEDURE — 99999 PR PBB SHADOW E&M-EST. PATIENT-LVL V: CPT | Mod: PBBFAC,,, | Performed by: SURGERY

## 2020-08-06 PROCEDURE — 3078F DIAST BP <80 MM HG: CPT | Mod: CPTII,S$GLB,, | Performed by: SURGERY

## 2020-08-06 PROCEDURE — 46600 DIAGNOSTIC ANOSCOPY SPX: CPT | Mod: S$GLB,,, | Performed by: SURGERY

## 2020-08-06 PROCEDURE — 3077F SYST BP >= 140 MM HG: CPT | Mod: CPTII,S$GLB,, | Performed by: SURGERY

## 2020-08-06 PROCEDURE — 1125F AMNT PAIN NOTED PAIN PRSNT: CPT | Mod: S$GLB,,, | Performed by: SURGERY

## 2020-08-06 NOTE — PROGRESS NOTES
79-year-old female whom I am familiar with.  Patient completed neoadjuvant treatment for distal rectal cancer approximately 12/19.  Patient had CT and MRI in September demonstrating suspicious nodes.  Follow-up MRI in December had demonstrated no mass and no obvious nodes consistent with good response to neoadjuvant treatment.  Patient had follow-up flexible sigmoidoscopy by me on January 31st.  There were no masses appreciated within the rectum.  There was scar present from where the mass was but no true mass.  Multiple biopsies were taken from the scar and sent to pathology.  Pt has since completed adjuvant chemotherapy.  She notes that she feels well.  She has no pain or discomfort.  She reports good bowel function.  She denies any blood per rectum.    She has had no changes in her overall health.    AFVSS  AAox3  CTA B  Soft/nt/nd  Rectal: ext exam demonstrates no masses.  No fistula or fissure.  ZE with normal sphincter tone.  No masses appreciated  Anoscopy performed demonstrating mild int hemorrhods.  No masses seen      Assessment:  History of rectal cancer    Lengthy discussion with patient.  She again demonstrates no evidence of disease.  She has completed radiation as well as systemic chemotherapy and at present has no evidence of disease.  I do recommend repeat MRI which she is agreeable to.  Following MRI would recommend flexible sigmoidoscopy in the next several weeks to evaluate ensure no evidence of recurrence.  Patient is agreeable to this plan.  We will arrange for these things.

## 2020-08-12 ENCOUNTER — HOSPITAL ENCOUNTER (OUTPATIENT)
Dept: RADIOLOGY | Facility: HOSPITAL | Age: 80
Discharge: HOME | End: 2020-08-12
Attending: SURGERY
Payer: COMMERCIAL

## 2020-08-12 DIAGNOSIS — Z85.048 HISTORY OF RECTAL CANCER: ICD-10-CM

## 2020-08-12 PROCEDURE — 72195 MRI PELVIS W/O DYE: CPT | Mod: 26,,, | Performed by: RADIOLOGY

## 2020-08-12 PROCEDURE — 72195 MRI RECTAL CANCER WITHOUT CONTRAST: ICD-10-PCS | Mod: 26,,, | Performed by: RADIOLOGY

## 2020-08-12 PROCEDURE — 72195 MRI PELVIS W/O DYE: CPT | Mod: TC

## 2020-08-14 ENCOUNTER — HOSPITAL ENCOUNTER (OUTPATIENT)
Dept: RADIOLOGY | Facility: CLINIC | Age: 80
Discharge: HOME | End: 2020-08-14
Attending: PHYSICAL MEDICINE & REHABILITATION
Payer: COMMERCIAL

## 2020-08-14 DIAGNOSIS — Z12.31 ENCOUNTER FOR SCREENING MAMMOGRAM FOR MALIGNANT NEOPLASM OF BREAST: ICD-10-CM

## 2020-08-14 PROCEDURE — 77063 MAMMO DIGITAL SCREENING BILAT WITH TOMOSYNTHESIS_CAD: ICD-10-PCS | Mod: 26,,, | Performed by: RADIOLOGY

## 2020-08-14 PROCEDURE — 77067 SCR MAMMO BI INCL CAD: CPT | Mod: 26,,, | Performed by: RADIOLOGY

## 2020-08-14 PROCEDURE — 77063 BREAST TOMOSYNTHESIS BI: CPT | Mod: 26,,, | Performed by: RADIOLOGY

## 2020-08-14 PROCEDURE — 77067 MAMMO DIGITAL SCREENING BILAT WITH TOMOSYNTHESIS_CAD: ICD-10-PCS | Mod: 26,,, | Performed by: RADIOLOGY

## 2020-08-14 PROCEDURE — 77067 SCR MAMMO BI INCL CAD: CPT | Mod: TC,PO

## 2020-08-27 ENCOUNTER — PATIENT MESSAGE (OUTPATIENT)
Dept: SURGERY | Facility: CLINIC | Age: 80
End: 2020-08-27

## 2020-08-28 ENCOUNTER — TELEPHONE (OUTPATIENT)
Dept: SURGERY | Facility: CLINIC | Age: 80
End: 2020-08-28

## 2020-08-29 LAB
ALBUMIN SERPL-MCNC: 3.6 G/DL (ref 3.6–5.1)
ALBUMIN/GLOB SERPL: 1.5 (CALC) (ref 1–2.5)
ALP SERPL-CCNC: 139 U/L (ref 37–153)
ALT SERPL-CCNC: 22 U/L (ref 6–29)
AST SERPL-CCNC: 22 U/L (ref 10–35)
BASOPHILS # BLD AUTO: 38 CELLS/UL (ref 0–200)
BASOPHILS NFR BLD AUTO: 0.7 %
BILIRUB SERPL-MCNC: 0.6 MG/DL (ref 0.2–1.2)
BUN SERPL-MCNC: 32 MG/DL (ref 7–25)
BUN/CREAT SERPL: 18 (CALC) (ref 6–22)
CALCIUM SERPL-MCNC: 9.1 MG/DL (ref 8.6–10.4)
CHLORIDE SERPL-SCNC: 99 MMOL/L (ref 98–110)
CO2 SERPL-SCNC: 29 MMOL/L (ref 20–32)
CREAT SERPL-MCNC: 1.8 MG/DL (ref 0.6–0.93)
EOSINOPHIL # BLD AUTO: 108 CELLS/UL (ref 15–500)
EOSINOPHIL NFR BLD AUTO: 2 %
ERYTHROCYTE [DISTWIDTH] IN BLOOD BY AUTOMATED COUNT: 13.9 % (ref 11–15)
GFRSERPLBLD MDRD-ARVRAT: 27 ML/MIN/1.73M2
GLOBULIN SER CALC-MCNC: 2.4 G/DL (CALC) (ref 1.9–3.7)
GLUCOSE SERPL-MCNC: 192 MG/DL (ref 65–139)
HCT VFR BLD AUTO: 32.5 % (ref 35–45)
HGB BLD-MCNC: 10.7 G/DL (ref 11.7–15.5)
LYMPHOCYTES # BLD AUTO: 1150 CELLS/UL (ref 850–3900)
LYMPHOCYTES NFR BLD AUTO: 21.3 %
MCH RBC QN AUTO: 32.5 PG (ref 27–33)
MCHC RBC AUTO-ENTMCNC: 32.9 G/DL (ref 32–36)
MCV RBC AUTO: 98.8 FL (ref 80–100)
MONOCYTES # BLD AUTO: 562 CELLS/UL (ref 200–950)
MONOCYTES NFR BLD AUTO: 10.4 %
NEUTROPHILS # BLD AUTO: 3542 CELLS/UL (ref 1500–7800)
NEUTROPHILS NFR BLD AUTO: 65.6 %
PLATELET # BLD AUTO: 130 THOUSAND/UL (ref 140–400)
PMV BLD REES-ECKER: 10.8 FL (ref 7.5–12.5)
POTASSIUM SERPL-SCNC: 4.2 MMOL/L (ref 3.5–5.3)
PROT SERPL-MCNC: 6 G/DL (ref 6.1–8.1)
RBC # BLD AUTO: 3.29 MILLION/UL (ref 3.8–5.1)
SODIUM SERPL-SCNC: 137 MMOL/L (ref 135–146)
WBC # BLD AUTO: 5.4 THOUSAND/UL (ref 3.8–10.8)

## 2020-08-31 DIAGNOSIS — Z85.048 HISTORY OF RECTAL CANCER: Primary | ICD-10-CM

## 2020-08-31 RX ORDER — SODIUM CHLORIDE, SODIUM LACTATE, POTASSIUM CHLORIDE, CALCIUM CHLORIDE 600; 310; 30; 20 MG/100ML; MG/100ML; MG/100ML; MG/100ML
INJECTION, SOLUTION INTRAVENOUS CONTINUOUS
Status: CANCELLED | OUTPATIENT
Start: 2020-08-31

## 2020-08-31 RX ORDER — SODIUM CHLORIDE 0.9 % (FLUSH) 0.9 %
10 SYRINGE (ML) INJECTION
Status: CANCELLED | OUTPATIENT
Start: 2020-08-31

## 2020-08-31 NOTE — PROGRESS NOTES
[facility] Hematology/Oncology  PROGRESS NOTE -   Follow-up Visit      Subjective:       Patient ID:   NAME: Valery Huggins : 1940     79 y.o. female    Referring Doc: David Mendieta MD  Other Physicians: Armando Nath; Stanislav Epstein; Azar Donnelly; Mo    Chief Complaint:  Left RCC and rectal CA f/u    History of Present Illness:     Patient is being seen today in person and she has since completed chemotherapy about a month ago. She is still having some tingling in the fingers and toes.      The patient is on today to go over the results of the recently ordered labs, tests and studies. She has some lightheadedness for past couple weeks.     She had previously completed combination XRT and chemotherapy. No CP, SOB, HA's or N/V.   She last saw Dr Hassan with rad/onc on 2019.    She previously saw Dr Mendieta on 12/10/2019  and had MRI on 2020.  They did scope on 2020 which looked good and both the patient and Dr Mendieta did not want to proceed in direction of operation especially given her age and co-morbidities.She had recent MRI with Dr Mendieta on 2020 and he plans to scope her again at end of 2020    We previously discussed adjuvant chemotherapy with FOLOFOX x 10-12 cycles especially since she was not having surgery and she was agreeable.       She wanted to discontinue with therapy after # 10 cycle and that was reasonable          Discussed Covid19 precautions again                ROS:   GEN: normal without any fever, night sweats or weight loss  HEENT: normal with no HA's, sore throat, stiff neck, changes in vision;    CV: normal with no CP, SOB, PND, RAYA or orthopnea  PULM: normal with no SOB, cough, hemoptysis, sputum or pleuritic pain  GI: no nausea or constipation  : normal with no hematuria, dysuria  BREAST: normal with no mass, discharge, pain  SKIN: normal with no rash, erythema, bruising, or swelling    Allergies:  Review of patient's allergies indicates:  No Known  "Allergies    Medications:    Current Outpatient Medications:     amLODIPine (NORVASC) 10 MG tablet, Take 1 tablet (10 mg total) by mouth once daily., Disp: 90 tablet, Rfl: 0    ascorbic acid (VITAMIN C) 100 MG tablet, Take 100 mg by mouth once daily., Disp: , Rfl:     atorvastatin (LIPITOR) 10 MG tablet, Take 1 tablet (10 mg total) by mouth every evening., Disp: 90 tablet, Rfl: 0    b complex vitamins capsule, Take 1 capsule by mouth once daily., Disp: , Rfl:     BD ULTRA-FINE CONNIE PEN NEEDLE 32 gauge x 5/32" Ndle, INJECT TWICE DAILY AS DIRECTED, Disp: 200 each, Rfl: 0    calcitRIOL (ROCALTROL) 0.5 MCG Cap, TAKE 1 CAPSULE (0.5 MCG TOTAL) BY MOUTH ONCE DAILY., Disp: 90 capsule, Rfl: 0    calcium carbonate (CALCIUM 300 ORAL), Take 600 mg by mouth 2 (two) times a day. , Disp: , Rfl:     cholecalciferol, vitamin D3, 4,000 unit Cap, Take 1 capsule by mouth once daily., Disp: , Rfl:     docusate sodium (COLACE) 100 MG capsule, Take 100 mg by mouth 2 (two) times daily., Disp: , Rfl:     escitalopram oxalate (LEXAPRO) 20 MG tablet, Take 1 tablet (20 mg total) by mouth once daily., Disp: 90 tablet, Rfl: 0    FREESTYLE LITE METER kit, , Disp: , Rfl: 0    FREESTYLE LITE STRIPS Strp, , Disp: , Rfl: 3    gabapentin (NEURONTIN) 100 MG capsule, Take 1 capsule (100 mg total) by mouth 3 (three) times daily., Disp: 90 capsule, Rfl: 2    levothyroxine (SYNTHROID) 88 MCG tablet, Take 1 tablet (88 mcg total) by mouth before breakfast., Disp: 90 tablet, Rfl: 0    magnesium oxide (MAG-OX) 400 mg (241.3 mg magnesium) tablet, Take 1 tablet (400 mg total) by mouth 2 (two) times daily., Disp: 180 tablet, Rfl: 0    pantoprazole (PROTONIX) 40 MG tablet, Take 1 tablet (40 mg total) by mouth once daily., Disp: 90 tablet, Rfl: 0    TOUJEO SOLOSTAR U-300 INSULIN 300 unit/mL (1.5 mL) InPn pen, Inject 20 Units into the skin once daily. (Patient taking differently: Inject 26 Units into the skin once daily. ), Disp: 4.5 mL, Rfl: 3    " valsartan (DIOVAN) 40 MG tablet, Take 1 tablet (40 mg total) by mouth once daily., Disp: 90 tablet, Rfl: 0    VICTOZA 3-XAVIER 0.6 mg/0.1 mL (18 mg/3 mL) PnIj pen, 1.8 mg sq qd, Disp: 9 mL, Rfl: 0    Current Facility-Administered Medications:     0.9%  NaCl infusion (for blood administration), , Intravenous, Once, Carrillo Goode MD    PMHx/PSHx Updates:  See patient's last visit with me on 7/27/2020  See H&P on 10/8/2019        Pathology:  Cancer Staging  No matching staging information was found for the patient.          Objective:     Vitals:  Blood pressure (!) 153/75, pulse 79, temperature 97.6 °F (36.4 °C), resp. rate 18, weight 68.1 kg (150 lb 3.2 oz), last menstrual period 06/01/2012.        Physical Examination:   GEN: no apparent distress, comfortable; AAOx3  HEAD: atraumatic and normocephalic  EYES: no conjunctival pallor or muddiness, no icterus; normal pupil reaction to ambient light  ENT: OMM, no pharyngeal erythema, external bilateral ears WNL; no visible thrush or ulcers  NECK: no masses or swelling, trachea midline, no visible LAD/LN's   CV: no palpitations; no pedal edema; no noticeable JVD or neck vein distension;  portacath on right CW  CHEST: Normal respiratory effort; chest wall breath movements symmetrical; no audible wheezing  ABDOM: non-distended; no bloating  MUSC/Skeletal: ROM normal; joints visibly normal; no deformities or arthropathy  EXTREM: no clubbing, cyanosis, inflammation or swelling  SKIN: no rashes, lesions, ulcers, petechiae or subcutaneous nodules  : no hendrickson  NEURO: moving all 4 extremities; AAOx3; no tremors  PSYCH: normal mood, affect and behavior  LYMPH: no visible LN's or LAD              Labs:          Lab Results   Component Value Date    WBC 5.4 08/28/2020    HGB 10.7 (L) 08/28/2020    HCT 32.5 (L) 08/28/2020    MCV 98.8 08/28/2020     (L) 08/28/2020     CMP  Sodium   Date Value Ref Range Status   08/28/2020 137 135 - 146 mmol/L Final     Potassium   Date  Value Ref Range Status   08/28/2020 4.2 3.5 - 5.3 mmol/L Final     Chloride   Date Value Ref Range Status   08/28/2020 99 98 - 110 mmol/L Final     CO2   Date Value Ref Range Status   08/28/2020 29 20 - 32 mmol/L Final     Glucose   Date Value Ref Range Status   08/28/2020 192 (H) 65 - 139 mg/dL Final     Comment:               Non-fasting reference interval          BUN, Bld   Date Value Ref Range Status   08/28/2020 32 (H) 7 - 25 mg/dL Final     Creatinine   Date Value Ref Range Status   08/28/2020 1.80 (H) 0.60 - 0.93 mg/dL Final     Comment:     For patients >49 years of age, the reference limit  for Creatinine is approximately 13% higher for people  identified as -American.          Calcium   Date Value Ref Range Status   08/28/2020 9.1 8.6 - 10.4 mg/dL Final     Total Protein   Date Value Ref Range Status   08/28/2020 6.0 (L) 6.1 - 8.1 g/dL Final     Albumin   Date Value Ref Range Status   08/28/2020 3.6 3.6 - 5.1 g/dL Final     Total Bilirubin   Date Value Ref Range Status   08/28/2020 0.6 0.2 - 1.2 mg/dL Final     Alkaline Phosphatase   Date Value Ref Range Status   08/07/2020 168 (H) 37 - 153 U/L Final     AST   Date Value Ref Range Status   08/28/2020 22 10 - 35 U/L Final     ALT   Date Value Ref Range Status   08/28/2020 22 6 - 29 U/L Final     Anion Gap   Date Value Ref Range Status   06/10/2020 14 8 - 16 mmol/L Final     eGFR if    Date Value Ref Range Status   08/28/2020 31 (L) > OR = 60 mL/min/1.73m2 Final     eGFR if non    Date Value Ref Range Status   08/28/2020 27 (L) > OR = 60 mL/min/1.73m2 Final     SARS-CoV2 (COVID-19) Qualitative PCR Not Detected           Radiology/Diagnostic Studies:    MRI 8/6/2020:  Impression:     No evidence for disease progression.Minimal signal abnormality along the mid rectum corresponds to the previously treated lesion.  No pelvic adenopathy.       CT  3/5/2020    Impression       Status post left nephrectomy without evidence of  recurrent renal malignancy.    No significant change since prior study.           MRI  1/8/2020:  Impression       Significant reduction in size of the patient's known mid rectal mass, which is no longer distinctly visible.  Reduction in size of 3 mesorectal lymph nodes as above.               PET  10/17/2019    Impression       1. Left paratracheal small mass extending into superior mediastinum with associated moderate FDG activity is unchanged in size and appearance since 02/16/2018 CT.  This is unlikely to represent metastatic disease or malignancy, given stability, and may represent residual thyroid parenchyma but is otherwise nonspecific.  2. Unchanged 6 mm right upper lobe nodule since 02/16/2018.  Benign etiology is likely the continued CT thorax without IV contrast follow-up is recommended in 12 months to document greater than 2 years of stability.  3. No current convincing evidence of metastatic disease.  4. Previous rectal mass is no longer evident on this exam.               X-ray Chest Ap Portable    Result Date: 10/15/2019  EXAMINATION: XR CHEST AP PORTABLE CLINICAL HISTORY: s/p port; Encounter for adjustment and management of vascular access device TECHNIQUE: Single frontal view of the chest was performed. COMPARISON: 6/18/2019 FINDINGS: The cardiomediastinal silhouette is stable.  Lungs are clear of infiltrate.  No pleural effusions.  Laya catheter has been inserted from the region of the right subclavian vein with the tip at the level the proximal SVC.     Laya catheter inserted with the tip at the level the proximal SVC.  Lungs are expanded and clear.  No pneumothorax. Electronically signed by: Nubia Venegas MD Date:    10/15/2019 Time:    14:05    Surg Fl Surgery Fluoro Usage    Result Date: 10/15/2019  See OP Notes for results. IMPRESSION: See OP Notes for results. This procedure was auto-finalized by: [facility]     Mri Rectal Cancer Without Contrast    Result Date:  9/18/2019  EXAMINATION: MRI RECTAL CANCER WITHOUT CONTRAST CLINICAL HISTORY: Rectal cancer, staging, locoregional;rectal anal mass;  Malignant neoplasm of rectum TECHNIQUE: Multiplanar multisequence MR imaging of the pelvis without contrast. COMPARISON: 07/12/2019 FINDINGS: Approximately 4 cm from the anal verge there is a peripherally located lobular mass along the dorsal rectal wall.  This measures 4 cm in length and 3.3 cm in diameter.  There is heterogeneous signal intensity.  There is restricted diffusion in the lesion and no discrete extra colonic extension.  Several lymph nodes are noted in the mesorectal fat including two which measure 3 mm near the inferior sacrum, series 8, image 16, and a 5 mm lymph node at the S2 level, series 8, image 8.  Prominent but nonenlarged bilateral obturator chain lymph nodes also noted, on the right at 4 mm and left at 6 mm.  There is urinary bladder wall thickening which is diffuse.  Moderate degree of stool in the colon.  No dilated bowel loops.  Hysterectomy.     4 cm mid rectal lesion in keeping with known malignancy.  No discrete extension into the mesorectal fascia although there are several perirectal lymph nodes which raise the possibility for locoregional lymph node involvement. Electronically signed by: Bebo Kapoor Date:    09/18/2019 Time:    16:45      I have reviewed all available lab results and radiology reports.    Assessment/Plan:   (1) 79 y.o. female with diagnosis of prior left RCC who has been referred by David Mendieta MD for evaluation by medical hematology/oncology. She has been followed by Dr Stanislav Epstein with [facility] at the [facility] in [place]. She last saw Dr Epstein in July 2019. She was recently diagnosed with rectal mass by Dr Armando Duff which was found on colonoscopy on 8/26/2019. She had presented with lower Gi bleeding at that time. The pathology from those biopsies showed no evidence of malignancy at that time. She was  subsequently seen by Dr David Mendieta and underwent trans-anal surgical biopsy on 9/23/2019. The pathology from the surgical biopsy showed rectal carcinoma.         She has been seen by Dr Fareed Hassan with Rad/onc for radiation therapy evaluation.     PET was done on 10/17/2019     We previously discussed giving her combined chemotherapy with the XRt to try to reduce her risk of recurrence. She was agreeable to this plan.    She had portacath placed with Dr Mendieta. She saw Dr Hassan last week with rad/onc. She has since completed weekly XRT and chemotherapy    - She previously saw Dr Mendieta on 12/10/2019  and had MRI on 1/8/2020.  They did scope on 1/28th 2020 and I spoke to dr mendieta by phone last week. The scope looked good and both the patient and Dr Mendieta does not want to proceed in direction of operation especially given her age and co-morbidities.    - We previously discussed adjuvant chemotherapy with FOLOFOX x 10-12 cycles especially since she is not having surgery and she is agreeable.    - Will previously  provide literature on the regimen and set up chemotherapy      7/2/2020:  - She has since started chemotherapy and she has had 8 cycles so far; chemotherapy was held last time due to general malaise. She feels good and has some minimal and tolerable neuropathy in hands and feet.  She has some intermittent constipation.    She wants to continue with therapy and I would like to at least get 10 cycles in if possible.     7/27/2020:  - she is on her last cycle #10 today  - check labs weekly for next 4 weeks  - she will need to follow-up with dr mendieta and also get repeat scans every 6 months    9/1/2020:  - she completed the 10th and last cycle about a month ago  - she had recent MRI of pelvis with Dr Mendieta on 8/12/2020 and plans to get repeat scope at end of Sept 2020  - she has some occasional lightheadedness and is going to see her eye doctor in the near future  - will schedule a PET scan and consider MRI  of brain thereafter      (2) Left renal cell carcinoma s/p left nephrectomy with Dr Azar Donnelly - diagnosed about 2 years ago     (3) Ureterolithiasis     (4) CRI - stage III - followed by Dr Antonio; she saw him recently and per patient's report, her kidney function is stable per Dr Antonio  - she is seeing Dr Antonio this coming Thursday     (5) HTN and hypercholesterolemia     (6) DM - followed by Dr Brower with endocrinology     (7) Hx/of gall stones     (8) Chronic left shoulder issues     (9) MVP     (10) Thyroid disease with prior hx/of thyroid cancer s/p thyroidectomy      (11) Chronic anemia - most likley multifactorial due to iron deficiency, GIB and anemia of chronic renal disease  - hgb at 10.7 currently    (12) hypocalcemia - followed by Dr Serrano    (13) 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  Encouraged compliance with labs; discussed COVID19 precautions  2.  check labs monthly for now -  follow-up with Dr Mendieta for planned scope at end of Sept 2020  3.  F/u with PCP, GI, rad/onc , etc  4.  F/u nephrology - Dr Antonio   5. Refill antiemetics as needed  6. Patient plans to see eye doctor  7. Set up a f/u PET   RTC in 4 weeks     Fax note to Pham Winston, Gayatri; Tete Duff Tandron; Paco;     Discussion:       I spent over 25 mins of time with the patient. Reviewed results of the recently ordered labs, tests and studies; made directives with regards to the results. Over half of this time was spent couseling and coordinating care.    COVID-19 Discussion:    I had long discussion with patient and any applicable family about the COVID-19 coronavirus epidemic and the recommended precautions with regard to cancer and/or hematology  patients. I have re-iterated the CDC recommendations for adequate hand washing, use of hand -like products, and coughing into elbow, etc. In addition, especially for our patients who are on chemotherapy and/or our otherwise immunocompromised patients, I have recommended avoidance of crowds, including movie theaters, restaurants, churches, etc. I have recommended avoidance of any sick or symptomatic family members and/or friends. I have also recommended avoidance of any raw and unwashed food products, and general avoidance of food items that have not been prepared by themselves. The patient has been asked to call us immediately with any symptom developments, issues, questions or other general concerns.       Chemotherapy Discussion:      I discussed the available treatment option(s) in accordance with the latest/current national evidence-based guidelines (NCCN, UpToDate, NCI, ASCO, etc where applicable), their overall age/condition and their co-morbidities. I also went over the risks and benefits of the chemotherapy with regard to their particular cancer type, their cancer stage, their age/condition, and their co-morbidities. I provided literature on the chemotherapy regimen and discussed the chemotherapy side-effect profiles of the drug(s). I discussed the importance of compliance with obtaining and monitoring weekly lab work, and went over the potential hematopathology issues and risks with anemia, leucopenia and thrombocytopenia that can occur with chemotherapy. I discussed the potential risks of liver and kidney damage, which could be permanent and could necessitate dialysis long-term if kidney failure developed. I discussed the emetic and/or diarrheal potential of the regimen and the potential need for use of antiemetic and anti-diarrheal medications. I discussed the risk for development of anaphylactic shock, bronchospasm, dysrhythmia, and respiratory/cardiovascular arrest and/or failure. I discussed  the potential risks for development of alopecia, cold sensory issues, ringing in ears, vertigo, cataracts, glaucoma, and neuropathy, all of which could end up being chronic and life-long. Some chemotherpyI discussed the risks of hand-foot syndrome and rashes, and development of other autoimmune mediated processes such as pneumonitis, hepatitis, and colitis which could be life threatening. I discussed the risks of the potential development of a rare but fatal viral mediated disease known as PML (Progressive Multifocal Leukoencephalopathy), and risk of future development of leukemia and/or lymphoma from use of certain chemotherapy agents. I discussed the need for neutropenic precautions, basic hygiene/sanitation behaviors and dietary restrictions.    The patient's consent has been obtained to proceed with the chemotherapy.The patient will be referred to [facility] for training and education on chemotherapy, use of antiemetics and/or anti-diarrheals, use of NSAID's, potential chemotherapy side-effects, and any specific recommendations and precautions with the particular chemotherapy agents.      I answered all of the patient's (and family's, if applicable) questions to the best of my ability and to their complete satisfaction. The patient acknowledged full understanding of the risks, recommendations and plan(s).          I have explained all of the above in detail and the patient understands all of the current recommendation(s). I have answered all of their questions to the best of my ability and to their complete satisfaction.   The patient is to continue with the current management plan.            Electronically signed by Carrillo Goode MD                 Answers for HPI/ROS submitted by the patient on 8/26/2020   appetite change : No  unexpected weight change: No  mouth sores: No  visual disturbance: Yes  cough: No  shortness of breath: No  chest pain: No  abdominal pain:  No  diarrhea: No  frequency: No  back pain: No  rash: No  headaches: No  adenopathy: No  nervous/ anxious: No

## 2020-09-01 ENCOUNTER — OFFICE VISIT (OUTPATIENT)
Dept: HEMATOLOGY/ONCOLOGY | Facility: CLINIC | Age: 80
End: 2020-09-01
Payer: COMMERCIAL

## 2020-09-01 ENCOUNTER — INFUSION (OUTPATIENT)
Dept: INFUSION THERAPY | Facility: HOSPITAL | Age: 80
End: 2020-09-01
Attending: INTERNAL MEDICINE
Payer: COMMERCIAL

## 2020-09-01 VITALS
RESPIRATION RATE: 18 BRPM | DIASTOLIC BLOOD PRESSURE: 78 MMHG | OXYGEN SATURATION: 99 % | WEIGHT: 150 LBS | HEART RATE: 75 BPM | SYSTOLIC BLOOD PRESSURE: 156 MMHG | BODY MASS INDEX: 28.35 KG/M2 | TEMPERATURE: 97 F

## 2020-09-01 VITALS
BODY MASS INDEX: 28.38 KG/M2 | WEIGHT: 150.19 LBS | HEART RATE: 79 BPM | SYSTOLIC BLOOD PRESSURE: 153 MMHG | TEMPERATURE: 98 F | DIASTOLIC BLOOD PRESSURE: 75 MMHG | RESPIRATION RATE: 18 BRPM

## 2020-09-01 DIAGNOSIS — Z90.5 STATUS POST NEPHRECTOMY: ICD-10-CM

## 2020-09-01 DIAGNOSIS — D64.9 NORMOCYTIC ANEMIA: ICD-10-CM

## 2020-09-01 DIAGNOSIS — K62.89 RECTAL MASS: ICD-10-CM

## 2020-09-01 DIAGNOSIS — C20 RECTAL CANCER: ICD-10-CM

## 2020-09-01 DIAGNOSIS — C64.2 RENAL CELL CARCINOMA OF LEFT KIDNEY: ICD-10-CM

## 2020-09-01 DIAGNOSIS — E83.51 HYPOCALCEMIA: Primary | ICD-10-CM

## 2020-09-01 DIAGNOSIS — D70.1 CHEMOTHERAPY INDUCED NEUTROPENIA: ICD-10-CM

## 2020-09-01 DIAGNOSIS — C20 MALIGNANT NEOPLASM OF RECTUM: Primary | ICD-10-CM

## 2020-09-01 DIAGNOSIS — T45.1X5A CHEMOTHERAPY INDUCED NEUTROPENIA: ICD-10-CM

## 2020-09-01 DIAGNOSIS — N28.89 RENAL MASS, LEFT: ICD-10-CM

## 2020-09-01 PROCEDURE — 1101F PT FALLS ASSESS-DOCD LE1/YR: CPT | Mod: S$GLB,,, | Performed by: INTERNAL MEDICINE

## 2020-09-01 PROCEDURE — 99214 PR OFFICE/OUTPT VISIT, EST, LEVL IV, 30-39 MIN: ICD-10-PCS | Mod: S$GLB,,, | Performed by: INTERNAL MEDICINE

## 2020-09-01 PROCEDURE — 63600175 PHARM REV CODE 636 W HCPCS: Performed by: INTERNAL MEDICINE

## 2020-09-01 PROCEDURE — 25000003 PHARM REV CODE 250: Performed by: INTERNAL MEDICINE

## 2020-09-01 PROCEDURE — 99214 OFFICE O/P EST MOD 30 MIN: CPT | Mod: S$GLB,,, | Performed by: INTERNAL MEDICINE

## 2020-09-01 PROCEDURE — 1126F AMNT PAIN NOTED NONE PRSNT: CPT | Mod: S$GLB,,, | Performed by: INTERNAL MEDICINE

## 2020-09-01 PROCEDURE — A4216 STERILE WATER/SALINE, 10 ML: HCPCS | Performed by: INTERNAL MEDICINE

## 2020-09-01 PROCEDURE — 1159F MED LIST DOCD IN RCRD: CPT | Mod: S$GLB,,, | Performed by: INTERNAL MEDICINE

## 2020-09-01 PROCEDURE — 1159F PR MEDICATION LIST DOCUMENTED IN MEDICAL RECORD: ICD-10-PCS | Mod: S$GLB,,, | Performed by: INTERNAL MEDICINE

## 2020-09-01 PROCEDURE — 3077F SYST BP >= 140 MM HG: CPT | Mod: S$GLB,,, | Performed by: INTERNAL MEDICINE

## 2020-09-01 PROCEDURE — 3078F PR MOST RECENT DIASTOLIC BLOOD PRESSURE < 80 MM HG: ICD-10-PCS | Mod: S$GLB,,, | Performed by: INTERNAL MEDICINE

## 2020-09-01 PROCEDURE — 1101F PR PT FALLS ASSESS DOC 0-1 FALLS W/OUT INJ PAST YR: ICD-10-PCS | Mod: S$GLB,,, | Performed by: INTERNAL MEDICINE

## 2020-09-01 PROCEDURE — 1126F PR PAIN SEVERITY QUANTIFIED, NO PAIN PRESENT: ICD-10-PCS | Mod: S$GLB,,, | Performed by: INTERNAL MEDICINE

## 2020-09-01 PROCEDURE — 96523 IRRIG DRUG DELIVERY DEVICE: CPT

## 2020-09-01 PROCEDURE — 3078F DIAST BP <80 MM HG: CPT | Mod: S$GLB,,, | Performed by: INTERNAL MEDICINE

## 2020-09-01 PROCEDURE — 3077F PR MOST RECENT SYSTOLIC BLOOD PRESSURE >= 140 MM HG: ICD-10-PCS | Mod: S$GLB,,, | Performed by: INTERNAL MEDICINE

## 2020-09-01 RX ORDER — SODIUM CHLORIDE 0.9 % (FLUSH) 0.9 %
10 SYRINGE (ML) INJECTION
Status: CANCELLED | OUTPATIENT
Start: 2020-09-01

## 2020-09-01 RX ORDER — SODIUM CHLORIDE 0.9 % (FLUSH) 0.9 %
10 SYRINGE (ML) INJECTION
Status: COMPLETED | OUTPATIENT
Start: 2020-09-01 | End: 2020-09-01

## 2020-09-01 RX ORDER — HEPARIN 100 UNIT/ML
500 SYRINGE INTRAVENOUS
Status: COMPLETED | OUTPATIENT
Start: 2020-09-01 | End: 2020-09-01

## 2020-09-01 RX ORDER — HEPARIN 100 UNIT/ML
500 SYRINGE INTRAVENOUS
Status: CANCELLED | OUTPATIENT
Start: 2020-09-01

## 2020-09-01 RX ADMIN — SODIUM CHLORIDE 10 ML: 9 INJECTION INTRAMUSCULAR; INTRAVENOUS; SUBCUTANEOUS at 09:09

## 2020-09-01 RX ADMIN — HEPARIN 500 UNITS: 100 SYRINGE at 09:09

## 2020-09-08 NOTE — PROGRESS NOTES
Subjective:      Valery Huggins is a 79 y.o. female who presents to discuss  Medications for OAB.      She is an established patient of Dr. Mercado but is new to me today.       Ms. Mcadams presents today to discuss recently stopping VESIcare and Myrbetriq for OAB.  She denies bothersome urinary symptoms such as dysuria, hematuria, urgency, frequency.  She reports that she urinates a lot but it is not bothersome and she believes it is related to the amount of water she drinks.  She recently completed 12 months of chemotherapy for rectal cancer.  She is scheduled for PET scan next Tuesday.  She has a history of renal stones requiring intervention.  Most recent CT failed to reveal stones.  She currently denies flank pain, fever, chills, nausea and vomiting.       The following portions of the patient's history were reviewed and updated as appropriate: allergies, current medications, past family history, past medical history, past social history, past surgical history and problem list.    Review of Systems  Constitutional: no fever or chills  ENT: no nasal congestion or sore throat  Respiratory: no cough or shortness of breath  Cardiovascular: no chest pain or palpitations  Gastrointestinal: no nausea or vomiting, tolerating diet  Genitourinary: as per HPI  Hematologic/Lymphatic: no easy bruising or lymphadenopathy  Musculoskeletal: no arthralgias or myalgias  Neurological: no seizures or tremors  Behavioral/Psych: no auditory or visual hallucinations     Objective:   Vital Signs:BP (!) 148/66 (BP Location: Left arm, Patient Position: Sitting, BP Method: Medium (Automatic))   Pulse 92   Wt 68.1 kg (150 lb 0.4 oz)   LMP 06/01/2012   BMI 28.35 kg/m²     Physical Exam   General: alert and oriented, no acute distress  Head: normocephalic, atraumatic  Neck: supple, no lymphadenopathy, normal ROM, no masses  Respiratory: Symmetric expansion, non-labored breathing  Cardiovascular: regular rate and rhythm, nomal pulses, no  peripheral edema  Abdomen: soft, non tender, non distended, no palpable masses, no hernias, no hepatomegaly or splenomegaly  Pelvic:   Lymphatic: no inguinal nodes  Skin: normal coloration and turgor, no rashes, no suspicious skin lesions noted  Neuro: alert and oriented x3, no gross deficits  Psych: normal judgment and insight, normal mood/affect and non-anxious    Lab Review   Urinalysis demonstrates positive for protein  Lab Results   Component Value Date    WBC 5.4 08/28/2020    HGB 10.7 (L) 08/28/2020    HCT 32.5 (L) 08/28/2020    MCV 98.8 08/28/2020     (L) 08/28/2020     Lab Results   Component Value Date    CREATININE 1.80 (H) 08/28/2020    BUN 32 (H) 08/28/2020       Imaging   Results for orders placed during the hospital encounter of 12/29/18   CT Renal Stone Study ABD Pelvis WO    Narrative EXAMINATION:  CT RENAL STONE STUDY ABD PELVIS WO    CLINICAL HISTORY:  Hematuria;    TECHNIQUE:  Low dose axial images, sagittal and coronal reformations were obtained from the lung bases to the pubic synthesis without  IV administration of contrast.    COMPARISON:  08/20/2018 and multiple previous studies.    FINDINGS:  ABDOMEN:    - Lung bases: Clear.    - Liver: No focal mass or parenchymal change.    - Gallbladder: Cholecystectomy.    - Bile Ducts: No evidence of intra or extra hepatic biliary ductal dilation.    - Spleen: Unremarkable.    - Kidneys and ureters: No mass,hydronephrosis or calcification in the right kidney or ureter.  No left kidney.    - Adrenals: Unremarkable.    - Pancreas: No mass or peripancreatic fat stranding.    - Retroperitoneum:  No significant adenopathy.    - Vascular: No abdominal aortic aneurysm.    - Abdominal wall:  Unremarkable.    PELVIS:    No pelvic mass, adenopathy, or free fluid. Bladder shows mild wall thickening, pericystic fat stranding.  No visible mass.  Hysterectomy.  Ovaries are not identified..    STOMACH, BOWEL/MESENTERY:    No evidence of bowel obstruction or  inflammation.The appendix is not identified.    BONES:  No acute osseous abnormality and no suspicious lytic or blastic lesion.      Impression 1. Absent left kidney, history of renal tumor.  No recurrence or metastatic tumor seen.  2. Mild urinary bladder wall thickening with pericystic stranding; consistent with cystitis.  ER concurrent.    The preliminary and final reports are partially concordant.      Electronically signed by: Mark Grimm II, MD  Date:    12/30/2018  Time:    09:21         Assessment:     1. OAB (overactive bladder)    2. Nephrolithiasis      Plan:   She remains asymptomatic after discontinuing VESIcare and Myrbetriq.  Follow-up p.r.n. for new or worsening issues

## 2020-09-09 ENCOUNTER — OFFICE VISIT (OUTPATIENT)
Dept: UROLOGY | Facility: CLINIC | Age: 80
End: 2020-09-09
Payer: COMMERCIAL

## 2020-09-09 VITALS
SYSTOLIC BLOOD PRESSURE: 148 MMHG | DIASTOLIC BLOOD PRESSURE: 66 MMHG | HEART RATE: 92 BPM | BODY MASS INDEX: 28.35 KG/M2 | WEIGHT: 150 LBS

## 2020-09-09 DIAGNOSIS — Z87.442 HISTORY OF NEPHROLITHIASIS: ICD-10-CM

## 2020-09-09 DIAGNOSIS — N32.81 OAB (OVERACTIVE BLADDER): Primary | ICD-10-CM

## 2020-09-09 PROCEDURE — 99999 PR PBB SHADOW E&M-EST. PATIENT-LVL IV: CPT | Mod: PBBFAC,,, | Performed by: NURSE PRACTITIONER

## 2020-09-09 PROCEDURE — 99214 PR OFFICE/OUTPT VISIT, EST, LEVL IV, 30-39 MIN: ICD-10-PCS | Mod: S$GLB,,, | Performed by: NURSE PRACTITIONER

## 2020-09-09 PROCEDURE — 99999 PR PBB SHADOW E&M-EST. PATIENT-LVL IV: ICD-10-PCS | Mod: PBBFAC,,, | Performed by: NURSE PRACTITIONER

## 2020-09-09 PROCEDURE — 99214 OFFICE O/P EST MOD 30 MIN: CPT | Mod: S$GLB,,, | Performed by: NURSE PRACTITIONER

## 2020-09-09 NOTE — PATIENT INSTRUCTIONS
Overactive Bladder Syndrome (OAB)     Normally, urine stays in the bladder until a person decides to release it. With OAB, the bladder muscles contract involuntarily, causing a sudden urge to urinate and even urine leakage.   When the bladder muscle contract or squeeze involuntarily, it is called overactive bladder syndrome. This causes an intense urge to urinate, known as urgency. Urgency can occur many times during the day and night. If urine leaks with the urgency, it is called urge incontinence.  A disease that affects the bladder nerves, such as multiple sclerosis, can cause overactive bladder syndrome. Other conditions, such as urinary tract infection (UTI) or prostate problems in men, can also lead to OAB. But the exact cause is often not known.  How is overactive bladder syndrome diagnosed?  Your healthcare provider will examine you and ask about your symptoms and health history. You may also have one or more of the following:  · Urine test to take samples of urine and have them checked for problems.  · Urinary diary to record how much fluid you take in and urinate out in a 3 day period.  · Bladder ultrasound to study the bladder as it empties. Ultrasound uses sound waves to create detailed images of the inside of the body.  · Cystoscopy to allow the healthcare provider to look for problems in the urinary tract. The test uses a thin, flexible scope called a cystoscope with a light and camera on the end. The scope is inserted into the urethra (the tube that carries urine out of the body).  · Urodynamic studies, a battery of tests designed to measure and record many aspects of urinary bladder function, including pressures, volume, and urine flow.  How is overactive bladder syndrome treated?  Treatment depends on the cause and severity of your OAB. Treatments may include the following:  · Changing urination habits may be suggested. For instance, your healthcare provider may suggest that you urinate as soon as  you feel the urge. You may also need to limit how much fluid you have during the day.  · Exercising your pelvic muscles can help strengthen muscles used during urination. These exercises are called Kegels. They involve boyd as if you were stopping your urine stream and tightening your rectum as if trying not to pass gas. Your healthcare provider can help you learn how to do Kegels.  · Biofeedback to help you learn to control the movement of your bladder muscles. Sensors are placed on your abdomen. They turn signals given off by your muscles into lines on a computer screen.  · Medicine may be given to relax the bladder muscle. Medicine can also help ease bladder contractions, which reduces the urge to urinate.  · Neuromodulation may be done if medicine and behavioral changes dont work. Electrical pulses are sent to the sacral nerves (nerves that affect the pelvic area). These pulses help relieve OAB and urge incontinence.  · Surgery to make the bladder larger may be done in severe cases.  With treatment, OAB can be managed. A condition, such as UTI, that has caused you to have OAB will be treated. Treatment may involve taking medicine for months or years. You may also need to make changes in your daily routine. This may include going to the bathroom more often than you think you need to. Or, you may need to cut back on caffeine and alcohol because these can make symptoms worse. Your healthcare provider can tell you more.     When to call your healthcare provider  Call the healthcare provider right away if you have any of the following:  · Fever of 100.4°F (38.0 °C) or higher   · No improvement with treatment  · Trouble urinating because of pain  · Back or abdominal pain   Date Last Reviewed: 1/1/2017 © 2000-2017 [employer]. [address] [place] [address], [place] [address]. All rights reserved. This information is not intended as a substitute for professional medical care. Always follow your  healthcare professional's instructions.

## 2020-09-18 ENCOUNTER — HOSPITAL ENCOUNTER (OUTPATIENT)
Dept: RADIOLOGY | Facility: HOSPITAL | Age: 80
Discharge: HOME | End: 2020-09-18
Attending: INTERNAL MEDICINE
Payer: COMMERCIAL

## 2020-09-18 VITALS — WEIGHT: 150 LBS | BODY MASS INDEX: 28.32 KG/M2 | HEIGHT: 61 IN

## 2020-09-18 DIAGNOSIS — C20 MALIGNANT NEOPLASM OF RECTUM: ICD-10-CM

## 2020-09-18 LAB — GLUCOSE SERPL-MCNC: 126 MG/DL (ref 70–110)

## 2020-09-18 PROCEDURE — 82962 GLUCOSE BLOOD TEST: CPT | Mod: PO

## 2020-09-18 PROCEDURE — 78815 PET IMAGE W/CT SKULL-THIGH: CPT | Mod: TC,PO

## 2020-09-21 ENCOUNTER — TELEPHONE (OUTPATIENT)
Dept: HEMATOLOGY/ONCOLOGY | Facility: CLINIC | Age: 80
End: 2020-09-21

## 2020-09-21 NOTE — TELEPHONE ENCOUNTER
----- Message from Carrillo Goode MD sent at 9/20/2020 10:34 AM CDT -----  Call her with the report - seems to be stable overall

## 2020-09-23 DIAGNOSIS — Z01.818 PREOP EXAMINATION: Primary | ICD-10-CM

## 2020-09-27 NOTE — PROGRESS NOTES
"[facility]  PROGRESS NOTE -   Follow-up Visit      Subjective:       Patient ID:   NAME: Valery Huggins : 1940     79 y.o. female    Referring Doc: David Mendieta MD  Other Physicians: Armando Nath; Stanislav Epstein; Azar Donnelly; Mo    Chief Complaint:  Left RCC and rectal CA f/u    History of Present Illness:     Patient is being seen today in person in clinic with her . She is still having some neuropathy issues in the feet and also recently over the past two weeks some swelling in the ankles left >right.      The patient is on today to go over the results of the recently ordered labs, tests and studies. She has some lightheadedness for past couple weeks.     She had previously completed combination XRT and chemotherapy. No CP, SOB, HA's or N/V.       She previously saw Dr Mendieta on 12/10/2019  and had MRI on 2020.  They did scope on 2020 which looked good and both the patient and Dr Mendieta did not want to proceed in direction of operation especially given her age and co-morbidities.She had recent MRI with Dr Mendieta on 2020 and he plans to scope her again at end of 2020    We previously discussed adjuvant chemotherapy with FOLOFOX x 10-12 cycles especially since she was not having surgery and she was agreeable. She wanted to discontinue with therapy after # 10 cycle and that was reasonable     She saw Dr Lambert with opthal and everything is "fine" per patient    She had MRI of pelvis on 2020 and a  PEt scan on 2020         Discussed Covid19 precautions again                ROS:   GEN: normal without any fever, night sweats or weight loss  HEENT: normal with no HA's, sore throat, stiff neck, changes in vision;    CV: normal with no CP, SOB, PND, RAYA or orthopnea  PULM: normal with no SOB, cough, hemoptysis, sputum or pleuritic pain  GI: no nausea or constipation  : normal with no hematuria, dysuria  BREAST: normal with no mass, discharge, " "pain  SKIN: normal with no rash, erythema, bruising, or swelling    Allergies:  Review of patient's allergies indicates:  No Known Allergies    Medications:    Current Outpatient Medications:     amLODIPine (NORVASC) 10 MG tablet, Take 1 tablet (10 mg total) by mouth once daily., Disp: 90 tablet, Rfl: 0    ascorbic acid (VITAMIN C) 100 MG tablet, Take 100 mg by mouth once daily., Disp: , Rfl:     atorvastatin (LIPITOR) 10 MG tablet, Take 1 tablet (10 mg total) by mouth every evening., Disp: 90 tablet, Rfl: 0    b complex vitamins capsule, Take 1 capsule by mouth once daily., Disp: , Rfl:     BD ULTRA-FINE CONNIE PEN NEEDLE 32 gauge x 5/32" Ndle, INJECT TWICE DAILY AS DIRECTED, Disp: 200 each, Rfl: 0    calcitRIOL (ROCALTROL) 0.5 MCG Cap, TAKE 1 CAPSULE (0.5 MCG TOTAL) BY MOUTH ONCE DAILY., Disp: 90 capsule, Rfl: 0    calcium carbonate (CALCIUM 300 ORAL), Take 600 mg by mouth 2 (two) times a day. , Disp: , Rfl:     escitalopram oxalate (LEXAPRO) 20 MG tablet, Take 1 tablet (20 mg total) by mouth once daily., Disp: 90 tablet, Rfl: 0    FREESTYLE LITE METER kit, , Disp: , Rfl: 0    FREESTYLE LITE STRIPS Strp, , Disp: , Rfl: 3    gabapentin (NEURONTIN) 100 MG capsule, Take 1 capsule (100 mg total) by mouth 3 (three) times daily., Disp: 90 capsule, Rfl: 2    levothyroxine (SYNTHROID) 88 MCG tablet, Take 1 tablet (88 mcg total) by mouth before breakfast., Disp: 90 tablet, Rfl: 0    magnesium oxide (MAG-OX) 400 mg (241.3 mg magnesium) tablet, Take 1 tablet (400 mg total) by mouth 2 (two) times daily., Disp: 180 tablet, Rfl: 0    pantoprazole (PROTONIX) 40 MG tablet, Take 1 tablet (40 mg total) by mouth once daily., Disp: 90 tablet, Rfl: 0    TOUJEO SOLOSTAR U-300 INSULIN 300 unit/mL (1.5 mL) InPn pen, Inject 20 Units into the skin once daily. (Patient taking differently: Inject 26 Units into the skin once daily. ), Disp: 4.5 mL, Rfl: 3    valsartan (DIOVAN) 40 MG tablet, Take 1 tablet (40 mg total) by mouth " once daily., Disp: 90 tablet, Rfl: 0    VICTOZA 3-XAVIER 0.6 mg/0.1 mL (18 mg/3 mL) PnIj pen, 1.8 mg sq qd, Disp: 9 mL, Rfl: 0    cholecalciferol, vitamin D3, 4,000 unit Cap, Take 1 capsule by mouth once daily., Disp: , Rfl:     docusate sodium (COLACE) 100 MG capsule, Take 100 mg by mouth 2 (two) times daily., Disp: , Rfl:     Current Facility-Administered Medications:     0.9%  NaCl infusion (for blood administration), , Intravenous, Once, Carrillo Goode MD    PMHx/PSHx Updates:  See patient's last visit with me on 7/27/2020  See H&P on 10/8/2019        Pathology:  Cancer Staging  No matching staging information was found for the patient.          Objective:     Vitals:  Blood pressure (!) 146/81, pulse 83, temperature 97.8 °F (36.6 °C), resp. rate 18, weight 69.5 kg (153 lb 4.8 oz), last menstrual period 06/01/2012.        Physical Examination:   GEN: no apparent distress, comfortable; AAOx3  HEAD: atraumatic and normocephalic  EYES: no conjunctival pallor or muddiness, no icterus; normal pupil reaction to ambient light  ENT: OMM, no pharyngeal erythema, external bilateral ears WNL; no visible thrush or ulcers  NECK: no masses or swelling, trachea midline, no visible LAD/LN's   CV: no palpitations; no pedal edema; no noticeable JVD or neck vein distension;  portacath on right CW  CHEST: Normal respiratory effort; chest wall breath movements symmetrical; no audible wheezing  ABDOM: non-distended; no bloating  MUSC/Skeletal: ROM normal; joints visibly normal; no deformities or arthropathy  EXTREM: no clubbing, cyanosis, inflammation or swelling  SKIN: no rashes, lesions, ulcers, petechiae or subcutaneous nodules  : no hendrickson  NEURO: moving all 4 extremities; AAOx3; no tremors  PSYCH: normal mood, affect and behavior  LYMPH: no visible LN's or LAD              Labs:          Lab Results   Component Value Date    WBC 5.4 08/28/2020    HGB 10.7 (L) 08/28/2020    HCT 32.5 (L) 08/28/2020    MCV 98.8 08/28/2020      (L) 08/28/2020     CMP  Sodium   Date Value Ref Range Status   08/28/2020 137 135 - 146 mmol/L Final     Potassium   Date Value Ref Range Status   08/28/2020 4.2 3.5 - 5.3 mmol/L Final     Chloride   Date Value Ref Range Status   08/28/2020 99 98 - 110 mmol/L Final     CO2   Date Value Ref Range Status   08/28/2020 29 20 - 32 mmol/L Final     Glucose   Date Value Ref Range Status   08/28/2020 192 (H) 65 - 139 mg/dL Final     Comment:               Non-fasting reference interval          BUN, Bld   Date Value Ref Range Status   08/28/2020 32 (H) 7 - 25 mg/dL Final     Creatinine   Date Value Ref Range Status   08/28/2020 1.80 (H) 0.60 - 0.93 mg/dL Final     Comment:     For patients >49 years of age, the reference limit  for Creatinine is approximately 13% higher for people  identified as -American.          Calcium   Date Value Ref Range Status   08/28/2020 9.1 8.6 - 10.4 mg/dL Final     Total Protein   Date Value Ref Range Status   08/28/2020 6.0 (L) 6.1 - 8.1 g/dL Final     Albumin   Date Value Ref Range Status   08/28/2020 3.6 3.6 - 5.1 g/dL Final     Total Bilirubin   Date Value Ref Range Status   08/28/2020 0.6 0.2 - 1.2 mg/dL Final     Alkaline Phosphatase   Date Value Ref Range Status   08/07/2020 168 (H) 37 - 153 U/L Final     AST   Date Value Ref Range Status   08/28/2020 22 10 - 35 U/L Final     ALT   Date Value Ref Range Status   08/28/2020 22 6 - 29 U/L Final     Anion Gap   Date Value Ref Range Status   06/10/2020 14 8 - 16 mmol/L Final     eGFR if    Date Value Ref Range Status   08/28/2020 31 (L) > OR = 60 mL/min/1.73m2 Final     eGFR if non    Date Value Ref Range Status   08/28/2020 27 (L) > OR = 60 mL/min/1.73m2 Final     SARS-CoV2 (COVID-19) Qualitative PCR Not Detected           Radiology/Diagnostic Studies:    PET 9/18/2020:    Impression:     1. Negative for recurrent malignancy or metastatic disease.  2. Unchanged 6 mm right upper lobe nodule,  presumably benign.  3. Slight decreased FDG uptake associated with left paratracheal mass just inferior to thoracic inlet, differential diagnosis of which has been previously discussed, with benign etiologies likely.        MRI 8/6/2020:  Impression:     No evidence for disease progression.Minimal signal abnormality along the mid rectum corresponds to the previously treated lesion.  No pelvic adenopathy.       CT  3/5/2020    Impression       Status post left nephrectomy without evidence of recurrent renal malignancy.    No significant change since prior study.           MRI  1/8/2020:  Impression       Significant reduction in size of the patient's known mid rectal mass, which is no longer distinctly visible.  Reduction in size of 3 mesorectal lymph nodes as above.               PET  10/17/2019    Impression       1. Left paratracheal small mass extending into superior mediastinum with associated moderate FDG activity is unchanged in size and appearance since 02/16/2018 CT.  This is unlikely to represent metastatic disease or malignancy, given stability, and may represent residual thyroid parenchyma but is otherwise nonspecific.  2. Unchanged 6 mm right upper lobe nodule since 02/16/2018.  Benign etiology is likely the continued CT thorax without IV contrast follow-up is recommended in 12 months to document greater than 2 years of stability.  3. No current convincing evidence of metastatic disease.  4. Previous rectal mass is no longer evident on this exam.               X-ray Chest Ap Portable    Result Date: 10/15/2019  EXAMINATION: XR CHEST AP PORTABLE CLINICAL HISTORY: s/p port; Encounter for adjustment and management of vascular access device TECHNIQUE: Single frontal view of the chest was performed. COMPARISON: 6/18/2019 FINDINGS: The cardiomediastinal silhouette is stable.  Lungs are clear of infiltrate.  No pleural effusions.  Laya catheter has been inserted from the region of the right subclavian vein  with the tip at the level the proximal SVC.     Laya catheter inserted with the tip at the level the proximal SVC.  Lungs are expanded and clear.  No pneumothorax. Electronically signed by: Nubia Venegas MD Date:    10/15/2019 Time:    14:05    Surg Fl Surgery Fluoro Usage    Result Date: 10/15/2019  See OP Notes for results. IMPRESSION: See OP Notes for results. This procedure was auto-finalized by: [facility]     Mri Rectal Cancer Without Contrast    Result Date: 9/18/2019  EXAMINATION: MRI RECTAL CANCER WITHOUT CONTRAST CLINICAL HISTORY: Rectal cancer, staging, locoregional;rectal anal mass;  Malignant neoplasm of rectum TECHNIQUE: Multiplanar multisequence MR imaging of the pelvis without contrast. COMPARISON: 07/12/2019 FINDINGS: Approximately 4 cm from the anal verge there is a peripherally located lobular mass along the dorsal rectal wall.  This measures 4 cm in length and 3.3 cm in diameter.  There is heterogeneous signal intensity.  There is restricted diffusion in the lesion and no discrete extra colonic extension.  Several lymph nodes are noted in the mesorectal fat including two which measure 3 mm near the inferior sacrum, series 8, image 16, and a 5 mm lymph node at the S2 level, series 8, image 8.  Prominent but nonenlarged bilateral obturator chain lymph nodes also noted, on the right at 4 mm and left at 6 mm.  There is urinary bladder wall thickening which is diffuse.  Moderate degree of stool in the colon.  No dilated bowel loops.  Hysterectomy.     4 cm mid rectal lesion in keeping with known malignancy.  No discrete extension into the mesorectal fascia although there are several perirectal lymph nodes which raise the possibility for locoregional lymph node involvement. Electronically signed by: Bebo Kapoor Date:    09/18/2019 Time:    16:45      I have reviewed all available lab results and radiology reports.    Assessment/Plan:   (1) 79 y.o. female with diagnosis of prior left RCC  who has been referred by David Saldaña MD for evaluation by medical hematology/oncology. She has been followed by Dr Stanislav Epstein with [facility] the [facility] in [place]. She last saw Dr Epstein in July 2019. She was recently diagnosed with rectal mass by Dr Armando Duff which was found on colonoscopy on 8/26/2019. She had presented with lower Gi bleeding at that time. The pathology from those biopsies showed no evidence of malignancy at that time. She was subsequently seen by Dr David Saldaña and underwent trans-anal surgical biopsy on 9/23/2019. The pathology from the surgical biopsy showed rectal carcinoma.         She has been seen by Dr Fareed Hassan with Rad/onc for radiation therapy evaluation.     PET was done on 10/17/2019     We previously discussed giving her combined chemotherapy with the XRt to try to reduce her risk of recurrence. She was agreeable to this plan.    She had portacath placed with Dr Saldaña. She saw Dr Hassan last week with rad/onc. She has since completed weekly XRT and chemotherapy    - She previously saw Dr Saldaña on 12/10/2019  and had MRI on 1/8/2020.  They did scope on 1/28th 2020 and I spoke to dr saldaña by phone last week. The scope looked good and both the patient and Dr Saldaña does not want to proceed in direction of operation especially given her age and co-morbidities.    - We previously discussed adjuvant chemotherapy with FOLOFOX x 10-12 cycles especially since she is not having surgery and she is agreeable.    - Will previously  provide literature on the regimen and set up chemotherapy      7/2/2020:  - She has since started chemotherapy and she has had 8 cycles so far; chemotherapy was held last time due to general malaise. She feels good and has some minimal and tolerable neuropathy in hands and feet.  She has some intermittent constipation.    She wants to continue with therapy and I would like to at least get 10 cycles in if possible.     7/27/2020:  -  she is on her last cycle #10 today  - check labs weekly for next 4 weeks  - she will need to follow-up with dr mendieta and also get repeat scans every 6 months    9/1/2020:  - she completed the 10th and last cycle about a month ago  - she had recent MRI of pelvis with Dr Mendieta on 8/12/2020 and plans to get repeat scope at end of Sept 2020  - she has some occasional lightheadedness and is going to see her eye doctor in the near future  - will schedule a PET scan and consider MRI of brain thereafter    9/28/2020:  - PET on 9/18/2020 on chart  - scope scheduled for tomorrow with Dr Mendieta  - check up to date labs  - increase the gabpentin to 2 pills bid with one pill mid-day still - if symptoms do not improve, then consider referral to neurology      (2) Left renal cell carcinoma s/p left nephrectomy with Dr Azar Donnelly - diagnosed about 2 years ago     (3) Ureterolithiasis     (4) CRI - stage III - followed by Dr Antonio; she saw him recently and per patient's report, her kidney function is stable per Dr Antonio  - she is seeing Dr Antonio this coming Thursday     (5) HTN and hypercholesterolemia     (6) DM - followed by Dr Brower with endocrinology     (7) Hx/of gall stones     (8) Chronic left shoulder issues     (9) MVP     (10) Thyroid disease with prior hx/of thyroid cancer s/p thyroidectomy      (11) Chronic anemia - most likley multifactorial due to iron deficiency, GIB and anemia of chronic renal disease  - hgb at 10.7 currently    (12) hypocalcemia - followed by Dr Serrano    (13) 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  Encouraged compliance with labs; discussed COVID19 precautions  2.  check labs monthly for now -  follow-up with Dr Mendieta for planned scope tomorrow  3.  F/u with PCP, GI, rad/onc , etc  4.   F/u nephrology - Dr Antonio   5. Refill antiemetics as needed  6. f/u with eye doctor as directed by them  7. Increase gabapentin dose as detailed above     RTC in 4 weeks     Fax note to Ph[facility] Winston Parks; Tete Duff Tandron; Paco;     Discussion:       I spent over 25 mins of time with the patient. Reviewed results of the recently ordered labs, tests and studies; made directives with regards to the results. Over half of this time was spent couseling and coordinating care.    COVID-19 Discussion:    I had long discussion with patient and any applicable family about the COVID-19 coronavirus epidemic and the recommended precautions with regard to cancer and/or hematology patients. I have re-iterated the CDC recommendations for adequate hand washing, use of hand -like products, and coughing into elbow, etc. In addition, especially for our patients who are on chemotherapy and/or our otherwise immunocompromised patients, I have recommended avoidance of crowds, including movie theaters, restaurants, churches, etc. I have recommended avoidance of any sick or symptomatic family members and/or friends. I have also recommended avoidance of any raw and unwashed food products, and general avoidance of food items that have not been prepared by themselves. The patient has been asked to call us immediately with any symptom developments, issues, questions or other general concerns.       Chemotherapy Discussion:      I discussed the available treatment option(s) in accordance with the latest/current national evidence-based guidelines (NCCN, UpToDate, NCI, ASCO, etc where applicable), their overall age/condition and their co-morbidities. I also went over the risks and benefits of the chemotherapy with regard to their particular cancer type, their cancer stage, their age/condition, and their co-morbidities. I provided literature on the chemotherapy regimen and discussed the chemotherapy side-effect profiles of  the drug(s). I discussed the importance of compliance with obtaining and monitoring weekly lab work, and went over the potential hematopathology issues and risks with anemia, leucopenia and thrombocytopenia that can occur with chemotherapy. I discussed the potential risks of liver and kidney damage, which could be permanent and could necessitate dialysis long-term if kidney failure developed. I discussed the emetic and/or diarrheal potential of the regimen and the potential need for use of antiemetic and anti-diarrheal medications. I discussed the risk for development of anaphylactic shock, bronchospasm, dysrhythmia, and respiratory/cardiovascular arrest and/or failure. I discussed the potential risks for development of alopecia, cold sensory issues, ringing in ears, vertigo, cataracts, glaucoma, and neuropathy, all of which could end up being chronic and life-long. Some chemotherpyI discussed the risks of hand-foot syndrome and rashes, and development of other autoimmune mediated processes such as pneumonitis, hepatitis, and colitis which could be life threatening. I discussed the risks of the potential development of a rare but fatal viral mediated disease known as PML (Progressive Multifocal Leukoencephalopathy), and risk of future development of leukemia and/or lymphoma from use of certain chemotherapy agents. I discussed the need for neutropenic precautions, basic hygiene/sanitation behaviors and dietary restrictions.    The patient's consent has been obtained to proceed with the chemotherapy.The patient will be referred to [facility] /[facility] for training and education on chemotherapy, use of antiemetics and/or anti-diarrheals, use of NSAID's, potential chemotherapy side-effects, and any specific recommendations and precautions with the particular chemotherapy agents.      I answered all of the patient's (and family's, if applicable) questions to the best of my ability and to their complete  satisfaction. The patient acknowledged full understanding of the risks, recommendations and plan(s).          I have explained all of the above in detail and the patient understands all of the current recommendation(s). I have answered all of their questions to the best of my ability and to their complete satisfaction.   The patient is to continue with the current management plan.            Electronically signed by Carrillo Goode MD                        Answers for HPI/ROS submitted by the patient on 9/23/2020   appetite change : No  unexpected weight change: No  mouth sores: No  visual disturbance: No  cough: No  shortness of breath: No  chest pain: No  abdominal pain: No  diarrhea: No  frequency: No  back pain: No  rash: No  headaches: No  adenopathy: No  nervous/ anxious: No

## 2020-09-28 ENCOUNTER — OFFICE VISIT (OUTPATIENT)
Dept: HEMATOLOGY/ONCOLOGY | Facility: CLINIC | Age: 80
End: 2020-09-28
Payer: COMMERCIAL

## 2020-09-28 VITALS
BODY MASS INDEX: 28.97 KG/M2 | HEART RATE: 83 BPM | DIASTOLIC BLOOD PRESSURE: 81 MMHG | RESPIRATION RATE: 18 BRPM | TEMPERATURE: 98 F | SYSTOLIC BLOOD PRESSURE: 146 MMHG | WEIGHT: 153.31 LBS

## 2020-09-28 DIAGNOSIS — C20 MALIGNANT NEOPLASM OF RECTUM: Primary | ICD-10-CM

## 2020-09-28 DIAGNOSIS — D70.1 CHEMOTHERAPY INDUCED NEUTROPENIA: ICD-10-CM

## 2020-09-28 DIAGNOSIS — C64.2 RENAL CELL CARCINOMA OF LEFT KIDNEY: ICD-10-CM

## 2020-09-28 DIAGNOSIS — C20 RECTAL CANCER: ICD-10-CM

## 2020-09-28 DIAGNOSIS — T45.1X5A CHEMOTHERAPY INDUCED NEUTROPENIA: ICD-10-CM

## 2020-09-28 DIAGNOSIS — D64.9 NORMOCYTIC ANEMIA: ICD-10-CM

## 2020-09-28 PROCEDURE — 1126F PR PAIN SEVERITY QUANTIFIED, NO PAIN PRESENT: ICD-10-PCS | Mod: S$GLB,,, | Performed by: INTERNAL MEDICINE

## 2020-09-28 PROCEDURE — 1159F PR MEDICATION LIST DOCUMENTED IN MEDICAL RECORD: ICD-10-PCS | Mod: S$GLB,,, | Performed by: INTERNAL MEDICINE

## 2020-09-28 PROCEDURE — 3079F DIAST BP 80-89 MM HG: CPT | Mod: S$GLB,,, | Performed by: INTERNAL MEDICINE

## 2020-09-28 PROCEDURE — 3077F PR MOST RECENT SYSTOLIC BLOOD PRESSURE >= 140 MM HG: ICD-10-PCS | Mod: S$GLB,,, | Performed by: INTERNAL MEDICINE

## 2020-09-28 PROCEDURE — 99214 OFFICE O/P EST MOD 30 MIN: CPT | Mod: S$GLB,,, | Performed by: INTERNAL MEDICINE

## 2020-09-28 PROCEDURE — 1126F AMNT PAIN NOTED NONE PRSNT: CPT | Mod: S$GLB,,, | Performed by: INTERNAL MEDICINE

## 2020-09-28 PROCEDURE — 3077F SYST BP >= 140 MM HG: CPT | Mod: S$GLB,,, | Performed by: INTERNAL MEDICINE

## 2020-09-28 PROCEDURE — 1159F MED LIST DOCD IN RCRD: CPT | Mod: S$GLB,,, | Performed by: INTERNAL MEDICINE

## 2020-09-28 PROCEDURE — 99214 PR OFFICE/OUTPT VISIT, EST, LEVL IV, 30-39 MIN: ICD-10-PCS | Mod: S$GLB,,, | Performed by: INTERNAL MEDICINE

## 2020-09-28 PROCEDURE — 1101F PR PT FALLS ASSESS DOC 0-1 FALLS W/OUT INJ PAST YR: ICD-10-PCS | Mod: S$GLB,,, | Performed by: INTERNAL MEDICINE

## 2020-09-28 PROCEDURE — 3079F PR MOST RECENT DIASTOLIC BLOOD PRESSURE 80-89 MM HG: ICD-10-PCS | Mod: S$GLB,,, | Performed by: INTERNAL MEDICINE

## 2020-09-28 PROCEDURE — 1101F PT FALLS ASSESS-DOCD LE1/YR: CPT | Mod: S$GLB,,, | Performed by: INTERNAL MEDICINE

## 2020-09-29 ENCOUNTER — ANESTHESIA EVENT (OUTPATIENT)
Dept: ENDOSCOPY | Facility: HOSPITAL | Age: 80
End: 2020-09-29
Payer: COMMERCIAL

## 2020-09-29 ENCOUNTER — HOSPITAL ENCOUNTER (OUTPATIENT)
Facility: HOSPITAL | Age: 80
Discharge: HOME | End: 2020-09-29
Attending: SURGERY | Admitting: SURGERY
Payer: COMMERCIAL

## 2020-09-29 ENCOUNTER — ANESTHESIA (OUTPATIENT)
Dept: ENDOSCOPY | Facility: HOSPITAL | Age: 80
End: 2020-09-29
Payer: COMMERCIAL

## 2020-09-29 DIAGNOSIS — Z85.048 HISTORY OF RECTAL CANCER: ICD-10-CM

## 2020-09-29 LAB — POCT GLUCOSE: 143 MG/DL (ref 70–110)

## 2020-09-29 PROCEDURE — D9220A PRA ANESTHESIA: Mod: ANES,,, | Performed by: ANESTHESIOLOGY

## 2020-09-29 PROCEDURE — 25000003 PHARM REV CODE 250: Performed by: SURGERY

## 2020-09-29 PROCEDURE — 88305 TISSUE EXAM BY PATHOLOGIST: CPT | Mod: 26,,, | Performed by: PATHOLOGY

## 2020-09-29 PROCEDURE — 88342 IMHCHEM/IMCYTCHM 1ST ANTB: CPT | Performed by: PATHOLOGY

## 2020-09-29 PROCEDURE — D9220A PRA ANESTHESIA: ICD-10-PCS | Mod: ANES,,, | Performed by: ANESTHESIOLOGY

## 2020-09-29 PROCEDURE — 37000008 HC ANESTHESIA 1ST 15 MINUTES: Performed by: SURGERY

## 2020-09-29 PROCEDURE — 88305 TISSUE EXAM BY PATHOLOGIST: CPT | Performed by: PATHOLOGY

## 2020-09-29 PROCEDURE — 88305 TISSUE EXAM BY PATHOLOGIST: ICD-10-PCS | Mod: 26,,, | Performed by: PATHOLOGY

## 2020-09-29 PROCEDURE — 45331 SIGMOIDOSCOPY AND BIOPSY: CPT | Mod: ,,, | Performed by: SURGERY

## 2020-09-29 PROCEDURE — 45331 SIGMOIDOSCOPY AND BIOPSY: CPT | Performed by: SURGERY

## 2020-09-29 PROCEDURE — 82962 GLUCOSE BLOOD TEST: CPT | Performed by: SURGERY

## 2020-09-29 PROCEDURE — 88342 CHG IMMUNOCYTOCHEMISTRY: ICD-10-PCS | Mod: 26,,, | Performed by: PATHOLOGY

## 2020-09-29 PROCEDURE — D9220A PRA ANESTHESIA: ICD-10-PCS | Mod: CRNA,,, | Performed by: NURSE ANESTHETIST, CERTIFIED REGISTERED

## 2020-09-29 PROCEDURE — 63600175 PHARM REV CODE 636 W HCPCS: Performed by: NURSE ANESTHETIST, CERTIFIED REGISTERED

## 2020-09-29 PROCEDURE — 27201012 HC FORCEPS, HOT/COLD, DISP: Performed by: SURGERY

## 2020-09-29 PROCEDURE — 25000003 PHARM REV CODE 250: Performed by: NURSE ANESTHETIST, CERTIFIED REGISTERED

## 2020-09-29 PROCEDURE — D9220A PRA ANESTHESIA: Mod: CRNA,,, | Performed by: NURSE ANESTHETIST, CERTIFIED REGISTERED

## 2020-09-29 PROCEDURE — 45331 PR SIGMOIDOSCOPY,BIOPSY: ICD-10-PCS | Mod: ,,, | Performed by: SURGERY

## 2020-09-29 PROCEDURE — 88342 IMHCHEM/IMCYTCHM 1ST ANTB: CPT | Mod: 26,,, | Performed by: PATHOLOGY

## 2020-09-29 RX ORDER — PROPOFOL 10 MG/ML
VIAL (ML) INTRAVENOUS
Status: DISCONTINUED | OUTPATIENT
Start: 2020-09-29 | End: 2020-09-29

## 2020-09-29 RX ORDER — LIDOCAINE HCL/PF 100 MG/5ML
SYRINGE (ML) INTRAVENOUS
Status: DISCONTINUED | OUTPATIENT
Start: 2020-09-29 | End: 2020-09-29

## 2020-09-29 RX ORDER — SODIUM CHLORIDE 9 MG/ML
INJECTION, SOLUTION INTRAVENOUS CONTINUOUS
Status: DISCONTINUED | OUTPATIENT
Start: 2020-09-29 | End: 2020-09-29 | Stop reason: HOSPADM

## 2020-09-29 RX ADMIN — PROPOFOL 20 MG: 10 INJECTION, EMULSION INTRAVENOUS at 07:09

## 2020-09-29 RX ADMIN — PROPOFOL 80 MG: 10 INJECTION, EMULSION INTRAVENOUS at 07:09

## 2020-09-29 RX ADMIN — LIDOCAINE HYDROCHLORIDE 50 MG: 20 INJECTION INTRAVENOUS at 07:09

## 2020-09-29 RX ADMIN — SODIUM CHLORIDE: 0.9 INJECTION, SOLUTION INTRAVENOUS at 07:09

## 2020-09-29 NOTE — DISCHARGE INSTRUCTIONS

## 2020-09-29 NOTE — H&P
[facility]  History & Physical     Subjective:     Chief Complaint/Reason for Admission: history of rectal cancer    History of Present Illness:   Patient 79 y.o. female presents for flex sigmoidoscopy.  Pt has history of rectal cancer.  She completed total neoadjuvant treatment in 12/19.  Pt has had MRI and last flex sig in 1/20 with no evidence of disease.  Pt notes she is feeling well. No pain or discomfort.  Pt presents for flex sig to evaluate the rectal area and ensure no disease present.      No results found for: CEA      Patient Active Problem List    Diagnosis Date Noted    History of rectal cancer 09/29/2020    Hypocalcemia 06/08/2020    Chemotherapy induced neutropenia 04/27/2020    Malignant neoplasm of rectum 12/23/2019    Encounter for insertion of venous access port 10/15/2019    Rectal cancer 10/08/2019    Rectal mass 09/23/2019    Hypocalcemia 06/18/2019    Hypomagnesemia 06/18/2019    CKD (chronic kidney disease) stage 4, GFR 15-29 ml/min 06/18/2019    Non-insulin dependent type 2 diabetes mellitus 07/31/2018    Cervical lymphadenopathy 04/26/2018    Calculus of kidney and ureter 03/07/2018    Urinary tract obstruction by kidney stone 02/16/2018    Hyponatremia 01/22/2018    Normocytic anemia 01/22/2018    VICTOR M (acute kidney injury) 01/21/2018    Acute biliary pancreatitis 01/21/2018    Calculus of gallbladder without cholecystitis 01/21/2018    Status post Left nephrectomy 11/17/2017    Renal cell carcinoma 11/16/2017    Essential hypertension 09/26/2017    Ureteral calculus, left 01/16/2017    Ureterolithiasis 12/11/2016    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/2016    Renal cell carcinoma of left kidney 12/11/2016    Renal calculus, left 12/05/2016    BMI 28.0-28.9,adult 05/19/2016    Renal mass, left 11/02/2015    Calculus of ureter 07/06/2015    Chronic pain in left shoulder 09/05/2012    Hypercholesteremia 09/05/2012         Facility-Administered Medications Prior to Admission   Medication Dose Route Frequency Provider Last Rate Last Dose    0.9%  NaCl infusion (for blood administration)   Intravenous Once Carrillo Goode MD         Medications Prior to Admission   Medication Sig Dispense Refill Last Dose    amLODIPine (NORVASC) 10 MG tablet Take 1 tablet (10 mg total) by mouth once daily. 90 tablet 0 9/29/2020 at Unknown time    ascorbic acid (VITAMIN C) 100 MG tablet Take 100 mg by mouth once daily.   9/28/2020 at Unknown time    atorvastatin (LIPITOR) 10 MG tablet Take 1 tablet (10 mg total) by mouth every evening. 90 tablet 0 9/28/2020 at Unknown time    b complex vitamins capsule Take 1 capsule by mouth once daily.   9/28/2020 at Unknown time    calcitRIOL (ROCALTROL) 0.5 MCG Cap TAKE 1 CAPSULE (0.5 MCG TOTAL) BY MOUTH ONCE DAILY. 90 capsule 0 9/28/2020 at Unknown time    calcium carbonate (CALCIUM 300 ORAL) Take 600 mg by mouth 2 (two) times a day.    9/28/2020 at Unknown time    escitalopram oxalate (LEXAPRO) 20 MG tablet Take 1 tablet (20 mg total) by mouth once daily. 90 tablet 0 9/28/2020 at Unknown time    gabapentin (NEURONTIN) 100 MG capsule Take 1 capsule (100 mg total) by mouth 3 (three) times daily. 90 capsule 2 9/28/2020 at Unknown time    levothyroxine (SYNTHROID) 88 MCG tablet Take 1 tablet (88 mcg total) by mouth before breakfast. 90 tablet 0 9/28/2020 at Unknown time    magnesium oxide (MAG-OX) 400 mg (241.3 mg magnesium) tablet Take 1 tablet (400 mg total) by mouth 2 (two) times daily. 180 tablet 0 9/28/2020 at Unknown time    pantoprazole (PROTONIX) 40 MG tablet Take 1 tablet (40 mg total) by mouth once daily. 90 tablet 0 9/28/2020 at Unknown time    TOUJEO SOLOSTAR U-300 INSULIN 300 unit/mL (1.5 mL) InPn pen Inject 20 Units into the skin once daily. (Patient taking differently: Inject 26 Units into the skin once daily. ) 4.5 mL 3 9/28/2020 at Unknown time    valsartan (DIOVAN) 40 MG tablet  "Take 1 tablet (40 mg total) by mouth once daily. 90 tablet 0 9/29/2020 at Unknown time    BD ULTRA-FINE CONNIE PEN NEEDLE 32 gauge x 5/32" Ndle INJECT TWICE DAILY AS DIRECTED 200 each 0     cholecalciferol, vitamin D3, 4,000 unit Cap Take 1 capsule by mouth once daily.   More than a month at Unknown time    docusate sodium (COLACE) 100 MG capsule Take 100 mg by mouth 2 (two) times daily.   More than a month at Unknown time    FREESTYLE LITE METER kit   0     FREESTYLE LITE STRIPS Strp   3     VICTOZA 3-XAVIER 0.6 mg/0.1 mL (18 mg/3 mL) PnIj pen 1.8 mg sq qd 9 mL 0      Review of patient's allergies indicates:  No Known Allergies     Past Medical History:   Diagnosis Date    Anemia     Cancer     thyroid    Cancer     Kidney    Depression     Diabetes mellitus     Diabetes mellitus type II     Encounter for blood transfusion     Gallstones     Hypertension     Kidney stone     MVP (mitral valve prolapse)     PONV (postoperative nausea and vomiting)     Renal cell carcinoma of left kidney 12/11/2016    Thyroid disease       Past Surgical History:   Procedure Laterality Date    APPENDECTOMY      cataracts      both eyes    CHOLECYSTECTOMY      COLONOSCOPY N/A 8/26/2019    Procedure: COLONOSCOPY;  Surgeon: Armando Duff MD;  Location: [facility];  Service: Endoscopy;  Laterality: N/A;    CYSTOSCOPY      CYSTOSCOPY W/ URETERAL STENT PLACEMENT      ECTOPIC PREGNANCY SURGERY      EXAMINATION UNDER ANESTHESIA N/A 9/23/2019    Procedure: Exam under anesthesia;  Surgeon: David Mendieta MD;  Location: [facility] OR;  Service: General;  Laterality: N/A;    EYE SURGERY      phoebe cataract    FLEXIBLE SIGMOIDOSCOPY N/A 1/28/2020    Procedure: SIGMOIDOSCOPY, FLEXIBLE;  Surgeon: David Mendieta MD;  Location: [facility];  Service: Endoscopy;  Laterality: N/A;    HYSTERECTOMY      INSERTION OF TUNNELED CENTRAL VENOUS CATHETER (CVC) WITH SUBCUTANEOUS PORT Right 10/15/2019    Procedure: [id number];  " "Surgeon: David Mendieta MD;  Location: [facility];  Service: General;  Laterality: Right;    NASAL SEPTUM SURGERY      NEPHRECTOMY Left     OOPHORECTOMY      THYROIDECTOMY      two times    TONSILLECTOMY        Family History   Problem Relation Age of Onset    Hypertension Father     Kidney disease Father     Mental illness Mother     Cancer Brother       Social History     Tobacco Use    Smoking status: Never Smoker    Smokeless tobacco: Never Used   Substance Use Topics    Alcohol use: No        Review of Systems:  NO blood per rectum.  No pain.  No changes inb owel habits      OBJECTIVE:     Patient Vitals for the past 8 hrs:   BP Temp Temp src Pulse Resp SpO2 Height Weight   09/29/20 0702 (!) 148/68 98.1 °F (36.7 °C) Skin 81 16 99 % 5' 1" (1.549 m) 69.4 kg (153 lb)     AAOx3  Soft/nt/nd  2+ pulses B      Data Review:      ASSESSMENT/PLAN:     Active Hospital Problems    Diagnosis  POA    History of rectal cancer [Z85.048]  Yes      Resolved Hospital Problems   No resolved problems to display.       Plan:  TO have surveillance flex sig today  "

## 2020-09-29 NOTE — ANESTHESIA PREPROCEDURE EVALUATION
09/29/2020  Valery Huggins is a 79 y.o., female.    Anesthesia Evaluation    I have reviewed the Patient Summary Reports.    I have reviewed the Nursing Notes.    I have reviewed the Medications.     Review of Systems  Anesthesia Hx:  Hx of Anesthetic complications (PONV)    Social:  Non-Smoker    Hematology/Oncology:         -- Anemia: --  Cancer in past history (renal cell CA, rectal CA):    Cardiovascular:   Hypertension, well controlled Valvular problems/Murmurs, MVP    Pulmonary:  Pulmonary Normal    Renal/:   Chronic Renal Disease, ARF, CRI    Neurological:  Neurology Normal    Endocrine:   Diabetes, well controlled, type 2 Hypothyroidism    Psych:   Psychiatric History depression          Physical Exam  General:  Well nourished    Airway/Jaw/Neck:  Airway Findings: Mouth Opening: Normal Tongue: Normal  General Airway Assessment: Adult  Oropharynx Findings:  Mallampati: II  Jaw/Neck Findings:  Neck ROM: Normal ROM     Eyes/Ears/Nose:  Eyes/Ears/Nose Findings:    Dental:  Dental Findings:   Chest/Lungs:  Chest/Lungs Findings: Normal Respiratory Rate     Heart/Vascular:  Heart Findings: Rate: Normal  Rhythm: Regular Rhythm        Mental Status:  Mental Status Findings:  Cooperative, Alert and Oriented         Anesthesia Plan  Type of Anesthesia, risks & benefits discussed:  Anesthesia Type:  general  Patient's Preference:   Intra-op Monitoring Plan: standard ASA monitors  Intra-op Monitoring Plan Comments:   Post Op Pain Control Plan: multimodal analgesia  Post Op Pain Control Plan Comments:   Induction:   IV  Beta Blocker:  Patient is not currently on a Beta-Blocker (No further documentation required).       Informed Consent: Patient understands risks and agrees with Anesthesia plan.  Questions answered. Anesthesia consent signed with patient.  ASA Score: 3     Day of Surgery Review of History &  Physical:            Ready For Surgery From Anesthesia Perspective.

## 2020-09-29 NOTE — TRANSFER OF CARE
"Anesthesia Transfer of Care Note    Patient: Valery Huggins    Procedure(s) Performed: Procedure(s) (LRB):  SIGMOIDOSCOPY, FLEXIBLE (N/A)    Patient location: PACU    Anesthesia Type: general    Transport from OR: Transported from OR on room air with adequate spontaneous ventilation    Post pain: adequate analgesia    Post assessment: no apparent anesthetic complications and tolerated procedure well    Post vital signs: stable    Level of consciousness: awake, alert and oriented    Nausea/Vomiting: no nausea/vomiting    Complications: none    Transfer of care protocol was followed      Last vitals:   Visit Vitals  BP (!) 148/68 (BP Location: Left arm, Patient Position: Lying)   Pulse 81   Temp 36.7 °C (98.1 °F) (Skin)   Resp 16   Ht 5' 1" (1.549 m)   Wt 69.4 kg (153 lb)   LMP 06/01/2012   SpO2 99%   Breastfeeding No   BMI 28.91 kg/m²     "

## 2020-09-29 NOTE — ANESTHESIA POSTPROCEDURE EVALUATION
Anesthesia Post Evaluation    Patient: Valery Huggins    Procedure(s) Performed: Procedure(s) (LRB):  SIGMOIDOSCOPY, FLEXIBLE (N/A)    Final Anesthesia Type: general    Patient location during evaluation: PACU  Patient participation: Yes- Able to Participate  Level of consciousness: awake and alert and oriented  Post-procedure vital signs: reviewed and stable  Pain management: adequate  Airway patency: patent    PONV status at discharge: No PONV  Anesthetic complications: no      Cardiovascular status: blood pressure returned to baseline and stable  Respiratory status: unassisted and spontaneous ventilation  Hydration status: euvolemic  Follow-up not needed.          Vitals Value Taken Time   /77 09/29/20 0827   Temp 36.7 °C (98 °F) 09/29/20 0827   Pulse 78 09/29/20 0827   Resp 16 09/29/20 0827   SpO2 98 % 09/29/20 0827         Event Time   Out of Recovery 08:35:54         Pain/Jake Score: Jake Score: 10 (9/29/2020  8:21 AM)

## 2020-09-29 NOTE — [employer] PATIENT INSTRUCTIONS
Discharge Summary/Instructions after an Endoscopic Procedure  Patient Name: Valery Huggins  Patient MRN: [id number]  Patient YOB: 1940 Tuesday, September 29, 2020  David Mendieta MD  RESTRICTIONS:  During your procedure today, you received medications for sedation.  These   medications may affect your judgment, balance and coordination.  Therefore,   for 24 hours, you have the following restrictions:   - DO NOT drive a car, operate machinery, make legal/financial decisions,   sign important papers or drink alcohol.    ACTIVITY:  Today: no heavy lifting, straining or running due to procedural   sedation/anesthesia.  The following day: return to full activity including work.  DIET:  Eat and drink normally unless instructed otherwise.     TREATMENT FOR COMMON SIDE EFFECTS:  - Mild abdominal pain, nausea, belching, bloating or excessive gas:  rest,   eat lightly and use a heating pad.  - Sore Throat: treat with throat lozenges and/or gargle with warm salt   water.  - Because air was used during the procedure, expelling large amounts of air   from your rectum or belching is normal.  - If a bowel prep was taken, you may not have a bowel movement for 1-3 days.    This is normal.  SYMPTOMS TO WATCH FOR AND REPORT TO YOUR PHYSICIAN:  1. Abdominal pain or bloating, other than gas cramps.  2. Chest pain.  3. Back pain.  4. Signs of infection such as: chills or fever occurring within 24 hours   after the procedure.  5. Rectal bleeding, which would show as bright red, maroon, or black stools.   (A tablespoon of blood from the rectum is not serious, especially if   hemorrhoids are present.)  6. Vomiting.  7. Weakness or dizziness.  GO DIRECTLY TO THE NEAREST EMERGENCY ROOM IF YOU HAVE ANY OF THE FOLLOWING:      Difficulty breathing              Chills and/or fever over 101 F   Persistent vomiting and/or vomiting blood   Severe abdominal pain   Severe chest pain   Black, tarry stools   Bleeding- more than one  tablespoon   Any other symptom or condition that you feel may need urgent attention  Your doctor recommends these additional instructions:  If any biopsies were taken, your doctors clinic will contact you in 1 to 2   weeks with any results.  - Use fiber, for example Citrucel, Fibercon, Konsyl or Metamucil.   - Discharge patient to home (ambulatory).   - Resume regular diet.   - Await pathology results.   - Return to my office in 6 weeks.  For questions, problems or results please call your physician - David Mendieta MD at Work:  [phone].  [facility], EMERGENCY ROOM PHONE NUMBER: [phone]  IF A COMPLICATION OR EMERGENCY SITUATION ARISES AND YOU ARE UNABLE TO REACH   YOUR PHYSICIAN - GO DIRECTLY TO THE EMERGENCY ROOM.  David Mendieta MD  9/29/2020 7:45:27 AM  This report has been verified and signed electronically.  [employer]

## 2020-09-30 VITALS
TEMPERATURE: 98 F | WEIGHT: 153 LBS | OXYGEN SATURATION: 98 % | SYSTOLIC BLOOD PRESSURE: 146 MMHG | DIASTOLIC BLOOD PRESSURE: 77 MMHG | HEART RATE: 78 BPM | RESPIRATION RATE: 16 BRPM | HEIGHT: 61 IN | BODY MASS INDEX: 28.89 KG/M2

## 2020-10-01 ENCOUNTER — PATIENT MESSAGE (OUTPATIENT)
Dept: HEMATOLOGY/ONCOLOGY | Facility: CLINIC | Age: 80
End: 2020-10-01

## 2020-10-03 LAB
ALBUMIN SERPL-MCNC: 3.6 G/DL (ref 3.6–5.1)
ALBUMIN/GLOB SERPL: 1.5 (CALC) (ref 1–2.5)
ALP SERPL-CCNC: 109 U/L (ref 37–153)
ALT SERPL-CCNC: 19 U/L (ref 6–29)
AST SERPL-CCNC: 20 U/L (ref 10–35)
BASOPHILS # BLD AUTO: 42 CELLS/UL (ref 0–200)
BASOPHILS NFR BLD AUTO: 0.8 %
BILIRUB SERPL-MCNC: 0.6 MG/DL (ref 0.2–1.2)
BUN SERPL-MCNC: 31 MG/DL (ref 7–25)
BUN/CREAT SERPL: 17 (CALC) (ref 6–22)
CALCIUM SERPL-MCNC: 9.2 MG/DL (ref 8.6–10.4)
CHLORIDE SERPL-SCNC: 98 MMOL/L (ref 98–110)
CO2 SERPL-SCNC: 31 MMOL/L (ref 20–32)
CREAT SERPL-MCNC: 1.8 MG/DL (ref 0.6–0.93)
EOSINOPHIL # BLD AUTO: 109 CELLS/UL (ref 15–500)
EOSINOPHIL NFR BLD AUTO: 2.1 %
ERYTHROCYTE [DISTWIDTH] IN BLOOD BY AUTOMATED COUNT: 12.9 % (ref 11–15)
GFRSERPLBLD MDRD-ARVRAT: 27 ML/MIN/1.73M2
GLOBULIN SER CALC-MCNC: 2.4 G/DL (CALC) (ref 1.9–3.7)
GLUCOSE SERPL-MCNC: 196 MG/DL (ref 65–99)
HCT VFR BLD AUTO: 32.2 % (ref 35–45)
HGB BLD-MCNC: 10.6 G/DL (ref 11.7–15.5)
LYMPHOCYTES # BLD AUTO: 1232 CELLS/UL (ref 850–3900)
LYMPHOCYTES NFR BLD AUTO: 23.7 %
MCH RBC QN AUTO: 31.3 PG (ref 27–33)
MCHC RBC AUTO-ENTMCNC: 32.9 G/DL (ref 32–36)
MCV RBC AUTO: 95 FL (ref 80–100)
MONOCYTES # BLD AUTO: 463 CELLS/UL (ref 200–950)
MONOCYTES NFR BLD AUTO: 8.9 %
NEUTROPHILS # BLD AUTO: 3354 CELLS/UL (ref 1500–7800)
NEUTROPHILS NFR BLD AUTO: 64.5 %
PLATELET # BLD AUTO: 163 THOUSAND/UL (ref 140–400)
PMV BLD REES-ECKER: 9.8 FL (ref 7.5–12.5)
POTASSIUM SERPL-SCNC: 4.2 MMOL/L (ref 3.5–5.3)
PROT SERPL-MCNC: 6 G/DL (ref 6.1–8.1)
RBC # BLD AUTO: 3.39 MILLION/UL (ref 3.8–5.1)
SODIUM SERPL-SCNC: 137 MMOL/L (ref 135–146)
WBC # BLD AUTO: 5.2 THOUSAND/UL (ref 3.8–10.8)

## 2020-10-05 LAB
FINAL PATHOLOGIC DIAGNOSIS: NORMAL
GROSS: NORMAL
Lab: NORMAL

## 2020-10-06 ENCOUNTER — TELEPHONE (OUTPATIENT)
Dept: SURGERY | Facility: CLINIC | Age: 80
End: 2020-10-06

## 2020-10-06 NOTE — TELEPHONE ENCOUNTER
Called and reviewed pathology with pt.      - Rectal mucosa with reactive changes.   - Negative for dysplasia and malignancy.   - See comment.     Recommend repeat flex sig in 6 months to a year

## 2020-10-12 RX ORDER — HEPARIN 100 UNIT/ML
500 SYRINGE INTRAVENOUS
Status: CANCELLED | OUTPATIENT
Start: 2020-10-13

## 2020-10-12 RX ORDER — SODIUM CHLORIDE 0.9 % (FLUSH) 0.9 %
10 SYRINGE (ML) INJECTION
Status: CANCELLED | OUTPATIENT
Start: 2020-10-13

## 2020-10-13 ENCOUNTER — INFUSION (OUTPATIENT)
Dept: INFUSION THERAPY | Facility: HOSPITAL | Age: 80
End: 2020-10-13
Attending: INTERNAL MEDICINE
Payer: COMMERCIAL

## 2020-10-13 VITALS
WEIGHT: 152.69 LBS | HEART RATE: 84 BPM | TEMPERATURE: 98 F | RESPIRATION RATE: 20 BRPM | HEIGHT: 61 IN | SYSTOLIC BLOOD PRESSURE: 148 MMHG | BODY MASS INDEX: 28.83 KG/M2 | DIASTOLIC BLOOD PRESSURE: 72 MMHG

## 2020-10-13 DIAGNOSIS — C20 RECTAL CANCER: Primary | ICD-10-CM

## 2020-10-13 PROCEDURE — 63600175 PHARM REV CODE 636 W HCPCS: Performed by: NURSE PRACTITIONER

## 2020-10-13 PROCEDURE — 96523 IRRIG DRUG DELIVERY DEVICE: CPT

## 2020-10-13 RX ORDER — HEPARIN 100 UNIT/ML
500 SYRINGE INTRAVENOUS
Status: CANCELLED | OUTPATIENT
Start: 2020-10-13

## 2020-10-13 RX ORDER — SODIUM CHLORIDE 0.9 % (FLUSH) 0.9 %
10 SYRINGE (ML) INJECTION
Status: CANCELLED | OUTPATIENT
Start: 2020-10-13

## 2020-10-13 RX ORDER — HEPARIN 100 UNIT/ML
500 SYRINGE INTRAVENOUS
Status: DISCONTINUED | OUTPATIENT
Start: 2020-10-13 | End: 2020-10-13 | Stop reason: HOSPADM

## 2020-10-13 RX ORDER — SODIUM CHLORIDE 0.9 % (FLUSH) 0.9 %
10 SYRINGE (ML) INJECTION
Status: DISCONTINUED | OUTPATIENT
Start: 2020-10-13 | End: 2020-10-13 | Stop reason: HOSPADM

## 2020-10-13 RX ADMIN — HEPARIN 500 UNITS: 100 SYRINGE at 09:10

## 2020-10-13 NOTE — PLAN OF CARE
Problem: Fatigue  Goal: Improved Activity Tolerance  Outcome: Ongoing, Progressing  Intervention: Promote Energy Conservation  Flowsheets (Taken 10/13/2020 0900)  Fatigue Management: frequent rest breaks encouraged  Sleep/Rest Enhancement:   noise level reduced   regular sleep/rest pattern promoted  Activity Management: ambulated - L4

## 2020-10-22 ENCOUNTER — TELEPHONE (OUTPATIENT)
Dept: HEMATOLOGY/ONCOLOGY | Facility: CLINIC | Age: 80
End: 2020-10-22

## 2020-10-22 NOTE — TELEPHONE ENCOUNTER
Called the patient and she instructed me that Dr. Goode was suppose to order her Neurontin.  She instructed me that he was going to increase it to 2 pills in the am, 1 mid-day and 2 at bedtime.  I checked his note and so I ordered her Neurontin 100 mg - take 2 tablets in the am, 1 tablet mid-day and 2 at bedtime # 150 with 2 refills. I called it into [facility].

## 2020-10-22 NOTE — TELEPHONE ENCOUNTER
----- Message from [place] sent at 10/22/2020  3:54 PM CDT -----  [place] from [facility] called and said the patient is looking for a prescription of gabapentin. She said we were supposed to call it in for her. Pharmacy # is [phone].

## 2020-10-25 PROBLEM — Z85.048 HISTORY OF RECTAL CANCER: Status: RESOLVED | Noted: 2020-09-29 | Resolved: 2020-10-25

## 2020-10-25 NOTE — PROGRESS NOTES
[facility] Hematology/Oncology  PROGRESS NOTE -   Follow-up Visit      Subjective:       Patient ID:   NAME: Valery Huggins : 1940     79 y.o. female    Referring Doc: David Mendieta MD  Other Physicians: Armando Nath; Stanislav Epstein; Azar Donnelly; Mo    Chief Complaint:  Left RCC and rectal CA f/u    History of Present Illness:     Patient is being seen today in person in clinic with her . She is still having some neuropathy issues in the feet and hands but the feet are getting better.       The patient is on today to go over the results of the recently ordered labs, tests and studies.       She had previously completed combination XRT and chemotherapy. No CP, SOB, HA's or N/V.       She previously saw Dr Mendieta on 12/10/2019  and had MRI on 2020.  They did scope on 2020 which looked good and both the patient and Dr Mendieta did not want to proceed in direction of operation especially given her age and co-morbidities.She had recent MRI with Dr Mendieta on 2020 and he plans to scope her again at end of 2020    We previously discussed adjuvant chemotherapy with FOLOFOX x 10-12 cycles especially since she was not having surgery and she was agreeable. She wanted to discontinue with therapy after # 10 cycle and that was reasonable          She had MRI of pelvis on 2020 and a  PEt scan on 2020         Discussed Covid19 precautions again                ROS:   GEN: normal without any fever, night sweats or weight loss  HEENT: normal with no HA's, sore throat, stiff neck, changes in vision;    CV: normal with no CP, SOB, PND, RAYA or orthopnea  PULM: normal with no SOB, cough, hemoptysis, sputum or pleuritic pain  GI: no nausea or constipation  : normal with no hematuria, dysuria  BREAST: normal with no mass, discharge, pain  SKIN: normal with no rash, erythema, bruising, or swelling    Allergies:  Review of patient's allergies indicates:  No Known  "Allergies    Medications:    Current Outpatient Medications:     amLODIPine (NORVASC) 10 MG tablet, Take 1 tablet (10 mg total) by mouth once daily., Disp: 90 tablet, Rfl: 0    ascorbic acid (VITAMIN C) 100 MG tablet, Take 100 mg by mouth once daily., Disp: , Rfl:     atorvastatin (LIPITOR) 10 MG tablet, Take 1 tablet (10 mg total) by mouth every evening., Disp: 90 tablet, Rfl: 0    b complex vitamins capsule, Take 1 capsule by mouth once daily., Disp: , Rfl:     BD ULTRA-FINE CONNIE PEN NEEDLE 32 gauge x 5/32" Ndle, INJECT TWICE DAILY AS DIRECTED, Disp: 200 each, Rfl: 0    calcitRIOL (ROCALTROL) 0.5 MCG Cap, TAKE 1 CAPSULE (0.5 MCG TOTAL) BY MOUTH ONCE DAILY., Disp: 90 capsule, Rfl: 0    calcium carbonate (CALCIUM 300 ORAL), Take 600 mg by mouth 2 (two) times a day. , Disp: , Rfl:     escitalopram oxalate (LEXAPRO) 20 MG tablet, Take 1 tablet (20 mg total) by mouth once daily., Disp: 90 tablet, Rfl: 0    FREESTYLE LITE METER kit, , Disp: , Rfl: 0    FREESTYLE LITE STRIPS Strp, , Disp: , Rfl: 3    gabapentin (NEURONTIN) 100 MG capsule, Take 1 capsule (100 mg total) by mouth 3 (three) times daily., Disp: 90 capsule, Rfl: 2    levothyroxine (SYNTHROID) 88 MCG tablet, Take 1 tablet (88 mcg total) by mouth before breakfast., Disp: 90 tablet, Rfl: 0    magnesium oxide (MAG-OX) 400 mg (241.3 mg magnesium) tablet, Take 1 tablet (400 mg total) by mouth 2 (two) times daily., Disp: 180 tablet, Rfl: 0    pantoprazole (PROTONIX) 40 MG tablet, Take 1 tablet (40 mg total) by mouth once daily., Disp: 90 tablet, Rfl: 0    TOUJEO SOLOSTAR U-300 INSULIN 300 unit/mL (1.5 mL) InPn pen, Inject 20 Units into the skin once daily. (Patient taking differently: Inject 26 Units into the skin once daily. ), Disp: 4.5 mL, Rfl: 3    valsartan (DIOVAN) 40 MG tablet, Take 1 tablet (40 mg total) by mouth once daily., Disp: 90 tablet, Rfl: 0    VICTOZA 3-XAVEIR 0.6 mg/0.1 mL (18 mg/3 mL) PnIj pen, 1.8 mg sq qd, Disp: 9 mL, Rfl: 0    " cholecalciferol, vitamin D3, 4,000 unit Cap, Take 1 capsule by mouth once daily., Disp: , Rfl:     docusate sodium (COLACE) 100 MG capsule, Take 100 mg by mouth 2 (two) times daily., Disp: , Rfl:     Current Facility-Administered Medications:     0.9%  NaCl infusion (for blood administration), , Intravenous, Once, Carrillo Goode MD    PMHx/PSHx Updates:  See patient's last visit with me on 9/28/2020  See H&P on 10/8/2019        Pathology:  Cancer Staging  No matching staging information was found for the patient.          Objective:     Vitals:  Blood pressure 137/73, pulse 80, temperature 97.2 °F (36.2 °C), resp. rate 18, weight 69.7 kg (153 lb 9.6 oz), last menstrual period 06/01/2012.        Physical Examination:   GEN: no apparent distress, comfortable; AAOx3  HEAD: atraumatic and normocephalic  EYES: no conjunctival pallor or muddiness, no icterus; normal pupil reaction to ambient light  ENT: OMM, no pharyngeal erythema, external bilateral ears WNL; no visible thrush or ulcers  NECK: no masses or swelling, trachea midline, no visible LAD/LN's   CV: no palpitations; no pedal edema; no noticeable JVD or neck vein distension;  portacath on right CW  CHEST: Normal respiratory effort; chest wall breath movements symmetrical; no audible wheezing  ABDOM: non-distended; no bloating  MUSC/Skeletal: ROM normal; joints visibly normal; no deformities or arthropathy  EXTREM: no clubbing, cyanosis, inflammation or swelling  SKIN: no rashes, lesions, ulcers, petechiae or subcutaneous nodules  : no hendrickson  NEURO: moving all 4 extremities; AAOx3; no tremors  PSYCH: normal mood, affect and behavior  LYMPH: no visible LN's or LAD              Labs:          Lab Results   Component Value Date    WBC 5.2 10/02/2020    HGB 10.6 (L) 10/02/2020    HCT 32.2 (L) 10/02/2020    MCV 95.0 10/02/2020     10/02/2020     CMP  Sodium   Date Value Ref Range Status   10/02/2020 137 135 - 146 mmol/L Final     Potassium   Date Value  Ref Range Status   10/02/2020 4.2 3.5 - 5.3 mmol/L Final     Chloride   Date Value Ref Range Status   10/02/2020 98 98 - 110 mmol/L Final     CO2   Date Value Ref Range Status   10/02/2020 31 20 - 32 mmol/L Final     Glucose   Date Value Ref Range Status   10/02/2020 196 (H) 65 - 99 mg/dL Final     Comment:                   Fasting reference interval     For someone without known diabetes, a glucose  value >125 mg/dL indicates that they may have  diabetes and this should be confirmed with a  follow-up test.          BUN, Bld   Date Value Ref Range Status   10/02/2020 31 (H) 7 - 25 mg/dL Final     Creatinine   Date Value Ref Range Status   10/02/2020 1.80 (H) 0.60 - 0.93 mg/dL Final     Comment:     For patients >49 years of age, the reference limit  for Creatinine is approximately 13% higher for people  identified as -American.          Calcium   Date Value Ref Range Status   10/02/2020 9.2 8.6 - 10.4 mg/dL Final     Total Protein   Date Value Ref Range Status   10/02/2020 6.0 (L) 6.1 - 8.1 g/dL Final     Albumin   Date Value Ref Range Status   10/02/2020 3.6 3.6 - 5.1 g/dL Final     Total Bilirubin   Date Value Ref Range Status   10/02/2020 0.6 0.2 - 1.2 mg/dL Final     Alkaline Phosphatase   Date Value Ref Range Status   08/07/2020 168 (H) 37 - 153 U/L Final     AST   Date Value Ref Range Status   10/02/2020 20 10 - 35 U/L Final     ALT   Date Value Ref Range Status   10/02/2020 19 6 - 29 U/L Final     Anion Gap   Date Value Ref Range Status   06/10/2020 14 8 - 16 mmol/L Final     eGFR if    Date Value Ref Range Status   10/02/2020 31 (L) > OR = 60 mL/min/1.73m2 Final     eGFR if non    Date Value Ref Range Status   10/02/2020 27 (L) > OR = 60 mL/min/1.73m2 Final     SARS-CoV2 (COVID-19) Qualitative PCR Not Detected           Radiology/Diagnostic Studies:    PET 9/18/2020:    Impression:     1. Negative for recurrent malignancy or metastatic disease.  2. Unchanged 6 mm right  upper lobe nodule, presumably benign.  3. Slight decreased FDG uptake associated with left paratracheal mass just inferior to thoracic inlet, differential diagnosis of which has been previously discussed, with benign etiologies likely.        MRI 8/6/2020:  Impression:     No evidence for disease progression.Minimal signal abnormality along the mid rectum corresponds to the previously treated lesion.  No pelvic adenopathy.       CT  3/5/2020    Impression       Status post left nephrectomy without evidence of recurrent renal malignancy.    No significant change since prior study.           MRI  1/8/2020:  Impression       Significant reduction in size of the patient's known mid rectal mass, which is no longer distinctly visible.  Reduction in size of 3 mesorectal lymph nodes as above.               PET  10/17/2019    Impression       1. Left paratracheal small mass extending into superior mediastinum with associated moderate FDG activity is unchanged in size and appearance since 02/16/2018 CT.  This is unlikely to represent metastatic disease or malignancy, given stability, and may represent residual thyroid parenchyma but is otherwise nonspecific.  2. Unchanged 6 mm right upper lobe nodule since 02/16/2018.  Benign etiology is likely the continued CT thorax without IV contrast follow-up is recommended in 12 months to document greater than 2 years of stability.  3. No current convincing evidence of metastatic disease.  4. Previous rectal mass is no longer evident on this exam.               X-ray Chest Ap Portable    Result Date: 10/15/2019  EXAMINATION: XR CHEST AP PORTABLE CLINICAL HISTORY: s/p port; Encounter for adjustment and management of vascular access device TECHNIQUE: Single frontal view of the chest was performed. COMPARISON: 6/18/2019 FINDINGS: The cardiomediastinal silhouette is stable.  Lungs are clear of infiltrate.  No pleural effusions.  Laya catheter has been inserted from the region of the right  subclavian vein with the tip at the level the proximal SVC.     Laya catheter inserted with the tip at the level the proximal SVC.  Lungs are expanded and clear.  No pneumothorax. Electronically signed by: Nubia Venegas MD Date:    10/15/2019 Time:    14:05    Surg Fl Surgery Fluoro Usage    Result Date: 10/15/2019  See OP Notes for results. IMPRESSION: See OP Notes for results. This procedure was auto-finalized by: [facility]     Mri Rectal Cancer Without Contrast    Result Date: 9/18/2019  EXAMINATION: MRI RECTAL CANCER WITHOUT CONTRAST CLINICAL HISTORY: Rectal cancer, staging, locoregional;rectal anal mass;  Malignant neoplasm of rectum TECHNIQUE: Multiplanar multisequence MR imaging of the pelvis without contrast. COMPARISON: 07/12/2019 FINDINGS: Approximately 4 cm from the anal verge there is a peripherally located lobular mass along the dorsal rectal wall.  This measures 4 cm in length and 3.3 cm in diameter.  There is heterogeneous signal intensity.  There is restricted diffusion in the lesion and no discrete extra colonic extension.  Several lymph nodes are noted in the mesorectal fat including two which measure 3 mm near the inferior sacrum, series 8, image 16, and a 5 mm lymph node at the S2 level, series 8, image 8.  Prominent but nonenlarged bilateral obturator chain lymph nodes also noted, on the right at 4 mm and left at 6 mm.  There is urinary bladder wall thickening which is diffuse.  Moderate degree of stool in the colon.  No dilated bowel loops.  Hysterectomy.     4 cm mid rectal lesion in keeping with known malignancy.  No discrete extension into the mesorectal fascia although there are several perirectal lymph nodes which raise the possibility for locoregional lymph node involvement. Electronically signed by: Bebo Kapoor Date:    09/18/2019 Time:    16:45      I have reviewed all available lab results and radiology reports.    Assessment/Plan:   (1) 79 y.o. female with diagnosis of  prior left RCC who has been referred by David Saldaña MD for evaluation by medical hematology/oncology. She has been followed by Dr Stanislav Epstein with [facility] the [facility] in [place]. She last saw Dr Epstein in July 2019. She was recently diagnosed with rectal mass by Dr Armando Duff which was found on colonoscopy on 8/26/2019. She had presented with lower Gi bleeding at that time. The pathology from those biopsies showed no evidence of malignancy at that time. She was subsequently seen by Dr David Saldaña and underwent trans-anal surgical biopsy on 9/23/2019. The pathology from the surgical biopsy showed rectal carcinoma.         She has been seen by Dr Fareed Hassan with Rad/onc for radiation therapy evaluation.     PET was done on 10/17/2019     We previously discussed giving her combined chemotherapy with the XRt to try to reduce her risk of recurrence. She was agreeable to this plan.    She had portacath placed with Dr Saldaña. She saw Dr Hassan last week with rad/onc. She has since completed weekly XRT and chemotherapy    - She previously saw Dr Saldaña on 12/10/2019  and had MRI on 1/8/2020.  They did scope on 1/28th 2020 and I spoke to dr saldaña by phone last week. The scope looked good and both the patient and Dr Saldaña does not want to proceed in direction of operation especially given her age and co-morbidities.    - We previously discussed adjuvant chemotherapy with FOLOFOX x 10-12 cycles especially since she is not having surgery and she is agreeable.    - Will previously  provide literature on the regimen and set up chemotherapy      7/2/2020:  - She has since started chemotherapy and she has had 8 cycles so far; chemotherapy was held last time due to general malaise. She feels good and has some minimal and tolerable neuropathy in hands and feet.  She has some intermittent constipation.    She wants to continue with therapy and I would like to at least get 10 cycles in if possible.  "    7/27/2020:  - she is on her last cycle #10 today  - check labs weekly for next 4 weeks  - she will need to follow-up with dr mendieta and also get repeat scans every 6 months    9/1/2020:  - she completed the 10th and last cycle about a month ago  - she had recent MRI of pelvis with Dr Mendieta on 8/12/2020 and plans to get repeat scope at end of Sept 2020  - she has some occasional lightheadedness and is going to see her eye doctor in the near future  - will schedule a PET scan and consider MRI of brain thereafter    9/28/2020:  - PET on 9/18/2020 on chart  - scope scheduled for tomorrow with Dr Mendieta  - check up to date labs  - increase the gabpentin to 2 pills bid with one pill mid-day still - if symptoms do not improve, then consider referral to neurology    10/26/2020:  - doing ok except for residual neuropathy issues   - discussed referral to neurology but she wants to hold off for now  - she had scope with Dr mendieta since last visit which was "perfect" per patient and repeat is planned for six months      (2) Left renal cell carcinoma s/p left nephrectomy with Dr Azar Donnelly - diagnosed about 2 years ago     (3) Ureterolithiasis     (4) CRI - stage III - followed by Dr Antonio; she saw him recently and per patient's report, her kidney function is stable per Dr Antonio  - she is seeing Dr Antonio this coming Thursday     (5) HTN and hypercholesterolemia     (6) DM - followed by Dr Brower with endocrinology     (7) Hx/of gall stones     (8) Chronic left shoulder issues     (9) MVP     (10) Thyroid disease with prior hx/of thyroid cancer s/p thyroidectomy      (11) Chronic anemia - most likley multifactorial due to iron deficiency, GIB and anemia of chronic renal disease  - hgb at 10.8 currently    (12) hypocalcemia - followed by Dr Serrano    (13) 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  Encouraged compliance with labs; discussed COVID19 precautions  2.  check labs monthly for now    3.  F/u with PCP, GI, rad/onc , etc  4.  F/u nephrology - Dr Antonio   5. Refill antiemetics as needed  6. f/u with eye doctor as directed by them        RTC in 8 weeks     Fax note to Pham Winston Parks; Tete Duff Tandron; Paco;     Discussion:       I spent over 25 mins of time with the patient. Reviewed results of the recently ordered labs, tests and studies; made directives with regards to the results. Over half of this time was spent couseling and coordinating care.    COVID-19 Discussion:    I had long discussion with patient and any applicable family about the COVID-19 coronavirus epidemic and the recommended precautions with regard to cancer and/or hematology patients. I have re-iterated the CDC recommendations for adequate hand washing, use of hand -like products, and coughing into elbow, etc. In addition, especially for our patients who are on chemotherapy and/or our otherwise immunocompromised patients, I have recommended avoidance of crowds, including movie theaters, restaurants, churches, etc. I have recommended avoidance of any sick or symptomatic family members and/or friends. I have also recommended avoidance of any raw and unwashed food products, and general avoidance of food items that have not been prepared by themselves. The patient has been asked to call us immediately with any symptom developments, issues, questions or other general concerns.       Chemotherapy Discussion:      I discussed the available treatment option(s) in accordance with the latest/current national evidence-based guidelines (NCCN, UpToDate, NCI, ASCO, etc where applicable), their overall age/condition and their co-morbidities. I also went over the risks and benefits of the chemotherapy with regard to their particular cancer type,  their cancer stage, their age/condition, and their co-morbidities. I provided literature on the chemotherapy regimen and discussed the chemotherapy side-effect profiles of the drug(s). I discussed the importance of compliance with obtaining and monitoring weekly lab work, and went over the potential hematopathology issues and risks with anemia, leucopenia and thrombocytopenia that can occur with chemotherapy. I discussed the potential risks of liver and kidney damage, which could be permanent and could necessitate dialysis long-term if kidney failure developed. I discussed the emetic and/or diarrheal potential of the regimen and the potential need for use of antiemetic and anti-diarrheal medications. I discussed the risk for development of anaphylactic shock, bronchospasm, dysrhythmia, and respiratory/cardiovascular arrest and/or failure. I discussed the potential risks for development of alopecia, cold sensory issues, ringing in ears, vertigo, cataracts, glaucoma, and neuropathy, all of which could end up being chronic and life-long. Some chemotherpyI discussed the risks of hand-foot syndrome and rashes, and development of other autoimmune mediated processes such as pneumonitis, hepatitis, and colitis which could be life threatening. I discussed the risks of the potential development of a rare but fatal viral mediated disease known as PML (Progressive Multifocal Leukoencephalopathy), and risk of future development of leukemia and/or lymphoma from use of certain chemotherapy agents. I discussed the need for neutropenic precautions, basic hygiene/sanitation behaviors and dietary restrictions.    The patient's consent has been obtained to proceed with the chemotherapy.The patient will be referred to Chemotherapy [facility] /[facility] for training and education on chemotherapy, use of antiemetics and/or anti-diarrheals, use of NSAID's, potential chemotherapy side-effects, and any specific recommendations and  precautions with the particular chemotherapy agents.      I answered all of the patient's (and family's, if applicable) questions to the best of my ability and to their complete satisfaction. The patient acknowledged full understanding of the risks, recommendations and plan(s).          I have explained all of the above in detail and the patient understands all of the current recommendation(s). I have answered all of their questions to the best of my ability and to their complete satisfaction.   The patient is to continue with the current management plan.            Electronically signed by Carrillo Goode MD                       Answers for HPI/ROS submitted by the patient on 10/24/2020   appetite change : No  unexpected weight change: No  mouth sores: No  visual disturbance: No  cough: No  shortness of breath: No  chest pain: No  abdominal pain: No  diarrhea: No  frequency: No  back pain: No  rash: No  headaches: No  adenopathy: No  nervous/ anxious: No

## 2020-10-26 ENCOUNTER — OFFICE VISIT (OUTPATIENT)
Dept: HEMATOLOGY/ONCOLOGY | Facility: CLINIC | Age: 80
End: 2020-10-26
Payer: COMMERCIAL

## 2020-10-26 VITALS
WEIGHT: 153.63 LBS | RESPIRATION RATE: 18 BRPM | HEART RATE: 80 BPM | SYSTOLIC BLOOD PRESSURE: 137 MMHG | TEMPERATURE: 97 F | BODY MASS INDEX: 29.02 KG/M2 | DIASTOLIC BLOOD PRESSURE: 73 MMHG

## 2020-10-26 DIAGNOSIS — C20 MALIGNANT NEOPLASM OF RECTUM: Primary | ICD-10-CM

## 2020-10-26 DIAGNOSIS — D64.9 NORMOCYTIC ANEMIA: ICD-10-CM

## 2020-10-26 DIAGNOSIS — C20 RECTAL CANCER: ICD-10-CM

## 2020-10-26 DIAGNOSIS — Z90.5 STATUS POST NEPHRECTOMY: ICD-10-CM

## 2020-10-26 DIAGNOSIS — C64.2 RENAL CELL CARCINOMA OF LEFT KIDNEY: ICD-10-CM

## 2020-10-26 PROCEDURE — 1101F PR PT FALLS ASSESS DOC 0-1 FALLS W/OUT INJ PAST YR: ICD-10-PCS | Mod: S$GLB,,, | Performed by: INTERNAL MEDICINE

## 2020-10-26 PROCEDURE — 1159F MED LIST DOCD IN RCRD: CPT | Mod: S$GLB,,, | Performed by: INTERNAL MEDICINE

## 2020-10-26 PROCEDURE — 1159F PR MEDICATION LIST DOCUMENTED IN MEDICAL RECORD: ICD-10-PCS | Mod: S$GLB,,, | Performed by: INTERNAL MEDICINE

## 2020-10-26 PROCEDURE — 3075F SYST BP GE 130 - 139MM HG: CPT | Mod: S$GLB,,, | Performed by: INTERNAL MEDICINE

## 2020-10-26 PROCEDURE — 3075F PR MOST RECENT SYSTOLIC BLOOD PRESS GE 130-139MM HG: ICD-10-PCS | Mod: S$GLB,,, | Performed by: INTERNAL MEDICINE

## 2020-10-26 PROCEDURE — 1125F PR PAIN SEVERITY QUANTIFIED, PAIN PRESENT: ICD-10-PCS | Mod: S$GLB,,, | Performed by: INTERNAL MEDICINE

## 2020-10-26 PROCEDURE — 1101F PT FALLS ASSESS-DOCD LE1/YR: CPT | Mod: S$GLB,,, | Performed by: INTERNAL MEDICINE

## 2020-10-26 PROCEDURE — 99214 OFFICE O/P EST MOD 30 MIN: CPT | Mod: S$GLB,,, | Performed by: INTERNAL MEDICINE

## 2020-10-26 PROCEDURE — 3078F DIAST BP <80 MM HG: CPT | Mod: S$GLB,,, | Performed by: INTERNAL MEDICINE

## 2020-10-26 PROCEDURE — 1125F AMNT PAIN NOTED PAIN PRSNT: CPT | Mod: S$GLB,,, | Performed by: INTERNAL MEDICINE

## 2020-10-26 PROCEDURE — 3078F PR MOST RECENT DIASTOLIC BLOOD PRESSURE < 80 MM HG: ICD-10-PCS | Mod: S$GLB,,, | Performed by: INTERNAL MEDICINE

## 2020-10-26 PROCEDURE — 99214 PR OFFICE/OUTPT VISIT, EST, LEVL IV, 30-39 MIN: ICD-10-PCS | Mod: S$GLB,,, | Performed by: INTERNAL MEDICINE

## 2020-11-01 ENCOUNTER — PATIENT MESSAGE (OUTPATIENT)
Dept: UROLOGY | Facility: CLINIC | Age: 80
End: 2020-11-01

## 2020-11-01 ENCOUNTER — PATIENT MESSAGE (OUTPATIENT)
Dept: HEMATOLOGY/ONCOLOGY | Facility: CLINIC | Age: 80
End: 2020-11-01

## 2020-11-02 ENCOUNTER — CLINICAL SUPPORT (OUTPATIENT)
Dept: HEMATOLOGY/ONCOLOGY | Facility: CLINIC | Age: 80
End: 2020-11-02
Payer: COMMERCIAL

## 2020-11-02 DIAGNOSIS — Z23 NEED FOR PROPHYLACTIC VACCINATION AND INOCULATION AGAINST INFLUENZA: Primary | ICD-10-CM

## 2020-11-02 PROCEDURE — 90662 IIV NO PRSV INCREASED AG IM: CPT | Mod: S$GLB,,, | Performed by: INTERNAL MEDICINE

## 2020-11-02 PROCEDURE — 90662 FLU VACCINE - HIGH DOSE (65+) PRESERVATIVE FREE IM: ICD-10-PCS | Mod: S$GLB,,, | Performed by: INTERNAL MEDICINE

## 2020-11-02 PROCEDURE — 90471 FLU VACCINE - HIGH DOSE (65+) PRESERVATIVE FREE IM: ICD-10-PCS | Mod: S$GLB,,, | Performed by: INTERNAL MEDICINE

## 2020-11-02 PROCEDURE — 90471 IMMUNIZATION ADMIN: CPT | Mod: S$GLB,,, | Performed by: INTERNAL MEDICINE

## 2020-11-05 ENCOUNTER — PATIENT MESSAGE (OUTPATIENT)
Dept: HEMATOLOGY/ONCOLOGY | Facility: CLINIC | Age: 80
End: 2020-11-05

## 2020-11-07 ENCOUNTER — PATIENT MESSAGE (OUTPATIENT)
Dept: HEMATOLOGY/ONCOLOGY | Facility: CLINIC | Age: 80
End: 2020-11-07

## 2020-11-09 ENCOUNTER — TELEPHONE (OUTPATIENT)
Dept: HEMATOLOGY/ONCOLOGY | Facility: CLINIC | Age: 80
End: 2020-11-09

## 2020-11-09 NOTE — TELEPHONE ENCOUNTER
Called the patient and instructed her that I called Dr. Nath's office to obtain her lab reports.  I did receive her lab reports and it is none of the blood work that Dr. Goode needs.  Patient instructed me that she will go and get her blood work done.

## 2020-11-10 LAB
ALBUMIN SERPL-MCNC: 3.8 G/DL (ref 3.6–5.1)
ALBUMIN/GLOB SERPL: 1.7 (CALC) (ref 1–2.5)
ALP SERPL-CCNC: 91 U/L (ref 37–153)
ALT SERPL-CCNC: 20 U/L (ref 6–29)
AST SERPL-CCNC: 22 U/L (ref 10–35)
BASOPHILS # BLD AUTO: 41 CELLS/UL (ref 0–200)
BASOPHILS NFR BLD AUTO: 0.9 %
BILIRUB SERPL-MCNC: 0.6 MG/DL (ref 0.2–1.2)
BUN SERPL-MCNC: 34 MG/DL (ref 7–25)
BUN/CREAT SERPL: 19 (CALC) (ref 6–22)
CALCIUM SERPL-MCNC: 9.9 MG/DL (ref 8.6–10.4)
CHLORIDE SERPL-SCNC: 99 MMOL/L (ref 98–110)
CO2 SERPL-SCNC: 32 MMOL/L (ref 20–32)
CREAT SERPL-MCNC: 1.78 MG/DL (ref 0.6–0.93)
EOSINOPHIL # BLD AUTO: 140 CELLS/UL (ref 15–500)
EOSINOPHIL NFR BLD AUTO: 3.1 %
ERYTHROCYTE [DISTWIDTH] IN BLOOD BY AUTOMATED COUNT: 13 % (ref 11–15)
GFRSERPLBLD MDRD-ARVRAT: 27 ML/MIN/1.73M2
GLOBULIN SER CALC-MCNC: 2.3 G/DL (CALC) (ref 1.9–3.7)
GLUCOSE SERPL-MCNC: 143 MG/DL (ref 65–99)
HCT VFR BLD AUTO: 33.1 % (ref 35–45)
HGB BLD-MCNC: 10.9 G/DL (ref 11.7–15.5)
LYMPHOCYTES # BLD AUTO: 1161 CELLS/UL (ref 850–3900)
LYMPHOCYTES NFR BLD AUTO: 25.8 %
MCH RBC QN AUTO: 29.9 PG (ref 27–33)
MCHC RBC AUTO-ENTMCNC: 32.9 G/DL (ref 32–36)
MCV RBC AUTO: 90.9 FL (ref 80–100)
MONOCYTES # BLD AUTO: 464 CELLS/UL (ref 200–950)
MONOCYTES NFR BLD AUTO: 10.3 %
NEUTROPHILS # BLD AUTO: 2696 CELLS/UL (ref 1500–7800)
NEUTROPHILS NFR BLD AUTO: 59.9 %
PLATELET # BLD AUTO: 155 THOUSAND/UL (ref 140–400)
PMV BLD REES-ECKER: 10.1 FL (ref 7.5–12.5)
POTASSIUM SERPL-SCNC: 4.8 MMOL/L (ref 3.5–5.3)
PROT SERPL-MCNC: 6.1 G/DL (ref 6.1–8.1)
RBC # BLD AUTO: 3.64 MILLION/UL (ref 3.8–5.1)
SODIUM SERPL-SCNC: 139 MMOL/L (ref 135–146)
WBC # BLD AUTO: 4.5 THOUSAND/UL (ref 3.8–10.8)

## 2020-11-18 ENCOUNTER — PATIENT MESSAGE (OUTPATIENT)
Dept: HEMATOLOGY/ONCOLOGY | Facility: CLINIC | Age: 80
End: 2020-11-18

## 2020-11-20 ENCOUNTER — INFUSION (OUTPATIENT)
Dept: INFUSION THERAPY | Facility: HOSPITAL | Age: 80
End: 2020-11-20
Attending: INTERNAL MEDICINE
Payer: COMMERCIAL

## 2020-11-20 VITALS
SYSTOLIC BLOOD PRESSURE: 143 MMHG | RESPIRATION RATE: 18 BRPM | DIASTOLIC BLOOD PRESSURE: 74 MMHG | WEIGHT: 154.69 LBS | OXYGEN SATURATION: 96 % | BODY MASS INDEX: 30.21 KG/M2 | HEART RATE: 93 BPM | TEMPERATURE: 97 F

## 2020-11-20 DIAGNOSIS — C20 RECTAL CANCER: Primary | ICD-10-CM

## 2020-11-20 PROCEDURE — 63600175 PHARM REV CODE 636 W HCPCS: Performed by: NURSE PRACTITIONER

## 2020-11-20 PROCEDURE — A4216 STERILE WATER/SALINE, 10 ML: HCPCS | Performed by: NURSE PRACTITIONER

## 2020-11-20 PROCEDURE — 96523 IRRIG DRUG DELIVERY DEVICE: CPT

## 2020-11-20 PROCEDURE — 25000003 PHARM REV CODE 250: Performed by: NURSE PRACTITIONER

## 2020-11-20 RX ORDER — SODIUM CHLORIDE 0.9 % (FLUSH) 0.9 %
10 SYRINGE (ML) INJECTION
Status: DISCONTINUED | OUTPATIENT
Start: 2020-11-20 | End: 2020-11-20 | Stop reason: HOSPADM

## 2020-11-20 RX ORDER — SODIUM CHLORIDE 0.9 % (FLUSH) 0.9 %
10 SYRINGE (ML) INJECTION
Status: CANCELLED | OUTPATIENT
Start: 2020-11-20

## 2020-11-20 RX ORDER — HEPARIN 100 UNIT/ML
500 SYRINGE INTRAVENOUS
Status: CANCELLED | OUTPATIENT
Start: 2020-11-20

## 2020-11-20 RX ORDER — HEPARIN 100 UNIT/ML
500 SYRINGE INTRAVENOUS
Status: DISCONTINUED | OUTPATIENT
Start: 2020-11-20 | End: 2020-11-20 | Stop reason: HOSPADM

## 2020-11-20 RX ADMIN — HEPARIN 500 UNITS: 100 SYRINGE at 10:11

## 2020-11-20 RX ADMIN — SODIUM CHLORIDE, PRESERVATIVE FREE 10 ML: 5 INJECTION INTRAVENOUS at 10:11

## 2020-12-02 ENCOUNTER — PATIENT MESSAGE (OUTPATIENT)
Dept: HEMATOLOGY/ONCOLOGY | Facility: CLINIC | Age: 80
End: 2020-12-02

## 2020-12-11 LAB
ALBUMIN SERPL-MCNC: 4 G/DL (ref 3.6–5.1)
ALBUMIN/GLOB SERPL: 1.7 (CALC) (ref 1–2.5)
ALP SERPL-CCNC: 73 U/L (ref 37–153)
ALT SERPL-CCNC: 15 U/L (ref 6–29)
AST SERPL-CCNC: 15 U/L (ref 10–35)
BASOPHILS # BLD AUTO: 28 CELLS/UL (ref 0–200)
BASOPHILS NFR BLD AUTO: 0.5 %
BILIRUB SERPL-MCNC: 0.7 MG/DL (ref 0.2–1.2)
BUN SERPL-MCNC: 44 MG/DL (ref 7–25)
BUN/CREAT SERPL: 23 (CALC) (ref 6–22)
CALCIUM SERPL-MCNC: 10.8 MG/DL (ref 8.6–10.4)
CHLORIDE SERPL-SCNC: 96 MMOL/L (ref 98–110)
CO2 SERPL-SCNC: 36 MMOL/L (ref 20–32)
CREAT SERPL-MCNC: 1.88 MG/DL (ref 0.6–0.93)
EOSINOPHIL # BLD AUTO: 88 CELLS/UL (ref 15–500)
EOSINOPHIL NFR BLD AUTO: 1.6 %
ERYTHROCYTE [DISTWIDTH] IN BLOOD BY AUTOMATED COUNT: 13.8 % (ref 11–15)
GFRSERPLBLD MDRD-ARVRAT: 25 ML/MIN/1.73M2
GLOBULIN SER CALC-MCNC: 2.3 G/DL (CALC) (ref 1.9–3.7)
GLUCOSE SERPL-MCNC: 135 MG/DL (ref 65–139)
HCT VFR BLD AUTO: 32.5 % (ref 35–45)
HGB BLD-MCNC: 10.8 G/DL (ref 11.7–15.5)
LYMPHOCYTES # BLD AUTO: 1232 CELLS/UL (ref 850–3900)
LYMPHOCYTES NFR BLD AUTO: 22.4 %
MCH RBC QN AUTO: 30.3 PG (ref 27–33)
MCHC RBC AUTO-ENTMCNC: 33.2 G/DL (ref 32–36)
MCV RBC AUTO: 91 FL (ref 80–100)
MONOCYTES # BLD AUTO: 418 CELLS/UL (ref 200–950)
MONOCYTES NFR BLD AUTO: 7.6 %
NEUTROPHILS # BLD AUTO: 3735 CELLS/UL (ref 1500–7800)
NEUTROPHILS NFR BLD AUTO: 67.9 %
PLATELET # BLD AUTO: 194 THOUSAND/UL (ref 140–400)
PMV BLD REES-ECKER: 10 FL (ref 7.5–12.5)
POTASSIUM SERPL-SCNC: 4.3 MMOL/L (ref 3.5–5.3)
PROT SERPL-MCNC: 6.3 G/DL (ref 6.1–8.1)
RBC # BLD AUTO: 3.57 MILLION/UL (ref 3.8–5.1)
SODIUM SERPL-SCNC: 136 MMOL/L (ref 135–146)
WBC # BLD AUTO: 5.5 THOUSAND/UL (ref 3.8–10.8)

## 2020-12-20 NOTE — PROGRESS NOTES
[facility] Hematology/Oncology  PROGRESS NOTE -   Follow-up Visit      Subjective:       Patient ID:   NAME: Valery Huggins : 1940     80 y.o. female    Referring Doc: David Mendieta MD  Other Physicians: Armando Nath; Stanislav Epstein; Azar Donnelly; Mo    Chief Complaint:  Left RCC and rectal CA f/u    History of Present Illness:     Patient is being seen today in person in clinic with her . She is still having some neuropathy issues in the feet and hands but it is now bearable.  She reports that she is feeling fine.        The patient is on today to go over the results of the recently ordered labs, tests and studies.         No CP, SOB, HA's or N/V.       She previously had seen Dr Mendieta on 12/10/2019  and had MRI on 2020.  They did scope on 2020 which looked good and both the patient and Dr Mendieta did not want to proceed in direction of operation especially given her age and co-morbidities.She had recent MRI with Dr Mendieta on 2020 and he plans to scope her again at end of 2020    We previously discussed adjuvant chemotherapy with FOLOFOX x 10-12 cycles especially since she was not having surgery and she was agreeable. She wanted to discontinue with therapy after # 10 cycle and that was reasonable          She had MRI of pelvis on 2020 and a  PEt scan on 2020    Neuropathy is now bearable with the medication. She         Discussed Covid19 precautions again                ROS:   GEN: normal without any fever, night sweats or weight loss  HEENT: normal with no HA's, sore throat, stiff neck, changes in vision;    CV: normal with no CP, SOB, PND, RAYA or orthopnea  PULM: normal with no SOB, cough, hemoptysis, sputum or pleuritic pain  GI: no nausea or constipation  : normal with no hematuria, dysuria  BREAST: normal with no mass, discharge, pain  SKIN: normal with no rash, erythema, bruising, or swelling    Allergies:  Review of patient's allergies indicates:  No Known  "Allergies    Medications:    Current Outpatient Medications:     amLODIPine (NORVASC) 10 MG tablet, TAKE 1 TABLET (10 MG TOTAL) BY MOUTH ONCE DAILY., Disp: 90 tablet, Rfl: 0    ascorbic acid (VITAMIN C) 100 MG tablet, Take 100 mg by mouth once daily., Disp: , Rfl:     atorvastatin (LIPITOR) 10 MG tablet, TAKE 1 TABLET (10 MG TOTAL) BY MOUTH EVERY EVENING., Disp: 90 tablet, Rfl: 0    b complex vitamins capsule, Take 1 capsule by mouth once daily., Disp: , Rfl:     BD ULTRA-FINE CONNIE PEN NEEDLE 32 gauge x 5/32" Ndle, INJECT TWICE DAILY AS DIRECTED, Disp: 200 each, Rfl: 0    calcitRIOL (ROCALTROL) 0.5 MCG Cap, TAKE 1 CAPSULE (0.5 MCG TOTAL) BY MOUTH ONCE DAILY., Disp: 90 capsule, Rfl: 0    calcium carbonate (CALCIUM 300 ORAL), Take 600 mg by mouth 2 (two) times a day. , Disp: , Rfl:     escitalopram oxalate (LEXAPRO) 20 MG tablet, Take 1 tablet (20 mg total) by mouth once daily., Disp: 90 tablet, Rfl: 0    FREESTYLE LITE METER kit, , Disp: , Rfl: 0    FREESTYLE LITE STRIPS Strp, , Disp: , Rfl: 3    gabapentin (NEURONTIN) 100 MG capsule, Take 1 capsule (100 mg total) by mouth 3 (three) times daily., Disp: 90 capsule, Rfl: 2    levothyroxine (SYNTHROID) 88 MCG tablet, Take 1 tablet (88 mcg total) by mouth before breakfast., Disp: 90 tablet, Rfl: 0    magnesium oxide (MAG-OX) 400 mg (241.3 mg magnesium) tablet, Take 1 tablet (400 mg total) by mouth 2 (two) times daily., Disp: 180 tablet, Rfl: 0    pantoprazole (PROTONIX) 40 MG tablet, Take 1 tablet (40 mg total) by mouth once daily., Disp: 90 tablet, Rfl: 0    TOUJEO SOLOSTAR U-300 INSULIN 300 unit/mL (1.5 mL) InPn pen, Inject 20 Units into the skin once daily., Disp: 4.5 mL, Rfl: 3    valsartan (DIOVAN) 40 MG tablet, Take 1 tablet (40 mg total) by mouth once daily., Disp: 90 tablet, Rfl: 0    VICTOZA 3-XAVIER 0.6 mg/0.1 mL (18 mg/3 mL) PnIj pen, 1.8 mg sq qd, Disp: 9 mL, Rfl: 0    cholecalciferol, vitamin D3, 4,000 unit Cap, Take 1 capsule by mouth once " daily., Disp: , Rfl:     docusate sodium (COLACE) 100 MG capsule, Take 100 mg by mouth 2 (two) times daily., Disp: , Rfl:     Current Facility-Administered Medications:     0.9%  NaCl infusion (for blood administration), , Intravenous, Once, Carrillo Goode MD    PMHx/PSHx Updates:  See patient's last visit with me on 10/26/2020  See H&P on 10/8/2019        Pathology:  Cancer Staging  No matching staging information was found for the patient.          Objective:     Vitals:  Blood pressure 135/75, pulse 79, temperature 98 °F (36.7 °C), resp. rate 18, weight 70.8 kg (156 lb), last menstrual period 06/01/2012.        Physical Examination:   GEN: no apparent distress, comfortable; AAOx3  HEAD: atraumatic and normocephalic  EYES: no conjunctival pallor or muddiness, no icterus; normal pupil reaction to ambient light  ENT: OMM, no pharyngeal erythema, external bilateral ears WNL; no visible thrush or ulcers  NECK: no masses or swelling, trachea midline, no visible LAD/LN's   CV: no palpitations; no pedal edema; no noticeable JVD or neck vein distension;  portacath on right CW  CHEST: Normal respiratory effort; chest wall breath movements symmetrical; no audible wheezing  ABDOM: non-distended; no bloating  MUSC/Skeletal: ROM normal; joints visibly normal; no deformities or arthropathy  EXTREM: no clubbing, cyanosis, inflammation or swelling  SKIN: no rashes, lesions, ulcers, petechiae or subcutaneous nodules  : no hendrickson  NEURO: moving all 4 extremities; AAOx3; no tremors  PSYCH: normal mood, affect and behavior  LYMPH: no visible LN's or LAD              Labs:          Lab Results   Component Value Date    WBC 5.5 12/10/2020    HGB 10.8 (L) 12/10/2020    HCT 32.5 (L) 12/10/2020    MCV 91.0 12/10/2020     12/10/2020     CMP  Sodium   Date Value Ref Range Status   12/10/2020 136 135 - 146 mmol/L Final     Potassium   Date Value Ref Range Status   12/10/2020 4.3 3.5 - 5.3 mmol/L Final     Chloride   Date Value  Ref Range Status   12/10/2020 96 (L) 98 - 110 mmol/L Final     CO2   Date Value Ref Range Status   12/10/2020 36 (H) 20 - 32 mmol/L Final     Glucose   Date Value Ref Range Status   12/10/2020 135 65 - 139 mg/dL Final     Comment:               Non-fasting reference interval          BUN   Date Value Ref Range Status   12/10/2020 44 (H) 7 - 25 mg/dL Final     Creatinine   Date Value Ref Range Status   12/10/2020 1.88 (H) 0.60 - 0.93 mg/dL Final     Comment:     For patients >49 years of age, the reference limit  for Creatinine is approximately 13% higher for people  identified as -American.          Calcium   Date Value Ref Range Status   12/10/2020 10.8 (H) 8.6 - 10.4 mg/dL Final     Total Protein   Date Value Ref Range Status   12/10/2020 6.3 6.1 - 8.1 g/dL Final     Albumin   Date Value Ref Range Status   12/10/2020 4.0 3.6 - 5.1 g/dL Final     Total Bilirubin   Date Value Ref Range Status   12/10/2020 0.7 0.2 - 1.2 mg/dL Final     Alkaline Phosphatase   Date Value Ref Range Status   08/07/2020 168 (H) 37 - 153 U/L Final     AST   Date Value Ref Range Status   12/10/2020 15 10 - 35 U/L Final     ALT   Date Value Ref Range Status   12/10/2020 15 6 - 29 U/L Final     Anion Gap   Date Value Ref Range Status   06/10/2020 14 8 - 16 mmol/L Final     eGFR if    Date Value Ref Range Status   12/10/2020 29 (L) > OR = 60 mL/min/1.73m2 Final     eGFR if non    Date Value Ref Range Status   12/10/2020 25 (L) > OR = 60 mL/min/1.73m2 Final     SARS-CoV2 (COVID-19) Qualitative PCR Not Detected           Radiology/Diagnostic Studies:    PET 9/18/2020:    Impression:     1. Negative for recurrent malignancy or metastatic disease.  2. Unchanged 6 mm right upper lobe nodule, presumably benign.  3. Slight decreased FDG uptake associated with left paratracheal mass just inferior to thoracic inlet, differential diagnosis of which has been previously discussed, with benign etiologies  likely.        MRI 8/6/2020:  Impression:     No evidence for disease progression.Minimal signal abnormality along the mid rectum corresponds to the previously treated lesion.  No pelvic adenopathy.       CT  3/5/2020    Impression       Status post left nephrectomy without evidence of recurrent renal malignancy.    No significant change since prior study.           MRI  1/8/2020:  Impression       Significant reduction in size of the patient's known mid rectal mass, which is no longer distinctly visible.  Reduction in size of 3 mesorectal lymph nodes as above.               PET  10/17/2019    Impression       1. Left paratracheal small mass extending into superior mediastinum with associated moderate FDG activity is unchanged in size and appearance since 02/16/2018 CT.  This is unlikely to represent metastatic disease or malignancy, given stability, and may represent residual thyroid parenchyma but is otherwise nonspecific.  2. Unchanged 6 mm right upper lobe nodule since 02/16/2018.  Benign etiology is likely the continued CT thorax without IV contrast follow-up is recommended in 12 months to document greater than 2 years of stability.  3. No current convincing evidence of metastatic disease.  4. Previous rectal mass is no longer evident on this exam.               X-ray Chest Ap Portable    Result Date: 10/15/2019  EXAMINATION: XR CHEST AP PORTABLE CLINICAL HISTORY: s/p port; Encounter for adjustment and management of vascular access device TECHNIQUE: Single frontal view of the chest was performed. COMPARISON: 6/18/2019 FINDINGS: The cardiomediastinal silhouette is stable.  Lungs are clear of infiltrate.  No pleural effusions.  Laya catheter has been inserted from the region of the right subclavian vein with the tip at the level the proximal SVC.     Laya catheter inserted with the tip at the level the proximal SVC.  Lungs are expanded and clear.  No pneumothorax. Electronically signed by: Nubia Venegas MD  Date:    10/15/2019 Time:    14:05    Surg Fl Surgery Fluoro Usage    Result Date: 10/15/2019  See OP Notes for results. IMPRESSION: See OP Notes for results. This procedure was auto-finalized by: [facility]     Mri Rectal Cancer Without Contrast    Result Date: 9/18/2019  EXAMINATION: MRI RECTAL CANCER WITHOUT CONTRAST CLINICAL HISTORY: Rectal cancer, staging, locoregional;rectal anal mass;  Malignant neoplasm of rectum TECHNIQUE: Multiplanar multisequence MR imaging of the pelvis without contrast. COMPARISON: 07/12/2019 FINDINGS: Approximately 4 cm from the anal verge there is a peripherally located lobular mass along the dorsal rectal wall.  This measures 4 cm in length and 3.3 cm in diameter.  There is heterogeneous signal intensity.  There is restricted diffusion in the lesion and no discrete extra colonic extension.  Several lymph nodes are noted in the mesorectal fat including two which measure 3 mm near the inferior sacrum, series 8, image 16, and a 5 mm lymph node at the S2 level, series 8, image 8.  Prominent but nonenlarged bilateral obturator chain lymph nodes also noted, on the right at 4 mm and left at 6 mm.  There is urinary bladder wall thickening which is diffuse.  Moderate degree of stool in the colon.  No dilated bowel loops.  Hysterectomy.     4 cm mid rectal lesion in keeping with known malignancy.  No discrete extension into the mesorectal fascia although there are several perirectal lymph nodes which raise the possibility for locoregional lymph node involvement. Electronically signed by: Bebo Kapoor Date:    09/18/2019 Time:    16:45      I have reviewed all available lab results and radiology reports.    Assessment/Plan:   (1) 80 y.o. female with diagnosis of prior left RCC who has been referred by David Mendieta MD for evaluation by medical hematology/oncology. She has been followed by Dr Stanislav Epstein with [facility] at [facility] in [place]. She last saw   Tete in July 2019. She was recently diagnosed with rectal mass by Dr Armando Duff which was found on colonoscopy on 8/26/2019. She had presented with lower Gi bleeding at that time. The pathology from those biopsies showed no evidence of malignancy at that time. She was subsequently seen by Dr David Mendieta and underwent trans-anal surgical biopsy on 9/23/2019. The pathology from the surgical biopsy showed rectal carcinoma.         She has been seen by Dr Fareed Hassan with Rad/onc for radiation therapy evaluation.     PET was done on 10/17/2019     We previously discussed giving her combined chemotherapy with the XRt to try to reduce her risk of recurrence. She was agreeable to this plan.    She had portacath placed with Dr Mendieta. She saw Dr Hassan last week with rad/onc. She has since completed weekly XRT and chemotherapy    - She previously saw Dr Mendieta on 12/10/2019  and had MRI on 1/8/2020.  They did scope on 1/28th 2020 and I spoke to dr mendieta by phone last week. The scope looked good and both the patient and Dr Mendieta does not want to proceed in direction of operation especially given her age and co-morbidities.    - We previously discussed adjuvant chemotherapy with FOLOFOX x 10-12 cycles especially since she is not having surgery and she is agreeable.    - Will previously  provide literature on the regimen and set up chemotherapy      7/2/2020:  - She has since started chemotherapy and she has had 8 cycles so far; chemotherapy was held last time due to general malaise. She feels good and has some minimal and tolerable neuropathy in hands and feet.  She has some intermittent constipation.    She wants to continue with therapy and I would like to at least get 10 cycles in if possible.     7/27/2020:  - she is on her last cycle #10 today  - check labs weekly for next 4 weeks  - she will need to follow-up with dr mendieta and also get repeat scans every 6 months    9/1/2020:  - she completed the 10th and last  "cycle about a month ago  - she had recent MRI of pelvis with Dr Mendieta on 8/12/2020 and plans to get repeat scope at end of Sept 2020  - she has some occasional lightheadedness and is going to see her eye doctor in the near future  - will schedule a PET scan and consider MRI of brain thereafter    9/28/2020:  - PET on 9/18/2020 on chart  - scope scheduled for tomorrow with Dr Mendieta  - check up to date labs  - increase the gabpentin to 2 pills bid with one pill mid-day still - if symptoms do not improve, then consider referral to neurology    10/26/2020:  - doing ok except for residual neuropathy issues   - discussed referral to neurology but she wants to hold off for now  - she had scope with Dr mendieta since last visit which was "perfect" per patient and repeat is planned for six months    12/21/2020:  - she is doing well  - neuropathy is better      (2) Left renal cell carcinoma s/p left nephrectomy with Dr Azar Donnelly - diagnosed about 2 years ago     (3) Ureterolithiasis     (4) CRI - stage III - followed by Dr Antonio; she saw him recently and per patient's report, her kidney function is stable per Dr Antonio  - she is seeing Dr Antonio this coming Thursday     (5) HTN and hypercholesterolemia     (6) DM - followed by Dr Brower with endocrinology     (7) Hx/of gall stones     (8) Chronic left shoulder issues     (9) MVP     (10) Thyroid disease with prior hx/of thyroid cancer s/p thyroidectomy      (11) Chronic anemia - most likley multifactorial due to iron deficiency, GIB and anemia of chronic renal disease  - hgb at 10.8 currently    (12) hypocalcemia - followed by Dr Serrano    (13) Mild neuropathy in hands and feet - tolerable per patient       VISIT DIAGNOSES:      Malignant neoplasm of rectum    Normocytic anemia    Rectal cancer    Renal cell carcinoma of left kidney          PLAN:  1.  Encouraged compliance with labs; discussed COVID19 precautions  2.  check labs monthly every other month   3.  F/u with " PCP, GI, rad/onc , etc  4.  F/u nephrology - Dr Antonio   5. Refill antiemetics as needed  6. Repeat scans in March 2021        RTC in 12 weeks     Fax note to [facility] Parks; Tete Duff Tandron; Paco;     Discussion:       I spent over 25 mins of time with the patient. Reviewed results of the recently ordered labs, tests and studies; made directives with regards to the results. Over half of this time was spent couseling and coordinating care.    COVID-19 Discussion:    I had long discussion with patient and any applicable family about the COVID-19 coronavirus epidemic and the recommended precautions with regard to cancer and/or hematology patients. I have re-iterated the CDC recommendations for adequate hand washing, use of hand -like products, and coughing into elbow, etc. In addition, especially for our patients who are on chemotherapy and/or our otherwise immunocompromised patients, I have recommended avoidance of crowds, including movie theaters, restaurants, churches, etc. I have recommended avoidance of any sick or symptomatic family members and/or friends. I have also recommended avoidance of any raw and unwashed food products, and general avoidance of food items that have not been prepared by themselves. The patient has been asked to call us immediately with any symptom developments, issues, questions or other general concerns.       Chemotherapy Discussion:      I discussed the available treatment option(s) in accordance with the latest/current national evidence-based guidelines (NCCN, UpToDate, NCI, ASCO, etc where applicable), their overall age/condition and their co-morbidities. I also went over the risks and benefits of the chemotherapy with regard to their particular cancer type, their cancer stage, their age/condition, and their co-morbidities. I provided literature on the chemotherapy regimen and discussed the chemotherapy side-effect profiles of the drug(s). I discussed the  importance of compliance with obtaining and monitoring weekly lab work, and went over the potential hematopathology issues and risks with anemia, leucopenia and thrombocytopenia that can occur with chemotherapy. I discussed the potential risks of liver and kidney damage, which could be permanent and could necessitate dialysis long-term if kidney failure developed. I discussed the emetic and/or diarrheal potential of the regimen and the potential need for use of antiemetic and anti-diarrheal medications. I discussed the risk for development of anaphylactic shock, bronchospasm, dysrhythmia, and respiratory/cardiovascular arrest and/or failure. I discussed the potential risks for development of alopecia, cold sensory issues, ringing in ears, vertigo, cataracts, glaucoma, and neuropathy, all of which could end up being chronic and life-long. Some chemotherpyI discussed the risks of hand-foot syndrome and rashes, and development of other autoimmune mediated processes such as pneumonitis, hepatitis, and colitis which could be life threatening. I discussed the risks of the potential development of a rare but fatal viral mediated disease known as PML (Progressive Multifocal Leukoencephalopathy), and risk of future development of leukemia and/or lymphoma from use of certain chemotherapy agents. I discussed the need for neutropenic precautions, basic hygiene/sanitation behaviors and dietary restrictions.    The patient's consent has been obtained to proceed with the chemotherapy.The patient will be referred to [facility] /[facility] for training and education on chemotherapy, use of antiemetics and/or anti-diarrheals, use of NSAID's, potential chemotherapy side-effects, and any specific recommendations and precautions with the particular chemotherapy agents.      I answered all of the patient's (and family's, if applicable) questions to the best of my ability and to their complete satisfaction. The patient  acknowledged full understanding of the risks, recommendations and plan(s).          I have explained all of the above in detail and the patient understands all of the current recommendation(s). I have answered all of their questions to the best of my ability and to their complete satisfaction.   The patient is to continue with the current management plan.            Electronically signed by Carrillo Goode MD

## 2020-12-21 ENCOUNTER — OFFICE VISIT (OUTPATIENT)
Dept: HEMATOLOGY/ONCOLOGY | Facility: CLINIC | Age: 80
End: 2020-12-21
Payer: COMMERCIAL

## 2020-12-21 VITALS
SYSTOLIC BLOOD PRESSURE: 135 MMHG | RESPIRATION RATE: 18 BRPM | BODY MASS INDEX: 30.47 KG/M2 | HEART RATE: 79 BPM | TEMPERATURE: 98 F | DIASTOLIC BLOOD PRESSURE: 75 MMHG | WEIGHT: 156 LBS

## 2020-12-21 DIAGNOSIS — C20 MALIGNANT NEOPLASM OF RECTUM: Primary | ICD-10-CM

## 2020-12-21 DIAGNOSIS — C64.2 RENAL CELL CARCINOMA OF LEFT KIDNEY: ICD-10-CM

## 2020-12-21 DIAGNOSIS — C20 RECTAL CANCER: ICD-10-CM

## 2020-12-21 DIAGNOSIS — D64.9 NORMOCYTIC ANEMIA: ICD-10-CM

## 2020-12-21 PROCEDURE — 1159F PR MEDICATION LIST DOCUMENTED IN MEDICAL RECORD: ICD-10-PCS | Mod: S$GLB,,, | Performed by: INTERNAL MEDICINE

## 2020-12-21 PROCEDURE — 3078F DIAST BP <80 MM HG: CPT | Mod: S$GLB,,, | Performed by: INTERNAL MEDICINE

## 2020-12-21 PROCEDURE — 3078F PR MOST RECENT DIASTOLIC BLOOD PRESSURE < 80 MM HG: ICD-10-PCS | Mod: S$GLB,,, | Performed by: INTERNAL MEDICINE

## 2020-12-21 PROCEDURE — 3288F PR FALLS RISK ASSESSMENT DOCUMENTED: ICD-10-PCS | Mod: S$GLB,,, | Performed by: INTERNAL MEDICINE

## 2020-12-21 PROCEDURE — 1101F PT FALLS ASSESS-DOCD LE1/YR: CPT | Mod: S$GLB,,, | Performed by: INTERNAL MEDICINE

## 2020-12-21 PROCEDURE — 1159F MED LIST DOCD IN RCRD: CPT | Mod: S$GLB,,, | Performed by: INTERNAL MEDICINE

## 2020-12-21 PROCEDURE — 3075F SYST BP GE 130 - 139MM HG: CPT | Mod: S$GLB,,, | Performed by: INTERNAL MEDICINE

## 2020-12-21 PROCEDURE — 99214 PR OFFICE/OUTPT VISIT, EST, LEVL IV, 30-39 MIN: ICD-10-PCS | Mod: S$GLB,,, | Performed by: INTERNAL MEDICINE

## 2020-12-21 PROCEDURE — 3288F FALL RISK ASSESSMENT DOCD: CPT | Mod: S$GLB,,, | Performed by: INTERNAL MEDICINE

## 2020-12-21 PROCEDURE — 1126F AMNT PAIN NOTED NONE PRSNT: CPT | Mod: S$GLB,,, | Performed by: INTERNAL MEDICINE

## 2020-12-21 PROCEDURE — 3075F PR MOST RECENT SYSTOLIC BLOOD PRESS GE 130-139MM HG: ICD-10-PCS | Mod: S$GLB,,, | Performed by: INTERNAL MEDICINE

## 2020-12-21 PROCEDURE — 1126F PR PAIN SEVERITY QUANTIFIED, NO PAIN PRESENT: ICD-10-PCS | Mod: S$GLB,,, | Performed by: INTERNAL MEDICINE

## 2020-12-21 PROCEDURE — 1101F PR PT FALLS ASSESS DOC 0-1 FALLS W/OUT INJ PAST YR: ICD-10-PCS | Mod: S$GLB,,, | Performed by: INTERNAL MEDICINE

## 2020-12-21 PROCEDURE — 99214 OFFICE O/P EST MOD 30 MIN: CPT | Mod: S$GLB,,, | Performed by: INTERNAL MEDICINE

## 2020-12-29 ENCOUNTER — INFUSION (OUTPATIENT)
Dept: INFUSION THERAPY | Facility: HOSPITAL | Age: 80
End: 2020-12-29
Attending: INTERNAL MEDICINE
Payer: COMMERCIAL

## 2020-12-29 VITALS
BODY MASS INDEX: 30.74 KG/M2 | RESPIRATION RATE: 18 BRPM | SYSTOLIC BLOOD PRESSURE: 135 MMHG | HEART RATE: 87 BPM | DIASTOLIC BLOOD PRESSURE: 72 MMHG | OXYGEN SATURATION: 99 % | WEIGHT: 157.38 LBS | TEMPERATURE: 97 F

## 2020-12-29 DIAGNOSIS — C20 RECTAL CANCER: Primary | ICD-10-CM

## 2020-12-29 PROCEDURE — A4216 STERILE WATER/SALINE, 10 ML: HCPCS | Performed by: NURSE PRACTITIONER

## 2020-12-29 PROCEDURE — 63600175 PHARM REV CODE 636 W HCPCS: Performed by: NURSE PRACTITIONER

## 2020-12-29 PROCEDURE — 96523 IRRIG DRUG DELIVERY DEVICE: CPT

## 2020-12-29 PROCEDURE — 25000003 PHARM REV CODE 250: Performed by: NURSE PRACTITIONER

## 2020-12-29 RX ORDER — SODIUM CHLORIDE 0.9 % (FLUSH) 0.9 %
10 SYRINGE (ML) INJECTION
Status: DISCONTINUED | OUTPATIENT
Start: 2020-12-29 | End: 2020-12-29 | Stop reason: HOSPADM

## 2020-12-29 RX ORDER — SODIUM CHLORIDE 0.9 % (FLUSH) 0.9 %
10 SYRINGE (ML) INJECTION
Status: CANCELLED | OUTPATIENT
Start: 2020-12-29

## 2020-12-29 RX ORDER — HEPARIN 100 UNIT/ML
500 SYRINGE INTRAVENOUS
Status: CANCELLED | OUTPATIENT
Start: 2020-12-29

## 2020-12-29 RX ORDER — HEPARIN 100 UNIT/ML
500 SYRINGE INTRAVENOUS
Status: DISCONTINUED | OUTPATIENT
Start: 2020-12-29 | End: 2020-12-29 | Stop reason: HOSPADM

## 2020-12-29 RX ADMIN — SODIUM CHLORIDE, PRESERVATIVE FREE 10 ML: 5 INJECTION INTRAVENOUS at 09:12

## 2020-12-29 RX ADMIN — HEPARIN 500 UNITS: 100 SYRINGE at 09:12

## 2021-01-07 ENCOUNTER — PATIENT MESSAGE (OUTPATIENT)
Dept: HEMATOLOGY/ONCOLOGY | Facility: CLINIC | Age: 81
End: 2021-01-07

## 2021-01-07 DIAGNOSIS — C64.2 RENAL CELL CARCINOMA OF LEFT KIDNEY: ICD-10-CM

## 2021-01-07 DIAGNOSIS — D64.9 NORMOCYTIC ANEMIA: ICD-10-CM

## 2021-01-07 DIAGNOSIS — N28.89 RENAL MASS, LEFT: ICD-10-CM

## 2021-01-07 DIAGNOSIS — C20 MALIGNANT NEOPLASM OF RECTUM: Primary | ICD-10-CM

## 2021-01-20 ENCOUNTER — IMMUNIZATION (OUTPATIENT)
Dept: PRIMARY CARE CLINIC | Facility: CLINIC | Age: 81
End: 2021-01-20
Payer: COMMERCIAL

## 2021-01-20 DIAGNOSIS — Z23 NEED FOR VACCINATION: Primary | ICD-10-CM

## 2021-01-20 PROCEDURE — 91300 COVID-19, MRNA, LNP-S, PF, 30 MCG/0.3 ML DOSE VACCINE: CPT | Mod: PBBFAC | Performed by: EMERGENCY MEDICINE

## 2021-02-09 ENCOUNTER — INFUSION (OUTPATIENT)
Dept: INFUSION THERAPY | Facility: HOSPITAL | Age: 81
End: 2021-02-09
Attending: INTERNAL MEDICINE
Payer: COMMERCIAL

## 2021-02-09 VITALS
TEMPERATURE: 98 F | RESPIRATION RATE: 18 BRPM | DIASTOLIC BLOOD PRESSURE: 69 MMHG | WEIGHT: 157.81 LBS | BODY MASS INDEX: 30.82 KG/M2 | SYSTOLIC BLOOD PRESSURE: 126 MMHG | HEART RATE: 93 BPM

## 2021-02-09 DIAGNOSIS — C20 RECTAL CANCER: Primary | ICD-10-CM

## 2021-02-09 PROCEDURE — 96523 IRRIG DRUG DELIVERY DEVICE: CPT

## 2021-02-09 PROCEDURE — A4216 STERILE WATER/SALINE, 10 ML: HCPCS | Performed by: NURSE PRACTITIONER

## 2021-02-09 PROCEDURE — 25000003 PHARM REV CODE 250: Performed by: NURSE PRACTITIONER

## 2021-02-09 PROCEDURE — 63600175 PHARM REV CODE 636 W HCPCS: Performed by: NURSE PRACTITIONER

## 2021-02-09 RX ORDER — SODIUM CHLORIDE 0.9 % (FLUSH) 0.9 %
10 SYRINGE (ML) INJECTION
Status: DISCONTINUED | OUTPATIENT
Start: 2021-02-09 | End: 2021-02-09 | Stop reason: HOSPADM

## 2021-02-09 RX ORDER — SODIUM CHLORIDE 0.9 % (FLUSH) 0.9 %
10 SYRINGE (ML) INJECTION
OUTPATIENT
Start: 2021-02-09

## 2021-02-09 RX ORDER — HEPARIN 100 UNIT/ML
500 SYRINGE INTRAVENOUS
OUTPATIENT
Start: 2021-02-09

## 2021-02-09 RX ORDER — HEPARIN 100 UNIT/ML
500 SYRINGE INTRAVENOUS
Status: DISCONTINUED | OUTPATIENT
Start: 2021-02-09 | End: 2021-02-09 | Stop reason: HOSPADM

## 2021-02-09 RX ADMIN — SODIUM CHLORIDE, PRESERVATIVE FREE 10 ML: 5 INJECTION INTRAVENOUS at 09:02

## 2021-02-09 RX ADMIN — HEPARIN 500 UNITS: 100 SYRINGE at 09:02

## 2021-02-10 ENCOUNTER — IMMUNIZATION (OUTPATIENT)
Dept: PRIMARY CARE CLINIC | Facility: CLINIC | Age: 81
End: 2021-02-10
Payer: COMMERCIAL

## 2021-02-10 DIAGNOSIS — Z23 NEED FOR VACCINATION: Primary | ICD-10-CM

## 2021-02-10 PROCEDURE — 0002A COVID-19, MRNA, LNP-S, PF, 30 MCG/0.3 ML DOSE VACCINE: CPT | Mod: PBBFAC | Performed by: EMERGENCY MEDICINE

## 2021-02-10 PROCEDURE — 91300 COVID-19, MRNA, LNP-S, PF, 30 MCG/0.3 ML DOSE VACCINE: CPT | Mod: PBBFAC | Performed by: EMERGENCY MEDICINE

## 2021-02-19 ENCOUNTER — PATIENT MESSAGE (OUTPATIENT)
Dept: SURGERY | Facility: CLINIC | Age: 81
End: 2021-02-19

## 2021-02-19 DIAGNOSIS — N30.00 ACUTE CYSTITIS WITHOUT HEMATURIA: ICD-10-CM

## 2021-02-19 DIAGNOSIS — R80.9 PROTEINURIA: ICD-10-CM

## 2021-02-19 DIAGNOSIS — N18.32 CHRONIC KIDNEY DISEASE (CKD) STAGE G3B/A1, MODERATELY DECREASED GLOMERULAR FILTRATION RATE (GFR) BETWEEN 30-44 ML/MIN/1.73 SQUARE METER AND ALBUMINURIA CREATININE RATIO LESS THAN 30 MG/G: Primary | ICD-10-CM

## 2021-02-22 ENCOUNTER — TELEPHONE (OUTPATIENT)
Dept: SURGERY | Facility: CLINIC | Age: 81
End: 2021-02-22

## 2021-02-22 ENCOUNTER — HOSPITAL ENCOUNTER (OUTPATIENT)
Dept: RADIOLOGY | Facility: HOSPITAL | Age: 81
Discharge: HOME | End: 2021-02-22
Attending: INTERNAL MEDICINE
Payer: COMMERCIAL

## 2021-02-22 DIAGNOSIS — R80.9 PROTEINURIA: ICD-10-CM

## 2021-02-22 DIAGNOSIS — N30.00 ACUTE CYSTITIS WITHOUT HEMATURIA: ICD-10-CM

## 2021-02-22 DIAGNOSIS — N18.32 CHRONIC KIDNEY DISEASE (CKD) STAGE G3B/A1, MODERATELY DECREASED GLOMERULAR FILTRATION RATE (GFR) BETWEEN 30-44 ML/MIN/1.73 SQUARE METER AND ALBUMINURIA CREATININE RATIO LESS THAN 30 MG/G: ICD-10-CM

## 2021-02-22 PROCEDURE — 76857 US EXAM PELVIC LIMITED: CPT | Mod: TC

## 2021-03-01 ENCOUNTER — PATIENT MESSAGE (OUTPATIENT)
Dept: UROLOGY | Facility: CLINIC | Age: 81
End: 2021-03-01

## 2021-03-01 DIAGNOSIS — N39.498 OTHER URINARY INCONTINENCE: Primary | ICD-10-CM

## 2021-03-08 ENCOUNTER — TELEPHONE (OUTPATIENT)
Dept: HEMATOLOGY/ONCOLOGY | Facility: CLINIC | Age: 81
End: 2021-03-08

## 2021-03-08 DIAGNOSIS — C64.2 RENAL CELL CARCINOMA OF LEFT KIDNEY: ICD-10-CM

## 2021-03-08 DIAGNOSIS — C20 MALIGNANT NEOPLASM OF RECTUM: Primary | ICD-10-CM

## 2021-03-09 ENCOUNTER — TELEPHONE (OUTPATIENT)
Dept: HEMATOLOGY/ONCOLOGY | Facility: CLINIC | Age: 81
End: 2021-03-09

## 2021-03-09 ENCOUNTER — OFFICE VISIT (OUTPATIENT)
Dept: SURGERY | Facility: CLINIC | Age: 81
End: 2021-03-09
Payer: COMMERCIAL

## 2021-03-09 VITALS
DIASTOLIC BLOOD PRESSURE: 73 MMHG | BODY MASS INDEX: 30.86 KG/M2 | TEMPERATURE: 98 F | SYSTOLIC BLOOD PRESSURE: 150 MMHG | HEIGHT: 60 IN | WEIGHT: 157.19 LBS | HEART RATE: 75 BPM

## 2021-03-09 DIAGNOSIS — Z01.818 PREOP EXAMINATION: ICD-10-CM

## 2021-03-09 DIAGNOSIS — Z85.048 HISTORY OF RECTAL CANCER: Primary | ICD-10-CM

## 2021-03-09 DIAGNOSIS — C18.9 MALIGNANT NEOPLASM OF COLON, UNSPECIFIED PART OF COLON: ICD-10-CM

## 2021-03-09 PROCEDURE — 3078F PR MOST RECENT DIASTOLIC BLOOD PRESSURE < 80 MM HG: ICD-10-PCS | Mod: CPTII,S$GLB,, | Performed by: SURGERY

## 2021-03-09 PROCEDURE — 1126F PR PAIN SEVERITY QUANTIFIED, NO PAIN PRESENT: ICD-10-PCS | Mod: S$GLB,,, | Performed by: SURGERY

## 2021-03-09 PROCEDURE — 1159F MED LIST DOCD IN RCRD: CPT | Mod: S$GLB,,, | Performed by: SURGERY

## 2021-03-09 PROCEDURE — 3077F PR MOST RECENT SYSTOLIC BLOOD PRESSURE >= 140 MM HG: ICD-10-PCS | Mod: CPTII,S$GLB,, | Performed by: SURGERY

## 2021-03-09 PROCEDURE — 46600 DIAGNOSTIC ANOSCOPY SPX: CPT | Mod: 51,S$GLB,, | Performed by: SURGERY

## 2021-03-09 PROCEDURE — 99999 PR PBB SHADOW E&M-EST. PATIENT-LVL V: CPT | Mod: PBBFAC,,, | Performed by: SURGERY

## 2021-03-09 PROCEDURE — 99213 OFFICE O/P EST LOW 20 MIN: CPT | Mod: 25,S$GLB,, | Performed by: SURGERY

## 2021-03-09 PROCEDURE — 1101F PR PT FALLS ASSESS DOC 0-1 FALLS W/OUT INJ PAST YR: ICD-10-PCS | Mod: CPTII,S$GLB,, | Performed by: SURGERY

## 2021-03-09 PROCEDURE — 36590 PR REMOVAL TUNNELED CV CATH W SUBQ PORT OR PUMP: ICD-10-PCS | Mod: S$GLB,,, | Performed by: SURGERY

## 2021-03-09 PROCEDURE — 3288F PR FALLS RISK ASSESSMENT DOCUMENTED: ICD-10-PCS | Mod: CPTII,S$GLB,, | Performed by: SURGERY

## 2021-03-09 PROCEDURE — 99213 PR OFFICE/OUTPT VISIT, EST, LEVL III, 20-29 MIN: ICD-10-PCS | Mod: 25,S$GLB,, | Performed by: SURGERY

## 2021-03-09 PROCEDURE — 1159F PR MEDICATION LIST DOCUMENTED IN MEDICAL RECORD: ICD-10-PCS | Mod: S$GLB,,, | Performed by: SURGERY

## 2021-03-09 PROCEDURE — 1126F AMNT PAIN NOTED NONE PRSNT: CPT | Mod: S$GLB,,, | Performed by: SURGERY

## 2021-03-09 PROCEDURE — 99999 PR PBB SHADOW E&M-EST. PATIENT-LVL V: ICD-10-PCS | Mod: PBBFAC,,, | Performed by: SURGERY

## 2021-03-09 PROCEDURE — 46600 PR DIAG2STIC A2SCOPY: ICD-10-PCS | Mod: 51,S$GLB,, | Performed by: SURGERY

## 2021-03-09 PROCEDURE — 36590 REMOVAL TUNNELED CV CATH: CPT | Mod: S$GLB,,, | Performed by: SURGERY

## 2021-03-09 PROCEDURE — 3288F FALL RISK ASSESSMENT DOCD: CPT | Mod: CPTII,S$GLB,, | Performed by: SURGERY

## 2021-03-09 PROCEDURE — 3077F SYST BP >= 140 MM HG: CPT | Mod: CPTII,S$GLB,, | Performed by: SURGERY

## 2021-03-09 PROCEDURE — 1101F PT FALLS ASSESS-DOCD LE1/YR: CPT | Mod: CPTII,S$GLB,, | Performed by: SURGERY

## 2021-03-09 PROCEDURE — 3078F DIAST BP <80 MM HG: CPT | Mod: CPTII,S$GLB,, | Performed by: SURGERY

## 2021-03-09 RX ORDER — B-COMPLEX WITH VITAMIN C
1 TABLET ORAL DAILY
COMMUNITY
End: 2021-04-13 | Stop reason: SDUPTHER

## 2021-03-09 RX ORDER — ASCORBIC ACID 250 MG
1 TABLET ORAL DAILY
COMMUNITY
End: 2021-04-13 | Stop reason: SDUPTHER

## 2021-03-09 RX ORDER — DILTIAZEM HYDROCHLORIDE 120 MG/1
120 CAPSULE, EXTENDED RELEASE ORAL DAILY
Status: ON HOLD | COMMUNITY
Start: 2021-02-18 | End: 2021-04-13

## 2021-03-09 RX ORDER — CALCITRIOL 0.5 UG/1
1 CAPSULE ORAL DAILY
COMMUNITY
Start: 2020-11-30 | End: 2021-04-13 | Stop reason: SDUPTHER

## 2021-03-10 ENCOUNTER — PATIENT MESSAGE (OUTPATIENT)
Dept: SURGERY | Facility: CLINIC | Age: 81
End: 2021-03-10

## 2021-03-10 RX ORDER — SODIUM CHLORIDE 0.9 % (FLUSH) 0.9 %
10 SYRINGE (ML) INJECTION
Status: CANCELLED | OUTPATIENT
Start: 2021-03-10

## 2021-03-18 ENCOUNTER — PATIENT MESSAGE (OUTPATIENT)
Dept: HEMATOLOGY/ONCOLOGY | Facility: CLINIC | Age: 81
End: 2021-03-18

## 2021-03-19 ENCOUNTER — PATIENT MESSAGE (OUTPATIENT)
Dept: HEMATOLOGY/ONCOLOGY | Facility: CLINIC | Age: 81
End: 2021-03-19

## 2021-03-19 DIAGNOSIS — K62.89 RECTAL MASS: ICD-10-CM

## 2021-03-19 DIAGNOSIS — C64.2 RENAL CELL CARCINOMA OF LEFT KIDNEY: ICD-10-CM

## 2021-03-19 DIAGNOSIS — C20 MALIGNANT NEOPLASM OF RECTUM: Primary | ICD-10-CM

## 2021-03-20 LAB
ALBUMIN SERPL-MCNC: 3.7 G/DL (ref 3.6–5.1)
ALBUMIN/GLOB SERPL: 1.6 (CALC) (ref 1–2.5)
ALP SERPL-CCNC: 72 U/L (ref 37–153)
ALT SERPL-CCNC: 13 U/L (ref 6–29)
AST SERPL-CCNC: 17 U/L (ref 10–35)
BASOPHILS # BLD AUTO: 21 CELLS/UL (ref 0–200)
BASOPHILS NFR BLD AUTO: 0.4 %
BILIRUB SERPL-MCNC: 0.4 MG/DL (ref 0.2–1.2)
BUN SERPL-MCNC: 46 MG/DL (ref 7–25)
BUN/CREAT SERPL: 22 (CALC) (ref 6–22)
CALCIUM SERPL-MCNC: 7.8 MG/DL (ref 8.6–10.4)
CHLORIDE SERPL-SCNC: 100 MMOL/L (ref 98–110)
CO2 SERPL-SCNC: 31 MMOL/L (ref 20–32)
CREAT SERPL-MCNC: 2.1 MG/DL (ref 0.6–0.93)
EOSINOPHIL # BLD AUTO: 99 CELLS/UL (ref 15–500)
EOSINOPHIL NFR BLD AUTO: 1.9 %
ERYTHROCYTE [DISTWIDTH] IN BLOOD BY AUTOMATED COUNT: 14 % (ref 11–15)
GFRSERPLBLD MDRD-ARVRAT: 22 ML/MIN/1.73M2
GLOBULIN SER CALC-MCNC: 2.3 G/DL (CALC) (ref 1.9–3.7)
GLUCOSE SERPL-MCNC: 119 MG/DL (ref 65–139)
HCT VFR BLD AUTO: 30.9 % (ref 35–45)
HGB BLD-MCNC: 10.5 G/DL (ref 11.7–15.5)
LYMPHOCYTES # BLD AUTO: 1050 CELLS/UL (ref 850–3900)
LYMPHOCYTES NFR BLD AUTO: 20.2 %
MCH RBC QN AUTO: 30.8 PG (ref 27–33)
MCHC RBC AUTO-ENTMCNC: 34 G/DL (ref 32–36)
MCV RBC AUTO: 90.6 FL (ref 80–100)
MONOCYTES # BLD AUTO: 432 CELLS/UL (ref 200–950)
MONOCYTES NFR BLD AUTO: 8.3 %
NEUTROPHILS # BLD AUTO: 3598 CELLS/UL (ref 1500–7800)
NEUTROPHILS NFR BLD AUTO: 69.2 %
PLATELET # BLD AUTO: 220 THOUSAND/UL (ref 140–400)
PMV BLD REES-ECKER: 10.1 FL (ref 7.5–12.5)
POTASSIUM SERPL-SCNC: 4.2 MMOL/L (ref 3.5–5.3)
PROT SERPL-MCNC: 6 G/DL (ref 6.1–8.1)
RBC # BLD AUTO: 3.41 MILLION/UL (ref 3.8–5.1)
SODIUM SERPL-SCNC: 138 MMOL/L (ref 135–146)
WBC # BLD AUTO: 5.2 THOUSAND/UL (ref 3.8–10.8)

## 2021-03-22 ENCOUNTER — TELEPHONE (OUTPATIENT)
Dept: HEMATOLOGY/ONCOLOGY | Facility: CLINIC | Age: 81
End: 2021-03-22

## 2021-04-07 ENCOUNTER — OFFICE VISIT (OUTPATIENT)
Dept: HEMATOLOGY/ONCOLOGY | Facility: CLINIC | Age: 81
End: 2021-04-07
Payer: COMMERCIAL

## 2021-04-07 ENCOUNTER — PATIENT MESSAGE (OUTPATIENT)
Dept: SURGERY | Facility: CLINIC | Age: 81
End: 2021-04-07

## 2021-04-07 VITALS
RESPIRATION RATE: 18 BRPM | HEIGHT: 60 IN | DIASTOLIC BLOOD PRESSURE: 87 MMHG | BODY MASS INDEX: 30.82 KG/M2 | SYSTOLIC BLOOD PRESSURE: 162 MMHG | WEIGHT: 157 LBS | HEART RATE: 87 BPM

## 2021-04-07 DIAGNOSIS — C20 MALIGNANT NEOPLASM OF RECTUM: ICD-10-CM

## 2021-04-07 DIAGNOSIS — D70.1 CHEMOTHERAPY INDUCED NEUTROPENIA: ICD-10-CM

## 2021-04-07 DIAGNOSIS — C20 RECTAL CANCER: ICD-10-CM

## 2021-04-07 DIAGNOSIS — D64.9 NORMOCYTIC ANEMIA: ICD-10-CM

## 2021-04-07 DIAGNOSIS — T45.1X5A CHEMOTHERAPY INDUCED NEUTROPENIA: ICD-10-CM

## 2021-04-07 DIAGNOSIS — C64.2 RENAL CELL CARCINOMA OF LEFT KIDNEY: Primary | ICD-10-CM

## 2021-04-07 PROCEDURE — 1126F PR PAIN SEVERITY QUANTIFIED, NO PAIN PRESENT: ICD-10-PCS | Mod: S$GLB,,, | Performed by: INTERNAL MEDICINE

## 2021-04-07 PROCEDURE — 1101F PT FALLS ASSESS-DOCD LE1/YR: CPT | Mod: S$GLB,,, | Performed by: INTERNAL MEDICINE

## 2021-04-07 PROCEDURE — 3288F FALL RISK ASSESSMENT DOCD: CPT | Mod: S$GLB,,, | Performed by: INTERNAL MEDICINE

## 2021-04-07 PROCEDURE — 3288F PR FALLS RISK ASSESSMENT DOCUMENTED: ICD-10-PCS | Mod: S$GLB,,, | Performed by: INTERNAL MEDICINE

## 2021-04-07 PROCEDURE — 3079F DIAST BP 80-89 MM HG: CPT | Mod: S$GLB,,, | Performed by: INTERNAL MEDICINE

## 2021-04-07 PROCEDURE — 3077F PR MOST RECENT SYSTOLIC BLOOD PRESSURE >= 140 MM HG: ICD-10-PCS | Mod: S$GLB,,, | Performed by: INTERNAL MEDICINE

## 2021-04-07 PROCEDURE — 3077F SYST BP >= 140 MM HG: CPT | Mod: S$GLB,,, | Performed by: INTERNAL MEDICINE

## 2021-04-07 PROCEDURE — 1126F AMNT PAIN NOTED NONE PRSNT: CPT | Mod: S$GLB,,, | Performed by: INTERNAL MEDICINE

## 2021-04-07 PROCEDURE — 99214 PR OFFICE/OUTPT VISIT, EST, LEVL IV, 30-39 MIN: ICD-10-PCS | Mod: S$GLB,,, | Performed by: INTERNAL MEDICINE

## 2021-04-07 PROCEDURE — 1101F PR PT FALLS ASSESS DOC 0-1 FALLS W/OUT INJ PAST YR: ICD-10-PCS | Mod: S$GLB,,, | Performed by: INTERNAL MEDICINE

## 2021-04-07 PROCEDURE — 3079F PR MOST RECENT DIASTOLIC BLOOD PRESSURE 80-89 MM HG: ICD-10-PCS | Mod: S$GLB,,, | Performed by: INTERNAL MEDICINE

## 2021-04-07 PROCEDURE — 1159F PR MEDICATION LIST DOCUMENTED IN MEDICAL RECORD: ICD-10-PCS | Mod: S$GLB,,, | Performed by: INTERNAL MEDICINE

## 2021-04-07 PROCEDURE — 1159F MED LIST DOCD IN RCRD: CPT | Mod: S$GLB,,, | Performed by: INTERNAL MEDICINE

## 2021-04-07 PROCEDURE — 99214 OFFICE O/P EST MOD 30 MIN: CPT | Mod: S$GLB,,, | Performed by: INTERNAL MEDICINE

## 2021-04-13 ENCOUNTER — HOSPITAL ENCOUNTER (OUTPATIENT)
Facility: HOSPITAL | Age: 81
Discharge: HOME | End: 2021-04-13
Attending: SURGERY | Admitting: SURGERY
Payer: COMMERCIAL

## 2021-04-13 ENCOUNTER — ANESTHESIA EVENT (OUTPATIENT)
Dept: ENDOSCOPY | Facility: HOSPITAL | Age: 81
End: 2021-04-13
Payer: COMMERCIAL

## 2021-04-13 ENCOUNTER — ANESTHESIA (OUTPATIENT)
Dept: ENDOSCOPY | Facility: HOSPITAL | Age: 81
End: 2021-04-13
Payer: COMMERCIAL

## 2021-04-13 VITALS
TEMPERATURE: 98 F | BODY MASS INDEX: 29.07 KG/M2 | OXYGEN SATURATION: 97 % | SYSTOLIC BLOOD PRESSURE: 143 MMHG | RESPIRATION RATE: 16 BRPM | WEIGHT: 154 LBS | HEART RATE: 85 BPM | HEIGHT: 61 IN | DIASTOLIC BLOOD PRESSURE: 75 MMHG

## 2021-04-13 DIAGNOSIS — Z85.048 HISTORY OF RECTAL CANCER: ICD-10-CM

## 2021-04-13 PROCEDURE — 88305 TISSUE EXAM BY PATHOLOGIST: CPT | Performed by: PATHOLOGY

## 2021-04-13 PROCEDURE — D9220A PRA ANESTHESIA: ICD-10-PCS | Mod: ,,, | Performed by: NURSE ANESTHETIST, CERTIFIED REGISTERED

## 2021-04-13 PROCEDURE — 88342 CHG IMMUNOCYTOCHEMISTRY: ICD-10-PCS | Mod: 26,,, | Performed by: PATHOLOGY

## 2021-04-13 PROCEDURE — D9220A PRA ANESTHESIA: ICD-10-PCS | Mod: ,,, | Performed by: ANESTHESIOLOGY

## 2021-04-13 PROCEDURE — 88342 IMHCHEM/IMCYTCHM 1ST ANTB: CPT | Performed by: PATHOLOGY

## 2021-04-13 PROCEDURE — 37000008 HC ANESTHESIA 1ST 15 MINUTES: Performed by: SURGERY

## 2021-04-13 PROCEDURE — 88342 IMHCHEM/IMCYTCHM 1ST ANTB: CPT | Mod: 26,,, | Performed by: PATHOLOGY

## 2021-04-13 PROCEDURE — 25000003 PHARM REV CODE 250: Performed by: SURGERY

## 2021-04-13 PROCEDURE — 45331 SIGMOIDOSCOPY AND BIOPSY: CPT | Mod: PT,,, | Performed by: SURGERY

## 2021-04-13 PROCEDURE — D9220A PRA ANESTHESIA: Mod: ,,, | Performed by: NURSE ANESTHETIST, CERTIFIED REGISTERED

## 2021-04-13 PROCEDURE — 88305 TISSUE EXAM BY PATHOLOGIST: ICD-10-PCS | Mod: 26,,, | Performed by: PATHOLOGY

## 2021-04-13 PROCEDURE — 27201012 HC FORCEPS, HOT/COLD, DISP: Performed by: SURGERY

## 2021-04-13 PROCEDURE — 88305 TISSUE EXAM BY PATHOLOGIST: CPT | Mod: 26,,, | Performed by: PATHOLOGY

## 2021-04-13 PROCEDURE — D9220A PRA ANESTHESIA: Mod: ,,, | Performed by: ANESTHESIOLOGY

## 2021-04-13 PROCEDURE — 25000003 PHARM REV CODE 250: Performed by: NURSE ANESTHETIST, CERTIFIED REGISTERED

## 2021-04-13 PROCEDURE — 37000009 HC ANESTHESIA EA ADD 15 MINS: Performed by: SURGERY

## 2021-04-13 PROCEDURE — 45331 SIGMOIDOSCOPY AND BIOPSY: CPT | Performed by: SURGERY

## 2021-04-13 PROCEDURE — 63600175 PHARM REV CODE 636 W HCPCS: Performed by: NURSE ANESTHETIST, CERTIFIED REGISTERED

## 2021-04-13 PROCEDURE — 45331 PR SIGMOIDOSCOPY,BIOPSY: ICD-10-PCS | Mod: PT,,, | Performed by: SURGERY

## 2021-04-13 RX ORDER — PROPOFOL 10 MG/ML
VIAL (ML) INTRAVENOUS
Status: DISCONTINUED | OUTPATIENT
Start: 2021-04-13 | End: 2021-04-13

## 2021-04-13 RX ORDER — SODIUM CHLORIDE 9 MG/ML
INJECTION, SOLUTION INTRAVENOUS CONTINUOUS
Status: DISCONTINUED | OUTPATIENT
Start: 2021-04-13 | End: 2021-04-13 | Stop reason: HOSPADM

## 2021-04-13 RX ORDER — VALSARTAN 40 MG/1
40 TABLET ORAL DAILY
COMMUNITY
End: 2021-04-19

## 2021-04-13 RX ORDER — ONDANSETRON 2 MG/ML
INJECTION INTRAMUSCULAR; INTRAVENOUS
Status: DISCONTINUED | OUTPATIENT
Start: 2021-04-13 | End: 2021-04-13

## 2021-04-13 RX ORDER — LIDOCAINE HCL/PF 100 MG/5ML
SYRINGE (ML) INTRAVENOUS
Status: DISCONTINUED | OUTPATIENT
Start: 2021-04-13 | End: 2021-04-13

## 2021-04-13 RX ADMIN — LIDOCAINE HYDROCHLORIDE 50 MG: 20 INJECTION INTRAVENOUS at 08:04

## 2021-04-13 RX ADMIN — ONDANSETRON 4 MG: 2 INJECTION, SOLUTION INTRAMUSCULAR; INTRAVENOUS at 08:04

## 2021-04-13 RX ADMIN — PROPOFOL 30 MG: 10 INJECTION, EMULSION INTRAVENOUS at 08:04

## 2021-04-13 RX ADMIN — SODIUM CHLORIDE: 0.9 INJECTION, SOLUTION INTRAVENOUS at 07:04

## 2021-04-13 RX ADMIN — GLYCOPYRROLATE 0.1 MG: 0.2 INJECTION, SOLUTION INTRAMUSCULAR; INTRAVITREAL at 08:04

## 2021-04-13 RX ADMIN — PROPOFOL 100 MG: 10 INJECTION, EMULSION INTRAVENOUS at 08:04

## 2021-04-14 PROBLEM — I10 HTN (HYPERTENSION): Status: ACTIVE | Noted: 2021-04-14

## 2021-04-14 PROBLEM — I16.0 HYPERTENSIVE URGENCY: Status: ACTIVE | Noted: 2021-04-14

## 2021-04-14 PROBLEM — R07.9 CHEST PAIN: Status: ACTIVE | Noted: 2021-04-14

## 2021-04-14 PROBLEM — R51.9 HEADACHE: Status: ACTIVE | Noted: 2021-04-14

## 2021-04-15 PROBLEM — R07.9 CHEST PAIN: Status: RESOLVED | Noted: 2021-04-14 | Resolved: 2021-04-15

## 2021-04-16 PROBLEM — I16.0 HYPERTENSIVE URGENCY: Status: RESOLVED | Noted: 2021-04-14 | Resolved: 2021-04-16

## 2021-04-16 PROBLEM — R51.9 HEADACHE: Status: RESOLVED | Noted: 2021-04-14 | Resolved: 2021-04-16

## 2021-04-19 ENCOUNTER — PATIENT MESSAGE (OUTPATIENT)
Dept: SURGERY | Facility: CLINIC | Age: 81
End: 2021-04-19

## 2021-04-19 DIAGNOSIS — R07.9 CHEST PAIN, UNSPECIFIED TYPE: Primary | ICD-10-CM

## 2021-04-19 LAB
FINAL PATHOLOGIC DIAGNOSIS: NORMAL
GROSS: NORMAL
Lab: NORMAL

## 2021-04-21 ENCOUNTER — TELEPHONE (OUTPATIENT)
Dept: SURGERY | Facility: HOSPITAL | Age: 81
End: 2021-04-21

## 2021-04-28 PROBLEM — R06.09 DOE (DYSPNEA ON EXERTION): Status: ACTIVE | Noted: 2021-04-28

## 2021-04-30 ENCOUNTER — HOSPITAL ENCOUNTER (OUTPATIENT)
Dept: RADIOLOGY | Facility: HOSPITAL | Age: 81
Discharge: HOME | End: 2021-04-30
Attending: SURGERY
Payer: COMMERCIAL

## 2021-04-30 DIAGNOSIS — Z85.048 HISTORY OF RECTAL CANCER: ICD-10-CM

## 2021-04-30 DIAGNOSIS — C18.9 MALIGNANT NEOPLASM OF COLON, UNSPECIFIED PART OF COLON: ICD-10-CM

## 2021-04-30 PROCEDURE — 72195 MRI RECTAL CANCER WITHOUT CONTRAST: ICD-10-PCS | Mod: 26,,, | Performed by: RADIOLOGY

## 2021-04-30 PROCEDURE — 72195 MRI PELVIS W/O DYE: CPT | Mod: 26,,, | Performed by: RADIOLOGY

## 2021-04-30 PROCEDURE — 72195 MRI PELVIS W/O DYE: CPT | Mod: TC

## 2021-05-04 ENCOUNTER — PATIENT MESSAGE (OUTPATIENT)
Dept: SURGERY | Facility: CLINIC | Age: 81
End: 2021-05-04

## 2021-05-05 ENCOUNTER — TELEPHONE (OUTPATIENT)
Dept: SURGERY | Facility: CLINIC | Age: 81
End: 2021-05-05

## 2021-05-06 ENCOUNTER — PATIENT MESSAGE (OUTPATIENT)
Dept: SURGERY | Facility: CLINIC | Age: 81
End: 2021-05-06

## 2021-05-11 ENCOUNTER — PATIENT MESSAGE (OUTPATIENT)
Dept: HEMATOLOGY/ONCOLOGY | Facility: CLINIC | Age: 81
End: 2021-05-11

## 2021-06-01 ENCOUNTER — TELEPHONE (OUTPATIENT)
Dept: HEMATOLOGY/ONCOLOGY | Facility: CLINIC | Age: 81
End: 2021-06-01

## 2021-06-01 ENCOUNTER — PATIENT MESSAGE (OUTPATIENT)
Dept: HEMATOLOGY/ONCOLOGY | Facility: CLINIC | Age: 81
End: 2021-06-01

## 2021-06-01 DIAGNOSIS — C20 MALIGNANT NEOPLASM OF RECTUM: ICD-10-CM

## 2021-06-01 DIAGNOSIS — C64.2 RENAL CELL CARCINOMA OF LEFT KIDNEY: Primary | ICD-10-CM

## 2021-06-01 DIAGNOSIS — D64.9 NORMOCYTIC ANEMIA: ICD-10-CM

## 2021-06-07 ENCOUNTER — PATIENT MESSAGE (OUTPATIENT)
Dept: SURGERY | Facility: CLINIC | Age: 81
End: 2021-06-07

## 2021-06-09 ENCOUNTER — PATIENT MESSAGE (OUTPATIENT)
Dept: SURGERY | Facility: CLINIC | Age: 81
End: 2021-06-09

## 2021-06-09 ENCOUNTER — PATIENT MESSAGE (OUTPATIENT)
Dept: HEMATOLOGY/ONCOLOGY | Facility: CLINIC | Age: 81
End: 2021-06-09

## 2021-06-23 ENCOUNTER — PATIENT MESSAGE (OUTPATIENT)
Dept: SURGERY | Facility: CLINIC | Age: 81
End: 2021-06-23

## 2021-06-29 ENCOUNTER — PATIENT MESSAGE (OUTPATIENT)
Dept: SURGERY | Facility: CLINIC | Age: 81
End: 2021-06-29

## 2021-06-30 ENCOUNTER — PATIENT MESSAGE (OUTPATIENT)
Dept: SURGERY | Facility: CLINIC | Age: 81
End: 2021-06-30

## 2021-07-13 ENCOUNTER — OFFICE VISIT (OUTPATIENT)
Dept: SURGERY | Facility: CLINIC | Age: 81
End: 2021-07-13
Payer: COMMERCIAL

## 2021-07-13 VITALS
BODY MASS INDEX: 28.91 KG/M2 | SYSTOLIC BLOOD PRESSURE: 175 MMHG | HEART RATE: 77 BPM | DIASTOLIC BLOOD PRESSURE: 75 MMHG | WEIGHT: 153 LBS

## 2021-07-13 DIAGNOSIS — Z85.048 HISTORY OF RECTAL CANCER: Primary | ICD-10-CM

## 2021-07-13 PROCEDURE — 1126F PR PAIN SEVERITY QUANTIFIED, NO PAIN PRESENT: ICD-10-PCS | Mod: S$GLB,,, | Performed by: SURGERY

## 2021-07-13 PROCEDURE — 1126F AMNT PAIN NOTED NONE PRSNT: CPT | Mod: S$GLB,,, | Performed by: SURGERY

## 2021-07-13 PROCEDURE — 99999 PR PBB SHADOW E&M-EST. PATIENT-LVL III: CPT | Mod: PBBFAC,,, | Performed by: SURGERY

## 2021-07-13 PROCEDURE — 46600 PR DIAG2STIC A2SCOPY: ICD-10-PCS | Mod: S$GLB,,, | Performed by: SURGERY

## 2021-07-13 PROCEDURE — 46600 DIAGNOSTIC ANOSCOPY SPX: CPT | Mod: S$GLB,,, | Performed by: SURGERY

## 2021-07-13 PROCEDURE — 99213 PR OFFICE/OUTPT VISIT, EST, LEVL III, 20-29 MIN: ICD-10-PCS | Mod: 25,S$GLB,, | Performed by: SURGERY

## 2021-07-13 PROCEDURE — 99213 OFFICE O/P EST LOW 20 MIN: CPT | Mod: 25,S$GLB,, | Performed by: SURGERY

## 2021-07-13 PROCEDURE — 3288F FALL RISK ASSESSMENT DOCD: CPT | Mod: CPTII,S$GLB,, | Performed by: SURGERY

## 2021-07-13 PROCEDURE — 1159F MED LIST DOCD IN RCRD: CPT | Mod: S$GLB,,, | Performed by: SURGERY

## 2021-07-13 PROCEDURE — 1101F PT FALLS ASSESS-DOCD LE1/YR: CPT | Mod: CPTII,S$GLB,, | Performed by: SURGERY

## 2021-07-13 PROCEDURE — 3288F PR FALLS RISK ASSESSMENT DOCUMENTED: ICD-10-PCS | Mod: CPTII,S$GLB,, | Performed by: SURGERY

## 2021-07-13 PROCEDURE — 99999 PR PBB SHADOW E&M-EST. PATIENT-LVL III: ICD-10-PCS | Mod: PBBFAC,,, | Performed by: SURGERY

## 2021-07-13 PROCEDURE — 1101F PR PT FALLS ASSESS DOC 0-1 FALLS W/OUT INJ PAST YR: ICD-10-PCS | Mod: CPTII,S$GLB,, | Performed by: SURGERY

## 2021-07-13 PROCEDURE — 1159F PR MEDICATION LIST DOCUMENTED IN MEDICAL RECORD: ICD-10-PCS | Mod: S$GLB,,, | Performed by: SURGERY

## 2021-07-20 ENCOUNTER — PATIENT MESSAGE (OUTPATIENT)
Dept: SURGERY | Facility: CLINIC | Age: 81
End: 2021-07-20

## 2021-07-20 ENCOUNTER — OFFICE VISIT (OUTPATIENT)
Dept: HEMATOLOGY/ONCOLOGY | Facility: CLINIC | Age: 81
End: 2021-07-20
Payer: COMMERCIAL

## 2021-07-20 VITALS
RESPIRATION RATE: 17 BRPM | HEART RATE: 92 BPM | DIASTOLIC BLOOD PRESSURE: 66 MMHG | BODY MASS INDEX: 29.34 KG/M2 | SYSTOLIC BLOOD PRESSURE: 120 MMHG | TEMPERATURE: 98 F | WEIGHT: 155.31 LBS

## 2021-07-20 DIAGNOSIS — C20 MALIGNANT NEOPLASM OF RECTUM: ICD-10-CM

## 2021-07-20 DIAGNOSIS — D64.9 NORMOCYTIC ANEMIA: ICD-10-CM

## 2021-07-20 DIAGNOSIS — C64.2 RENAL CELL CARCINOMA OF LEFT KIDNEY: Primary | ICD-10-CM

## 2021-07-20 DIAGNOSIS — Z90.5 STATUS POST NEPHRECTOMY: ICD-10-CM

## 2021-07-20 DIAGNOSIS — C20 RECTAL CANCER: ICD-10-CM

## 2021-07-20 PROCEDURE — 1101F PR PT FALLS ASSESS DOC 0-1 FALLS W/OUT INJ PAST YR: ICD-10-PCS | Mod: S$GLB,,, | Performed by: INTERNAL MEDICINE

## 2021-07-20 PROCEDURE — 1126F AMNT PAIN NOTED NONE PRSNT: CPT | Mod: S$GLB,,, | Performed by: INTERNAL MEDICINE

## 2021-07-20 PROCEDURE — 99214 PR OFFICE/OUTPT VISIT, EST, LEVL IV, 30-39 MIN: ICD-10-PCS | Mod: S$GLB,,, | Performed by: INTERNAL MEDICINE

## 2021-07-20 PROCEDURE — 1159F PR MEDICATION LIST DOCUMENTED IN MEDICAL RECORD: ICD-10-PCS | Mod: S$GLB,,, | Performed by: INTERNAL MEDICINE

## 2021-07-20 PROCEDURE — 3074F SYST BP LT 130 MM HG: CPT | Mod: S$GLB,,, | Performed by: INTERNAL MEDICINE

## 2021-07-20 PROCEDURE — 99214 OFFICE O/P EST MOD 30 MIN: CPT | Mod: S$GLB,,, | Performed by: INTERNAL MEDICINE

## 2021-07-20 PROCEDURE — 3288F PR FALLS RISK ASSESSMENT DOCUMENTED: ICD-10-PCS | Mod: S$GLB,,, | Performed by: INTERNAL MEDICINE

## 2021-07-20 PROCEDURE — 1101F PT FALLS ASSESS-DOCD LE1/YR: CPT | Mod: S$GLB,,, | Performed by: INTERNAL MEDICINE

## 2021-07-20 PROCEDURE — 3078F DIAST BP <80 MM HG: CPT | Mod: S$GLB,,, | Performed by: INTERNAL MEDICINE

## 2021-07-20 PROCEDURE — 3288F FALL RISK ASSESSMENT DOCD: CPT | Mod: S$GLB,,, | Performed by: INTERNAL MEDICINE

## 2021-07-20 PROCEDURE — 3078F PR MOST RECENT DIASTOLIC BLOOD PRESSURE < 80 MM HG: ICD-10-PCS | Mod: S$GLB,,, | Performed by: INTERNAL MEDICINE

## 2021-07-20 PROCEDURE — 1159F MED LIST DOCD IN RCRD: CPT | Mod: S$GLB,,, | Performed by: INTERNAL MEDICINE

## 2021-07-20 PROCEDURE — 3074F PR MOST RECENT SYSTOLIC BLOOD PRESSURE < 130 MM HG: ICD-10-PCS | Mod: S$GLB,,, | Performed by: INTERNAL MEDICINE

## 2021-07-20 PROCEDURE — 1126F PR PAIN SEVERITY QUANTIFIED, NO PAIN PRESENT: ICD-10-PCS | Mod: S$GLB,,, | Performed by: INTERNAL MEDICINE

## 2021-07-21 ENCOUNTER — PATIENT MESSAGE (OUTPATIENT)
Dept: SURGERY | Facility: CLINIC | Age: 81
End: 2021-07-21

## 2021-07-21 DIAGNOSIS — Z85.048 HISTORY OF RECTAL CANCER: Primary | ICD-10-CM

## 2021-09-16 ENCOUNTER — TELEPHONE (OUTPATIENT)
Dept: HEMATOLOGY/ONCOLOGY | Facility: CLINIC | Age: 81
End: 2021-09-16

## 2021-09-16 DIAGNOSIS — C20 RECTAL CANCER: ICD-10-CM

## 2021-09-16 DIAGNOSIS — C64.2 RENAL CELL CARCINOMA OF LEFT KIDNEY: Primary | ICD-10-CM

## 2021-09-23 ENCOUNTER — HOSPITAL ENCOUNTER (OUTPATIENT)
Dept: RADIOLOGY | Facility: HOSPITAL | Age: 81
Discharge: HOME | End: 2021-09-23
Attending: PHYSICAL MEDICINE & REHABILITATION
Payer: COMMERCIAL

## 2021-09-23 DIAGNOSIS — Z12.31 ENCOUNTER FOR SCREENING MAMMOGRAM FOR MALIGNANT NEOPLASM OF BREAST: ICD-10-CM

## 2021-09-23 PROCEDURE — 77067 SCR MAMMO BI INCL CAD: CPT | Mod: TC,PO

## 2021-10-07 ENCOUNTER — TELEPHONE (OUTPATIENT)
Dept: HEMATOLOGY/ONCOLOGY | Facility: CLINIC | Age: 81
End: 2021-10-07

## 2021-10-29 ENCOUNTER — PATIENT MESSAGE (OUTPATIENT)
Dept: HEMATOLOGY/ONCOLOGY | Facility: CLINIC | Age: 81
End: 2021-10-29
Payer: MEDICARE

## 2021-11-03 ENCOUNTER — TELEPHONE (OUTPATIENT)
Dept: SURGERY | Facility: CLINIC | Age: 81
End: 2021-11-03
Payer: MEDICARE

## 2021-11-05 ENCOUNTER — IMMUNIZATION (OUTPATIENT)
Dept: PRIMARY CARE CLINIC | Facility: CLINIC | Age: 81
End: 2021-11-05
Payer: COMMERCIAL

## 2021-11-05 DIAGNOSIS — Z23 NEED FOR VACCINATION: Primary | ICD-10-CM

## 2021-11-05 PROCEDURE — 0004A COVID-19, MRNA, LNP-S, PF, 30 MCG/0.3 ML DOSE VACCINE: CPT | Mod: PBBFAC | Performed by: FAMILY MEDICINE

## 2021-11-08 ENCOUNTER — PATIENT MESSAGE (OUTPATIENT)
Dept: HEMATOLOGY/ONCOLOGY | Facility: CLINIC | Age: 81
End: 2021-11-08
Payer: MEDICARE

## 2022-01-17 NOTE — PROGRESS NOTES
[facility] Hematology/Oncology  PROGRESS NOTE -   Follow-up Visit      Subjective:       Patient ID:   NAME: aVlery Huggins : 1940     81 y.o. female    Referring Doc: David Mendieta MD  Other Physicians: Armando Nath; Stanislav Epstein; Azar Donnelly; Mo    Chief Complaint:  Left RCC and rectal CA f/u    History of Present Illness:     Patient is being seen today in person in clinic . She is here with her  today..  She reports that she is feeling fine.  The patient is on today to go over the results of the recently ordered labs, tests and studies.     No CP, SOB, HA's or N/V.       She sees Dr Mendieta again in 2022 and is expected to have another scope. She last had a scope in Oct 2021.      She saw Dr Allen with cardiology in Dec 2021 and plans to see him again in Dec 2022    She had CT scans in Oct 2021     She has been seeing Dr Malik with nephrology and last saw her in Dec 2021 and sees her again in 4 months    Discussed Covid19 precautions - she had her vaccinations                ROS:   GEN: normal without any fever, night sweats or weight loss  HEENT: normal with no HA's, sore throat, stiff neck, changes in vision;    CV: normal with no CP, SOB, PND, RAYA or orthopnea  PULM: normal with no SOB, cough, hemoptysis, sputum or pleuritic pain  GI: no nausea or constipation  : normal with no hematuria, dysuria  BREAST: normal with no mass, discharge, pain  SKIN: normal with no rash, erythema, bruising, or swelling    Allergies:  Review of patient's allergies indicates:   Allergen Reactions    Sunitinib Diarrhea, Nausea And Vomiting and Other (See Comments)       Medications:    Current Outpatient Medications:     ascorbic acid, vitamin C, (VITAMIN C) 100 MG tablet, Take 100 mg by mouth once daily., Disp: , Rfl:     atorvastatin (LIPITOR) 20 MG tablet, TAKE 1 TABLET (20 MG TOTAL) BY MOUTH ONCE DAILY., Disp: 90 tablet, Rfl: 0    b complex vitamins capsule, Take 1 capsule by mouth once  "daily., Disp: , Rfl:     BD ULTRA-FINE CONNIE PEN NEEDLE 32 gauge x 5/32" Ndle, INJECT TWICE DAILY AS DIRECTED, Disp: 200 each, Rfl: 0    calcitRIOL (ROCALTROL) 0.5 MCG Cap, TAKE 1 CAPSULE (0.5 MCG TOTAL) BY MOUTH ONCE DAILY., Disp: 90 capsule, Rfl: 0    cholecalciferol, vitamin D3, 100 mcg (4,000 unit) Cap, Take 1 capsule by mouth once daily., Disp: , Rfl:     EScitalopram oxalate (LEXAPRO) 20 MG tablet, TAKE ONE TABLET BY MOUTH DAILY, Disp: 90 tablet, Rfl: 0    FREESTYLE LITE METER kit, , Disp: , Rfl: 0    FREESTYLE LITE STRIPS Strp, , Disp: , Rfl: 3    gabapentin (NEURONTIN) 100 MG capsule, TAKE 2 (TWO) CAPSULES BY MOUTH EACH MORNING, 1 CAPSULE BY MOUTH MIDDAY, AND 2 CAPSULE(S) BY MOUTH EACH NIGHT AT BEDTIME, Disp: 150 capsule, Rfl: 2    hydrALAZINE (APRESOLINE) 50 MG tablet, Take 1 tablet (50 mg total) by mouth every 8 (eight) hours. (Patient taking differently: Take 50 mg by mouth 2 (two) times a day.), Disp: 90 tablet, Rfl: 11    magnesium oxide (MAG-OX) 400 mg (241.3 mg magnesium) tablet, Take 1 tablet (400 mg total) by mouth 2 (two) times daily., Disp: 180 tablet, Rfl: 0    metoprolol tartrate (LOPRESSOR) 25 MG tablet, Take 1 tablet (25 mg total) by mouth 2 (two) times daily., Disp: 60 tablet, Rfl: 11    pantoprazole (PROTONIX) 40 MG tablet, TAKE 1 TABLET (40 MG TOTAL) BY MOUTH ONCE DAILY., Disp: 90 tablet, Rfl: 0    SYNTHROID 88 mcg tablet, TAKE 1 TABLET (88 MCG TOTAL) BY MOUTH BEFORE BREAKFAST., Disp: 90 tablet, Rfl: 0    TOUJEO SOLOSTAR U-300 INSULIN 300 unit/mL (1.5 mL) InPn pen, INJECT 20 UNITS SUBCUTANEAOUSLY DAILY, Disp: 3 pen, Rfl: 1    VICTOZA 3-XAVIER 0.6 mg/0.1 mL (18 mg/3 mL) PnIj pen, INJECT 1.8 MILLIGRAM SUBCUTANEAOUSLY DAILY, Disp: 9 mL, Rfl: 0    Current Facility-Administered Medications:     0.9%  NaCl infusion (for blood administration), , Intravenous, Once, Carrillo Goode MD    PMHx/PSHx Updates:  See patient's last visit with me on 7/20/2021  See H&P on " "10/8/2019        Pathology:  Cancer Staging  No matching staging information was found for the patient.          Objective:     Vitals:  Blood pressure 126/84, pulse 76, temperature 97.9 °F (36.6 °C), resp. rate 18, height 5' 1" (1.549 m), weight 69.9 kg (154 lb), last menstrual period 06/01/2012.        Physical Examination:   GEN: no apparent distress, comfortable; AAOx3  HEAD: atraumatic and normocephalic  EYES: no conjunctival pallor or muddiness, no icterus; normal pupil reaction to ambient light  ENT: OMM, no pharyngeal erythema, external bilateral ears WNL; no visible thrush or ulcers  NECK: no masses or swelling, trachea midline, no visible LAD/LN's   CV: no palpitations; no pedal edema; no noticeable JVD or neck vein distension;  portacath on right CW since removed  CHEST: Normal respiratory effort; chest wall breath movements symmetrical; no audible wheezing  ABDOM: non-distended; no bloating  MUSC/Skeletal: ROM normal; joints visibly normal; no deformities or arthropathy  EXTREM: no clubbing, cyanosis, inflammation or swelling  SKIN: no rashes, lesions, ulcers, petechiae or subcutaneous nodules  : no hendrickson  NEURO: moving all 4 extremities; AAOx3; no tremors  PSYCH: normal mood, affect and behavior  LYMPH: no visible LN's or LAD              Labs:          Lab Results   Component Value Date    WBC 5.8 01/12/2022    HGB 10.5 (L) 01/12/2022    HCT 32.4 (L) 01/12/2022    MCV 91.8 01/12/2022     01/12/2022     CMP  Sodium   Date Value Ref Range Status   01/12/2022 138 135 - 146 mmol/L Final     Potassium   Date Value Ref Range Status   01/12/2022 3.8 3.5 - 5.3 mmol/L Final     Chloride   Date Value Ref Range Status   01/12/2022 100 98 - 110 mmol/L Final     CO2   Date Value Ref Range Status   01/12/2022 34 (H) 20 - 32 mmol/L Final     Glucose   Date Value Ref Range Status   01/12/2022 254 (H) 65 - 139 mg/dL Final     Comment:               Non-fasting reference interval          BUN   Date Value Ref " Range Status   01/12/2022 39 (H) 7 - 25 mg/dL Final     Creatinine   Date Value Ref Range Status   01/12/2022 1.98 (H) 0.60 - 0.93 mg/dL Final     Comment:     For patients >49 years of age, the reference limit  for Creatinine is approximately 13% higher for people  identified as -American.          Calcium   Date Value Ref Range Status   01/12/2022 10.1 8.6 - 10.4 mg/dL Final     Total Protein   Date Value Ref Range Status   01/12/2022 6.1 6.1 - 8.1 g/dL Final     Albumin   Date Value Ref Range Status   01/12/2022 3.8 3.6 - 5.1 g/dL Final     Total Bilirubin   Date Value Ref Range Status   01/12/2022 0.6 0.2 - 1.2 mg/dL Final     Alkaline Phosphatase   Date Value Ref Range Status   11/12/2021 63 55 - 135 U/L Final     AST   Date Value Ref Range Status   01/12/2022 13 10 - 35 U/L Final     ALT   Date Value Ref Range Status   01/12/2022 13 6 - 29 U/L Final     Anion Gap   Date Value Ref Range Status   11/12/2021 10 8 - 16 mmol/L Final     eGFR if    Date Value Ref Range Status   01/12/2022 27 (L) > OR = 60 mL/min/1.73m2 Final     eGFR if non    Date Value Ref Range Status   01/12/2022 23 (L) > OR = 60 mL/min/1.73m2 Final     Lab Results   Component Value Date    CEA 2.7 07/15/2021     Lab Results   Component Value Date    IRON 58 01/12/2022    TIBC 283 01/12/2022    FERRITIN 115 01/12/2022           Radiology/Diagnostic Studies:    CT scans  10/7/2021:  Impression:     Essentially stable exam without evidence for recurrent or metastatic disease.  There are no measurable lesions per RECIST criteria.     Stable right upper lobe pulmonary nodule, prior cholecystectomy and left nephrectomy, and additional findings as above.          CT scans 3/23/2021:    Impression:     1. Stable right upper lobe nodule.  No evidence for recurrent or metastatic neoplasm.  There are no measurable lesions per RECIST criteria.  2. Several non dependent polypoid nodules in the trachea, similar to  multiple prior studies and likely benign.           PET 9/18/2020:    Impression:     1. Negative for recurrent malignancy or metastatic disease.  2. Unchanged 6 mm right upper lobe nodule, presumably benign.  3. Slight decreased FDG uptake associated with left paratracheal mass just inferior to thoracic inlet, differential diagnosis of which has been previously discussed, with benign etiologies likely.        MRI 8/6/2020:  Impression:     No evidence for disease progression.Minimal signal abnormality along the mid rectum corresponds to the previously treated lesion.  No pelvic adenopathy.       CT  3/5/2020    Impression       Status post left nephrectomy without evidence of recurrent renal malignancy.    No significant change since prior study.           MRI  1/8/2020:  Impression       Significant reduction in size of the patient's known mid rectal mass, which is no longer distinctly visible.  Reduction in size of 3 mesorectal lymph nodes as above.               PET  10/17/2019    Impression       1. Left paratracheal small mass extending into superior mediastinum with associated moderate FDG activity is unchanged in size and appearance since 02/16/2018 CT.  This is unlikely to represent metastatic disease or malignancy, given stability, and may represent residual thyroid parenchyma but is otherwise nonspecific.  2. Unchanged 6 mm right upper lobe nodule since 02/16/2018.  Benign etiology is likely the continued CT thorax without IV contrast follow-up is recommended in 12 months to document greater than 2 years of stability.  3. No current convincing evidence of metastatic disease.  4. Previous rectal mass is no longer evident on this exam.               X-ray Chest Ap Portable    Result Date: 10/15/2019  EXAMINATION: XR CHEST AP PORTABLE CLINICAL HISTORY: s/p port; Encounter for adjustment and management of vascular access device TECHNIQUE: Single frontal view of the chest was performed. COMPARISON: 6/18/2019  FINDINGS: The cardiomediastinal silhouette is stable.  Lungs are clear of infiltrate.  No pleural effusions.  Laya catheter has been inserted from the region of the right subclavian vein with the tip at the level the proximal SVC.     Laya catheter inserted with the tip at the level the proximal SVC.  Lungs are expanded and clear.  No pneumothorax. Electronically signed by: Nubia Venegas MD Date:    10/15/2019 Time:    14:05    Surg Fl Surgery Fluoro Usage    Result Date: 10/15/2019  See OP Notes for results. IMPRESSION: See OP Notes for results. This procedure was auto-finalized by: [facility]     Mri Rectal Cancer Without Contrast    Result Date: 9/18/2019  EXAMINATION: MRI RECTAL CANCER WITHOUT CONTRAST CLINICAL HISTORY: Rectal cancer, staging, locoregional;rectal anal mass;  Malignant neoplasm of rectum TECHNIQUE: Multiplanar multisequence MR imaging of the pelvis without contrast. COMPARISON: 07/12/2019 FINDINGS: Approximately 4 cm from the anal verge there is a peripherally located lobular mass along the dorsal rectal wall.  This measures 4 cm in length and 3.3 cm in diameter.  There is heterogeneous signal intensity.  There is restricted diffusion in the lesion and no discrete extra colonic extension.  Several lymph nodes are noted in the mesorectal fat including two which measure 3 mm near the inferior sacrum, series 8, image 16, and a 5 mm lymph node at the S2 level, series 8, image 8.  Prominent but nonenlarged bilateral obturator chain lymph nodes also noted, on the right at 4 mm and left at 6 mm.  There is urinary bladder wall thickening which is diffuse.  Moderate degree of stool in the colon.  No dilated bowel loops.  Hysterectomy.     4 cm mid rectal lesion in keeping with known malignancy.  No discrete extension into the mesorectal fascia although there are several perirectal lymph nodes which raise the possibility for locoregional lymph node involvement. Electronically signed by: Bebo  Kapoor Date:    09/18/2019 Time:    16:45      I have reviewed all available lab results and radiology reports.    Assessment/Plan:   (1) 81 y.o. female with diagnosis of prior left RCC who has been referred by David Saldaña MD for evaluation by medical hematology/oncology. She has been followed by Dr Stanislav Epstein with [facility] at the [facility] in [place]. She last saw Dr Epstein in July 2019. She was recently diagnosed with rectal mass by Dr Armando Duff which was found on colonoscopy on 8/26/2019. She had presented with lower Gi bleeding at that time. The pathology from those biopsies showed no evidence of malignancy at that time. She was subsequently seen by Dr David Saldaña and underwent trans-anal surgical biopsy on 9/23/2019. The pathology from the surgical biopsy showed rectal carcinoma.         She has been seen by Dr Fareed Hassan with Rad/onc for radiation therapy evaluation.     PET was done on 10/17/2019     We previously discussed giving her combined chemotherapy with the XRt to try to reduce her risk of recurrence. She was agreeable to this plan.    She had portacath placed with Dr Saldaña. She saw Dr Hassan last week with rad/onc. She has since completed weekly XRT and chemotherapy    - She previously saw Dr Saldaña on 12/10/2019  and had MRI on 1/8/2020.  They did scope on 1/28th 2020 and I spoke to dr saldaña by phone last week. The scope looked good and both the patient and Dr Saldaña does not want to proceed in direction of operation especially given her age and co-morbidities.    - We previously discussed adjuvant chemotherapy with FOLOFOX x 10-12 cycles especially since she is not having surgery and she is agreeable.    - Will previously  provide literature on the regimen and set up chemotherapy      7/2/2020:  - She has since started chemotherapy and she has had 8 cycles so far; chemotherapy was held last time due to general malaise. She feels good and has some minimal and tolerable  "neuropathy in hands and feet.  She has some intermittent constipation.    She wants to continue with therapy and I would like to at least get 10 cycles in if possible.     7/27/2020:  - she is on her last cycle #10 today  - check labs weekly for next 4 weeks  - she will need to follow-up with dr mendieta and also get repeat scans every 6 months    9/1/2020:  - she completed the 10th and last cycle about a month ago  - she had recent MRI of pelvis with Dr Mendieta on 8/12/2020 and plans to get repeat scope at end of Sept 2020  - she has some occasional lightheadedness and is going to see her eye doctor in the near future  - will schedule a PET scan and consider MRI of brain thereafter    9/28/2020:  - PET on 9/18/2020 on chart  - scope scheduled for tomorrow with Dr Mendieta  - check up to date labs  - increase the gabpentin to 2 pills bid with one pill mid-day still - if symptoms do not improve, then consider referral to neurology    10/26/2020:  - doing ok except for residual neuropathy issues   - discussed referral to neurology but she wants to hold off for now  - she had scope with Dr mendieta since last visit which was "perfect" per patient and repeat is planned for six months    12/21/2020:  - she is doing well  - neuropathy is better    4/7/2021:  - she is having MRI of pelvis and flex sig with Dr Mendieta next week  - latest CT's on 3/26/2021 look stable    7/20/2021:  - doing well  - seen by Dr mendieta recently with planned repeat flex sig in couple of months  - CEA at 2.7  - will get repeat scans with PEt in Sept 2021 1/18/2022:  - She sees Dr Mendieta again in April 2022 and is expected to have another scope. She last had a scope in Oct 2021.  - she last had scans in Oct 2021  - check labs every 3 months incl CEA  - repeat scans in April 2022      (2) Left renal cell carcinoma s/p left nephrectomy with Dr Azar Donnelly - diagnosed about 2 years ago     (3) Ureterolithiasis     (4) CRI - stage III - followed by Dr Antonio; " she saw him recently and per patient's report, her kidney function is stable per Dr Antonio  - she is seeing Dr Antonio tomorrow     (5) HTN and hypercholesterolemia     (6) DM - followed by Dr Brower with endocrinology     (7) Hx/of gall stones     (8) Chronic left shoulder issues     (9) MVP     (10) Thyroid disease with prior hx/of thyroid cancer s/p thyroidectomy      (11) Chronic anemia - most likley multifactorial due to iron deficiency, GIB and anemia of chronic renal disease       (12) hypocalcemia - followed by Dr Serrano    (13) 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  Encouraged compliance with labs; discussed COVID19 precautions  2.  check labs every 3 months with CEA every 6 mnonths  3.  F/u with PCP, GI, rad/onc, Gen Surgery etc  4.  F/u nephrology     5. Refill antiemetics as needed  6. Set up PET in April 2022        RTC in 6 months     Fax note to [facility] Parks; Tete Duff Tandron; [place];     Discussion:       I spent over 25 mins of time with the patient. Reviewed results of the recently ordered labs, tests and studies; made directives with regards to the results. Over half of this time was spent couseling and coordinating care.    COVID-19 Discussion:    I had long discussion with patient and any applicable family about the COVID-19 coronavirus epidemic and the recommended precautions with regard to cancer and/or hematology patients. I have re-iterated the CDC recommendations for adequate hand washing, use of hand -like products, and coughing into elbow, etc. In addition, especially for our patients who are on chemotherapy and/or our otherwise immunocompromised patients, I have recommended  avoidance of crowds, including movie theaters, restaurants, churches, etc. I have recommended avoidance of any sick or symptomatic family members and/or friends. I have also recommended avoidance of any raw and unwashed food products, and general avoidance of food items that have not been prepared by themselves. The patient has been asked to call us immediately with any symptom developments, issues, questions or other general concerns.       Chemotherapy Discussion:      I discussed the available treatment option(s) in accordance with the latest/current national evidence-based guidelines (NCCN, UpToDate, NCI, ASCO, etc where applicable), their overall age/condition and their co-morbidities. I also went over the risks and benefits of the chemotherapy with regard to their particular cancer type, their cancer stage, their age/condition, and their co-morbidities. I provided literature on the chemotherapy regimen and discussed the chemotherapy side-effect profiles of the drug(s). I discussed the importance of compliance with obtaining and monitoring weekly lab work, and went over the potential hematopathology issues and risks with anemia, leucopenia and thrombocytopenia that can occur with chemotherapy. I discussed the potential risks of liver and kidney damage, which could be permanent and could necessitate dialysis long-term if kidney failure developed. I discussed the emetic and/or diarrheal potential of the regimen and the potential need for use of antiemetic and anti-diarrheal medications. I discussed the risk for development of anaphylactic shock, bronchospasm, dysrhythmia, and respiratory/cardiovascular arrest and/or failure. I discussed the potential risks for development of alopecia, cold sensory issues, ringing in ears, vertigo, cataracts, glaucoma, and neuropathy, all of which could end up being chronic and life-long. Some chemotherpyI discussed the risks of hand-foot syndrome and rashes, and development of  other autoimmune mediated processes such as pneumonitis, hepatitis, and colitis which could be life threatening. I discussed the risks of the potential development of a rare but fatal viral mediated disease known as PML (Progressive Multifocal Leukoencephalopathy), and risk of future development of leukemia and/or lymphoma from use of certain chemotherapy agents. I discussed the need for neutropenic precautions, basic hygiene/sanitation behaviors and dietary restrictions.    The patient's consent has been obtained to proceed with the chemotherapy.The patient will be referred to [facility] for training and education on chemotherapy, use of antiemetics and/or anti-diarrheals, use of NSAID's, potential chemotherapy side-effects, and any specific recommendations and precautions with the particular chemotherapy agents.      I answered all of the patient's (and family's, if applicable) questions to the best of my ability and to their complete satisfaction. The patient acknowledged full understanding of the risks, recommendations and plan(s).          I have explained all of the above in detail and the patient understands all of the current recommendation(s). I have answered all of their questions to the best of my ability and to their complete satisfaction.   The patient is to continue with the current management plan.            Electronically signed by Carrillo Goode MD                            Answers for HPI/ROS submitted by the patient on 1/13/2022  appetite change : No  unexpected weight change: No  mouth sores: No  visual disturbance: No  cough: No  shortness of breath: No  chest pain: No  abdominal pain: No  diarrhea: No  frequency: Yes  back pain: No  rash: No  headaches: No  adenopathy: No  nervous/ anxious: No

## 2022-01-18 ENCOUNTER — OFFICE VISIT (OUTPATIENT)
Dept: HEMATOLOGY/ONCOLOGY | Facility: CLINIC | Age: 82
End: 2022-01-18
Payer: COMMERCIAL

## 2022-01-18 VITALS
BODY MASS INDEX: 29.07 KG/M2 | HEART RATE: 76 BPM | TEMPERATURE: 98 F | RESPIRATION RATE: 18 BRPM | DIASTOLIC BLOOD PRESSURE: 84 MMHG | SYSTOLIC BLOOD PRESSURE: 126 MMHG | HEIGHT: 61 IN | WEIGHT: 154 LBS

## 2022-01-18 DIAGNOSIS — C64.2 RENAL CELL CARCINOMA OF LEFT KIDNEY: ICD-10-CM

## 2022-01-18 DIAGNOSIS — C20 MALIGNANT NEOPLASM OF RECTUM: Primary | ICD-10-CM

## 2022-01-18 DIAGNOSIS — D64.9 NORMOCYTIC ANEMIA: ICD-10-CM

## 2022-01-18 DIAGNOSIS — Z85.048 HISTORY OF RECTAL CANCER: ICD-10-CM

## 2022-01-18 DIAGNOSIS — C20 RECTAL CANCER: ICD-10-CM

## 2022-01-18 DIAGNOSIS — D70.1 CHEMOTHERAPY INDUCED NEUTROPENIA: ICD-10-CM

## 2022-01-18 DIAGNOSIS — Z90.5 STATUS POST NEPHRECTOMY: ICD-10-CM

## 2022-01-18 DIAGNOSIS — T45.1X5A CHEMOTHERAPY INDUCED NEUTROPENIA: ICD-10-CM

## 2022-01-18 PROCEDURE — 1159F MED LIST DOCD IN RCRD: CPT | Mod: S$GLB,,, | Performed by: INTERNAL MEDICINE

## 2022-01-18 PROCEDURE — 1126F AMNT PAIN NOTED NONE PRSNT: CPT | Mod: S$GLB,,, | Performed by: INTERNAL MEDICINE

## 2022-01-18 PROCEDURE — 1126F PR PAIN SEVERITY QUANTIFIED, NO PAIN PRESENT: ICD-10-PCS | Mod: S$GLB,,, | Performed by: INTERNAL MEDICINE

## 2022-01-18 PROCEDURE — 1160F PR REVIEW ALL MEDS BY PRESCRIBER/CLIN PHARMACIST DOCUMENTED: ICD-10-PCS | Mod: S$GLB,,, | Performed by: INTERNAL MEDICINE

## 2022-01-18 PROCEDURE — 99214 OFFICE O/P EST MOD 30 MIN: CPT | Mod: S$GLB,,, | Performed by: INTERNAL MEDICINE

## 2022-01-18 PROCEDURE — 1101F PT FALLS ASSESS-DOCD LE1/YR: CPT | Mod: S$GLB,,, | Performed by: INTERNAL MEDICINE

## 2022-01-18 PROCEDURE — 1160F RVW MEDS BY RX/DR IN RCRD: CPT | Mod: S$GLB,,, | Performed by: INTERNAL MEDICINE

## 2022-01-18 PROCEDURE — 3074F SYST BP LT 130 MM HG: CPT | Mod: S$GLB,,, | Performed by: INTERNAL MEDICINE

## 2022-01-18 PROCEDURE — 3079F PR MOST RECENT DIASTOLIC BLOOD PRESSURE 80-89 MM HG: ICD-10-PCS | Mod: S$GLB,,, | Performed by: INTERNAL MEDICINE

## 2022-01-18 PROCEDURE — 1101F PR PT FALLS ASSESS DOC 0-1 FALLS W/OUT INJ PAST YR: ICD-10-PCS | Mod: S$GLB,,, | Performed by: INTERNAL MEDICINE

## 2022-01-18 PROCEDURE — 3288F FALL RISK ASSESSMENT DOCD: CPT | Mod: S$GLB,,, | Performed by: INTERNAL MEDICINE

## 2022-01-18 PROCEDURE — 99214 PR OFFICE/OUTPT VISIT, EST, LEVL IV, 30-39 MIN: ICD-10-PCS | Mod: S$GLB,,, | Performed by: INTERNAL MEDICINE

## 2022-01-18 PROCEDURE — 3288F PR FALLS RISK ASSESSMENT DOCUMENTED: ICD-10-PCS | Mod: S$GLB,,, | Performed by: INTERNAL MEDICINE

## 2022-01-18 PROCEDURE — 1159F PR MEDICATION LIST DOCUMENTED IN MEDICAL RECORD: ICD-10-PCS | Mod: S$GLB,,, | Performed by: INTERNAL MEDICINE

## 2022-01-18 PROCEDURE — 3079F DIAST BP 80-89 MM HG: CPT | Mod: S$GLB,,, | Performed by: INTERNAL MEDICINE

## 2022-01-18 PROCEDURE — 3074F PR MOST RECENT SYSTOLIC BLOOD PRESSURE < 130 MM HG: ICD-10-PCS | Mod: S$GLB,,, | Performed by: INTERNAL MEDICINE

## 2022-03-30 ENCOUNTER — TELEPHONE (OUTPATIENT)
Dept: SURGERY | Facility: CLINIC | Age: 82
End: 2022-03-30
Payer: MEDICARE

## 2022-03-30 NOTE — TELEPHONE ENCOUNTER
----- Message from Mary Beth Caballero sent at 3/30/2022 10:49 AM CDT -----  Regarding: Pt Called  Name of Who is Calling: REJI MENDOZA [[id number]]      What is the request in detail: Pt called asking to speak with nurse. Pt states that Dr. Mendieta wanted her to have a Colonoscopy done but is not sure when he wanted her to repeat it. Please advise.       Can the clinic reply by [facility]: NO      What Number to Call Back if not in [facility]: [phone]

## 2022-04-13 PROBLEM — R07.9 CHEST PAIN: Status: RESOLVED | Noted: 2021-04-14 | Resolved: 2022-04-13

## 2022-04-13 PROBLEM — K85.10 ACUTE BILIARY PANCREATITIS: Status: RESOLVED | Noted: 2018-01-21 | Resolved: 2022-04-13

## 2022-04-13 PROBLEM — E87.1 HYPONATREMIA: Status: RESOLVED | Noted: 2018-01-22 | Resolved: 2022-04-13

## 2022-04-13 PROBLEM — N13.8 URINARY TRACT OBSTRUCTION BY KIDNEY STONE: Status: RESOLVED | Noted: 2018-02-16 | Resolved: 2022-04-13

## 2022-04-13 PROBLEM — N20.0 URINARY TRACT OBSTRUCTION BY KIDNEY STONE: Status: RESOLVED | Noted: 2018-02-16 | Resolved: 2022-04-13

## 2022-04-13 PROBLEM — R06.09 DOE (DYSPNEA ON EXERTION): Status: RESOLVED | Noted: 2021-04-28 | Resolved: 2022-04-13

## 2022-04-19 ENCOUNTER — PATIENT MESSAGE (OUTPATIENT)
Dept: SURGERY | Facility: CLINIC | Age: 82
End: 2022-04-19
Payer: MEDICARE

## 2022-04-19 DIAGNOSIS — Z85.048 HISTORY OF RECTAL CANCER: Primary | ICD-10-CM

## 2022-04-19 RX ORDER — SODIUM CHLORIDE 0.9 % (FLUSH) 0.9 %
10 SYRINGE (ML) INJECTION
Status: CANCELLED | OUTPATIENT
Start: 2022-04-19

## 2022-04-19 RX ORDER — SODIUM CHLORIDE, SODIUM LACTATE, POTASSIUM CHLORIDE, CALCIUM CHLORIDE 600; 310; 30; 20 MG/100ML; MG/100ML; MG/100ML; MG/100ML
INJECTION, SOLUTION INTRAVENOUS CONTINUOUS
Status: CANCELLED | OUTPATIENT
Start: 2022-04-19

## 2022-05-02 ENCOUNTER — HOSPITAL ENCOUNTER (OUTPATIENT)
Dept: RADIOLOGY | Facility: HOSPITAL | Age: 82
Discharge: HOME | End: 2022-05-02
Attending: INTERNAL MEDICINE
Payer: COMMERCIAL

## 2022-05-02 VITALS — BODY MASS INDEX: 28.7 KG/M2 | WEIGHT: 152 LBS | HEIGHT: 61 IN

## 2022-05-02 DIAGNOSIS — D64.9 NORMOCYTIC ANEMIA: ICD-10-CM

## 2022-05-02 DIAGNOSIS — C20 MALIGNANT NEOPLASM OF RECTUM: ICD-10-CM

## 2022-05-02 DIAGNOSIS — C64.2 RENAL CELL CARCINOMA OF LEFT KIDNEY: ICD-10-CM

## 2022-05-02 LAB — GLUCOSE SERPL-MCNC: 173 MG/DL (ref 70–110)

## 2022-05-02 PROCEDURE — 78815 PET IMAGE W/CT SKULL-THIGH: CPT | Mod: TC,PO,PS

## 2022-05-10 ENCOUNTER — TELEPHONE (OUTPATIENT)
Dept: HEMATOLOGY/ONCOLOGY | Facility: CLINIC | Age: 82
End: 2022-05-10

## 2022-05-10 NOTE — TELEPHONE ENCOUNTER
----- Message from Carrillo Goode MD sent at 5/3/2022  7:51 AM CDT -----  Please call her with stable report

## 2022-05-11 ENCOUNTER — TELEPHONE (OUTPATIENT)
Dept: SURGERY | Facility: CLINIC | Age: 82
End: 2022-05-11
Payer: MEDICARE

## 2022-05-11 NOTE — TELEPHONE ENCOUNTER
Called and reviewed pathology with pt.      VASILEAPACALLY DIAGNOSIS:   A.   ASCENDING COLON POLYP, POLYPECTOMY:          -Tubular adenoma.          -No evidence of high grade dysplasia.   B.   DESCENDING COLON POLYP X 2, POLYPECTOMY:          -Fragments of fecal material.          -No intact colonic mucosa present for evaluation    Recommend repeat cscope in 3 years

## 2022-07-15 NOTE — PROGRESS NOTES
[facility] Hematology/Oncology  PROGRESS NOTE -   Follow-up Visit      Subjective:       Patient ID:   NAME: Valery Huggins : 1940     81 y.o. female    Referring Doc: David Mendieta MD  Other Physicians: Armando Nath; Stanislav Epstein; Azar Donnelly; Mo    Chief Complaint:  Left RCC and rectal CA f/u    History of Present Illness:     Patient is being seen today in person in clinic . She is here with her  today..  She reports that she is feeling fine.  The patient is on today to go over the results of the recently ordered labs, tests and studies.     No CP, SOB, HA's or N/V.         She had covid about a month ago.     She had a colonoscopy with Dr Mendieta in 2022 with one polyp removed with planned flex sig in 6 months     She saw Dr Nath on 2022; she was having some pain in the left shoulder for which he gave her an injection    Discussed Covid19 precautions - she had her vaccinations                ROS:   GEN: normal without any fever, night sweats or weight loss  HEENT: normal with no HA's, sore throat, stiff neck, changes in vision;    CV: normal with no CP, SOB, PND, RAYA or orthopnea  PULM: normal with no SOB, cough, hemoptysis, sputum or pleuritic pain  GI: no nausea or constipation  : normal with no hematuria, dysuria  BREAST: normal with no mass, discharge, pain  SKIN: normal with no rash, erythema, bruising, or swelling    Allergies:  Review of patient's allergies indicates:   Allergen Reactions    Sunitinib Diarrhea, Nausea And Vomiting and Other (See Comments)       Medications:    Current Outpatient Medications:     amLODIPine (NORVASC) 2.5 MG tablet, Take 1 tablet (2.5 mg total) by mouth once daily., Disp: 90 tablet, Rfl: 0    ascorbic acid, vitamin C, (VITAMIN C) 100 MG tablet, Take 100 mg by mouth once daily., Disp: , Rfl:     atorvastatin (LIPITOR) 10 MG tablet, TAKE 1 TABLET (10 MG TOTAL) BY MOUTH EVERY EVENING FOR CHOLESTEROL, Disp: 90 tablet, Rfl: 0    b  "complex vitamins capsule, Take 1 capsule by mouth once daily., Disp: , Rfl:     BD ULTRA-FINE CONNIE PEN NEEDLE 32 gauge x 5/32" Ndle, INJECT TWICE DAILY AS DIRECTED, Disp: 200 each, Rfl: 0    calcitRIOL (ROCALTROL) 0.5 MCG Cap, TAKE 1 CAPSULE BY MOUTH DAILY, Disp: 90 capsule, Rfl: 0    cholecalciferol, vitamin D3, 100 mcg (4,000 unit) Cap, Take 1 capsule by mouth once daily., Disp: , Rfl:     EScitalopram oxalate (LEXAPRO) 20 MG tablet, TAKE ONE TABLET BY MOUTH DAILY, Disp: 90 tablet, Rfl: 0    FREESTYLE LITE METER kit, , Disp: , Rfl: 0    FREESTYLE LITE STRIPS Strp, USE TO TEST FOR BLOOD SUGAR TWICE DAILY AS DIRECTED, Disp: 200 strip, Rfl: 3    gabapentin (NEURONTIN) 100 MG capsule, TAKE 2 CAPSULES BY MOUTH EACH MORNING AND 1 MIDDAY AND 2 EACH NIGHT AT BEDTIME, Disp: 150 capsule, Rfl: 2    hydrALAZINE (APRESOLINE) 100 MG tablet, Take 1 tablet (100 mg total) by mouth 2 (two) times daily., Disp: 90 tablet, Rfl: 1    lifitegrast (XIIDRA) 5 % Dpet, Apply to eye., Disp: , Rfl:     magnesium oxide (MAG-OX) 400 mg (241.3 mg magnesium) tablet, Take 1 tablet (400 mg total) by mouth 2 (two) times daily., Disp: 180 tablet, Rfl: 0    metoprolol tartrate (LOPRESSOR) 25 MG tablet, Take 1 tablet (25 mg total) by mouth 2 (two) times daily., Disp: 180 tablet, Rfl: 0    OZEMPIC 1 mg/dose (4 mg/3 mL), , Disp: , Rfl:     pantoprazole (PROTONIX) 40 MG tablet, Take 1 tablet (40 mg total) by mouth once daily., Disp: 90 tablet, Rfl: 0    SYNTHROID 88 mcg tablet, TAKE ONE TABLET BY MOUTH EACH MORNING ON AN EMPTY STOMACH, Disp: 90 tablet, Rfl: 0    TOUJEO SOLOSTAR U-300 INSULIN 300 unit/mL (1.5 mL) InPn pen, INJECT 20 UNITS SUBCUTANEAOUSLY DAILY, Disp: 3 pen, Rfl: 1    semaglutide (OZEMPIC) 0.25 mg or 0.5 mg(2 mg/1.5 mL) pen injector, Inject into the skin every 7 days. taKE ON THURSDAYS, Disp: , Rfl:     PMHx/PSHx Updates:  See patient's last visit with me on 1/18/2022  See H&P on 10/8/2019        Pathology:  Cancer " "Staging  No matching staging information was found for the patient.          Objective:     Vitals:  Blood pressure 124/67, pulse 76, temperature 98.6 °F (37 °C), resp. rate 16, height 5' 1" (1.549 m), weight 69.4 kg (153 lb), last menstrual period 06/01/2012.        Physical Examination:   GEN: no apparent distress, comfortable; AAOx3  HEAD: atraumatic and normocephalic  EYES: no conjunctival pallor or muddiness, no icterus; normal pupil reaction to ambient light  ENT: OMM, no pharyngeal erythema, external bilateral ears WNL; no visible thrush or ulcers  NECK: no masses or swelling, trachea midline, no visible LAD/LN's   CV: no palpitations; no pedal edema; no noticeable JVD or neck vein distension;  portacath on right CW since removed  CHEST: Normal respiratory effort; chest wall breath movements symmetrical; no audible wheezing  ABDOM: non-distended; no bloating  MUSC/Skeletal: ROM normal; joints visibly normal; no deformities or arthropathy  EXTREM: no clubbing, cyanosis, inflammation or swelling  SKIN: no rashes, lesions, ulcers, petechiae or subcutaneous nodules  : no hendrickson  NEURO: moving all 4 extremities; AAOx3; no tremors  PSYCH: normal mood, affect and behavior  LYMPH: no visible LN's or LAD              Labs:      7/13/2022 on chart from [facility]    Lab Results   Component Value Date    WBC 6.5 04/13/2022    HGB 10.6 (L) 04/13/2022    HCT 32.7 (L) 04/13/2022    MCV 91.6 04/13/2022     04/13/2022     CMP  Sodium   Date Value Ref Range Status   04/13/2022 136 135 - 146 mmol/L Final     Potassium   Date Value Ref Range Status   04/13/2022 4.3 3.5 - 5.3 mmol/L Final     Chloride   Date Value Ref Range Status   04/13/2022 98 98 - 110 mmol/L Final     CO2   Date Value Ref Range Status   04/13/2022 29 20 - 32 mmol/L Final     Glucose   Date Value Ref Range Status   04/13/2022 162 (H) 65 - 139 mg/dL Final     Comment:               Non-fasting reference interval          BUN   Date Value Ref Range Status "   04/13/2022 35 (H) 7 - 25 mg/dL Final     Creatinine   Date Value Ref Range Status   04/13/2022 2.16 (H) 0.60 - 0.93 mg/dL Final     Comment:     For patients >49 years of age, the reference limit  for Creatinine is approximately 13% higher for people  identified as -American.          Calcium   Date Value Ref Range Status   04/13/2022 9.1 8.6 - 10.4 mg/dL Final     Total Protein   Date Value Ref Range Status   04/13/2022 6.0 (L) 6.1 - 8.1 g/dL Final     Albumin   Date Value Ref Range Status   04/13/2022 3.8 3.6 - 5.1 g/dL Final     Total Bilirubin   Date Value Ref Range Status   04/13/2022 0.7 0.2 - 1.2 mg/dL Final     Alkaline Phosphatase   Date Value Ref Range Status   11/12/2021 63 55 - 135 U/L Final     AST   Date Value Ref Range Status   04/13/2022 14 10 - 35 U/L Final     ALT   Date Value Ref Range Status   04/13/2022 11 6 - 29 U/L Final     Anion Gap   Date Value Ref Range Status   11/12/2021 10 8 - 16 mmol/L Final     eGFR if    Date Value Ref Range Status   04/13/2022 24 (L) > OR = 60 mL/min/1.73m2 Final     eGFR if non    Date Value Ref Range Status   04/13/2022 21 (L) > OR = 60 mL/min/1.73m2 Final     Lab Results   Component Value Date    CEA 2.7 07/15/2021     Lab Results   Component Value Date    IRON 57 04/13/2022    TIBC 285 04/13/2022    FERRITIN 88 04/13/2022           Radiology/Diagnostic Studies:    PEt 5/2/2022:    IMPRESSION:     Stable exam demonstrating no evidence of FDG avid malignancy.     Stable 6 mm right upper lobe pulmonary nodule.      CT scans  10/7/2021:  Impression:     Essentially stable exam without evidence for recurrent or metastatic disease.  There are no measurable lesions per RECIST criteria.     Stable right upper lobe pulmonary nodule, prior cholecystectomy and left nephrectomy, and additional findings as above.          CT scans 3/23/2021:    Impression:     1. Stable right upper lobe nodule.  No evidence for recurrent or metastatic  neoplasm.  There are no measurable lesions per RECIST criteria.  2. Several non dependent polypoid nodules in the trachea, similar to multiple prior studies and likely benign.           PET 9/18/2020:    Impression:     1. Negative for recurrent malignancy or metastatic disease.  2. Unchanged 6 mm right upper lobe nodule, presumably benign.  3. Slight decreased FDG uptake associated with left paratracheal mass just inferior to thoracic inlet, differential diagnosis of which has been previously discussed, with benign etiologies likely.        MRI 8/6/2020:  Impression:     No evidence for disease progression.Minimal signal abnormality along the mid rectum corresponds to the previously treated lesion.  No pelvic adenopathy.       CT  3/5/2020    Impression       Status post left nephrectomy without evidence of recurrent renal malignancy.    No significant change since prior study.           MRI  1/8/2020:  Impression       Significant reduction in size of the patient's known mid rectal mass, which is no longer distinctly visible.  Reduction in size of 3 mesorectal lymph nodes as above.               PET  10/17/2019    Impression       1. Left paratracheal small mass extending into superior mediastinum with associated moderate FDG activity is unchanged in size and appearance since 02/16/2018 CT.  This is unlikely to represent metastatic disease or malignancy, given stability, and may represent residual thyroid parenchyma but is otherwise nonspecific.  2. Unchanged 6 mm right upper lobe nodule since 02/16/2018.  Benign etiology is likely the continued CT thorax without IV contrast follow-up is recommended in 12 months to document greater than 2 years of stability.  3. No current convincing evidence of metastatic disease.  4. Previous rectal mass is no longer evident on this exam.               X-ray Chest Ap Portable    Result Date: 10/15/2019  EXAMINATION: XR CHEST AP PORTABLE CLINICAL HISTORY: s/p port; Encounter for  adjustment and management of vascular access device TECHNIQUE: Single frontal view of the chest was performed. COMPARISON: 6/18/2019 FINDINGS: The cardiomediastinal silhouette is stable.  Lungs are clear of infiltrate.  No pleural effusions.  Laya catheter has been inserted from the region of the right subclavian vein with the tip at the level the proximal SVC.     Laya catheter inserted with the tip at the level the proximal SVC.  Lungs are expanded and clear.  No pneumothorax. Electronically signed by: Nubia Venegas MD Date:    10/15/2019 Time:    14:05    Surg Fl Surgery Fluoro Usage    Result Date: 10/15/2019  See OP Notes for results. IMPRESSION: See OP Notes for results. This procedure was auto-finalized by: [facility]     Mri Rectal Cancer Without Contrast    Result Date: 9/18/2019  EXAMINATION: MRI RECTAL CANCER WITHOUT CONTRAST CLINICAL HISTORY: Rectal cancer, staging, locoregional;rectal anal mass;  Malignant neoplasm of rectum TECHNIQUE: Multiplanar multisequence MR imaging of the pelvis without contrast. COMPARISON: 07/12/2019 FINDINGS: Approximately 4 cm from the anal verge there is a peripherally located lobular mass along the dorsal rectal wall.  This measures 4 cm in length and 3.3 cm in diameter.  There is heterogeneous signal intensity.  There is restricted diffusion in the lesion and no discrete extra colonic extension.  Several lymph nodes are noted in the mesorectal fat including two which measure 3 mm near the inferior sacrum, series 8, image 16, and a 5 mm lymph node at the S2 level, series 8, image 8.  Prominent but nonenlarged bilateral obturator chain lymph nodes also noted, on the right at 4 mm and left at 6 mm.  There is urinary bladder wall thickening which is diffuse.  Moderate degree of stool in the colon.  No dilated bowel loops.  Hysterectomy.     4 cm mid rectal lesion in keeping with known malignancy.  No discrete extension into the mesorectal fascia although there are  several perirectal lymph nodes which raise the possibility for locoregional lymph node involvement. Electronically signed by: Bebo Kapoor Date:    09/18/2019 Time:    16:45      I have reviewed all available lab results and radiology reports.    Assessment/Plan:   (1) 81 y.o. female with diagnosis of prior left RCC who has been referred by David Saldaña MD for evaluation by medical hematology/oncology. She has been followed by Dr Stanislav Epstein with [facility] at the [facility] in [place]. She last saw Dr Epstein in July 2019. She was recently diagnosed with rectal mass by Dr Armando Duff which was found on colonoscopy on 8/26/2019. She had presented with lower Gi bleeding at that time. The pathology from those biopsies showed no evidence of malignancy at that time. She was subsequently seen by Dr David Saldaña and underwent trans-anal surgical biopsy on 9/23/2019. The pathology from the surgical biopsy showed rectal carcinoma.         She has been seen by Dr Farede Hassan with Rad/onc for radiation therapy evaluation.     PET was done on 10/17/2019     We previously discussed giving her combined chemotherapy with the XRt to try to reduce her risk of recurrence. She was agreeable to this plan.    She had portacath placed with Dr Saldaña. She saw Dr Hassan last week with rad/onc. She has since completed weekly XRT and chemotherapy    - She previously saw Dr Sadlaña on 12/10/2019  and had MRI on 1/8/2020.  They did scope on 1/28th 2020 and I spoke to dr saldaña by phone last week. The scope looked good and both the patient and Dr Saldaña does not want to proceed in direction of operation especially given her age and co-morbidities.    - We previously discussed adjuvant chemotherapy with FOLOFOX x 10-12 cycles especially since she is not having surgery and she is agreeable.    - Will previously  provide literature on the regimen and set up chemotherapy      7/2/2020:  - She has since started chemotherapy and  "she has had 8 cycles so far; chemotherapy was held last time due to general malaise. She feels good and has some minimal and tolerable neuropathy in hands and feet.  She has some intermittent constipation.    She wants to continue with therapy and I would like to at least get 10 cycles in if possible.     7/27/2020:  - she is on her last cycle #10 today  - check labs weekly for next 4 weeks  - she will need to follow-up with dr mendieta and also get repeat scans every 6 months    9/1/2020:  - she completed the 10th and last cycle about a month ago  - she had recent MRI of pelvis with Dr Mendieta on 8/12/2020 and plans to get repeat scope at end of Sept 2020  - she has some occasional lightheadedness and is going to see her eye doctor in the near future  - will schedule a PET scan and consider MRI of brain thereafter    9/28/2020:  - PET on 9/18/2020 on chart  - scope scheduled for tomorrow with Dr Mendieta  - check up to date labs  - increase the gabpentin to 2 pills bid with one pill mid-day still - if symptoms do not improve, then consider referral to neurology    10/26/2020:  - doing ok except for residual neuropathy issues   - discussed referral to neurology but she wants to hold off for now  - she had scope with Dr mendieta since last visit which was "perfect" per patient and repeat is planned for six months    12/21/2020:  - she is doing well  - neuropathy is better    4/7/2021:  - she is having MRI of pelvis and flex sig with Dr Mendieta next week  - latest CT's on 3/26/2021 look stable    7/20/2021:  - doing well  - seen by Dr mendieta recently with planned repeat flex sig in couple of months  - CEA at 2.7  - will get repeat scans with PEt in Sept 2021 1/18/2022:  - She sees Dr Mendieta again in April 2022 and is expected to have another scope. She last had a scope in Oct 2021.  - she last had scans in Oct 2021  - check labs every 3 months incl CEA  - repeat scans in April 2022 7/18/2022:  - She had covid about a month " ago.   - She had a colonoscopy with Dr Mendieta in June 2022 with one polyp removed with planned flex sig in 6 months   - She saw Dr Nath on 7/6/2022; she was having some pain in the left shoulder for which he gave her an injection  - she had PET on 5/2/2022 which was stable  - repeat scans in one year unless something new develops        (2) Left renal cell carcinoma s/p left nephrectomy with Dr Azar Donnelly - diagnosed about 2 years ago     (3) Ureterolithiasis     (4) CRI - stage III - followed by Dr Antonio; she saw him recently and per patient's report, her kidney function is stable per Dr Antonio  - she is seeing Dr Antonio tomorrow     (5) HTN and hypercholesterolemia     (6) DM - followed by Dr Brower with endocrinology     (7) Hx/of gall stones     (8) Chronic left shoulder issues     (9) MVP     (10) Thyroid disease with prior hx/of thyroid cancer s/p thyroidectomy      (11) Chronic anemia - most likley multifactorial due to iron deficiency, GIB and anemia of chronic renal disease       (12) hypocalcemia - followed by Dr Serrano    (13) 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  Encouraged compliance with labs; discussed COVID19 precautions  2.  check labs every 3 months with CEA every 6 mnonths  3.  F/u with PCP, GI, rad/onc, Gen Surgery etc  4.  F/u nephrology     5. Refill antiemetics as needed           RTC in 6 months     Fax note to [facility]; Tete Duff Tandron; [place];     Discussion:       I spent over 25 mins of time with the patient. Reviewed results of the recently ordered labs, tests and studies; made directives with regards to the results. Over half of this time was spent couseling and coordinating care.    COVID-19 Discussion:    I had long discussion with patient and any applicable family  about the COVID-19 coronavirus epidemic and the recommended precautions with regard to cancer and/or hematology patients. I have re-iterated the CDC recommendations for adequate hand washing, use of hand -like products, and coughing into elbow, etc. In addition, especially for our patients who are on chemotherapy and/or our otherwise immunocompromised patients, I have recommended avoidance of crowds, including movie theaters, restaurants, churches, etc. I have recommended avoidance of any sick or symptomatic family members and/or friends. I have also recommended avoidance of any raw and unwashed food products, and general avoidance of food items that have not been prepared by themselves. The patient has been asked to call us immediately with any symptom developments, issues, questions or other general concerns.       Chemotherapy Discussion:      I discussed the available treatment option(s) in accordance with the latest/current national evidence-based guidelines (NCCN, UpToDate, NCI, ASCO, etc where applicable), their overall age/condition and their co-morbidities. I also went over the risks and benefits of the chemotherapy with regard to their particular cancer type, their cancer stage, their age/condition, and their co-morbidities. I provided literature on the chemotherapy regimen and discussed the chemotherapy side-effect profiles of the drug(s). I discussed the importance of compliance with obtaining and monitoring weekly lab work, and went over the potential hematopathology issues and risks with anemia, leucopenia and thrombocytopenia that can occur with chemotherapy. I discussed the potential risks of liver and kidney damage, which could be permanent and could necessitate dialysis long-term if kidney failure developed. I discussed the emetic and/or diarrheal potential of the regimen and the potential need for use of antiemetic and anti-diarrheal medications. I discussed the risk for development of  anaphylactic shock, bronchospasm, dysrhythmia, and respiratory/cardiovascular arrest and/or failure. I discussed the potential risks for development of alopecia, cold sensory issues, ringing in ears, vertigo, cataracts, glaucoma, and neuropathy, all of which could end up being chronic and life-long. Some chemotherpyI discussed the risks of hand-foot syndrome and rashes, and development of other autoimmune mediated processes such as pneumonitis, hepatitis, and colitis which could be life threatening. I discussed the risks of the potential development of a rare but fatal viral mediated disease known as PML (Progressive Multifocal Leukoencephalopathy), and risk of future development of leukemia and/or lymphoma from use of certain chemotherapy agents. I discussed the need for neutropenic precautions, basic hygiene/sanitation behaviors and dietary restrictions.    The patient's consent has been obtained to proceed with the chemotherapy.The patient will be referred to Chemotherapy [facility] /[facility] for training and education on chemotherapy, use of antiemetics and/or anti-diarrheals, use of NSAID's, potential chemotherapy side-effects, and any specific recommendations and precautions with the particular chemotherapy agents.      I answered all of the patient's (and family's, if applicable) questions to the best of my ability and to their complete satisfaction. The patient acknowledged full understanding of the risks, recommendations and plan(s).          I have explained all of the above in detail and the patient understands all of the current recommendation(s). I have answered all of their questions to the best of my ability and to their complete satisfaction.   The patient is to continue with the current management plan.            Electronically signed by Carrillo Goode MD                Answers for HPI/ROS submitted by the patient on 7/11/2022  appetite change : No  mouth sores: No  visual disturbance:  No  cough: No  shortness of breath: No  chest pain: No  abdominal pain: No  diarrhea: No  frequency: No  back pain: No  rash: No  headaches: No  adenopathy: No  nervous/ anxious: No

## 2022-07-18 ENCOUNTER — OFFICE VISIT (OUTPATIENT)
Dept: HEMATOLOGY/ONCOLOGY | Facility: CLINIC | Age: 82
End: 2022-07-18
Payer: COMMERCIAL

## 2022-07-18 VITALS
HEART RATE: 76 BPM | WEIGHT: 153 LBS | SYSTOLIC BLOOD PRESSURE: 124 MMHG | HEIGHT: 61 IN | DIASTOLIC BLOOD PRESSURE: 67 MMHG | BODY MASS INDEX: 28.89 KG/M2 | RESPIRATION RATE: 16 BRPM | TEMPERATURE: 99 F

## 2022-07-18 DIAGNOSIS — C20 MALIGNANT NEOPLASM OF RECTUM: Primary | ICD-10-CM

## 2022-07-18 DIAGNOSIS — C20 RECTAL CANCER: ICD-10-CM

## 2022-07-18 DIAGNOSIS — C64.2 RENAL CELL CARCINOMA OF LEFT KIDNEY: ICD-10-CM

## 2022-07-18 DIAGNOSIS — D64.9 NORMOCYTIC ANEMIA: ICD-10-CM

## 2022-07-18 PROCEDURE — 3074F SYST BP LT 130 MM HG: CPT | Mod: CPTII,S$GLB,, | Performed by: INTERNAL MEDICINE

## 2022-07-18 PROCEDURE — 99214 OFFICE O/P EST MOD 30 MIN: CPT | Mod: S$GLB,,, | Performed by: INTERNAL MEDICINE

## 2022-07-18 PROCEDURE — 1159F MED LIST DOCD IN RCRD: CPT | Mod: CPTII,S$GLB,, | Performed by: INTERNAL MEDICINE

## 2022-07-18 PROCEDURE — 1126F PR PAIN SEVERITY QUANTIFIED, NO PAIN PRESENT: ICD-10-PCS | Mod: CPTII,S$GLB,, | Performed by: INTERNAL MEDICINE

## 2022-07-18 PROCEDURE — 1159F PR MEDICATION LIST DOCUMENTED IN MEDICAL RECORD: ICD-10-PCS | Mod: CPTII,S$GLB,, | Performed by: INTERNAL MEDICINE

## 2022-07-18 PROCEDURE — 3074F PR MOST RECENT SYSTOLIC BLOOD PRESSURE < 130 MM HG: ICD-10-PCS | Mod: CPTII,S$GLB,, | Performed by: INTERNAL MEDICINE

## 2022-07-18 PROCEDURE — 3078F PR MOST RECENT DIASTOLIC BLOOD PRESSURE < 80 MM HG: ICD-10-PCS | Mod: CPTII,S$GLB,, | Performed by: INTERNAL MEDICINE

## 2022-07-18 PROCEDURE — 1101F PT FALLS ASSESS-DOCD LE1/YR: CPT | Mod: CPTII,S$GLB,, | Performed by: INTERNAL MEDICINE

## 2022-07-18 PROCEDURE — 1126F AMNT PAIN NOTED NONE PRSNT: CPT | Mod: CPTII,S$GLB,, | Performed by: INTERNAL MEDICINE

## 2022-07-18 PROCEDURE — 1160F RVW MEDS BY RX/DR IN RCRD: CPT | Mod: CPTII,S$GLB,, | Performed by: INTERNAL MEDICINE

## 2022-07-18 PROCEDURE — 3078F DIAST BP <80 MM HG: CPT | Mod: CPTII,S$GLB,, | Performed by: INTERNAL MEDICINE

## 2022-07-18 PROCEDURE — 99214 PR OFFICE/OUTPT VISIT, EST, LEVL IV, 30-39 MIN: ICD-10-PCS | Mod: S$GLB,,, | Performed by: INTERNAL MEDICINE

## 2022-07-18 PROCEDURE — 1160F PR REVIEW ALL MEDS BY PRESCRIBER/CLIN PHARMACIST DOCUMENTED: ICD-10-PCS | Mod: CPTII,S$GLB,, | Performed by: INTERNAL MEDICINE

## 2022-07-18 PROCEDURE — 3288F PR FALLS RISK ASSESSMENT DOCUMENTED: ICD-10-PCS | Mod: CPTII,S$GLB,, | Performed by: INTERNAL MEDICINE

## 2022-07-18 PROCEDURE — 1101F PR PT FALLS ASSESS DOC 0-1 FALLS W/OUT INJ PAST YR: ICD-10-PCS | Mod: CPTII,S$GLB,, | Performed by: INTERNAL MEDICINE

## 2022-07-18 PROCEDURE — 3288F FALL RISK ASSESSMENT DOCD: CPT | Mod: CPTII,S$GLB,, | Performed by: INTERNAL MEDICINE

## 2022-10-03 ENCOUNTER — PATIENT MESSAGE (OUTPATIENT)
Dept: SURGERY | Facility: CLINIC | Age: 82
End: 2022-10-03
Payer: MEDICARE

## 2022-10-07 ENCOUNTER — PATIENT MESSAGE (OUTPATIENT)
Dept: SURGERY | Facility: CLINIC | Age: 82
End: 2022-10-07
Payer: MEDICARE

## 2022-10-07 DIAGNOSIS — Z85.048 HISTORY OF RECTAL CANCER: Primary | ICD-10-CM

## 2022-10-07 RX ORDER — SODIUM CHLORIDE, SODIUM LACTATE, POTASSIUM CHLORIDE, CALCIUM CHLORIDE 600; 310; 30; 20 MG/100ML; MG/100ML; MG/100ML; MG/100ML
INJECTION, SOLUTION INTRAVENOUS CONTINUOUS
Status: CANCELLED | OUTPATIENT
Start: 2022-10-07

## 2022-10-07 RX ORDER — SODIUM CHLORIDE 0.9 % (FLUSH) 0.9 %
10 SYRINGE (ML) INJECTION
Status: CANCELLED | OUTPATIENT
Start: 2022-10-07

## 2022-11-07 ENCOUNTER — TELEPHONE (OUTPATIENT)
Dept: SURGERY | Facility: HOSPITAL | Age: 82
End: 2022-11-07
Payer: MEDICARE

## 2022-11-07 NOTE — TELEPHONE ENCOUNTER
Attempted to call pt to review results of pathology from flex sig.  NO answer.  WIll have office reach out to pt to review results and to ensure regularly scheduled f/u is performed

## 2022-11-14 ENCOUNTER — PATIENT MESSAGE (OUTPATIENT)
Dept: SURGERY | Facility: CLINIC | Age: 82
End: 2022-11-14
Payer: MEDICARE

## 2022-11-22 ENCOUNTER — TELEPHONE (OUTPATIENT)
Dept: SURGERY | Facility: CLINIC | Age: 82
End: 2022-11-22
Payer: MEDICARE

## 2022-11-22 NOTE — TELEPHONE ENCOUNTER
LMOR @ 3:39pm on patients cell phone for patient to call back to schedule a f/u appointment. Migue

## 2022-11-29 ENCOUNTER — OFFICE VISIT (OUTPATIENT)
Dept: SURGERY | Facility: CLINIC | Age: 82
End: 2022-11-29
Payer: COMMERCIAL

## 2022-11-29 VITALS
TEMPERATURE: 100 F | SYSTOLIC BLOOD PRESSURE: 135 MMHG | BODY MASS INDEX: 29.02 KG/M2 | HEART RATE: 75 BPM | WEIGHT: 153.69 LBS | DIASTOLIC BLOOD PRESSURE: 65 MMHG | HEIGHT: 61 IN

## 2022-11-29 DIAGNOSIS — Z09 POSTOP CHECK: Primary | ICD-10-CM

## 2022-11-29 PROCEDURE — 1126F PR PAIN SEVERITY QUANTIFIED, NO PAIN PRESENT: ICD-10-PCS | Mod: CPTII,S$GLB,, | Performed by: SURGERY

## 2022-11-29 PROCEDURE — 1101F PR PT FALLS ASSESS DOC 0-1 FALLS W/OUT INJ PAST YR: ICD-10-PCS | Mod: CPTII,S$GLB,, | Performed by: SURGERY

## 2022-11-29 PROCEDURE — 3075F SYST BP GE 130 - 139MM HG: CPT | Mod: CPTII,S$GLB,, | Performed by: SURGERY

## 2022-11-29 PROCEDURE — 3078F DIAST BP <80 MM HG: CPT | Mod: CPTII,S$GLB,, | Performed by: SURGERY

## 2022-11-29 PROCEDURE — 3078F PR MOST RECENT DIASTOLIC BLOOD PRESSURE < 80 MM HG: ICD-10-PCS | Mod: CPTII,S$GLB,, | Performed by: SURGERY

## 2022-11-29 PROCEDURE — 3288F FALL RISK ASSESSMENT DOCD: CPT | Mod: CPTII,S$GLB,, | Performed by: SURGERY

## 2022-11-29 PROCEDURE — 3288F PR FALLS RISK ASSESSMENT DOCUMENTED: ICD-10-PCS | Mod: CPTII,S$GLB,, | Performed by: SURGERY

## 2022-11-29 PROCEDURE — 99999 PR PBB SHADOW E&M-EST. PATIENT-LVL IV: CPT | Mod: PBBFAC,,, | Performed by: SURGERY

## 2022-11-29 PROCEDURE — 1159F MED LIST DOCD IN RCRD: CPT | Mod: CPTII,S$GLB,, | Performed by: SURGERY

## 2022-11-29 PROCEDURE — 1126F AMNT PAIN NOTED NONE PRSNT: CPT | Mod: CPTII,S$GLB,, | Performed by: SURGERY

## 2022-11-29 PROCEDURE — 99024 PR POST-OP FOLLOW-UP VISIT: ICD-10-PCS | Mod: S$GLB,,, | Performed by: SURGERY

## 2022-11-29 PROCEDURE — 3075F PR MOST RECENT SYSTOLIC BLOOD PRESS GE 130-139MM HG: ICD-10-PCS | Mod: CPTII,S$GLB,, | Performed by: SURGERY

## 2022-11-29 PROCEDURE — 99999 PR PBB SHADOW E&M-EST. PATIENT-LVL IV: ICD-10-PCS | Mod: PBBFAC,,, | Performed by: SURGERY

## 2022-11-29 PROCEDURE — 1101F PT FALLS ASSESS-DOCD LE1/YR: CPT | Mod: CPTII,S$GLB,, | Performed by: SURGERY

## 2022-11-29 PROCEDURE — 99024 POSTOP FOLLOW-UP VISIT: CPT | Mod: S$GLB,,, | Performed by: SURGERY

## 2022-11-29 PROCEDURE — 1159F PR MEDICATION LIST DOCUMENTED IN MEDICAL RECORD: ICD-10-PCS | Mod: CPTII,S$GLB,, | Performed by: SURGERY

## 2022-11-29 NOTE — PROGRESS NOTES
Patient doing well.  No complaints.  She returns for follow-up after sigmoidoscopy.  Sigmoidoscopy was performed 1 month ago.  She has no complaints otherwise states she feels well.    AFVSS  AAox3  Soft/nt/nd  Rectal deferred    Path from recent flex sig d/w pt    A/P: history of rectal cancer  Routine surveillance in 6 months  She will RTC at that time.

## 2022-12-23 ENCOUNTER — IMMUNIZATION (OUTPATIENT)
Dept: PRIMARY CARE CLINIC | Facility: CLINIC | Age: 82
End: 2022-12-23
Payer: COMMERCIAL

## 2022-12-23 DIAGNOSIS — Z23 NEED FOR VACCINATION: Primary | ICD-10-CM

## 2022-12-23 PROCEDURE — 91312 COVID-19, MRNA, LNP-S, BIVALENT BOOSTER, PF, 30 MCG/0.3 ML DOSE: ICD-10-PCS | Mod: S$GLB,,, | Performed by: FAMILY MEDICINE

## 2022-12-23 PROCEDURE — 0124A COVID-19, MRNA, LNP-S, BIVALENT BOOSTER, PF, 30 MCG/0.3 ML DOSE: CPT | Mod: PBBFAC | Performed by: FAMILY MEDICINE

## 2022-12-23 PROCEDURE — 91312 COVID-19, MRNA, LNP-S, BIVALENT BOOSTER, PF, 30 MCG/0.3 ML DOSE: CPT | Mod: S$GLB,,, | Performed by: FAMILY MEDICINE

## 2023-01-20 ENCOUNTER — TELEPHONE (OUTPATIENT)
Dept: HEMATOLOGY/ONCOLOGY | Facility: CLINIC | Age: 83
End: 2023-01-20

## 2023-01-24 NOTE — PROGRESS NOTES
[facility] Hematology/Oncology  PROGRESS NOTE -   Follow-up Visit      Subjective:       Patient ID:   NAME: Valery Huggins : 1940     82 y.o. female    Referring Doc: David Mendieta MD  Other Physicians: Armando Nath; Stanislav Epstein; Azar Donnelly; Mo    Chief Complaint:  Left RCC and rectal CA f/u    History of Present Illness:     Patient is being seen today in person in clinic . She is here by herself today..  She reports that she is feeling fine.  The patient is on today to go over the results of the recently ordered labs, tests and studies.         No CP, SOB, HA's or N/V.       She saw Dr Nath in Dec 2022       She had a colonoscopy with Dr Mendieta in Oct 2022 which was good per patient; he will do repeat in 2023    She saw Dr Angeles with nephrology    Discussed Covid19 precautions - she had her vaccinations                ROS:   GEN: normal without any fever, night sweats or weight loss  HEENT: normal with no HA's, sore throat, stiff neck, changes in vision;    CV: normal with no CP, SOB, PND, RAYA or orthopnea  PULM: normal with no SOB, cough, hemoptysis, sputum or pleuritic pain  GI: no nausea or constipation  : normal with no hematuria, dysuria  BREAST: normal with no mass, discharge, pain  SKIN: normal with no rash, erythema, bruising, or swelling    Allergies:  Review of patient's allergies indicates:   Allergen Reactions    Sunitinib Diarrhea, Nausea And Vomiting and Other (See Comments)     Vomiting and Diarrhea       Medications:    Current Outpatient Medications:     amLODIPine (NORVASC) 2.5 MG tablet, Take 1 tablet (2.5 mg total) by mouth once daily., Disp: 90 tablet, Rfl: 0    ascorbic acid, vitamin C, (VITAMIN C) 100 MG tablet, Take 100 mg by mouth once daily., Disp: , Rfl:     atorvastatin (LIPITOR) 10 MG tablet, Take 1 tablet (10 mg total) by mouth every evening., Disp: 90 tablet, Rfl: 0    b complex vitamins capsule, Take 1 capsule by mouth once daily., Disp: , Rfl:     " BD ULTRA-FINE CONNIE PEN NEEDLE 32 gauge x 5/32" Ndle, INJECT TWICE DAILY AS DIRECTED (Patient not taking: Reported on 11/29/2022), Disp: 200 each, Rfl: 0    calcitRIOL (ROCALTROL) 0.5 MCG Cap, TAKE 1 CAPSULE (0.5 MCG TOTAL) BY MOUTH ONCE DAILY., Disp: 90 capsule, Rfl: 0    cholecalciferol, vitamin D3, 100 mcg (4,000 unit) Cap, Take 1 capsule by mouth once daily., Disp: , Rfl:     EScitalopram oxalate (LEXAPRO) 20 MG tablet, Take 1 tablet (20 mg total) by mouth once daily., Disp: 90 tablet, Rfl: 0    FREESTYLE LITE METER kit, , Disp: , Rfl: 0    FREESTYLE LITE STRIPS Strp, USE TO TEST FOR BLOOD SUGAR TWICE DAILY AS DIRECTED (Patient not taking: Reported on 11/29/2022), Disp: 200 strip, Rfl: 3    gabapentin (NEURONTIN) 100 MG capsule, TAKE 2 CAPSULES BY MOUTH EACH MORNING, 1 CAPSULE MIDDAY AND 2 EACH NIGHT AT BEDTIME, Disp: 150 capsule, Rfl: 2    hydrALAZINE (APRESOLINE) 100 MG tablet, Take 1 tablet (100 mg total) by mouth 2 (two) times daily., Disp: 90 tablet, Rfl: 0    levothyroxine (SYNTHROID) 88 MCG tablet, Take 1 tablet (88 mcg total) by mouth once daily., Disp: 90 tablet, Rfl: 0    lifitegrast (XIIDRA) 5 % Dpet, Apply to eye., Disp: , Rfl:     magnesium oxide (MAG-OX) 400 mg (241.3 mg magnesium) tablet, Take 1 tablet (400 mg total) by mouth 2 (two) times daily., Disp: 180 tablet, Rfl: 0    metoprolol tartrate (LOPRESSOR) 25 MG tablet, Take 1 tablet (25 mg total) by mouth 2 (two) times daily., Disp: 180 tablet, Rfl: 0    OZEMPIC 1 mg/dose (4 mg/3 mL), every 7 days., Disp: , Rfl:     pantoprazole (PROTONIX) 40 MG tablet, Take 1 tablet (40 mg total) by mouth once daily., Disp: 90 tablet, Rfl: 0    TOUJEO SOLOSTAR U-300 INSULIN 300 unit/mL (1.5 mL) InPn pen, INJECT 20 UNITS SUBCUTANEAOUSLY DAILY, Disp: 3 pen, Rfl: 1  No current facility-administered medications for this visit.    Facility-Administered Medications Ordered in Other Visits:     lactated ringers bolus 1,000 mL, 1,000 mL, Intravenous, Once, David MARROQUIN" MD Gayatri    lactated ringers infusion, , Intravenous, Continuous, David Mendieta MD    LIDOcaine (PF) 10 mg/ml (1%) injection 10 mg, 1 mL, Intradermal, Once PRN, David Mendieta MD    sodium chloride 0.9% flush 10 mL, 10 mL, Intravenous, PRN, David Mendieta MD    PMHx/PSHx Updates:  See patient's last visit with me on 7/18/2022  See H&P on 10/8/2019        Pathology:  Cancer Staging  No matching staging information was found for the patient.          Objective:     Vitals:  Blood pressure 130/78, pulse 77, temperature 98.1 °F (36.7 °C), weight 69.9 kg (154 lb), last menstrual period 06/01/2012.        Physical Examination:   GEN: no apparent distress, comfortable; AAOx3  HEAD: atraumatic and normocephalic  EYES: no conjunctival pallor or muddiness, no icterus; normal pupil reaction to ambient light  ENT: OMM, no pharyngeal erythema, external bilateral ears WNL; no visible thrush or ulcers  NECK: no masses or swelling, trachea midline, no visible LAD/LN's   CV: no palpitations; no pedal edema; no noticeable JVD or neck vein distension;  portacath on right CW since removed  CHEST: Normal respiratory effort; chest wall breath movements symmetrical; no audible wheezing  ABDOM: non-distended; no bloating  MUSC/Skeletal: ROM normal; joints visibly normal; no deformities or arthropathy  EXTREM: no clubbing, cyanosis, inflammation or swelling  SKIN: no rashes, lesions, ulcers, petechiae or subcutaneous nodules  : no hendrickson  NEURO: moving all 4 extremities; AAOx3; no tremors  PSYCH: normal mood, affect and behavior  LYMPH: no visible LN's or LAD              Labs:          Lab Results   Component Value Date    WBC 6.2 01/20/2023    HGB 11.1 (L) 01/20/2023    HCT 32.4 (L) 01/20/2023    MCV 88.0 01/20/2023     01/20/2023     CMP  Sodium   Date Value Ref Range Status   01/20/2023 136 135 - 146 mmol/L Final     Potassium   Date Value Ref Range Status   01/20/2023 4.6 3.5 - 5.3 mmol/L Final     Chloride   Date Value  Ref Range Status   01/20/2023 97 (L) 98 - 110 mmol/L Final     CO2   Date Value Ref Range Status   01/20/2023 34 (H) 20 - 32 mmol/L Final     Glucose   Date Value Ref Range Status   01/20/2023 139 (H) 65 - 99 mg/dL Final     Comment:                   Fasting reference interval     For someone without known diabetes, a glucose  value >125 mg/dL indicates that they may have  diabetes and this should be confirmed with a  follow-up test.          BUN   Date Value Ref Range Status   01/20/2023 43 (H) 7 - 25 mg/dL Final     Creatinine   Date Value Ref Range Status   01/20/2023 2.19 (H) 0.60 - 0.95 mg/dL Final     Calcium   Date Value Ref Range Status   01/20/2023 10.3 8.6 - 10.4 mg/dL Final     Total Protein   Date Value Ref Range Status   01/20/2023 6.0 (L) 6.1 - 8.1 g/dL Final     Albumin   Date Value Ref Range Status   01/20/2023 3.7 3.6 - 5.1 g/dL Final     Total Bilirubin   Date Value Ref Range Status   01/20/2023 0.6 0.2 - 1.2 mg/dL Final     Alkaline Phosphatase   Date Value Ref Range Status   11/12/2021 63 55 - 135 U/L Final     AST   Date Value Ref Range Status   01/20/2023 13 10 - 35 U/L Final     ALT   Date Value Ref Range Status   01/20/2023 10 6 - 29 U/L Final     Anion Gap   Date Value Ref Range Status   11/12/2021 10 8 - 16 mmol/L Final     eGFR if    Date Value Ref Range Status   04/13/2022 24 (L) > OR = 60 mL/min/1.73m2 Final     eGFR if non    Date Value Ref Range Status   04/13/2022 21 (L) > OR = 60 mL/min/1.73m2 Final     Lab Results   Component Value Date    CEA 2.7 07/15/2021     Lab Results   Component Value Date    IRON 58 01/20/2023    TIBC 281 01/20/2023    FERRITIN 95 01/20/2023           Radiology/Diagnostic Studies:    PET 5/2/2022:    IMPRESSION:     Stable exam demonstrating no evidence of FDG avid malignancy.     Stable 6 mm right upper lobe pulmonary nodule.      CT scans  10/7/2021:  Impression:     Essentially stable exam without evidence for recurrent or  metastatic disease.  There are no measurable lesions per RECIST criteria.     Stable right upper lobe pulmonary nodule, prior cholecystectomy and left nephrectomy, and additional findings as above.          CT scans 3/23/2021:    Impression:     1. Stable right upper lobe nodule.  No evidence for recurrent or metastatic neoplasm.  There are no measurable lesions per RECIST criteria.  2. Several non dependent polypoid nodules in the trachea, similar to multiple prior studies and likely benign.           PET 9/18/2020:    Impression:     1. Negative for recurrent malignancy or metastatic disease.  2. Unchanged 6 mm right upper lobe nodule, presumably benign.  3. Slight decreased FDG uptake associated with left paratracheal mass just inferior to thoracic inlet, differential diagnosis of which has been previously discussed, with benign etiologies likely.        MRI 8/6/2020:  Impression:     No evidence for disease progression.Minimal signal abnormality along the mid rectum corresponds to the previously treated lesion.  No pelvic adenopathy.       CT  3/5/2020    Impression       Status post left nephrectomy without evidence of recurrent renal malignancy.    No significant change since prior study.           MRI  1/8/2020:  Impression       Significant reduction in size of the patient's known mid rectal mass, which is no longer distinctly visible.  Reduction in size of 3 mesorectal lymph nodes as above.               PET  10/17/2019    Impression       1. Left paratracheal small mass extending into superior mediastinum with associated moderate FDG activity is unchanged in size and appearance since 02/16/2018 CT.  This is unlikely to represent metastatic disease or malignancy, given stability, and may represent residual thyroid parenchyma but is otherwise nonspecific.  2. Unchanged 6 mm right upper lobe nodule since 02/16/2018.  Benign etiology is likely the continued CT thorax without IV contrast follow-up is  recommended in 12 months to document greater than 2 years of stability.  3. No current convincing evidence of metastatic disease.  4. Previous rectal mass is no longer evident on this exam.               X-ray Chest Ap Portable    Result Date: 10/15/2019  EXAMINATION: XR CHEST AP PORTABLE CLINICAL HISTORY: s/p port; Encounter for adjustment and management of vascular access device TECHNIQUE: Single frontal view of the chest was performed. COMPARISON: 6/18/2019 FINDINGS: The cardiomediastinal silhouette is stable.  Lungs are clear of infiltrate.  No pleural effusions.  Laya catheter has been inserted from the region of the right subclavian vein with the tip at the level the proximal SVC.     Laya catheter inserted with the tip at the level the proximal SVC.  Lungs are expanded and clear.  No pneumothorax. Electronically signed by: Nubia Venegas MD Date:    10/15/2019 Time:    14:05    Surg Fl Surgery Fluoro Usage    Result Date: 10/15/2019  See OP Notes for results. IMPRESSION: See OP Notes for results. This procedure was auto-finalized by: Virtual Radi[facility]     Mri Rectal Cancer Without Contrast    Result Date: 9/18/2019  EXAMINATION: MRI RECTAL CANCER WITHOUT CONTRAST CLINICAL HISTORY: Rectal cancer, staging, locoregional;rectal anal mass;  Malignant neoplasm of rectum TECHNIQUE: Multiplanar multisequence MR imaging of the pelvis without contrast. COMPARISON: 07/12/2019 FINDINGS: Approximately 4 cm from the anal verge there is a peripherally located lobular mass along the dorsal rectal wall.  This measures 4 cm in length and 3.3 cm in diameter.  There is heterogeneous signal intensity.  There is restricted diffusion in the lesion and no discrete extra colonic extension.  Several lymph nodes are noted in the mesorectal fat including two which measure 3 mm near the inferior sacrum, series 8, image 16, and a 5 mm lymph node at the S2 level, series 8, image 8.  Prominent but nonenlarged bilateral obturator chain  lymph nodes also noted, on the right at 4 mm and left at 6 mm.  There is urinary bladder wall thickening which is diffuse.  Moderate degree of stool in the colon.  No dilated bowel loops.  Hysterectomy.     4 cm mid rectal lesion in keeping with known malignancy.  No discrete extension into the mesorectal fascia although there are several perirectal lymph nodes which raise the possibility for locoregional lymph node involvement. Electronically signed by: Bebo Kapoor Date:    09/18/2019 Time:    16:45      I have reviewed all available lab results and radiology reports.    Assessment/Plan:   (1) 82 y.o. female with diagnosis of prior left RCC who has been referred by David Saldaña MD for evaluation by medical hematology/oncology. She has been followed by Dr Stanislav Epstein with [facility] at the [facility] in [place]. She last saw Dr Epstein in July 2019. She was recently diagnosed with rectal mass by Dr Armando Duff which was found on colonoscopy on 8/26/2019. She had presented with lower Gi bleeding at that time. The pathology from those biopsies showed no evidence of malignancy at that time. She was subsequently seen by Dr David Saldaña and underwent trans-anal surgical biopsy on 9/23/2019. The pathology from the surgical biopsy showed rectal carcinoma.         She has been seen by Dr Fareed Hassan with Rad/onc for radiation therapy evaluation.     PET was done on 10/17/2019     We previously discussed giving her combined chemotherapy with the XRt to try to reduce her risk of recurrence. She was agreeable to this plan.    She had portacath placed with Dr Saldaña. She saw Dr Hassan last week with rad/onc. She has since completed weekly XRT and chemotherapy    - She previously saw Dr Saldaña on 12/10/2019  and had MRI on 1/8/2020.  They did scope on 1/28th 2020 and I spoke to dr saldaña by phone last week. The scope looked good and both the patient and Dr Saldaña does not want to proceed in direction of  "operation especially given her age and co-morbidities.    - We previously discussed adjuvant chemotherapy with FOLOFOX x 10-12 cycles especially since she is not having surgery and she is agreeable.    - Will previously  provide literature on the regimen and set up chemotherapy      7/2/2020:  - She has since started chemotherapy and she has had 8 cycles so far; chemotherapy was held last time due to general malaise. She feels good and has some minimal and tolerable neuropathy in hands and feet.  She has some intermittent constipation.    She wants to continue with therapy and I would like to at least get 10 cycles in if possible.     7/27/2020:  - she is on her last cycle #10 today  - check labs weekly for next 4 weeks  - she will need to follow-up with dr mendieta and also get repeat scans every 6 months    9/1/2020:  - she completed the 10th and last cycle about a month ago  - she had recent MRI of pelvis with Dr Mendieta on 8/12/2020 and plans to get repeat scope at end of Sept 2020  - she has some occasional lightheadedness and is going to see her eye doctor in the near future  - will schedule a PET scan and consider MRI of brain thereafter    9/28/2020:  - PET on 9/18/2020 on chart  - scope scheduled for tomorrow with Dr Mendieta  - check up to date labs  - increase the gabpentin to 2 pills bid with one pill mid-day still - if symptoms do not improve, then consider referral to neurology    10/26/2020:  - doing ok except for residual neuropathy issues   - discussed referral to neurology but she wants to hold off for now  - she had scope with Dr mendieta since last visit which was "perfect" per patient and repeat is planned for six months    12/21/2020:  - she is doing well  - neuropathy is better    4/7/2021:  - she is having MRI of pelvis and flex sig with Dr Mendieta next week  - latest CT's on 3/26/2021 look stable    7/20/2021:  - doing well  - seen by Dr mendieta recently with planned repeat flex sig in couple of months  - " CEA at 2.7  - will get repeat scans with PEt in Sept 2021 1/18/2022:  - She sees Dr Mendieta again in April 2022 and is expected to have another scope. She last had a scope in Oct 2021.  - she last had scans in Oct 2021  - check labs every 3 months incl CEA  - repeat scans in April 2022 7/18/2022:  - She had covid about a month ago.   - She had a colonoscopy with Dr Mendieta in June 2022 with one polyp removed with planned flex sig in 6 months   - She saw Dr Nath on 7/6/2022; she was having some pain in the left shoulder for which he gave her an injection  - she had PET on 5/2/2022 which was stable  - repeat scans in one year unless something new develops    1/25/2023:  - recent flexsig with Dr Mendieta in oct 2022 which was good - planned repeat in April  - due for rpeat 6 month scans - will set up         (2) Left renal cell carcinoma s/p left nephrectomy with Dr Azar Donnelly - diagnosed about 2 years ago     (3) Ureterolithiasis     (4) CRI - stage III - followed by Dr Antonio; she saw him recently and per patient's report, her kidney function is stable per Dr Antonio  - she is seeing Dr Antonio tomorrow     (5) HTN and hypercholesterolemia     (6) DM - followed by Dr Brower with endocrinology     (7) Hx/of gall stones     (8) Chronic left shoulder issues     (9) MVP     (10) Thyroid disease with prior hx/of thyroid cancer s/p thyroidectomy      (11) Chronic anemia - most likley multifactorial due to iron deficiency, GIB and anemia of chronic renal disease       (12) hypocalcemia - followed by Dr Serrano    (13) 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  Encouraged compliance with labs; discussed COVID19 precautions  2.  check labs every 3 months with CEA every 6 mnonths  3.  F/u with PCP, GI,  rad/onc, Gen Surgery etc  4.  F/u nephrology     5. Refill antiemetics as needed  6. set up Ct scans   7. F/u with Dr Mendieta with expected repeat flexsig in April 2023           RTC in 6 months     Fax note to Ph[facility] Winston Parks; Tete Duff Tandron; [place];     Discussion:       I spent over 25 mins of time with the patient. Reviewed results of the recently ordered labs, tests and studies; made directives with regards to the results. Over half of this time was spent couseling and coordinating care.    COVID-19 Discussion:    I had long discussion with patient and any applicable family about the COVID-19 coronavirus epidemic and the recommended precautions with regard to cancer and/or hematology patients. I have re-iterated the CDC recommendations for adequate hand washing, use of hand -like products, and coughing into elbow, etc. In addition, especially for our patients who are on chemotherapy and/or our otherwise immunocompromised patients, I have recommended avoidance of crowds, including movie theaters, restaurants, churches, etc. I have recommended avoidance of any sick or symptomatic family members and/or friends. I have also recommended avoidance of any raw and unwashed food products, and general avoidance of food items that have not been prepared by themselves. The patient has been asked to call us immediately with any symptom developments, issues, questions or other general concerns.       Chemotherapy Discussion:      I discussed the available treatment option(s) in accordance with the latest/current national evidence-based guidelines (NCCN, UpToDate, NCI, ASCO, etc where applicable), their overall age/condition and their co-morbidities. I also went over the risks and benefits of the chemotherapy with regard to their particular cancer type, their cancer stage, their age/condition, and their co-morbidities. I provided literature on the chemotherapy regimen and discussed the chemotherapy  side-effect profiles of the drug(s). I discussed the importance of compliance with obtaining and monitoring weekly lab work, and went over the potential hematopathology issues and risks with anemia, leucopenia and thrombocytopenia that can occur with chemotherapy. I discussed the potential risks of liver and kidney damage, which could be permanent and could necessitate dialysis long-term if kidney failure developed. I discussed the emetic and/or diarrheal potential of the regimen and the potential need for use of antiemetic and anti-diarrheal medications. I discussed the risk for development of anaphylactic shock, bronchospasm, dysrhythmia, and respiratory/cardiovascular arrest and/or failure. I discussed the potential risks for development of alopecia, cold sensory issues, ringing in ears, vertigo, cataracts, glaucoma, and neuropathy, all of which could end up being chronic and life-long. Some chemotherpyI discussed the risks of hand-foot syndrome and rashes, and development of other autoimmune mediated processes such as pneumonitis, hepatitis, and colitis which could be life threatening. I discussed the risks of the potential development of a rare but fatal viral mediated disease known as PML (Progressive Multifocal Leukoencephalopathy), and risk of future development of leukemia and/or lymphoma from use of certain chemotherapy agents. I discussed the need for neutropenic precautions, basic hygiene/sanitation behaviors and dietary restrictions.    The patient's consent has been obtained to proceed with the chemotherapy.The patient will be referred to Chemotherapy [facility] /[facility] for training and education on chemotherapy, use of antiemetics and/or anti-diarrheals, use of NSAID's, potential chemotherapy side-effects, and any specific recommendations and precautions with the particular chemotherapy agents.      I answered all of the patient's (and family's, if applicable) questions to the best of my  ability and to their complete satisfaction. The patient acknowledged full understanding of the risks, recommendations and plan(s).          I have explained all of the above in detail and the patient understands all of the current recommendation(s). I have answered all of their questions to the best of my ability and to their complete satisfaction.   The patient is to continue with the current management plan.            Electronically signed by Carrillo Goode MD                      Answers submitted by the patient for this visit:  Review of Systems Questionnaire (Submitted on 1/23/2023)  appetite change : No  unexpected weight change: No  mouth sores: No  visual disturbance: No  cough: No  shortness of breath: No  chest pain: No  abdominal pain: No  diarrhea: No  frequency: No  back pain: No  rash: No  headaches: No  adenopathy: No  nervous/ anxious: No

## 2023-01-25 ENCOUNTER — OFFICE VISIT (OUTPATIENT)
Dept: HEMATOLOGY/ONCOLOGY | Facility: CLINIC | Age: 83
End: 2023-01-25
Payer: MEDICARE

## 2023-01-25 VITALS
SYSTOLIC BLOOD PRESSURE: 130 MMHG | DIASTOLIC BLOOD PRESSURE: 78 MMHG | HEART RATE: 77 BPM | BODY MASS INDEX: 29.1 KG/M2 | TEMPERATURE: 98 F | WEIGHT: 154 LBS

## 2023-01-25 DIAGNOSIS — C20 RECTAL CANCER: ICD-10-CM

## 2023-01-25 DIAGNOSIS — C20 MALIGNANT NEOPLASM OF RECTUM: ICD-10-CM

## 2023-01-25 DIAGNOSIS — D64.9 NORMOCYTIC ANEMIA: ICD-10-CM

## 2023-01-25 DIAGNOSIS — C64.2 RENAL CELL CARCINOMA OF LEFT KIDNEY: ICD-10-CM

## 2023-01-25 DIAGNOSIS — Z90.5 STATUS POST NEPHRECTOMY: Primary | ICD-10-CM

## 2023-01-25 PROCEDURE — 99214 PR OFFICE/OUTPT VISIT, EST, LEVL IV, 30-39 MIN: ICD-10-PCS | Mod: S$GLB,,, | Performed by: INTERNAL MEDICINE

## 2023-01-25 PROCEDURE — 99214 OFFICE O/P EST MOD 30 MIN: CPT | Mod: S$GLB,,, | Performed by: INTERNAL MEDICINE

## 2023-02-02 ENCOUNTER — TELEPHONE (OUTPATIENT)
Dept: HEMATOLOGY/ONCOLOGY | Facility: CLINIC | Age: 83
End: 2023-02-02

## 2023-02-02 NOTE — TELEPHONE ENCOUNTER
----- Message from Saran Madison sent at 2/2/2023  2:53 PM CST -----  Patient has auth just need to change the facility  ----- Message -----  From: Justine Jefferson RN  Sent: 2/2/2023  11:55 AM CST  To: Saran Madison    CT orders are in, can we please make sure auth is obtained for patient to have scan done at [facility].   ----- Message -----  From: Agustina Bryson  Sent: 2/2/2023   9:12 AM CST  To: Rupa Vizcaino Staff    Pt's CT orders are at [facility]. She is asking if she can change it to [facility] cb [phone]

## 2023-02-02 NOTE — TELEPHONE ENCOUNTER
Per Darius auth obtained. Patient aware and informed that scheduling will call with appt and if she does not hear from them to please call them to schedule. Verbalized understanding.

## 2023-02-20 ENCOUNTER — TELEPHONE (OUTPATIENT)
Dept: HEMATOLOGY/ONCOLOGY | Facility: CLINIC | Age: 83
End: 2023-02-20

## 2023-02-20 NOTE — TELEPHONE ENCOUNTER
Dr Goode reviewed patient's CT scan - per his verbal order, patient called and notified that her scans were stable. Patient verbalized understanding of above.

## 2023-04-10 NOTE — ADDENDUM NOTE
Addended by: KIYA POTTS on: 12/10/2019 12:33 PM     Modules accepted: Orders     Body Location Override (Optional - Billing Will Still Be Based On Selected Body Map Location If Applicable): right anterior thigh Detail Level: Detailed Depth Of Biopsy: dermis Was A Bandage Applied: Yes Size Of Lesion In Cm: 1.5 Biopsy Type: H and E Biopsy Method: Personna blade Anesthesia Type: 1% lidocaine with epinephrine Anesthesia Volume In Cc (Will Not Render If 0): 4 Additional Anesthesia Volume In Cc (Will Not Render If 0): 0 Hemostasis: Monsel's and Electrocautery Wound Care: Petrolatum Dressing: bandage Destruction After The Procedure: No Type Of Destruction Used: Curettage Curettage Text: The wound bed was treated with curettage after the biopsy was performed. Cryotherapy Text: The wound bed was treated with cryotherapy after the biopsy was performed. Electrodesiccation Text: The wound bed was treated with electrodesiccation after the biopsy was performed. Electrodesiccation And Curettage Text: The wound bed was treated with electrodesiccation and curettage after the biopsy was performed. Silver Nitrate Text: The wound bed was treated with silver nitrate after the biopsy was performed. Lab: -[id number] Consent: Written consent was obtained and risks were reviewed including but not limited to scarring, infection, bleeding, scabbing, incomplete removal, nerve damage and allergy to anesthesia. Post-Care Instructions: I reviewed with the patient in detail post-care instructions. Patient is to keep the biopsy site dry overnight, and then apply bacitracin twice daily until healed. Patient may apply hydrogen peroxide soaks to remove any crusting. Notification Instructions: Patient will be notified of biopsy results. However, patient instructed to call the office if not contacted within 2 weeks. Billing Type: [employer] Information: Selecting Yes will display possible errors in your note based on the variables you have selected. This validation is only offered as a suggestion for you. PLEASE NOTE THAT THE VALIDATION TEXT WILL BE REMOVED WHEN YOU FINALIZE YOUR NOTE. IF YOU WANT TO FAX A PRELIMINARY NOTE YOU WILL NEED TO TOGGLE THIS TO 'NO' IF YOU DO NOT WANT IT IN YOUR FAXED NOTE.

## 2023-04-21 ENCOUNTER — TELEPHONE (OUTPATIENT)
Dept: SURGERY | Facility: CLINIC | Age: 83
End: 2023-04-21
Payer: MEDICARE

## 2023-04-21 NOTE — TELEPHONE ENCOUNTER
----- Message from Neha Madison sent at 4/21/2023  2:31 PM CDT -----  Contact: pt  Type: Needs Medical Advice  Who Called:  pt  Best Call Back Number: [phone]  Additional Information: pt is calling the office for kaela buck. Please call back and advise

## 2023-04-21 NOTE — TELEPHONE ENCOUNTER
Spoke with patient, advised Migue and Christopher are out of the office and will return on Monday.  She states Dr Mendieta told her she needed another flex sig in April and she wanted to remind you.  If you can set it up and call her with the date.

## 2023-04-24 ENCOUNTER — PATIENT MESSAGE (OUTPATIENT)
Dept: SURGERY | Facility: CLINIC | Age: 83
End: 2023-04-24
Payer: MEDICARE

## 2023-05-01 ENCOUNTER — PATIENT MESSAGE (OUTPATIENT)
Dept: SURGERY | Facility: CLINIC | Age: 83
End: 2023-05-01
Payer: MEDICARE

## 2023-05-04 ENCOUNTER — PATIENT MESSAGE (OUTPATIENT)
Dept: SURGERY | Facility: CLINIC | Age: 83
End: 2023-05-04
Payer: MEDICARE

## 2023-05-04 DIAGNOSIS — Z85.048 HISTORY OF RECTAL CANCER: Primary | ICD-10-CM

## 2023-05-04 RX ORDER — SODIUM CHLORIDE 0.9 % (FLUSH) 0.9 %
10 SYRINGE (ML) INJECTION
Status: CANCELLED | OUTPATIENT
Start: 2023-05-04

## 2023-05-04 RX ORDER — SODIUM CHLORIDE, SODIUM LACTATE, POTASSIUM CHLORIDE, CALCIUM CHLORIDE 600; 310; 30; 20 MG/100ML; MG/100ML; MG/100ML; MG/100ML
INJECTION, SOLUTION INTRAVENOUS CONTINUOUS
Status: CANCELLED | OUTPATIENT
Start: 2023-05-04

## 2023-05-15 ENCOUNTER — ANESTHESIA EVENT (OUTPATIENT)
Dept: ENDOSCOPY | Facility: HOSPITAL | Age: 83
End: 2023-05-15
Payer: MEDICARE

## 2023-05-15 ENCOUNTER — ANESTHESIA (OUTPATIENT)
Dept: ENDOSCOPY | Facility: HOSPITAL | Age: 83
End: 2023-05-15
Payer: MEDICARE

## 2023-05-15 ENCOUNTER — HOSPITAL ENCOUNTER (OUTPATIENT)
Facility: HOSPITAL | Age: 83
Discharge: HOME | End: 2023-05-15
Attending: SURGERY | Admitting: SURGERY
Payer: MEDICARE

## 2023-05-15 VITALS
BODY MASS INDEX: 28.13 KG/M2 | OXYGEN SATURATION: 99 % | RESPIRATION RATE: 18 BRPM | SYSTOLIC BLOOD PRESSURE: 136 MMHG | TEMPERATURE: 98 F | WEIGHT: 149 LBS | DIASTOLIC BLOOD PRESSURE: 72 MMHG | HEART RATE: 75 BPM | HEIGHT: 61 IN

## 2023-05-15 PROCEDURE — 25000003 PHARM REV CODE 250: Performed by: SURGERY

## 2023-05-15 PROCEDURE — D9220A PRA ANESTHESIA: Mod: ANES,,, | Performed by: ANESTHESIOLOGY

## 2023-05-15 PROCEDURE — D9220A PRA ANESTHESIA: ICD-10-PCS | Mod: ANES,,, | Performed by: ANESTHESIOLOGY

## 2023-05-15 PROCEDURE — 88305 TISSUE EXAM BY PATHOLOGIST: CPT | Mod: 26,,, | Performed by: PATHOLOGY

## 2023-05-15 PROCEDURE — 37000009 HC ANESTHESIA EA ADD 15 MINS: Performed by: SURGERY

## 2023-05-15 PROCEDURE — 63600175 PHARM REV CODE 636 W HCPCS: Performed by: STUDENT IN AN ORGANIZED HEALTH CARE EDUCATION/TRAINING PROGRAM

## 2023-05-15 PROCEDURE — 37000008 HC ANESTHESIA 1ST 15 MINUTES: Performed by: SURGERY

## 2023-05-15 PROCEDURE — D9220A PRA ANESTHESIA: ICD-10-PCS | Mod: CRNA,,, | Performed by: STUDENT IN AN ORGANIZED HEALTH CARE EDUCATION/TRAINING PROGRAM

## 2023-05-15 PROCEDURE — 88305 TISSUE EXAM BY PATHOLOGIST: CPT | Performed by: PATHOLOGY

## 2023-05-15 PROCEDURE — 27201012 HC FORCEPS, HOT/COLD, DISP: Performed by: SURGERY

## 2023-05-15 PROCEDURE — 25000003 PHARM REV CODE 250: Performed by: STUDENT IN AN ORGANIZED HEALTH CARE EDUCATION/TRAINING PROGRAM

## 2023-05-15 PROCEDURE — 45331 PR SIGMOIDOSCOPY,BIOPSY: ICD-10-PCS | Mod: ,,, | Performed by: SURGERY

## 2023-05-15 PROCEDURE — 45331 SIGMOIDOSCOPY AND BIOPSY: CPT | Performed by: SURGERY

## 2023-05-15 PROCEDURE — 45331 SIGMOIDOSCOPY AND BIOPSY: CPT | Mod: ,,, | Performed by: SURGERY

## 2023-05-15 PROCEDURE — D9220A PRA ANESTHESIA: Mod: CRNA,,, | Performed by: STUDENT IN AN ORGANIZED HEALTH CARE EDUCATION/TRAINING PROGRAM

## 2023-05-15 PROCEDURE — 88305 TISSUE EXAM BY PATHOLOGIST: ICD-10-PCS | Mod: 26,,, | Performed by: PATHOLOGY

## 2023-05-15 RX ORDER — SODIUM CHLORIDE 9 MG/ML
INJECTION, SOLUTION INTRAVENOUS CONTINUOUS
Status: DISCONTINUED | OUTPATIENT
Start: 2023-05-15 | End: 2023-05-15 | Stop reason: HOSPADM

## 2023-05-15 RX ORDER — LIDOCAINE HYDROCHLORIDE 20 MG/ML
INJECTION INTRAVENOUS
Status: DISCONTINUED | OUTPATIENT
Start: 2023-05-15 | End: 2023-05-15

## 2023-05-15 RX ORDER — SODIUM CHLORIDE 9 MG/ML
INJECTION, SOLUTION INTRAVENOUS CONTINUOUS
Status: DISCONTINUED | OUTPATIENT
Start: 2023-05-15 | End: 2023-05-15

## 2023-05-15 RX ORDER — PROPOFOL 10 MG/ML
VIAL (ML) INTRAVENOUS
Status: DISCONTINUED | OUTPATIENT
Start: 2023-05-15 | End: 2023-05-15

## 2023-05-15 RX ADMIN — LIDOCAINE HYDROCHLORIDE 40 MG: 20 INJECTION, SOLUTION INTRAVENOUS at 07:05

## 2023-05-15 RX ADMIN — PROPOFOL 30 MG: 10 INJECTION, EMULSION INTRAVENOUS at 07:05

## 2023-05-15 RX ADMIN — PROPOFOL 50 MG: 10 INJECTION, EMULSION INTRAVENOUS at 07:05

## 2023-05-15 RX ADMIN — SODIUM CHLORIDE: 9 INJECTION, SOLUTION INTRAVENOUS at 07:05

## 2023-05-15 RX ADMIN — PROPOFOL 20 MG: 10 INJECTION, EMULSION INTRAVENOUS at 07:05

## 2023-05-15 RX ADMIN — PROPOFOL 10 MG: 10 INJECTION, EMULSION INTRAVENOUS at 07:05

## 2023-05-15 NOTE — TRANSFER OF CARE
"Anesthesia Transfer of Care Note    Patient: Valery Huggins    Procedure(s) Performed: Procedure(s) (LRB):  SIGMOIDOSCOPY, FLEXIBLE (N/A)    Patient location: PACU    Anesthesia Type: general    Transport from OR: Transported from OR on room air with adequate spontaneous ventilation    Post pain: adequate analgesia    Post assessment: no apparent anesthetic complications    Post vital signs: stable    Level of consciousness: awake and alert    Nausea/Vomiting: no nausea/vomiting    Complications: none    Transfer of care protocol was followed      Last vitals:   Visit Vitals  BP (!) 171/77   Pulse 70   Temp 36.7 °C (98.1 °F) (Skin)   Resp 18   Ht 5' 1" (1.549 m)   Wt 67.6 kg (149 lb)   LMP 06/01/2012   SpO2 98%   Breastfeeding No   BMI 28.15 kg/m²     "

## 2023-05-15 NOTE — H&P
[facility]  History & Physical     Subjective:     Chief Complaint/Reason for Admission: history of rectal cancer    History of Present Illness:   Patient 82 y.o. female presents for flexible sigmodoscopy.  Pt has history of rectal cancer which responded well to nonoperative mgmt.  Pt had findings worrisome for StIII rectal cancer.  She had apparent complete response to neoadjuvant treatment and has been on surveillance.  Last flex sig 6 months ago.      Patient Active Problem List    Diagnosis Date Noted    HTN (hypertension) 04/14/2021    History of rectal cancer 04/13/2021    Hypocalcemia 06/08/2020    Chemotherapy induced neutropenia 04/27/2020    Malignant neoplasm of rectum 12/23/2019    Encounter for insertion of venous access port 10/15/2019    Rectal cancer 10/08/2019    Rectal mass 09/23/2019    Hypocalcemia 06/18/2019    Hypomagnesemia 06/18/2019    CKD (chronic kidney disease) stage 4, GFR 15-29 ml/min 06/18/2019    Non-insulin dependent type 2 diabetes mellitus 07/31/2018    Cervical lymphadenopathy 04/26/2018    Calculus of kidney and ureter 03/07/2018    Normocytic anemia 01/22/2018    VICTOR M (acute kidney injury) 01/21/2018    Calculus of gallbladder without cholecystitis 01/21/2018    Status post Left nephrectomy 11/17/2017    Renal cell carcinoma 11/16/2017    Essential hypertension 09/26/2017    Ureteral calculus, left 01/16/2017    Ureterolithiasis 12/11/2016    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/2016    Renal cell carcinoma of left kidney 12/11/2016    Renal calculus, left 12/05/2016    BMI 28.0-28.9,adult 05/19/2016    Renal mass, left 11/02/2015    Calculus of ureter 07/06/2015    Chronic pain in left shoulder 09/05/2012    Hypercholesteremia 09/05/2012        Medications Prior to Admission   Medication Sig Dispense Refill Last Dose    amLODIPine (NORVASC) 2.5 MG tablet Take 1 tablet (2.5 mg total) by mouth once daily. 90 tablet 0 5/15/2023    ascorbic acid, vitamin C,  (VITAMIN C) 100 MG tablet Take 100 mg by mouth once daily.   5/14/2023    b complex vitamins capsule Take 1 capsule by mouth once daily.   5/14/2023    EScitalopram oxalate (LEXAPRO) 20 MG tablet TAKE ONE TABLET BY MOUTH DAILY 90 tablet 0 5/14/2023    gabapentin (NEURONTIN) 100 MG capsule TAKE 2 CAPSULES BY MOUTH EACH MORNING, 1 CAPSULE MIDDAY AND 2 EACH NIGHT AT BEDTIME 150 capsule 2 5/14/2023    hydrALAZINE (APRESOLINE) 100 MG tablet Take 1 tablet (100 mg total) by mouth 2 (two) times daily. 90 tablet 0 5/15/2023    levothyroxine (SYNTHROID) 88 MCG tablet Take 1 tablet (88 mcg total) by mouth once daily. 90 tablet 0 5/14/2023    lifitegrast (XIIDRA) 5 % Dpet Apply to eye.   5/14/2023    magnesium oxide (MAG-OX) 400 mg (241.3 mg magnesium) tablet Take 1 tablet (400 mg total) by mouth 2 (two) times daily. 180 tablet 0 5/14/2023    metoprolol tartrate (LOPRESSOR) 25 MG tablet Take 1 tablet (25 mg total) by mouth 2 (two) times daily. 180 tablet 0 5/15/2023    OZEMPIC 1 mg/dose (4 mg/3 mL) every 7 days.   5/14/2023    pantoprazole (PROTONIX) 40 MG tablet TAKE 1 TABLET (40 MG TOTAL) BY MOUTH ONCE DAILY. 90 tablet 0 5/14/2023    TOUJEO SOLOSTAR U-300 INSULIN 300 unit/mL (1.5 mL) InPn pen INJECT 20 UNITS SUBCUTANEAOUSLY DAILY 3 pen 1 5/14/2023    atorvastatin (LIPITOR) 10 MG tablet Take 1 tablet (10 mg total) by mouth every evening. 90 tablet 0     calcitRIOL (ROCALTROL) 0.5 MCG Cap TAKE 1 CAPSULE (0.5 MCG TOTAL) BY MOUTH ONCE DAILY. 90 capsule 0     FREESTYLE LITE METER kit   0      Review of patient's allergies indicates:   Allergen Reactions    Sunitinib Diarrhea, Nausea And Vomiting and Other (See Comments)     Vomiting and Diarrhea        Past Medical History:   Diagnosis Date    Anemia     Depression     Diabetes mellitus type II     Encounter for blood transfusion     Gallstones     GERD (gastroesophageal reflux disease)     Hypertension     Kidney stone     MVP (mitral valve prolapse)     Personal history of  colonic polyps 04/28/2022    PONV (postoperative nausea and vomiting)     Renal cell carcinoma of left kidney 12/11/2016    Thyroid cancer     Thyroid disease       Past Surgical History:   Procedure Laterality Date    APPENDECTOMY      cataracts      both eyes    CHOLECYSTECTOMY      COLONOSCOPY N/A 08/26/2019    Procedure: COLONOSCOPY;  Surgeon: Armando Duff MD;  Location: [facility];  Service: Endoscopy;  Laterality: N/A;    COLONOSCOPY N/A 04/28/2022    Procedure: COLONOSCOPY;  Surgeon: David Mendieta MD;  Location: [facility];  Service: General;  Laterality: N/A;    COLONOSCOPY W/ POLYPECTOMY  04/28/2022    CYSTOSCOPY      CYSTOSCOPY W/ URETERAL STENT PLACEMENT      ECTOPIC PREGNANCY SURGERY      EXAMINATION UNDER ANESTHESIA N/A 09/23/2019    Procedure: Exam under anesthesia;  Surgeon: David Mendieta MD;  Location: Replaced by [facility];  Service: General;  Laterality: N/A;    EYE SURGERY      phoebe cataract    FLEXIBLE SIGMOIDOSCOPY N/A 01/28/2020    Procedure: SIGMOIDOSCOPY, FLEXIBLE;  Surgeon: David Mendieta MD;  Location: [facility];  Service: Endoscopy;  Laterality: N/A;    FLEXIBLE SIGMOIDOSCOPY N/A 09/29/2020    Procedure: SIGMOIDOSCOPY, FLEXIBLE;  Surgeon: David Mendieta MD;  Location: [facility];  Service: Endoscopy;  Laterality: N/A;    FLEXIBLE SIGMOIDOSCOPY N/A 04/13/2021    Procedure: SIGMOIDOSCOPY, FLEXIBLE;  Surgeon: David Mendieta MD;  Location: [facility];  Service: Endoscopy;  Laterality: N/A;    FLEXIBLE SIGMOIDOSCOPY  10/21/2021    per  10/21/2021    FLEXIBLE SIGMOIDOSCOPY N/A 10/21/2021    Procedure: SIGMOIDOSCOPY, FLEXIBLE;  Surgeon: David Mendieta MD;  Location: [facility];  Service: General;  Laterality: N/A;    FLEXIBLE SIGMOIDOSCOPY  10/20/2022    FLEXIBLE SIGMOIDOSCOPY N/A 10/20/2022    Procedure: SIGMOIDOSCOPY, FLEXIBLE;  Surgeon: David Mendieta MD;  Location: [facility];  Service: General;  Laterality: N/A;    HYSTERECTOMY      INSERTION OF TUNNELED CENTRAL VENOUS CATHETER (CVC)  "WITH SUBCUTANEOUS PORT Right 10/15/2019    Procedure: [id number];  Surgeon: David Mendieta MD;  Location: [facility] OR;  Service: General;  Laterality: Right;    NASAL SEPTUM SURGERY      NEPHRECTOMY Left     OOPHORECTOMY      THYROIDECTOMY      two times    TONSILLECTOMY        Family History   Problem Relation Age of Onset    Hypertension Father     Kidney disease Father     Mental illness Mother     Cancer Brother       Social History     Tobacco Use    Smoking status: Never    Smokeless tobacco: Never   Substance Use Topics    Alcohol use: No        Review of Systems:  A comprehensive review of systems was negative.    OBJECTIVE:     Patient Vitals for the past 8 hrs:   BP Temp Temp src Pulse Resp SpO2 Height Weight   05/15/23 0701 (!) 171/77 98.1 °F (36.7 °C) Skin 70 18 98 % 5' 1" (1.549 m) 67.6 kg (149 lb)     AAox3  Soft/nt/nd      Data Review:      ASSESSMENT/PLAN:     There are no hospital problems to display for this patient.    History of rectal cancer  Plan:  To have flex sig today    "

## 2023-05-15 NOTE — ANESTHESIA PREPROCEDURE EVALUATION
05/15/2023  Valery Huggins is a 82 y.o., female.      Pre-op Assessment    I have reviewed the Patient Summary Reports.     I have reviewed the Nursing Notes. I have reviewed the NPO Status.   I have reviewed the Medications.     Review of Systems  Anesthesia Hx:  PONV     Social:  Non-Smoker    Hematology/Oncology:         -- Anemia: --  Cancer in past history (renal cell CA L- s/p nephrectomy, Thyroid CA):    Cardiovascular:   Hypertension, well controlled Valvular problems/Murmurs, MVP    Pulmonary:  Pulmonary Normal    Renal/:   Chronic Renal Disease renal calculi    Hepatic/GI:   GERD    Neurological:  Neurology Normal    Endocrine:   Diabetes, well controlled, type 2 Hypothyroidism    Psych:   Psychiatric History depression          Physical Exam  General: Well nourished, Cooperative, Alert and Oriented    Airway:  Mallampati: II   Mouth Opening: Normal  TM Distance: Normal  Neck ROM: Normal ROM        Anesthesia Plan  Type of Anesthesia, risks & benefits discussed:    Anesthesia Type: Gen ETT, Gen Supraglottic Airway, Gen Natural Airway, MAC  Intra-op Monitoring Plan: Standard ASA Monitors  Post Op Pain Control Plan: multimodal analgesia  Induction:  IV  Airway Plan: Direct, Video and Fiberoptic, Post-Induction  Informed Consent: Informed consent signed with the Patient and all parties understand the risks and agree with anesthesia plan.  All questions answered.   ASA Score: 3  Anesthesia Plan Notes: Advanced age    Ready For Surgery From Anesthesia Perspective.     .

## 2023-05-15 NOTE — [employer] PATIENT INSTRUCTIONS
Discharge Summary/Instructions after an Endoscopic Procedure  Patient Name: Valery Huggins  Patient MRN: [id number]  Patient YOB: 1940  Monday, May 15, 2023  David Mendieta MD  Dear patient,  As a result of recent federal legislation (The Federal Cures Act), you may   receive lab or pathology results from your procedure in your [facility]   account before your physician is able to contact you. Your physician or   their representative will relay the results to you with their   recommendations at their soonest availability.  Thank you,  RESTRICTIONS:  During your procedure today, you received medications for sedation.  These   medications may affect your judgment, balance and coordination.  Therefore,   for 24 hours, you have the following restrictions:   - DO NOT drive a car, operate machinery, make legal/financial decisions,   sign important papers or drink alcohol.    ACTIVITY:  Today: no heavy lifting, straining or running due to procedural   sedation/anesthesia.  The following day: return to full activity including work.  DIET:  Eat and drink normally unless instructed otherwise.     TREATMENT FOR COMMON SIDE EFFECTS:  - Mild abdominal pain, nausea, belching, bloating or excessive gas:  rest,   eat lightly and use a heating pad.  - Sore Throat: treat with throat lozenges and/or gargle with warm salt   water.  - Because air was used during the procedure, expelling large amounts of air   from your rectum or belching is normal.  - If a bowel prep was taken, you may not have a bowel movement for 1-3 days.    This is normal.  SYMPTOMS TO WATCH FOR AND REPORT TO YOUR PHYSICIAN:  1. Abdominal pain or bloating, other than gas cramps.  2. Chest pain.  3. Back pain.  4. Signs of infection such as: chills or fever occurring within 24 hours   after the procedure.  5. Rectal bleeding, which would show as bright red, maroon, or black stools.   (A tablespoon of blood from the rectum is not serious, especially if    hemorrhoids are present.)  6. Vomiting.  7. Weakness or dizziness.  GO DIRECTLY TO THE NEAREST EMERGENCY ROOM IF YOU HAVE ANY OF THE FOLLOWING:      Difficulty breathing              Chills and/or fever over 101 F   Persistent vomiting and/or vomiting blood   Severe abdominal pain   Severe chest pain   Black, tarry stools   Bleeding- more than one tablespoon   Any other symptom or condition that you feel may need urgent attention  Your doctor recommends these additional instructions:  If any biopsies were taken, your doctors clinic will contact you in 1 to 2   weeks with any results.  - Discharge patient to home (ambulatory).   - Resume regular diet.   - Return to my office in 6 months.  For questions, problems or results please call your physician - David Mendieta MD at Work:  [phone].  [facility], EMERGENCY ROOM PHONE NUMBER: [phone]  IF A COMPLICATION OR EMERGENCY SITUATION ARISES AND YOU ARE UNABLE TO REACH   YOUR PHYSICIAN - GO DIRECTLY TO THE EMERGENCY ROOM.  David Mendieta MD  5/15/2023 7:27:11 AM  This report has been verified and signed electronically.  Dear patient,  As a result of recent federal legislation (The Federal Cures Act), you may   receive lab or pathology results from your procedure in your [facility]   account before your physician is able to contact you. Your physician or   their representative will relay the results to you with their   recommendations at their soonest availability.  Thank you,  [employer]

## 2023-05-15 NOTE — PLAN OF CARE
Vss, darlene po fluids, denies pain, ambulates easily. IV removed, catheter intact. Discharge instructions provided and states understanding. States ready to go home.  Discharged from facility with family per wheelchair.

## 2023-05-15 NOTE — ANESTHESIA POSTPROCEDURE EVALUATION
Anesthesia Post Evaluation    Patient: Valery Huggins    Procedure(s) Performed: Procedure(s) (LRB):  SIGMOIDOSCOPY, FLEXIBLE (N/A)    Final Anesthesia Type: general      Patient location during evaluation: PACU  Patient participation: Yes- Able to Participate  Level of consciousness: awake and alert and oriented  Post-procedure vital signs: reviewed and stable  Pain management: adequate  Airway patency: patent    PONV status at discharge: No PONV  Anesthetic complications: no      Cardiovascular status: blood pressure returned to baseline and stable  Respiratory status: unassisted and spontaneous ventilation  Hydration status: euvolemic  Follow-up not needed.          Vitals Value Taken Time   /72 05/15/23 0750   Temp 36.7 °C (98 °F) 05/15/23 0750   Pulse 75 05/15/23 0750   Resp 18 05/15/23 0750   SpO2 99 % 05/15/23 0750         Event Time   Out of Recovery 07:58:42         Pain/Jake Score: No data recorded

## 2023-05-17 LAB
FINAL PATHOLOGIC DIAGNOSIS: NORMAL
GROSS: NORMAL
Lab: NORMAL

## 2023-05-25 ENCOUNTER — TELEPHONE (OUTPATIENT)
Dept: SURGERY | Facility: CLINIC | Age: 83
End: 2023-05-25
Payer: MEDICARE

## 2023-05-25 ENCOUNTER — PATIENT MESSAGE (OUTPATIENT)
Dept: SURGERY | Facility: CLINIC | Age: 83
End: 2023-05-25
Payer: MEDICARE

## 2023-05-25 NOTE — TELEPHONE ENCOUNTER
----- Message from Kerry Johnson sent at 5/25/2023  8:30 AM CDT -----  Regarding: Patient Returning Call  Contact: patient at [phone]  Type:  Patient Returning Call    Who Called:  patient  Who Left Message for Patient:  Dr. Mendieta  Does the patient know what this is regarding?:  yes, results  Best Call Back Number:  [phone]    Additional Information:  Please call and advise. Thank you

## 2023-05-25 NOTE — TELEPHONE ENCOUNTER
Called and attempted to reviewed pathology with pt.      1. Rectum, tattoo, biopsy:   Rectal mucosa with no diagnostic abnormality.   Negative for dysplasia and carcinoma.   Tattoo pigment is identified.       Pt did not answer  Will need to f/u with me in 3 months in office.      WIll have office reach out to pt     1.62

## 2023-06-05 ENCOUNTER — HOSPITAL ENCOUNTER (OUTPATIENT)
Dept: RADIOLOGY | Facility: HOSPITAL | Age: 83
Discharge: HOME | End: 2023-06-05
Attending: PHYSICAL MEDICINE & REHABILITATION
Payer: MEDICARE

## 2023-06-05 ENCOUNTER — HOSPITAL ENCOUNTER (OUTPATIENT)
Dept: RADIOLOGY | Facility: HOSPITAL | Age: 83
Discharge: HOME | End: 2023-06-05
Attending: NURSE PRACTITIONER
Payer: MEDICARE

## 2023-06-05 VITALS — WEIGHT: 149.94 LBS | BODY MASS INDEX: 28.31 KG/M2 | HEIGHT: 61 IN

## 2023-06-05 DIAGNOSIS — Z12.31 ENCOUNTER FOR SCREENING MAMMOGRAM FOR MALIGNANT NEOPLASM OF BREAST: ICD-10-CM

## 2023-06-05 DIAGNOSIS — Z78.0 POST-MENOPAUSE: ICD-10-CM

## 2023-06-05 PROCEDURE — 77080 DXA BONE DENSITY AXIAL: CPT | Mod: TC,PO

## 2023-06-05 PROCEDURE — 77067 SCR MAMMO BI INCL CAD: CPT | Mod: TC,PO

## 2023-06-06 NOTE — PROGRESS NOTES
Your results look fine and do not require any change in treatment.     Please contact me if you have any additional concerns.    Sincerely,    Luci Nath

## 2023-07-18 DIAGNOSIS — G62.9 NEUROPATHY: ICD-10-CM

## 2023-07-18 RX ORDER — GABAPENTIN 100 MG/1
CAPSULE ORAL
Qty: 150 CAPSULE | Refills: 2 | Status: SHIPPED | OUTPATIENT
Start: 2023-07-18 | End: 2024-02-19

## 2023-08-14 NOTE — PROGRESS NOTES
"[facility] Hematology/Oncology  PROGRESS NOTE -   Follow-up Visit      Subjective:       Patient ID:   NAME: Valery Huggins : 1940     82 y.o. female    Referring Doc: David Mendieta MD  Other Physicians: Armando Nath; Stanislav Epstein; Azar Donnelly; Mo    Chief Complaint:  Left RCC and rectal CA f/u    History of Present Illness:     Patient is being seen today in person in clinic . She is here with her  today.     She reports that she is feeling fine.  The patient is on today to go over the results of the recently ordered labs, tests and studies.         No CP, SOB, HA's or N/V.       She saw Dr Nath on 8/10/2023. She is now seeing Dr Flores with nephrology     She had a colonoscopy with Dr Mendieta in Oct 2022 which was good per patient; she had flex-sig in 2023 with was "fine" per patient         Discussed Covid19 precautions - she had her vaccinations                ROS:   GEN: normal without any fever, night sweats or weight loss  HEENT: normal with no HA's, sore throat, stiff neck, changes in vision;    CV: normal with no CP, SOB, PND, RAYA or orthopnea  PULM: normal with no SOB, cough, hemoptysis, sputum or pleuritic pain  GI: no nausea or constipation  : normal with no hematuria, dysuria  BREAST: normal with no mass, discharge, pain  SKIN: normal with no rash, erythema, bruising, or swelling    Allergies:  Review of patient's allergies indicates:   Allergen Reactions    Sunitinib Diarrhea, Nausea And Vomiting and Other (See Comments)     Vomiting and Diarrhea       Medications:    Current Outpatient Medications:     amLODIPine (NORVASC) 2.5 MG tablet, TAKE 1 TABLET (2.5 MG TOTAL) BY MOUTH ONCE DAILY., Disp: 90 tablet, Rfl: 0    ascorbic acid, vitamin C, (VITAMIN C) 100 MG tablet, Take 100 mg by mouth once daily., Disp: , Rfl:     atorvastatin (LIPITOR) 10 MG tablet, TAKE 1 TABLET (10 MG TOTAL) BY MOUTH EVERY EVENING., Disp: 90 tablet, Rfl: 0    b complex vitamins capsule, Take 1 " capsule by mouth once daily., Disp: , Rfl:     calcitRIOL (ROCALTROL) 0.5 MCG Cap, TAKE 1 CAPSULE (0.5 MCG TOTAL) BY MOUTH ONCE DAILY., Disp: 90 capsule, Rfl: 0    EScitalopram oxalate (LEXAPRO) 20 MG tablet, TAKE ONE TABLET BY MOUTH DAILY, Disp: 90 tablet, Rfl: 0    gabapentin (NEURONTIN) 100 MG capsule, TAKE 2 CAPSULES BY MOUTH EACH MORNING AND 1 AT MIDDAY AND 2 EACH NIGHT, Disp: 150 capsule, Rfl: 2    hydrALAZINE (APRESOLINE) 100 MG tablet, TAKE 1 TABLET (100 MG TOTAL) BY MOUTH 2 (TWO) TIMES DAILY., Disp: 90 tablet, Rfl: 0    levothyroxine (SYNTHROID) 88 MCG tablet, Take 1 tablet (88 mcg total) by mouth once daily., Disp: 90 tablet, Rfl: 0    lifitegrast (XIIDRA) 5 % Dpet, Apply to eye., Disp: , Rfl:     magnesium oxide (MAG-OX) 400 mg (241.3 mg magnesium) tablet, Take 1 tablet (400 mg total) by mouth 2 (two) times daily., Disp: 180 tablet, Rfl: 0    metoprolol tartrate (LOPRESSOR) 25 MG tablet, TAKE 1 TABLET (25 MG TOTAL) BY MOUTH 2 (TWO) TIMES DAILY., Disp: 180 tablet, Rfl: 0    OZEMPIC 1 mg/dose (4 mg/3 mL), every 7 days., Disp: , Rfl:     pantoprazole (PROTONIX) 40 MG tablet, TAKE 1 TABLET (40 MG TOTAL) BY MOUTH ONCE DAILY., Disp: 90 tablet, Rfl: 0    TOUJEO SOLOSTAR U-300 INSULIN 300 unit/mL (1.5 mL) InPn pen, INJECT 20 UNITS SUBCUTANEAOUSLY DAILY, Disp: 3 pen, Rfl: 1    traMADoL (ULTRAM) 50 mg tablet, Take 1 tablet (50 mg total) by mouth every 12 (twelve) hours as needed for Pain., Disp: 15 tablet, Rfl: 0  No current facility-administered medications for this visit.    Facility-Administered Medications Ordered in Other Visits:     lactated ringers bolus 1,000 mL, 1,000 mL, Intravenous, Once, Davdi Mendieta MD    lactated ringers infusion, , Intravenous, Continuous, David Mendieta MD    LIDOcaine (PF) 10 mg/ml (1%) injection 10 mg, 1 mL, Intradermal, Once PRN, David Mendieta MD    sodium chloride 0.9% flush 10 mL, 10 mL, Intravenous, PRN, David Mendieta MD    PMHx/PSHx Updates:  See patient's  "last visit with me on 1/25/2023  See H&P on 10/8/2019        Pathology:  Cancer Staging  No matching staging information was found for the patient.          Objective:     Vitals:  Blood pressure (!) 150/68, pulse 77, temperature 97.7 °F (36.5 °C), resp. rate 18, height 5' 1" (1.549 m), weight 66.2 kg (146 lb), last menstrual period 06/01/2012.        Physical Examination:   GEN: no apparent distress, comfortable; AAOx3  HEAD: atraumatic and normocephalic  EYES: no conjunctival pallor or muddiness, no icterus; normal pupil reaction to ambient light  ENT: OMM, no pharyngeal erythema, external bilateral ears WNL; no visible thrush or ulcers  NECK: no masses or swelling, trachea midline, no visible LAD/LN's   CV: no palpitations; no pedal edema; no noticeable JVD or neck vein distension;  portacath on right CW since removed  CHEST: Normal respiratory effort; chest wall breath movements symmetrical; no audible wheezing  ABDOM: non-distended; no bloating  MUSC/Skeletal: ROM normal; joints visibly normal; no deformities or arthropathy  EXTREM: no clubbing, cyanosis, inflammation or swelling  SKIN: no rashes, lesions, ulcers, petechiae or subcutaneous nodules  : no hendrickson  NEURO: moving all 4 extremities; AAOx3; no tremors  PSYCH: normal mood, affect and behavior  LYMPH: no visible LN's or LAD              Labs:          Lab Results   Component Value Date    WBC 5.6 08/07/2023    HGB 10.9 (L) 08/07/2023    HCT 30.6 (L) 08/07/2023    MCV 90.8 08/07/2023     08/07/2023     CMP  Sodium   Date Value Ref Range Status   08/07/2023 139 135 - 146 mmol/L Final     Potassium   Date Value Ref Range Status   08/07/2023 4.5 3.5 - 5.3 mmol/L Final     Chloride   Date Value Ref Range Status   08/07/2023 99 98 - 110 mmol/L Final     CO2   Date Value Ref Range Status   08/07/2023 33 (H) 20 - 32 mmol/L Final     Glucose   Date Value Ref Range Status   08/07/2023 116 (H) 65 - 99 mg/dL Final     Comment:                   Fasting " reference interval     For someone without known diabetes, a glucose value  between 100 and 125 mg/dL is consistent with  prediabetes and should be confirmed with a  follow-up test.          BUN   Date Value Ref Range Status   08/07/2023 39 (H) 7 - 25 mg/dL Final     Creatinine   Date Value Ref Range Status   08/07/2023 2.02 (H) 0.60 - 0.95 mg/dL Final     Calcium   Date Value Ref Range Status   08/07/2023 9.9 8.6 - 10.4 mg/dL Final     Total Protein   Date Value Ref Range Status   08/07/2023 6.0 (L) 6.1 - 8.1 g/dL Final     Albumin   Date Value Ref Range Status   08/07/2023 3.7 3.6 - 5.1 g/dL Final     Total Bilirubin   Date Value Ref Range Status   08/07/2023 0.6 0.2 - 1.2 mg/dL Final     Alkaline Phosphatase   Date Value Ref Range Status   11/12/2021 63 55 - 135 U/L Final     AST   Date Value Ref Range Status   08/07/2023 12 10 - 35 U/L Final     ALT   Date Value Ref Range Status   08/07/2023 11 6 - 29 U/L Final     Anion Gap   Date Value Ref Range Status   11/12/2021 10 8 - 16 mmol/L Final     eGFR if    Date Value Ref Range Status   04/13/2022 24 (L) > OR = 60 mL/min/1.73m2 Final     eGFR if non    Date Value Ref Range Status   04/13/2022 21 (L) > OR = 60 mL/min/1.73m2 Final       Lab Results   Component Value Date    IRON 57 08/07/2023    TIBC 296 08/07/2023    FERRITIN 89 08/07/2023           Radiology/Diagnostic Studies:    CT scans 2/17/2023:    Impression:     Postsurgical change from prior left nephrectomy.     Few scattered subcentimeter pulmonary nodules, all stable from prior exam.     No new soft tissue mass or pathologic lymph node enlargement.        PET 5/2/2022:    IMPRESSION:     Stable exam demonstrating no evidence of FDG avid malignancy.     Stable 6 mm right upper lobe pulmonary nodule.      CT scans  10/7/2021:  Impression:     Essentially stable exam without evidence for recurrent or metastatic disease.  There are no measurable lesions per RECIST criteria.      Stable right upper lobe pulmonary nodule, prior cholecystectomy and left nephrectomy, and additional findings as above.          CT scans 3/23/2021:    Impression:     1. Stable right upper lobe nodule.  No evidence for recurrent or metastatic neoplasm.  There are no measurable lesions per RECIST criteria.  2. Several non dependent polypoid nodules in the trachea, similar to multiple prior studies and likely benign.           PET 9/18/2020:    Impression:     1. Negative for recurrent malignancy or metastatic disease.  2. Unchanged 6 mm right upper lobe nodule, presumably benign.  3. Slight decreased FDG uptake associated with left paratracheal mass just inferior to thoracic inlet, differential diagnosis of which has been previously discussed, with benign etiologies likely.        MRI 8/6/2020:  Impression:     No evidence for disease progression.Minimal signal abnormality along the mid rectum corresponds to the previously treated lesion.  No pelvic adenopathy.       CT  3/5/2020    Impression       Status post left nephrectomy without evidence of recurrent renal malignancy.    No significant change since prior study.           MRI  1/8/2020:  Impression       Significant reduction in size of the patient's known mid rectal mass, which is no longer distinctly visible.  Reduction in size of 3 mesorectal lymph nodes as above.               PET  10/17/2019    Impression       1. Left paratracheal small mass extending into superior mediastinum with associated moderate FDG activity is unchanged in size and appearance since 02/16/2018 CT.  This is unlikely to represent metastatic disease or malignancy, given stability, and may represent residual thyroid parenchyma but is otherwise nonspecific.  2. Unchanged 6 mm right upper lobe nodule since 02/16/2018.  Benign etiology is likely the continued CT thorax without IV contrast follow-up is recommended in 12 months to document greater than 2 years of stability.  3. No current  convincing evidence of metastatic disease.  4. Previous rectal mass is no longer evident on this exam.               X-ray Chest Ap Portable    Result Date: 10/15/2019  EXAMINATION: XR CHEST AP PORTABLE CLINICAL HISTORY: s/p port; Encounter for adjustment and management of vascular access device TECHNIQUE: Single frontal view of the chest was performed. COMPARISON: 6/18/2019 FINDINGS: The cardiomediastinal silhouette is stable.  Lungs are clear of infiltrate.  No pleural effusions.  Laya catheter has been inserted from the region of the right subclavian vein with the tip at the level the proximal SVC.     Laya catheter inserted with the tip at the level the proximal SVC.  Lungs are expanded and clear.  No pneumothorax. Electronically signed by: Nubia Venegas MD Date:    10/15/2019 Time:    14:05    Surg Fl Surgery Fluoro Usage    Result Date: 10/15/2019  See OP Notes for results. IMPRESSION: See OP Notes for results. This procedure was auto-finalized by: [facility]     Mri Rectal Cancer Without Contrast    Result Date: 9/18/2019  EXAMINATION: MRI RECTAL CANCER WITHOUT CONTRAST CLINICAL HISTORY: Rectal cancer, staging, locoregional;rectal anal mass;  Malignant neoplasm of rectum TECHNIQUE: Multiplanar multisequence MR imaging of the pelvis without contrast. COMPARISON: 07/12/2019 FINDINGS: Approximately 4 cm from the anal verge there is a peripherally located lobular mass along the dorsal rectal wall.  This measures 4 cm in length and 3.3 cm in diameter.  There is heterogeneous signal intensity.  There is restricted diffusion in the lesion and no discrete extra colonic extension.  Several lymph nodes are noted in the mesorectal fat including two which measure 3 mm near the inferior sacrum, series 8, image 16, and a 5 mm lymph node at the S2 level, series 8, image 8.  Prominent but nonenlarged bilateral obturator chain lymph nodes also noted, on the right at 4 mm and left at 6 mm.  There is urinary bladder  wall thickening which is diffuse.  Moderate degree of stool in the colon.  No dilated bowel loops.  Hysterectomy.     4 cm mid rectal lesion in keeping with known malignancy.  No discrete extension into the mesorectal fascia although there are several perirectal lymph nodes which raise the possibility for locoregional lymph node involvement. Electronically signed by: Bebo Antwon Date:    09/18/2019 Time:    16:45      I have reviewed all available lab results and radiology reports.    Assessment/Plan:   (1) 82 y.o. female with diagnosis of prior left RCC who has been referred by David Saldaña MD for evaluation by medical hematology/oncology. She has been followed by Dr Stanislav Epstein with [facility] at the [facility] in [place]. She last saw Dr Epstein in July 2019. She was recently diagnosed with rectal mass by Dr Armando Duff which was found on colonoscopy on 8/26/2019. She had presented with lower Gi bleeding at that time. The pathology from those biopsies showed no evidence of malignancy at that time. She was subsequently seen by Dr David aSldaña and underwent trans-anal surgical biopsy on 9/23/2019. The pathology from the surgical biopsy showed rectal carcinoma.         She has been seen by Dr Fareed Hassan with Rad/onc for radiation therapy evaluation.     PET was done on 10/17/2019     We previously discussed giving her combined chemotherapy with the XRt to try to reduce her risk of recurrence. She was agreeable to this plan.    She had portacath placed with Dr Saldaña. She saw Dr Hassan last week with rad/onc. She has since completed weekly XRT and chemotherapy    - She previously saw Dr Saldaña on 12/10/2019  and had MRI on 1/8/2020.  They did scope on 1/28th 2020 and I spoke to dr saldaña by phone last week. The scope looked good and both the patient and Dr Saldaña does not want to proceed in direction of operation especially given her age and co-morbidities.    - We previously discussed adjuvant  "chemotherapy with FOLOFOX x 10-12 cycles especially since she is not having surgery and she is agreeable.    - Will previously  provide literature on the regimen and set up chemotherapy      7/2/2020:  - She has since started chemotherapy and she has had 8 cycles so far; chemotherapy was held last time due to general malaise. She feels good and has some minimal and tolerable neuropathy in hands and feet.  She has some intermittent constipation.    She wants to continue with therapy and I would like to at least get 10 cycles in if possible.     7/27/2020:  - she is on her last cycle #10 today  - check labs weekly for next 4 weeks  - she will need to follow-up with dr mendieta and also get repeat scans every 6 months    9/1/2020:  - she completed the 10th and last cycle about a month ago  - she had recent MRI of pelvis with Dr Mendieta on 8/12/2020 and plans to get repeat scope at end of Sept 2020  - she has some occasional lightheadedness and is going to see her eye doctor in the near future  - will schedule a PET scan and consider MRI of brain thereafter    9/28/2020:  - PET on 9/18/2020 on chart  - scope scheduled for tomorrow with Dr Mendieta  - check up to date labs  - increase the gabpentin to 2 pills bid with one pill mid-day still - if symptoms do not improve, then consider referral to neurology    10/26/2020:  - doing ok except for residual neuropathy issues   - discussed referral to neurology but she wants to hold off for now  - she had scope with Dr mendieta since last visit which was "perfect" per patient and repeat is planned for six months    12/21/2020:  - she is doing well  - neuropathy is better    4/7/2021:  - she is having MRI of pelvis and flex sig with Dr Mendieta next week  - latest CT's on 3/26/2021 look stable    7/20/2021:  - doing well  - seen by Dr mendieta recently with planned repeat flex sig in couple of months  - CEA at 2.7  - will get repeat scans with PEt in Sept 2021 1/18/2022:  - She sees Dr Mendieta " again in April 2022 and is expected to have another scope. She last had a scope in Oct 2021.  - she last had scans in Oct 2021  - check labs every 3 months incl CEA  - repeat scans in April 2022 7/18/2022:  - She had covid about a month ago.   - She had a colonoscopy with Dr Mendieta in June 2022 with one polyp removed with planned flex sig in 6 months   - She saw Dr Nath on 7/6/2022; she was having some pain in the left shoulder for which he gave her an injection  - she had PET on 5/2/2022 which was stable  - repeat scans in one year unless something new develops    1/25/2023:  - recent flexsig with Dr Mendieta in oct 2022 which was good - planned repeat in April  - due for rpeat 6 month scans - will set up     8/15/2023:  - latest hgb  at 10.2  - iron panel adequate  - s/p Ct scans in Feb 2023 stable  - die for repeat CEA  - flex-sig in April 2023 negative per patient        (2) Left renal cell carcinoma s/p left nephrectomy with Dr Azar Donnelly - diagnosed about 2 years ago     (3) Ureterolithiasis     (4) CRI - stage III - followed by Dr Antonio; she saw him recently and per patient's report, her kidney function is stable per Dr Antonio  - she is seeing Dr Antonio tomorrow    8/15/20-23:  - followed by Dr Flores now     (5) HTN and hypercholesterolemia     (6) DM - followed by Dr Brower with endocrinology     (7) Hx/of gall stones     (8) Chronic left shoulder issues     (9) MVP     (10) Thyroid disease with prior hx/of thyroid cancer s/p thyroidectomy      (11) Chronic anemia - most likley multifactorial due to iron deficiency, GIB and anemia of chronic renal disease       (12) hypocalcemia - followed by Dr Serrano    (13) 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  Encouraged compliance with labs; discussed COVID19 precautions  2.  check labs every 3 months with CEA every 6 months  3.  F/u with PCP, GI, rad/onc, Gen Surgery etc  4.  F/u nephrology     5.  F/u PET in Feb 2024  7. F/u with Dr Mendieta            RTC in 6 months     Fax note to [facility]; Tete Duff Tandron; [place];     Discussion:       I spent over 25 mins of time with the patient. Reviewed results of the recently ordered labs, tests and studies; made directives with regards to the results. Over half of this time was spent couseling and coordinating care.    COVID-19 Discussion:    I had long discussion with patient and any applicable family about the COVID-19 coronavirus epidemic and the recommended precautions with regard to cancer and/or hematology patients. I have re-iterated the CDC recommendations for adequate hand washing, use of hand -like products, and coughing into elbow, etc. In addition, especially for our patients who are on chemotherapy and/or our otherwise immunocompromised patients, I have recommended avoidance of crowds, including movie theaters, restaurants, churches, etc. I have recommended avoidance of any sick or symptomatic family members and/or friends. I have also recommended avoidance of any raw and unwashed food products, and general avoidance of food items that have not been prepared by themselves. The patient has been asked to call us immediately with any symptom developments, issues, questions or other general concerns.       Chemotherapy Discussion:      I discussed the available treatment option(s) in accordance with the latest/current national evidence-based guidelines (NCCN, UpToDate, NCI, ASCO, etc where applicable), their overall age/condition and their co-morbidities. I also went over the risks and benefits of the chemotherapy with regard to their particular cancer type, their cancer stage, their age/condition,  and their co-morbidities. I provided literature on the chemotherapy regimen and discussed the chemotherapy side-effect profiles of the drug(s). I discussed the importance of compliance with obtaining and monitoring weekly lab work, and went over the potential hematopathology issues and risks with anemia, leucopenia and thrombocytopenia that can occur with chemotherapy. I discussed the potential risks of liver and kidney damage, which could be permanent and could necessitate dialysis long-term if kidney failure developed. I discussed the emetic and/or diarrheal potential of the regimen and the potential need for use of antiemetic and anti-diarrheal medications. I discussed the risk for development of anaphylactic shock, bronchospasm, dysrhythmia, and respiratory/cardiovascular arrest and/or failure. I discussed the potential risks for development of alopecia, cold sensory issues, ringing in ears, vertigo, cataracts, glaucoma, and neuropathy, all of which could end up being chronic and life-long. Some chemotherpyI discussed the risks of hand-foot syndrome and rashes, and development of other autoimmune mediated processes such as pneumonitis, hepatitis, and colitis which could be life threatening. I discussed the risks of the potential development of a rare but fatal viral mediated disease known as PML (Progressive Multifocal Leukoencephalopathy), and risk of future development of leukemia and/or lymphoma from use of certain chemotherapy agents. I discussed the need for neutropenic precautions, basic hygiene/sanitation behaviors and dietary restrictions.    The patient's consent has been obtained to proceed with the chemotherapy.The patient will be referred to Chemotherapy [facility] /[facility] for training and education on chemotherapy, use of antiemetics and/or anti-diarrheals, use of NSAID's, potential chemotherapy side-effects, and any specific recommendations and precautions with the particular chemotherapy  agents.      I answered all of the patient's (and family's, if applicable) questions to the best of my ability and to their complete satisfaction. The patient acknowledged full understanding of the risks, recommendations and plan(s).          I have explained all of the above in detail and the patient understands all of the current recommendation(s). I have answered all of their questions to the best of my ability and to their complete satisfaction.   The patient is to continue with the current management plan.            Electronically signed by Carrillo Goode MD

## 2023-08-15 ENCOUNTER — OFFICE VISIT (OUTPATIENT)
Dept: HEMATOLOGY/ONCOLOGY | Facility: CLINIC | Age: 83
End: 2023-08-15
Payer: MEDICARE

## 2023-08-15 VITALS
HEART RATE: 77 BPM | WEIGHT: 146 LBS | TEMPERATURE: 98 F | DIASTOLIC BLOOD PRESSURE: 68 MMHG | RESPIRATION RATE: 18 BRPM | HEIGHT: 61 IN | SYSTOLIC BLOOD PRESSURE: 150 MMHG | BODY MASS INDEX: 27.56 KG/M2

## 2023-08-15 DIAGNOSIS — Z85.048 HISTORY OF RECTAL CANCER: ICD-10-CM

## 2023-08-15 DIAGNOSIS — C20 MALIGNANT NEOPLASM OF RECTUM: ICD-10-CM

## 2023-08-15 DIAGNOSIS — C20 RECTAL CANCER: ICD-10-CM

## 2023-08-15 DIAGNOSIS — T45.1X5A CHEMOTHERAPY INDUCED NEUTROPENIA: ICD-10-CM

## 2023-08-15 DIAGNOSIS — C64.2 RENAL CELL CARCINOMA OF LEFT KIDNEY: Primary | ICD-10-CM

## 2023-08-15 DIAGNOSIS — R59.0 CERVICAL LYMPHADENOPATHY: ICD-10-CM

## 2023-08-15 DIAGNOSIS — D64.9 NORMOCYTIC ANEMIA: ICD-10-CM

## 2023-08-15 DIAGNOSIS — D70.1 CHEMOTHERAPY INDUCED NEUTROPENIA: ICD-10-CM

## 2023-08-15 PROCEDURE — 99214 PR OFFICE/OUTPT VISIT, EST, LEVL IV, 30-39 MIN: ICD-10-PCS | Mod: S$GLB,,, | Performed by: INTERNAL MEDICINE

## 2023-08-15 PROCEDURE — 99214 OFFICE O/P EST MOD 30 MIN: CPT | Mod: S$GLB,,, | Performed by: INTERNAL MEDICINE

## 2023-08-17 NOTE — TELEPHONE ENCOUNTER
----- Message from David Mendieta MD sent at 11/7/2022 10:44 AM CST -----  Attempted to call pt to review results of pathology from flex sig.  NO answer.  WIll have office reach out to pt to review results and to ensure regularly scheduled f/u is performed  ----- Message -----  From: [place], [employer] [facility] [employer]  Sent: 10/27/2022  11:41 AM CST  To: David Mendieta MD       Yes

## 2023-12-12 ENCOUNTER — PATIENT MESSAGE (OUTPATIENT)
Dept: SURGERY | Facility: CLINIC | Age: 83
End: 2023-12-12
Payer: MEDICARE

## 2024-01-22 ENCOUNTER — PATIENT MESSAGE (OUTPATIENT)
Dept: SURGERY | Facility: CLINIC | Age: 84
End: 2024-01-22
Payer: MEDICARE

## 2024-02-07 ENCOUNTER — TELEPHONE (OUTPATIENT)
Dept: HEMATOLOGY/ONCOLOGY | Facility: CLINIC | Age: 84
End: 2024-02-07

## 2024-02-07 NOTE — TELEPHONE ENCOUNTER
Spoke to patient, informed that these questions would be best answered by imaging center and dept that does the PET, call transferred to imaging.

## 2024-02-07 NOTE — TELEPHONE ENCOUNTER
----- Message from [place] sent at 2/7/2024  1:26 PM CST -----  The patient is scheduled for a PET scan on Monday and she needs to know if she can take her diabetic medication and eat before the scan or if she has to wait until after the scan. # [phone]

## 2024-02-12 ENCOUNTER — HOSPITAL ENCOUNTER (OUTPATIENT)
Dept: RADIOLOGY | Facility: HOSPITAL | Age: 84
Discharge: HOME | End: 2024-02-12
Attending: INTERNAL MEDICINE
Payer: MEDICARE

## 2024-02-12 VITALS — BODY MASS INDEX: 27.38 KG/M2 | WEIGHT: 145 LBS | HEIGHT: 61 IN

## 2024-02-12 DIAGNOSIS — C64.2 RENAL CELL CARCINOMA OF LEFT KIDNEY: ICD-10-CM

## 2024-02-12 DIAGNOSIS — C20 RECTAL CANCER: ICD-10-CM

## 2024-02-12 LAB — GLUCOSE SERPL-MCNC: 139 MG/DL (ref 70–110)

## 2024-02-12 PROCEDURE — 82962 GLUCOSE BLOOD TEST: CPT | Mod: PO

## 2024-02-12 PROCEDURE — 78815 PET IMAGE W/CT SKULL-THIGH: CPT | Mod: TC,PS,PO

## 2024-02-14 NOTE — PROGRESS NOTES
[facility] Hematology/Oncology  PROGRESS NOTE -   Follow-up Visit      Subjective:       Patient ID:   NAME: Valery Huggins : 1940     83 y.o. female    Referring Doc: David Mendieta MD  Other Physicians: Armando Nath; Stanislav Epstein; Azar Donnelly; Mo    Chief Complaint:  Left RCC and rectal CA f/u    History of Present Illness:     Patient is being seen today in person in clinic . She is here with her  today.     She reports that she is feeling fine.  The patient is on today to go over the results of the recently ordered labs, tests and studies.         No CP, SOB, HA's or N/V.       She saw Dr Nath in 2023. She has been seeing Dr Flores with nephrology     She had recent PET on 2024 which was essentially stable    She is seeing Dr Mendieta again at end of the month         Discussed Covid19 precautions - she had her vaccinations                ROS:   GEN: normal without any fever, night sweats or weight loss  HEENT: normal with no HA's, sore throat, stiff neck, changes in vision;    CV: normal with no CP, SOB, PND, RAYA or orthopnea  PULM: normal with no SOB, cough, hemoptysis, sputum or pleuritic pain  GI: no nausea or constipation  : normal with no hematuria, dysuria  BREAST: normal with no mass, discharge, pain  SKIN: normal with no rash, erythema, bruising, or swelling    Allergies:  Review of patient's allergies indicates:   Allergen Reactions    Sunitinib Diarrhea, Nausea And Vomiting and Other (See Comments)     Vomiting and Diarrhea       Medications:    Current Outpatient Medications:     amLODIPine (NORVASC) 2.5 MG tablet, TAKE 1 TABLET (2.5 MG TOTAL) BY MOUTH ONCE DAILY., Disp: 90 tablet, Rfl: 0    ascorbic acid, vitamin C, (VITAMIN C) 100 MG tablet, Take 100 mg by mouth once daily., Disp: , Rfl:     atorvastatin (LIPITOR) 10 MG tablet, TAKE 1 TABLET (10 MG TOTAL) BY MOUTH EVERY EVENING., Disp: 30 tablet, Rfl: 0    b complex vitamins capsule, Take 1 capsule by mouth once  daily., Disp: , Rfl:     calcitRIOL (ROCALTROL) 0.5 MCG Cap, TAKE 1 CAPSULE (0.5 MCG TOTAL) BY MOUTH ONCE DAILY., Disp: 30 capsule, Rfl: 0    EScitalopram oxalate (LEXAPRO) 20 MG tablet, TAKE ONE TABLET BY MOUTH DAILY, Disp: 90 tablet, Rfl: 0    gabapentin (NEURONTIN) 100 MG capsule, TAKE 2 CAPSULES BY MOUTH EACH MORNING AND 1 AT MIDDAY AND 2 EACH NIGHT (Patient taking differently: once daily.), Disp: 150 capsule, Rfl: 2    hydrALAZINE (APRESOLINE) 100 MG tablet, TAKE 1 TABLET (100 MG TOTAL) BY MOUTH 2 (TWO) TIMES DAILY., Disp: 90 tablet, Rfl: 0    levothyroxine (SYNTHROID) 88 MCG tablet, Take 1 tablet (88 mcg total) by mouth once daily., Disp: 90 tablet, Rfl: 0    lifitegrast (XIIDRA) 5 % Dpet, Apply to eye., Disp: , Rfl:     magnesium oxide (MAG-OX) 400 mg (241.3 mg magnesium) tablet, Take 1 tablet (400 mg total) by mouth 2 (two) times daily., Disp: 180 tablet, Rfl: 0    metoprolol tartrate (LOPRESSOR) 25 MG tablet, Take 1 tablet (25 mg total) by mouth 2 (two) times daily., Disp: 180 tablet, Rfl: 0    OZEMPIC 1 mg/dose (4 mg/3 mL), every 7 days., Disp: , Rfl:     pantoprazole (PROTONIX) 40 MG tablet, TAKE 1 TABLET (40 MG TOTAL) BY MOUTH ONCE DAILY., Disp: 30 tablet, Rfl: 0    TOUJEO SOLOSTAR U-300 INSULIN 300 unit/mL (1.5 mL) InPn pen, INJECT 20 UNITS SUBCUTANEAOUSLY DAILY, Disp: 4.5 mL, Rfl: 1  No current facility-administered medications for this visit.    Facility-Administered Medications Ordered in Other Visits:     lactated ringers bolus 1,000 mL, 1,000 mL, Intravenous, Once, David Mendieta MD    lactated ringers infusion, , Intravenous, Continuous, David Mendieta MD    LIDOcaine (PF) 10 mg/ml (1%) injection 10 mg, 1 mL, Intradermal, Once PRN, David Mendieta MD    sodium chloride 0.9% flush 10 mL, 10 mL, Intravenous, PRN, David Mendieta MD    PMHx/PSHx Updates:  See patient's last visit with me on 8/15/2023  See H&P on 10/8/2019        Pathology:  Cancer Staging  No matching staging information  was found for the patient.          Objective:     Vitals:  Blood pressure (!) 147/70, pulse 80, temperature 97.8 °F (36.6 °C), weight 65.3 kg (144 lb), last menstrual period 06/01/2012.        Physical Examination:   GEN: no apparent distress, comfortable; AAOx3  HEAD: atraumatic and normocephalic  EYES: no conjunctival pallor or muddiness, no icterus; normal pupil reaction to ambient light  ENT: OMM, no pharyngeal erythema, external bilateral ears WNL; no visible thrush or ulcers  NECK: no masses or swelling, trachea midline, no visible LAD/LN's   CV: no palpitations; no pedal edema; no noticeable JVD or neck vein distension;  portacath on right CW since removed  CHEST: Normal respiratory effort; chest wall breath movements symmetrical; no audible wheezing  ABDOM: non-distended; no bloating  MUSC/Skeletal: ROM normal; joints visibly normal; no deformities or arthropathy  EXTREM: no clubbing, cyanosis, inflammation or swelling  SKIN: no rashes, lesions, ulcers, petechiae or subcutaneous nodules  : no hendrickson  NEURO: moving all 4 extremities; AAOx3; no tremors  PSYCH: normal mood, affect and behavior  LYMPH: no visible LN's or LAD              Labs:          Lab Results   Component Value Date    WBC 6.7 12/11/2023    HGB 11.2 (L) 12/11/2023    HCT 34.2 (L) 12/11/2023    MCV 91.9 12/11/2023     12/11/2023     CMP  Sodium   Date Value Ref Range Status   12/11/2023 138 135 - 146 mmol/L Final     Potassium   Date Value Ref Range Status   12/11/2023 4.3 3.5 - 5.3 mmol/L Final     Chloride   Date Value Ref Range Status   12/11/2023 99 98 - 110 mmol/L Final     CO2   Date Value Ref Range Status   12/11/2023 31 20 - 32 mmol/L Final     Glucose   Date Value Ref Range Status   12/11/2023 152 (H) 65 - 99 mg/dL Final     Comment:                   Fasting reference interval     For someone without known diabetes, a glucose  value >125 mg/dL indicates that they may have  diabetes and this should be confirmed with  a  follow-up test.          BUN   Date Value Ref Range Status   12/11/2023 33 (H) 7 - 25 mg/dL Final     Creatinine   Date Value Ref Range Status   12/11/2023 2.10 (H) 0.60 - 0.95 mg/dL Final     Calcium   Date Value Ref Range Status   12/11/2023 9.0 8.6 - 10.4 mg/dL Final     Total Protein   Date Value Ref Range Status   12/11/2023 5.9 (L) 6.1 - 8.1 g/dL Final     Albumin   Date Value Ref Range Status   12/11/2023 3.7 3.6 - 5.1 g/dL Final     Total Bilirubin   Date Value Ref Range Status   12/11/2023 0.5 0.2 - 1.2 mg/dL Final     Alkaline Phosphatase   Date Value Ref Range Status   11/12/2021 63 55 - 135 U/L Final     AST   Date Value Ref Range Status   12/11/2023 15 10 - 35 U/L Final     ALT   Date Value Ref Range Status   12/11/2023 14 6 - 29 U/L Final     Anion Gap   Date Value Ref Range Status   11/12/2021 10 8 - 16 mmol/L Final     eGFR if    Date Value Ref Range Status   04/13/2022 24 (L) > OR = 60 mL/min/1.73m2 Final     eGFR if non    Date Value Ref Range Status   04/13/2022 21 (L) > OR = 60 mL/min/1.73m2 Final       Lab Results   Component Value Date    IRON 57 08/07/2023    TIBC 296 08/07/2023    FERRITIN 89 08/07/2023           Radiology/Diagnostic Studies:    PET 2/12/2024:    IMPRESSION:  1. No PET/CT evidence of FDG avid malignancy.  2. Unchanged small bilobed mass along the left side of the trachea extending to the anterior superior mediastinum suggesting a small lymph node or possibly trace residual thyroid tissue. Given the stability since at least 10/17/2019, and documented history since 2/16/2018, this is most certainly benign.  3. Unchanged pulmonary nodule in the anterior right upper lobe.  4. No PET/CT finding of FDG avid malignancy/metastatic disease.      CT scans 2/17/2023:    Impression:     Postsurgical change from prior left nephrectomy.     Few scattered subcentimeter pulmonary nodules, all stable from prior exam.     No new soft tissue mass or pathologic  lymph node enlargement.        PET 5/2/2022:    IMPRESSION:     Stable exam demonstrating no evidence of FDG avid malignancy.     Stable 6 mm right upper lobe pulmonary nodule.      CT scans  10/7/2021:  Impression:     Essentially stable exam without evidence for recurrent or metastatic disease.  There are no measurable lesions per RECIST criteria.     Stable right upper lobe pulmonary nodule, prior cholecystectomy and left nephrectomy, and additional findings as above.          CT scans 3/23/2021:    Impression:     1. Stable right upper lobe nodule.  No evidence for recurrent or metastatic neoplasm.  There are no measurable lesions per RECIST criteria.  2. Several non dependent polypoid nodules in the trachea, similar to multiple prior studies and likely benign.           PET 9/18/2020:    Impression:     1. Negative for recurrent malignancy or metastatic disease.  2. Unchanged 6 mm right upper lobe nodule, presumably benign.  3. Slight decreased FDG uptake associated with left paratracheal mass just inferior to thoracic inlet, differential diagnosis of which has been previously discussed, with benign etiologies likely.        MRI 8/6/2020:  Impression:     No evidence for disease progression.Minimal signal abnormality along the mid rectum corresponds to the previously treated lesion.  No pelvic adenopathy.       CT  3/5/2020    Impression       Status post left nephrectomy without evidence of recurrent renal malignancy.    No significant change since prior study.           MRI  1/8/2020:  Impression       Significant reduction in size of the patient's known mid rectal mass, which is no longer distinctly visible.  Reduction in size of 3 mesorectal lymph nodes as above.               PET  10/17/2019    Impression       1. Left paratracheal small mass extending into superior mediastinum with associated moderate FDG activity is unchanged in size and appearance since 02/16/2018 CT.  This is unlikely to represent  metastatic disease or malignancy, given stability, and may represent residual thyroid parenchyma but is otherwise nonspecific.  2. Unchanged 6 mm right upper lobe nodule since 02/16/2018.  Benign etiology is likely the continued CT thorax without IV contrast follow-up is recommended in 12 months to document greater than 2 years of stability.  3. No current convincing evidence of metastatic disease.  4. Previous rectal mass is no longer evident on this exam.               X-ray Chest Ap Portable    Result Date: 10/15/2019  EXAMINATION: XR CHEST AP PORTABLE CLINICAL HISTORY: s/p port; Encounter for adjustment and management of vascular access device TECHNIQUE: Single frontal view of the chest was performed. COMPARISON: 6/18/2019 FINDINGS: The cardiomediastinal silhouette is stable.  Lungs are clear of infiltrate.  No pleural effusions.  Laya catheter has been inserted from the region of the right subclavian vein with the tip at the level the proximal SVC.     Laya catheter inserted with the tip at the level the proximal SVC.  Lungs are expanded and clear.  No pneumothorax. Electronically signed by: Nubia Venegas MD Date:    10/15/2019 Time:    14:05    Surg Fl Surgery Fluoro Usage    Result Date: 10/15/2019  See OP Notes for results. IMPRESSION: See OP Notes for results. This procedure was auto-finalized by: [facility] Radi[facility]     Mri Rectal Cancer Without Contrast    Result Date: 9/18/2019  EXAMINATION: MRI RECTAL CANCER WITHOUT CONTRAST CLINICAL HISTORY: Rectal cancer, staging, locoregional;rectal anal mass;  Malignant neoplasm of rectum TECHNIQUE: Multiplanar multisequence MR imaging of the pelvis without contrast. COMPARISON: 07/12/2019 FINDINGS: Approximately 4 cm from the anal verge there is a peripherally located lobular mass along the dorsal rectal wall.  This measures 4 cm in length and 3.3 cm in diameter.  There is heterogeneous signal intensity.  There is restricted diffusion in the lesion and no  discrete extra colonic extension.  Several lymph nodes are noted in the mesorectal fat including two which measure 3 mm near the inferior sacrum, series 8, image 16, and a 5 mm lymph node at the S2 level, series 8, image 8.  Prominent but nonenlarged bilateral obturator chain lymph nodes also noted, on the right at 4 mm and left at 6 mm.  There is urinary bladder wall thickening which is diffuse.  Moderate degree of stool in the colon.  No dilated bowel loops.  Hysterectomy.     4 cm mid rectal lesion in keeping with known malignancy.  No discrete extension into the mesorectal fascia although there are several perirectal lymph nodes which raise the possibility for locoregional lymph node involvement. Electronically signed by: Bebo Kapoor Date:    09/18/2019 Time:    16:45      I have reviewed all available lab results and radiology reports.    Assessment/Plan:   (1) 83 y.o. female with diagnosis of prior left RCC who has been referred by David Mendieta MD for evaluation by medical hematology/oncology. She has been followed by Dr Stanislav Epstein with [facility] at the [facility] in [place]. She last saw Dr Epstein in July 2019. She was recently diagnosed with rectal mass by Dr Armando Duff which was found on colonoscopy on 8/26/2019. She had presented with lower Gi bleeding at that time. The pathology from those biopsies showed no evidence of malignancy at that time. She was subsequently seen by Dr David Mendieta and underwent trans-anal surgical biopsy on 9/23/2019. The pathology from the surgical biopsy showed rectal carcinoma.         She has been seen by Dr aFreed Hassan with Rad/onc for radiation therapy evaluation.     PET was done on 10/17/2019     We previously discussed giving her combined chemotherapy with the XRt to try to reduce her risk of recurrence. She was agreeable to this plan.    She had portacath placed with Dr Mendieta. She saw Dr Hassan last week with rad/onc. She has since completed  "weekly XRT and chemotherapy    - She previously saw Dr Mendieta on 12/10/2019  and had MRI on 1/8/2020.  They did scope on 1/28th 2020 and I spoke to dr mendieta by phone last week. The scope looked good and both the patient and Dr Mendieta does not want to proceed in direction of operation especially given her age and co-morbidities.    - We previously discussed adjuvant chemotherapy with FOLOFOX x 10-12 cycles especially since she is not having surgery and she is agreeable.    - Will previously  provide literature on the regimen and set up chemotherapy      7/2/2020:  - She has since started chemotherapy and she has had 8 cycles so far; chemotherapy was held last time due to general malaise. She feels good and has some minimal and tolerable neuropathy in hands and feet.  She has some intermittent constipation.    She wants to continue with therapy and I would like to at least get 10 cycles in if possible.     7/27/2020:  - she is on her last cycle #10 today  - check labs weekly for next 4 weeks  - she will need to follow-up with dr mendieta and also get repeat scans every 6 months    9/1/2020:  - she completed the 10th and last cycle about a month ago  - she had recent MRI of pelvis with Dr Mendieta on 8/12/2020 and plans to get repeat scope at end of Sept 2020  - she has some occasional lightheadedness and is going to see her eye doctor in the near future  - will schedule a PET scan and consider MRI of brain thereafter    9/28/2020:  - PET on 9/18/2020 on chart  - scope scheduled for tomorrow with Dr Mendieta  - check up to date labs  - increase the gabpentin to 2 pills bid with one pill mid-day still - if symptoms do not improve, then consider referral to neurology    10/26/2020:  - doing ok except for residual neuropathy issues   - discussed referral to neurology but she wants to hold off for now  - she had scope with Dr mendieta since last visit which was "perfect" per patient and repeat is planned for six months    12/21/2020:  - " she is doing well  - neuropathy is better    4/7/2021:  - she is having MRI of pelvis and flex sig with Dr Mendieta next week  - latest CT's on 3/26/2021 look stable    7/20/2021:  - doing well  - seen by Dr mendieta recently with planned repeat flex sig in couple of months  - CEA at 2.7  - will get repeat scans with PEt in Sept 2021 1/18/2022:  - She sees Dr Mendieta again in April 2022 and is expected to have another scope. She last had a scope in Oct 2021.  - she last had scans in Oct 2021  - check labs every 3 months incl CEA  - repeat scans in April 2022 7/18/2022:  - She had covid about a month ago.   - She had a colonoscopy with Dr Mendieta in June 2022 with one polyp removed with planned flex sig in 6 months   - She saw Dr Nath on 7/6/2022; she was having some pain in the left shoulder for which he gave her an injection  - she had PET on 5/2/2022 which was stable  - repeat scans in one year unless something new develops    1/25/2023:  - recent flexsig with Dr Mendieta in oct 2022 which was good - planned repeat in April  - due for rpeat 6 month scans - will set up     8/15/2023:  - latest hgb  at 10.2  - iron panel adequate  - s/p Ct scans in Feb 2023 stable  - die for repeat CEA  - flex-sig in April 2023 negative per patient      2/15/2024:  - She saw Dr Nath in Nov 2023. She has been seeing Dr Flores with nephrology   - She had recent PET on 2/12/2024 which was essentially stable  - She is seeing Dr Mendieta again at end of the month      (2) Left renal cell carcinoma s/p left nephrectomy with Dr Azar Donnelly - diagnosed about 2 years ago     (3) Ureterolithiasis     (4) CRI - stage III - followed by Dr Antonio; she saw him recently and per patient's report, her kidney function is stable per Dr Antonio  - she is seeing Dr Antonio tomorrow    8/15/20-23:  - followed by Dr Flores now    2/15/2024:  - sees him again next week     (5) HTN and hypercholesterolemia     (6) DM - followed by Dr Brower with endocrinology      (7) Hx/of gall stones     (8) Chronic left shoulder issues     (9) MVP     (10) Thyroid disease with prior hx/of thyroid cancer s/p thyroidectomy      (11) Chronic anemia - most likley multifactorial due to iron deficiency, GIB and anemia of chronic renal disease       (12) hypocalcemia - followed by Dr Serrano    (13) 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  Encouraged compliance with labs;   2.  check labs every with CEA every 6 months  3.  F/u with PCP, GI, rad/onc, Gen Surgery etc  4.  F/u nephrology  next week  5.  F/u PET in Feb 2025  7. F/u with Dr Mendieta at end of month with expected repeat flex sig           RTC in 12 months     Fax note to Ph[facility] Winston Parks; Tete Duff Tandron; Brenden     Discussion:       I spent over 25 mins of time with the patient. Reviewed results of the recently ordered labs, tests and studies; made directives with regards to the results. Over half of this time was spent couseling and coordinating care.    COVID-19 Discussion:    I had long discussion with patient and any applicable family about the COVID-19 coronavirus epidemic and the recommended precautions with regard to cancer and/or hematology patients. I have re-iterated the CDC recommendations for adequate hand washing, use of hand -like products, and coughing into elbow, etc. In addition, especially for our patients who are on chemotherapy and/or our otherwise immunocompromised patients, I have recommended avoidance of crowds, including movie theaters, restaurants, churches, etc. I have recommended avoidance of any sick or symptomatic family members and/or friends. I have also recommended avoidance of any raw and unwashed food products, and general avoidance  of food items that have not been prepared by themselves. The patient has been asked to call us immediately with any symptom developments, issues, questions or other general concerns.       Chemotherapy Discussion:      I discussed the available treatment option(s) in accordance with the latest/current national evidence-based guidelines (NCCN, UpToDate, NCI, ASCO, etc where applicable), their overall age/condition and their co-morbidities. I also went over the risks and benefits of the chemotherapy with regard to their particular cancer type, their cancer stage, their age/condition, and their co-morbidities. I provided literature on the chemotherapy regimen and discussed the chemotherapy side-effect profiles of the drug(s). I discussed the importance of compliance with obtaining and monitoring weekly lab work, and went over the potential hematopathology issues and risks with anemia, leucopenia and thrombocytopenia that can occur with chemotherapy. I discussed the potential risks of liver and kidney damage, which could be permanent and could necessitate dialysis long-term if kidney failure developed. I discussed the emetic and/or diarrheal potential of the regimen and the potential need for use of antiemetic and anti-diarrheal medications. I discussed the risk for development of anaphylactic shock, bronchospasm, dysrhythmia, and respiratory/cardiovascular arrest and/or failure. I discussed the potential risks for development of alopecia, cold sensory issues, ringing in ears, vertigo, cataracts, glaucoma, and neuropathy, all of which could end up being chronic and life-long. Some chemotherpyI discussed the risks of hand-foot syndrome and rashes, and development of other autoimmune mediated processes such as pneumonitis, hepatitis, and colitis which could be life threatening. I discussed the risks of the potential development of a rare but fatal viral mediated disease known as PML (Progressive Multifocal  Leukoencephalopathy), and risk of future development of leukemia and/or lymphoma from use of certain chemotherapy agents. I discussed the need for neutropenic precautions, basic hygiene/sanitation behaviors and dietary restrictions.    The patient's consent has been obtained to proceed with the chemotherapy.The patient will be referred to [facility] for training and education on chemotherapy, use of antiemetics and/or anti-diarrheals, use of NSAID's, potential chemotherapy side-effects, and any specific recommendations and precautions with the particular chemotherapy agents.      I answered all of the patient's (and family's, if applicable) questions to the best of my ability and to their complete satisfaction. The patient acknowledged full understanding of the risks, recommendations and plan(s).          I have explained all of the above in detail and the patient understands all of the current recommendation(s). I have answered all of their questions to the best of my ability and to their complete satisfaction.   The patient is to continue with the current management plan.            Electronically signed by Carrillo Goode MD                       Answers submitted by the patient for this visit:  Review of Systems Questionnaire (Submitted on 2/10/2024)  appetite change : No  unexpected weight change: No  mouth sores: No  visual disturbance: No  cough: No  shortness of breath: No  chest pain: No  abdominal pain: No  diarrhea: No  frequency: No  back pain: No  rash: No  headaches: No  adenopathy: No  nervous/ anxious: No

## 2024-02-15 ENCOUNTER — OFFICE VISIT (OUTPATIENT)
Dept: HEMATOLOGY/ONCOLOGY | Facility: CLINIC | Age: 84
End: 2024-02-15
Payer: MEDICARE

## 2024-02-15 VITALS
DIASTOLIC BLOOD PRESSURE: 70 MMHG | BODY MASS INDEX: 27.21 KG/M2 | HEART RATE: 80 BPM | SYSTOLIC BLOOD PRESSURE: 147 MMHG | WEIGHT: 144 LBS | TEMPERATURE: 98 F

## 2024-02-15 DIAGNOSIS — C20 RECTAL CANCER: Primary | ICD-10-CM

## 2024-02-15 DIAGNOSIS — D70.1 CHEMOTHERAPY INDUCED NEUTROPENIA: ICD-10-CM

## 2024-02-15 DIAGNOSIS — T45.1X5A CHEMOTHERAPY INDUCED NEUTROPENIA: ICD-10-CM

## 2024-02-15 DIAGNOSIS — C64.2 RENAL CELL CARCINOMA OF LEFT KIDNEY: ICD-10-CM

## 2024-02-15 DIAGNOSIS — Z85.048 HISTORY OF RECTAL CANCER: ICD-10-CM

## 2024-02-15 DIAGNOSIS — D64.9 NORMOCYTIC ANEMIA: ICD-10-CM

## 2024-02-15 DIAGNOSIS — C20 MALIGNANT NEOPLASM OF RECTUM: ICD-10-CM

## 2024-02-15 PROCEDURE — 99214 OFFICE O/P EST MOD 30 MIN: CPT | Mod: S$GLB,,, | Performed by: INTERNAL MEDICINE

## 2024-02-19 DIAGNOSIS — G62.9 NEUROPATHY: ICD-10-CM

## 2024-02-19 RX ORDER — GABAPENTIN 100 MG/1
CAPSULE ORAL
Qty: 150 CAPSULE | Refills: 2 | Status: SHIPPED | OUTPATIENT
Start: 2024-02-19 | End: 2024-02-21

## 2024-02-21 PROBLEM — Z98.890 S/P COLONOSCOPY: Status: ACTIVE | Noted: 2024-02-21

## 2024-02-27 ENCOUNTER — OFFICE VISIT (OUTPATIENT)
Dept: SURGERY | Facility: CLINIC | Age: 84
End: 2024-02-27
Payer: MEDICARE

## 2024-02-27 VITALS
SYSTOLIC BLOOD PRESSURE: 130 MMHG | TEMPERATURE: 100 F | WEIGHT: 145.31 LBS | BODY MASS INDEX: 27.43 KG/M2 | HEIGHT: 61 IN | HEART RATE: 85 BPM | DIASTOLIC BLOOD PRESSURE: 63 MMHG

## 2024-02-27 DIAGNOSIS — Z85.048 HISTORY OF RECTAL CANCER: Primary | ICD-10-CM

## 2024-02-27 DIAGNOSIS — N64.4 BREAST PAIN, LEFT: ICD-10-CM

## 2024-02-27 PROCEDURE — 99213 OFFICE O/P EST LOW 20 MIN: CPT | Mod: S$PBB,,, | Performed by: SURGERY

## 2024-02-27 PROCEDURE — 99213 OFFICE O/P EST LOW 20 MIN: CPT | Mod: PBBFAC,PN | Performed by: SURGERY

## 2024-02-27 PROCEDURE — 99999 PR PBB SHADOW E&M-EST. PATIENT-LVL III: CPT | Mod: PBBFAC,,, | Performed by: SURGERY

## 2024-02-27 NOTE — PROGRESS NOTES
Pleasant 84 yo F whom I am familiar with.  Pt diagnosed with distal rectal cancer in 7/19.  She received neoadjuvant treatment and had complete response.  Decision was made against surgical treatment.  Surveillance has never demonstrated lesion.  Last flex sig in 5/23.  PET 2/24 with no PET avid uptake.  Pt also notes L sided breast pain that began last night.  Denies drainage or erythema.  Notes significant tenderness to touch.      AFVSS  AAox3  Soft/nt/nd  L breat with some induration superiorly. No fluctuance.  No erythema.  Area is tender to touch.   Rectal deferred    A/P: History of rectal cancer/  Breast tenderness    Will order US of L breast  Rectal cancer: will arrange for flex sig in the next 3 months

## 2024-03-08 ENCOUNTER — HOSPITAL ENCOUNTER (OUTPATIENT)
Dept: RADIOLOGY | Facility: OTHER | Age: 84
Discharge: HOME | End: 2024-03-08
Attending: SURGERY
Payer: MEDICARE

## 2024-03-08 DIAGNOSIS — N64.4 BREAST PAIN, LEFT: ICD-10-CM

## 2024-03-08 PROCEDURE — 77065 DX MAMMO INCL CAD UNI: CPT | Mod: TC,LT

## 2024-03-08 PROCEDURE — 77061 BREAST TOMOSYNTHESIS UNI: CPT | Mod: TC,LT

## 2024-03-08 PROCEDURE — 77061 BREAST TOMOSYNTHESIS UNI: CPT | Mod: 26,LT,, | Performed by: RADIOLOGY

## 2024-03-08 PROCEDURE — 77065 DX MAMMO INCL CAD UNI: CPT | Mod: 26,LT,, | Performed by: RADIOLOGY

## 2024-03-11 ENCOUNTER — PATIENT MESSAGE (OUTPATIENT)
Dept: SURGERY | Facility: CLINIC | Age: 84
End: 2024-03-11
Payer: MEDICARE

## 2024-03-13 ENCOUNTER — OFFICE VISIT (OUTPATIENT)
Dept: UROLOGY | Facility: CLINIC | Age: 84
End: 2024-03-13
Payer: MEDICARE

## 2024-03-13 ENCOUNTER — TELEPHONE (OUTPATIENT)
Dept: SURGERY | Facility: CLINIC | Age: 84
End: 2024-03-13
Payer: MEDICARE

## 2024-03-13 VITALS
WEIGHT: 140.31 LBS | HEART RATE: 73 BPM | SYSTOLIC BLOOD PRESSURE: 135 MMHG | DIASTOLIC BLOOD PRESSURE: 70 MMHG | HEIGHT: 61 IN | BODY MASS INDEX: 26.49 KG/M2

## 2024-03-13 DIAGNOSIS — N30.00 ACUTE CYSTITIS WITHOUT HEMATURIA: Primary | ICD-10-CM

## 2024-03-13 LAB
BILIRUB SERPL-MCNC: NEGATIVE MG/DL
BLOOD URINE, POC: 1
CLARITY, POC UA: NORMAL
COLOR, POC UA: YELLOW
GLUCOSE UR QL STRIP: NEGATIVE
KETONES UR QL STRIP: NEGATIVE
LEUKOCYTE ESTERASE URINE, POC: NORMAL
NITRITE, POC UA: NEGATIVE
PH, POC UA: 7
PROTEIN, POC: NORMAL
SPECIFIC GRAVITY, POC UA: 1.01
UROBILINOGEN, POC UA: NORMAL

## 2024-03-13 PROCEDURE — 99204 OFFICE O/P NEW MOD 45 MIN: CPT | Mod: S$PBB,,, | Performed by: UROLOGY

## 2024-03-13 PROCEDURE — 99999PBSHW POCT URINE DIPSTICK WITHOUT MICROSCOPE: Mod: PBBFAC,,,

## 2024-03-13 PROCEDURE — 87086 URINE CULTURE/COLONY COUNT: CPT | Performed by: UROLOGY

## 2024-03-13 PROCEDURE — 99213 OFFICE O/P EST LOW 20 MIN: CPT | Mod: PBBFAC,PN | Performed by: UROLOGY

## 2024-03-13 PROCEDURE — 81002 URINALYSIS NONAUTO W/O SCOPE: CPT | Mod: PBBFAC,PN | Performed by: UROLOGY

## 2024-03-13 PROCEDURE — 99999 PR PBB SHADOW E&M-EST. PATIENT-LVL III: CPT | Mod: PBBFAC,,, | Performed by: UROLOGY

## 2024-03-13 NOTE — TELEPHONE ENCOUNTER
Attempted to call pt to review mammogram   No answer.     Will have office reach out to pt to discuss.      Mammogram with no significant findings.    If breast pain persists would recommend eval by breast surgeon

## 2024-03-13 NOTE — PROGRESS NOTES
"Subjective:      aVlery Huggins is a 83 y.o. female who returns today regarding her nephrolithiasis and OAB symptoms.    She is here today primarily regarding voiding symptoms.    She again reports bothersome urinary symptoms including intermittency and some dysuria for about 2 weeks. She is no longer taking OAB meds and had done well until 2 weeks ago.    She also has h/o stones s/p right URS (solitary kidney) in 2018.     She was diagnosed with rectal cancer and has completed chemo/radiation at time of last visit in 2020 and has remained cancer free since.    The following portions of the patient's history were reviewed and updated as appropriate: allergies, current medications, past family history, past medical history, past social history, past surgical history and problem list.    Review of Systems  A comprehensive multipoint review of systems was negative except as otherwise stated in the HPI.     Objective:   Vitals: /70 (BP Location: Left arm, Patient Position: Sitting, BP Method: Large (Automatic))   Pulse 73   Ht 5' 1" (1.549 m)   Wt 63.6 kg (140 lb 5.2 oz)   LMP 06/01/2012   BMI 26.51 kg/m²     Physical Exam   General: alert and oriented, no acute distress  Respiratory: Symmetric expansion, non-labored breathing  Neuro: no gross deficits  Psych: normal judgment and insight, normal mood/affect and non-anxious    Lab Review   Urinalysis demonstrates positive for leukocytes, red blood cells   Lab Results   Component Value Date    WBC 6.7 12/11/2023    HGB 11.2 (L) 12/11/2023    HCT 34.2 (L) 12/11/2023    MCV 91.9 12/11/2023     12/11/2023     Lab Results   Component Value Date    CREATININE 2.10 (H) 12/11/2023    BUN 33 (H) 12/11/2023     24 Hour Urine  Date: 4/30/2018 -- Vol 1.72 L, Ca 222 mg/day, Ox 25 mg/day, Cit 284 mg/day, pH 6.391, Na 176, K 48, Mg 172, Cr 910   Date: 9/29/2015 -- Vol 2.17 L, Ca 348 mg/day, Ox 31 mg/day, Cit 702 mg/day, pH 5.692, Na 170, K 68, Mg 34, Cr 1310 "     Imaging Review   PET/CT from Feb 2024 reviewed w/ no stones in solitary right kidney.    Assessment and Plan:   Urgency, Frequency, UUI  -- Suspect UTI - will send culture today    Nephrolithiasis  -- Recent CT reviewed - no stones  -- Will defer additional imaging for now (she has routine abdominal imaging for other reasons)

## 2024-03-15 LAB
BACTERIA UR CULT: NORMAL
BACTERIA UR CULT: NORMAL

## 2024-03-21 ENCOUNTER — PATIENT MESSAGE (OUTPATIENT)
Dept: SURGERY | Facility: CLINIC | Age: 84
End: 2024-03-21
Payer: MEDICARE

## 2024-03-21 DIAGNOSIS — Z85.048 HISTORY OF RECTAL CANCER: Primary | ICD-10-CM

## 2024-03-21 RX ORDER — SODIUM CHLORIDE, SODIUM LACTATE, POTASSIUM CHLORIDE, CALCIUM CHLORIDE 600; 310; 30; 20 MG/100ML; MG/100ML; MG/100ML; MG/100ML
INJECTION, SOLUTION INTRAVENOUS CONTINUOUS
Status: CANCELLED | OUTPATIENT
Start: 2024-03-21

## 2024-03-21 RX ORDER — SODIUM CHLORIDE 0.9 % (FLUSH) 0.9 %
10 SYRINGE (ML) INJECTION
Status: CANCELLED | OUTPATIENT
Start: 2024-03-21

## 2024-04-01 ENCOUNTER — TELEPHONE (OUTPATIENT)
Dept: SURGERY | Facility: CLINIC | Age: 84
End: 2024-04-01
Payer: MEDICARE

## 2024-04-01 NOTE — TELEPHONE ENCOUNTER
----- Message from Kandis Rosas sent at 4/1/2024 10:58 AM CDT -----  Contact: self  Type:  Needs Medical Advice    Who Called:  Pt   Symptoms (please be specific):  pt is having a procedure on 4/8 and need instructions   Best Call Back Number:  [phone]  Additional Information: Re: Omezpic medication... Should she take it?   Thank you...

## 2024-04-01 NOTE — TELEPHONE ENCOUNTER
Spoke with patient, advised she should hold her Ozempic one week before her sigmoidoscopy.  Patient voiced understanding.

## 2024-04-08 ENCOUNTER — HOSPITAL ENCOUNTER (OUTPATIENT)
Facility: HOSPITAL | Age: 84
Discharge: HOME | End: 2024-04-08
Attending: SURGERY | Admitting: SURGERY
Payer: MEDICARE

## 2024-04-08 ENCOUNTER — ANESTHESIA (OUTPATIENT)
Dept: ENDOSCOPY | Facility: HOSPITAL | Age: 84
End: 2024-04-08
Payer: MEDICARE

## 2024-04-08 ENCOUNTER — ANESTHESIA EVENT (OUTPATIENT)
Dept: ENDOSCOPY | Facility: HOSPITAL | Age: 84
End: 2024-04-08
Payer: MEDICARE

## 2024-04-08 VITALS
HEART RATE: 76 BPM | WEIGHT: 140 LBS | BODY MASS INDEX: 26.43 KG/M2 | OXYGEN SATURATION: 98 % | SYSTOLIC BLOOD PRESSURE: 127 MMHG | HEIGHT: 61 IN | RESPIRATION RATE: 18 BRPM | DIASTOLIC BLOOD PRESSURE: 65 MMHG | TEMPERATURE: 98 F

## 2024-04-08 DIAGNOSIS — Z85.048 HISTORY OF RECTAL CANCER: ICD-10-CM

## 2024-04-08 PROCEDURE — 37000009 HC ANESTHESIA EA ADD 15 MINS: Performed by: SURGERY

## 2024-04-08 PROCEDURE — 27201012 HC FORCEPS, HOT/COLD, DISP: Performed by: SURGERY

## 2024-04-08 PROCEDURE — 88305 TISSUE EXAM BY PATHOLOGIST: CPT | Mod: TC | Performed by: PATHOLOGY

## 2024-04-08 PROCEDURE — 25000003 PHARM REV CODE 250: Performed by: NURSE ANESTHETIST, CERTIFIED REGISTERED

## 2024-04-08 PROCEDURE — 63600175 PHARM REV CODE 636 W HCPCS: Performed by: NURSE ANESTHETIST, CERTIFIED REGISTERED

## 2024-04-08 PROCEDURE — 37000008 HC ANESTHESIA 1ST 15 MINUTES: Performed by: SURGERY

## 2024-04-08 PROCEDURE — 45331 SIGMOIDOSCOPY AND BIOPSY: CPT | Performed by: SURGERY

## 2024-04-08 PROCEDURE — 25000003 PHARM REV CODE 250: Performed by: SURGERY

## 2024-04-08 PROCEDURE — 45331 SIGMOIDOSCOPY AND BIOPSY: CPT | Mod: ,,, | Performed by: SURGERY

## 2024-04-08 RX ORDER — PROPOFOL 10 MG/ML
INJECTION, EMULSION INTRAVENOUS
Status: DISCONTINUED | OUTPATIENT
Start: 2024-04-08 | End: 2024-04-08

## 2024-04-08 RX ORDER — LIDOCAINE HYDROCHLORIDE 20 MG/ML
INJECTION INTRAVENOUS
Status: DISCONTINUED | OUTPATIENT
Start: 2024-04-08 | End: 2024-04-08

## 2024-04-08 RX ORDER — SODIUM CHLORIDE 9 MG/ML
INJECTION, SOLUTION INTRAVENOUS CONTINUOUS
Status: DISCONTINUED | OUTPATIENT
Start: 2024-04-08 | End: 2024-04-08 | Stop reason: HOSPADM

## 2024-04-08 RX ORDER — SODIUM CHLORIDE 0.9 % (FLUSH) 0.9 %
10 SYRINGE (ML) INJECTION
Status: DISCONTINUED | OUTPATIENT
Start: 2024-04-08 | End: 2024-04-08 | Stop reason: HOSPADM

## 2024-04-08 RX ADMIN — SODIUM CHLORIDE: 0.9 INJECTION, SOLUTION INTRAVENOUS at 07:04

## 2024-04-08 RX ADMIN — PROPOFOL 25 MG: 10 INJECTION, EMULSION INTRAVENOUS at 07:04

## 2024-04-08 RX ADMIN — LIDOCAINE HYDROCHLORIDE 100 MG: 20 INJECTION, SOLUTION INTRAVENOUS at 07:04

## 2024-04-08 RX ADMIN — PROPOFOL 50 MG: 10 INJECTION, EMULSION INTRAVENOUS at 07:04

## 2024-04-08 NOTE — ANESTHESIA PREPROCEDURE EVALUATION
04/08/2024  Valery Huggins is a 83 y.o., female.      Pre-op Assessment    I have reviewed the Patient Summary Reports.     I have reviewed the Nursing Notes. I have reviewed the NPO Status.   I have reviewed the Medications.     Review of Systems  Anesthesia Hx:    PONV                Social:  Non-Smoker       Hematology/Oncology:       -- Anemia:                    --  Cancer in past history (renal cell CA L- s/p nephrectomy, Thyroid CA):                     Cardiovascular:     Hypertension, well controlled Valvular problems/Murmurs, MVP             ECG has been reviewed.                          Pulmonary:  Pulmonary Normal                       Renal/:  Chronic Renal Disease renal calculi               Hepatic/GI:  Bowel Prep.   GERD             Neurological:  Neurology Normal                                      Endocrine:  Diabetes, well controlled, type 2 Hypothyroidism          Psych:  Psychiatric History  depression                Physical Exam  General: Well nourished, Cooperative, Alert and Oriented    Airway:  Mallampati: II   Mouth Opening: Normal  TM Distance: Normal  Neck ROM: Normal ROM        Anesthesia Plan  Type of Anesthesia, risks & benefits discussed:    Anesthesia Type: Gen Natural Airway  Intra-op Monitoring Plan: Standard ASA Monitors  Induction:  IV  Airway Plan: Direct  Informed Consent: Informed consent signed with the Patient and all parties understand the risks and agree with anesthesia plan.  All questions answered.   ASA Score: 3  Anesthesia Plan Notes: Advanced age    Ready For Surgery From Anesthesia Perspective.     .

## 2024-04-08 NOTE — H&P
Critical access hospital - Endoscopy  History & Physical     Subjective:     Chief Complaint/Reason for Admission: History of rectal cancer    History of Present Illness:   Patient 83 y.o. female  whom I am familiar with.  Pt diagnosed with distal rectal cancer in 7/19.  She received neoadjuvant treatment and had complete response.  Decision was made against surgical treatment.  Surveillance has never demonstrated lesion.  Last flex sig in 5/23.  PET 2/24 with no PET avid uptake.         Medications Prior to Admission   Medication Sig Dispense Refill Last Dose    amLODIPine (NORVASC) 2.5 MG tablet TAKE 1 TABLET (2.5 MG TOTAL) BY MOUTH ONCE DAILY. 90 tablet 0 4/8/2024 at 0530    ascorbic acid, vitamin C, (VITAMIN C) 100 MG tablet Take 100 mg by mouth once daily.   4/7/2024    atorvastatin (LIPITOR) 10 MG tablet TAKE 1 TABLET (10 MG TOTAL) BY MOUTH EVERY EVENING. 30 tablet 0 4/7/2024    b complex vitamins capsule Take 1 capsule by mouth once daily.   4/7/2024    calcitRIOL (ROCALTROL) 0.5 MCG Cap TAKE 1 CAPSULE (0.5 MCG TOTAL) BY MOUTH ONCE DAILY. 30 capsule 0 4/7/2024    EScitalopram oxalate (LEXAPRO) 20 MG tablet TAKE ONE TABLET BY MOUTH DAILY 90 tablet 0 4/7/2024    gabapentin (NEURONTIN) 100 MG capsule Take 1 capsule (100 mg total) by mouth once daily. 150 capsule 2 4/7/2024    hydrALAZINE (APRESOLINE) 100 MG tablet TAKE 1 TABLET (100 MG TOTAL) BY MOUTH 2 (TWO) TIMES DAILY. 90 tablet 0 4/8/2024 at 0530    levothyroxine (SYNTHROID) 88 MCG tablet Take 1 tablet (88 mcg total) by mouth once daily. 90 tablet 0 4/7/2024    metoprolol tartrate (LOPRESSOR) 25 MG tablet TAKE 1 TABLET (25 MG TOTAL) BY MOUTH 2 (TWO) TIMES DAILY. 180 tablet 0 4/8/2024 at 0530    pantoprazole (PROTONIX) 40 MG tablet TAKE 1 TABLET (40 MG TOTAL) BY MOUTH ONCE DAILY. 30 tablet 0 4/7/2024    TOUJEO SOLOSTAR U-300 INSULIN 300 unit/mL (1.5 mL) InPn pen INJECT 20 UNITS SUBCUTANEAOUSLY DAILY 4.5 mL 1 4/7/2024    lifitegrast (XIIDRA) 5 % Dpet Apply to  eye.       OZEMPIC 1 mg/dose (4 mg/3 mL) every 7 days.   3/25/2024     Review of patient's allergies indicates:   Allergen Reactions    Sunitinib Diarrhea, Nausea And Vomiting and Other (See Comments)     Vomiting and Diarrhea        Past Medical History:   Diagnosis Date    Anemia     Depression     Diabetes mellitus type II     Encounter for blood transfusion     Gallstones     GERD (gastroesophageal reflux disease)     Hypertension     Kidney stone     MVP (mitral valve prolapse)     Personal history of colonic polyps 04/28/2022    PONV (postoperative nausea and vomiting)     Renal cell carcinoma of left kidney 12/11/2016    Thyroid cancer     Thyroid disease       Past Surgical History:   Procedure Laterality Date    APPENDECTOMY      cataracts      both eyes    CHOLECYSTECTOMY      COLONOSCOPY N/A 08/26/2019    Procedure: COLONOSCOPY;  Surgeon: Armando Duff MD;  Location: [facility];  Service: Endoscopy;  Laterality: N/A;    COLONOSCOPY N/A 04/28/2022    Procedure: COLONOSCOPY;  Surgeon: David Mendieta MD;  Location: [facility];  Service: General;  Laterality: N/A;    COLONOSCOPY W/ POLYPECTOMY  04/28/2022    CYSTOSCOPY      CYSTOSCOPY W/ URETERAL STENT PLACEMENT      ECTOPIC PREGNANCY SURGERY      EXAMINATION UNDER ANESTHESIA N/A 09/23/2019    Procedure: Exam under anesthesia;  Surgeon: David Mendieta MD;  Location: [facility];  Service: General;  Laterality: N/A;    EYE SURGERY      phoebe cataract    FLEXIBLE SIGMOIDOSCOPY N/A 01/28/2020    Procedure: SIGMOIDOSCOPY, FLEXIBLE;  Surgeon: David Mendieta MD;  Location: [facility];  Service: Endoscopy;  Laterality: N/A;    FLEXIBLE SIGMOIDOSCOPY N/A 09/29/2020    Procedure: SIGMOIDOSCOPY, FLEXIBLE;  Surgeon: David Mendieta MD;  Location: [facility] ENDO;  Service: Endoscopy;  Laterality: N/A;    FLEXIBLE SIGMOIDOSCOPY N/A 04/13/2021    Procedure: SIGMOIDOSCOPY, FLEXIBLE;  Surgeon: David Mendieta MD;  Location: [facility] ENDO;  Service: Endoscopy;  Laterality: N/A;     "FLEXIBLE SIGMOIDOSCOPY  10/21/2021    per  10/21/2021    FLEXIBLE SIGMOIDOSCOPY N/A 10/21/2021    Procedure: SIGMOIDOSCOPY, FLEXIBLE;  Surgeon: David Mendieta MD;  Location: [facility] ENDO;  Service: General;  Laterality: N/A;    FLEXIBLE SIGMOIDOSCOPY  10/20/2022    FLEXIBLE SIGMOIDOSCOPY N/A 10/20/2022    Procedure: SIGMOIDOSCOPY, FLEXIBLE;  Surgeon: David Mendieta MD;  Location: [facility] ENDO;  Service: General;  Laterality: N/A;    FLEXIBLE SIGMOIDOSCOPY N/A 5/15/2023    Procedure: SIGMOIDOSCOPY, FLEXIBLE;  Surgeon: David Mendieta MD;  Location: [facility] ENDO;  Service: Endoscopy;  Laterality: N/A;    HYSTERECTOMY      INSERTION OF TUNNELED CENTRAL VENOUS CATHETER (CVC) WITH SUBCUTANEOUS PORT Right 10/15/2019    Procedure: [id number];  Surgeon: David Mendieta MD;  Location: [facility] OR;  Service: General;  Laterality: Right;    NASAL SEPTUM SURGERY      NEPHRECTOMY Left     OOPHORECTOMY      THYROIDECTOMY      two times    TONSILLECTOMY          Social History     Tobacco Use    Smoking status: Never    Smokeless tobacco: Never   Substance Use Topics    Alcohol use: No        Review of Systems:  A comprehensive review of systems was negative.    OBJECTIVE:     Patient Vitals for the past 8 hrs:   BP Temp Temp src Pulse Resp SpO2 Height Weight   04/08/24 0645 (!) 142/65 97.6 °F (36.4 °C) Skin 77 18 98 % 5' 1" (1.549 m) 63.5 kg (140 lb)     AAOx3  Soft/nd/nt      Data Review:      ASSESSMENT/PLAN:     There are no hospital problems to display for this patient.  Surveillance scope    Plan:  TO have flex sig today    "

## 2024-04-08 NOTE — PROVATION PATIENT INSTRUCTIONS
Discharge Summary/Instructions after an Endoscopic Procedure  Patient Name: Valery Huggins  Patient MRN: [id number]  Patient YOB: 1940 Monday, April 8, 2024  David Mendieta MD  Dear patient,  As a result of recent federal legislation (The Federal Cures Act), you may   receive lab or pathology results from your procedure in your [facility]   account before your physician is able to contact you. Your physician or   their representative will relay the results to you with their   recommendations at their soonest availability.  Thank you,  RESTRICTIONS:  During your procedure today, you received medications for sedation.  These   medications may affect your judgment, balance and coordination.  Therefore,   for 24 hours, you have the following restrictions:   - DO NOT drive a car, operate machinery, make legal/financial decisions,   sign important papers or drink alcohol.    ACTIVITY:  Today: no heavy lifting, straining or running due to procedural   sedation/anesthesia.  The following day: return to full activity including work.  DIET:  Eat and drink normally unless instructed otherwise.     TREATMENT FOR COMMON SIDE EFFECTS:  - Mild abdominal pain, nausea, belching, bloating or excessive gas:  rest,   eat lightly and use a heating pad.  - Sore Throat: treat with throat lozenges and/or gargle with warm salt   water.  - Because air was used during the procedure, expelling large amounts of air   from your rectum or belching is normal.  - If a bowel prep was taken, you may not have a bowel movement for 1-3 days.    This is normal.  SYMPTOMS TO WATCH FOR AND REPORT TO YOUR PHYSICIAN:  1. Abdominal pain or bloating, other than gas cramps.  2. Chest pain.  3. Back pain.  4. Signs of infection such as: chills or fever occurring within 24 hours   after the procedure.  5. Rectal bleeding, which would show as bright red, maroon, or black stools.   (A tablespoon of blood from the rectum is not serious, especially if    hemorrhoids are present.)  6. Vomiting.  7. Weakness or dizziness.  GO DIRECTLY TO THE NEAREST EMERGENCY ROOM IF YOU HAVE ANY OF THE FOLLOWING:      Difficulty breathing              Chills and/or fever over 101 F   Persistent vomiting and/or vomiting blood   Severe abdominal pain   Severe chest pain   Black, tarry stools   Bleeding- more than one tablespoon   Any other symptom or condition that you feel may need urgent attention  Your doctor recommends these additional instructions:  If any biopsies were taken, your doctors clinic will contact you in 1 to 2   weeks with any results.  - Discharge patient to home (ambulatory).   - Resume regular diet.   - Return to my office at appointment to be scheduled.  For questions, problems or results please call your physician - David Mendieat MD at Work:  [phone].  [facility], EMERGENCY ROOM PHONE NUMBER: [phone]  IF A COMPLICATION OR EMERGENCY SITUATION ARISES AND YOU ARE UNABLE TO REACH   YOUR PHYSICIAN - GO DIRECTLY TO THE EMERGENCY ROOM.  David Mendieta MD  4/8/2024 7:32:01 AM  This report has been verified and signed electronically.  Dear patient,  As a result of recent federal legislation (The Federal Cures Act), you may   receive lab or pathology results from your procedure in your [facility]   account before your physician is able to contact you. Your physician or   their representative will relay the results to you with their   recommendations at their soonest availability.  Thank you,  PROVATION

## 2024-04-08 NOTE — TRANSFER OF CARE
"Anesthesia Transfer of Care Note    Patient: Valery Huggins    Procedure(s) Performed: Procedure(s) (LRB):  SIGMOIDOSCOPY, FLEXIBLE (N/A)    Patient location: PACU    Anesthesia Type: general    Transport from OR: Transported from OR on 2-3 L/min O2 by NC with adequate spontaneous ventilation    Post pain: adequate analgesia    Post assessment: no apparent anesthetic complications and tolerated procedure well    Post vital signs: stable    Level of consciousness: awake and responds to stimulation    Nausea/Vomiting: no nausea/vomiting    Complications: none    Transfer of care protocol was followed      Last vitals: Visit Vitals  BP (!) 142/65 (BP Location: Left arm, Patient Position: Lying)   Pulse 77   Temp 36.4 °C (97.6 °F) (Skin)   Resp 18   Ht 5' 1" (1.549 m)   Wt 63.5 kg (140 lb)   LMP 06/01/2012   SpO2 98%   Breastfeeding No   BMI 26.45 kg/m²     "

## 2024-04-08 NOTE — ANESTHESIA POSTPROCEDURE EVALUATION
Anesthesia Post Evaluation    Patient: Valery Huggins    Procedure(s) Performed: Procedure(s) (LRB):  SIGMOIDOSCOPY, FLEXIBLE (N/A)    Final Anesthesia Type: general      Patient location during evaluation: PACU  Patient participation: Yes- Able to Participate  Level of consciousness: sedated and awake  Post-procedure vital signs: reviewed and stable  Pain management: adequate  Airway patency: patent    PONV status at discharge: No PONV  Anesthetic complications: no      Cardiovascular status: blood pressure returned to baseline  Respiratory status: spontaneous ventilation  Hydration status: euvolemic  Follow-up not needed.              Vitals Value Taken Time   /65 04/08/24 0755   Temp 36.4 °C (97.6 °F) 04/08/24 0730   Pulse 76 04/08/24 0756   Resp 18 04/08/24 0750   SpO2 97 % 04/08/24 0756   Vitals shown include unvalidated device data.      Event Time   Out of Recovery 08:12:14         Pain/Jake Score: Jake Score: 10 (4/8/2024  7:55 AM)

## 2024-04-14 PROBLEM — E11.29 TYPE 2 DIABETES MELLITUS WITH OTHER DIABETIC KIDNEY COMPLICATION: Status: ACTIVE | Noted: 2018-07-31

## 2024-05-08 ENCOUNTER — PATIENT MESSAGE (OUTPATIENT)
Dept: SURGERY | Facility: CLINIC | Age: 84
End: 2024-05-08
Payer: MEDICARE

## 2024-09-16 DIAGNOSIS — G62.9 NEUROPATHY: ICD-10-CM

## 2024-10-01 RX ORDER — GABAPENTIN 100 MG/1
CAPSULE ORAL
Qty: 150 CAPSULE | Refills: 0 | Status: SHIPPED | OUTPATIENT
Start: 2024-10-01

## 2024-10-08 ENCOUNTER — OFFICE VISIT (OUTPATIENT)
Dept: UROLOGY | Facility: CLINIC | Age: 84
End: 2024-10-08
Payer: MEDICARE

## 2024-10-08 VITALS
BODY MASS INDEX: 26.93 KG/M2 | SYSTOLIC BLOOD PRESSURE: 124 MMHG | WEIGHT: 142.63 LBS | HEIGHT: 61 IN | HEART RATE: 75 BPM | DIASTOLIC BLOOD PRESSURE: 67 MMHG

## 2024-10-08 DIAGNOSIS — N30.00 ACUTE CYSTITIS WITHOUT HEMATURIA: Primary | ICD-10-CM

## 2024-10-08 DIAGNOSIS — N32.81 OAB (OVERACTIVE BLADDER): ICD-10-CM

## 2024-10-08 LAB
BACTERIA #/AREA URNS HPF: NORMAL /HPF
BILIRUB SERPL-MCNC: NORMAL MG/DL
BLOOD URINE, POC: NORMAL
CLARITY, POC UA: CLEAR
COLOR, POC UA: YELLOW
GLUCOSE UR QL STRIP: NORMAL
KETONES UR QL STRIP: NORMAL
LEUKOCYTE ESTERASE URINE, POC: NORMAL
MICROSCOPIC COMMENT: NORMAL
NITRITE, POC UA: NORMAL
PH, POC UA: 5
POC RESIDUAL URINE VOLUME: 0 ML (ref 0–100)
PROTEIN, POC: NORMAL
RBC #/AREA URNS HPF: 1 /HPF (ref 0–4)
SPECIFIC GRAVITY, POC UA: 1
SQUAMOUS #/AREA URNS HPF: 0 /HPF
UROBILINOGEN, POC UA: NORMAL
WBC #/AREA URNS HPF: 4 /HPF (ref 0–5)

## 2024-10-08 PROCEDURE — 99214 OFFICE O/P EST MOD 30 MIN: CPT | Mod: S$PBB,,, | Performed by: NURSE PRACTITIONER

## 2024-10-08 PROCEDURE — 81002 URINALYSIS NONAUTO W/O SCOPE: CPT | Mod: PBBFAC,PN | Performed by: NURSE PRACTITIONER

## 2024-10-08 PROCEDURE — 99215 OFFICE O/P EST HI 40 MIN: CPT | Mod: PBBFAC,PN,25 | Performed by: NURSE PRACTITIONER

## 2024-10-08 PROCEDURE — 87086 URINE CULTURE/COLONY COUNT: CPT | Performed by: NURSE PRACTITIONER

## 2024-10-08 PROCEDURE — 51798 US URINE CAPACITY MEASURE: CPT | Mod: PBBFAC,PN | Performed by: NURSE PRACTITIONER

## 2024-10-08 PROCEDURE — 99999PBSHW POCT BLADDER SCAN: Mod: PBBFAC,,,

## 2024-10-08 PROCEDURE — 99999PBSHW POCT URINE DIPSTICK WITHOUT MICROSCOPE: Mod: PBBFAC,,,

## 2024-10-08 PROCEDURE — 81001 URINALYSIS AUTO W/SCOPE: CPT | Performed by: NURSE PRACTITIONER

## 2024-10-08 PROCEDURE — 99999 PR PBB SHADOW E&M-EST. PATIENT-LVL V: CPT | Mod: PBBFAC,,, | Performed by: NURSE PRACTITIONER

## 2024-10-08 RX ORDER — SOLIFENACIN SUCCINATE 10 MG/1
10 TABLET, FILM COATED ORAL DAILY
Qty: 30 TABLET | Refills: 11 | Status: SHIPPED | OUTPATIENT
Start: 2024-10-08 | End: 2025-10-08

## 2024-10-08 RX ORDER — MIRABEGRON 25 MG/1
25 TABLET, FILM COATED, EXTENDED RELEASE ORAL DAILY
Qty: 30 TABLET | Refills: 11 | Status: SHIPPED | OUTPATIENT
Start: 2024-10-08 | End: 2025-10-08

## 2024-10-08 NOTE — PROGRESS NOTES
"Subjective:      Valery Huggins is a 83 y.o. female who returns today regarding LUTS.    Previous patient of Dr. Mercado; new to me today  Reports mild urinary hesitancy in the morning, bothersome urgency and urge incontinence during the day that has been present for several weeks.   Denies GH, flank pain, f/c/n/v.   Denies Domonique    She also has h/o stones s/p right URS (solitary kidney) in 2018.      She was diagnosed with rectal cancer and has completed chemo/radiation at time of last visit in 2020 and has remained cancer free since.    The following portions of the patient's history were reviewed and updated as appropriate: allergies, current medications, past family history, past medical history, past social history, past surgical history and problem list.    Review of Systems  A comprehensive multipoint review of systems was negative except as otherwise stated in the HPI.     Objective:   Vitals: /67 (BP Location: Left arm, Patient Position: Sitting)   Pulse 75   Ht 5' 1" (1.549 m)   Wt 64.7 kg (142 lb 10.2 oz)   LMP 06/01/2012   BMI 26.95 kg/m²       Physical Exam   General: alert and oriented, no acute distress  Head: normocephalic, atraumatic  Neck: supple, no lymphadenopathy, normal ROM, no masses  Respiratory: Symmetric expansion, non-labored breathing  Cardiovascular: regular rate and rhythm, nomal pulses, no peripheral edema  Skin: normal coloration and turgor, no rashes, no suspicious skin lesions noted  Neuro: alert and oriented x3, no gross deficits  Psych: normal judgment and insight, normal mood/affect, and non-anxious    Lab Review   Urinalysis demonstrates   Lab Results   Component Value Date    WBC 6.7 12/11/2023    HGB 11.2 (L) 12/11/2023    HCT 34.2 (L) 12/11/2023    MCV 91.9 12/11/2023     12/11/2023     Lab Results   Component Value Date    CREATININE 2.10 (H) 12/11/2023    BUN 33 (H) 12/11/2023         Bladder Scan PVR: 0 cc      Assessment and Plan:   1. Acute cystitis " without hematuria  - Urine culture    2. OAB (overactive bladder)  --Discussed treatment options for OAB and urge incontinence, including anticholinergics, myrbetriq, botox, and interstim  --Discussed common bladder irritants such as caffeine, alcohol, citrus, and acidic and spicy foods. Encouraged to minimize in diet to reduce symptoms.  --trial mirabegron (rx for vesicare printed)     - Urine culture  - Urinalysis Microscopic     --rtc in 1 month     This note is dictated on M*Modal word recognition program.  There are word recognition mistakes that are occasionally missed on review.

## 2024-10-10 LAB — BACTERIA UR CULT: NO GROWTH

## 2025-01-03 ENCOUNTER — TELEPHONE (OUTPATIENT)
Dept: GASTROENTEROLOGY | Facility: CLINIC | Age: 85
End: 2025-01-03
Payer: MEDICARE

## 2025-01-03 ENCOUNTER — TELEPHONE (OUTPATIENT)
Dept: SURGERY | Facility: CLINIC | Age: 85
End: 2025-01-03
Payer: MEDICARE

## 2025-01-03 NOTE — TELEPHONE ENCOUNTER
----- Message from Charline sent at 1/3/2025  1:56 PM CST -----  Type:  Patient Returning Call    Who Called:PT    Who Left Message for Patient: PT asked for Christopher    Does the patient know what this is regarding?:    Would the patient rather a call back or a response via [facility]? call    Best Call Back Number:[phone]       Additional Information:  Please call back to advise. Thank you!

## 2025-01-03 NOTE — TELEPHONE ENCOUNTER
Patient stated she will give us a call back if she needs to have the procedure done. Stated she believe she had one early last year.

## 2025-01-03 NOTE — TELEPHONE ENCOUNTER
Spoke to pt, plan for Colonoscopy in [place] in April, send instruction through the portal.

## 2025-01-31 ENCOUNTER — HOSPITAL ENCOUNTER (OUTPATIENT)
Facility: HOSPITAL | Age: 85
Discharge: HOME | End: 2025-02-01
Attending: EMERGENCY MEDICINE | Admitting: STUDENT IN AN ORGANIZED HEALTH CARE EDUCATION/TRAINING PROGRAM
Payer: MEDICARE

## 2025-01-31 DIAGNOSIS — R06.02 SHORTNESS OF BREATH: ICD-10-CM

## 2025-01-31 DIAGNOSIS — R06.02 SOB (SHORTNESS OF BREATH): ICD-10-CM

## 2025-01-31 PROBLEM — R79.89 POSITIVE D DIMER: Status: ACTIVE | Noted: 2025-01-31

## 2025-01-31 LAB
ALBUMIN SERPL BCP-MCNC: 3.9 G/DL (ref 3.5–5.2)
ALP SERPL-CCNC: 72 U/L (ref 40–150)
ALT SERPL W/O P-5'-P-CCNC: 21 U/L (ref 10–44)
ANION GAP SERPL CALC-SCNC: 11 MMOL/L (ref 8–16)
AST SERPL-CCNC: 21 U/L (ref 10–40)
BASOPHILS # BLD AUTO: 0.05 K/UL (ref 0–0.2)
BASOPHILS NFR BLD: 0.6 % (ref 0–1.9)
BILIRUB SERPL-MCNC: 0.9 MG/DL (ref 0.1–1)
BNP SERPL-MCNC: 54 PG/ML (ref 0–99)
BUN SERPL-MCNC: 27 MG/DL (ref 8–23)
CALCIUM SERPL-MCNC: 9.9 MG/DL (ref 8.7–10.5)
CHLORIDE SERPL-SCNC: 100 MMOL/L (ref 95–110)
CO2 SERPL-SCNC: 25 MMOL/L (ref 23–29)
CREAT SERPL-MCNC: 2.6 MG/DL (ref 0.5–1.4)
DIFFERENTIAL METHOD BLD: ABNORMAL
EOSINOPHIL # BLD AUTO: 0.1 K/UL (ref 0–0.5)
EOSINOPHIL NFR BLD: 1.2 % (ref 0–8)
ERYTHROCYTE [DISTWIDTH] IN BLOOD BY AUTOMATED COUNT: 14.3 % (ref 11.5–14.5)
EST. GFR  (NO RACE VARIABLE): 17.9 ML/MIN/1.73 M^2
GLUCOSE SERPL-MCNC: 190 MG/DL (ref 70–110)
HCT VFR BLD AUTO: 37.2 % (ref 37–48.5)
HCV AB SERPL QL IA: NORMAL
HGB BLD-MCNC: 12.3 G/DL (ref 12–16)
HIV 1+2 AB+HIV1 P24 AG SERPL QL IA: NORMAL
IMM GRANULOCYTES # BLD AUTO: 0.04 K/UL (ref 0–0.04)
IMM GRANULOCYTES NFR BLD AUTO: 0.5 % (ref 0–0.5)
LYMPHOCYTES # BLD AUTO: 1.5 K/UL (ref 1–4.8)
LYMPHOCYTES NFR BLD: 17.7 % (ref 18–48)
MCH RBC QN AUTO: 30 PG (ref 27–31)
MCHC RBC AUTO-ENTMCNC: 33.1 G/DL (ref 32–36)
MCV RBC AUTO: 91 FL (ref 82–98)
MONOCYTES # BLD AUTO: 0.6 K/UL (ref 0.3–1)
MONOCYTES NFR BLD: 7.5 % (ref 4–15)
NEUTROPHILS # BLD AUTO: 6.2 K/UL (ref 1.8–7.7)
NEUTROPHILS NFR BLD: 72.5 % (ref 38–73)
NRBC BLD-RTO: 0 /100 WBC
OHS QRS DURATION: 78 MS
OHS QTC CALCULATION: 472 MS
PLATELET # BLD AUTO: 269 K/UL (ref 150–450)
PMV BLD AUTO: 9.9 FL (ref 9.2–12.9)
POCT GLUCOSE: 212 MG/DL (ref 70–110)
POTASSIUM SERPL-SCNC: 4.1 MMOL/L (ref 3.5–5.1)
PROT SERPL-MCNC: 7.8 G/DL (ref 6–8.4)
RBC # BLD AUTO: 4.1 M/UL (ref 4–5.4)
SODIUM SERPL-SCNC: 136 MMOL/L (ref 136–145)
TROPONIN I SERPL DL<=0.01 NG/ML-MCNC: 16 NG/L (ref 0–14)
TROPONIN I SERPL DL<=0.01 NG/ML-MCNC: 16 NG/L (ref 0–14)
WBC # BLD AUTO: 8.52 K/UL (ref 3.9–12.7)

## 2025-01-31 PROCEDURE — 87427 SHIGA-LIKE TOXIN AG IA: CPT | Mod: 59 | Performed by: PHYSICIAN ASSISTANT

## 2025-01-31 PROCEDURE — 93005 ELECTROCARDIOGRAM TRACING: CPT

## 2025-01-31 PROCEDURE — 87209 SMEAR COMPLEX STAIN: CPT | Performed by: PHYSICIAN ASSISTANT

## 2025-01-31 PROCEDURE — 87389 HIV-1 AG W/HIV-1&-2 AB AG IA: CPT | Performed by: PHYSICIAN ASSISTANT

## 2025-01-31 PROCEDURE — 63600175 PHARM REV CODE 636 W HCPCS: Performed by: PHYSICIAN ASSISTANT

## 2025-01-31 PROCEDURE — 89055 LEUKOCYTE ASSESSMENT FECAL: CPT | Performed by: PHYSICIAN ASSISTANT

## 2025-01-31 PROCEDURE — 93010 ELECTROCARDIOGRAM REPORT: CPT | Mod: ,,, | Performed by: STUDENT IN AN ORGANIZED HEALTH CARE EDUCATION/TRAINING PROGRAM

## 2025-01-31 PROCEDURE — 86803 HEPATITIS C AB TEST: CPT | Performed by: PHYSICIAN ASSISTANT

## 2025-01-31 PROCEDURE — 87425 ROTAVIRUS AG IA: CPT | Performed by: PHYSICIAN ASSISTANT

## 2025-01-31 PROCEDURE — 83880 ASSAY OF NATRIURETIC PEPTIDE: CPT | Performed by: EMERGENCY MEDICINE

## 2025-01-31 PROCEDURE — 80053 COMPREHEN METABOLIC PANEL: CPT | Performed by: EMERGENCY MEDICINE

## 2025-01-31 PROCEDURE — 87449 NOS EACH ORGANISM AG IA: CPT | Performed by: PHYSICIAN ASSISTANT

## 2025-01-31 PROCEDURE — G0378 HOSPITAL OBSERVATION PER HR: HCPCS

## 2025-01-31 PROCEDURE — 83735 ASSAY OF MAGNESIUM: CPT | Performed by: PHYSICIAN ASSISTANT

## 2025-01-31 PROCEDURE — 99285 EMERGENCY DEPT VISIT HI MDM: CPT | Mod: 25

## 2025-01-31 PROCEDURE — 85025 COMPLETE CBC W/AUTO DIFF WBC: CPT | Performed by: EMERGENCY MEDICINE

## 2025-01-31 PROCEDURE — 87045 FECES CULTURE AEROBIC BACT: CPT | Performed by: PHYSICIAN ASSISTANT

## 2025-01-31 PROCEDURE — 84484 ASSAY OF TROPONIN QUANT: CPT | Performed by: EMERGENCY MEDICINE

## 2025-01-31 PROCEDURE — 93010 ELECTROCARDIOGRAM REPORT: CPT | Mod: ,,, | Performed by: INTERNAL MEDICINE

## 2025-01-31 PROCEDURE — 84484 ASSAY OF TROPONIN QUANT: CPT | Mod: 91 | Performed by: PHYSICIAN ASSISTANT

## 2025-01-31 PROCEDURE — 87046 STOOL CULTR AEROBIC BACT EA: CPT | Performed by: PHYSICIAN ASSISTANT

## 2025-01-31 PROCEDURE — 25000003 PHARM REV CODE 250: Performed by: PHYSICIAN ASSISTANT

## 2025-01-31 RX ORDER — HYDRALAZINE HYDROCHLORIDE 25 MG/1
100 TABLET, FILM COATED ORAL 2 TIMES DAILY
Status: DISCONTINUED | OUTPATIENT
Start: 2025-01-31 | End: 2025-02-01 | Stop reason: HOSPADM

## 2025-01-31 RX ORDER — ACETAMINOPHEN 325 MG/1
650 TABLET ORAL EVERY 8 HOURS PRN
Status: DISCONTINUED | OUTPATIENT
Start: 2025-01-31 | End: 2025-02-01 | Stop reason: HOSPADM

## 2025-01-31 RX ORDER — METOPROLOL TARTRATE 25 MG/1
25 TABLET, FILM COATED ORAL 2 TIMES DAILY
Status: DISCONTINUED | OUTPATIENT
Start: 2025-01-31 | End: 2025-02-01 | Stop reason: HOSPADM

## 2025-01-31 RX ORDER — ACETAMINOPHEN 500 MG
1000 TABLET ORAL
Status: COMPLETED | OUTPATIENT
Start: 2025-01-31 | End: 2025-01-31

## 2025-01-31 RX ORDER — TALC
6 POWDER (GRAM) TOPICAL NIGHTLY PRN
Status: DISCONTINUED | OUTPATIENT
Start: 2025-01-31 | End: 2025-02-01 | Stop reason: HOSPADM

## 2025-01-31 RX ORDER — LABETALOL HYDROCHLORIDE 5 MG/ML
10 INJECTION, SOLUTION INTRAVENOUS
Status: DISCONTINUED | OUTPATIENT
Start: 2025-01-31 | End: 2025-01-31

## 2025-01-31 RX ORDER — INSULIN ASPART 100 [IU]/ML
0-5 INJECTION, SOLUTION INTRAVENOUS; SUBCUTANEOUS
Status: DISCONTINUED | OUTPATIENT
Start: 2025-01-31 | End: 2025-02-01 | Stop reason: HOSPADM

## 2025-01-31 RX ORDER — IBUPROFEN 200 MG
24 TABLET ORAL
Status: DISCONTINUED | OUTPATIENT
Start: 2025-01-31 | End: 2025-02-01 | Stop reason: HOSPADM

## 2025-01-31 RX ORDER — ONDANSETRON HYDROCHLORIDE 2 MG/ML
4 INJECTION, SOLUTION INTRAVENOUS
Status: COMPLETED | OUTPATIENT
Start: 2025-01-31 | End: 2025-01-31

## 2025-01-31 RX ORDER — GLUCAGON 1 MG
1 KIT INJECTION
Status: DISCONTINUED | OUTPATIENT
Start: 2025-01-31 | End: 2025-02-01 | Stop reason: HOSPADM

## 2025-01-31 RX ORDER — POLYMYXIN B SULFATE AND TRIMETHOPRIM 1; 10000 MG/ML; [USP'U]/ML
1 SOLUTION OPHTHALMIC EVERY 6 HOURS
Status: DISCONTINUED | OUTPATIENT
Start: 2025-01-31 | End: 2025-02-01 | Stop reason: HOSPADM

## 2025-01-31 RX ORDER — MAGNESIUM SULFATE HEPTAHYDRATE 40 MG/ML
2 INJECTION, SOLUTION INTRAVENOUS ONCE
Status: COMPLETED | OUTPATIENT
Start: 2025-01-31 | End: 2025-02-01

## 2025-01-31 RX ORDER — SODIUM CHLORIDE 0.9 % (FLUSH) 0.9 %
10 SYRINGE (ML) INJECTION
Status: DISCONTINUED | OUTPATIENT
Start: 2025-01-31 | End: 2025-02-01 | Stop reason: HOSPADM

## 2025-01-31 RX ORDER — IBUPROFEN 200 MG
16 TABLET ORAL
Status: DISCONTINUED | OUTPATIENT
Start: 2025-01-31 | End: 2025-02-01 | Stop reason: HOSPADM

## 2025-01-31 RX ADMIN — METOPROLOL TARTRATE 25 MG: 25 TABLET, FILM COATED ORAL at 08:01

## 2025-01-31 RX ADMIN — POLYMYXIN B SULFATE AND TRIMETHOPRIM 1 DROP: 10000; 1 SOLUTION OPHTHALMIC at 07:01

## 2025-01-31 RX ADMIN — MAGNESIUM SULFATE HEPTAHYDRATE 2 G: 40 INJECTION, SOLUTION INTRAVENOUS at 11:01

## 2025-01-31 RX ADMIN — HYDRALAZINE HYDROCHLORIDE 100 MG: 25 TABLET ORAL at 08:01

## 2025-01-31 RX ADMIN — ACETAMINOPHEN 1000 MG: 500 TABLET ORAL at 09:01

## 2025-01-31 RX ADMIN — ONDANSETRON 4 MG: 2 INJECTION INTRAMUSCULAR; INTRAVENOUS at 09:01

## 2025-01-31 NOTE — H&P
ED Observation Unit  History and Physical      I assumed care of this patient from the Main ED at onset of observation time, 4:30 on 01/31/2025.       History of Present Illness:  Patient is an 84-year-old female with a past medical history of diabetes type 2, CKD stage 4, history of nephrectomy, hypertension, who was seen by her primary care physician on 01/30/2025 with complaints of shortness of breath and possible upper respiratory infection.  Outpatient labs significant for an elevated D-dimer at 1.12, and she was advised to come to the emergency room.  Her shortness of breath has resolved over the last 24 hours, but she is still complaining of some chest heaviness that is worse with inspiration.  Patient is also complaining of 24 hour onset left eye redness, itchiness, and discharge.  Denies any visual changes, floaters, etc..      High sensitivity troponin I at 16, BNP within normal limits, chest x-ray without acute cardiopulmonary process, QTC elevated at 472.    Spoke with ER physician, plan is to admit to EDOU for V/Q scan.  Hypertensive, but saturating 99% on room air.  Evening hypertensive medications restarted.    I reviewed the ED Provider Note dated 1/31/25 prior to my evaluation of this patient.  I reviewed all labs and imaging performed in the Main ED, prior to patient being placed in Observation. Patient was placed in the ED Observation Unit for Shortness of breath.    PMHx   Past Medical History:   Diagnosis Date    Anemia     Depression     Diabetes mellitus type II     Encounter for blood transfusion     Gallstones     GERD (gastroesophageal reflux disease)     Hypertension     Kidney stone     MVP (mitral valve prolapse)     Personal history of colonic polyps 04/28/2022    PONV (postoperative nausea and vomiting)     Renal cell carcinoma of left kidney 12/11/2016    Thyroid cancer     Thyroid disease       Past Surgical History:   Procedure Laterality Date    APPENDECTOMY      cataracts       both eyes    CHOLECYSTECTOMY      COLONOSCOPY N/A 08/26/2019    Procedure: COLONOSCOPY;  Surgeon: Armando Duff MD;  Location: [facility];  Service: Endoscopy;  Laterality: N/A;    COLONOSCOPY N/A 04/28/2022    Procedure: COLONOSCOPY;  Surgeon: David Mendieta MD;  Location: [facility];  Service: General;  Laterality: N/A;    COLONOSCOPY W/ POLYPECTOMY  04/28/2022    CYSTOSCOPY      CYSTOSCOPY W/ URETERAL STENT PLACEMENT      ECTOPIC PREGNANCY SURGERY      EXAMINATION UNDER ANESTHESIA N/A 09/23/2019    Procedure: Exam under anesthesia;  Surgeon: David Mendieta MD;  Location: [facility];  Service: General;  Laterality: N/A;    EYE SURGERY      phoebe cataract    FLEXIBLE SIGMOIDOSCOPY N/A 01/28/2020    Procedure: SIGMOIDOSCOPY, FLEXIBLE;  Surgeon: David Mendieta MD;  Location: [facility];  Service: Endoscopy;  Laterality: N/A;    FLEXIBLE SIGMOIDOSCOPY N/A 09/29/2020    Procedure: SIGMOIDOSCOPY, FLEXIBLE;  Surgeon: David Mendieta MD;  Location: [facility];  Service: Endoscopy;  Laterality: N/A;    FLEXIBLE SIGMOIDOSCOPY N/A 04/13/2021    Procedure: SIGMOIDOSCOPY, FLEXIBLE;  Surgeon: David Mendieta MD;  Location: [facility];  Service: Endoscopy;  Laterality: N/A;    FLEXIBLE SIGMOIDOSCOPY  10/21/2021    per  10/21/2021    FLEXIBLE SIGMOIDOSCOPY N/A 10/21/2021    Procedure: SIGMOIDOSCOPY, FLEXIBLE;  Surgeon: David Mendieta MD;  Location: [facility];  Service: General;  Laterality: N/A;    FLEXIBLE SIGMOIDOSCOPY  10/20/2022    FLEXIBLE SIGMOIDOSCOPY N/A 10/20/2022    Procedure: SIGMOIDOSCOPY, FLEXIBLE;  Surgeon: David Mendieta MD;  Location: [facility];  Service: General;  Laterality: N/A;    FLEXIBLE SIGMOIDOSCOPY N/A 5/15/2023    Procedure: SIGMOIDOSCOPY, FLEXIBLE;  Surgeon: David Mendieta MD;  Location: [facility];  Service: Endoscopy;  Laterality: N/A;    FLEXIBLE SIGMOIDOSCOPY N/A 4/8/2024    Procedure: SIGMOIDOSCOPY, FLEXIBLE;  Surgeon: David Mendieta MD;  Location: [facility];  Service: General;   "Laterality: N/A;    HYSTERECTOMY      INSERTION OF TUNNELED CENTRAL VENOUS CATHETER (CVC) WITH SUBCUTANEOUS PORT Right 10/15/2019    Procedure: [id number];  Surgeon: David Mendieta MD;  Location: [facility] OR;  Service: General;  Laterality: Right;    NASAL SEPTUM SURGERY      NEPHRECTOMY Left     OOPHORECTOMY      THYROIDECTOMY      two times    TONSILLECTOMY          Family Hx   Family History   Problem Relation Name Age of Onset    Hypertension Father      Kidney disease Father      Mental illness Mother      Cancer Brother          Social Hx   Social History     Socioeconomic History    Marital status:    Tobacco Use    Smoking status: Never    Smokeless tobacco: Never   Substance and Sexual Activity    Alcohol use: No    Drug use: No    Sexual activity: Yes     Partners: Male        Vital Signs   Vitals:    01/31/25 1321 01/31/25 1833   BP: (!) 174/82 (!) 180/89   Pulse: 89 80   Resp: 18 16   Temp: 98 °F (36.7 °C) 98.8 °F (37.1 °C)   TempSrc: Oral Oral   SpO2: 99% 97%   Weight: 62 kg (136 lb 9.6 oz)    Height: 5' 1" (1.549 m)         Review of Systems  Review of Systems   Constitutional: Negative.    HENT: Negative.     Eyes:  Positive for discharge (Left) and redness.   Respiratory:  Positive for shortness of breath. Negative for cough and hemoptysis.    Cardiovascular:  Negative for chest pain, palpitations, orthopnea, claudication, leg swelling and PND.        Deep middle chest "heaviness ", worse with inspiration   Gastrointestinal: Negative.    Musculoskeletal: Negative.    Skin: Negative.    Neurological: Negative.    Psychiatric/Behavioral: Negative.         Physical Exam  Physical Exam  Constitutional:       General: She is not in acute distress.     Appearance: She is well-developed. She is not ill-appearing.   HENT:      Head: Normocephalic.   Eyes:      Comments: Left eye erythema, discharge   Cardiovascular:      Rate and Rhythm: Normal rate and regular rhythm.   Pulmonary:      " Effort: Pulmonary effort is normal.      Breath sounds: Normal breath sounds.   Chest:      Chest wall: No mass, deformity, tenderness or crepitus.   Abdominal:      General: Bowel sounds are normal.      Palpations: Abdomen is soft.   Skin:     General: Skin is warm and dry.   Neurological:      Mental Status: She is alert and oriented to person, place, and time.         Medications:   Scheduled Meds:   hydrALAZINE  100 mg Oral BID    metoprolol tartrate  25 mg Oral BID    polymyxin B sulf-trimethoprim  1 drop Left Eye Q6H     Continuous Infusions:  PRN Meds:.  Current Facility-Administered Medications:     acetaminophen, 650 mg, Oral, Q8H PRN    dextrose 50%, 12.5 g, Intravenous, PRN    dextrose 50%, 25 g, Intravenous, PRN    glucagon (human recombinant), 1 mg, Intramuscular, PRN    glucose, 16 g, Oral, PRN    glucose, 24 g, Oral, PRN    insulin aspart U-100, 0-5 Units, Subcutaneous, QID (AC + HS) PRN    melatonin, 6 mg, Oral, Nightly PRN    sodium chloride 0.9%, 10 mL, Intravenous, PRN      Assessment/Plan:  Shortness of breath  Elevated D-dimer  Left eye bacterial conjunctivitis  Hypertension  Diabetes type 2  CKD stage 4 with history of nephrectomy    -D-dimer slightly elevated and with history of shortness of breath and persistent chest heaviness, V/Q scan ordered with history of CKD stage 4  -troponin I high sensitivity at 16, do not suspect ACS, likely elevated secondary to possible embolic event versus chronic kidney disease.  Repeat at this time.  -patient remains hemodynamically stable saturating % on room air  -new onset left eye bacterial conjunctivitis, drops ordered  -p.m. antihypertensive medications ordered  -sliding scale/ Accu-Cheks       Case was discussed with the ED provider, Dr. Yazan Bhandari PA-C      Attestation of Dr. Hand (Attending Physician):      I have reviewed the record and the patient care provided by the [facility] and agree with the documented HPI, ROS, PE.  I also  agree with the treatment plan and work up and I have performed an independent history / physical exam and MDM.

## 2025-01-31 NOTE — FIRST PROVIDER EVALUATION
"Medical screening examination initiated.  I have conducted a focused provider triage encounter, findings are as follows:    Brief history of present illness:  SOB yesterday.  Evaluated by PCP who sent a D-dimer which is elevated at greater than 1.  Creatinine 2.7.  History of solitary kidney.  Sent here for V/Q scan.  She notes shortness of breath has improved and she attributed her symptoms to anxiety.  COVID and influenza yesterday were negative.    Vitals:    01/31/25 1321   BP: (!) 174/82   Pulse: 89   Resp: 18   Temp: 98 °F (36.7 °C)   TempSrc: Oral   SpO2: 99%   Weight: 62 kg (136 lb 9.6 oz)   Height: 5' 1" (1.549 m)       Pertinent physical exam:  Resting comfortably on room air.    Brief workup plan:  Labs.  V/Q scan.    Preliminary workup initiated; this workup will be continued and followed by the physician or advanced practice provider that is assigned to the patient when roomed.  "

## 2025-01-31 NOTE — ED PROVIDER NOTES
Encounter Date: 1/31/2025       History     Chief Complaint   Patient presents with    Shortness of Breath     Started with SOB a week ago, had labs drawn at clinic and told to come to ED. Feels somewhat better today     HPI    Patient is a 84-year-old female with past medical history diabetes, depression, hypertension that is presenting for positive D-dimer at outside facility primary care.  As per patient, patient states that she initially had URI viral like syndrome, flu-like illness starting up roughly 1 week ago.  The patient states that she had cough, sore throat, generalized body aches, weakness with nausea/vomiting.  Patient went and saw her primary care doctor yesterday on 01/30/2025.  Patient states that she was very short of breath, D-dimer was collected.  Patient states that she feels symptomatically greatly improved today.  The patient and found to have elevated D-dimer at outside facility, patient has history of solitary kidney with baseline elevated creatinine therefore directed to the ED for a V/Q scan.  Patient currently states that her shortness of breath and overall symptoms greatly improved, feels better today.  Patient denies any fevers, chills, chest pains, nausea, vomiting, diarrhea, abdominal pain today.    Review of patient's allergies indicates:   Allergen Reactions    Sunitinib Diarrhea, Nausea And Vomiting and Other (See Comments)     Vomiting and Diarrhea     Past Medical History:   Diagnosis Date    Anemia     Depression     Diabetes mellitus type II     Encounter for blood transfusion     Gallstones     GERD (gastroesophageal reflux disease)     Hypertension     Kidney stone     MVP (mitral valve prolapse)     Personal history of colonic polyps 04/28/2022    PONV (postoperative nausea and vomiting)     Renal cell carcinoma of left kidney 12/11/2016    Thyroid cancer     Thyroid disease      Past Surgical History:   Procedure Laterality Date    APPENDECTOMY      cataracts      both eyes     CHOLECYSTECTOMY      COLONOSCOPY N/A 08/26/2019    Procedure: COLONOSCOPY;  Surgeon: Armando Duff MD;  Location: [facility];  Service: Endoscopy;  Laterality: N/A;    COLONOSCOPY N/A 04/28/2022    Procedure: COLONOSCOPY;  Surgeon: David Mendieta MD;  Location: [facility];  Service: General;  Laterality: N/A;    COLONOSCOPY W/ POLYPECTOMY  04/28/2022    CYSTOSCOPY      CYSTOSCOPY W/ URETERAL STENT PLACEMENT      ECTOPIC PREGNANCY SURGERY      EXAMINATION UNDER ANESTHESIA N/A 09/23/2019    Procedure: Exam under anesthesia;  Surgeon: David Mendieta MD;  Location: [facility];  Service: General;  Laterality: N/A;    EYE SURGERY      phoebe cataract    FLEXIBLE SIGMOIDOSCOPY N/A 01/28/2020    Procedure: SIGMOIDOSCOPY, FLEXIBLE;  Surgeon: David Mendieta MD;  Location: [facility];  Service: Endoscopy;  Laterality: N/A;    FLEXIBLE SIGMOIDOSCOPY N/A 09/29/2020    Procedure: SIGMOIDOSCOPY, FLEXIBLE;  Surgeon: David Mendieta MD;  Location: [facility];  Service: Endoscopy;  Laterality: N/A;    FLEXIBLE SIGMOIDOSCOPY N/A 04/13/2021    Procedure: SIGMOIDOSCOPY, FLEXIBLE;  Surgeon: David Mendieta MD;  Location: [facility];  Service: Endoscopy;  Laterality: N/A;    FLEXIBLE SIGMOIDOSCOPY  10/21/2021    per  10/21/2021    FLEXIBLE SIGMOIDOSCOPY N/A 10/21/2021    Procedure: SIGMOIDOSCOPY, FLEXIBLE;  Surgeon: David Mendieta MD;  Location: [facility];  Service: General;  Laterality: N/A;    FLEXIBLE SIGMOIDOSCOPY  10/20/2022    FLEXIBLE SIGMOIDOSCOPY N/A 10/20/2022    Procedure: SIGMOIDOSCOPY, FLEXIBLE;  Surgeon: David Mendieta MD;  Location: [facility];  Service: General;  Laterality: N/A;    FLEXIBLE SIGMOIDOSCOPY N/A 5/15/2023    Procedure: SIGMOIDOSCOPY, FLEXIBLE;  Surgeon: David Mendieta MD;  Location: [facility];  Service: Endoscopy;  Laterality: N/A;    FLEXIBLE SIGMOIDOSCOPY N/A 4/8/2024    Procedure: SIGMOIDOSCOPY, FLEXIBLE;  Surgeon: David Mendieta MD;  Location: [facility];  Service: General;  Laterality:  N/A;    HYSTERECTOMY      INSERTION OF TUNNELED CENTRAL VENOUS CATHETER (CVC) WITH SUBCUTANEOUS PORT Right 10/15/2019    Procedure: [id number];  Surgeon: David Mendieta MD;  Location: [facility] OR;  Service: General;  Laterality: Right;    NASAL SEPTUM SURGERY      NEPHRECTOMY Left     OOPHORECTOMY      THYROIDECTOMY      two times    TONSILLECTOMY       Family History   Problem Relation Name Age of Onset    Hypertension Father      Kidney disease Father      Mental illness Mother      Cancer Brother       Social History     Tobacco Use    Smoking status: Never    Smokeless tobacco: Never   Substance Use Topics    Alcohol use: No    Drug use: No     Review of Systems   Constitutional:         No other system positives other than aforementioned as reported by patient       Physical Exam     Initial Vitals [01/31/25 1321]   BP Pulse Resp Temp SpO2   (!) 174/82 89 18 98 °F (36.7 °C) 99 %      MAP       --         Physical Exam    Vitals reviewed.  Constitutional: She appears well-developed and well-nourished. She is not diaphoretic. No distress.   Well-appearing 84-year-old female, no distress, appropriately conversation   Cardiovascular:  Normal rate, regular rhythm, normal heart sounds and intact distal pulses.     Exam reveals no gallop and no friction rub.       No murmur heard.  Pulmonary/Chest: Breath sounds normal. No respiratory distress. She has no wheezes. She has no rales.   Breathing comfortably on room air, no additional sounds on auscultation   Abdominal: Abdomen is soft. Bowel sounds are normal. She exhibits no distension. There is no abdominal tenderness.   Benign abdominal exam There is no rebound and no guarding.     Neurological: She is alert and oriented to person, place, and time. She has normal strength. GCS score is 15.   Skin: Skin is warm and dry. No rash noted. No erythema. No pallor.         ED Course   Procedures  Labs Reviewed   CBC W/ AUTO DIFFERENTIAL - Abnormal       Result Value     WBC 8.52      RBC 4.10      Hemoglobin 12.3      Hematocrit 37.2      MCV 91      MCH 30.0      MCHC 33.1      RDW 14.3      Platelets 269      MPV 9.9      Immature Granulocytes 0.5      Gran # (ANC) 6.2      Immature Grans (Abs) 0.04      Lymph # 1.5      Mono # 0.6      Eos # 0.1      Baso # 0.05      nRBC 0      Gran % 72.5      Lymph % 17.7 (*)     Mono % 7.5      Eosinophil % 1.2      Basophil % 0.6      Differential Method Automated      Narrative:     Release to patient->Immediate   COMPREHENSIVE METABOLIC PANEL - Abnormal    Sodium 136      Potassium 4.1      Chloride 100      CO2 25      Glucose 190 (*)     BUN 27 (*)     Creatinine 2.6 (*)     Calcium 9.9      Total Protein 7.8      Albumin 3.9      Total Bilirubin 0.9      Alkaline Phosphatase 72      AST 21      ALT 21      eGFR 17.9 (*)     Anion Gap 11      Narrative:     Release to patient->Immediate   TROPONIN I HIGH SENSITIVITY - Abnormal    Troponin I High Sensitivity 16 (*)     Narrative:     Release to patient->Immediate   B-TYPE NATRIURETIC PEPTIDE    BNP 54      Narrative:     Release to patient->Immediate   HEPATITIS C ANTIBODY    Hepatitis C Ab Non-reactive      Narrative:     Release to patient->Immediate   HIV 1 / 2 ANTIBODY    HIV 1/2 Ag/Ab Non-reactive      Narrative:     Release to patient->Immediate   TROPONIN I HIGH SENSITIVITY   POCT GLUCOSE MONITORING CONTINUOUS        ECG Results              EKG 12-lead (Final result)        Collection Time Result Time QRS Duration OHS QTC Calculation    01/31/25 13:26:41 01/31/25 13:42:27 78 472                     Final result by Interface, Lab In [facility] (01/31/25 13:42:34)                   Narrative:    Test Reason : R06.02,    Vent. Rate :  84 BPM     Atrial Rate :  84 BPM     P-R Int : 130 ms          QRS Dur :  78 ms      QT Int : 400 ms       P-R-T Axes :  19  40  20 degrees    QTcB Int : 472 ms    Sinus rhythm with Premature atrial complexes  Otherwise normal ECG  When compared with  ECG of 20-Dec-2024 12:55,  Premature atrial complexes are now Present    Confirmed by Matthew Pelaez ([id number]) on 1/31/2025 1:42:26 PM    Referred By:            Confirmed By: Matthew Pelaez                                  Imaging Results              X-Ray Chest AP Portable (Final result)  Result time 01/31/25 14:21:06      Final result by Jake Avina MD (01/31/25 14:21:06)                   Impression:      See above      Electronically signed by: Jake Avina MD  Date:    01/31/2025  Time:    14:21               Narrative:    EXAMINATION:  XR CHEST AP PORTABLE    CLINICAL HISTORY:  shortness of breath;    TECHNIQUE:  Single frontal view of the chest was performed.    COMPARISON:  No 01/30/2025 ne    FINDINGS:  Heart size normal.  The lungs are clear.  No pleural effusion                                       Medications   hydrALAZINE tablet 100 mg (has no administration in time range)   metoprolol tartrate (LOPRESSOR) tablet 25 mg (has no administration in time range)   sodium chloride 0.9% flush 10 mL (has no administration in time range)   melatonin tablet 6 mg (has no administration in time range)   acetaminophen tablet 650 mg (has no administration in time range)   glucose chewable tablet 16 g (has no administration in time range)   glucose chewable tablet 24 g (has no administration in time range)   dextrose 50% injection 12.5 g (has no administration in time range)   dextrose 50% injection 25 g (has no administration in time range)   glucagon (human recombinant) injection 1 mg (has no administration in time range)   insulin aspart U-100 pen 0-5 Units (has no administration in time range)   polymyxin B sulf-trimethoprim 10,000 unit- 1 mg/mL ophthalmic drops 1 drop (1 drop Left Eye Given 1/31/25 1905)     Medical Decision Making  1. Differential Diagnosis includes:  ACS, PE, viral syndrome, flu-like illness      2. Co Morbidities include:  Elderly   Increased patient risk:  Solitary kidney,  CKD      3. External notes reviewed:  Previous clinic notes      4. History sources independently obtained include: n/a      5. Discussion of management with: EDOU      6. Independent intrepretation of tests include: EKG: Rate 84, sinus rhythm, PACs, no obvious ST-T wave elevations      7. Diagnostic tests or therapies considered but not ordered: n/a      8. Social determinants of health: n/a      9. Shared decision making includes:  Patient is an 84-year-old female presenting for shortness of breath and elevated D-dimer.  The patient states that her overall symptoms greatly improved.  However patient was found to have elevated D-dimer at primary care doctor.  Patient was sent here for further evaluation, V/Q scan.  Patient was admitted to EDOU for laboratory results and V/Q scan.  Patient agrees with treatment and plan.  Patient has no questions at this time.  Disposition after EDOU stay will be pending ACS workup laboratory results and V/Q scan.    Risk  OTC drugs.  Prescription drug management.                                      Clinical Impression:  Final diagnoses:  [R06.02] Shortness of breath  [R06.02] SOB (shortness of breath)          ED Disposition Condition    Observation Stable                Leon Hand MD  01/31/25 [id number]

## 2025-01-31 NOTE — ED TRIAGE NOTES
Pt arriving to ED c/o SOB, Started with SOB a week ago, had labs drawn at clinic and told to come to ED for elevated d-dimer. Feels somewhat better today. Denies chest pain. Denies recent travel/long car rides, - blood thinners

## 2025-01-31 NOTE — ED NOTES
Patient identifiers verified and correct for Pema Huggins   LOC: The patient is aao x4  APPEARANCE: Patient appears comfortable and in no acute distress  SKIN: The skin is warm and dry, color consistent with ethnicity, patient has normal skin turgor and moist mucus membranes, skin intact  MUSCULOSKELETAL: Patient moving all extremities spontaneously  RESPIRATORY: Airway is open and patent, respirations are spontaneous, patient has a normal effort and rate, no accessory muscle use noted,  O2 sats noted at 99% on room air. +SOB  CARDIAC: Patient has a normal rate, no edema noted, capillary refill < 3 seconds.   GASTRO: Soft and non tender to palpation, no distention noted  : Pt denies any pain or frequency with urination.  NEURO: Pt opens eyes spontaneously, behavior appropriate to situation, follows commands, facial expression symmetrical, bilateral hand grasp equal and even, purposeful motor response noted, normal sensation in all extremities when touched with a finger.

## 2025-01-31 NOTE — Clinical Note
Diagnosis: Shortness of breath [786.05.ICD-9-CM]   Future Attending Provider: KAYLYN POSADAS [[id number]]   Is the patient being sent to ED Observation?: Yes

## 2025-02-01 VITALS
BODY MASS INDEX: 25.68 KG/M2 | HEIGHT: 61 IN | DIASTOLIC BLOOD PRESSURE: 59 MMHG | SYSTOLIC BLOOD PRESSURE: 129 MMHG | TEMPERATURE: 99 F | WEIGHT: 136 LBS | RESPIRATION RATE: 20 BRPM | HEART RATE: 78 BPM | OXYGEN SATURATION: 97 %

## 2025-02-01 LAB
ANION GAP SERPL CALC-SCNC: 10 MMOL/L (ref 8–16)
ASCENDING AORTA: 3.34 CM
AV AREA BY CONTINUOUS VTI: 3.1 CM2
AV INDEX (PROSTH): 1.07
AV LVOT MEAN GRADIENT: 3 MMHG
AV LVOT PEAK GRADIENT: 5 MMHG
AV MEAN GRADIENT: 3 MMHG
AV PEAK GRADIENT: 5 MMHG
AV VALVE AREA BY VELOCITY RATIO: 2.8 CM²
AV VALVE AREA: 3 CM2
AV VELOCITY RATIO: 1
BASOPHILS # BLD AUTO: 0.04 K/UL (ref 0–0.2)
BASOPHILS NFR BLD: 0.3 % (ref 0–1.9)
BSA FOR ECHO PROCEDURE: 1.63 M2
BUN SERPL-MCNC: 29 MG/DL (ref 8–23)
CALCIUM SERPL-MCNC: 8.7 MG/DL (ref 8.7–10.5)
CHLORIDE SERPL-SCNC: 102 MMOL/L (ref 95–110)
CO2 SERPL-SCNC: 24 MMOL/L (ref 23–29)
CREAT SERPL-MCNC: 2.3 MG/DL (ref 0.5–1.4)
CV ECHO LV RWT: 0.44 CM
DIFFERENTIAL METHOD BLD: ABNORMAL
DOP CALC AO PEAK VEL: 1.1 M/S
DOP CALC AO VTI: 24.1 CM
DOP CALC LVOT AREA: 2.8 CM2
DOP CALC LVOT DIAMETER: 1.9 CM
DOP CALC LVOT PEAK VEL: 1.1 M/S
DOP CALC LVOT STROKE VOLUME: 73.1 CM3
DOP CALCLVOT PEAK VEL VTI: 25.8 CM
E WAVE DECELERATION TIME: 292 MS
E/A RATIO: 0.7
E/E' RATIO: 12 M/S
ECHO EF ESTIMATED: 68 %
ECHO LV POSTERIOR WALL: 0.8 CM (ref 0.6–1.1)
EOSINOPHIL # BLD AUTO: 0 K/UL (ref 0–0.5)
EOSINOPHIL NFR BLD: 0.1 % (ref 0–8)
ERYTHROCYTE [DISTWIDTH] IN BLOOD BY AUTOMATED COUNT: 14 % (ref 11.5–14.5)
EST. GFR  (NO RACE VARIABLE): 20.7 ML/MIN/1.73 M^2
FRACTIONAL SHORTENING: 36.1 % (ref 28–44)
GLUCOSE SERPL-MCNC: 194 MG/DL (ref 70–110)
HCT VFR BLD AUTO: 32.3 % (ref 37–48.5)
HGB BLD-MCNC: 11 G/DL (ref 12–16)
IMM GRANULOCYTES # BLD AUTO: 0.07 K/UL (ref 0–0.04)
IMM GRANULOCYTES NFR BLD AUTO: 0.5 % (ref 0–0.5)
INTERVENTRICULAR SEPTUM: 1 CM (ref 0.6–1.1)
IVRT: 120 MS
LA MAJOR: 5.3 CM
LA MINOR: 5.3 CM
LA WIDTH: 3.5 CM
LEFT ATRIUM SIZE: 2.9 CM
LEFT ATRIUM VOLUME INDEX MOD: 28 ML/M2
LEFT ATRIUM VOLUME INDEX: 29 ML/M2
LEFT ATRIUM VOLUME MOD: 44 ML
LEFT ATRIUM VOLUME: 46 CM3
LEFT INTERNAL DIMENSION IN SYSTOLE: 2.3 CM (ref 2.1–4)
LEFT VENTRICLE DIASTOLIC VOLUME INDEX: 34.93 ML/M2
LEFT VENTRICLE DIASTOLIC VOLUME: 55.88 ML
LEFT VENTRICLE MASS INDEX: 58 G/M2
LEFT VENTRICLE SYSTOLIC VOLUME INDEX: 11.1 ML/M2
LEFT VENTRICLE SYSTOLIC VOLUME: 17.7 ML
LEFT VENTRICULAR INTERNAL DIMENSION IN DIASTOLE: 3.6 CM (ref 3.5–6)
LEFT VENTRICULAR MASS: 92.8 G
LV LATERAL E/E' RATIO: 11.6
LV SEPTAL E/E' RATIO: 11.6
LYMPHOCYTES # BLD AUTO: 0.7 K/UL (ref 1–4.8)
LYMPHOCYTES NFR BLD: 5.2 % (ref 18–48)
MAGNESIUM SERPL-MCNC: 1.9 MG/DL (ref 1.6–2.6)
MAGNESIUM SERPL-MCNC: 3.1 MG/DL (ref 1.6–2.6)
MCH RBC QN AUTO: 30.1 PG (ref 27–31)
MCHC RBC AUTO-ENTMCNC: 34.1 G/DL (ref 32–36)
MCV RBC AUTO: 89 FL (ref 82–98)
MONOCYTES # BLD AUTO: 0.5 K/UL (ref 0.3–1)
MONOCYTES NFR BLD: 3.8 % (ref 4–15)
MV PEAK A VEL: 1.15 M/S
MV PEAK E VEL: 0.81 M/S
NEUTROPHILS # BLD AUTO: 11.7 K/UL (ref 1.8–7.7)
NEUTROPHILS NFR BLD: 90.1 % (ref 38–73)
NRBC BLD-RTO: 0 /100 WBC
OHS CV RV/LV RATIO: 0.67 CM
OHS QRS DURATION: 68 MS
OHS QRS DURATION: 82 MS
OHS QTC CALCULATION: 505 MS
OHS QTC CALCULATION: 517 MS
PHOSPHATE SERPL-MCNC: 3.5 MG/DL (ref 2.7–4.5)
PISA TR MAX VEL: 2.1 M/S
PLATELET # BLD AUTO: 230 K/UL (ref 150–450)
PMV BLD AUTO: 10.7 FL (ref 9.2–12.9)
POCT GLUCOSE: 179 MG/DL (ref 70–110)
POCT GLUCOSE: 193 MG/DL (ref 70–110)
POTASSIUM SERPL-SCNC: 4.4 MMOL/L (ref 3.5–5.1)
RA MAJOR: 4.39 CM
RA PRESSURE ESTIMATED: 3 MMHG
RA WIDTH: 2.29 CM
RBC # BLD AUTO: 3.65 M/UL (ref 4–5.4)
RIGHT VENTRICLE DIASTOLIC BASEL DIMENSION: 2.4 CM
RV AG STL QL IA.RAPID: NEGATIVE
RV TB RVSP: 5 MMHG
RV TISSUE DOPPLER FREE WALL SYSTOLIC VELOCITY 1 (APICAL 4 CHAMBER VIEW): 15.42 CM/S
SINUS: 3.24 CM
SODIUM SERPL-SCNC: 136 MMOL/L (ref 136–145)
STJ: 2.49 CM
TDI LATERAL: 0.07 M/S
TDI SEPTAL: 0.07 M/S
TDI: 0.07 M/S
TR MAX PG: 18 MMHG
TRICUSPID ANNULAR PLANE SYSTOLIC EXCURSION: 1.5 CM
TROPONIN I SERPL DL<=0.01 NG/ML-MCNC: 16 NG/L (ref 0–14)
TROPONIN I SERPL DL<=0.01 NG/ML-MCNC: 19 NG/L (ref 0–14)
TV PEAK GRADIENT: 18 MMHG
TV REST PULMONARY ARTERY PRESSURE: 21 MMHG
WBC # BLD AUTO: 13.01 K/UL (ref 3.9–12.7)
WBC #/AREA STL HPF: NORMAL /[HPF]
Z-SCORE OF LEFT VENTRICULAR DIMENSION IN END DIASTOLE: -2.35
Z-SCORE OF LEFT VENTRICULAR DIMENSION IN END SYSTOLE: -1.62

## 2025-02-01 PROCEDURE — 93005 ELECTROCARDIOGRAM TRACING: CPT

## 2025-02-01 PROCEDURE — A9540 TC99M MAA: HCPCS | Performed by: STUDENT IN AN ORGANIZED HEALTH CARE EDUCATION/TRAINING PROGRAM

## 2025-02-01 PROCEDURE — 83735 ASSAY OF MAGNESIUM: CPT

## 2025-02-01 PROCEDURE — G0378 HOSPITAL OBSERVATION PER HR: HCPCS

## 2025-02-01 PROCEDURE — 25000003 PHARM REV CODE 250: Performed by: PHYSICIAN ASSISTANT

## 2025-02-01 PROCEDURE — 80048 BASIC METABOLIC PNL TOTAL CA: CPT

## 2025-02-01 PROCEDURE — A9567 TECHNETIUM TC-99M AEROSOL: HCPCS | Performed by: STUDENT IN AN ORGANIZED HEALTH CARE EDUCATION/TRAINING PROGRAM

## 2025-02-01 PROCEDURE — 84484 ASSAY OF TROPONIN QUANT: CPT

## 2025-02-01 PROCEDURE — 84100 ASSAY OF PHOSPHORUS: CPT

## 2025-02-01 PROCEDURE — 25000003 PHARM REV CODE 250

## 2025-02-01 PROCEDURE — 85025 COMPLETE CBC W/AUTO DIFF WBC: CPT

## 2025-02-01 PROCEDURE — 93010 ELECTROCARDIOGRAM REPORT: CPT | Mod: ,,, | Performed by: INTERNAL MEDICINE

## 2025-02-01 RX ORDER — LEVOTHYROXINE SODIUM 88 UG/1
88 TABLET ORAL DAILY
Status: DISCONTINUED | OUTPATIENT
Start: 2025-02-01 | End: 2025-02-01 | Stop reason: HOSPADM

## 2025-02-01 RX ORDER — ESCITALOPRAM OXALATE 5 MG/1
20 TABLET ORAL DAILY
Status: DISCONTINUED | OUTPATIENT
Start: 2025-02-01 | End: 2025-02-01 | Stop reason: HOSPADM

## 2025-02-01 RX ORDER — HYDROXYZINE PAMOATE 25 MG/1
25 CAPSULE ORAL EVERY 8 HOURS PRN
Qty: 90 CAPSULE | Refills: 0 | Status: SHIPPED | OUTPATIENT
Start: 2025-02-01 | End: 2025-02-01 | Stop reason: HOSPADM

## 2025-02-01 RX ORDER — AMLODIPINE BESYLATE 2.5 MG/1
2.5 TABLET ORAL DAILY
Status: DISCONTINUED | OUTPATIENT
Start: 2025-02-01 | End: 2025-02-01 | Stop reason: HOSPADM

## 2025-02-01 RX ORDER — HYDROXYZINE PAMOATE 25 MG/1
25 CAPSULE ORAL EVERY 8 HOURS PRN
Status: DISCONTINUED | OUTPATIENT
Start: 2025-02-01 | End: 2025-02-01 | Stop reason: HOSPADM

## 2025-02-01 RX ORDER — ATORVASTATIN CALCIUM 10 MG/1
10 TABLET, FILM COATED ORAL NIGHTLY
Status: DISCONTINUED | OUTPATIENT
Start: 2025-02-01 | End: 2025-02-01 | Stop reason: HOSPADM

## 2025-02-01 RX ORDER — GABAPENTIN 100 MG/1
100 CAPSULE ORAL 2 TIMES DAILY
Status: DISCONTINUED | OUTPATIENT
Start: 2025-02-01 | End: 2025-02-01 | Stop reason: HOSPADM

## 2025-02-01 RX ORDER — PANTOPRAZOLE SODIUM 40 MG/1
40 TABLET, DELAYED RELEASE ORAL 2 TIMES DAILY
Status: DISCONTINUED | OUTPATIENT
Start: 2025-02-01 | End: 2025-02-01 | Stop reason: HOSPADM

## 2025-02-01 RX ADMIN — AMLODIPINE BESYLATE 2.5 MG: 2.5 TABLET ORAL at 09:02

## 2025-02-01 RX ADMIN — POLYMYXIN B SULFATE AND TRIMETHOPRIM 1 DROP: 10000; 1 SOLUTION OPHTHALMIC at 01:02

## 2025-02-01 RX ADMIN — KIT FOR THE PREPARATION OF TECHNETIUM TC 99M ALBUMIN AGGREGATED 5.4 MILLICURIE: 2 INJECTION, POWDER, LYOPHILIZED, FOR SUSPENSION INTRAPERITONEAL; INTRAVENOUS at 01:02

## 2025-02-01 RX ADMIN — ESCITALOPRAM OXALATE 20 MG: 5 TABLET, FILM COATED ORAL at 09:02

## 2025-02-01 RX ADMIN — METOPROLOL TARTRATE 25 MG: 25 TABLET, FILM COATED ORAL at 09:02

## 2025-02-01 RX ADMIN — PANTOPRAZOLE SODIUM 40 MG: 40 TABLET, DELAYED RELEASE ORAL at 09:02

## 2025-02-01 RX ADMIN — HYDRALAZINE HYDROCHLORIDE 100 MG: 25 TABLET ORAL at 09:02

## 2025-02-01 RX ADMIN — GABAPENTIN 100 MG: 100 CAPSULE ORAL at 09:02

## 2025-02-01 RX ADMIN — LEVOTHYROXINE SODIUM 88 MCG: 88 TABLET ORAL at 09:02

## 2025-02-01 RX ADMIN — POLYMYXIN B SULFATE AND TRIMETHOPRIM 1 DROP: 10000; 1 SOLUTION OPHTHALMIC at 05:02

## 2025-02-01 RX ADMIN — KIT FOR THE PREPARATION OF TECHNETIUM TC 99M PENTETATE 47.8 MILLICURIE: 20 INJECTION, POWDER, LYOPHILIZED, FOR SOLUTION INTRAVENOUS; RESPIRATORY (INHALATION) at 12:02

## 2025-02-01 NOTE — ED NOTES
Patient identifiers verified and correct for Pema Huggins  LOC: The patient is aao x4  APPEARANCE: Patient appears comfortable and in no acute distress, patient is clean and well groomed.  SKIN: The skin is warm and dry, color consistent with ethnicity, patient has normal skin turgor and moist mucus membranes, skin intact  MUSCULOSKELETAL: Patient moving all extremities spontaneously, no swelling noted.  RESPIRATORY: Airway is open and patent, respirations are spontaneous, patient has a normal effort and rate, no accessory muscle use noted,  O2 sats noted at 96% on room air.  CARDIAC: Pt on cardiac monitor. Patient has a normal rate, no edema noted, capillary refill < 3 seconds.   GASTRO: Soft and non tender to palpation, no distention noted  : Pt denies any pain or frequency with urination.  NEURO: Pt opens eyes spontaneously, behavior appropriate to situation, follows commands, facial expression symmetrical, bilateral hand grasp equal and even, purposeful motor response noted, normal sensation in all extremities when touched with a finger.

## 2025-02-01 NOTE — DISCHARGE SUMMARY
[facility] - Emergency Dept  [facility] Medicine  Discharge Summary      Patient Name: Valery Huggins  MRN: [id number]  RAY: [id number]  Patient Class: OP- Observation  Admission Date: 1/31/2025  Hospital Length of Stay: 0 days  Discharge Date and Time: 2/1/2025  4:45 PM  Attending Physician: Carmen Hong MD   Discharging Provider: Carmen Hong MD  Primary Care Provider: Luci Nath NP  Hospital Medicine Team: [facility] Carmen Hong MD  Primary Care Team: [facility]    HPI:   Valery Huggins is a 84 y.o. female with past medical history of anemia, depression, anxiety, T2DM, GERD HTN, CKD4, RCC s/p nephrectomy, h/o rectal CA and thyroid cancer, thyroid disease admitted to  as upgrade from [facility] pending V/Q scan. At time of  admission, no acute complaints.     She was seen by her primary care physician on 01/30/2025 with complaints of shortness of breath and possible upper respiratory infection.  Patient reports she had postnasal drip and a few episodes nausea/vomiting without abdominal pain since Tuesday though better the last 24 hrs. Protonix twice daily helps with this. She was concerned that she was having new sensation of SOB/chest tightness as well, however. Outpatient labs significant for an elevated D-dimer at 1.12, and she was advised to come to the emergency room. Covid and flu were negative.   Her shortness of breath had resolved over the last 24 hours, but she was still complaining of some chest heaviness that is worse with inspiration. She feels this could be anxiety (has  with dementia and more stress for this).      Hypertensive, but saturating 99% on room air.  Evening hypertensive medications restarted. HS trop 16>16>19. BNP 50. No leukocytosis or anemia. Cr at baseline. CXR without acute cardiopulmonary process.    [facility] provider reports calling radiology multiple times and was told that the on-call nuclear medicine technician that does the V/Q scans was unavailable  and would be done 2/1. Lower extremity US negative for DVT. While in [facility] pt has episode nausea/pallor/diarrhea that resolved. No abdominal pain or chest pain. Stool studies were collected and repeat EKG done. Admitted to .      * No surgery found *      Hospital Course:   Patient sent for SOB and elevated d dimer. When age adjusted d dimer borderline elevated. Renal function prohibitive of CTA. BLE DVT US negative. VQ scan negative. Patient discharged.      Goals of Care Treatment Preferences:  Code Status: Full Code         Consults:     Physical Exam  Vitals and nursing note reviewed.   Constitutional:       General: She is not in acute distress.  HENT:      Head: Normocephalic and atraumatic.      Nose: Nose normal.      Mouth/Throat:      Pharynx: No oropharyngeal exudate.   Eyes:      Extraocular Movements: Extraocular movements intact.      Conjunctiva/sclera: Conjunctivae normal.   Cardiovascular:      Rate and Rhythm: Normal rate and regular rhythm.      Heart sounds: Normal heart sounds.   Pulmonary:      Effort: Pulmonary effort is normal. No respiratory distress.      Breath sounds: Normal breath sounds.   Abdominal:      General: Bowel sounds are normal. There is no distension.      Palpations: Abdomen is soft.      Tenderness: There is no abdominal tenderness.   Musculoskeletal:         General: Normal range of motion.      Cervical back: Normal range of motion.      Right lower leg: No edema.      Left lower leg: No edema.   Skin:     General: Skin is warm and dry.   Neurological:      General: No focal deficit present.      Mental Status: She is alert and oriented to person, place, and time.   Psychiatric:         Mood and Affect: Mood normal.         Behavior: Behavior normal.         Thought Content: Thought content normal.         Judgment: Judgment normal.     * Shortness of breath  Stable on room air. Pt feels possibly 2/2 anxiety  - Sent from PCP with elevated dimer, V/Q scan pending/ LE US  negative  - Trops slightly elevated, denies any actual chest pain. trend to peak/flat; Will check TTE as well  - Cardiac monitoring  - PRN duonebs   - PRN vistaril for anxiety     Positive D dimer  - Pending V/Q scan  - DVT US negative  - HDS on room air    CKD (chronic kidney disease) stage 4, GFR 15-29 ml/min  Creatine stable for now. BMP reviewed- noted Estimated Creatinine Clearance: 15.9 mL/min (A) (based on SCr of 2.3 mg/dL (H)). according to latest data. Based on current GFR, CKD stage is stage 4 - GFR 15-29.  Monitor UOP and serial BMP and adjust therapy as needed. Renally dose meds. Avoid nephrotoxic medications and procedures.    Type 2 diabetes mellitus with other diabetic kidney complication  Patient's FSGs are controlled on current medication regimen.  Last A1c reviewed-   Lab Results   Component Value Date    LABA1C 6.3 (H) 02/01/2018    HGBA1C 6.5 (H) 01/30/2025     Most recent fingerstick glucose reviewed-   Recent Labs   Lab 01/31/25  2212 02/01/25  1006 02/01/25  1153   POCTGLUCOSE 212* 193* 179*       Current correctional scale  Low  Maintain anti-hyperglycemic dose as follows-   Antihyperglycemics (From admission, onward)      Start     Stop Route Frequency Ordered    01/31/25 1902  insulin aspart U-100 pen 0-5 Units         -- SubQ Before meals & nightly PRN 01/31/25 1802        Hold Oral hypoglycemics while patient is in the hospital.     Essential hypertension  Patient's blood pressure range in the last 24 hours was: BP  Min: 116/56  Max: 218/96.The patient's inpatient anti-hypertensive regimen is listed below:  Current Antihypertensives  hydrALAZINE tablet 100 mg, 2 times daily, Oral  metoprolol tartrate (LOPRESSOR) tablet 25 mg, 2 times daily, Oral  amLODIPine tablet 2.5 mg, Daily, Oral    Plan  - BP is controlled, no changes needed to their regimen    Postoperative hypothyroidism  - Continue home synthroid       Final Active Diagnoses:    Diagnosis Date Noted POA    PRINCIPAL PROBLEM:   Shortness of breath [R06.02] 04/28/2021 Yes    Positive D dimer [R79.89] 01/31/2025 Yes    CKD (chronic kidney disease) stage 4, GFR 15-29 ml/min [N18.4] 06/18/2019 Yes    Type 2 diabetes mellitus with other diabetic kidney complication [E11.29] 07/31/2018 Yes    Essential hypertension [I10] 09/26/2017 Yes     Chronic    Postoperative hypothyroidism [E89.0] 12/11/2016 Yes      Problems Resolved During this Admission:       Discharged Condition: stable    Disposition:     Follow Up:    Patient Instructions:   No discharge procedures on file.    Significant Diagnostic Studies: N/A  VQ scan negative    Pending Diagnostic Studies:       Procedure Component Value Units Date/Time    Giardia / Cryptosporidum, EIA [[id number]] Collected: 01/31/25 2241    Order Status: Sent Lab Status: In process Updated: 01/31/25 2241    Specimen: Stool     NM Lung Scan Ventilation Perfusion [[id number]]     Order Status: Sent Lab Status: No result     Stool Exam-Ova,Cysts,Parasites [[id number]] Collected: 01/31/25 2241    Order Status: Sent Lab Status: In process Updated: 01/31/25 2257    Specimen: Stool            Medications:  Reconciled Home Medications:      Medication List        CHANGE how you take these medications      gabapentin 100 MG capsule  Commonly known as: NEURONTIN  TAKE 2 CAPSULES BY MOUTH EACH MORNING AND 1 AT MIDDAY AND 2 EACH NIGHT  What changed: See the new instructions.            CONTINUE taking these medications      amLODIPine 2.5 MG tablet  Commonly known as: NORVASC  Take 1 tablet (2.5 mg total) by mouth once daily.     ascorbic acid (vitamin C) 100 MG tablet  Commonly known as: VITAMIN C  Take 100 mg by mouth once daily.     atorvastatin 10 MG tablet  Commonly known as: LIPITOR  Take 1 tablet (10 mg total) by mouth every evening.     b complex vitamins capsule  Take 1 capsule by mouth once daily.     busPIRone 5 MG Tab  Commonly known as: BUSPAR  Take 1 tablet (5 mg total) by mouth 3 (three) times daily as  needed.     calcitRIOL 0.5 MCG Cap  Commonly known as: ROCALTROL  Take 1 capsule (0.5 mcg total) by mouth once daily.     cetirizine 10 MG tablet  Commonly known as: ZYRTEC  Take 1 tablet (10 mg total) by mouth once daily.     EScitalopram oxalate 20 MG tablet  Commonly known as: LEXAPRO  Take 1 tablet (20 mg total) by mouth once daily.     FIBER GUMMIES ORAL  Take 1 tablet by mouth once daily.     fluticasone propionate 50 mcg/actuation nasal spray  Commonly known as: FLONASE  1 spray (50 mcg total) by Each Nostril route once daily.     hydrALAZINE 100 MG tablet  Commonly known as: APRESOLINE  Take 1 tablet (100 mg total) by mouth 2 (two) times daily.     levothyroxine 88 MCG tablet  Commonly known as: SYNTHROID  Take 1 tablet (88 mcg total) by mouth once daily.     meclizine 25 mg tablet  Commonly known as: ANTIVERT  Take 1 tablet (25 mg total) by mouth 3 (three) times daily as needed for Dizziness.     metoprolol tartrate 25 MG tablet  Commonly known as: LOPRESSOR  Take 1 tablet (25 mg total) by mouth 2 (two) times daily.     neomycin-polymyxin-gramicidin ophthalmic solution  Commonly known as: NEOSPORIN  Place 1 drop into both eyes 4 (four) times daily.     ondansetron 4 MG tablet  Commonly known as: ZOFRAN  Take 1 tablet (4 mg total) by mouth daily as needed for Nausea.     OZEMPIC 1 mg/dose (4 mg/3 mL)  Generic drug: semaglutide  Inject 1 mg into the skin every 7 days.     pantoprazole 40 MG tablet  Commonly known as: PROTONIX  Take 1 tablet (40 mg total) by mouth 2 (two) times daily.     TOUJEO SOLOSTAR U-300 INSULIN 300 unit/mL (1.5 mL) Inpn pen  Generic drug: insulin glargine (TOUJEO)  INJECT 20 UNITS SUBCUTANEAOUSLY DAILY            STOP taking these medications      sulfamethoxazole-trimethoprim 800-160mg 800-160 mg Tab  Commonly known as: BACTRIM DS              Indwelling Lines/Drains at time of discharge:   Lines/Drains/Airways       None                   Time spent on the discharge of patient: 35  minutes         Carmen Hong MD  Department of Hospital Medicine  [facility] - Emergency Dept

## 2025-02-01 NOTE — CARE UPDATE
[facility] Update     Spoke with Dr. Honeycutt, ED attending- case discussed thoroughly.  Admitted for positive D-dimer to rule out PE/ACS as patient has persistent chest heaviness.  There is no bilateral lower extremity edema, erythema, or calf tenderness; low suspicion for lower extremity DVT.  Since there is no nuclear medicine technician in house to perform the V/Q scan, will order a venous duplex of bilateral lower extremities.  Discussed with patient.  Patient will be upgraded to Hospital Medicine for continued care.      9:45pm     -Notified patient with acute episode of vomiting.  When transferred to another unit, pale, short of breath, chest heaviness present.  Patient does endorse loose stool with incontinence.    -QTC prolonged, Zofran 4 mg IV x1 this patient is actively vomiting.  Stat EKG, repeat troponin, stool studies.  Vital signs remained stable and she is saturating 98% on room air.    10:10pm    -patient stating she can not move her left leg.  Patient is more lethargic, pale.  She is oriented to self/year, but unsure location.  Generalized weakness, but diffuse throughout.  Strength 5/5 all extremities.  No pronator drift.  Cranial nerves 2 through 12 grossly intact.  Spoke with ED attending Dr. Honeycutt- agrees with examination.  Plan to perform serial neuro checks.  Repeat EKG sinus rhythm with PACs, prolonged QTC now at 517..  Magnesium sulfate 2 mg x1.  Nausea seems under control with 1 dose of Zofran.  Per nurse, stool formed, soft.  C diff canceled, but will proceed with other stool studies.    Patient complains of dizziness, but no vertigo symptoms.  Vital signs remained stable, glucose elevated to 212.    11:00 p.m.    -patient is still complaining her left leg is weak.  Repeat neuro exam.  Patient is alert, oriented to self/year/place.  Strength 5/5 all extremities, no pronator drift, cranial nerves 2-12 grossly intact.  Dizziness has improved, but still present.  Denies any chest pain or  abdominal pain.    12:00 am    -the patient is seen and examined.  Patient more alert, states she is feeling better, dizziness has resolved, chest heaviness has resolved, and left leg numbness is much improved.  Her speech is clear, neurologically she is intact, no changes in neuro exam.    Venous duplex of bilateral lower extremities and V/Q scan have yet to be completed.    ROBINSON Silverio

## 2025-02-01 NOTE — DISCHARGE INSTRUCTIONS
While you were in the hospital, an ultrasound of your legs was done to look for a blood clot or DVT. The study was negative for a clot. The VQ scan of your lungs were negative for blood clots.     While you were in the hospital, an ultrasound of your heart was done. Your heart function is normal. The detailed results are listed below:    Results for orders placed during the hospital encounter of 01/31/25    Echo    Interpretation Summary    Left Ventricle: The left ventricle is normal in size. Normal wall thickness. There is concentric remodeling. Normal wall motion. There is normal systolic function with a visually estimated ejection fraction of 60 - 65%. There is normal diastolic function.    Right Ventricle: Normal right ventricular cavity size. Wall thickness is normal. Systolic function is normal.    Aortic Valve: There is mild aortic valve sclerosis.    Pulmonary Artery: The estimated pulmonary artery systolic pressure is 21 mmHg.    IVC/SVC: Normal venous pressure at 3 mmHg.

## 2025-02-01 NOTE — ED NOTES
Received report from ALTAGRACIA Plunkett. Assumed care of Valery Huggins, a 84 y.o. female at this time.    Telemetry Verification   Patient placed on Telemetry Box  Verified on [facility] monitor  Box [id number]   Rate 84   Rhythm NS      Pt on continuous cardiac monitoring. Pt in hospital gown, side rails up x 2, bed low and locked, and call light is placed within reach. Pt denies any complaints or needs.    loop explant

## 2025-02-01 NOTE — ASSESSMENT & PLAN NOTE
Creatine stable for now. BMP reviewed- noted Estimated Creatinine Clearance: 15.9 mL/min (A) (based on SCr of 2.3 mg/dL (H)). according to latest data. Based on current GFR, CKD stage is stage 4 - GFR 15-29.  Monitor UOP and serial BMP and adjust therapy as needed. Renally dose meds. Avoid nephrotoxic medications and procedures.

## 2025-02-01 NOTE — ASSESSMENT & PLAN NOTE
Patient's blood pressure range in the last 24 hours was: BP  Min: 116/56  Max: 218/96.The patient's inpatient anti-hypertensive regimen is listed below:  Current Antihypertensives  hydrALAZINE tablet 100 mg, 2 times daily, Oral  metoprolol tartrate (LOPRESSOR) tablet 25 mg, 2 times daily, Oral  amLODIPine tablet 2.5 mg, Daily, Oral    Plan  - BP is controlled, no changes needed to their regimen

## 2025-02-01 NOTE — ASSESSMENT & PLAN NOTE
Creatine stable for now. BMP reviewed- noted Estimated Creatinine Clearance: 14.1 mL/min (A) (based on SCr of 2.6 mg/dL (H)). according to latest data. Based on current GFR, CKD stage is stage 4 - GFR 15-29.  Monitor UOP and serial BMP and adjust therapy as needed. Renally dose meds. Avoid nephrotoxic medications and procedures.

## 2025-02-01 NOTE — ASSESSMENT & PLAN NOTE
Patient's FSGs are controlled on current medication regimen.  Last A1c reviewed-   Lab Results   Component Value Date    LABA1C 6.3 (H) 02/01/2018    HGBA1C 6.5 (H) 01/30/2025     Most recent fingerstick glucose reviewed-   Recent Labs   Lab 01/31/25  2212 02/01/25  1006 02/01/25  1153   POCTGLUCOSE 212* 193* 179*       Current correctional scale  Low  Maintain anti-hyperglycemic dose as follows-   Antihyperglycemics (From admission, onward)    Start     Stop Route Frequency Ordered    01/31/25 1902  insulin aspart U-100 pen 0-5 Units         -- SubQ Before meals & nightly PRN 01/31/25 1802      Hold Oral hypoglycemics while patient is in the hospital.

## 2025-02-01 NOTE — SUBJECTIVE & OBJECTIVE
Past Medical History:   Diagnosis Date    Anemia     Depression     Diabetes mellitus type II     Encounter for blood transfusion     Gallstones     GERD (gastroesophageal reflux disease)     Hypertension     Kidney stone     MVP (mitral valve prolapse)     Personal history of colonic polyps 04/28/2022    PONV (postoperative nausea and vomiting)     Renal cell carcinoma of left kidney 12/11/2016    Thyroid cancer     Thyroid disease        Past Surgical History:   Procedure Laterality Date    APPENDECTOMY      cataracts      both eyes    CHOLECYSTECTOMY      COLONOSCOPY N/A 08/26/2019    Procedure: COLONOSCOPY;  Surgeon: Armando Duff MD;  Location: [facility];  Service: Endoscopy;  Laterality: N/A;    COLONOSCOPY N/A 04/28/2022    Procedure: COLONOSCOPY;  Surgeon: David Mendieta MD;  Location: [facility];  Service: General;  Laterality: N/A;    COLONOSCOPY W/ POLYPECTOMY  04/28/2022    CYSTOSCOPY      CYSTOSCOPY W/ URETERAL STENT PLACEMENT      ECTOPIC PREGNANCY SURGERY      EXAMINATION UNDER ANESTHESIA N/A 09/23/2019    Procedure: Exam under anesthesia;  Surgeon: David Mendieta MD;  Location: [facility], [facility];  Service: General;  Laterality: N/A;    EYE SURGERY      phoebe cataract    FLEXIBLE SIGMOIDOSCOPY N/A 01/28/2020    Procedure: SIGMOIDOSCOPY, FLEXIBLE;  Surgeon: David Mendieta MD;  Location: [facility];  Service: Endoscopy;  Laterality: N/A;    FLEXIBLE SIGMOIDOSCOPY N/A 09/29/2020    Procedure: SIGMOIDOSCOPY, FLEXIBLE;  Surgeon: David Mendieta MD;  Location: [facility];  Service: Endoscopy;  Laterality: N/A;    FLEXIBLE SIGMOIDOSCOPY N/A 04/13/2021    Procedure: SIGMOIDOSCOPY, FLEXIBLE;  Surgeon: David Mendieta MD;  Location: [facility];  Service: Endoscopy;  Laterality: N/A;    FLEXIBLE SIGMOIDOSCOPY  10/21/2021    per  10/21/2021    FLEXIBLE SIGMOIDOSCOPY N/A 10/21/2021    Procedure: SIGMOIDOSCOPY, FLEXIBLE;  Surgeon: David Mendieta MD;  Location: [facility];  Service: General;  Laterality: N/A;     FLEXIBLE SIGMOIDOSCOPY  10/20/2022    FLEXIBLE SIGMOIDOSCOPY N/A 10/20/2022    Procedure: SIGMOIDOSCOPY, FLEXIBLE;  Surgeon: David Mendieta MD;  Location: [facility] ENDO;  Service: General;  Laterality: N/A;    FLEXIBLE SIGMOIDOSCOPY N/A 5/15/2023    Procedure: SIGMOIDOSCOPY, FLEXIBLE;  Surgeon: David Mendieta MD;  Location: [facility] ENDO;  Service: Endoscopy;  Laterality: N/A;    FLEXIBLE SIGMOIDOSCOPY N/A 4/8/2024    Procedure: SIGMOIDOSCOPY, FLEXIBLE;  Surgeon: David Mendieta MD;  Location: [facility] ENDO;  Service: General;  Laterality: N/A;    HYSTERECTOMY      INSERTION OF TUNNELED CENTRAL VENOUS CATHETER (CVC) WITH SUBCUTANEOUS PORT Right 10/15/2019    Procedure: [id number];  Surgeon: David Mendieta MD;  Location: [facility] OR;  Service: General;  Laterality: Right;    NASAL SEPTUM SURGERY      NEPHRECTOMY Left     OOPHORECTOMY      THYROIDECTOMY      two times    TONSILLECTOMY         Review of patient's allergies indicates:   Allergen Reactions    Sunitinib Diarrhea, Nausea And Vomiting and Other (See Comments)     Vomiting and Diarrhea       Current Facility-Administered Medications on File Prior to Encounter   Medication    lactated ringers bolus 1,000 mL    lactated ringers infusion    LIDOcaine (PF) 10 mg/ml (1%) injection 10 mg    sodium chloride 0.9% flush 10 mL     Current Outpatient Medications on File Prior to Encounter   Medication Sig    amLODIPine (NORVASC) 2.5 MG tablet Take 1 tablet (2.5 mg total) by mouth once daily.    ascorbic acid, vitamin C, (VITAMIN C) 100 MG tablet Take 100 mg by mouth once daily.    atorvastatin (LIPITOR) 10 MG tablet Take 1 tablet (10 mg total) by mouth every evening.    b complex vitamins capsule Take 1 capsule by mouth once daily.    busPIRone (BUSPAR) 5 MG Tab Take 1 tablet (5 mg total) by mouth 3 (three) times daily as needed.    calcitRIOL (ROCALTROL) 0.5 MCG Cap Take 1 capsule (0.5 mcg total) by mouth once daily.    cetirizine (ZYRTEC) 10 MG tablet Take 1  tablet (10 mg total) by mouth once daily.    EScitalopram oxalate (LEXAPRO) 20 MG tablet Take 1 tablet (20 mg total) by mouth once daily.    fluticasone propionate (FLONASE) 50 mcg/actuation nasal spray 1 spray (50 mcg total) by Each Nostril route once daily.    gabapentin (NEURONTIN) 100 MG capsule TAKE 2 CAPSULES BY MOUTH EACH MORNING AND 1 AT MIDDAY AND 2 EACH NIGHT    hydrALAZINE (APRESOLINE) 100 MG tablet Take 1 tablet (100 mg total) by mouth 2 (two) times daily.    lifitegrast (XIIDRA) 5 % Dpet Apply to eye.    meclizine (ANTIVERT) 25 mg tablet Take 1 tablet (25 mg total) by mouth 3 (three) times daily as needed for Dizziness.    metoprolol tartrate (LOPRESSOR) 25 MG tablet Take 1 tablet (25 mg total) by mouth 2 (two) times daily.    neomycin-polymyxin-gramicidin (NEOSPORIN) ophthalmic solution Place 1 drop into both eyes 4 (four) times daily.    ondansetron (ZOFRAN) 4 MG tablet Take 1 tablet (4 mg total) by mouth daily as needed for Nausea.    OZEMPIC 1 mg/dose (4 mg/3 mL) every 7 days.    pantoprazole (PROTONIX) 40 MG tablet Take 1 tablet (40 mg total) by mouth 2 (two) times daily.    sulfamethoxazole-trimethoprim 800-160mg (BACTRIM DS) 800-160 mg Tab Take 1 tablet by mouth 2 (two) times daily.    TOUJEO SOLOSTAR U-300 INSULIN 300 unit/mL (1.5 mL) InPn pen INJECT 20 UNITS SUBCUTANEAOUSLY DAILY    levothyroxine (SYNTHROID) 88 MCG tablet Take 1 tablet (88 mcg total) by mouth once daily.     Family History       Problem Relation (Age of Onset)    Cancer Brother    Hypertension Father    Kidney disease Father    Mental illness Mother          Tobacco Use    Smoking status: Never    Smokeless tobacco: Never   Substance and Sexual Activity    Alcohol use: No    Drug use: No    Sexual activity: Yes     Partners: Male     Review of Systems   Constitutional:  Negative for chills and fever.   HENT:  Positive for postnasal drip. Negative for trouble swallowing.    Eyes:  Negative for visual disturbance.   Respiratory:   Positive for chest tightness and shortness of breath. Negative for cough and wheezing.    Cardiovascular:  Negative for chest pain, palpitations and leg swelling.   Gastrointestinal:  Positive for diarrhea, nausea and vomiting. Negative for abdominal pain and constipation.   Genitourinary:  Negative for dysuria and frequency.   Musculoskeletal:  Negative for arthralgias and back pain.   Skin:  Negative for rash and wound.   Neurological:  Negative for syncope, weakness, light-headedness and headaches.   Psychiatric/Behavioral:  Negative for agitation and confusion.      Objective:     Vital Signs (Most Recent):  Temp: 98.7 °F (37.1 °C) (02/01/25 0358)  Pulse: 89 (02/01/25 0358)  Resp: 20 (01/31/25 2315)  BP: (!) 116/56 (02/01/25 0358)  SpO2: 96 % (02/01/25 0358) Vital Signs (24h Range):  Temp:  [97.3 °F (36.3 °C)-98.8 °F (37.1 °C)] 98.7 °F (37.1 °C)  Pulse:  [80-95] 89  Resp:  [14-20] 20  SpO2:  [96 %-99 %] 96 %  BP: (116-218)/(56-96) 116/56     Weight: 62 kg (136 lb 9.6 oz)  Body mass index is 25.81 kg/m².     Physical Exam  Vitals and nursing note reviewed.   Constitutional:       General: She is not in acute distress.  HENT:      Head: Normocephalic and atraumatic.      Nose: Nose normal.      Mouth/Throat:      Pharynx: No oropharyngeal exudate.   Eyes:      Extraocular Movements: Extraocular movements intact.      Conjunctiva/sclera: Conjunctivae normal.   Cardiovascular:      Rate and Rhythm: Normal rate and regular rhythm.      Heart sounds: Normal heart sounds.   Pulmonary:      Effort: Pulmonary effort is normal. No respiratory distress.      Breath sounds: Normal breath sounds.   Abdominal:      General: Bowel sounds are normal. There is no distension.      Palpations: Abdomen is soft.      Tenderness: There is no abdominal tenderness.   Musculoskeletal:         General: Normal range of motion.      Cervical back: Normal range of motion.      Right lower leg: No edema.      Left lower leg: No edema.    Skin:     General: Skin is warm and dry.   Neurological:      General: No focal deficit present.      Mental Status: She is alert and oriented to person, place, and time.   Psychiatric:         Mood and Affect: Mood normal.         Behavior: Behavior normal.         Thought Content: Thought content normal.         Judgment: Judgment normal.                Significant Labs: All pertinent labs within the past 24 hours have been reviewed.  A1C:   Recent Labs   Lab 12/05/24  0943 01/30/25  1132   HGBA1C 6.4* 6.5*     Bilirubin:   Recent Labs   Lab 01/30/25  1132 01/31/25  1512   BILITOT 1.0 0.9       CBC:   Recent Labs   Lab 01/30/25  1132 01/31/25  1512   WBC 8.43 8.52   HGB 11.9* 12.3   HCT 36.5* 37.2    269     CMP:   Recent Labs   Lab 01/30/25  1132 01/31/25  1512    136   K 4.2 4.1   CL 98 100   CO2 25 25   * 190*   BUN 30* 27*   CREATININE 2.7* 2.6*   CALCIUM 9.9 9.9   PROT 7.1 7.8   ALBUMIN 3.6 3.9   BILITOT 1.0 0.9   ALKPHOS 68 72   AST 21 21   ALT 20 21   ANIONGAP 13 11     Cardiac Markers:   Recent Labs   Lab 01/31/25  1512   BNP 54     Magnesium:   Recent Labs   Lab 01/31/25  2347   MG 1.9     POCT Glucose:   Recent Labs   Lab 01/31/25  2212   POCTGLUCOSE 212*     Troponin:   Recent Labs   Lab 01/31/25  1512 01/31/25  1823 01/31/25  2347   TROPONINIHS 16* 16* 19*     TSH:   Recent Labs   Lab 12/05/24  0943   TSH 2.12     Urine Studies:   Recent Labs   Lab 01/30/25  1156   COLORU Yellow   APPEARANCEUA Clear   PHUR 7.0   SPECGRAV 1.020   PROTEINUA 1+*   GLUCUA Negative   KETONESU Negative   BILIRUBINUA Negative   OCCULTUA Negative   NITRITE Negative   UROBILINOGEN Negative   LEUKOCYTESUR 1+*   RBCUA 1   WBCUA 7*   BACTERIA None   SQUAMEPITHEL 1   HYALINECASTS 3*       Significant Imaging: I have reviewed all pertinent imaging results/findings within the past 24 hours.  US Lower Extremity Veins Bilateral  Narrative: EXAMINATION:  US LOWER EXTREMITY VEINS BILATERAL    CLINICAL  HISTORY:  elevated d dimer/ SOB;    TECHNIQUE:  Duplex and color flow Doppler and dynamic compression was performed of the bilateral lower extremity veins was performed.    COMPARISON:  02/03/2021.    FINDINGS:  Right thigh veins: The common femoral, femoral, popliteal, upper greater saphenous, and deep femoral veins are patent and free of thrombus. The veins are normally compressible and have normal phasic flow and augmentation response.    Right calf veins: The visualized calf veins are patent.    Left thigh veins: The common femoral, femoral, popliteal, upper greater saphenous, and deep femoral veins are patent and free of thrombus. The veins are normally compressible and have normal phasic flow and augmentation response.    Left calf veins: The visualized calf veins are patent.    Miscellaneous: No organized fluid collection.  Impression: No evidence of deep venous thrombosis in either lower extremity.    Electronically signed by: Justin Paniagua MD  Date:    02/01/2025  Time:    01:48

## 2025-02-01 NOTE — HOSPITAL COURSE
Patient sent for SOB and elevated d dimer. When age adjusted d dimer borderline elevated. Renal function prohibitive of CTA. BLE DVT US negative. VQ scan negative. Patient discharged.

## 2025-02-01 NOTE — ASSESSMENT & PLAN NOTE
- Stable on room air. Pt feels possibly 2/2 anxiety  - Sent from PCP with elevated dimer, V/Q scan pending/ LE US negative  - Trops slightly elevated, denies any actual chest pain. trend to peak/flat; Will check TTE as well  - Cardiac monitoring  - PRN duonebs   - PRN vistaril for anxiety

## 2025-02-01 NOTE — ED NOTES
Assumed care of the patient. Report received from ALTAGRACIA Ramsey. Pt on continuous cardiac monitoring. Pt in hospital gown, side rails up X2, bed low and locked, and call light is placed within reach. No family/visitors at bedside at this time. Pt denies any complaints or needs.

## 2025-02-01 NOTE — ED NOTES
Assumed care of patient at this time. Hospital bed side rails up x3, locked in lowest position, call bell is within reach. Pt instructed to call staff for mobility. Cardiac monitor battery replaced, rate and rhythm confirmed by RN, 83 NSR.  Pt denies any further needs at this time, in NAD, AAOx4.

## 2025-02-01 NOTE — H&P
[facility] - Emergency Dept  [facility]  History & Physical    Patient Name: Valery Huggins  MRN: [id number]  Patient Class: OP- Observation  Admission Date: 1/31/2025  Attending Physician: Carmen Hong MD   Primary Care Provider: Luci Nath NP         Patient information was obtained from patient, past medical records, and ER records.     Subjective:     Principal Problem:Shortness of breath    Chief Complaint:   Chief Complaint   Patient presents with    Shortness of Breath     Started with SOB a week ago, had labs drawn at clinic and told to come to ED. Feels somewhat better today        HPI: Valery Huggins is a 84 y.o. female with past medical history of anemia, depression, anxiety, T2DM, GERD HTN, CKD4, RCC s/p nephrectomy, h/o rectal CA and thyroid cancer, thyroid disease admitted to  as upgrade from EDOU pending V/Q scan. At time of  admission, no acute complaints.     She was seen by her primary care physician on 01/30/2025 with complaints of shortness of breath and possible upper respiratory infection.  Patient reports she had postnasal drip and a few episodes nausea/vomiting without abdominal pain since Tuesday though better the last 24 hrs. Protonix twice daily helps with this. She was concerned that she was having new sensation of SOB/chest tightness as well, however. Outpatient labs significant for an elevated D-dimer at 1.12, and she was advised to come to the emergency room. Covid and flu were negative.   Her shortness of breath had resolved over the last 24 hours, but she was still complaining of some chest heaviness that is worse with inspiration. She feels this could be anxiety (has  with dementia and more stress for this).      Hypertensive, but saturating 99% on room air.  Evening hypertensive medications restarted. HS trop 16>16>19. BNP 50. No leukocytosis or anemia. Cr at baseline. CXR without acute cardiopulmonary process.    ED[facility] provider reports calling radiology  multiple times and was told that the on-call nuclear medicine technician that does the V/Q scans was unavailable and would be done 2/1. Lower extremity US negative for DVT. While in ED[facility] pt has episode nausea/pallor/diarrhea that resolved. No abdominal pain or chest pain. Stool studies were collected and repeat EKG done. Admitted to .      Past Medical History:   Diagnosis Date    Anemia     Depression     Diabetes mellitus type II     Encounter for blood transfusion     Gallstones     GERD (gastroesophageal reflux disease)     Hypertension     Kidney stone     MVP (mitral valve prolapse)     Personal history of colonic polyps 04/28/2022    PONV (postoperative nausea and vomiting)     Renal cell carcinoma of left kidney 12/11/2016    Thyroid cancer     Thyroid disease        Past Surgical History:   Procedure Laterality Date    APPENDECTOMY      cataracts      both eyes    CHOLECYSTECTOMY      COLONOSCOPY N/A 08/26/2019    Procedure: COLONOSCOPY;  Surgeon: Armando Duff MD;  Location: [facility];  Service: Endoscopy;  Laterality: N/A;    COLONOSCOPY N/A 04/28/2022    Procedure: COLONOSCOPY;  Surgeon: David Mendieta MD;  Location: [facility];  Service: General;  Laterality: N/A;    COLONOSCOPY W/ POLYPECTOMY  04/28/2022    CYSTOSCOPY      CYSTOSCOPY W/ URETERAL STENT PLACEMENT      ECTOPIC PREGNANCY SURGERY      EXAMINATION UNDER ANESTHESIA N/A 09/23/2019    Procedure: Exam under anesthesia;  Surgeon: David Mendieta MD;  Location: [facility];  Service: General;  Laterality: N/A;    EYE SURGERY      phoebe cataract    FLEXIBLE SIGMOIDOSCOPY N/A 01/28/2020    Procedure: SIGMOIDOSCOPY, FLEXIBLE;  Surgeon: David Mendieta MD;  Location: [facility];  Service: Endoscopy;  Laterality: N/A;    FLEXIBLE SIGMOIDOSCOPY N/A 09/29/2020    Procedure: SIGMOIDOSCOPY, FLEXIBLE;  Surgeon: David Mendieta MD;  Location: [facility];  Service: Endoscopy;  Laterality: N/A;    FLEXIBLE SIGMOIDOSCOPY N/A 04/13/2021    Procedure:  SIGMOIDOSCOPY, FLEXIBLE;  Surgeon: David Mendieta MD;  Location: [facility];  Service: Endoscopy;  Laterality: N/A;    FLEXIBLE SIGMOIDOSCOPY  10/21/2021    per  10/21/2021    FLEXIBLE SIGMOIDOSCOPY N/A 10/21/2021    Procedure: SIGMOIDOSCOPY, FLEXIBLE;  Surgeon: David Mendieta MD;  Location: [facility];  Service: General;  Laterality: N/A;    FLEXIBLE SIGMOIDOSCOPY  10/20/2022    FLEXIBLE SIGMOIDOSCOPY N/A 10/20/2022    Procedure: SIGMOIDOSCOPY, FLEXIBLE;  Surgeon: David Mendieta MD;  Location: [facility];  Service: General;  Laterality: N/A;    FLEXIBLE SIGMOIDOSCOPY N/A 5/15/2023    Procedure: SIGMOIDOSCOPY, FLEXIBLE;  Surgeon: David Mendieta MD;  Location: [facility];  Service: Endoscopy;  Laterality: N/A;    FLEXIBLE SIGMOIDOSCOPY N/A 4/8/2024    Procedure: SIGMOIDOSCOPY, FLEXIBLE;  Surgeon: David Mendieta MD;  Location: [facility];  Service: General;  Laterality: N/A;    HYSTERECTOMY      INSERTION OF TUNNELED CENTRAL VENOUS CATHETER (CVC) WITH SUBCUTANEOUS PORT Right 10/15/2019    Procedure: [id number];  Surgeon: David Mendieta MD;  Location: [facility];  Service: General;  Laterality: Right;    NASAL SEPTUM SURGERY      NEPHRECTOMY Left     OOPHORECTOMY      THYROIDECTOMY      two times    TONSILLECTOMY         Review of patient's allergies indicates:   Allergen Reactions    Sunitinib Diarrhea, Nausea And Vomiting and Other (See Comments)     Vomiting and Diarrhea       Current Facility-Administered Medications on File Prior to Encounter   Medication    lactated ringers bolus 1,000 mL    lactated ringers infusion    LIDOcaine (PF) 10 mg/ml (1%) injection 10 mg    sodium chloride 0.9% flush 10 mL     Current Outpatient Medications on File Prior to Encounter   Medication Sig    amLODIPine (NORVASC) 2.5 MG tablet Take 1 tablet (2.5 mg total) by mouth once daily.    ascorbic acid, vitamin C, (VITAMIN C) 100 MG tablet Take 100 mg by mouth once daily.    atorvastatin (LIPITOR) 10 MG tablet  Take 1 tablet (10 mg total) by mouth every evening.    b complex vitamins capsule Take 1 capsule by mouth once daily.    busPIRone (BUSPAR) 5 MG Tab Take 1 tablet (5 mg total) by mouth 3 (three) times daily as needed.    calcitRIOL (ROCALTROL) 0.5 MCG Cap Take 1 capsule (0.5 mcg total) by mouth once daily.    cetirizine (ZYRTEC) 10 MG tablet Take 1 tablet (10 mg total) by mouth once daily.    EScitalopram oxalate (LEXAPRO) 20 MG tablet Take 1 tablet (20 mg total) by mouth once daily.    fluticasone propionate (FLONASE) 50 mcg/actuation nasal spray 1 spray (50 mcg total) by Each Nostril route once daily.    gabapentin (NEURONTIN) 100 MG capsule TAKE 2 CAPSULES BY MOUTH EACH MORNING AND 1 AT MIDDAY AND 2 EACH NIGHT    hydrALAZINE (APRESOLINE) 100 MG tablet Take 1 tablet (100 mg total) by mouth 2 (two) times daily.    lifitegrast (XIIDRA) 5 % Dpet Apply to eye.    meclizine (ANTIVERT) 25 mg tablet Take 1 tablet (25 mg total) by mouth 3 (three) times daily as needed for Dizziness.    metoprolol tartrate (LOPRESSOR) 25 MG tablet Take 1 tablet (25 mg total) by mouth 2 (two) times daily.    neomycin-polymyxin-gramicidin (NEOSPORIN) ophthalmic solution Place 1 drop into both eyes 4 (four) times daily.    ondansetron (ZOFRAN) 4 MG tablet Take 1 tablet (4 mg total) by mouth daily as needed for Nausea.    OZEMPIC 1 mg/dose (4 mg/3 mL) every 7 days.    pantoprazole (PROTONIX) 40 MG tablet Take 1 tablet (40 mg total) by mouth 2 (two) times daily.    sulfamethoxazole-trimethoprim 800-160mg (BACTRIM DS) 800-160 mg Tab Take 1 tablet by mouth 2 (two) times daily.    TOUJEO SOLOSTAR U-300 INSULIN 300 unit/mL (1.5 mL) InPn pen INJECT 20 UNITS SUBCUTANEAOUSLY DAILY    levothyroxine (SYNTHROID) 88 MCG tablet Take 1 tablet (88 mcg total) by mouth once daily.     Family History       Problem Relation (Age of Onset)    Cancer Brother    Hypertension Father    Kidney disease Father    Mental illness Mother          Tobacco Use    Smoking  status: Never    Smokeless tobacco: Never   Substance and Sexual Activity    Alcohol use: No    Drug use: No    Sexual activity: Yes     Partners: Male     Review of Systems   Constitutional:  Negative for chills and fever.   HENT:  Positive for postnasal drip. Negative for trouble swallowing.    Eyes:  Negative for visual disturbance.   Respiratory:  Positive for chest tightness and shortness of breath. Negative for cough and wheezing.    Cardiovascular:  Negative for chest pain, palpitations and leg swelling.   Gastrointestinal:  Positive for diarrhea, nausea and vomiting. Negative for abdominal pain and constipation.   Genitourinary:  Negative for dysuria and frequency.   Musculoskeletal:  Negative for arthralgias and back pain.   Skin:  Negative for rash and wound.   Neurological:  Negative for syncope, weakness, light-headedness and headaches.   Psychiatric/Behavioral:  Negative for agitation and confusion.      Objective:     Vital Signs (Most Recent):  Temp: 98.7 °F (37.1 °C) (02/01/25 0358)  Pulse: 89 (02/01/25 0358)  Resp: 20 (01/31/25 2315)  BP: (!) 116/56 (02/01/25 0358)  SpO2: 96 % (02/01/25 0358) Vital Signs (24h Range):  Temp:  [97.3 °F (36.3 °C)-98.8 °F (37.1 °C)] 98.7 °F (37.1 °C)  Pulse:  [80-95] 89  Resp:  [14-20] 20  SpO2:  [96 %-99 %] 96 %  BP: (116-218)/(56-96) 116/56     Weight: 62 kg (136 lb 9.6 oz)  Body mass index is 25.81 kg/m².     Physical Exam  Vitals and nursing note reviewed.   Constitutional:       General: She is not in acute distress.  HENT:      Head: Normocephalic and atraumatic.      Nose: Nose normal.      Mouth/Throat:      Pharynx: No oropharyngeal exudate.   Eyes:      Extraocular Movements: Extraocular movements intact.      Conjunctiva/sclera: Conjunctivae normal.   Cardiovascular:      Rate and Rhythm: Normal rate and regular rhythm.      Heart sounds: Normal heart sounds.   Pulmonary:      Effort: Pulmonary effort is normal. No respiratory distress.      Breath sounds:  Normal breath sounds.   Abdominal:      General: Bowel sounds are normal. There is no distension.      Palpations: Abdomen is soft.      Tenderness: There is no abdominal tenderness.   Musculoskeletal:         General: Normal range of motion.      Cervical back: Normal range of motion.      Right lower leg: No edema.      Left lower leg: No edema.   Skin:     General: Skin is warm and dry.   Neurological:      General: No focal deficit present.      Mental Status: She is alert and oriented to person, place, and time.   Psychiatric:         Mood and Affect: Mood normal.         Behavior: Behavior normal.         Thought Content: Thought content normal.         Judgment: Judgment normal.                Significant Labs: All pertinent labs within the past 24 hours have been reviewed.  A1C:   Recent Labs   Lab 12/05/24  0943 01/30/25  1132   HGBA1C 6.4* 6.5*     Bilirubin:   Recent Labs   Lab 01/30/25  1132 01/31/25  1512   BILITOT 1.0 0.9       CBC:   Recent Labs   Lab 01/30/25  1132 01/31/25  1512   WBC 8.43 8.52   HGB 11.9* 12.3   HCT 36.5* 37.2    269     CMP:   Recent Labs   Lab 01/30/25  1132 01/31/25  1512    136   K 4.2 4.1   CL 98 100   CO2 25 25   * 190*   BUN 30* 27*   CREATININE 2.7* 2.6*   CALCIUM 9.9 9.9   PROT 7.1 7.8   ALBUMIN 3.6 3.9   BILITOT 1.0 0.9   ALKPHOS 68 72   AST 21 21   ALT 20 21   ANIONGAP 13 11     Cardiac Markers:   Recent Labs   Lab 01/31/25  1512   BNP 54     Magnesium:   Recent Labs   Lab 01/31/25  2347   MG 1.9     POCT Glucose:   Recent Labs   Lab 01/31/25  2212   POCTGLUCOSE 212*     Troponin:   Recent Labs   Lab 01/31/25  1512 01/31/25  1823 01/31/25  2347   TROPONINIHS 16* 16* 19*     TSH:   Recent Labs   Lab 12/05/24  0943   TSH 2.12     Urine Studies:   Recent Labs   Lab 01/30/25  1156   COLORU Yellow   APPEARANCEUA Clear   PHUR 7.0   SPECGRAV 1.020   PROTEINUA 1+*   GLUCUA Negative   KETONESU Negative   BILIRUBINUA Negative   OCCULTUA Negative   NITRITE  Negative   UROBILINOGEN Negative   LEUKOCYTESUR 1+*   RBCUA 1   WBCUA 7*   BACTERIA None   SQUAMEPITHEL 1   HYALINECASTS 3*       Significant Imaging: I have reviewed all pertinent imaging results/findings within the past 24 hours.  US Lower Extremity Veins Bilateral  Narrative: EXAMINATION:  US LOWER EXTREMITY VEINS BILATERAL    CLINICAL HISTORY:  elevated d dimer/ SOB;    TECHNIQUE:  Duplex and color flow Doppler and dynamic compression was performed of the bilateral lower extremity veins was performed.    COMPARISON:  02/03/2021.    FINDINGS:  Right thigh veins: The common femoral, femoral, popliteal, upper greater saphenous, and deep femoral veins are patent and free of thrombus. The veins are normally compressible and have normal phasic flow and augmentation response.    Right calf veins: The visualized calf veins are patent.    Left thigh veins: The common femoral, femoral, popliteal, upper greater saphenous, and deep femoral veins are patent and free of thrombus. The veins are normally compressible and have normal phasic flow and augmentation response.    Left calf veins: The visualized calf veins are patent.    Miscellaneous: No organized fluid collection.  Impression: No evidence of deep venous thrombosis in either lower extremity.    Electronically signed by: Justin Paniagua MD  Date:    02/01/2025  Time:    01:48       Assessment/Plan:     * Shortness of breath  - Stable on room air. Pt feels possibly 2/2 anxiety  - Sent from PCP with elevated dimer, V/Q scan pending/ LE US negative  - Trops slightly elevated, denies any actual chest pain. trend to peak/flat; Will check TTE as well  - Cardiac monitoring  - PRN duonebs   - PRN vistaril for anxiety     Positive D dimer  - Pending V/Q scan  - DVT US negative  - HDS on room air    CKD (chronic kidney disease) stage 4, GFR 15-29 ml/min  Creatine stable for now. BMP reviewed- noted Estimated Creatinine Clearance: 14.1 mL/min (A) (based on SCr of 2.6 mg/dL (H)).  according to latest data. Based on current GFR, CKD stage is stage 4 - GFR 15-29.  Monitor UOP and serial BMP and adjust therapy as needed. Renally dose meds. Avoid nephrotoxic medications and procedures.    Type 2 diabetes mellitus with other diabetic kidney complication  Patient's FSGs are controlled on current medication regimen.  Last A1c reviewed-   Lab Results   Component Value Date    LABA1C 6.3 (H) 02/01/2018    HGBA1C 6.5 (H) 01/30/2025     Most recent fingerstick glucose reviewed-   Recent Labs   Lab 01/31/25  2212   POCTGLUCOSE 212*     Current correctional scale  Low  Maintain anti-hyperglycemic dose as follows-   Antihyperglycemics (From admission, onward)      Start     Stop Route Frequency Ordered    01/31/25 1902  insulin aspart U-100 pen 0-5 Units         -- SubQ Before meals & nightly PRN 01/31/25 1802        Hold Oral hypoglycemics while patient is in the hospital.     Essential hypertension  Patient's blood pressure range in the last 24 hours was: BP  Min: 116/56  Max: 218/96.The patient's inpatient anti-hypertensive regimen is listed below:  Current Antihypertensives  hydrALAZINE tablet 100 mg, 2 times daily, Oral  metoprolol tartrate (LOPRESSOR) tablet 25 mg, 2 times daily, Oral  amLODIPine tablet 2.5 mg, Daily, Oral    Plan  - BP is controlled, no changes needed to their regimen    Postoperative hypothyroidism  - Continue home synthroid       VTE Risk Mitigation (From admission, onward)           Ordered     IP VTE LOW RISK PATIENT  Once         01/31/25 1719                         On 02/01/2025, patient should be placed in hospital observation services under my care in collaboration with Danika Duffy DO.           Laurie Dasilva PA-C  Department of Hospital Medicine  [facility] - Emergency Dept

## 2025-02-01 NOTE — ED NOTES
The patient was incontinent of loose brown stool, linens changed and perineal care given. Patient repositioned for comfort, care continues.

## 2025-02-01 NOTE — ASSESSMENT & PLAN NOTE
Stable on room air. Pt feels possibly 2/2 anxiety  - Sent from PCP with elevated dimer, V/Q scan pending/ LE US negative  - Trops slightly elevated, denies any actual chest pain. trend to peak/flat; Will check TTE as well  - Cardiac monitoring  - PRN duonebs   - PRN vistaril for anxiety

## 2025-02-01 NOTE — HPI
Valery Huggins is a 84 y.o. female with past medical history of anemia, depression, anxiety, T2DM, GERD HTN, CKD4, RCC s/p nephrectomy, h/o rectal CA and thyroid cancer, thyroid disease admitted to  as upgrade from [facility] pending V/Q scan. At time of  admission, no acute complaints.     She was seen by her primary care physician on 01/30/2025 with complaints of shortness of breath and possible upper respiratory infection.  Patient reports she had postnasal drip and a few episodes nausea/vomiting without abdominal pain since Tuesday though better the last 24 hrs. Protonix twice daily helps with this. She was concerned that she was having new sensation of SOB/chest tightness as well, however. Outpatient labs significant for an elevated D-dimer at 1.12, and she was advised to come to the emergency room. Covid and flu were negative.   Her shortness of breath had resolved over the last 24 hours, but she was still complaining of some chest heaviness that is worse with inspiration. She feels this could be anxiety (has  with dementia and more stress for this).      Hypertensive, but saturating 99% on room air.  Evening hypertensive medications restarted. HS trop 16>16>19. BNP 50. No leukocytosis or anemia. Cr at baseline. CXR without acute cardiopulmonary process.    ED[facility] provider reports calling radiology multiple times and was told that the on-call nuclear medicine technician that does the V/Q scans was unavailable and would be done 2/1. Lower extremity US negative for DVT. While in [facility] pt has episode nausea/pallor/diarrhea that resolved. No abdominal pain or chest pain. Stool studies were collected and repeat EKG done. Admitted to .

## 2025-02-01 NOTE — ASSESSMENT & PLAN NOTE
Patient's FSGs are controlled on current medication regimen.  Last A1c reviewed-   Lab Results   Component Value Date    LABA1C 6.3 (H) 02/01/2018    HGBA1C 6.5 (H) 01/30/2025     Most recent fingerstick glucose reviewed-   Recent Labs   Lab 01/31/25  2212   POCTGLUCOSE 212*     Current correctional scale  Low  Maintain anti-hyperglycemic dose as follows-   Antihyperglycemics (From admission, onward)      Start     Stop Route Frequency Ordered    01/31/25 1902  insulin aspart U-100 pen 0-5 Units         -- SubQ Before meals & nightly PRN 01/31/25 1802        Hold Oral hypoglycemics while patient is in the hospital.

## 2025-02-01 NOTE — PHARMACY MED REC
"Admission Medication History     The home medication history was taken by Kyler Mcgarry.    You may go to "Admission" then "Reconcile Home Medications" tabs to review and/or act upon these items.     The home medication list has been updated by the Pharmacy department.   Please read ALL comments highlighted in yellow.   Please address this information as you see fit.    Feel free to contact us if you have any questions or require assistance.      The medications listed below were removed from the home medication list. Please reorder if appropriate:  Patient reports no longer taking the following medication(s):  LIFITEGRAST 5 % DPET    Medications listed below were obtained from: Patient/family and Analytic software- [employer]  Current Outpatient Medications on File Prior to Encounter   Medication Sig    amLODIPine (NORVASC) 2.5 MG tablet Take 1 tablet (2.5 mg total) by mouth once daily.    ascorbic acid, vitamin C, (VITAMIN C) 100 MG tablet Take 100 mg by mouth once daily.    atorvastatin (LIPITOR) 10 MG tablet Take 1 tablet (10 mg total) by mouth every evening.    b complex vitamins capsule Take 1 capsule by mouth once daily.    busPIRone (BUSPAR) 5 MG Tab Take 1 tablet (5 mg total) by mouth 2 (two) times daily.    calcitRIOL (ROCALTROL) 0.5 MCG Cap Take 1 capsule (0.5 mcg total) by mouth once daily.    cetirizine (ZYRTEC) 10 MG tablet Take 1 tablet (10 mg total) by mouth once daily.    EScitalopram oxalate (LEXAPRO) 20 MG tablet Take 1 tablet (20 mg total) by mouth once daily.    fluticasone propionate (FLONASE) 50 mcg/actuation nasal spray 1 spray (50 mcg total) by Each Nostril route daily as needed for rhinitis.    gabapentin (NEURONTIN) 100 MG capsule Take 100 mg by mouth 2 (two) times daily.    hydrALAZINE (APRESOLINE) 100 MG tablet Take 1 tablet (100 mg total) by mouth 2 (two) times daily.    inulin (FIBER GUMMIES ORAL) Take 1 tablet by mouth once daily.    meclizine (ANTIVERT) 25 mg tablet Take 1 tablet (25 mg " total) by mouth 3 (three) times daily as needed for dizziness.    metoprolol tartrate (LOPRESSOR) 25 MG tablet Take 1 tablet (25 mg total) by mouth 2 (two) times daily.    ondansetron (ZOFRAN) 4 MG tablet Take 1 tablet (4 mg total) by mouth daily as needed for nausea.    OZEMPIC 1 mg/dose (4 mg/3 mL) Inject 1 mg into the skin every 7 days.    pantoprazole (PROTONIX) 40 MG tablet Take 1 tablet (40 mg total) by mouth 2 (two) times daily.    TOUJEO SOLOSTAR U-300 INSULIN 300 unit/mL (1.5 mL) InPn pen Inject 20 units subcutaneaously daily.    levothyroxine (SYNTHROID) 88 MCG tablet Take 1 tablet (88 mcg total) by mouth once daily.    neomycin-polymyxin-gramicidin (NEOSPORIN) ophthalmic solution Place 1 drop into both eyes 4 (four) times daily.    sulfamethoxazole-trimethoprim 800-160mg (BACTRIM DS) 800-160 mg Tab Take 1 tablet by mouth 2 (two) times daily.                 Kyler Mcgarry  EXT [id number]        .

## 2025-02-03 LAB
BACTERIA STL CULT: NORMAL
BACTERIA STL CULT: NORMAL

## 2025-02-07 LAB — O+P STL MICRO: NORMAL

## 2025-02-10 ENCOUNTER — PATIENT MESSAGE (OUTPATIENT)
Dept: SURGERY | Facility: CLINIC | Age: 85
End: 2025-02-10
Payer: MEDICARE

## 2025-02-10 DIAGNOSIS — Z85.048 HISTORY OF RECTAL CANCER: Primary | ICD-10-CM

## 2025-02-10 DIAGNOSIS — Z12.11 SCREEN FOR COLON CANCER: ICD-10-CM

## 2025-02-10 RX ORDER — SODIUM CHLORIDE 0.9 % (FLUSH) 0.9 %
10 SYRINGE (ML) INJECTION
OUTPATIENT
Start: 2025-02-10

## 2025-02-10 RX ORDER — SODIUM CHLORIDE, SODIUM LACTATE, POTASSIUM CHLORIDE, CALCIUM CHLORIDE 600; 310; 30; 20 MG/100ML; MG/100ML; MG/100ML; MG/100ML
INJECTION, SOLUTION INTRAVENOUS CONTINUOUS
OUTPATIENT
Start: 2025-02-10

## 2025-02-14 LAB
ALBUMIN SERPL-MCNC: 3.5 G/DL (ref 3.6–5.1)
ALBUMIN/GLOB SERPL: 1.5 (CALC) (ref 1–2.5)
ALP SERPL-CCNC: 73 U/L (ref 37–153)
ALT SERPL-CCNC: 12 U/L (ref 6–29)
AST SERPL-CCNC: 15 U/L (ref 10–35)
BASOPHILS # BLD AUTO: 27 CELLS/UL (ref 0–200)
BASOPHILS NFR BLD AUTO: 0.3 %
BILIRUB SERPL-MCNC: 0.5 MG/DL (ref 0.2–1.2)
BUN SERPL-MCNC: 29 MG/DL (ref 7–25)
BUN/CREAT SERPL: 13 (CALC) (ref 6–22)
CALCIUM SERPL-MCNC: 8.5 MG/DL (ref 8.6–10.4)
CHLORIDE SERPL-SCNC: 98 MMOL/L (ref 98–110)
CO2 SERPL-SCNC: 32 MMOL/L (ref 20–32)
CREAT SERPL-MCNC: 2.15 MG/DL (ref 0.6–0.95)
EGFR: 22 ML/MIN/1.73M2
EOSINOPHIL # BLD AUTO: 125 CELLS/UL (ref 15–500)
EOSINOPHIL NFR BLD AUTO: 1.4 %
ERYTHROCYTE [DISTWIDTH] IN BLOOD BY AUTOMATED COUNT: 13.2 % (ref 11–15)
FERRITIN SERPL-MCNC: 122 NG/ML (ref 16–288)
GLOBULIN SER CALC-MCNC: 2.4 G/DL (CALC) (ref 1.9–3.7)
GLUCOSE SERPL-MCNC: 214 MG/DL (ref 65–139)
HCT VFR BLD AUTO: 29.9 % (ref 35–45)
HGB BLD-MCNC: 9.6 G/DL (ref 11.7–15.5)
IRON SATN MFR SERPL: 17 % (CALC) (ref 16–45)
IRON SERPL-MCNC: 43 MCG/DL (ref 45–160)
LYMPHOCYTES # BLD AUTO: 1068 CELLS/UL (ref 850–3900)
LYMPHOCYTES NFR BLD AUTO: 12 %
MCH RBC QN AUTO: 29.4 PG (ref 27–33)
MCHC RBC AUTO-ENTMCNC: 32.1 G/DL (ref 32–36)
MCV RBC AUTO: 91.7 FL (ref 80–100)
MONOCYTES # BLD AUTO: 525 CELLS/UL (ref 200–950)
MONOCYTES NFR BLD AUTO: 5.9 %
NEUTROPHILS # BLD AUTO: 7156 CELLS/UL (ref 1500–7800)
NEUTROPHILS NFR BLD AUTO: 80.4 %
PLATELET # BLD AUTO: 229 THOUSAND/UL (ref 140–400)
PMV BLD REES-ECKER: 10.6 FL (ref 7.5–12.5)
POTASSIUM SERPL-SCNC: 4 MMOL/L (ref 3.5–5.3)
PROT SERPL-MCNC: 5.9 G/DL (ref 6.1–8.1)
RBC # BLD AUTO: 3.26 MILLION/UL (ref 3.8–5.1)
SODIUM SERPL-SCNC: 137 MMOL/L (ref 135–146)
TIBC SERPL-MCNC: 256 MCG/DL (CALC) (ref 250–450)
WBC # BLD AUTO: 8.9 THOUSAND/UL (ref 3.8–10.8)

## 2025-02-17 NOTE — PROGRESS NOTES
[facility] Hematology/Oncology  PROGRESS NOTE -   Follow-up Visit      Subjective:       Patient ID:   NAME: Valery Huggins : 1940     84 y.o. female    Referring Doc: David Mendieta MD  Other Physicians: Armando Nath; Stanislav Epstein; Azar Donnelly; Mo    Chief Complaint:  Left RCC and rectal CA f/u    History of Present Illness:     Patient is being seen today in person in clinic . She is here with her  today.     She reports that she is feeling fine.  The patient is on today to go over the results of the recently ordered labs, tests and studies.         No CP, SOB, HA's or N/V.       Dr Mendieta planning colonoscopy in 2025    She saw Dr Nath on 2/3/2025 and he sent her to the ER due to concerns for a clot. She had bout of the flu at that time with diarrhea and N/V. Patient reports everything was negative and symptoms have since resolved.      She is seeing Dr Flores with nephrology ; she is seeing Dr Capps with endocrine next month                    ROS:   GEN: normal without any fever, night sweats or weight loss; recent bout of the flu  HEENT: normal with no HA's, sore throat, stiff neck, changes in vision;    CV: normal with no CP, SOB, PND, RAYA or orthopnea  PULM: normal with no SOB, cough, hemoptysis, sputum or pleuritic pain  GI: no nausea or constipation at this time  : normal with no hematuria, dysuria  BREAST: normal with no mass, discharge, pain  SKIN: normal with no rash, erythema, bruising, or swelling    Allergies:  Review of patient's allergies indicates:   Allergen Reactions    Sunitinib Diarrhea, Nausea And Vomiting and Other (See Comments)     Vomiting and Diarrhea       Medications:    Current Outpatient Medications:     amLODIPine (NORVASC) 2.5 MG tablet, Take 1 tablet (2.5 mg total) by mouth once daily., Disp: 90 tablet, Rfl: 0    ascorbic acid, vitamin C, (VITAMIN C) 100 MG tablet, Take 100 mg by mouth once daily., Disp: , Rfl:     atorvastatin (LIPITOR) 10 MG  tablet, Take 1 tablet (10 mg total) by mouth every evening., Disp: 90 tablet, Rfl: 0    b complex vitamins capsule, Take 1 capsule by mouth once daily., Disp: , Rfl:     busPIRone (BUSPAR) 5 MG Tab, Take 1 tablet (5 mg total) by mouth 3 (three) times daily as needed. (Patient taking differently: Take 5 mg by mouth 2 (two) times daily.), Disp: 90 tablet, Rfl: 0    calcitRIOL (ROCALTROL) 0.5 MCG Cap, Take 1 capsule (0.5 mcg total) by mouth once daily., Disp: 90 capsule, Rfl: 0    cetirizine (ZYRTEC) 10 MG tablet, Take 1 tablet (10 mg total) by mouth once daily., Disp: 30 tablet, Rfl: 0    EScitalopram oxalate (LEXAPRO) 20 MG tablet, Take 1 tablet (20 mg total) by mouth once daily., Disp: 90 tablet, Rfl: 0    fluticasone propionate (FLONASE) 50 mcg/actuation nasal spray, 1 spray (50 mcg total) by Each Nostril route once daily. (Patient taking differently: 1 spray by Each Nostril route daily as needed for Rhinitis.), Disp: 9.9 mL, Rfl: 0    gabapentin (NEURONTIN) 100 MG capsule, TAKE 2 CAPSULES BY MOUTH EACH MORNING AND 1 AT MIDDAY AND 2 EACH NIGHT (Patient taking differently: Take 100 mg by mouth 2 (two) times daily.), Disp: 150 capsule, Rfl: 0    hydrALAZINE (APRESOLINE) 100 MG tablet, Take 1 tablet (100 mg total) by mouth 2 (two) times daily., Disp: 90 tablet, Rfl: 0    inulin (FIBER GUMMIES ORAL), Take 1 tablet by mouth once daily., Disp: , Rfl:     metoprolol tartrate (LOPRESSOR) 25 MG tablet, Take 1 tablet (25 mg total) by mouth 2 (two) times daily., Disp: 180 tablet, Rfl: 0    neomycin-polymyxin-gramicidin (NEOSPORIN) ophthalmic solution, Place 1 drop into both eyes 4 (four) times daily., Disp: 10 mL, Rfl: 0    ondansetron (ZOFRAN) 4 MG tablet, Take 1 tablet (4 mg total) by mouth daily as needed for Nausea., Disp: 30 tablet, Rfl: 1    OZEMPIC 1 mg/dose (4 mg/3 mL), Inject 1 mg into the skin every 7 days., Disp: , Rfl:     pantoprazole (PROTONIX) 40 MG tablet, Take 1 tablet (40 mg total) by mouth 2 (two) times  "daily., Disp: 90 tablet, Rfl: 0    TOUJEO SOLOSTAR U-300 INSULIN 300 unit/mL (1.5 mL) InPn pen, INJECT 20 UNITS SUBCUTANEAOUSLY DAILY, Disp: 4.5 mL, Rfl: 1    levothyroxine (SYNTHROID) 88 MCG tablet, Take 1 tablet (88 mcg total) by mouth once daily., Disp: 90 tablet, Rfl: 0    meclizine (ANTIVERT) 25 mg tablet, Take 1 tablet (25 mg total) by mouth 3 (three) times daily as needed for Dizziness., Disp: 45 tablet, Rfl: 0  No current facility-administered medications for this visit.    Facility-Administered Medications Ordered in Other Visits:     lactated ringers bolus 1,000 mL, 1,000 mL, Intravenous, Once, David Mendieta MD    lactated ringers infusion, , Intravenous, Continuous, David Mendieta MD    LIDOcaine (PF) 10 mg/ml (1%) injection 10 mg, 1 mL, Intradermal, Once PRN, David Mendieta MD    sodium chloride 0.9% flush 10 mL, 10 mL, Intravenous, PRN, David Mendieta MD    PMHx/PSHx Updates:  See patient's last visit with me on 2/15/2024  See H&P on 10/8/2019        Pathology:  Cancer Staging  No matching staging information was found for the patient.          Objective:     Vitals:  Blood pressure 125/60, pulse 76, temperature 97.5 °F (36.4 °C), temperature source Temporal, resp. rate 16, height 5' 1" (1.549 m), weight 66.7 kg (147 lb), last menstrual period 06/01/2012, SpO2 98%.        Physical Examination:   GEN: no apparent distress, comfortable; AAOx3  HEAD: atraumatic and normocephalic  EYES: no conjunctival pallor or muddiness, no icterus; normal pupil reaction to ambient light  ENT: OMM, no pharyngeal erythema, external bilateral ears WNL; no visible thrush or ulcers  NECK: no masses or swelling, trachea midline, no visible LAD/LN's   CV: no palpitations; no pedal edema; no noticeable JVD or neck vein distension;  portacath on right CW since removed  CHEST: Normal respiratory effort; chest wall breath movements symmetrical; no audible wheezing  ABDOM: non-distended; no bloating  MUSC/Skeletal: ROM " normal; joints visibly normal; no deformities or arthropathy  EXTREM: no clubbing, cyanosis, inflammation or swelling  SKIN: no rashes, lesions, ulcers, petechiae or subcutaneous nodules  : no hendrickson  NEURO: moving all 4 extremities; AAOx3; no tremors  PSYCH: normal mood, affect and behavior  LYMPH: no visible LN's or LAD              Labs:          Lab Results   Component Value Date    WBC 8.9 02/13/2025    HGB 9.6 (L) 02/13/2025    HCT 29.9 (L) 02/13/2025    MCV 91.7 02/13/2025     02/13/2025     CMP  Sodium   Date Value Ref Range Status   02/13/2025 137 135 - 146 mmol/L Final     Potassium   Date Value Ref Range Status   02/13/2025 4.0 3.5 - 5.3 mmol/L Final     Chloride   Date Value Ref Range Status   02/13/2025 98 98 - 110 mmol/L Final     CO2   Date Value Ref Range Status   02/13/2025 32 20 - 32 mmol/L Final     Glucose   Date Value Ref Range Status   02/13/2025 214 (H) 65 - 139 mg/dL Final     Comment:               Non-fasting reference interval          BUN   Date Value Ref Range Status   02/13/2025 29 (H) 7 - 25 mg/dL Final     Creatinine   Date Value Ref Range Status   02/13/2025 2.15 (H) 0.60 - 0.95 mg/dL Final     Calcium   Date Value Ref Range Status   02/13/2025 8.5 (L) 8.6 - 10.4 mg/dL Final     Total Protein   Date Value Ref Range Status   02/13/2025 5.9 (L) 6.1 - 8.1 g/dL Final     Albumin   Date Value Ref Range Status   02/13/2025 3.5 (L) 3.6 - 5.1 g/dL Final     Total Bilirubin   Date Value Ref Range Status   02/13/2025 0.5 0.2 - 1.2 mg/dL Final     Alkaline Phosphatase   Date Value Ref Range Status   01/31/2025 72 40 - 150 U/L Final     AST   Date Value Ref Range Status   02/13/2025 15 10 - 35 U/L Final     ALT   Date Value Ref Range Status   02/13/2025 12 6 - 29 U/L Final     Anion Gap   Date Value Ref Range Status   02/01/2025 10 8 - 16 mmol/L Final     eGFR if    Date Value Ref Range Status   04/13/2022 24 (L) > OR = 60 mL/min/1.73m2 Final     eGFR if non African  American   Date Value Ref Range Status   04/13/2022 21 (L) > OR = 60 mL/min/1.73m2 Final       Lab Results   Component Value Date    IRON 43 (L) 02/13/2025    TIBC 256 02/13/2025    FERRITIN 122 02/13/2025           Radiology/Diagnostic Studies:    PET 2/12/2024:    IMPRESSION:  1. No PET/CT evidence of FDG avid malignancy.  2. Unchanged small bilobed mass along the left side of the trachea extending to the anterior superior mediastinum suggesting a small lymph node or possibly trace residual thyroid tissue. Given the stability since at least 10/17/2019, and documented history since 2/16/2018, this is most certainly benign.  3. Unchanged pulmonary nodule in the anterior right upper lobe.  4. No PET/CT finding of FDG avid malignancy/metastatic disease.      CT scans 2/17/2023:    Impression:     Postsurgical change from prior left nephrectomy.     Few scattered subcentimeter pulmonary nodules, all stable from prior exam.     No new soft tissue mass or pathologic lymph node enlargement.        PET 5/2/2022:    IMPRESSION:     Stable exam demonstrating no evidence of FDG avid malignancy.     Stable 6 mm right upper lobe pulmonary nodule.      CT scans  10/7/2021:  Impression:     Essentially stable exam without evidence for recurrent or metastatic disease.  There are no measurable lesions per RECIST criteria.     Stable right upper lobe pulmonary nodule, prior cholecystectomy and left nephrectomy, and additional findings as above.          CT scans 3/23/2021:    Impression:     1. Stable right upper lobe nodule.  No evidence for recurrent or metastatic neoplasm.  There are no measurable lesions per RECIST criteria.  2. Several non dependent polypoid nodules in the trachea, similar to multiple prior studies and likely benign.           PET 9/18/2020:    Impression:     1. Negative for recurrent malignancy or metastatic disease.  2. Unchanged 6 mm right upper lobe nodule, presumably benign.  3. Slight decreased FDG  uptake associated with left paratracheal mass just inferior to thoracic inlet, differential diagnosis of which has been previously discussed, with benign etiologies likely.        MRI 8/6/2020:  Impression:     No evidence for disease progression.Minimal signal abnormality along the mid rectum corresponds to the previously treated lesion.  No pelvic adenopathy.       CT  3/5/2020    Impression       Status post left nephrectomy without evidence of recurrent renal malignancy.    No significant change since prior study.           MRI  1/8/2020:  Impression       Significant reduction in size of the patient's known mid rectal mass, which is no longer distinctly visible.  Reduction in size of 3 mesorectal lymph nodes as above.               PET  10/17/2019    Impression       1. Left paratracheal small mass extending into superior mediastinum with associated moderate FDG activity is unchanged in size and appearance since 02/16/2018 CT.  This is unlikely to represent metastatic disease or malignancy, given stability, and may represent residual thyroid parenchyma but is otherwise nonspecific.  2. Unchanged 6 mm right upper lobe nodule since 02/16/2018.  Benign etiology is likely the continued CT thorax without IV contrast follow-up is recommended in 12 months to document greater than 2 years of stability.  3. No current convincing evidence of metastatic disease.  4. Previous rectal mass is no longer evident on this exam.               X-ray Chest Ap Portable    Result Date: 10/15/2019  EXAMINATION: XR CHEST AP PORTABLE CLINICAL HISTORY: s/p port; Encounter for adjustment and management of vascular access device TECHNIQUE: Single frontal view of the chest was performed. COMPARISON: 6/18/2019 FINDINGS: The cardiomediastinal silhouette is stable.  Lungs are clear of infiltrate.  No pleural effusions.  Laya catheter has been inserted from the region of the right subclavian vein with the tip at the level the proximal SVC.      Laya catheter inserted with the tip at the level the proximal SVC.  Lungs are expanded and clear.  No pneumothorax. Electronically signed by: Nubia Venegas MD Date:    10/15/2019 Time:    14:05    Surg Fl Surgery Fluoro Usage    Result Date: 10/15/2019  See OP Notes for results. IMPRESSION: See OP Notes for results. This procedure was auto-finalized by: [facility]     Mri Rectal Cancer Without Contrast    Result Date: 9/18/2019  EXAMINATION: MRI RECTAL CANCER WITHOUT CONTRAST CLINICAL HISTORY: Rectal cancer, staging, locoregional;rectal anal mass;  Malignant neoplasm of rectum TECHNIQUE: Multiplanar multisequence MR imaging of the pelvis without contrast. COMPARISON: 07/12/2019 FINDINGS: Approximately 4 cm from the anal verge there is a peripherally located lobular mass along the dorsal rectal wall.  This measures 4 cm in length and 3.3 cm in diameter.  There is heterogeneous signal intensity.  There is restricted diffusion in the lesion and no discrete extra colonic extension.  Several lymph nodes are noted in the mesorectal fat including two which measure 3 mm near the inferior sacrum, series 8, image 16, and a 5 mm lymph node at the S2 level, series 8, image 8.  Prominent but nonenlarged bilateral obturator chain lymph nodes also noted, on the right at 4 mm and left at 6 mm.  There is urinary bladder wall thickening which is diffuse.  Moderate degree of stool in the colon.  No dilated bowel loops.  Hysterectomy.     4 cm mid rectal lesion in keeping with known malignancy.  No discrete extension into the mesorectal fascia although there are several perirectal lymph nodes which raise the possibility for locoregional lymph node involvement. Electronically signed by: Bebo Kapoor Date:    09/18/2019 Time:    16:45      I have reviewed all available lab results and radiology reports.    Assessment/Plan:   (1) 84 y.o. female with diagnosis of prior left RCC who has been referred by David Mendieta,  MD for evaluation by medical hematology/oncology. She has been followed by Dr Stanislav Epstein with [facility] at the [facility] in [place]. She last saw Dr Epstein in July 2019. She was recently diagnosed with rectal mass by Dr Armando Duff which was found on colonoscopy on 8/26/2019. She had presented with lower Gi bleeding at that time. The pathology from those biopsies showed no evidence of malignancy at that time. She was subsequently seen by Dr David Mendieta and underwent trans-anal surgical biopsy on 9/23/2019. The pathology from the surgical biopsy showed rectal carcinoma.         She has been seen by Dr Fareed Hassan with Rad/onc for radiation therapy evaluation.     PET was done on 10/17/2019     We previously discussed giving her combined chemotherapy with the XRt to try to reduce her risk of recurrence. She was agreeable to this plan.    She had portacath placed with Dr Mendieta. She saw Dr Hassan last week with rad/onc. She has since completed weekly XRT and chemotherapy    - She previously saw Dr Mendieta on 12/10/2019  and had MRI on 1/8/2020.  They did scope on 1/28th 2020 and I spoke to dr mendieta by phone last week. The scope looked good and both the patient and Dr Mendieta does not want to proceed in direction of operation especially given her age and co-morbidities.    - We previously discussed adjuvant chemotherapy with FOLOFOX x 10-12 cycles especially since she is not having surgery and she is agreeable.    - Will previously  provide literature on the regimen and set up chemotherapy      7/2/2020:  - She has since started chemotherapy and she has had 8 cycles so far; chemotherapy was held last time due to general malaise. She feels good and has some minimal and tolerable neuropathy in hands and feet.  She has some intermittent constipation.    She wants to continue with therapy and I would like to at least get 10 cycles in if possible.     7/27/2020:  - she is on her last cycle #10 today  - check  "labs weekly for next 4 weeks  - she will need to follow-up with dr mendieta and also get repeat scans every 6 months    9/1/2020:  - she completed the 10th and last cycle about a month ago  - she had recent MRI of pelvis with Dr Mendieta on 8/12/2020 and plans to get repeat scope at end of Sept 2020  - she has some occasional lightheadedness and is going to see her eye doctor in the near future  - will schedule a PET scan and consider MRI of brain thereafter    9/28/2020:  - PET on 9/18/2020 on chart  - scope scheduled for tomorrow with Dr Mendieta  - check up to date labs  - increase the gabpentin to 2 pills bid with one pill mid-day still - if symptoms do not improve, then consider referral to neurology    10/26/2020:  - doing ok except for residual neuropathy issues   - discussed referral to neurology but she wants to hold off for now  - she had scope with Dr mendieta since last visit which was "perfect" per patient and repeat is planned for six months    12/21/2020:  - she is doing well  - neuropathy is better    4/7/2021:  - she is having MRI of pelvis and flex sig with Dr Mendieta next week  - latest CT's on 3/26/2021 look stable    7/20/2021:  - doing well  - seen by Dr mendieta recently with planned repeat flex sig in couple of months  - CEA at 2.7  - will get repeat scans with PEt in Sept 2021 1/18/2022:  - She sees Dr Mendieta again in April 2022 and is expected to have another scope. She last had a scope in Oct 2021.  - she last had scans in Oct 2021  - check labs every 3 months incl CEA  - repeat scans in April 2022 7/18/2022:  - She had covid about a month ago.   - She had a colonoscopy with Dr Mendieta in June 2022 with one polyp removed with planned flex sig in 6 months   - She saw Dr Nath on 7/6/2022; she was having some pain in the left shoulder for which he gave her an injection  - she had PET on 5/2/2022 which was stable  - repeat scans in one year unless something new develops    1/25/2023:  - recent flexsig " with Dr Mendieta in oct 2022 which was good - planned repeat in April  - due for rpeat 6 month scans - will set up     8/15/2023:  - latest hgb  at 10.2  - iron panel adequate  - s/p Ct scans in Feb 2023 stable  - die for repeat CEA  - flex-sig in April 2023 negative per patient      2/15/2024:  - She saw Dr Nath in Nov 2023. She has been seeing Dr Flores with nephrology   - She had recent PET on 2/12/2024 which was essentially stable  - She is seeing Dr Mendieta again at end of the month    2/18/2025:  - Dr Mendieta planning colonoscopy in Apr 2025  - She saw Dr Nath on 2/3/2025      (2) Left renal cell carcinoma s/p left nephrectomy with Dr Azar Donnelly - diagnosed about 2 years ago     (3) Ureterolithiasis     (4) CRI - stage III - followed by Dr Antonio; she saw him recently and per patient's report, her kidney function is stable per Dr Antonio  - she is seeing Dr Antonio tomorrow    8/15/20-23:  - followed by Dr Flores now    2/15/2024:  - sees him again next week     (5) HTN and hypercholesterolemia     (6) DM - followed by Dr Brower with endocrinology     (7) Hx/of gall stones     (8) Chronic left shoulder issues     (9) MVP     (10) Thyroid disease with prior hx/of thyroid cancer s/p thyroidectomy      (11) Chronic anemia - most likley multifactorial due to iron deficiency, GIB and anemia of chronic renal disease     2/18/2025:  - latest hgb at 9.6 and a little lower  - resume oral iron  - check labs every 3 months for now    (12) hypocalcemia - followed by Dr Serrano    (13) 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  Add ICAR-C po daily  2.  check labs every 3 months incl iron panel for now; with CEA every 6 months  3.  F/u  with PCP, GI, rad/onc, Gen Surgery etc  4.  F/u nephrology  next week  5.  Planned scope with Dr saldaña in Apr 2025           RTC in 6 months     Fax note to Pham Winston Parks; Tete Duff Tandron; Brenden     Discussion:       I spent over 25 mins of time with the patient. Reviewed results of the recently ordered labs, tests and studies; made directives with regards to the results. Over half of this time was spent couseling and coordinating care.    COVID-19 Discussion:    I had long discussion with patient and any applicable family about the COVID-19 coronavirus epidemic and the recommended precautions with regard to cancer and/or hematology patients. I have re-iterated the CDC recommendations for adequate hand washing, use of hand -like products, and coughing into elbow, etc. In addition, especially for our patients who are on chemotherapy and/or our otherwise immunocompromised patients, I have recommended avoidance of crowds, including movie theaters, restaurants, churches, etc. I have recommended avoidance of any sick or symptomatic family members and/or friends. I have also recommended avoidance of any raw and unwashed food products, and general avoidance of food items that have not been prepared by themselves. The patient has been asked to call us immediately with any symptom developments, issues, questions or other general concerns.       Chemotherapy Discussion:      I discussed the available treatment option(s) in accordance with the latest/current national evidence-based guidelines (NCCN, UpToDate, NCI, ASCO, etc where applicable), their overall age/condition and their co-morbidities. I also went over the risks and benefits of the chemotherapy with regard to their particular cancer type, their cancer stage, their age/condition, and their co-morbidities. I provided literature on the chemotherapy regimen and discussed the chemotherapy side-effect profiles of the drug(s). I discussed the  importance of compliance with obtaining and monitoring weekly lab work, and went over the potential hematopathology issues and risks with anemia, leucopenia and thrombocytopenia that can occur with chemotherapy. I discussed the potential risks of liver and kidney damage, which could be permanent and could necessitate dialysis long-term if kidney failure developed. I discussed the emetic and/or diarrheal potential of the regimen and the potential need for use of antiemetic and anti-diarrheal medications. I discussed the risk for development of anaphylactic shock, bronchospasm, dysrhythmia, and respiratory/cardiovascular arrest and/or failure. I discussed the potential risks for development of alopecia, cold sensory issues, ringing in ears, vertigo, cataracts, glaucoma, and neuropathy, all of which could end up being chronic and life-long. Some chemotherpyI discussed the risks of hand-foot syndrome and rashes, and development of other autoimmune mediated processes such as pneumonitis, hepatitis, and colitis which could be life threatening. I discussed the risks of the potential development of a rare but fatal viral mediated disease known as PML (Progressive Multifocal Leukoencephalopathy), and risk of future development of leukemia and/or lymphoma from use of certain chemotherapy agents. I discussed the need for neutropenic precautions, basic hygiene/sanitation behaviors and dietary restrictions.    The patient's consent has been obtained to proceed with the chemotherapy.The patient will be referred to Chemotherapy [facility] /[facility] for training and education on chemotherapy, use of antiemetics and/or anti-diarrheals, use of NSAID's, potential chemotherapy side-effects, and any specific recommendations and precautions with the particular chemotherapy agents.      I answered all of the patient's (and family's, if applicable) questions to the best of my ability and to their complete satisfaction. The patient  acknowledged full understanding of the risks, recommendations and plan(s).          I have explained all of the above in detail and the patient understands all of the current recommendation(s). I have answered all of their questions to the best of my ability and to their complete satisfaction.   The patient is to continue with the current management plan.            Electronically signed by Carrillo Goode MD                            Answers submitted by the patient for this visit:  Review of Systems Questionnaire (Submitted on 2/17/2025)  appetite change : No  unexpected weight change: No  mouth sores: No  visual disturbance: No  cough: Yes  shortness of breath: No  chest pain: Yes  abdominal pain: No  diarrhea: Yes  frequency: No  back pain: No  rash: No  headaches: No  adenopathy: No  nervous/ anxious: Yes

## 2025-02-18 ENCOUNTER — OFFICE VISIT (OUTPATIENT)
Facility: CLINIC | Age: 85
End: 2025-02-18
Payer: MEDICARE

## 2025-02-18 VITALS
TEMPERATURE: 98 F | SYSTOLIC BLOOD PRESSURE: 125 MMHG | RESPIRATION RATE: 16 BRPM | DIASTOLIC BLOOD PRESSURE: 60 MMHG | OXYGEN SATURATION: 98 % | BODY MASS INDEX: 27.75 KG/M2 | WEIGHT: 147 LBS | HEART RATE: 76 BPM | HEIGHT: 61 IN

## 2025-02-18 DIAGNOSIS — D70.1 CHEMOTHERAPY INDUCED NEUTROPENIA: ICD-10-CM

## 2025-02-18 DIAGNOSIS — C20 RECTAL CANCER: ICD-10-CM

## 2025-02-18 DIAGNOSIS — Z90.5 STATUS POST NEPHRECTOMY: ICD-10-CM

## 2025-02-18 DIAGNOSIS — D50.9 IRON DEFICIENCY ANEMIA, UNSPECIFIED IRON DEFICIENCY ANEMIA TYPE: ICD-10-CM

## 2025-02-18 DIAGNOSIS — D64.9 NORMOCYTIC ANEMIA: ICD-10-CM

## 2025-02-18 DIAGNOSIS — C20 MALIGNANT NEOPLASM OF RECTUM: ICD-10-CM

## 2025-02-18 DIAGNOSIS — K62.89 RECTAL MASS: ICD-10-CM

## 2025-02-18 DIAGNOSIS — C64.2 RENAL CELL CARCINOMA OF LEFT KIDNEY: Primary | ICD-10-CM

## 2025-02-18 DIAGNOSIS — T45.1X5A CHEMOTHERAPY INDUCED NEUTROPENIA: ICD-10-CM

## 2025-02-18 DIAGNOSIS — Z85.048 HISTORY OF RECTAL CANCER: ICD-10-CM

## 2025-02-18 DIAGNOSIS — D53.9 NUTRITIONAL ANEMIA, UNSPECIFIED: ICD-10-CM

## 2025-02-18 PROCEDURE — 99214 OFFICE O/P EST MOD 30 MIN: CPT | Mod: PBBFAC,PN | Performed by: INTERNAL MEDICINE

## 2025-02-18 RX ORDER — IRON,CARBONYL/ASCORBIC ACID 100-250 MG
1 TABLET ORAL DAILY
Qty: 30 EACH | Refills: 6 | Status: SHIPPED | OUTPATIENT
Start: 2025-02-18

## 2025-03-20 DIAGNOSIS — G62.9 NEUROPATHY: ICD-10-CM

## 2025-03-20 DIAGNOSIS — E56.9 VITAMIN DEFICIENCY: ICD-10-CM

## 2025-03-20 RX ORDER — CALCITRIOL 0.5 UG/1
0.5 CAPSULE ORAL DAILY
Qty: 90 CAPSULE | Refills: 0 | Status: SHIPPED | OUTPATIENT
Start: 2025-03-20

## 2025-03-20 RX ORDER — GABAPENTIN 100 MG/1
CAPSULE ORAL
Qty: 150 CAPSULE | Refills: 0 | Status: SHIPPED | OUTPATIENT
Start: 2025-03-20

## 2025-04-21 ENCOUNTER — TELEPHONE (OUTPATIENT)
Dept: PSYCHOLOGY | Facility: CLINIC | Age: 85
End: 2025-04-21
Payer: MEDICARE

## 2025-04-21 NOTE — TELEPHONE ENCOUNTER
----- Message from ALTAGRACIA De Oliveira sent at 4/21/2025 11:12 AM CDT -----  Regarding: FW: Appt    ----- Message -----  From: Gisel Chavis  Sent: 4/21/2025  11:11 AM CDT  To: [facility] Staff  Subject: Appt                                             Pt called in to schedule an appt; no available appts in Epic. Pt is asking for a call back soon to schedule. Thanks. Reason appt not schedule: cannot schedule Patient's DX: referral in chart Patient requesting call back or [facility] msg: Call Back

## 2025-04-25 ENCOUNTER — TELEPHONE (OUTPATIENT)
Dept: PSYCHOLOGY | Facility: CLINIC | Age: 85
End: 2025-04-25
Payer: MEDICARE

## 2025-04-25 NOTE — TELEPHONE ENCOUNTER
Copied from CRM #[id number]. Topic: Appointments - Appointment Access  >> Apr 14, 2025  3:41 PM Beba wrote:  Patient would like to get medical advice.  Symptoms (please be specific):  Pt is requesting to schedule a new pt appt with Valerie Perez. Pt states she has a referral from [facility]  How long have you had these symptoms: N/A  Would you like a call back, or a response through your [facility] portal?:   call back to pt   Pharmacy name and phone # (copy from chart):   N/A  Comments:

## 2025-04-28 ENCOUNTER — HOSPITAL ENCOUNTER (OUTPATIENT)
Facility: HOSPITAL | Age: 85
Discharge: HOME | End: 2025-04-28
Attending: SURGERY | Admitting: SURGERY
Payer: MEDICARE

## 2025-04-28 ENCOUNTER — ANESTHESIA EVENT (OUTPATIENT)
Dept: ENDOSCOPY | Facility: HOSPITAL | Age: 85
End: 2025-04-28
Payer: MEDICARE

## 2025-04-28 ENCOUNTER — ANESTHESIA (OUTPATIENT)
Dept: ENDOSCOPY | Facility: HOSPITAL | Age: 85
End: 2025-04-28
Payer: MEDICARE

## 2025-04-28 DIAGNOSIS — Z12.11 SCREENING FOR COLON CANCER: ICD-10-CM

## 2025-04-28 LAB — POCT GLUCOSE: 110 MG/DL (ref 70–110)

## 2025-04-28 PROCEDURE — 37000009 HC ANESTHESIA EA ADD 15 MINS: Performed by: SURGERY

## 2025-04-28 PROCEDURE — 25000003 PHARM REV CODE 250: Performed by: SURGERY

## 2025-04-28 PROCEDURE — 27201089 HC SNARE, DISP (ANY): Performed by: SURGERY

## 2025-04-28 PROCEDURE — 45385 COLONOSCOPY W/LESION REMOVAL: CPT | Mod: PT,,, | Performed by: SURGERY

## 2025-04-28 PROCEDURE — 82962 GLUCOSE BLOOD TEST: CPT | Performed by: SURGERY

## 2025-04-28 PROCEDURE — 88305 TISSUE EXAM BY PATHOLOGIST: CPT | Mod: TC | Performed by: PATHOLOGY

## 2025-04-28 PROCEDURE — 37000008 HC ANESTHESIA 1ST 15 MINUTES: Performed by: SURGERY

## 2025-04-28 PROCEDURE — 63600175 PHARM REV CODE 636 W HCPCS: Performed by: NURSE ANESTHETIST, CERTIFIED REGISTERED

## 2025-04-28 PROCEDURE — 45385 COLONOSCOPY W/LESION REMOVAL: CPT | Mod: PT | Performed by: SURGERY

## 2025-04-28 RX ORDER — PROPOFOL 10 MG/ML
VIAL (ML) INTRAVENOUS
Status: DISCONTINUED | OUTPATIENT
Start: 2025-04-28 | End: 2025-04-28

## 2025-04-28 RX ORDER — SODIUM CHLORIDE 9 MG/ML
INJECTION, SOLUTION INTRAVENOUS CONTINUOUS
Status: DISCONTINUED | OUTPATIENT
Start: 2025-04-28 | End: 2025-04-28 | Stop reason: HOSPADM

## 2025-04-28 RX ORDER — LIDOCAINE HYDROCHLORIDE 20 MG/ML
INJECTION INTRAVENOUS
Status: DISCONTINUED | OUTPATIENT
Start: 2025-04-28 | End: 2025-04-28

## 2025-04-28 RX ADMIN — SODIUM CHLORIDE: 9 INJECTION, SOLUTION INTRAVENOUS at 07:04

## 2025-04-28 RX ADMIN — PROPOFOL 80 MG: 10 INJECTION, EMULSION INTRAVENOUS at 07:04

## 2025-04-28 RX ADMIN — PROPOFOL 40 MG: 10 INJECTION, EMULSION INTRAVENOUS at 07:04

## 2025-04-28 RX ADMIN — LIDOCAINE HYDROCHLORIDE 50 MG: 20 INJECTION, SOLUTION INTRAVENOUS at 07:04

## 2025-04-28 NOTE — TRANSFER OF CARE
Anesthesia Transfer of Care Note    Patient: Valery Huggins    Procedure(s) Performed: Procedure(s) (LRB):  COLONOSCOPY (N/A)    Patient location: PACU    Anesthesia Type: general    Transport from OR: Transported from OR on room air with adequate spontaneous ventilation    Post pain: adequate analgesia    Post assessment: no apparent anesthetic complications and tolerated procedure well    Level of consciousness: sedated and responds to stimulation    Nausea/Vomiting: no nausea/vomiting    Complications: none    Transfer of care protocol was followed      Last vitals: Visit Vitals  BP (!) 179/79 (BP Location: Left arm, Patient Position: Lying)   Pulse 69   Temp 36.7 °C (98 °F) (Skin)   Resp 17   LMP 06/01/2012   SpO2 98%

## 2025-04-28 NOTE — PROVATION PATIENT INSTRUCTIONS
Discharge Summary/Instructions after an Endoscopic Procedure  Patient Name: Valery Huggins  Patient MRN: [id number]  Patient YOB: 1940 Monday, April 28, 2025  David Mendieta MD  Dear patient,  As a result of recent federal legislation (The Federal Cures Act), you may   receive lab or pathology results from your procedure in your [facility]   account before your physician is able to contact you. Your physician or   their representative will relay the results to you with their   recommendations at their soonest availability.  Thank you,  RESTRICTIONS:  During your procedure today, you received medications for sedation.  These   medications may affect your judgment, balance and coordination.  Therefore,   for 24 hours, you have the following restrictions:   - DO NOT drive a car, operate machinery, make legal/financial decisions,   sign important papers or drink alcohol.    ACTIVITY:  Today: no heavy lifting, straining or running due to procedural   sedation/anesthesia.  The following day: return to full activity including work.  DIET:  Eat and drink normally unless instructed otherwise.     TREATMENT FOR COMMON SIDE EFFECTS:  - Mild abdominal pain, nausea, belching, bloating or excessive gas:  rest,   eat lightly and use a heating pad.  - Sore Throat: treat with throat lozenges and/or gargle with warm salt   water.  - Because air was used during the procedure, expelling large amounts of air   from your rectum or belching is normal.  - If a bowel prep was taken, you may not have a bowel movement for 1-3 days.    This is normal.  SYMPTOMS TO WATCH FOR AND REPORT TO YOUR PHYSICIAN:  1. Abdominal pain or bloating, other than gas cramps.  2. Chest pain.  3. Back pain.  4. Signs of infection such as: chills or fever occurring within 24 hours   after the procedure.  5. Rectal bleeding, which would show as bright red, maroon, or black stools.   (A tablespoon of blood from the rectum is not serious, especially if    hemorrhoids are present.)  6. Vomiting.  7. Weakness or dizziness.  GO DIRECTLY TO THE NEAREST EMERGENCY ROOM IF YOU HAVE ANY OF THE FOLLOWING:      Difficulty breathing              Chills and/or fever over 101 F   Persistent vomiting and/or vomiting blood   Severe abdominal pain   Severe chest pain   Black, tarry stools   Bleeding- more than one tablespoon   Any other symptom or condition that you feel may need urgent attention  Your doctor recommends these additional instructions:  If any biopsies were taken, your doctors clinic will contact you in 1 to 2   weeks with any results.  - Discharge patient to home (ambulatory).   - Resume regular diet.   - Continue present medications.   - Await pathology results.   - Repeat colonoscopy in 5 years for surveillance.   - Return to my office PRN.   - Patient has a contact number available for emergencies.  The signs and   symptoms of potential delayed complications were discussed with the   patient.  Return to normal activities tomorrow.  Written discharge   instructions were provided to the patient.  For questions, problems or results please call your physician - David Mendieta MD at Work:  [phone].  [facility], EMERGENCY ROOM PHONE NUMBER: [phone]  IF A COMPLICATION OR EMERGENCY SITUATION ARISES AND YOU ARE UNABLE TO REACH   YOUR PHYSICIAN - GO DIRECTLY TO THE EMERGENCY ROOM.  David Mendieta MD  4/28/2025 8:06:16 AM  This report has been verified and signed electronically.  Dear patient,  As a result of recent federal legislation (The Federal Cures Act), you may   receive lab or pathology results from your procedure in your [facility]   account before your physician is able to contact you. Your physician or   their representative will relay the results to you with their   recommendations at their soonest availability.  Thank you,  PROVATION

## 2025-04-28 NOTE — ANESTHESIA PREPROCEDURE EVALUATION
04/28/2025  Valery Huggins is a 84 y.o., female.      Pre-op Assessment    I have reviewed the Patient Summary Reports.    I have reviewed the NPO Status.   I have reviewed the Medications.     Review of Systems  Anesthesia Hx:    PONV                Social:  Non-Smoker       Hematology/Oncology:       -- Anemia:                    --  Cancer in past history (renal cell CA L- s/p nephrectomy, Thyroid CA):                 Oncology Comments: Renal Cell CA  Rectal CA     Cardiovascular:     Hypertension, well controlled Valvular problems/Murmurs, MVP          hyperlipidemia   ECG has been reviewed.         Shortness of Breath                    Hypertension         Pulmonary:      Shortness of breath                  Renal/:  Chronic Renal Disease, CKD renal calculi  Hx nephrectomy     Kidney Function/Disease             Hepatic/GI:  Bowel Prep.   GERD         Gerd          Neurological:  Neurology Normal                                      Endocrine:  Diabetes, well controlled, type 2 Hypothyroidism   Diabetes                   Hypothyroidism          Psych:  Psychiatric History  depression                Physical Exam  General: Well nourished, Cooperative, Alert and Oriented    Airway:  Mallampati: II   Mouth Opening: Normal  TM Distance: Normal  Neck ROM: Normal ROM        Anesthesia Plan  Type of Anesthesia, risks & benefits discussed:    Anesthesia Type: Gen Natural Airway  Intra-op Monitoring Plan: Standard ASA Monitors  Induction:  IV  Airway Plan: Direct  Informed Consent: Informed consent signed with the Patient and all parties understand the risks and agree with anesthesia plan.  All questions answered.   ASA Score: 3    Ready For Surgery From Anesthesia Perspective.     .

## 2025-04-28 NOTE — ANESTHESIA POSTPROCEDURE EVALUATION
Anesthesia Post Evaluation    Patient: Valery Huggins    Procedure(s) Performed: Procedure(s) (LRB):  COLONOSCOPY (N/A)    Final Anesthesia Type: general      Patient location during evaluation: PACU  Patient participation: Yes- Able to Participate  Level of consciousness: awake  Post-procedure vital signs: reviewed and stable  Pain management: adequate  Airway patency: patent    PONV status at discharge: No PONV  Anesthetic complications: no      Cardiovascular status: blood pressure returned to baseline  Respiratory status: unassisted  Hydration status: euvolemic  Follow-up not needed.              Vitals Value Taken Time   /71 04/28/25 08:30   Temp 36.7 °C (98 °F) 04/28/25 08:30   Pulse 66 04/28/25 08:30   Resp 14 04/28/25 08:30   SpO2 98 % 04/28/25 08:30         Event Time   Out of Recovery 08:39:46         Pain/Jake Score: Jake Score: 10 (4/28/2025  8:35 AM)

## 2025-04-28 NOTE — H&P
[facility] - Endoscopy  History & Physical     Subjective:     Chief Complaint/Reason for Admission: history of rectal caner/ colon polyps    History of Present Illness:   Patient 84 y.o. female presents for colonoscopy.  Pt has history of rectal cancer and has had previous colon polyps    Patient Active Problem List    Diagnosis Date Noted    Positive D dimer 01/31/2025    S/P colonoscopy 02/21/2024    Shortness of breath 04/28/2021    HTN (hypertension) 04/14/2021    History of rectal cancer 04/13/2021    Hypocalcemia 06/08/2020    Chemotherapy induced neutropenia 04/27/2020    Malignant neoplasm of rectum 12/23/2019    Encounter for insertion of venous access port 10/15/2019    Rectal cancer 10/08/2019    Rectal mass 09/23/2019    Hypocalcemia 06/18/2019    Hypomagnesemia 06/18/2019    CKD (chronic kidney disease) stage 4, GFR 15-29 ml/min 06/18/2019    Type 2 diabetes mellitus with other diabetic kidney complication 07/31/2018    Cervical lymphadenopathy 04/26/2018    Calculus of kidney and ureter 03/07/2018    Normocytic anemia 01/22/2018    VICTOR M (acute kidney injury) 01/21/2018    Calculus of gallbladder without cholecystitis 01/21/2018    Status post Left nephrectomy 11/17/2017    Renal cell carcinoma 11/16/2017    Essential hypertension 09/26/2017    Ureteral calculus, left 01/16/2017    Ureterolithiasis 12/11/2016    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/2016    Renal cell carcinoma of left kidney 12/11/2016    Renal calculus, left 12/05/2016    BMI 28.0-28.9,adult 05/19/2016    Renal mass, left 11/02/2015    Calculus of ureter 07/06/2015    Chronic pain in left shoulder 09/05/2012    Hypercholesteremia 09/05/2012        Prescriptions Prior to Admission[1]  Review of patient's allergies indicates:   Allergen Reactions    Sunitinib Diarrhea, Nausea And Vomiting and Other (See Comments)     Vomiting and Diarrhea        Past Medical History:   Diagnosis Date    Anemia     Depression     Diabetes  mellitus type II     Encounter for blood transfusion     Gallstones     GERD (gastroesophageal reflux disease)     Hypertension     Kidney stone     MVP (mitral valve prolapse)     Personal history of colonic polyps 04/28/2022    PONV (postoperative nausea and vomiting)     Renal cell carcinoma of left kidney 12/11/2016    Thyroid cancer     Thyroid disease       Past Surgical History:   Procedure Laterality Date    APPENDECTOMY      cataracts      both eyes    CHOLECYSTECTOMY      COLONOSCOPY N/A 08/26/2019    Procedure: COLONOSCOPY;  Surgeon: Armando Dfuf MD;  Location: [facility];  Service: Endoscopy;  Laterality: N/A;    COLONOSCOPY N/A 04/28/2022    Procedure: COLONOSCOPY;  Surgeon: David Mendieta MD;  Location: [facility];  Service: General;  Laterality: N/A;    COLONOSCOPY W/ POLYPECTOMY  04/28/2022    CYSTOSCOPY      CYSTOSCOPY W/ URETERAL STENT PLACEMENT      ECTOPIC PREGNANCY SURGERY      EXAMINATION UNDER ANESTHESIA N/A 09/23/2019    Procedure: Exam under anesthesia;  Surgeon: David Mendieta MD;  Location: [facility];  Service: General;  Laterality: N/A;    EYE SURGERY      phoebe cataract    FLEXIBLE SIGMOIDOSCOPY N/A 01/28/2020    Procedure: SIGMOIDOSCOPY, FLEXIBLE;  Surgeon: David Mendieta MD;  Location: [facility];  Service: Endoscopy;  Laterality: N/A;    FLEXIBLE SIGMOIDOSCOPY N/A 09/29/2020    Procedure: SIGMOIDOSCOPY, FLEXIBLE;  Surgeon: David Mendieta MD;  Location: [facility] ENDO;  Service: Endoscopy;  Laterality: N/A;    FLEXIBLE SIGMOIDOSCOPY N/A 04/13/2021    Procedure: SIGMOIDOSCOPY, FLEXIBLE;  Surgeon: David Mendieta MD;  Location: [facility];  Service: Endoscopy;  Laterality: N/A;    FLEXIBLE SIGMOIDOSCOPY  10/21/2021    per  10/21/2021    FLEXIBLE SIGMOIDOSCOPY N/A 10/21/2021    Procedure: SIGMOIDOSCOPY, FLEXIBLE;  Surgeon: David Mendieta MD;  Location: [facility];  Service: General;  Laterality: N/A;    FLEXIBLE SIGMOIDOSCOPY  10/20/2022    FLEXIBLE SIGMOIDOSCOPY N/A 10/20/2022     Procedure: SIGMOIDOSCOPY, FLEXIBLE;  Surgeon: David Mendieta MD;  [facility] ENDO;  Service: General;  Laterality: N/A;    FLEXIBLE SIGMOIDOSCOPY N/A 5/15/2023    Procedure: SIGMOIDOSCOPY, FLEXIBLE;  Surgeon: David Mendieta MD;  Location: [facility] ENDO;  Service: Endoscopy;  Laterality: N/A;    FLEXIBLE SIGMOIDOSCOPY N/A 4/8/2024    Procedure: SIGMOIDOSCOPY, FLEXIBLE;  Surgeon: David Mendieta MD;  Location: [facility] ENDO;  Service: General;  Laterality: N/A;    HYSTERECTOMY      INSERTION OF TUNNELED CENTRAL VENOUS CATHETER (CVC) WITH SUBCUTANEOUS PORT Right 10/15/2019    Procedure: [id number];  Surgeon: David Mendieta MD;  Location: [facility] OR;  Service: General;  Laterality: Right;    NASAL SEPTUM SURGERY      NEPHRECTOMY Left     OOPHORECTOMY      THYROIDECTOMY      two times    TONSILLECTOMY          Social History     Tobacco Use    Smoking status: Never    Smokeless tobacco: Never   Substance Use Topics    Alcohol use: No        Review of Systems:  A comprehensive review of systems was negative.    OBJECTIVE:     Patient Vitals for the past 8 hrs:   BP Temp Temp src Pulse Resp SpO2   04/28/25 0651 (!) 179/79 98 °F (36.7 °C) Skin 69 17 98 %     AAOx3  Soft/nd/nt  No resp distress    Data Review:      ASSESSMENT/PLAN:     There are no hospital problems to display for this patient.  Hx of rectal cancer    Plan:  TO have cscope today         [1]   Medications Prior to Admission   Medication Sig Dispense Refill Last Dose/Taking    amLODIPine (NORVASC) 2.5 MG tablet Take 1 tablet (2.5 mg total) by mouth once daily. 90 tablet 0 Past Week    ascorbic acid, vitamin C, (VITAMIN C) 100 MG tablet Take 100 mg by mouth once daily.   Past Week    atorvastatin (LIPITOR) 10 MG tablet Take 1 tablet (10 mg total) by mouth every evening. 90 tablet 0 Past Week    b complex vitamins capsule Take 1 capsule by mouth once daily.   Past Week    busPIRone (BUSPAR) 5 MG Tab Take 1 tablet (5 mg total) by mouth 3 (three) times  daily as needed. 90 tablet 0 Past Week    cetirizine (ZYRTEC) 10 MG tablet Take 1 tablet (10 mg total) by mouth once daily. 30 tablet 0 Past Week    EScitalopram oxalate (LEXAPRO) 20 MG tablet Take 1 tablet (20 mg total) by mouth once daily. 90 tablet 0 Past Week    fluticasone propionate (FLONASE) 50 mcg/actuation nasal spray 1 spray (50 mcg total) by Each Nostril route once daily. 9.9 mL 0 Past Week    gabapentin (NEURONTIN) 100 MG capsule TAKE 2 CAPSULES BY MOUTH EACH MORNING AND 1 AT MIDDAY AND 2 EACH NIGHT 150 capsule 0 Past Week    hydrALAZINE (APRESOLINE) 100 MG tablet Take 1 tablet (100 mg total) by mouth 2 (two) times daily. 90 tablet 0 Past Week    inulin (FIBER GUMMIES ORAL) Take 1 tablet by mouth once daily.   Past Week    iron-vitamin C 100-250 mg, ICAR-C, (ICAR-C) 100-250 mg Tab Take 1 tablet by mouth once daily. 30 each 6 Past Week    levothyroxine (SYNTHROID) 88 MCG tablet Take 1 tablet (88 mcg total) by mouth once daily. 90 tablet 0 Past Week    metoprolol tartrate (LOPRESSOR) 25 MG tablet Take 1 tablet (25 mg total) by mouth 2 (two) times daily. 180 tablet 0 Past Week    ondansetron (ZOFRAN) 4 MG tablet Take 1 tablet (4 mg total) by mouth daily as needed for Nausea. 30 tablet 1 Past Week    OZEMPIC 1 mg/dose (4 mg/3 mL) Inject 1 mg into the skin every 7 days.   Past Month    pantoprazole (PROTONIX) 40 MG tablet Take 1 tablet (40 mg total) by mouth 2 (two) times daily. 90 tablet 0 Past Week    TOUJEO SOLOSTAR U-300 INSULIN 300 unit/mL (1.5 mL) InPn pen INJECT 20 UNITS SUBCUTANEAOUSLY DAILY 4.5 mL 1 4/27/2025    calcitRIOL (ROCALTROL) 0.5 MCG Cap TAKE 1 CAPSULE (0.5 MCG TOTAL) BY MOUTH ONCE DAILY. 90 capsule 0     calcitRIOL (ROCALTROL) 0.5 MCG Cap Take 1 capsule (0.5 mcg total) by mouth once daily. 90 capsule 0     gabapentin (NEURONTIN) 100 MG capsule Take 1 capsule (100 mg total) by mouth 2 (two) times daily. 180 capsule 0

## 2025-04-29 VITALS
DIASTOLIC BLOOD PRESSURE: 71 MMHG | HEART RATE: 66 BPM | OXYGEN SATURATION: 98 % | RESPIRATION RATE: 14 BRPM | SYSTOLIC BLOOD PRESSURE: 135 MMHG | TEMPERATURE: 98 F

## 2025-05-05 ENCOUNTER — RESULTS FOLLOW-UP (OUTPATIENT)
Dept: SURGERY | Facility: HOSPITAL | Age: 85
End: 2025-05-05

## 2025-05-05 ENCOUNTER — TELEPHONE (OUTPATIENT)
Dept: SURGERY | Facility: HOSPITAL | Age: 85
End: 2025-05-05

## 2025-05-05 NOTE — TELEPHONE ENCOUNTER
Called and attempted to reviewed pathology with pt.      HEPATIC FLEXURE POLYPS:     - TUBULAR ADENOMA; NEGATIVE FOR HIGH GRADE DYSPLASIA.     - BENIGN POLYPOID FRAGMENT OF COLONIC MUCOSA WITH MILD NONSPECIFIC    CHRONIC       INFLAMMATION._____       Pt did not answer  Recommend repeat cscope in 5 years    WIll have office reach out to pt

## 2025-05-27 ENCOUNTER — TELEPHONE (OUTPATIENT)
Dept: PSYCHOLOGY | Facility: CLINIC | Age: 85
End: 2025-05-27
Payer: MEDICARE

## 2025-05-28 ENCOUNTER — PATIENT MESSAGE (OUTPATIENT)
Dept: PSYCHOLOGY | Facility: CLINIC | Age: 85
End: 2025-05-28
Payer: MEDICARE

## 2025-05-28 ENCOUNTER — OFFICE VISIT (OUTPATIENT)
Dept: PSYCHOLOGY | Facility: CLINIC | Age: 85
End: 2025-05-28
Payer: COMMERCIAL

## 2025-05-28 VITALS
SYSTOLIC BLOOD PRESSURE: 132 MMHG | DIASTOLIC BLOOD PRESSURE: 74 MMHG | HEART RATE: 81 BPM | TEMPERATURE: 98 F | OXYGEN SATURATION: 97 %

## 2025-05-28 DIAGNOSIS — F41.1 GENERALIZED ANXIETY DISORDER: Primary | ICD-10-CM

## 2025-05-28 DIAGNOSIS — F41.9 ANXIETY: ICD-10-CM

## 2025-05-28 DIAGNOSIS — F32.0 MILD SINGLE CURRENT EPISODE OF MAJOR DEPRESSIVE DISORDER: ICD-10-CM

## 2025-05-28 DIAGNOSIS — Z63.8 FAMILY CONFLICT: ICD-10-CM

## 2025-05-28 PROCEDURE — 3288F FALL RISK ASSESSMENT DOCD: CPT | Mod: CPTII,S$GLB,, | Performed by: NURSE PRACTITIONER

## 2025-05-28 PROCEDURE — 1101F PT FALLS ASSESS-DOCD LE1/YR: CPT | Mod: CPTII,S$GLB,, | Performed by: NURSE PRACTITIONER

## 2025-05-28 PROCEDURE — 99999 PR PBB SHADOW E&M-EST. PATIENT-LVL V: CPT | Mod: PBBFAC,,, | Performed by: NURSE PRACTITIONER

## 2025-05-28 PROCEDURE — 99205 OFFICE O/P NEW HI 60 MIN: CPT | Mod: S$GLB,,, | Performed by: NURSE PRACTITIONER

## 2025-05-28 PROCEDURE — 3078F DIAST BP <80 MM HG: CPT | Mod: CPTII,S$GLB,, | Performed by: NURSE PRACTITIONER

## 2025-05-28 PROCEDURE — 1126F AMNT PAIN NOTED NONE PRSNT: CPT | Mod: CPTII,S$GLB,, | Performed by: NURSE PRACTITIONER

## 2025-05-28 PROCEDURE — 3075F SYST BP GE 130 - 139MM HG: CPT | Mod: CPTII,S$GLB,, | Performed by: NURSE PRACTITIONER

## 2025-05-28 PROCEDURE — 1159F MED LIST DOCD IN RCRD: CPT | Mod: CPTII,S$GLB,, | Performed by: NURSE PRACTITIONER

## 2025-05-28 RX ORDER — ESCITALOPRAM OXALATE 20 MG/1
20 TABLET ORAL DAILY
Qty: 30 TABLET | Refills: 2 | Status: SHIPPED | OUTPATIENT
Start: 2025-05-28 | End: 2025-08-26

## 2025-05-28 RX ORDER — BUSPIRONE HYDROCHLORIDE 5 MG/1
10 TABLET ORAL 2 TIMES DAILY
Qty: 120 TABLET | Refills: 2 | Status: SHIPPED | OUTPATIENT
Start: 2025-05-28 | End: 2025-08-26

## 2025-05-28 SDOH — SOCIAL DETERMINANTS OF HEALTH (SDOH): OTHER SPECIFIED PROBLEMS RELATED TO PRIMARY SUPPORT GROUP: Z63.8
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
## 2025-06-02 ENCOUNTER — HOSPITAL ENCOUNTER (OUTPATIENT)
Dept: RADIOLOGY | Facility: HOSPITAL | Age: 85
Discharge: HOME | End: 2025-06-02
Attending: PHYSICAL MEDICINE & REHABILITATION
Payer: MEDICARE

## 2025-06-02 DIAGNOSIS — Z12.31 ENCOUNTER FOR SCREENING MAMMOGRAM FOR MALIGNANT NEOPLASM OF BREAST: ICD-10-CM

## 2025-06-02 PROCEDURE — 77063 BREAST TOMOSYNTHESIS BI: CPT | Mod: TC

## 2025-06-02 PROCEDURE — 77067 SCR MAMMO BI INCL CAD: CPT | Mod: 26,,, | Performed by: RADIOLOGY

## 2025-06-02 PROCEDURE — 77063 BREAST TOMOSYNTHESIS BI: CPT | Mod: 26,,, | Performed by: RADIOLOGY

## 2025-06-06 ENCOUNTER — PATIENT MESSAGE (OUTPATIENT)
Dept: SURGERY | Facility: CLINIC | Age: 85
End: 2025-06-06
Payer: MEDICARE

## 2025-07-01 ENCOUNTER — TELEPHONE (OUTPATIENT)
Dept: PSYCHOLOGY | Facility: CLINIC | Age: 85
End: 2025-07-01
Payer: COMMERCIAL

## 2025-07-02 ENCOUNTER — OFFICE VISIT (OUTPATIENT)
Dept: PSYCHOLOGY | Facility: CLINIC | Age: 85
End: 2025-07-02
Payer: COMMERCIAL

## 2025-07-02 VITALS
SYSTOLIC BLOOD PRESSURE: 128 MMHG | DIASTOLIC BLOOD PRESSURE: 78 MMHG | HEART RATE: 74 BPM | OXYGEN SATURATION: 97 % | TEMPERATURE: 98 F

## 2025-07-02 DIAGNOSIS — Z63.8 FAMILY CONFLICT: ICD-10-CM

## 2025-07-02 DIAGNOSIS — F41.1 GENERALIZED ANXIETY DISORDER: Primary | ICD-10-CM

## 2025-07-02 DIAGNOSIS — F32.0 MILD SINGLE CURRENT EPISODE OF MAJOR DEPRESSIVE DISORDER: ICD-10-CM

## 2025-07-02 PROCEDURE — 1159F MED LIST DOCD IN RCRD: CPT | Mod: CPTII,S$GLB,, | Performed by: NURSE PRACTITIONER

## 2025-07-02 PROCEDURE — 1101F PT FALLS ASSESS-DOCD LE1/YR: CPT | Mod: CPTII,S$GLB,, | Performed by: NURSE PRACTITIONER

## 2025-07-02 PROCEDURE — 3288F FALL RISK ASSESSMENT DOCD: CPT | Mod: CPTII,S$GLB,, | Performed by: NURSE PRACTITIONER

## 2025-07-02 PROCEDURE — 3078F DIAST BP <80 MM HG: CPT | Mod: CPTII,S$GLB,, | Performed by: NURSE PRACTITIONER

## 2025-07-02 PROCEDURE — 3074F SYST BP LT 130 MM HG: CPT | Mod: CPTII,S$GLB,, | Performed by: NURSE PRACTITIONER

## 2025-07-02 PROCEDURE — 99999 PR PBB SHADOW E&M-EST. PATIENT-LVL IV: CPT | Mod: PBBFAC,,, | Performed by: NURSE PRACTITIONER

## 2025-07-02 PROCEDURE — 99214 OFFICE O/P EST MOD 30 MIN: CPT | Mod: S$GLB,,, | Performed by: NURSE PRACTITIONER

## 2025-07-02 PROCEDURE — 1126F AMNT PAIN NOTED NONE PRSNT: CPT | Mod: CPTII,S$GLB,, | Performed by: NURSE PRACTITIONER

## 2025-07-02 PROCEDURE — 90833 PSYTX W PT W E/M 30 MIN: CPT | Mod: S$GLB,,, | Performed by: NURSE PRACTITIONER

## 2025-07-02 PROCEDURE — G2211 COMPLEX E/M VISIT ADD ON: HCPCS | Mod: S$GLB,,, | Performed by: NURSE PRACTITIONER

## 2025-07-02 RX ORDER — SERTRALINE HYDROCHLORIDE 50 MG/1
50 TABLET, FILM COATED ORAL DAILY
Qty: 90 TABLET | Refills: 0 | Status: SHIPPED | OUTPATIENT
Start: 2025-07-02 | End: 2025-09-30

## 2025-07-02 SDOH — SOCIAL DETERMINANTS OF HEALTH (SDOH): OTHER SPECIFIED PROBLEMS RELATED TO PRIMARY SUPPORT GROUP: Z63.8

## 2025-07-02 NOTE — PROGRESS NOTES
"Outpatient Psychiatry Follow-Up Visit ([facility])    07/02/2025    Clinical Status of Patient:  Outpatient (Ambulatory)    Chief Complaint:  Valery Huggins is a 84 y.o. female who presents today for follow-up of depression and anxiety.  Met with patient.     Current Medications:   Buspar 10 mg TID  Lexapro 20 mg Daily  Gabapentin 100 mg- neuropathy pain from oncologist     Past Medication Trials:  Buspar- when taken  Diarrhea    Interval History and Content of Current Session:  Patient seen and chart reviewed. Last seen on 5/28/25    Changes at last visit:  Continued Lexapro 20 mg daily at night.   Increased Buspar: Start with 5 mg twice daily for 2 weeks. If needed, increase to 10 mg twice daily after 2 weeks.     History of Present Illness    HPI:  Patient discontinued BuSpar due to severe diarrhea, which persisted even with the 5mg dose taken twice daily and resolved about a week after stopping the medication. She notes that BuSpar was effective for anxiety when taken as needed, without causing side effects. Patient rates her current anxiety level as 5-6 out of 10, and her level of sadness or depression as 3-4 out of 10. She reports waves of sadness previously, but this has improved. Patient mentions feeling "pretty good" overall, even without taking BuSpar regularly.    Patient reports sleeping at least 7 hours per night and having a normal appetite. She discloses a recent stressor involving her daughter ceasing communication about a month ago, for reasons unknown to her. However, she reports coping better with this situation now compared to initially. Patient also mentions that her 's dementia appears to have stabilized, which may be contributing to her improved emotional state.    Patient reports getting a new car, which is described as exciting and potentially contributing to her improved mood and social engagement.    ROS:  General: -fever, -chills, -fatigue, -weight gain, -weight loss  Eyes: -vision " changes, -redness, -discharge  ENT: -ear pain, -nasal congestion, -sore throat  Cardiovascular: -chest pain, -palpitations, -lower extremity edema  Respiratory: -cough, -shortness of breath  Gastrointestinal: -abdominal pain, -nausea, -vomiting, -diarrhea, -constipation, -blood in stool  Genitourinary: -dysuria, -hematuria, -frequency  Musculoskeletal: -joint pain, -muscle pain  Skin: -rash, -lesion  Neurological: -headache, -dizziness, -numbness, -tingling  Psychiatric: +anxiety, +depression, -sleep difficulty         Denies SI/HI/AVH. Denies adverse effects from medication  Pt reports taking medications as prescribed and behaving appropriately during interview today.    Pt appears:  Appropriate for     Mood:  Happy content    Sleep:  7-8 hours/ night    Appetite:  Normal no issues,     Self Rates Depression: 3-4/10  Self Rated Anxiety:  5-6 /10      Psychotherapy:  Target symptoms: depression, anxiety   Why chosen therapy is appropriate versus another modality: relevant to diagnosis, evidence based practice  Outcome monitoring methods: self-report, observation  Therapeutic intervention type: insight oriented psychotherapy, supportive psychotherapy  Topics discussed/themes: relationships difficulties, illness/death of a loved one, symptom recognition  The patient's response to the intervention is accepting. The patient's progress toward treatment goals is good.   Duration of intervention: 18 minutes.    Review of Systems   PSYCHIATRIC: Pertinant items are noted in the narrative.        Past Medical, Family and Social History: The patient's past medical, family and social history have been reviewed and updated as appropriate within the electronic medical record - see encounter notes.    Compliance: yes    Side effects: None    Risk Parameters:  Patient reports no suicidal ideation  Patient reports no homicidal ideation  Patient reports no self-injurious behavior  Patient reports no violent behavior    Exam  (detailed: at least 9 elements; comprehensive: all 15 elements)   Constitutional  Vitals:  Most recent vital signs, dated less than 90 days prior to this appointment, were reviewed.   Vitals:    07/02/25 0900   BP: 128/78   Pulse: 74   Temp: 98.1 °F (36.7 °C)   TempSrc: Oral   SpO2: 97%        General:  unremarkable, age appropriate     Musculoskeletal  Muscle Strength/Tone:  no spasicity, no rigidity, no cogwheeling, no flaccidity, no paratonia, no dyskinesia, no dystonia, no tremor, no tic, no choreoathetosis, no atrophy   Gait & Station:  non-ataxic     Psychiatric  Physical Exam    Appearance: Appears stated age. Well-groomed. Well-nourished.  Speech: Normal rate. Normal volume. Spontaneous and fluid.  Affect: Appropriate.  Thought Content: No evidence of aggression. No evidence of homicidal ideation. No evidence of homicidal plan. No evidence of homicidal intent. No evidence of suicidal ideation. No evidence of suicidal plan. No evidence of suicidal intent. No evidence of delusions.  Memory: Recent memory intact. Remote memory intact.  Behavior: Cooperative. Good eye contact. Engaged. Pleasant.  Mood: Euthymic.  Thought Form: Linear thinking. Goal oriented and directed.  Perception: No perceptual abnormalities noted.  Judgement: Intact as evidenced by decision making in the recent past.  Insight: Good insight into symptoms. Good insight into treatment options.  Cognition: A&Ox3. Normal attention span. Average fund of knowledge.  Motor: No gross motor abnormalities.         Assessment and Diagnosis   Status/Progress: Based on the examination today, the patient's problem(s) is/are improved and adequately but not ideally controlled.  New problems have not been presented today.   Co-morbidities, Diagnostic uncertainty, and Lack of compliance are not complicating management of the primary condition.  There are no active rule-out diagnoses for this patient at this time.     General Impression: Valery Huggins, a 84  y.o. female, presenting for initial evaluation visit for Depression and Generalized anxiety disorder   Presents 05/28/2025 - Continued Lexapro 20 mg daily at night. Increased Buspar: Start with 5 mg twice daily for 2 weeks. If needed, increase to 10 mg twice daily after 2 weeks.   Presents 07/02/2025 - d/c Lexapro, Start Zoloft 50 mg daily      ICD-10-CM ICD-9-CM    1. Generalized anxiety disorder  F41.1 300.02 sertraline (ZOLOFT) 50 MG tablet      2. Mild single current episode of major depressive disorder  F32.0 296.21 sertraline (ZOLOFT) 50 MG tablet      3. Family conflict  Z63.8 V61.9           Intervention/Counseling/Treatment Plan   Assessment & Plan    IMPRESSION:  Discontinued BuSpar due to patient experiencing significant diarrhea.  Assessed current anxiety management, noting Lexapro at max dose.  Switched antidepressant to address generalized anxiety, choosing Zoloft (sertraline) as next option due to its cardioprotective properties.  Evaluated current anxiety level (5-6/10) and depression level (3-4/10).  Noted improvement in overall mood and ability to handle stressors.  Reviewed use of antihistamines (Benadryl, hydroxyzine) for anxiety management.    MAJOR DEPRESSIVE DISORDER:  - Explained potential side effects during antidepressant transition, including temporary worsening of symptoms and initial GI upset with Zoloft, typically resolving as body adjusts.  - Started Zoloft (sertraline): Begin 1 day after stopping Lexapro, take half tablet for 3-4 days, then increase to full tablet daily, can be taken morning or night.  - Discontinued Lexapro: Decrease to half tablet for 3 days, then stop completely.    ANXIETY DISORDER:  - Continued BuSpar: Use as needed for anxiety, particularly during antidepressant transition.    FOLLOW-UP:  - Follow up in 5 weeks to evaluate effectiveness of new medication regimen.  - Contact office if experiencing significant side effects or worsening symptoms during medication  transition.       Discussed diagnosis, risk and benefits of proposed treatment above vs alternative treatment vs no treatment, and potential side effects of these treatments, and the inherent unpredictability of individual responses to these treatments. The patient expresses understanding and gives informed consent to pursue treatment at this time, believing that the potential benefits outweigh the potential risks. Patient has no other questions. Risks/adverse effects at this time include but are not limited to: GI side effects, sexual dysfunction, activation vs sedation, triggering of suicidal ideation, and serotonin syndrome.   Patient voices understanding and agreement with this plan  Provided crisis numbers  Encouraged patient to keep future appointments  Instruct patient to call or message with questions  In the event of an emergency, including suicidal ideation, patient was advised to go to the emergency room  This note was generated with the assistance of ambient listening technology. Verbal consent was obtained by the patient and accompanying visitor(s) for the recording of patient appointment to facilitate this note. I attest to having reviewed and edited the generated note for accuracy, though some syntax or spelling errors may persist. Please contact the author of this note for any clarification.        Return to Clinic: 6 weeks, 1 month        Valerie Perez DNP, PMJOSE JUANP, FNP

## 2025-08-05 ENCOUNTER — TELEPHONE (OUTPATIENT)
Dept: PSYCHOLOGY | Facility: CLINIC | Age: 85
End: 2025-08-05
Payer: MEDICARE

## 2025-08-06 ENCOUNTER — OFFICE VISIT (OUTPATIENT)
Dept: PSYCHOLOGY | Facility: CLINIC | Age: 85
End: 2025-08-06
Payer: MEDICARE

## 2025-08-06 VITALS
SYSTOLIC BLOOD PRESSURE: 119 MMHG | DIASTOLIC BLOOD PRESSURE: 59 MMHG | OXYGEN SATURATION: 98 % | TEMPERATURE: 98 F | HEART RATE: 70 BPM

## 2025-08-06 DIAGNOSIS — F32.0 MILD SINGLE CURRENT EPISODE OF MAJOR DEPRESSIVE DISORDER: ICD-10-CM

## 2025-08-06 DIAGNOSIS — F41.1 GENERALIZED ANXIETY DISORDER: ICD-10-CM

## 2025-08-06 PROCEDURE — 99214 OFFICE O/P EST MOD 30 MIN: CPT | Mod: PBBFAC,PN | Performed by: NURSE PRACTITIONER

## 2025-08-06 PROCEDURE — G2211 COMPLEX E/M VISIT ADD ON: HCPCS | Mod: ,,, | Performed by: NURSE PRACTITIONER

## 2025-08-06 PROCEDURE — 99214 OFFICE O/P EST MOD 30 MIN: CPT | Mod: S$PBB,,, | Performed by: NURSE PRACTITIONER

## 2025-08-06 PROCEDURE — 99999 PR PBB SHADOW E&M-EST. PATIENT-LVL IV: CPT | Mod: PBBFAC,,, | Performed by: NURSE PRACTITIONER

## 2025-08-06 PROCEDURE — 90833 PSYTX W PT W E/M 30 MIN: CPT | Mod: ,,, | Performed by: NURSE PRACTITIONER

## 2025-08-06 RX ORDER — SERTRALINE HYDROCHLORIDE 50 MG/1
50 TABLET, FILM COATED ORAL DAILY
Qty: 90 TABLET | Refills: 1 | Status: SHIPPED | OUTPATIENT
Start: 2025-08-06 | End: 2026-02-02

## 2025-08-18 ENCOUNTER — OFFICE VISIT (OUTPATIENT)
Facility: CLINIC | Age: 85
End: 2025-08-18
Payer: MEDICARE

## 2025-08-18 VITALS
WEIGHT: 151.38 LBS | RESPIRATION RATE: 18 BRPM | HEIGHT: 61 IN | TEMPERATURE: 98 F | HEART RATE: 80 BPM | OXYGEN SATURATION: 96 % | BODY MASS INDEX: 28.58 KG/M2

## 2025-08-18 DIAGNOSIS — T45.1X5A CHEMOTHERAPY INDUCED NEUTROPENIA: ICD-10-CM

## 2025-08-18 DIAGNOSIS — Z85.528 HISTORY OF RENAL CARCINOMA: ICD-10-CM

## 2025-08-18 DIAGNOSIS — D70.1 CHEMOTHERAPY INDUCED NEUTROPENIA: ICD-10-CM

## 2025-08-18 DIAGNOSIS — Z90.5 STATUS POST NEPHRECTOMY: ICD-10-CM

## 2025-08-18 DIAGNOSIS — D64.9 NORMOCYTIC ANEMIA: ICD-10-CM

## 2025-08-18 DIAGNOSIS — Z85.048 HISTORY OF RECTAL CANCER: Primary | ICD-10-CM

## 2025-08-18 PROCEDURE — 99214 OFFICE O/P EST MOD 30 MIN: CPT | Mod: PBBFAC,PN | Performed by: INTERNAL MEDICINE

## 2025-08-18 PROCEDURE — 99999 PR PBB SHADOW E&M-EST. PATIENT-LVL IV: CPT | Mod: PBBFAC,,, | Performed by: INTERNAL MEDICINE

## 2025-08-18 PROCEDURE — G2211 COMPLEX E/M VISIT ADD ON: HCPCS | Mod: ,,, | Performed by: INTERNAL MEDICINE

## 2025-08-18 PROCEDURE — 99215 OFFICE O/P EST HI 40 MIN: CPT | Mod: S$PBB,,, | Performed by: INTERNAL MEDICINE

## (undated) DEVICE — SOL WATER STRL IRR 1000ML

## (undated) DEVICE — SEE MEDLINE ITEM 157117

## (undated) DEVICE — CANNULA ENDOPATH XCEL 5X100MM

## (undated) DEVICE — DRESSING GAUZE 6PLY 4X4

## (undated) DEVICE — TROCAR ENDOPATH XCEL 5X100MM

## (undated) DEVICE — SCRUB 10% POVIDONE IODINE 4OZ

## (undated) DEVICE — WATER STRL IRR USP UROMTC 1000

## (undated) DEVICE — ELECTRODE REM PLYHSV RETURN 9

## (undated) DEVICE — SUT 1 48IN PDS II VIO MONO

## (undated) DEVICE — NDL SAFETY 21G X 1 1/2 ECLPSE

## (undated) DEVICE — Device

## (undated) DEVICE — DRESSING TELFA N ADH 3X8

## (undated) DEVICE — BAG INZII TISS RETRV 12/15MM

## (undated) DEVICE — ASSEMBLY HARMONIC ACE 8MM

## (undated) DEVICE — STRAP OR TABLE 5IN X 72IN

## (undated) DEVICE — PACK CUSTOM ENDO CHOLO SLI

## (undated) DEVICE — SYR 10CC LUER LOCK

## (undated) DEVICE — SPONGE SUPER KERLIX 6X6.75IN

## (undated) DEVICE — LINER SUCTION 3000CC

## (undated) DEVICE — BLADE SURG #15 CARBON STEEL

## (undated) DEVICE — BLADE SURG CARBON STEEL SZ11

## (undated) DEVICE — CLOSURE SKIN STERI STRIP 1/2X4

## (undated) DEVICE — SLEEVE SCD EXPRESS CALF MEDIUM

## (undated) DEVICE — PORT ACCESS 5MM W/120MM

## (undated) DEVICE — CATH URTRL OPEN END STR TIP 5F

## (undated) DEVICE — DRAPE C ARM 42 X 120 10/BX

## (undated) DEVICE — SUT CTD VICRYL 0 UND BR SUT

## (undated) DEVICE — SEE MEDLINE ITEM 152622

## (undated) DEVICE — NDL INSUF ULTRA VERESS 120MM

## (undated) DEVICE — SET TRI-LUMEN FILTERED TUBE

## (undated) DEVICE — COVER TIP CURVED SCISSORS XI

## (undated) DEVICE — WIRE GUIDE 0.038OLD

## (undated) DEVICE — GLOVE SURG ULTRA TOUCH 7.5

## (undated) DEVICE — OBTURATOR BLADELESS 8MM XI

## (undated) DEVICE — DRESSING TRANS 4X4 TEGADERM

## (undated) DEVICE — STAPLER SKIN PROXIMATE WIDE

## (undated) DEVICE — APPLICATOR CHLORAPREP ORN 26ML

## (undated) DEVICE — SEAL UNIVERSAL 5MM-8MM XI

## (undated) DEVICE — TRAY DRY SKIN SCRUB PREP

## (undated) DEVICE — BANDAGE ADHESIVE

## (undated) DEVICE — ADHESIVE DERMABOND ADVANCED

## (undated) DEVICE — SHEATH FLEXOR URETERAL 28CM

## (undated) DEVICE — SUT MONOCRYL 4-0 PS-2

## (undated) DEVICE — CATH URETERAL RUTNER 5 FR

## (undated) DEVICE — SOL IRR NACL .9% 3000ML

## (undated) DEVICE — BAG TISS RETRV MONARCH 10MM

## (undated) DEVICE — STAPLER ECHELON FLEX GST 45MM

## (undated) DEVICE — SOL IRR WATER STRL 3000 ML

## (undated) DEVICE — SEE MEDLINE ITEM 152680

## (undated) DEVICE — RELOAD ECHELON ENDOPATH 45MM

## (undated) DEVICE — IRRIGATOR SUCTION W/TIP

## (undated) DEVICE — GUIDEWIRE NITINOL HYBRID 150CM

## (undated) DEVICE — SPONGE GELFOAM 12-7MM

## (undated) DEVICE — SOL 9P NACL IRR PIC IL

## (undated) DEVICE — SEE MEDLINE ITEM 146292

## (undated) DEVICE — BAG URINARY DRN 2000ML

## (undated) DEVICE — PACK CUSTOM UNIV BASIN SLI

## (undated) DEVICE — CLIP HEMO-LOK MLX LARGE LF

## (undated) DEVICE — GAUZE SPONGE BULKEE 6X6.75IN

## (undated) DEVICE — SYRINGE 0.9% NACL 10MIL PREFIL

## (undated) DEVICE — TAPE SILK 3IN

## (undated) DEVICE — SCRUB HIBICLENS 4% CHG 4OZ

## (undated) DEVICE — SUT ABS CLIP LAPRA-TY CTD

## (undated) DEVICE — HEMOSTAT SURGICEL PWD 3G

## (undated) DEVICE — SET IRR URLGY 2LINE UNIV SPIKE

## (undated) DEVICE — GOWN SURGICAL X-LARGE

## (undated) DEVICE — PAD ABD 8X10 STERILE

## (undated) DEVICE — HEMOSTAT SURGICEL 4X8IN

## (undated) DEVICE — SOL ELECTROLUBE ANTI-STIC

## (undated) DEVICE — BRUSH SCRUB SURGICALW/BETADINE

## (undated) DEVICE — GUIDE WIRE MOTION .035 X 150CM

## (undated) DEVICE — SEE MEDLINE ITEM 156923

## (undated) DEVICE — DRAPE MINOR PROCEDURE

## (undated) DEVICE — IRRIGATOR ENDOSCOPY DISP.

## (undated) DEVICE — SUT 3-0 VICRYL / SH (J416)

## (undated) DEVICE — TROCAR ENDOPATH XCEL 12X100MM

## (undated) DEVICE — SUT 0 VICRYL / UR6 (J603)

## (undated) DEVICE — DRAPE ARM DAVINCI XI

## (undated) DEVICE — TROCAR ENDOPATH XCEL 11MM 10CM

## (undated) DEVICE — NDL HYPO SAFETY 22 X 1 1/2

## (undated) DEVICE — TUBING PNEUMO

## (undated) DEVICE — SUTURE CV6 36IN ON TTC-13 NDL

## (undated) DEVICE — GLOVE BIOGEL 7.0

## (undated) DEVICE — SCISSOR CURVED ENDOPATH 5MM

## (undated) DEVICE — SYR SLIP TIP 10ML SHIELD

## (undated) DEVICE — SUT 2-0 VICRYL / SH (J417)

## (undated) DEVICE — BRIEF MESH LARGE

## (undated) DEVICE — KIT ANTIFOG

## (undated) DEVICE — LUBRICANT SURGILUBE 2 OZ

## (undated) DEVICE — SEE MEDLINE ITEM 153151

## (undated) DEVICE — CONTAINER SPECIMEN STRL 4OZ

## (undated) DEVICE — SOL BETADINE 5%

## (undated) DEVICE — DRAPE COLUMN DAVINCI XI

## (undated) DEVICE — APPLIER CLIP ENDO LIGAMAX 5MM

## (undated) DEVICE — GAUZE SPONGE 4X4 12PLY

## (undated) DEVICE — SEE MEDLINE ITEM 157185

## (undated) DEVICE — DRAPE LAP TIBURON 77X122IN

## (undated) DEVICE — SOL CLEARIFY VISUALIZATION LAP

## (undated) DEVICE — SEE MEDLINE ITEM 152487

## (undated) DEVICE — GLOVE BIOGEL SKINSENSE PI 7.5

## (undated) DEVICE — EXTRACTOR TIPLESS 2.4FRX1115CM